# Patient Record
Sex: MALE | Race: BLACK OR AFRICAN AMERICAN | NOT HISPANIC OR LATINO | Employment: OTHER | URBAN - METROPOLITAN AREA
[De-identification: names, ages, dates, MRNs, and addresses within clinical notes are randomized per-mention and may not be internally consistent; named-entity substitution may affect disease eponyms.]

---

## 2017-01-01 RX ORDER — ISOSORBIDE MONONITRATE 30 MG/1
TABLET, EXTENDED RELEASE ORAL
Qty: 90 TABLET | Refills: 0 | Status: SHIPPED | OUTPATIENT
Start: 2017-01-01 | End: 2017-01-01 | Stop reason: SDUPTHER

## 2017-01-01 RX ORDER — LOSARTAN POTASSIUM 50 MG/1
TABLET ORAL
Qty: 60 TABLET | Refills: 0 | Status: SHIPPED | OUTPATIENT
Start: 2017-01-01 | End: 2018-01-01 | Stop reason: SDUPTHER

## 2017-01-01 RX ORDER — ISOSORBIDE MONONITRATE 30 MG/1
TABLET, EXTENDED RELEASE ORAL
Qty: 60 TABLET | Refills: 0 | Status: SHIPPED | OUTPATIENT
Start: 2017-01-01 | End: 2018-01-01

## 2017-01-01 RX ORDER — ISOSORBIDE MONONITRATE 30 MG/1
TABLET, EXTENDED RELEASE ORAL
Qty: 90 TABLET | Refills: 0 | Status: SHIPPED | OUTPATIENT
Start: 2017-01-01 | End: 2018-01-01 | Stop reason: SDUPTHER

## 2017-01-01 RX ORDER — LOSARTAN POTASSIUM 50 MG/1
TABLET ORAL
Qty: 60 TABLET | Refills: 0 | Status: SHIPPED | OUTPATIENT
Start: 2017-01-01 | End: 2017-01-01 | Stop reason: SDUPTHER

## 2017-01-09 RX ORDER — SPIRONOLACTONE 25 MG/1
TABLET ORAL
Qty: 30 TABLET | Refills: 0 | Status: SHIPPED | OUTPATIENT
Start: 2017-01-09 | End: 2017-02-20 | Stop reason: SDUPTHER

## 2017-02-20 RX ORDER — SPIRONOLACTONE 25 MG/1
TABLET ORAL
Qty: 30 TABLET | Refills: 5 | Status: SHIPPED | OUTPATIENT
Start: 2017-02-20 | End: 2017-06-19 | Stop reason: SDUPTHER

## 2017-03-13 RX ORDER — POTASSIUM CHLORIDE 20 MEQ/1
TABLET, EXTENDED RELEASE ORAL
Qty: 90 TABLET | Refills: 0 | Status: SHIPPED | OUTPATIENT
Start: 2017-03-13

## 2017-03-13 RX ORDER — FUROSEMIDE 40 MG/1
TABLET ORAL
Qty: 120 TABLET | Refills: 0 | Status: SHIPPED | OUTPATIENT
Start: 2017-03-13 | End: 2017-06-11 | Stop reason: SDUPTHER

## 2017-03-20 ENCOUNTER — CLINICAL SUPPORT (OUTPATIENT)
Dept: TRANSPLANT | Facility: CLINIC | Age: 57
End: 2017-03-20
Payer: MEDICAID

## 2017-03-20 ENCOUNTER — HOSPITAL ENCOUNTER (OUTPATIENT)
Dept: CARDIOLOGY | Facility: CLINIC | Age: 57
Discharge: HOME | End: 2017-03-20
Payer: MEDICAID

## 2017-03-20 ENCOUNTER — OFFICE VISIT (OUTPATIENT)
Dept: TRANSPLANT | Facility: CLINIC | Age: 57
End: 2017-03-20
Payer: MEDICAID

## 2017-03-20 VITALS
SYSTOLIC BLOOD PRESSURE: 122 MMHG | DIASTOLIC BLOOD PRESSURE: 79 MMHG | WEIGHT: 228.81 LBS | HEART RATE: 73 BPM | HEIGHT: 71 IN | BODY MASS INDEX: 32.03 KG/M2

## 2017-03-20 DIAGNOSIS — Z95.0 BIVENTRICULAR CARDIAC PACEMAKER IN SITU: ICD-10-CM

## 2017-03-20 DIAGNOSIS — I25.111 CORONARY ARTERY DISEASE INVOLVING NATIVE CORONARY ARTERY OF NATIVE HEART WITH ANGINA PECTORIS WITH DOCUMENTED SPASM: ICD-10-CM

## 2017-03-20 DIAGNOSIS — I63.9 CEREBROVASCULAR ACCIDENT (CVA), UNSPECIFIED MECHANISM: ICD-10-CM

## 2017-03-20 DIAGNOSIS — D86.0 SARCOIDOSIS OF LUNG: ICD-10-CM

## 2017-03-20 DIAGNOSIS — G40.909 SEIZURE DISORDER: ICD-10-CM

## 2017-03-20 DIAGNOSIS — I50.22 CHRONIC SYSTOLIC HEART FAILURE: ICD-10-CM

## 2017-03-20 DIAGNOSIS — I50.22 CHRONIC SYSTOLIC HEART FAILURE: Primary | ICD-10-CM

## 2017-03-20 DIAGNOSIS — I50.22 CHRONIC SYSTOLIC CHF (CONGESTIVE HEART FAILURE): Primary | ICD-10-CM

## 2017-03-20 LAB
DIASTOLIC DYSFUNCTION: NO
DIASTOLIC DYSFUNCTION: NO
ESTIMATED PA SYSTOLIC PRESSURE: 24
MITRAL VALVE REGURGITATION: ABNORMAL
RETIRED EF AND QEF - SEE NOTES: 13 (ref 55–65)
TRICUSPID VALVE REGURGITATION: ABNORMAL

## 2017-03-20 PROCEDURE — 99999 PR PBB SHADOW E&M-EST. PATIENT-LVL III: CPT | Mod: PBBFAC,,, | Performed by: INTERNAL MEDICINE

## 2017-03-20 PROCEDURE — 93306 TTE W/DOPPLER COMPLETE: CPT | Mod: 26,S$PBB,, | Performed by: INTERNAL MEDICINE

## 2017-03-20 PROCEDURE — 94621 CARDIOPULM EXERCISE TESTING: CPT | Mod: 26,S$PBB,, | Performed by: INTERNAL MEDICINE

## 2017-03-20 PROCEDURE — 99214 OFFICE O/P EST MOD 30 MIN: CPT | Mod: S$PBB,,, | Performed by: INTERNAL MEDICINE

## 2017-03-20 RX ORDER — PROMETHAZINE HYDROCHLORIDE AND CODEINE PHOSPHATE 6.25; 1 MG/5ML; MG/5ML
SOLUTION ORAL
Refills: 0 | COMMUNITY
Start: 2017-02-07 | End: 2017-03-20

## 2017-03-20 RX ORDER — COLCHICINE 0.6 MG/1
0.6 TABLET ORAL DAILY
Refills: 5 | COMMUNITY
Start: 2017-03-11

## 2017-03-20 RX ORDER — PREDNISONE 10 MG/1
TABLET ORAL
Refills: 5 | Status: ON HOLD | COMMUNITY
Start: 2017-02-26 | End: 2018-01-01 | Stop reason: HOSPADM

## 2017-03-20 RX ORDER — LOSARTAN POTASSIUM 50 MG/1
50 TABLET ORAL DAILY
Qty: 30 TABLET | Refills: 6
Start: 2017-03-20 | End: 2017-06-19 | Stop reason: SDUPTHER

## 2017-03-20 NOTE — MR AVS SNAPSHOT
[facility]  [address]  [facility] [address]  Phone: [phone]                  Yonathan Good Jr.   3/20/2017 10:30 AM   Office Visit    Description:  Male : 1960   Provider:  Chana Berman MD   Department:  [facility]           Reason for Visit     Heart Transplant Pre-evaluation           Diagnoses this Visit        Comments    Cerebrovascular accident (CVA), unspecified mechanism    -  Primary     Seizure disorder         Sarcoidosis of lung         Chronic systolic heart failure         Coronary artery disease involving native coronary artery of native heart with angina pectoris with documented spasm         Biventricular cardiac pacemaker in situ                To Do List           Future Appointments        Provider Department Dept Phone    2017 9:00 AM LAB, APPOINTMENT [place] [facility] [phone]    2017 10:30 AM Kimberly Lazo MD [facility] [phone]      Goals (5 Years of Data)     None       These Medications        Disp Refills Start End    losartan (COZAAR) 50 MG tablet 30 tablet 6 3/20/2017     Take 1 tablet (50 mg total) by mouth once daily. - Oral    Pharmacy: [facility] [address] [place] [address] HEATHER AT [facility] Ph #: [phone]         [facility]     [facility] Care [facility] - 24/ Assistance  Registered nurses in the [facility] provide clinical advisement, health education, appointment booking, and other advisory services.  Call for this free service at [phone].             Medications           Message regarding Medications     Verify the changes and/or additions to your medication regime listed below are the same as discussed with your clinician today.  If any of these changes or additions are incorrect, please notify your healthcare provider.        CHANGE how you are taking these medications     Start Taking  Instead of    losartan (COZAAR) 50 MG tablet losartan (COZAAR) 50 MG tablet    Dosage:  Take 1 tablet (50 mg total) by mouth once daily. Dosage:  Take 1 tablet (50 mg total) by mouth 2 (two) times daily.    Reason for Change:  Reorder       STOP taking these medications     promethazine-codeine 6.25-10 mg/5 ml (PHENERGAN WITH CODEINE) 6.25-10 mg/5 mL syrup TK 5 ML PO Q 6 H PRF COUGH           Verify that the below list of medications is an accurate representation of the medications you are currently taking.  If none reported, the list may be blank. If incorrect, please contact your healthcare provider. Carry this list with you in case of emergency.           Current Medications     albuterol (VENTOLIN HFA) 90 mcg/actuation inhaler Inhale 2 puffs into the lungs every 4 (four) hours as needed for Wheezing.    allopurinol (ZYLOPRIM) 100 MG tablet TK 1 T PO D FOR GOUT    alprazolam (XANAX) 2 MG tablet Take 2 mg by mouth 2 (two) times daily as needed.     ARNUITY ELLIPTA 200 mcg/actuation DsDv INHALE 1 PUFF PO QD    aspirin (ECOTRIN) 81 MG EC tablet Take 81 mg by mouth. 1 Tablet, Delayed Release (E.C.) Oral Every day    carisoprodol (SOMA) 350 MG tablet Take 350 mg by mouth 4 (four) times daily as needed for Muscle spasms.    colchicine 0.6 mg tablet 0.6 mg once daily.     fluticasone furoate 100 mcg/actuation DsDv Inhale into the lungs.    folic acid (FOLVITE) 1 MG tablet TK 1 T PO QD    furosemide (LASIX) 40 MG tablet TAKE 2 TABLETS BY MOUTH TWICE DAILY    gabapentin (NEURONTIN) 600 MG tablet Take 600 mg by mouth 2 (two) times daily. 1 Tablet Oral At bedtime    hydrocodone-acetaminophen 10-325mg (NORCO)  mg Tab Take 1 tablet by mouth 2 (two) times daily.     isosorbide mononitrate (IMDUR) 30 MG 24 hr tablet Take 2 tablets (60 mg total) by mouth once daily.    losartan (COZAAR) 50 MG tablet Take 1 tablet (50 mg total) by mouth once daily.    NITROSTAT 0.4 mg SL tablet Place 0.4 mg under the tongue every 5 (five)  "minutes as needed.     pantoprazole (PROTONIX) 40 MG tablet Take 40 mg by mouth. 1 Tablet, Delayed Release (E.C.) Oral Every day    phenytoin (DILANTIN) 100 MG ER capsule 100 mg once.     potassium chloride SA (K-DUR,KLOR-CON) 20 MEQ tablet TAKE 2 TABLETS BY MOUTH EVERY MORNING AND 1 TABLET BY MOUTH EVERY EVENING    pravastatin (PRAVACHOL) 40 MG tablet Take 40 mg by mouth once daily.     predniSONE (DELTASONE) 10 MG tablet TK 1 T PO QAM    predniSONE (DELTASONE) 5 MG tablet 10 mg once daily.     sotalol (BETAPACE) 120 MG Tab 120 mg every 12 (twelve) hours.     spironolactone (ALDACTONE) 25 MG tablet TAKE 1 TABLET(25 MG) BY MOUTH EVERY DAY    warfarin (COUMADIN) 7.5 MG tablet            Clinical Reference Information           Your Vitals Were     BP Pulse Height Weight BMI    122/79 73 5' 11" (1.803 m) 103.8 kg (228 lb 13.4 oz) 31.92 kg/m2      Blood Pressure          Most Recent Value    BP  122/79      Allergies as of 3/20/2017     No Known Allergies      Immunizations Administered on Date of Encounter - 3/20/2017     None      Maintenance Dialysis History     Patient has no recorded history of maintenance dialysis.      Transplant Information        Txp Date Organ Coordinator Care Team     Heart JOANNA Hammondstarun Sign-Up     Activating your [facility] account is as easy as 1-2-3!     1) Visit [email or web address], select Sign Up Now, enter this activation code and your date of birth, then select Next.  [email or web address]  Expires: 3/30/2017  8:44 AM      2) Create a username and password to use when you visit [facility] in the future and select a security question in case you lose your password and select Next.    3) Enter your e-mail address and click Sign Up!    Additional Information  If you have questions, please e-mail [email or web address] or call [phone] to talk to our [facility] staff. Remember, [facility] is NOT to be used for urgent needs. For medical emergencies, dial 911.         Language " Assistance Services     ATTENTION: Language assistance services are available, free of charge. Please call [phone].      ATENCIÓN: Si habla vasquez, tiene a torres disposición servicios gratuitos de asistencia lingüística. Llame al [phone].     CHÚ Ý: N?u b?n nói Ti?ng Vi?t, có các d?ch v? h? tr? ngôn ng? mi?n phí dành cho b?n. G?i s? [phone].         [facility] complies with applicable Federal civil rights laws and does not discriminate on the basis of race, color, national origin, age, disability, or sex.

## 2017-03-20 NOTE — PROGRESS NOTES
"Subjective:   Initial evaluation of heart transplant candidacy.    HPI:  Mr. Good is a 56 y.o. year old Black or  male who has presented to be evaluated as a potential heart transplant recipient.  Mr. Good has ICM, LVEF 10% ([facility]viously [facility] in [facility]), CAD /sp SONG, left parietal stroke associated with presumed LV thrombus, pulmonary sarcoidosis, HTN, seizure disorder, left ulnar neuropathy, whom we first met 07/2012 on transfer for BIV/ICD placement. At the time, cardiac MRI done before placement of ICD demonstrated nontransmural myocardial scar, which was most likely representing MI in LAD distribution, not sarcoidosis. Last hospitalization for heart failure March 2014.     Since his last visit, he feels he has been doing very well. We increased his diuretic after August RHC, and patient states he has been doing very well since then. However then states that he notices shortness of breath that comes on with "fast heart rate", still cutting grass but not doing it as much as before at a time, takes more breaks, only able to do front yard rather than both front and back. Admits to stable tightness in his chest, states he takes nitroglycerin and it resolves. He describes NYHA FC III, states he does not get short of breath if he takes his time, and is adamant that he feels better. Is very hesitant to proceed with advanced options, due to feeling as though he feels better. Of note, states he quit drinking alcohol completely since his last visit with me in August (although in August he stated he quit drinking earlier than that?).    CPX with pkVO2 10.4 ml/kg/min and VE/VCO2 27.1.     Underwent RHC 08/08/16, with the following findings:  AOPRES: 138/101 (113)  AOSAT: 96  FICKCI: 1.92  FICKCO: 4.29  PAPRES: 66/34 (48)  PASAT: 59  PVR: 4.2  PWPRES: 34/34 (34)  RAPRES: 19/19 (17)  RVPRES: 64/13, 18    Echo 05/29/15:  1 - Left ventricular enlargement. LVEDD 7.0cm.   2 - Eccentric hypertrophy. " "  3 - Severely depressed left ventricular systolic function (EF 15-20%).   4 - Left ventricular diastolic dysfunction.   5 - Mild left atrial enlargement.   6 - Pulmonary hypertension.   7 - Normal right ventricular systolic function .   8 - Trivial tricuspid regurgitation.     Review of Systems   Constitution: Negative for chills, decreased appetite, diaphoresis, fever, weakness, malaise/fatigue, weight gain and weight loss.   HENT: Negative for headaches.    Eyes: Negative for visual disturbance.   Cardiovascular: Positive for dyspnea on exertion and palpitations. Negative for chest pain, irregular heartbeat, leg swelling, near-syncope, orthopnea, paroxysmal nocturnal dyspnea and syncope.   Respiratory: Positive for cough, shortness of breath and sputum production. Negative for sleep disturbances due to breathing, snoring and wheezing.    Hematologic/Lymphatic: Negative for bleeding problem. Does not bruise/bleed easily.   Skin: Negative for color change, poor wound healing and rash.   Musculoskeletal: Negative for back pain, falls, joint pain and muscle weakness.   Gastrointestinal: Negative for bloating, abdominal pain, constipation, diarrhea, hematemesis, hematochezia, melena, nausea and vomiting.   Genitourinary: Negative for nocturia.   Neurological: Negative for excessive daytime sleepiness, dizziness, focal weakness and light-headedness.   Psychiatric/Behavioral: Negative for depression and memory loss. The patient does not have insomnia and is not nervous/anxious.      Objective:     Physical Exam   Constitutional: He is oriented to person, place, and time. He appears well-developed and well-nourished. No distress.   /79  Pulse 73  Ht 5' 11" (1.803 m)  Wt 103.8 kg (228 lb 13.4 oz)  BMI 31.92 kg/m2   HENT:   Head: Normocephalic and atraumatic.   Mouth/Throat: Oropharynx is clear and moist.   Eyes: Conjunctivae and EOM are normal. Right eye exhibits no discharge. Left eye exhibits no discharge. "   Neck: Normal range of motion. Neck supple. No JVD (7cm H2O) present.   Cardiovascular: Normal rate, regular rhythm and intact distal pulses.  Exam reveals no gallop and no friction rub.    No murmur heard.  Pulmonary/Chest: Effort normal and breath sounds normal. He has no wheezes. He has no rales. He exhibits no tenderness.   Abdominal: Soft. Bowel sounds are normal. He exhibits no distension and no mass. There is no tenderness. There is no rebound and no guarding.   Musculoskeletal: Normal range of motion. He exhibits no edema or tenderness.   Neurological: He is alert and oriented to person, place, and time.   Skin: Skin is warm and dry. No rash noted. He is not diaphoretic. No erythema.   Psychiatric: He has a normal mood and affect. His behavior is normal. Judgment and thought content normal.   Nursing note and vitals reviewed.    Labs:      Chemistry        Component Value Date/Time     03/20/2017 0855    K 3.5 03/20/2017 0855     03/20/2017 0855    CO2 24 03/20/2017 0855    BUN 23 (H) 03/20/2017 0855    CREATININE 1.1 03/20/2017 0855    GLU 98 03/20/2017 0855        Component Value Date/Time    CALCIUM 9.1 03/20/2017 0855    ALKPHOS 54 (L) 03/20/2017 0855    AST 17 03/20/2017 0855    ALT 21 03/20/2017 0855    BILITOT 0.6 03/20/2017 0855          Magnesium   Date Value Ref Range Status   10/07/2016 2.5 1.6 - 2.6 mg/dL Final     Lab Results   Component Value Date    WBC 5.14 03/20/2017    HGB 14.3 03/20/2017    HCT 42.0 03/20/2017    MCV 93 03/20/2017     (L) 03/20/2017     Lab Results   Component Value Date    INR 2.1 (H) 03/20/2017    INR 2.4 (H) 10/07/2016    INR 1.5 (H) 08/08/2016     BNP   Date Value Ref Range Status   03/20/2017 380 (H) 0 - 99 pg/mL Final     Comment:     Values of less than 100 pg/ml are consistent with non-CHF populations.   10/07/2016 442 (H) 0 - 99 pg/mL Final     Comment:     Values of less than 100 pg/ml are consistent with non-CHF populations.   08/08/2016 5398  (H) 0 - 99 pg/mL Final     Comment:     Values of less than 100 pg/ml are consistent with non-CHF populations.     Labs were reviewed with the patient.    Assessment:      1. Chronic systolic heart failure    2. Cerebrovascular accident (CVA), unspecified mechanism    3. Seizure disorder    4. Sarcoidosis of lung    5. Coronary artery disease involving native coronary artery of native heart with angina pectoris with documented spasm    6. Biventricular cardiac pacemaker in situ        Plan:   Patient seems to have declined some since his last visit in October, when he was NYHA FC II-III and had improved so much with increasing his meds.   He is very reluctant to proceed with advanced options, states he is skeptical of this and he would prefer to look for etiology of chest pain before proceeding with further advanced options.   He is also very hesitant to increase imdur because he is worried about his blood pressure dropping.   Patient states he has abstained from drinking alcohol since August of 2016.   Will send a copy of our note to Dr. Rahman who is his primary cardiologist (and who we apologize to for not sending notes in the past, patient states he has not been receiving our notes, we will correct this with the system coordinator and make sure the numbers in the computer are accurate). Since it does seem patient is having a little more chest pain now than previously, wonder about performing a repeat ischemic evaluation- last one here was in 2015 at which time patient had infarct only, no ischemia.   Additionally patient remarks about heart racing and feels this is the main time that his chest pain acts up- wonder about VT, if possible that Dr. Rahman would perform ICD interrogation and see if there are any more arrhythmias noted than previous.   Patient is now NYHA III  Recommend 2 gram sodium restriction and 1500cc fluid restriction.  Encourage physical activity with graded exercise program.  Requested patient  to weigh themselves daily, and to notify us if their weight increases by more than 3 lbs in 1 day or 5 lbs in 1 week.   Would like to repeat RHC, with possible admission at that time, however patient wants to hold off still on evaluation.       UNOS Patient Status  Functional Status: 80% - Normal activity with effort: some symptoms of disease  Physical Capacity: No Limitations  Working for Income: Unknown    Chana Berman MD

## 2017-05-11 RX ORDER — ISOSORBIDE MONONITRATE 30 MG/1
TABLET, EXTENDED RELEASE ORAL
Qty: 60 TABLET | Refills: 0 | Status: SHIPPED | OUTPATIENT
Start: 2017-05-11 | End: 2017-06-11 | Stop reason: SDUPTHER

## 2017-06-12 RX ORDER — FUROSEMIDE 40 MG/1
TABLET ORAL
Qty: 120 TABLET | Refills: 0 | Status: ON HOLD | OUTPATIENT
Start: 2017-06-12 | End: 2018-01-01

## 2017-06-12 RX ORDER — ISOSORBIDE MONONITRATE 30 MG/1
TABLET, EXTENDED RELEASE ORAL
Qty: 60 TABLET | Refills: 0 | Status: SHIPPED | OUTPATIENT
Start: 2017-06-12 | End: 2017-06-19 | Stop reason: SDUPTHER

## 2017-06-16 ENCOUNTER — TELEPHONE (OUTPATIENT)
Dept: TRANSPLANT | Facility: CLINIC | Age: 57
End: 2017-06-16

## 2017-06-16 NOTE — TELEPHONE ENCOUNTER
Contacted patient to confirm appt for Monday w/Dr. Berman at 9 and labs at 0720.  Pt verbalized understanding and advised of plans to keep appts.

## 2017-06-19 ENCOUNTER — LAB VISIT (OUTPATIENT)
Dept: LAB | Facility: HOSPITAL | Age: 57
End: 2017-06-19
Attending: INTERNAL MEDICINE
Payer: MEDICAID

## 2017-06-19 ENCOUNTER — OFFICE VISIT (OUTPATIENT)
Dept: TRANSPLANT | Facility: CLINIC | Age: 57
End: 2017-06-19
Payer: MEDICAID

## 2017-06-19 VITALS
HEART RATE: 63 BPM | HEIGHT: 71 IN | WEIGHT: 228.81 LBS | BODY MASS INDEX: 32.03 KG/M2 | DIASTOLIC BLOOD PRESSURE: 76 MMHG | SYSTOLIC BLOOD PRESSURE: 114 MMHG

## 2017-06-19 DIAGNOSIS — D86.0 SARCOIDOSIS OF LUNG: ICD-10-CM

## 2017-06-19 DIAGNOSIS — I25.111 CORONARY ARTERY DISEASE INVOLVING NATIVE CORONARY ARTERY OF NATIVE HEART WITH ANGINA PECTORIS WITH DOCUMENTED SPASM: ICD-10-CM

## 2017-06-19 DIAGNOSIS — G40.909 SEIZURE DISORDER: ICD-10-CM

## 2017-06-19 DIAGNOSIS — I50.22 CHRONIC SYSTOLIC HEART FAILURE: Primary | ICD-10-CM

## 2017-06-19 DIAGNOSIS — R91.8 OPACITY OF LUNG ON IMAGING STUDY: ICD-10-CM

## 2017-06-19 DIAGNOSIS — I50.22 CHRONIC SYSTOLIC CHF (CONGESTIVE HEART FAILURE): ICD-10-CM

## 2017-06-19 DIAGNOSIS — Z95.0 BIVENTRICULAR CARDIAC PACEMAKER IN SITU: ICD-10-CM

## 2017-06-19 DIAGNOSIS — I63.9 CEREBROVASCULAR ACCIDENT (CVA), UNSPECIFIED MECHANISM: ICD-10-CM

## 2017-06-19 LAB
ALBUMIN SERPL BCP-MCNC: 3.9 G/DL
ALP SERPL-CCNC: 56 U/L
ALT SERPL W/O P-5'-P-CCNC: 37 U/L
ANION GAP SERPL CALC-SCNC: 12 MMOL/L
AST SERPL-CCNC: 33 U/L
BASOPHILS # BLD AUTO: 0.04 K/UL
BASOPHILS NFR BLD: 0.6 %
BILIRUB SERPL-MCNC: 0.6 MG/DL
BNP SERPL-MCNC: 627 PG/ML
BUN SERPL-MCNC: 30 MG/DL
CALCIUM SERPL-MCNC: 9.5 MG/DL
CHLORIDE SERPL-SCNC: 102 MMOL/L
CO2 SERPL-SCNC: 25 MMOL/L
CREAT SERPL-MCNC: 1.4 MG/DL
DIFFERENTIAL METHOD: ABNORMAL
EOSINOPHIL # BLD AUTO: 0.2 K/UL
EOSINOPHIL NFR BLD: 2.7 %
ERYTHROCYTE [DISTWIDTH] IN BLOOD BY AUTOMATED COUNT: 14.5 %
EST. GFR  (AFRICAN AMERICAN): >60 ML/MIN/1.73 M^2
EST. GFR  (NON AFRICAN AMERICAN): 56.6 ML/MIN/1.73 M^2
GLUCOSE SERPL-MCNC: 96 MG/DL
HCT VFR BLD AUTO: 45.1 %
HGB BLD-MCNC: 15 G/DL
LYMPHOCYTES # BLD AUTO: 1.8 K/UL
LYMPHOCYTES NFR BLD: 27.8 %
MCH RBC QN AUTO: 31.7 PG
MCHC RBC AUTO-ENTMCNC: 33.3 %
MCV RBC AUTO: 95 FL
MONOCYTES # BLD AUTO: 1.1 K/UL
MONOCYTES NFR BLD: 16.2 %
NEUTROPHILS # BLD AUTO: 3.4 K/UL
NEUTROPHILS NFR BLD: 52.2 %
PLATELET # BLD AUTO: 176 K/UL
PMV BLD AUTO: 11.2 FL
POTASSIUM SERPL-SCNC: 3.7 MMOL/L
PROT SERPL-MCNC: 8.1 G/DL
RBC # BLD AUTO: 4.73 M/UL
SODIUM SERPL-SCNC: 139 MMOL/L
WBC # BLD AUTO: 6.55 K/UL

## 2017-06-19 PROCEDURE — 99999 PR PBB SHADOW E&M-EST. PATIENT-LVL III: CPT | Mod: PBBFAC,TXP,, | Performed by: INTERNAL MEDICINE

## 2017-06-19 PROCEDURE — 99214 OFFICE O/P EST MOD 30 MIN: CPT | Mod: S$PBB,TXP,, | Performed by: INTERNAL MEDICINE

## 2017-06-19 PROCEDURE — 99213 OFFICE O/P EST LOW 20 MIN: CPT | Mod: PBBFAC,TXP | Performed by: INTERNAL MEDICINE

## 2017-06-19 RX ORDER — LOSARTAN POTASSIUM 50 MG/1
50 TABLET ORAL DAILY
Qty: 30 TABLET | Refills: 6
Start: 2017-06-19

## 2017-06-19 RX ORDER — ISOSORBIDE MONONITRATE 30 MG/1
90 TABLET, EXTENDED RELEASE ORAL DAILY
Qty: 90 TABLET | Refills: 0 | Status: SHIPPED | OUTPATIENT
Start: 2017-06-19 | End: 2017-01-01 | Stop reason: SDUPTHER

## 2017-06-19 RX ORDER — SPIRONOLACTONE 25 MG/1
TABLET ORAL
Qty: 30 TABLET | Refills: 5 | Status: SHIPPED | OUTPATIENT
Start: 2017-06-19 | End: 2018-01-01 | Stop reason: SDUPTHER

## 2017-06-19 NOTE — PATIENT INSTRUCTIONS
Increase imdur to 3 tablets once a day (90mg total).     If blood pressure drops too much, and you feel it, decrease to 60mg again.

## 2017-06-19 NOTE — PROGRESS NOTES
"Subjective:   Initial evaluation of heart transplant candidacy.    HPI:  Mr. Good is a 56 y.o. year old Black or  male who has presented to be evaluated as a potential heart transplant recipient.  Mr. Good has ICM, LVEF 10% (previously 25% in 2012 [facility] at [facility]), CAD /sp SONG, left parietal stroke associated with presumed LV thrombus, pulmonary sarcoidosis, HTN, seizure disorder, left ulnar neuropathy, whom we first met 07/2012 on transfer for BIV/ICD placement. At the time, cardiac MRI done before placement of ICD demonstrated nontransmural myocardial scar, which was most likely representing MI in LAD distribution, not sarcoidosis. Last hospitalization for heart failure March 2014.     Since his last visit, he feels he has been doing very well. We increased his diuretic after August RHC, and patient states he has been doing very well since then. However then states that he notices shortness of breath that comes on with "fast heart rate", still cutting grass but not doing it as much as before at a time, takes more breaks, only able to do front yard rather than both front and back. Admits to stable tightness in his chest, states he takes nitroglycerin and it resolves. He describes NYHA FC III, states he does not get short of breath if he takes his time, and is adamant that he feels better. Is very hesitant to proceed with advanced options, due to feeling as though he feels better. Of note, states he quit drinking alcohol completely since his last visit with me in August (although in August he stated he quit drinking earlier than that?).    CPX with pkVO2 10.4 ml/kg/min and VE/VCO2 27.1.     Underwent RHC 08/08/16, with the following findings:  AOPRES: 138/101 (113)  AOSAT: 96  FICKCI: 1.92  FICKCO: 4.29  PAPRES: 66/34 (48)  PASAT: 59  PVR: 4.2  PWPRES: 34/34 (34)  RAPRES: 19/19 (17)  RVPRES: 64/13, 18    Echo 05/29/15:  1 - Left ventricular enlargement. LVEDD 7.0cm.   2 - Eccentric hypertrophy. " "  3 - Severely depressed left ventricular systolic function (EF 15-20%).   4 - Left ventricular diastolic dysfunction.   5 - Mild left atrial enlargement.   6 - Pulmonary hypertension.   7 - Normal right ventricular systolic function .   8 - Trivial tricuspid regurgitation.     Review of Systems   Constitution: Negative for chills, decreased appetite, diaphoresis, fever, weakness, malaise/fatigue, weight gain and weight loss.   HENT: Negative for headaches.    Eyes: Negative for visual disturbance.   Cardiovascular: Positive for dyspnea on exertion and palpitations. Negative for chest pain, irregular heartbeat, leg swelling, near-syncope, orthopnea, paroxysmal nocturnal dyspnea and syncope.   Respiratory: Positive for cough, shortness of breath and sputum production. Negative for sleep disturbances due to breathing, snoring and wheezing.    Hematologic/Lymphatic: Negative for bleeding problem. Does not bruise/bleed easily.   Skin: Negative for color change, poor wound healing and rash.   Musculoskeletal: Negative for back pain, falls, joint pain and muscle weakness.   Gastrointestinal: Negative for bloating, abdominal pain, constipation, diarrhea, hematemesis, hematochezia, melena, nausea and vomiting.   Genitourinary: Negative for nocturia.   Neurological: Negative for excessive daytime sleepiness, dizziness, focal weakness and light-headedness.   Psychiatric/Behavioral: Negative for depression and memory loss. The patient does not have insomnia and is not nervous/anxious.      Objective:     Physical Exam   Constitutional: He is oriented to person, place, and time. He appears well-developed and well-nourished. No distress.   /79  Pulse 73  Ht 5' 11" (1.803 m)  Wt 103.8 kg (228 lb 13.4 oz)  BMI 31.92 kg/m2   HENT:   Head: Normocephalic and atraumatic.   Mouth/Throat: Oropharynx is clear and moist.   Eyes: Conjunctivae and EOM are normal. Right eye exhibits no discharge. Left eye exhibits no discharge. "   Neck: Normal range of motion. Neck supple. No JVD (7cm H2O) present.   Cardiovascular: Normal rate, regular rhythm and intact distal pulses.  Exam reveals no gallop and no friction rub.    No murmur heard.  Pulmonary/Chest: Effort normal and breath sounds normal. He has no wheezes. He has no rales. He exhibits no tenderness.   Abdominal: Soft. Bowel sounds are normal. He exhibits no distension and no mass. There is no tenderness. There is no rebound and no guarding.   Musculoskeletal: Normal range of motion. He exhibits no edema or tenderness.   Neurological: He is alert and oriented to person, place, and time.   Skin: Skin is warm and dry. No rash noted. He is not diaphoretic. No erythema.   Psychiatric: He has a normal mood and affect. His behavior is normal. Judgment and thought content normal.   Nursing note and vitals reviewed.    Labs:      Chemistry        Component Value Date/Time     06/19/2017 0811    K 3.7 06/19/2017 0811     06/19/2017 0811    CO2 25 06/19/2017 0811    BUN 30 (H) 06/19/2017 0811    CREATININE 1.4 06/19/2017 0811    GLU 96 06/19/2017 0811        Component Value Date/Time    CALCIUM 9.5 06/19/2017 0811    ALKPHOS 56 06/19/2017 0811    AST 33 06/19/2017 0811    ALT 37 06/19/2017 0811    BILITOT 0.6 06/19/2017 0811          Magnesium   Date Value Ref Range Status   10/07/2016 2.5 1.6 - 2.6 mg/dL Final     Lab Results   Component Value Date    WBC 6.55 06/19/2017    HGB 15.0 06/19/2017    HCT 45.1 06/19/2017    MCV 95 06/19/2017     06/19/2017     Lab Results   Component Value Date    INR 2.1 (H) 03/20/2017    INR 2.4 (H) 10/07/2016    INR 1.5 (H) 08/08/2016     BNP   Date Value Ref Range Status   06/19/2017 627 (H) 0 - 99 pg/mL Final     Comment:     Values of less than 100 pg/ml are consistent with non-CHF populations.   03/20/2017 380 (H) 0 - 99 pg/mL Final     Comment:     Values of less than 100 pg/ml are consistent with non-CHF populations.   10/07/2016 442 (H) 0 -  99 pg/mL Final     Comment:     Values of less than 100 pg/ml are consistent with non-CHF populations.     Labs were reviewed with the patient.    Assessment:      1. Chronic systolic heart failure    2. Sarcoidosis of lung    3. Cerebrovascular accident (CVA), unspecified mechanism    4. Seizure disorder    5. Coronary artery disease involving native coronary artery of native heart with angina pectoris with documented spasm    6. Opacity of lung on imaging study    7. Biventricular cardiac pacemaker in situ        Plan:   Patient states today he has thought about it more and feels better about the possibility of proceeding with evaluation for OHT/LVAD. His daughter is very trepidatious and less comfortable with the idea, but as opposed to last visit, he states he is ready if we think it will help him feel better.   Will increase imdur to 90mg po daily to see if helps with his chest pain, chest pressure.  Additionally, he states he has not gotten tested for sleep apnea, and will approach Dr. Gates to try to set this up.   Come back in 2 months with repeat CPX, and possible intitiation of evaluation at that time.   Will send a copy of this note to Dr. Rahman as well, and thank him for sending EKG and FUENTES report.   Patient is now NYHA III  Recommend 2 gram sodium restriction and 1500cc fluid restriction.  Encourage physical activity with graded exercise program.  Requested patient to weigh themselves daily, and to notify us if their weight increases by more than 3 lbs in 1 day or 5 lbs in 1 week.   Would like to repeat RHC, with possible admission at that time, however will plan on doing this at next visit if we proceed with evaluation.       UNOS Patient Status  Functional Status: 70% - Cares for self: unable to carry on normal activity or active work  Physical Capacity: No Limitations  Working for Income: Unknown    Chana Berman MD

## 2017-06-27 DIAGNOSIS — I50.22 CHRONIC SYSTOLIC HEART FAILURE: Primary | ICD-10-CM

## 2017-07-23 RX ORDER — LOSARTAN POTASSIUM 50 MG/1
TABLET ORAL
Qty: 60 TABLET | Refills: 0 | Status: SHIPPED | OUTPATIENT
Start: 2017-07-23 | End: 2018-01-01 | Stop reason: SDUPTHER

## 2018-01-01 ENCOUNTER — ANESTHESIA (OUTPATIENT)
Dept: INTENSIVE CARE | Facility: HOSPITAL | Age: 58
End: 2018-01-01

## 2018-01-01 ENCOUNTER — HOSPITAL ENCOUNTER (INPATIENT)
Facility: HOSPITAL | Age: 58
LOS: 22 days | DRG: 981 | End: 2018-07-29
Attending: INTERNAL MEDICINE | Admitting: INTERNAL MEDICINE
Payer: MEDICAID

## 2018-01-01 ENCOUNTER — TELEPHONE (OUTPATIENT)
Dept: TRANSPLANT | Facility: CLINIC | Age: 58
End: 2018-01-01

## 2018-01-01 ENCOUNTER — OFFICE VISIT (OUTPATIENT)
Dept: TRANSPLANT | Facility: CLINIC | Age: 58
End: 2018-01-01
Payer: MEDICAID

## 2018-01-01 ENCOUNTER — ANESTHESIA (OUTPATIENT)
Dept: INTENSIVE CARE | Facility: HOSPITAL | Age: 58
DRG: 981 | End: 2018-01-01
Payer: MEDICAID

## 2018-01-01 ENCOUNTER — ANESTHESIA EVENT (OUTPATIENT)
Dept: INTENSIVE CARE | Facility: HOSPITAL | Age: 58
End: 2018-01-01

## 2018-01-01 ENCOUNTER — INITIAL CONSULT (OUTPATIENT)
Dept: TRANSPLANT | Facility: CLINIC | Age: 58
End: 2018-01-01
Payer: MEDICAID

## 2018-01-01 ENCOUNTER — COMMITTEE REVIEW (OUTPATIENT)
Dept: TRANSPLANT | Facility: CLINIC | Age: 58
End: 2018-01-01

## 2018-01-01 ENCOUNTER — PATIENT OUTREACH (OUTPATIENT)
Dept: ADMINISTRATIVE | Facility: CLINIC | Age: 58
End: 2018-01-01

## 2018-01-01 ENCOUNTER — DOCUMENTATION ONLY (OUTPATIENT)
Dept: ELECTROPHYSIOLOGY | Facility: CLINIC | Age: 58
End: 2018-01-01

## 2018-01-01 ENCOUNTER — ANESTHESIA EVENT (OUTPATIENT)
Dept: ENDOSCOPY | Facility: HOSPITAL | Age: 58
DRG: 091 | End: 2018-01-01
Payer: MEDICAID

## 2018-01-01 ENCOUNTER — HOSPITAL ENCOUNTER (OUTPATIENT)
Dept: CARDIOLOGY | Facility: CLINIC | Age: 58
Discharge: HOME | End: 2018-01-16
Attending: INTERNAL MEDICINE
Payer: MEDICAID

## 2018-01-01 ENCOUNTER — SOCIAL WORK (OUTPATIENT)
Dept: TRANSPLANT | Facility: HOSPITAL | Age: 58
End: 2018-01-01

## 2018-01-01 ENCOUNTER — HOSPITAL ENCOUNTER (OUTPATIENT)
Dept: CARDIOLOGY | Facility: CLINIC | Age: 58
Discharge: HOME | End: 2018-05-03
Payer: MEDICAID

## 2018-01-01 ENCOUNTER — HOSPITAL ENCOUNTER (OUTPATIENT)
Dept: RADIOLOGY | Facility: HOSPITAL | Age: 58
Discharge: HOME | End: 2018-05-03
Attending: INTERNAL MEDICINE
Payer: MEDICAID

## 2018-01-01 ENCOUNTER — HOSPITAL ENCOUNTER (OUTPATIENT)
Dept: PULMONOLOGY | Facility: CLINIC | Age: 58
Discharge: HOME | End: 2018-05-03
Payer: MEDICAID

## 2018-01-01 ENCOUNTER — ANESTHESIA (OUTPATIENT)
Dept: ENDOSCOPY | Facility: HOSPITAL | Age: 58
DRG: 091 | End: 2018-01-01
Payer: MEDICAID

## 2018-01-01 ENCOUNTER — ANESTHESIA EVENT (OUTPATIENT)
Dept: INTENSIVE CARE | Facility: HOSPITAL | Age: 58
DRG: 981 | End: 2018-01-01
Payer: MEDICAID

## 2018-01-01 ENCOUNTER — TELEPHONE (OUTPATIENT)
Dept: ADMINISTRATIVE | Facility: HOSPITAL | Age: 58
End: 2018-01-01

## 2018-01-01 ENCOUNTER — DOCUMENTATION ONLY (OUTPATIENT)
Dept: TRANSPLANT | Facility: CLINIC | Age: 58
End: 2018-01-01

## 2018-01-01 ENCOUNTER — EDUCATION (OUTPATIENT)
Dept: TRANSPLANT | Facility: CLINIC | Age: 58
End: 2018-01-01

## 2018-01-01 ENCOUNTER — HOSPITAL ENCOUNTER (OUTPATIENT)
Facility: HOSPITAL | Age: 58
Discharge: HOME | End: 2018-03-05
Attending: INTERNAL MEDICINE | Admitting: INTERNAL MEDICINE
Payer: MEDICAID

## 2018-01-01 ENCOUNTER — HOSPITAL ENCOUNTER (INPATIENT)
Facility: HOSPITAL | Age: 58
LOS: 12 days | Discharge: HOME | DRG: 091 | End: 2018-07-04
Attending: PSYCHIATRY & NEUROLOGY | Admitting: PSYCHIATRY & NEUROLOGY
Payer: MEDICAID

## 2018-01-01 ENCOUNTER — SURGERY (OUTPATIENT)
Age: 58
End: 2018-01-01

## 2018-01-01 ENCOUNTER — NURSE TRIAGE (OUTPATIENT)
Dept: ADMINISTRATIVE | Facility: CLINIC | Age: 58
End: 2018-01-01

## 2018-01-01 VITALS
DIASTOLIC BLOOD PRESSURE: 52 MMHG | WEIGHT: 237.44 LBS | BODY MASS INDEX: 33.24 KG/M2 | HEART RATE: 83 BPM | SYSTOLIC BLOOD PRESSURE: 97 MMHG | HEIGHT: 71 IN

## 2018-01-01 VITALS
OXYGEN SATURATION: 97 % | TEMPERATURE: 97 F | DIASTOLIC BLOOD PRESSURE: 75 MMHG | HEART RATE: 88 BPM | SYSTOLIC BLOOD PRESSURE: 131 MMHG | RESPIRATION RATE: 20 BRPM | HEIGHT: 71 IN | BODY MASS INDEX: 32.9 KG/M2 | WEIGHT: 235 LBS

## 2018-01-01 VITALS
BODY MASS INDEX: 31.36 KG/M2 | SYSTOLIC BLOOD PRESSURE: 115 MMHG | WEIGHT: 224 LBS | HEIGHT: 71 IN | HEART RATE: 77 BPM | TEMPERATURE: 98 F | OXYGEN SATURATION: 91 % | RESPIRATION RATE: 18 BRPM | DIASTOLIC BLOOD PRESSURE: 77 MMHG

## 2018-01-01 VITALS
HEART RATE: 63 BPM | BODY MASS INDEX: 31.91 KG/M2 | SYSTOLIC BLOOD PRESSURE: 103 MMHG | OXYGEN SATURATION: 97 % | HEIGHT: 71 IN | DIASTOLIC BLOOD PRESSURE: 71 MMHG | WEIGHT: 227.94 LBS

## 2018-01-01 VITALS
BODY MASS INDEX: 32.31 KG/M2 | DIASTOLIC BLOOD PRESSURE: 46 MMHG | SYSTOLIC BLOOD PRESSURE: 64 MMHG | OXYGEN SATURATION: 43 % | HEIGHT: 71 IN | HEART RATE: 80 BPM | TEMPERATURE: 99 F | WEIGHT: 230.81 LBS | RESPIRATION RATE: 21 BRPM

## 2018-01-01 VITALS — WEIGHT: 235.88 LBS | HEIGHT: 71 IN | BODY MASS INDEX: 33.02 KG/M2

## 2018-01-01 DIAGNOSIS — D86.0 SARCOIDOSIS OF LUNG: ICD-10-CM

## 2018-01-01 DIAGNOSIS — I50.9 CHRONIC CONGESTIVE HEART FAILURE, UNSPECIFIED HEART FAILURE TYPE: Primary | ICD-10-CM

## 2018-01-01 DIAGNOSIS — I50.1 LEFT VENTRICULAR FAILURE: ICD-10-CM

## 2018-01-01 DIAGNOSIS — I50.22 CHRONIC SYSTOLIC HEART FAILURE: ICD-10-CM

## 2018-01-01 DIAGNOSIS — Z76.82 LUNG TRANSPLANT CANDIDATE: ICD-10-CM

## 2018-01-01 DIAGNOSIS — K92.2 GI BLEED: ICD-10-CM

## 2018-01-01 DIAGNOSIS — R06.02 SHORTNESS OF BREATH: ICD-10-CM

## 2018-01-01 DIAGNOSIS — Z76.82 ORGAN TRANSPLANT CANDIDATE: Primary | ICD-10-CM

## 2018-01-01 DIAGNOSIS — I50.23 ACUTE ON CHRONIC SYSTOLIC HEART FAILURE: ICD-10-CM

## 2018-01-01 DIAGNOSIS — Z76.82 LUNG TRANSPLANT CANDIDATE: Primary | ICD-10-CM

## 2018-01-01 DIAGNOSIS — I47.29 POLYMORPHIC VENTRICULAR TACHYCARDIA: ICD-10-CM

## 2018-01-01 DIAGNOSIS — Z95.0 BIVENTRICULAR CARDIAC PACEMAKER IN SITU: ICD-10-CM

## 2018-01-01 DIAGNOSIS — R07.9 CHEST PAIN IN ADULT: ICD-10-CM

## 2018-01-01 DIAGNOSIS — R47.82 FLUENCY DISORDER ASSOCIATED WITH UNDERLYING DISEASE: ICD-10-CM

## 2018-01-01 DIAGNOSIS — Z92.29 PERSONAL HISTORY OF LONG-TERM (CURRENT) USE OF ANTICOAGULANTS: ICD-10-CM

## 2018-01-01 DIAGNOSIS — Z86.73 HISTORY OF STROKE: ICD-10-CM

## 2018-01-01 DIAGNOSIS — R07.9 CHEST PAIN, UNSPECIFIED TYPE: ICD-10-CM

## 2018-01-01 DIAGNOSIS — J96.01 ACUTE RESPIRATORY FAILURE WITH HYPOXIA: ICD-10-CM

## 2018-01-01 DIAGNOSIS — R88.8 ABNORMAL FINDINGS IN OTHER BODY FLUIDS AND SUBSTANCES: ICD-10-CM

## 2018-01-01 DIAGNOSIS — Z95.810 PRESENCE OF AUTOMATIC IMPLANTABLE CARDIOVERTER-DEFIBRILLATOR: ICD-10-CM

## 2018-01-01 DIAGNOSIS — Z95.0 BIVENTRICULAR CARDIAC PACEMAKER IN SITU: Primary | ICD-10-CM

## 2018-01-01 DIAGNOSIS — Z72.0 NICOTINE ABUSE: ICD-10-CM

## 2018-01-01 DIAGNOSIS — D86.9 SARCOIDOSIS: Primary | ICD-10-CM

## 2018-01-01 DIAGNOSIS — K92.2 GASTROINTESTINAL HEMORRHAGE, UNSPECIFIED GASTROINTESTINAL HEMORRHAGE TYPE: ICD-10-CM

## 2018-01-01 DIAGNOSIS — Z45.02 ICD (IMPLANTABLE CARDIOVERTER-DEFIBRILLATOR) DISCHARGE: ICD-10-CM

## 2018-01-01 DIAGNOSIS — Z78.9 PACED RHYTHM ON CARDIAC MONITOR: ICD-10-CM

## 2018-01-01 DIAGNOSIS — G40.909 SEIZURE DISORDER: ICD-10-CM

## 2018-01-01 DIAGNOSIS — I47.29 VENTRICULAR TACHYCARDIA, INCESSANT: ICD-10-CM

## 2018-01-01 DIAGNOSIS — I25.111 CORONARY ARTERY DISEASE INVOLVING NATIVE CORONARY ARTERY OF NATIVE HEART WITH ANGINA PECTORIS WITH DOCUMENTED SPASM: ICD-10-CM

## 2018-01-01 DIAGNOSIS — I50.22 CHRONIC SYSTOLIC CHF (CONGESTIVE HEART FAILURE): ICD-10-CM

## 2018-01-01 DIAGNOSIS — E03.9 HYPOTHYROIDISM, UNSPECIFIED TYPE: ICD-10-CM

## 2018-01-01 DIAGNOSIS — J95.851 VAP (VENTILATOR-ASSOCIATED PNEUMONIA): ICD-10-CM

## 2018-01-01 DIAGNOSIS — N17.9 ACUTE RENAL FAILURE, UNSPECIFIED ACUTE RENAL FAILURE TYPE: ICD-10-CM

## 2018-01-01 DIAGNOSIS — I50.9 CONGESTIVE HEART FAILURE, UNSPECIFIED HF CHRONICITY, UNSPECIFIED HEART FAILURE TYPE: ICD-10-CM

## 2018-01-01 DIAGNOSIS — I50.23 ACUTE ON CHRONIC SYSTOLIC HEART FAILURE: Primary | ICD-10-CM

## 2018-01-01 DIAGNOSIS — R07.9 CHEST PAIN: ICD-10-CM

## 2018-01-01 DIAGNOSIS — R47.01 EXPRESSIVE APHASIA: ICD-10-CM

## 2018-01-01 DIAGNOSIS — Z86.79 HISTORY OF ATRIAL FIBRILLATION: ICD-10-CM

## 2018-01-01 DIAGNOSIS — I95.9 HYPOTENSION: ICD-10-CM

## 2018-01-01 DIAGNOSIS — R57.0 CARDIOGENIC SHOCK: ICD-10-CM

## 2018-01-01 DIAGNOSIS — D86.0 SARCOIDOSIS OF LUNG: Primary | ICD-10-CM

## 2018-01-01 DIAGNOSIS — R25.9 ABNORMAL INVOLUNTARY MOVEMENTS: ICD-10-CM

## 2018-01-01 DIAGNOSIS — Z76.82 ORGAN TRANSPLANT CANDIDATE: ICD-10-CM

## 2018-01-01 DIAGNOSIS — I63.9 STROKE: ICD-10-CM

## 2018-01-01 DIAGNOSIS — I73.9 PERIPHERAL VASCULAR DISEASE: ICD-10-CM

## 2018-01-01 DIAGNOSIS — I50.43 ACUTE ON CHRONIC COMBINED SYSTOLIC AND DIASTOLIC CONGESTIVE HEART FAILURE: ICD-10-CM

## 2018-01-01 DIAGNOSIS — I50.9 CHF (CONGESTIVE HEART FAILURE): ICD-10-CM

## 2018-01-01 DIAGNOSIS — D72.829 LEUKOCYTOSIS, UNSPECIFIED TYPE: ICD-10-CM

## 2018-01-01 DIAGNOSIS — I50.9 HEART FAILURE: ICD-10-CM

## 2018-01-01 DIAGNOSIS — Z76.82 HEART TRANSPLANT CANDIDATE: ICD-10-CM

## 2018-01-01 DIAGNOSIS — I47.20 VENTRICULAR TACHYCARDIA: ICD-10-CM

## 2018-01-01 DIAGNOSIS — I49.01 VENTRICULAR FIBRILLATION: ICD-10-CM

## 2018-01-01 DIAGNOSIS — K92.2 LOWER GI BLEEDING: ICD-10-CM

## 2018-01-01 DIAGNOSIS — I47.29 NSVT (NONSUSTAINED VENTRICULAR TACHYCARDIA): ICD-10-CM

## 2018-01-01 DIAGNOSIS — Z12.5 SCREENING FOR PROSTATE CANCER: ICD-10-CM

## 2018-01-01 DIAGNOSIS — Z45.09 ENCOUNTER FOR MANAGEMENT OF INTRA-AORTIC BALLOON PUMP: ICD-10-CM

## 2018-01-01 DIAGNOSIS — I50.9 HEART FAILURE: Primary | ICD-10-CM

## 2018-01-01 DIAGNOSIS — I95.9 HYPOTENSION, UNSPECIFIED HYPOTENSION TYPE: ICD-10-CM

## 2018-01-01 DIAGNOSIS — J96.90 RESPIRATORY FAILURE REQUIRING INTUBATION: ICD-10-CM

## 2018-01-01 DIAGNOSIS — I50.23 ACUTE ON CHRONIC SYSTOLIC CONGESTIVE HEART FAILURE: ICD-10-CM

## 2018-01-01 DIAGNOSIS — I50.23 ACUTE ON CHRONIC SYSTOLIC (CONGESTIVE) HEART FAILURE: Primary | ICD-10-CM

## 2018-01-01 DIAGNOSIS — I47.20 VT (VENTRICULAR TACHYCARDIA): ICD-10-CM

## 2018-01-01 LAB
1,3 BETA GLUCAN SPEC-MCNC: 46 PG/ML
ABO + RH BLD: NORMAL
ABORH REPEAT: NORMAL
ALBUMIN SERPL BCP-MCNC: 1.9 G/DL
ALBUMIN SERPL BCP-MCNC: 2 G/DL
ALBUMIN SERPL BCP-MCNC: 2.1 G/DL
ALBUMIN SERPL BCP-MCNC: 2.2 G/DL
ALBUMIN SERPL BCP-MCNC: 2.4 G/DL
ALBUMIN SERPL BCP-MCNC: 2.5 G/DL
ALBUMIN SERPL BCP-MCNC: 2.7 G/DL
ALBUMIN SERPL BCP-MCNC: 2.8 G/DL
ALBUMIN SERPL BCP-MCNC: 2.9 G/DL
ALBUMIN SERPL BCP-MCNC: 3 G/DL
ALBUMIN SERPL BCP-MCNC: 3 G/DL
ALBUMIN SERPL BCP-MCNC: 3.1 G/DL
ALBUMIN SERPL BCP-MCNC: 3.1 G/DL
ALBUMIN SERPL BCP-MCNC: 3.2 G/DL
ALBUMIN SERPL BCP-MCNC: 3.3 G/DL
ALBUMIN SERPL BCP-MCNC: 3.4 G/DL
ALBUMIN SERPL BCP-MCNC: 3.5 G/DL
ALBUMIN SERPL BCP-MCNC: 3.6 G/DL
ALBUMIN SERPL BCP-MCNC: 3.8 G/DL
ALBUMIN SERPL BCP-MCNC: 3.8 G/DL
ALLENS TEST: ABNORMAL
ALP SERPL-CCNC: 53 U/L
ALP SERPL-CCNC: 53 U/L
ALP SERPL-CCNC: 54 U/L
ALP SERPL-CCNC: 55 U/L
ALP SERPL-CCNC: 56 U/L
ALP SERPL-CCNC: 57 U/L
ALP SERPL-CCNC: 57 U/L
ALP SERPL-CCNC: 58 U/L
ALP SERPL-CCNC: 59 U/L
ALP SERPL-CCNC: 59 U/L
ALP SERPL-CCNC: 60 U/L
ALP SERPL-CCNC: 60 U/L
ALP SERPL-CCNC: 61 U/L
ALP SERPL-CCNC: 62 U/L
ALP SERPL-CCNC: 63 U/L
ALP SERPL-CCNC: 63 U/L
ALP SERPL-CCNC: 64 U/L
ALP SERPL-CCNC: 64 U/L
ALP SERPL-CCNC: 65 U/L
ALP SERPL-CCNC: 66 U/L
ALP SERPL-CCNC: 67 U/L
ALP SERPL-CCNC: 68 U/L
ALP SERPL-CCNC: 68 U/L
ALP SERPL-CCNC: 69 U/L
ALP SERPL-CCNC: 70 U/L
ALP SERPL-CCNC: 71 U/L
ALP SERPL-CCNC: 71 U/L
ALP SERPL-CCNC: 72 U/L
ALP SERPL-CCNC: 72 U/L
ALP SERPL-CCNC: 75 U/L
ALP SERPL-CCNC: 78 U/L
ALP SERPL-CCNC: 79 U/L
ALP SERPL-CCNC: 79 U/L
ALP SERPL-CCNC: 87 U/L
ALP SERPL-CCNC: 93 U/L
ALT SERPL W/O P-5'-P-CCNC: 10 U/L
ALT SERPL W/O P-5'-P-CCNC: 10 U/L
ALT SERPL W/O P-5'-P-CCNC: 11 U/L
ALT SERPL W/O P-5'-P-CCNC: 12 U/L
ALT SERPL W/O P-5'-P-CCNC: 13 U/L
ALT SERPL W/O P-5'-P-CCNC: 14 U/L
ALT SERPL W/O P-5'-P-CCNC: 15 U/L
ALT SERPL W/O P-5'-P-CCNC: 17 U/L
ALT SERPL W/O P-5'-P-CCNC: 17 U/L
ALT SERPL W/O P-5'-P-CCNC: 22 U/L
ALT SERPL W/O P-5'-P-CCNC: 26 U/L
ALT SERPL W/O P-5'-P-CCNC: 28 U/L
ALT SERPL W/O P-5'-P-CCNC: 29 U/L
ALT SERPL W/O P-5'-P-CCNC: 29 U/L
ALT SERPL W/O P-5'-P-CCNC: 30 U/L
ALT SERPL W/O P-5'-P-CCNC: 30 U/L
ALT SERPL W/O P-5'-P-CCNC: 32 U/L
ALT SERPL W/O P-5'-P-CCNC: 32 U/L
ALT SERPL W/O P-5'-P-CCNC: 33 U/L
ALT SERPL W/O P-5'-P-CCNC: 34 U/L
ALT SERPL W/O P-5'-P-CCNC: 35 U/L
ALT SERPL W/O P-5'-P-CCNC: 35 U/L
ALT SERPL W/O P-5'-P-CCNC: 37 U/L
ALT SERPL W/O P-5'-P-CCNC: 40 U/L
ALT SERPL W/O P-5'-P-CCNC: 41 U/L
ALT SERPL W/O P-5'-P-CCNC: 42 U/L
ALT SERPL W/O P-5'-P-CCNC: 45 U/L
ALT SERPL W/O P-5'-P-CCNC: 47 U/L
ALT SERPL W/O P-5'-P-CCNC: 47 U/L
ALT SERPL W/O P-5'-P-CCNC: 48 U/L
ALT SERPL W/O P-5'-P-CCNC: 5 U/L
ALT SERPL W/O P-5'-P-CCNC: 5 U/L
ALT SERPL W/O P-5'-P-CCNC: 51 U/L
ALT SERPL W/O P-5'-P-CCNC: 52 U/L
ALT SERPL W/O P-5'-P-CCNC: 53 U/L
ALT SERPL W/O P-5'-P-CCNC: 55 U/L
ALT SERPL W/O P-5'-P-CCNC: 57 U/L
ALT SERPL W/O P-5'-P-CCNC: 58 U/L
ALT SERPL W/O P-5'-P-CCNC: 6 U/L
ALT SERPL W/O P-5'-P-CCNC: 6 U/L
ALT SERPL W/O P-5'-P-CCNC: 7 U/L
ALT SERPL W/O P-5'-P-CCNC: 7 U/L
ALT SERPL W/O P-5'-P-CCNC: 8 U/L
ALT SERPL W/O P-5'-P-CCNC: 9 U/L
AMPHET+METHAMPHET UR QL: NEGATIVE
AMPHET+METHAMPHET UR QL: NEGATIVE
AMPHETAMINES SERPL QL: NEGATIVE
ANION GAP SERPL CALC-SCNC: 10 MMOL/L
ANION GAP SERPL CALC-SCNC: 11 MMOL/L
ANION GAP SERPL CALC-SCNC: 12 MMOL/L
ANION GAP SERPL CALC-SCNC: 13 MMOL/L
ANION GAP SERPL CALC-SCNC: 14 MMOL/L
ANION GAP SERPL CALC-SCNC: 14 MMOL/L
ANION GAP SERPL CALC-SCNC: 16 MMOL/L
ANION GAP SERPL CALC-SCNC: 17 MMOL/L
ANION GAP SERPL CALC-SCNC: 18 MMOL/L
ANION GAP SERPL CALC-SCNC: 18 MMOL/L
ANION GAP SERPL CALC-SCNC: 6 MMOL/L
ANION GAP SERPL CALC-SCNC: 7 MMOL/L
ANION GAP SERPL CALC-SCNC: 7 MMOL/L
ANION GAP SERPL CALC-SCNC: 8 MMOL/L
ANION GAP SERPL CALC-SCNC: 9 MMOL/L
ANISOCYTOSIS BLD QL SMEAR: SLIGHT
APTT BLDCRRT: 22.1 SEC
APTT BLDCRRT: 22.6 SEC
APTT BLDCRRT: <21 SEC
AST SERPL-CCNC: 10 U/L
AST SERPL-CCNC: 11 U/L
AST SERPL-CCNC: 12 U/L
AST SERPL-CCNC: 13 U/L
AST SERPL-CCNC: 14 U/L
AST SERPL-CCNC: 14 U/L
AST SERPL-CCNC: 15 U/L
AST SERPL-CCNC: 16 U/L
AST SERPL-CCNC: 17 U/L
AST SERPL-CCNC: 18 U/L
AST SERPL-CCNC: 18 U/L
AST SERPL-CCNC: 19 U/L
AST SERPL-CCNC: 20 U/L
AST SERPL-CCNC: 21 U/L
AST SERPL-CCNC: 23 U/L
AST SERPL-CCNC: 23 U/L
AST SERPL-CCNC: 24 U/L
AST SERPL-CCNC: 24 U/L
AST SERPL-CCNC: 25 U/L
AST SERPL-CCNC: 25 U/L
AST SERPL-CCNC: 27 U/L
AST SERPL-CCNC: 28 U/L
AST SERPL-CCNC: 29 U/L
AST SERPL-CCNC: 30 U/L
AST SERPL-CCNC: 31 U/L
AST SERPL-CCNC: 32 U/L
AST SERPL-CCNC: 32 U/L
AST SERPL-CCNC: 34 U/L
AST SERPL-CCNC: 35 U/L
AST SERPL-CCNC: 35 U/L
AST SERPL-CCNC: 36 U/L
AST SERPL-CCNC: 37 U/L
AST SERPL-CCNC: 38 U/L
AST SERPL-CCNC: 38 U/L
AST SERPL-CCNC: 40 U/L
AST SERPL-CCNC: 40 U/L
AST SERPL-CCNC: 43 U/L
AST SERPL-CCNC: 46 U/L
AT III AG ACT/NOR PPP IA: 81 %
BACTERIA #/AREA URNS AUTO: ABNORMAL /HPF
BACTERIA #/AREA URNS AUTO: ABNORMAL /HPF
BACTERIA BLD CULT: NORMAL
BACTERIA CATH TIP CULT: NORMAL
BACTERIA SPEC AEROBE CULT: NORMAL
BACTERIA UR CULT: NORMAL
BARBITURATES SERPL QL SCN: NEGATIVE
BARBITURATES UR QL SCN>200 NG/ML: NEGATIVE
BARBITURATES UR QL SCN>200 NG/ML: NEGATIVE
BASO STIPL BLD QL SMEAR: ABNORMAL
BASOPHILS # BLD AUTO: 0.01 K/UL
BASOPHILS # BLD AUTO: 0.02 K/UL
BASOPHILS # BLD AUTO: 0.03 K/UL
BASOPHILS # BLD AUTO: 0.04 K/UL
BASOPHILS # BLD AUTO: 0.06 K/UL
BASOPHILS # BLD AUTO: 0.06 K/UL
BASOPHILS # BLD AUTO: ABNORMAL K/UL
BASOPHILS NFR BLD: 0 %
BASOPHILS NFR BLD: 0.1 %
BASOPHILS NFR BLD: 0.2 %
BASOPHILS NFR BLD: 0.3 %
BASOPHILS NFR BLD: 0.4 %
BASOPHILS NFR BLD: 0.5 %
BASOPHILS NFR BLD: 0.6 %
BASOPHILS NFR BLD: 0.8 %
BASOPHILS NFR BLD: 0.9 %
BASOPHILS NFR BLD: 1 %
BENZODIAZ SERPL QL: NEGATIVE
BENZODIAZ UR QL SCN>200 NG/ML: NEGATIVE
BENZODIAZ UR QL SCN>200 NG/ML: NORMAL
BILIRUB DIRECT SERPL-MCNC: 0.4 MG/DL
BILIRUB SERPL-MCNC: 0.3 MG/DL
BILIRUB SERPL-MCNC: 0.4 MG/DL
BILIRUB SERPL-MCNC: 0.5 MG/DL
BILIRUB SERPL-MCNC: 0.6 MG/DL
BILIRUB SERPL-MCNC: 0.7 MG/DL
BILIRUB SERPL-MCNC: 0.9 MG/DL
BILIRUB SERPL-MCNC: 1.2 MG/DL
BILIRUB UR QL STRIP: NEGATIVE
BLD GP AB SCN CELLS X3 SERPL QL: NORMAL
BLD PROD TYP BPU: NORMAL
BLD PROD TYP BPU: NORMAL
BLOOD UNIT EXPIRATION DATE: NORMAL
BLOOD UNIT EXPIRATION DATE: NORMAL
BLOOD UNIT TYPE CODE: 1700
BLOOD UNIT TYPE CODE: 9500
BLOOD UNIT TYPE: NORMAL
BLOOD UNIT TYPE: NORMAL
BNP SERPL-MCNC: 1135 PG/ML
BNP SERPL-MCNC: 406 PG/ML
BNP SERPL-MCNC: 425 PG/ML
BNP SERPL-MCNC: 894 PG/ML
BUN SERPL-MCNC: 11 MG/DL
BUN SERPL-MCNC: 111 MG/DL
BUN SERPL-MCNC: 115 MG/DL
BUN SERPL-MCNC: 118 MG/DL
BUN SERPL-MCNC: 118 MG/DL
BUN SERPL-MCNC: 13 MG/DL
BUN SERPL-MCNC: 135 MG/DL
BUN SERPL-MCNC: 140 MG/DL
BUN SERPL-MCNC: 140 MG/DL
BUN SERPL-MCNC: 15 MG/DL
BUN SERPL-MCNC: 15 MG/DL
BUN SERPL-MCNC: 16 MG/DL
BUN SERPL-MCNC: 18 MG/DL
BUN SERPL-MCNC: 19 MG/DL
BUN SERPL-MCNC: 20 MG/DL
BUN SERPL-MCNC: 21 MG/DL
BUN SERPL-MCNC: 21 MG/DL (ref 6–30)
BUN SERPL-MCNC: 23 MG/DL
BUN SERPL-MCNC: 23 MG/DL
BUN SERPL-MCNC: 24 MG/DL
BUN SERPL-MCNC: 25 MG/DL
BUN SERPL-MCNC: 26 MG/DL
BUN SERPL-MCNC: 27 MG/DL
BUN SERPL-MCNC: 28 MG/DL
BUN SERPL-MCNC: 31 MG/DL
BUN SERPL-MCNC: 36 MG/DL
BUN SERPL-MCNC: 39 MG/DL
BUN SERPL-MCNC: 42 MG/DL
BUN SERPL-MCNC: 42 MG/DL
BUN SERPL-MCNC: 48 MG/DL
BUN SERPL-MCNC: 49 MG/DL
BUN SERPL-MCNC: 51 MG/DL
BUN SERPL-MCNC: 52 MG/DL
BUN SERPL-MCNC: 53 MG/DL
BUN SERPL-MCNC: 54 MG/DL
BUN SERPL-MCNC: 55 MG/DL
BUN SERPL-MCNC: 56 MG/DL
BUN SERPL-MCNC: 56 MG/DL
BUN SERPL-MCNC: 57 MG/DL
BUN SERPL-MCNC: 58 MG/DL
BUN SERPL-MCNC: 58 MG/DL
BUN SERPL-MCNC: 59 MG/DL
BUN SERPL-MCNC: 60 MG/DL
BUN SERPL-MCNC: 61 MG/DL
BUN SERPL-MCNC: 61 MG/DL
BUN SERPL-MCNC: 62 MG/DL
BUN SERPL-MCNC: 63 MG/DL
BUN SERPL-MCNC: 63 MG/DL
BUN SERPL-MCNC: 64 MG/DL
BUN SERPL-MCNC: 65 MG/DL
BUN SERPL-MCNC: 65 MG/DL
BUN SERPL-MCNC: 67 MG/DL
BUN SERPL-MCNC: 68 MG/DL
BUN SERPL-MCNC: 78 MG/DL
BUN SERPL-MCNC: 83 MG/DL
BUN SERPL-MCNC: 83 MG/DL
BUN SERPL-MCNC: 84 MG/DL
BUN SERPL-MCNC: 88 MG/DL
BUN SERPL-MCNC: 89 MG/DL
BUN SERPL-MCNC: 90 MG/DL
BUN SERPL-MCNC: 94 MG/DL
BUN SERPL-MCNC: 99 MG/DL
BZE UR QL SCN: NEGATIVE
BZE UR QL SCN: NEGATIVE
CALCIUM SERPL-MCNC: 10 MG/DL
CALCIUM SERPL-MCNC: 10.4 MG/DL
CALCIUM SERPL-MCNC: 8 MG/DL
CALCIUM SERPL-MCNC: 8.2 MG/DL
CALCIUM SERPL-MCNC: 8.2 MG/DL
CALCIUM SERPL-MCNC: 8.3 MG/DL
CALCIUM SERPL-MCNC: 8.3 MG/DL
CALCIUM SERPL-MCNC: 8.4 MG/DL
CALCIUM SERPL-MCNC: 8.4 MG/DL
CALCIUM SERPL-MCNC: 8.5 MG/DL
CALCIUM SERPL-MCNC: 8.6 MG/DL
CALCIUM SERPL-MCNC: 8.7 MG/DL
CALCIUM SERPL-MCNC: 8.8 MG/DL
CALCIUM SERPL-MCNC: 8.9 MG/DL
CALCIUM SERPL-MCNC: 9 MG/DL
CALCIUM SERPL-MCNC: 9.1 MG/DL
CALCIUM SERPL-MCNC: 9.2 MG/DL
CALCIUM SERPL-MCNC: 9.3 MG/DL
CALCIUM SERPL-MCNC: 9.4 MG/DL
CALCIUM SERPL-MCNC: 9.5 MG/DL
CALCIUM SERPL-MCNC: 9.6 MG/DL
CALCIUM SERPL-MCNC: 9.7 MG/DL
CALCIUM SERPL-MCNC: 9.8 MG/DL
CALCIUM SERPL-MCNC: 9.9 MG/DL
CANNABINOIDS SERPL QL: NEGATIVE
CANNABINOIDS UR QL SCN: NEGATIVE
CANNABINOIDS UR QL SCN: NEGATIVE
CARDIOLIPIN IGG SER IA-ACNC: <9.4 GPL
CARDIOLIPIN IGM SER IA-ACNC: <9.4 MPL
CHLORIDE SERPL-SCNC: 100 MMOL/L
CHLORIDE SERPL-SCNC: 101 MMOL/L
CHLORIDE SERPL-SCNC: 102 MMOL/L
CHLORIDE SERPL-SCNC: 103 MMOL/L
CHLORIDE SERPL-SCNC: 103 MMOL/L (ref 95–110)
CHLORIDE SERPL-SCNC: 104 MMOL/L
CHLORIDE SERPL-SCNC: 105 MMOL/L
CHLORIDE SERPL-SCNC: 90 MMOL/L
CHLORIDE SERPL-SCNC: 91 MMOL/L
CHLORIDE SERPL-SCNC: 93 MMOL/L
CHLORIDE SERPL-SCNC: 93 MMOL/L
CHLORIDE SERPL-SCNC: 95 MMOL/L
CHLORIDE SERPL-SCNC: 96 MMOL/L
CHLORIDE SERPL-SCNC: 97 MMOL/L
CHLORIDE SERPL-SCNC: 98 MMOL/L
CHLORIDE SERPL-SCNC: 99 MMOL/L
CHOLEST SERPL-MCNC: 205 MG/DL
CHOLEST SERPL-MCNC: 250 MG/DL
CHOLEST/HDLC SERPL: 2.8 {RATIO}
CHOLEST/HDLC SERPL: 3 {RATIO}
CK MB SERPL-MCNC: 1.6 NG/ML
CK MB SERPL-RTO: 2.7 %
CK SERPL-CCNC: 59 U/L
CLARITY UR REFRACT.AUTO: ABNORMAL
CLARITY UR REFRACT.AUTO: CLEAR
CLASS I ANTIBODIES - LUMINEX: NEGATIVE
CLASS II ANTIBODIES - LUMINEX: NEGATIVE
CMV IGG SERPL QL IA: REACTIVE
CO2 SERPL-SCNC: 19 MMOL/L
CO2 SERPL-SCNC: 21 MMOL/L
CO2 SERPL-SCNC: 22 MMOL/L
CO2 SERPL-SCNC: 22 MMOL/L
CO2 SERPL-SCNC: 23 MMOL/L
CO2 SERPL-SCNC: 24 MMOL/L
CO2 SERPL-SCNC: 25 MMOL/L
CO2 SERPL-SCNC: 26 MMOL/L
CO2 SERPL-SCNC: 27 MMOL/L
CO2 SERPL-SCNC: 28 MMOL/L
CO2 SERPL-SCNC: 29 MMOL/L
CO2 SERPL-SCNC: 30 MMOL/L
CO2 SERPL-SCNC: 31 MMOL/L
CO2 SERPL-SCNC: 32 MMOL/L
CO2 SERPL-SCNC: 33 MMOL/L
CO2 SERPL-SCNC: 34 MMOL/L
CO2 SERPL-SCNC: 35 MMOL/L
COCAINE, BLOOD: NEGATIVE
CODING SYSTEM: NORMAL
CODING SYSTEM: NORMAL
COLOR UR AUTO: NORMAL
COLOR UR AUTO: YELLOW
COMPLEXED PSA SERPL-MCNC: 0.47 NG/ML
CORONARY STENOSIS: ABNORMAL
CORTIS SERPL-MCNC: 1.1 UG/DL
COTININE SERPL-MCNC: <3 NG/ML
CPRA %: 0
CREAT SERPL-MCNC: 0.7 MG/DL (ref 0.5–1.4)
CREAT SERPL-MCNC: 0.8 MG/DL (ref 0.5–1.4)
CREAT SERPL-MCNC: 0.9 MG/DL
CREAT SERPL-MCNC: 1 MG/DL
CREAT SERPL-MCNC: 1.1 MG/DL
CREAT SERPL-MCNC: 1.2 MG/DL
CREAT SERPL-MCNC: 1.3 MG/DL
CREAT SERPL-MCNC: 1.4 MG/DL
CREAT SERPL-MCNC: 1.5 MG/DL
CREAT SERPL-MCNC: 1.6 MG/DL
CREAT SERPL-MCNC: 1.7 MG/DL
CREAT SERPL-MCNC: 1.8 MG/DL
CREAT SERPL-MCNC: 1.9 MG/DL
CREAT SERPL-MCNC: 2 MG/DL
CREAT SERPL-MCNC: 2.1 MG/DL
CREAT SERPL-MCNC: 2.2 MG/DL
CREAT SERPL-MCNC: 2.3 MG/DL
CREAT SERPL-MCNC: 2.4 MG/DL
CREAT SERPL-MCNC: 2.5 MG/DL
CREAT SERPL-MCNC: 2.6 MG/DL
CREAT SERPL-MCNC: 2.9 MG/DL
CREAT SERPL-MCNC: 2.9 MG/DL
CREAT UR-MCNC: 25 MG/DL
CREAT UR-MCNC: 70 MG/DL
CRP SERPL-MCNC: 49.3 MG/L
DELSYS: ABNORMAL
DEPRECATED SRA 0.1U/ML PORCINE HEPARIN S: 0 %
DEPRECATED SRA 100U/ML PORCINE HEPARIN S: 1 %
DIASTOLIC DYSFUNCTION: NO
DIASTOLIC DYSFUNCTION: YES
DIFFERENTIAL METHOD: ABNORMAL
DISPENSE STATUS: NORMAL
DISPENSE STATUS: NORMAL
EBV VCA IGG SER QL IA: POSITIVE
EOSINOPHIL # BLD AUTO: 0 K/UL
EOSINOPHIL # BLD AUTO: 0.1 K/UL
EOSINOPHIL # BLD AUTO: 0.2 K/UL
EOSINOPHIL # BLD AUTO: 0.3 K/UL
EOSINOPHIL # BLD AUTO: ABNORMAL K/UL
EOSINOPHIL NFR BLD: 0 %
EOSINOPHIL NFR BLD: 0.3 %
EOSINOPHIL NFR BLD: 0.5 %
EOSINOPHIL NFR BLD: 0.7 %
EOSINOPHIL NFR BLD: 0.7 %
EOSINOPHIL NFR BLD: 0.8 %
EOSINOPHIL NFR BLD: 0.9 %
EOSINOPHIL NFR BLD: 1 %
EOSINOPHIL NFR BLD: 1.1 %
EOSINOPHIL NFR BLD: 1.2 %
EOSINOPHIL NFR BLD: 1.2 %
EOSINOPHIL NFR BLD: 1.3 %
EOSINOPHIL NFR BLD: 1.3 %
EOSINOPHIL NFR BLD: 1.6 %
EOSINOPHIL NFR BLD: 1.6 %
EOSINOPHIL NFR BLD: 1.7 %
EOSINOPHIL NFR BLD: 2 %
EOSINOPHIL NFR BLD: 2.1 %
EOSINOPHIL NFR BLD: 2.1 %
EOSINOPHIL NFR BLD: 2.2 %
EOSINOPHIL NFR BLD: 2.2 %
EOSINOPHIL NFR BLD: 2.4 %
EOSINOPHIL NFR BLD: 2.5 %
EOSINOPHIL NFR BLD: 2.6 %
EOSINOPHIL NFR BLD: 2.8 %
ERYTHROCYTE [DISTWIDTH] IN BLOOD BY AUTOMATED COUNT: 14.2 %
ERYTHROCYTE [DISTWIDTH] IN BLOOD BY AUTOMATED COUNT: 14.3 %
ERYTHROCYTE [DISTWIDTH] IN BLOOD BY AUTOMATED COUNT: 14.4 %
ERYTHROCYTE [DISTWIDTH] IN BLOOD BY AUTOMATED COUNT: 14.4 %
ERYTHROCYTE [DISTWIDTH] IN BLOOD BY AUTOMATED COUNT: 14.5 %
ERYTHROCYTE [DISTWIDTH] IN BLOOD BY AUTOMATED COUNT: 14.6 %
ERYTHROCYTE [DISTWIDTH] IN BLOOD BY AUTOMATED COUNT: 14.7 %
ERYTHROCYTE [DISTWIDTH] IN BLOOD BY AUTOMATED COUNT: 14.8 %
ERYTHROCYTE [DISTWIDTH] IN BLOOD BY AUTOMATED COUNT: 14.9 %
ERYTHROCYTE [DISTWIDTH] IN BLOOD BY AUTOMATED COUNT: 15.1 %
ERYTHROCYTE [DISTWIDTH] IN BLOOD BY AUTOMATED COUNT: 15.3 %
ERYTHROCYTE [DISTWIDTH] IN BLOOD BY AUTOMATED COUNT: 15.3 %
ERYTHROCYTE [DISTWIDTH] IN BLOOD BY AUTOMATED COUNT: 15.4 %
ERYTHROCYTE [DISTWIDTH] IN BLOOD BY AUTOMATED COUNT: 15.4 %
ERYTHROCYTE [DISTWIDTH] IN BLOOD BY AUTOMATED COUNT: 15.5 %
ERYTHROCYTE [DISTWIDTH] IN BLOOD BY AUTOMATED COUNT: 15.8 %
ERYTHROCYTE [DISTWIDTH] IN BLOOD BY AUTOMATED COUNT: 16.1 %
ERYTHROCYTE [DISTWIDTH] IN BLOOD BY AUTOMATED COUNT: 16.2 %
ERYTHROCYTE [DISTWIDTH] IN BLOOD BY AUTOMATED COUNT: 16.2 %
ERYTHROCYTE [DISTWIDTH] IN BLOOD BY AUTOMATED COUNT: 16.4 %
ERYTHROCYTE [DISTWIDTH] IN BLOOD BY AUTOMATED COUNT: 16.5 %
ERYTHROCYTE [DISTWIDTH] IN BLOOD BY AUTOMATED COUNT: 17 %
ERYTHROCYTE [DISTWIDTH] IN BLOOD BY AUTOMATED COUNT: 17 %
ERYTHROCYTE [DISTWIDTH] IN BLOOD BY AUTOMATED COUNT: 17.1 %
ERYTHROCYTE [DISTWIDTH] IN BLOOD BY AUTOMATED COUNT: 17.4 %
ERYTHROCYTE [SEDIMENTATION RATE] IN BLOOD BY WESTERGREN METHOD: 14 MM/H
ERYTHROCYTE [SEDIMENTATION RATE] IN BLOOD BY WESTERGREN METHOD: 16 MM/H
ERYTHROCYTE [SEDIMENTATION RATE] IN BLOOD BY WESTERGREN METHOD: 18 MM/H
EST. GFR  (AFRICAN AMERICAN): 26.9 ML/MIN/1.73 M^2
EST. GFR  (AFRICAN AMERICAN): 26.9 ML/MIN/1.73 M^2
EST. GFR  (AFRICAN AMERICAN): 30.7 ML/MIN/1.73 M^2
EST. GFR  (AFRICAN AMERICAN): 32.2 ML/MIN/1.73 M^2
EST. GFR  (AFRICAN AMERICAN): 33.8 ML/MIN/1.73 M^2
EST. GFR  (AFRICAN AMERICAN): 35.6 ML/MIN/1.73 M^2
EST. GFR  (AFRICAN AMERICAN): 37.6 ML/MIN/1.73 M^2
EST. GFR  (AFRICAN AMERICAN): 39.8 ML/MIN/1.73 M^2
EST. GFR  (AFRICAN AMERICAN): 42.2 ML/MIN/1.73 M^2
EST. GFR  (AFRICAN AMERICAN): 44.9 ML/MIN/1.73 M^2
EST. GFR  (AFRICAN AMERICAN): 47.9 ML/MIN/1.73 M^2
EST. GFR  (AFRICAN AMERICAN): 51.3 ML/MIN/1.73 M^2
EST. GFR  (AFRICAN AMERICAN): 55.2 ML/MIN/1.73 M^2
EST. GFR  (AFRICAN AMERICAN): 59.7 ML/MIN/1.73 M^2
EST. GFR  (AFRICAN AMERICAN): >60 ML/MIN/1.73 M^2
EST. GFR  (NON AFRICAN AMERICAN): 23.3 ML/MIN/1.73 M^2
EST. GFR  (NON AFRICAN AMERICAN): 23.3 ML/MIN/1.73 M^2
EST. GFR  (NON AFRICAN AMERICAN): 26.6 ML/MIN/1.73 M^2
EST. GFR  (NON AFRICAN AMERICAN): 27.9 ML/MIN/1.73 M^2
EST. GFR  (NON AFRICAN AMERICAN): 29.3 ML/MIN/1.73 M^2
EST. GFR  (NON AFRICAN AMERICAN): 30.8 ML/MIN/1.73 M^2
EST. GFR  (NON AFRICAN AMERICAN): 32.5 ML/MIN/1.73 M^2
EST. GFR  (NON AFRICAN AMERICAN): 34.4 ML/MIN/1.73 M^2
EST. GFR  (NON AFRICAN AMERICAN): 36.5 ML/MIN/1.73 M^2
EST. GFR  (NON AFRICAN AMERICAN): 38.8 ML/MIN/1.73 M^2
EST. GFR  (NON AFRICAN AMERICAN): 41.4 ML/MIN/1.73 M^2
EST. GFR  (NON AFRICAN AMERICAN): 44.4 ML/MIN/1.73 M^2
EST. GFR  (NON AFRICAN AMERICAN): 47.8 ML/MIN/1.73 M^2
EST. GFR  (NON AFRICAN AMERICAN): 51.7 ML/MIN/1.73 M^2
EST. GFR  (NON AFRICAN AMERICAN): 56.2 ML/MIN/1.73 M^2
EST. GFR  (NON AFRICAN AMERICAN): >60 ML/MIN/1.73 M^2
ESTIMATED AVG GLUCOSE: 140 MG/DL
ESTIMATED PA SYSTOLIC PRESSURE: 21.15
ESTIMATED PA SYSTOLIC PRESSURE: 21.34
ETHANOL SERPL-MCNC: NEGATIVE MG/DL
ETHANOL UR-MCNC: <10 MG/DL
F2 GENE MUT ANL BLD/T: NORMAL
F5 P.R506Q BLD/T QL: NORMAL
FACT VIII ACT/NOR PPP: 226 %
FACT X PPP CHRO-ACNC: 0.27 IU/ML
FACT X PPP CHRO-ACNC: 0.34 IU/ML
FACT X PPP CHRO-ACNC: 0.37 IU/ML
FACT X PPP CHRO-ACNC: 0.39 IU/ML
FACT X PPP CHRO-ACNC: 0.47 IU/ML
FACT X PPP CHRO-ACNC: 0.47 IU/ML
FACT X PPP CHRO-ACNC: 0.6 IU/ML
FACT X PPP CHRO-ACNC: 0.61 IU/ML
FACT X PPP CHRO-ACNC: 0.65 IU/ML
FACT X PPP CHRO-ACNC: 0.69 IU/ML
FACT X PPP CHRO-ACNC: 0.72 IU/ML
FACT X PPP CHRO-ACNC: 0.72 IU/ML
FACT X PPP CHRO-ACNC: 0.75 IU/ML
FACT X PPP CHRO-ACNC: 0.75 IU/ML
FACT X PPP CHRO-ACNC: 0.77 IU/ML
FACT X PPP CHRO-ACNC: 0.79 IU/ML
FERRITIN SERPL-MCNC: 87 NG/ML
FIBRINOGEN PPP-MCNC: 301 MG/DL
FIBRINOGEN PPP-MCNC: 363 MG/DL
FIO2: 100
FIO2: 28
FIO2: 30
FIO2: 32
FIO2: 35
FIO2: 40
FIO2: 50
FIO2: 60
FIO2: 95
FLOW: 15
FLOW: 3
G6PD RBC-CCNT: 15.7 U/G HGB (ref 7–20.5)
GALACTOMANNAN AG SERPL IA-ACNC: <0.5 INDEX
GIANT PLATELETS BLD QL SMEAR: PRESENT
GLUCOSE SERPL-MCNC: 104 MG/DL
GLUCOSE SERPL-MCNC: 104 MG/DL
GLUCOSE SERPL-MCNC: 107 MG/DL
GLUCOSE SERPL-MCNC: 107 MG/DL (ref 70–110)
GLUCOSE SERPL-MCNC: 109 MG/DL
GLUCOSE SERPL-MCNC: 110 MG/DL
GLUCOSE SERPL-MCNC: 112 MG/DL
GLUCOSE SERPL-MCNC: 114 MG/DL
GLUCOSE SERPL-MCNC: 116 MG/DL
GLUCOSE SERPL-MCNC: 116 MG/DL
GLUCOSE SERPL-MCNC: 118 MG/DL
GLUCOSE SERPL-MCNC: 124 MG/DL
GLUCOSE SERPL-MCNC: 125 MG/DL
GLUCOSE SERPL-MCNC: 127 MG/DL
GLUCOSE SERPL-MCNC: 128 MG/DL
GLUCOSE SERPL-MCNC: 128 MG/DL
GLUCOSE SERPL-MCNC: 131 MG/DL
GLUCOSE SERPL-MCNC: 132 MG/DL
GLUCOSE SERPL-MCNC: 134 MG/DL
GLUCOSE SERPL-MCNC: 134 MG/DL
GLUCOSE SERPL-MCNC: 135 MG/DL
GLUCOSE SERPL-MCNC: 136 MG/DL
GLUCOSE SERPL-MCNC: 137 MG/DL
GLUCOSE SERPL-MCNC: 138 MG/DL
GLUCOSE SERPL-MCNC: 139 MG/DL
GLUCOSE SERPL-MCNC: 140 MG/DL
GLUCOSE SERPL-MCNC: 141 MG/DL
GLUCOSE SERPL-MCNC: 141 MG/DL
GLUCOSE SERPL-MCNC: 142 MG/DL
GLUCOSE SERPL-MCNC: 143 MG/DL
GLUCOSE SERPL-MCNC: 147 MG/DL
GLUCOSE SERPL-MCNC: 149 MG/DL
GLUCOSE SERPL-MCNC: 152 MG/DL
GLUCOSE SERPL-MCNC: 153 MG/DL
GLUCOSE SERPL-MCNC: 154 MG/DL
GLUCOSE SERPL-MCNC: 154 MG/DL
GLUCOSE SERPL-MCNC: 155 MG/DL
GLUCOSE SERPL-MCNC: 155 MG/DL
GLUCOSE SERPL-MCNC: 156 MG/DL
GLUCOSE SERPL-MCNC: 156 MG/DL
GLUCOSE SERPL-MCNC: 158 MG/DL
GLUCOSE SERPL-MCNC: 158 MG/DL
GLUCOSE SERPL-MCNC: 159 MG/DL
GLUCOSE SERPL-MCNC: 160 MG/DL
GLUCOSE SERPL-MCNC: 162 MG/DL
GLUCOSE SERPL-MCNC: 163 MG/DL
GLUCOSE SERPL-MCNC: 165 MG/DL
GLUCOSE SERPL-MCNC: 171 MG/DL
GLUCOSE SERPL-MCNC: 172 MG/DL
GLUCOSE SERPL-MCNC: 172 MG/DL
GLUCOSE SERPL-MCNC: 173 MG/DL
GLUCOSE SERPL-MCNC: 174 MG/DL
GLUCOSE SERPL-MCNC: 175 MG/DL
GLUCOSE SERPL-MCNC: 176 MG/DL
GLUCOSE SERPL-MCNC: 177 MG/DL
GLUCOSE SERPL-MCNC: 178 MG/DL
GLUCOSE SERPL-MCNC: 184 MG/DL
GLUCOSE SERPL-MCNC: 184 MG/DL
GLUCOSE SERPL-MCNC: 189 MG/DL
GLUCOSE SERPL-MCNC: 190 MG/DL
GLUCOSE SERPL-MCNC: 194 MG/DL
GLUCOSE SERPL-MCNC: 196 MG/DL
GLUCOSE SERPL-MCNC: 201 MG/DL
GLUCOSE SERPL-MCNC: 203 MG/DL
GLUCOSE SERPL-MCNC: 209 MG/DL
GLUCOSE SERPL-MCNC: 225 MG/DL
GLUCOSE SERPL-MCNC: 231 MG/DL
GLUCOSE SERPL-MCNC: 231 MG/DL
GLUCOSE SERPL-MCNC: 268 MG/DL
GLUCOSE SERPL-MCNC: 272 MG/DL
GLUCOSE SERPL-MCNC: 280 MG/DL
GLUCOSE SERPL-MCNC: 298 MG/DL
GLUCOSE SERPL-MCNC: 344 MG/DL
GLUCOSE SERPL-MCNC: 84 MG/DL
GLUCOSE SERPL-MCNC: 90 MG/DL
GLUCOSE SERPL-MCNC: 93 MG/DL
GLUCOSE SERPL-MCNC: 93 MG/DL
GLUCOSE SERPL-MCNC: 94 MG/DL
GLUCOSE SERPL-MCNC: 95 MG/DL
GLUCOSE SERPL-MCNC: 95 MG/DL
GLUCOSE SERPL-MCNC: 96 MG/DL
GLUCOSE SERPL-MCNC: 98 MG/DL
GLUCOSE SERPL-MCNC: 98 MG/DL
GLUCOSE SERPL-MCNC: 99 MG/DL
GLUCOSE UR QL STRIP: NEGATIVE
GRAM STN SPEC: NORMAL
GRAM STN SPEC: NORMAL
HBA1C MFR BLD HPLC: 6.5 %
HBV CORE AB SERPL QL IA: NEGATIVE
HBV SURFACE AB SER-ACNC: NEGATIVE M[IU]/ML
HBV SURFACE AG SERPL QL IA: NEGATIVE
HCO3 UR-SCNC: 23.8 MMOL/L (ref 24–28)
HCO3 UR-SCNC: 23.8 MMOL/L (ref 24–28)
HCO3 UR-SCNC: 26 MMOL/L (ref 24–28)
HCO3 UR-SCNC: 26.2 MMOL/L (ref 24–28)
HCO3 UR-SCNC: 26.7 MMOL/L (ref 24–28)
HCO3 UR-SCNC: 26.8 MMOL/L (ref 24–28)
HCO3 UR-SCNC: 26.9 MMOL/L (ref 24–28)
HCO3 UR-SCNC: 26.9 MMOL/L (ref 24–28)
HCO3 UR-SCNC: 27.1 MMOL/L (ref 24–28)
HCO3 UR-SCNC: 27.4 MMOL/L (ref 24–28)
HCO3 UR-SCNC: 27.6 MMOL/L (ref 24–28)
HCO3 UR-SCNC: 27.6 MMOL/L (ref 24–28)
HCO3 UR-SCNC: 27.7 MMOL/L (ref 24–28)
HCO3 UR-SCNC: 27.9 MMOL/L (ref 24–28)
HCO3 UR-SCNC: 27.9 MMOL/L (ref 24–28)
HCO3 UR-SCNC: 28.6 MMOL/L (ref 24–28)
HCO3 UR-SCNC: 28.9 MMOL/L (ref 24–28)
HCO3 UR-SCNC: 28.9 MMOL/L (ref 24–28)
HCO3 UR-SCNC: 29 MMOL/L (ref 24–28)
HCO3 UR-SCNC: 29.1 MMOL/L (ref 24–28)
HCO3 UR-SCNC: 29.2 MMOL/L (ref 24–28)
HCO3 UR-SCNC: 29.2 MMOL/L (ref 24–28)
HCO3 UR-SCNC: 29.5 MMOL/L (ref 24–28)
HCO3 UR-SCNC: 29.6 MMOL/L (ref 24–28)
HCO3 UR-SCNC: 29.6 MMOL/L (ref 24–28)
HCO3 UR-SCNC: 29.8 MMOL/L (ref 24–28)
HCO3 UR-SCNC: 29.8 MMOL/L (ref 24–28)
HCO3 UR-SCNC: 29.9 MMOL/L (ref 24–28)
HCO3 UR-SCNC: 30.7 MMOL/L (ref 24–28)
HCO3 UR-SCNC: 30.9 MMOL/L (ref 24–28)
HCO3 UR-SCNC: 30.9 MMOL/L (ref 24–28)
HCO3 UR-SCNC: 31 MMOL/L (ref 24–28)
HCO3 UR-SCNC: 31.1 MMOL/L (ref 24–28)
HCO3 UR-SCNC: 31.6 MMOL/L (ref 24–28)
HCO3 UR-SCNC: 32.1 MMOL/L (ref 24–28)
HCO3 UR-SCNC: 32.9 MMOL/L (ref 24–28)
HCO3 UR-SCNC: 33.3 MMOL/L (ref 24–28)
HCO3 UR-SCNC: 33.6 MMOL/L (ref 24–28)
HCO3 UR-SCNC: 33.7 MMOL/L (ref 24–28)
HCO3 UR-SCNC: 34.3 MMOL/L (ref 24–28)
HCO3 UR-SCNC: 34.8 MMOL/L (ref 24–28)
HCO3 UR-SCNC: 35.7 MMOL/L (ref 24–28)
HCO3 UR-SCNC: 35.8 MMOL/L (ref 24–28)
HCO3 UR-SCNC: 36 MMOL/L (ref 24–28)
HCO3 UR-SCNC: 36 MMOL/L (ref 24–28)
HCO3 UR-SCNC: 36.5 MMOL/L (ref 24–28)
HCO3 UR-SCNC: 36.5 MMOL/L (ref 24–28)
HCO3 UR-SCNC: 36.7 MMOL/L (ref 24–28)
HCO3 UR-SCNC: 37 MMOL/L (ref 24–28)
HCO3 UR-SCNC: 37.1 MMOL/L (ref 24–28)
HCO3 UR-SCNC: 37.6 MMOL/L (ref 24–28)
HCO3 UR-SCNC: 38 MMOL/L (ref 24–28)
HCO3 UR-SCNC: 38 MMOL/L (ref 24–28)
HCO3 UR-SCNC: 38.1 MMOL/L (ref 24–28)
HCO3 UR-SCNC: 38.2 MMOL/L (ref 24–28)
HCO3 UR-SCNC: 38.2 MMOL/L (ref 24–28)
HCO3 UR-SCNC: 38.3 MMOL/L (ref 24–28)
HCO3 UR-SCNC: 38.4 MMOL/L (ref 24–28)
HCO3 UR-SCNC: 38.4 MMOL/L (ref 24–28)
HCO3 UR-SCNC: 39.4 MMOL/L (ref 24–28)
HCO3 UR-SCNC: 39.8 MMOL/L (ref 24–28)
HCO3 UR-SCNC: 39.9 MMOL/L (ref 24–28)
HCO3 UR-SCNC: 40.7 MMOL/L (ref 24–28)
HCO3 UR-SCNC: 41 MMOL/L (ref 24–28)
HCO3 UR-SCNC: 41.1 MMOL/L (ref 24–28)
HCO3 UR-SCNC: 41.2 MMOL/L (ref 24–28)
HCO3 UR-SCNC: 41.8 MMOL/L (ref 24–28)
HCO3 UR-SCNC: 42.4 MMOL/L (ref 24–28)
HCT VFR BLD AUTO: 22.2 %
HCT VFR BLD AUTO: 22.3 %
HCT VFR BLD AUTO: 22.9 %
HCT VFR BLD AUTO: 22.9 %
HCT VFR BLD AUTO: 23.2 %
HCT VFR BLD AUTO: 23.3 %
HCT VFR BLD AUTO: 23.6 %
HCT VFR BLD AUTO: 24 %
HCT VFR BLD AUTO: 24 %
HCT VFR BLD AUTO: 24.7 %
HCT VFR BLD AUTO: 24.8 %
HCT VFR BLD AUTO: 24.9 %
HCT VFR BLD AUTO: 25.6 %
HCT VFR BLD AUTO: 25.8 %
HCT VFR BLD AUTO: 25.9 %
HCT VFR BLD AUTO: 25.9 %
HCT VFR BLD AUTO: 26 %
HCT VFR BLD AUTO: 26.4 %
HCT VFR BLD AUTO: 26.8 %
HCT VFR BLD AUTO: 27.2 %
HCT VFR BLD AUTO: 27.2 %
HCT VFR BLD AUTO: 27.3 %
HCT VFR BLD AUTO: 27.4 %
HCT VFR BLD AUTO: 27.7 %
HCT VFR BLD AUTO: 27.7 %
HCT VFR BLD AUTO: 27.8 %
HCT VFR BLD AUTO: 27.9 %
HCT VFR BLD AUTO: 28.4 %
HCT VFR BLD AUTO: 28.5 %
HCT VFR BLD AUTO: 29.1 %
HCT VFR BLD AUTO: 29.2 %
HCT VFR BLD AUTO: 29.7 %
HCT VFR BLD AUTO: 30.5 %
HCT VFR BLD AUTO: 31.6 %
HCT VFR BLD AUTO: 31.9 %
HCT VFR BLD AUTO: 32.7 %
HCT VFR BLD AUTO: 36.1 %
HCT VFR BLD AUTO: 36.5 %
HCT VFR BLD AUTO: 37.2 %
HCT VFR BLD AUTO: 37.4 %
HCT VFR BLD AUTO: 37.5 %
HCT VFR BLD AUTO: 38 %
HCT VFR BLD AUTO: 39.7 %
HCT VFR BLD AUTO: 39.9 %
HCT VFR BLD AUTO: 39.9 %
HCT VFR BLD AUTO: 40.1 %
HCT VFR BLD AUTO: 41.8 %
HCT VFR BLD AUTO: 41.9 %
HCT VFR BLD AUTO: 42.7 %
HCT VFR BLD AUTO: 42.9 %
HCT VFR BLD CALC: 41 %PCV (ref 36–54)
HCV AB SERPL QL IA: NEGATIVE
HCYS SERPL-SCNC: 15.6 UMOL/L
HDLC SERPL-MCNC: 73 MG/DL
HDLC SERPL-MCNC: 83 MG/DL
HDLC SERPL: 33.2 %
HDLC SERPL: 35.6 %
HEPATITIS A ANTIBODY, IGG: NEGATIVE
HGB BLD-MCNC: 10.1 G/DL
HGB BLD-MCNC: 10.1 G/DL
HGB BLD-MCNC: 10.4 G/DL
HGB BLD-MCNC: 11.4 G/DL
HGB BLD-MCNC: 11.6 G/DL
HGB BLD-MCNC: 11.7 G/DL
HGB BLD-MCNC: 11.7 G/DL
HGB BLD-MCNC: 11.8 G/DL
HGB BLD-MCNC: 12.3 G/DL
HGB BLD-MCNC: 12.3 G/DL
HGB BLD-MCNC: 12.7 G/DL
HGB BLD-MCNC: 12.7 G/DL
HGB BLD-MCNC: 13 G/DL
HGB BLD-MCNC: 13.5 G/DL
HGB BLD-MCNC: 13.5 G/DL
HGB BLD-MCNC: 13.6 G/DL
HGB BLD-MCNC: 14 G/DL
HGB BLD-MCNC: 6.4 G/DL
HGB BLD-MCNC: 6.7 G/DL
HGB BLD-MCNC: 6.8 G/DL
HGB BLD-MCNC: 6.9 G/DL
HGB BLD-MCNC: 6.9 G/DL
HGB BLD-MCNC: 7.1 G/DL
HGB BLD-MCNC: 7.1 G/DL
HGB BLD-MCNC: 7.2 G/DL
HGB BLD-MCNC: 7.2 G/DL
HGB BLD-MCNC: 7.3 G/DL
HGB BLD-MCNC: 7.3 G/DL
HGB BLD-MCNC: 7.4 G/DL
HGB BLD-MCNC: 7.4 G/DL
HGB BLD-MCNC: 7.6 G/DL
HGB BLD-MCNC: 7.8 G/DL
HGB BLD-MCNC: 7.9 G/DL
HGB BLD-MCNC: 8 G/DL
HGB BLD-MCNC: 8.2 G/DL
HGB BLD-MCNC: 8.3 G/DL
HGB BLD-MCNC: 8.4 G/DL
HGB BLD-MCNC: 8.4 G/DL
HGB BLD-MCNC: 8.5 G/DL
HGB BLD-MCNC: 8.6 G/DL
HGB BLD-MCNC: 8.6 G/DL
HGB BLD-MCNC: 8.7 G/DL
HGB BLD-MCNC: 8.7 G/DL
HGB BLD-MCNC: 8.8 G/DL
HGB BLD-MCNC: 8.8 G/DL
HGB BLD-MCNC: 8.9 G/DL
HGB BLD-MCNC: 9 G/DL
HGB BLD-MCNC: 9.1 G/DL
HGB BLD-MCNC: 9.1 G/DL
HGB BLD-MCNC: 9.2 G/DL
HGB BLD-MCNC: 9.4 G/DL
HGB BLD-MCNC: 9.6 G/DL
HGB UR QL STRIP: ABNORMAL
HGB UR QL STRIP: ABNORMAL
HGB UR QL STRIP: NEGATIVE
HGB UR QL STRIP: NEGATIVE
HISTOPLASMA AG VALUE: 0 NG/ML
HISTOPLASMA ANTIGEN URINE: NEGATIVE
HIV 1+2 AB+HIV1 P24 AG SERPL QL IA: NEGATIVE
HSV1 IGG SERPL QL IA: POSITIVE
HSV2 IGG SERPL QL IA: POSITIVE
HYALINE CASTS UR QL AUTO: 13 /LPF
HYPOCHROMIA BLD QL SMEAR: ABNORMAL
IMM GRANULOCYTES # BLD AUTO: 0.06 K/UL
IMM GRANULOCYTES # BLD AUTO: 0.09 K/UL
IMM GRANULOCYTES # BLD AUTO: 0.1 K/UL
IMM GRANULOCYTES # BLD AUTO: 0.11 K/UL
IMM GRANULOCYTES # BLD AUTO: 0.11 K/UL
IMM GRANULOCYTES # BLD AUTO: 0.12 K/UL
IMM GRANULOCYTES # BLD AUTO: 0.12 K/UL
IMM GRANULOCYTES # BLD AUTO: 0.13 K/UL
IMM GRANULOCYTES # BLD AUTO: 0.13 K/UL
IMM GRANULOCYTES # BLD AUTO: 0.14 K/UL
IMM GRANULOCYTES # BLD AUTO: 0.15 K/UL
IMM GRANULOCYTES # BLD AUTO: 0.17 K/UL
IMM GRANULOCYTES # BLD AUTO: 0.19 K/UL
IMM GRANULOCYTES # BLD AUTO: 0.2 K/UL
IMM GRANULOCYTES # BLD AUTO: 0.21 K/UL
IMM GRANULOCYTES # BLD AUTO: 0.24 K/UL
IMM GRANULOCYTES # BLD AUTO: 0.25 K/UL
IMM GRANULOCYTES # BLD AUTO: 0.26 K/UL
IMM GRANULOCYTES # BLD AUTO: 0.26 K/UL
IMM GRANULOCYTES # BLD AUTO: 0.28 K/UL
IMM GRANULOCYTES # BLD AUTO: 0.29 K/UL
IMM GRANULOCYTES # BLD AUTO: 0.29 K/UL
IMM GRANULOCYTES # BLD AUTO: 0.3 K/UL
IMM GRANULOCYTES # BLD AUTO: 0.31 K/UL
IMM GRANULOCYTES # BLD AUTO: 0.33 K/UL
IMM GRANULOCYTES # BLD AUTO: 0.4 K/UL
IMM GRANULOCYTES # BLD AUTO: 0.56 K/UL
IMM GRANULOCYTES # BLD AUTO: 0.6 K/UL
IMM GRANULOCYTES # BLD AUTO: 0.62 K/UL
IMM GRANULOCYTES # BLD AUTO: 0.64 K/UL
IMM GRANULOCYTES # BLD AUTO: ABNORMAL K/UL
IMM GRANULOCYTES NFR BLD AUTO: 0.7 %
IMM GRANULOCYTES NFR BLD AUTO: 1 %
IMM GRANULOCYTES NFR BLD AUTO: 1.1 %
IMM GRANULOCYTES NFR BLD AUTO: 1.1 %
IMM GRANULOCYTES NFR BLD AUTO: 1.2 %
IMM GRANULOCYTES NFR BLD AUTO: 1.3 %
IMM GRANULOCYTES NFR BLD AUTO: 1.4 %
IMM GRANULOCYTES NFR BLD AUTO: 1.4 %
IMM GRANULOCYTES NFR BLD AUTO: 1.5 %
IMM GRANULOCYTES NFR BLD AUTO: 1.8 %
IMM GRANULOCYTES NFR BLD AUTO: 1.9 %
IMM GRANULOCYTES NFR BLD AUTO: 2 %
IMM GRANULOCYTES NFR BLD AUTO: 2.1 %
IMM GRANULOCYTES NFR BLD AUTO: 2.2 %
IMM GRANULOCYTES NFR BLD AUTO: 2.3 %
IMM GRANULOCYTES NFR BLD AUTO: 2.4 %
IMM GRANULOCYTES NFR BLD AUTO: 2.5 %
IMM GRANULOCYTES NFR BLD AUTO: 2.5 %
IMM GRANULOCYTES NFR BLD AUTO: 2.8 %
IMM GRANULOCYTES NFR BLD AUTO: 3.1 %
IMM GRANULOCYTES NFR BLD AUTO: 3.1 %
IMM GRANULOCYTES NFR BLD AUTO: 3.3 %
IMM GRANULOCYTES NFR BLD AUTO: 3.5 %
IMM GRANULOCYTES NFR BLD AUTO: 3.8 %
IMM GRANULOCYTES NFR BLD AUTO: 3.8 %
IMM GRANULOCYTES NFR BLD AUTO: 4.3 %
IMM GRANULOCYTES NFR BLD AUTO: 4.6 %
IMM GRANULOCYTES NFR BLD AUTO: 4.9 %
IMM GRANULOCYTES NFR BLD AUTO: ABNORMAL %
INR PPP: 1
INR PPP: 1.1
INR PPP: 1.2
INR PPP: 1.2
INR PPP: 1.3
INR PPP: 1.4
INR PPP: 1.4
INR PPP: 1.5
INR PPP: 2
INR PPP: 2.1
INR PPP: 2.1
INR PPP: 2.4
INR PPP: 2.6
INR PPP: 2.7
INR PPP: 2.8
INR PPP: 3.1
INR PPP: 3.6
INTERNAL CAROTID STENOSIS: NORMAL
IRON SERPL-MCNC: 67 UG/DL
KETONES UR QL STRIP: NEGATIVE
LACTATE SERPL-SCNC: 1.1 MMOL/L
LACTATE SERPL-SCNC: 1.3 MMOL/L
LACTATE SERPL-SCNC: 1.3 MMOL/L
LACTATE SERPL-SCNC: 1.4 MMOL/L
LACTATE SERPL-SCNC: 1.5 MMOL/L
LACTATE SERPL-SCNC: 1.7 MMOL/L
LACTATE SERPL-SCNC: 1.8 MMOL/L
LACTATE SERPL-SCNC: 1.8 MMOL/L
LACTATE SERPL-SCNC: 2 MMOL/L
LACTATE SERPL-SCNC: 4.9 MMOL/L
LDH SERPL L TO P-CCNC: 394 U/L
LDLC SERPL CALC-MCNC: 111.6 MG/DL
LDLC SERPL CALC-MCNC: 150 MG/DL
LEUKOCYTE ESTERASE UR QL STRIP: ABNORMAL
LEUKOCYTE ESTERASE UR QL STRIP: ABNORMAL
LEUKOCYTE ESTERASE UR QL STRIP: NEGATIVE
LEUKOCYTE ESTERASE UR QL STRIP: NEGATIVE
LIDOCAIN SERPL-MCNC: 4.1 MCG/ML
LYMPHOCYTES # BLD AUTO: 0.5 K/UL
LYMPHOCYTES # BLD AUTO: 0.5 K/UL
LYMPHOCYTES # BLD AUTO: 0.6 K/UL
LYMPHOCYTES # BLD AUTO: 0.7 K/UL
LYMPHOCYTES # BLD AUTO: 0.8 K/UL
LYMPHOCYTES # BLD AUTO: 0.9 K/UL
LYMPHOCYTES # BLD AUTO: 1 K/UL
LYMPHOCYTES # BLD AUTO: 1.1 K/UL
LYMPHOCYTES # BLD AUTO: 1.2 K/UL
LYMPHOCYTES # BLD AUTO: 1.2 K/UL
LYMPHOCYTES # BLD AUTO: 1.3 K/UL
LYMPHOCYTES # BLD AUTO: 1.5 K/UL
LYMPHOCYTES # BLD AUTO: 1.5 K/UL
LYMPHOCYTES # BLD AUTO: 1.7 K/UL
LYMPHOCYTES # BLD AUTO: 2.1 K/UL
LYMPHOCYTES # BLD AUTO: ABNORMAL K/UL
LYMPHOCYTES NFR BLD: 0 %
LYMPHOCYTES NFR BLD: 10 %
LYMPHOCYTES NFR BLD: 10.9 %
LYMPHOCYTES NFR BLD: 11 %
LYMPHOCYTES NFR BLD: 11.3 %
LYMPHOCYTES NFR BLD: 12.7 %
LYMPHOCYTES NFR BLD: 13.2 %
LYMPHOCYTES NFR BLD: 13.3 %
LYMPHOCYTES NFR BLD: 13.3 %
LYMPHOCYTES NFR BLD: 13.4 %
LYMPHOCYTES NFR BLD: 13.5 %
LYMPHOCYTES NFR BLD: 13.9 %
LYMPHOCYTES NFR BLD: 14 %
LYMPHOCYTES NFR BLD: 14 %
LYMPHOCYTES NFR BLD: 14.3 %
LYMPHOCYTES NFR BLD: 14.4 %
LYMPHOCYTES NFR BLD: 14.7 %
LYMPHOCYTES NFR BLD: 15.6 %
LYMPHOCYTES NFR BLD: 15.7 %
LYMPHOCYTES NFR BLD: 15.8 %
LYMPHOCYTES NFR BLD: 18 %
LYMPHOCYTES NFR BLD: 19.5 %
LYMPHOCYTES NFR BLD: 19.8 %
LYMPHOCYTES NFR BLD: 2.5 %
LYMPHOCYTES NFR BLD: 22.5 %
LYMPHOCYTES NFR BLD: 3 %
LYMPHOCYTES NFR BLD: 3 %
LYMPHOCYTES NFR BLD: 3.3 %
LYMPHOCYTES NFR BLD: 3.6 %
LYMPHOCYTES NFR BLD: 3.9 %
LYMPHOCYTES NFR BLD: 4 %
LYMPHOCYTES NFR BLD: 4.6 %
LYMPHOCYTES NFR BLD: 4.9 %
LYMPHOCYTES NFR BLD: 5 %
LYMPHOCYTES NFR BLD: 5 %
LYMPHOCYTES NFR BLD: 5.3 %
LYMPHOCYTES NFR BLD: 6 %
LYMPHOCYTES NFR BLD: 6.1 %
LYMPHOCYTES NFR BLD: 6.3 %
LYMPHOCYTES NFR BLD: 6.5 %
LYMPHOCYTES NFR BLD: 7 %
LYMPHOCYTES NFR BLD: 8 %
LYMPHOCYTES NFR BLD: 8.1 %
LYMPHOCYTES NFR BLD: 8.4 %
LYMPHOCYTES NFR BLD: 8.7 %
LYMPHOCYTES NFR BLD: 9 %
LYMPHOCYTES NFR BLD: 9.2 %
LYMPHOCYTES NFR BLD: 9.5 %
LYMPHOCYTES NFR BLD: 9.6 %
MAGNESIUM SERPL-MCNC: 1.8 MG/DL
MAGNESIUM SERPL-MCNC: 1.9 MG/DL
MAGNESIUM SERPL-MCNC: 2 MG/DL
MAGNESIUM SERPL-MCNC: 2.1 MG/DL
MAGNESIUM SERPL-MCNC: 2.2 MG/DL
MAGNESIUM SERPL-MCNC: 2.3 MG/DL
MAGNESIUM SERPL-MCNC: 2.4 MG/DL
MAGNESIUM SERPL-MCNC: 2.5 MG/DL
MAGNESIUM SERPL-MCNC: 2.6 MG/DL
MAGNESIUM SERPL-MCNC: 2.7 MG/DL
MAGNESIUM SERPL-MCNC: 2.9 MG/DL
MCH RBC QN AUTO: 27.4 PG
MCH RBC QN AUTO: 27.6 PG
MCH RBC QN AUTO: 27.8 PG
MCH RBC QN AUTO: 28 PG
MCH RBC QN AUTO: 28.2 PG
MCH RBC QN AUTO: 28.3 PG
MCH RBC QN AUTO: 28.3 PG
MCH RBC QN AUTO: 28.4 PG
MCH RBC QN AUTO: 28.4 PG
MCH RBC QN AUTO: 28.5 PG
MCH RBC QN AUTO: 29 PG
MCH RBC QN AUTO: 29.1 PG
MCH RBC QN AUTO: 29.3 PG
MCH RBC QN AUTO: 29.5 PG
MCH RBC QN AUTO: 29.6 PG
MCH RBC QN AUTO: 29.7 PG
MCH RBC QN AUTO: 29.8 PG
MCH RBC QN AUTO: 29.8 PG
MCH RBC QN AUTO: 29.9 PG
MCH RBC QN AUTO: 30 PG
MCH RBC QN AUTO: 30.1 PG
MCH RBC QN AUTO: 30.2 PG
MCH RBC QN AUTO: 30.3 PG
MCH RBC QN AUTO: 30.4 PG
MCH RBC QN AUTO: 30.4 PG
MCH RBC QN AUTO: 30.5 PG
MCH RBC QN AUTO: 30.8 PG
MCH RBC QN AUTO: 30.8 PG
MCHC RBC AUTO-ENTMCNC: 28.8 G/DL
MCHC RBC AUTO-ENTMCNC: 29.2 G/DL
MCHC RBC AUTO-ENTMCNC: 29.6 G/DL
MCHC RBC AUTO-ENTMCNC: 29.7 G/DL
MCHC RBC AUTO-ENTMCNC: 30 G/DL
MCHC RBC AUTO-ENTMCNC: 30 G/DL
MCHC RBC AUTO-ENTMCNC: 30.1 G/DL
MCHC RBC AUTO-ENTMCNC: 30.2 G/DL
MCHC RBC AUTO-ENTMCNC: 30.3 G/DL
MCHC RBC AUTO-ENTMCNC: 30.4 G/DL
MCHC RBC AUTO-ENTMCNC: 30.4 G/DL
MCHC RBC AUTO-ENTMCNC: 30.5 G/DL
MCHC RBC AUTO-ENTMCNC: 30.6 G/DL
MCHC RBC AUTO-ENTMCNC: 30.9 G/DL
MCHC RBC AUTO-ENTMCNC: 31 G/DL
MCHC RBC AUTO-ENTMCNC: 31.2 G/DL
MCHC RBC AUTO-ENTMCNC: 31.3 G/DL
MCHC RBC AUTO-ENTMCNC: 31.4 G/DL
MCHC RBC AUTO-ENTMCNC: 31.5 G/DL
MCHC RBC AUTO-ENTMCNC: 31.6 G/DL
MCHC RBC AUTO-ENTMCNC: 31.7 G/DL
MCHC RBC AUTO-ENTMCNC: 31.7 G/DL
MCHC RBC AUTO-ENTMCNC: 31.8 G/DL
MCHC RBC AUTO-ENTMCNC: 32 G/DL
MCHC RBC AUTO-ENTMCNC: 32 G/DL
MCHC RBC AUTO-ENTMCNC: 32.1 G/DL
MCHC RBC AUTO-ENTMCNC: 32.3 G/DL
MCHC RBC AUTO-ENTMCNC: 32.3 G/DL
MCHC RBC AUTO-ENTMCNC: 32.4 G/DL
MCHC RBC AUTO-ENTMCNC: 32.5 G/DL
MCHC RBC AUTO-ENTMCNC: 32.6 G/DL
MCHC RBC AUTO-ENTMCNC: 32.6 G/DL
MCHC RBC AUTO-ENTMCNC: 32.8 G/DL
MCHC RBC AUTO-ENTMCNC: 32.8 G/DL
MCV RBC AUTO: 92 FL
MCV RBC AUTO: 93 FL
MCV RBC AUTO: 94 FL
MCV RBC AUTO: 95 FL
MCV RBC AUTO: 96 FL
MCV RBC AUTO: 97 FL
METAMYELOCYTES NFR BLD MANUAL: 0.5 %
METAMYELOCYTES NFR BLD MANUAL: 0.5 %
METAMYELOCYTES NFR BLD MANUAL: 2 %
METAMYELOCYTES NFR BLD MANUAL: 2 %
METHADONE SERPL QL SCN: NEGATIVE
METHADONE UR QL SCN>300 NG/ML: NEGATIVE
METHADONE UR QL SCN>300 NG/ML: NEGATIVE
MICROSCOPIC COMMENT: ABNORMAL
MICROSCOPIC COMMENT: ABNORMAL
MIN VOL: 12.4
MIN VOL: 7.11
MIN VOL: 7.34
MIN VOL: 7.4
MIN VOL: 7.42
MIN VOL: 7.43
MIN VOL: 7.6
MIN VOL: 7.62
MIN VOL: 7.62
MIN VOL: 7.66
MIN VOL: 7.67
MIN VOL: 7.7
MIN VOL: 7.78
MIN VOL: 7.8
MIN VOL: 7.8
MIN VOL: 8.46
MIN VOL: 8.49
MIN VOL: 8.88
MIN VOL: 8.92
MITOGEN IGNF BCKGRD COR BLD-ACNC: 0.03 IU/ML
MITOGEN NIL: >10 IU/ML
MITRAL VALVE REGURGITATION: ABNORMAL
MODE: ABNORMAL
MONOCYTES # BLD AUTO: 0.2 K/UL
MONOCYTES # BLD AUTO: 0.5 K/UL
MONOCYTES # BLD AUTO: 0.6 K/UL
MONOCYTES # BLD AUTO: 0.7 K/UL
MONOCYTES # BLD AUTO: 0.8 K/UL
MONOCYTES # BLD AUTO: 0.9 K/UL
MONOCYTES # BLD AUTO: 1 K/UL
MONOCYTES # BLD AUTO: 1 K/UL
MONOCYTES # BLD AUTO: 1.1 K/UL
MONOCYTES # BLD AUTO: 1.1 K/UL
MONOCYTES # BLD AUTO: 1.2 K/UL
MONOCYTES # BLD AUTO: 1.4 K/UL
MONOCYTES # BLD AUTO: ABNORMAL K/UL
MONOCYTES NFR BLD: 0 %
MONOCYTES NFR BLD: 1 %
MONOCYTES NFR BLD: 1 %
MONOCYTES NFR BLD: 1.8 %
MONOCYTES NFR BLD: 10 %
MONOCYTES NFR BLD: 10.2 %
MONOCYTES NFR BLD: 10.7 %
MONOCYTES NFR BLD: 10.9 %
MONOCYTES NFR BLD: 11 %
MONOCYTES NFR BLD: 11.4 %
MONOCYTES NFR BLD: 11.7 %
MONOCYTES NFR BLD: 11.9 %
MONOCYTES NFR BLD: 12.2 %
MONOCYTES NFR BLD: 12.4 %
MONOCYTES NFR BLD: 12.5 %
MONOCYTES NFR BLD: 12.7 %
MONOCYTES NFR BLD: 12.9 %
MONOCYTES NFR BLD: 13.3 %
MONOCYTES NFR BLD: 13.6 %
MONOCYTES NFR BLD: 3 %
MONOCYTES NFR BLD: 3 %
MONOCYTES NFR BLD: 4 %
MONOCYTES NFR BLD: 4.5 %
MONOCYTES NFR BLD: 4.5 %
MONOCYTES NFR BLD: 5 %
MONOCYTES NFR BLD: 5.1 %
MONOCYTES NFR BLD: 5.2 %
MONOCYTES NFR BLD: 5.3 %
MONOCYTES NFR BLD: 5.5 %
MONOCYTES NFR BLD: 5.5 %
MONOCYTES NFR BLD: 6 %
MONOCYTES NFR BLD: 6.1 %
MONOCYTES NFR BLD: 6.1 %
MONOCYTES NFR BLD: 6.2 %
MONOCYTES NFR BLD: 6.3 %
MONOCYTES NFR BLD: 6.5 %
MONOCYTES NFR BLD: 6.6 %
MONOCYTES NFR BLD: 6.7 %
MONOCYTES NFR BLD: 6.9 %
MONOCYTES NFR BLD: 7.1 %
MONOCYTES NFR BLD: 7.8 %
MONOCYTES NFR BLD: 7.9 %
MONOCYTES NFR BLD: 8.7 %
MONOCYTES NFR BLD: 8.8 %
MONOCYTES NFR BLD: 9.2 %
MONOCYTES NFR BLD: 9.2 %
MONOCYTES NFR BLD: 9.6 %
MONOCYTES NFR BLD: 9.8 %
MONOCYTES NFR BLD: 9.9 %
MYELOCYTES NFR BLD MANUAL: 1 %
MYELOCYTES NFR BLD MANUAL: 1.5 %
MYELOCYTES NFR BLD MANUAL: 2 %
MYELOCYTES NFR BLD MANUAL: 3 %
MYELOCYTES NFR BLD MANUAL: 4 %
NEUTROPHILS # BLD AUTO: 10.2 K/UL
NEUTROPHILS # BLD AUTO: 10.3 K/UL
NEUTROPHILS # BLD AUTO: 10.4 K/UL
NEUTROPHILS # BLD AUTO: 10.4 K/UL
NEUTROPHILS # BLD AUTO: 10.6 K/UL
NEUTROPHILS # BLD AUTO: 10.6 K/UL
NEUTROPHILS # BLD AUTO: 10.8 K/UL
NEUTROPHILS # BLD AUTO: 12.2 K/UL
NEUTROPHILS # BLD AUTO: 12.4 K/UL
NEUTROPHILS # BLD AUTO: 12.4 K/UL
NEUTROPHILS # BLD AUTO: 14.5 K/UL
NEUTROPHILS # BLD AUTO: 4 K/UL
NEUTROPHILS # BLD AUTO: 4.2 K/UL
NEUTROPHILS # BLD AUTO: 4.5 K/UL
NEUTROPHILS # BLD AUTO: 4.6 K/UL
NEUTROPHILS # BLD AUTO: 4.7 K/UL
NEUTROPHILS # BLD AUTO: 4.9 K/UL
NEUTROPHILS # BLD AUTO: 5 K/UL
NEUTROPHILS # BLD AUTO: 5 K/UL
NEUTROPHILS # BLD AUTO: 5.5 K/UL
NEUTROPHILS # BLD AUTO: 5.5 K/UL
NEUTROPHILS # BLD AUTO: 5.6 K/UL
NEUTROPHILS # BLD AUTO: 5.7 K/UL
NEUTROPHILS # BLD AUTO: 5.7 K/UL
NEUTROPHILS # BLD AUTO: 5.9 K/UL
NEUTROPHILS # BLD AUTO: 5.9 K/UL
NEUTROPHILS # BLD AUTO: 6 K/UL
NEUTROPHILS # BLD AUTO: 6.1 K/UL
NEUTROPHILS # BLD AUTO: 6.1 K/UL
NEUTROPHILS # BLD AUTO: 6.3 K/UL
NEUTROPHILS # BLD AUTO: 6.7 K/UL
NEUTROPHILS # BLD AUTO: 6.7 K/UL
NEUTROPHILS # BLD AUTO: 7.7 K/UL
NEUTROPHILS # BLD AUTO: 8.3 K/UL
NEUTROPHILS # BLD AUTO: 8.7 K/UL
NEUTROPHILS # BLD AUTO: 9 K/UL
NEUTROPHILS NFR BLD: 63.8 %
NEUTROPHILS NFR BLD: 64 %
NEUTROPHILS NFR BLD: 66.3 %
NEUTROPHILS NFR BLD: 66.3 %
NEUTROPHILS NFR BLD: 66.9 %
NEUTROPHILS NFR BLD: 67.1 %
NEUTROPHILS NFR BLD: 69.1 %
NEUTROPHILS NFR BLD: 69.4 %
NEUTROPHILS NFR BLD: 69.5 %
NEUTROPHILS NFR BLD: 70 %
NEUTROPHILS NFR BLD: 70.2 %
NEUTROPHILS NFR BLD: 70.5 %
NEUTROPHILS NFR BLD: 70.6 %
NEUTROPHILS NFR BLD: 70.6 %
NEUTROPHILS NFR BLD: 70.8 %
NEUTROPHILS NFR BLD: 71.7 %
NEUTROPHILS NFR BLD: 71.8 %
NEUTROPHILS NFR BLD: 72.4 %
NEUTROPHILS NFR BLD: 72.4 %
NEUTROPHILS NFR BLD: 72.9 %
NEUTROPHILS NFR BLD: 73.1 %
NEUTROPHILS NFR BLD: 74 %
NEUTROPHILS NFR BLD: 75.3 %
NEUTROPHILS NFR BLD: 76.1 %
NEUTROPHILS NFR BLD: 76.3 %
NEUTROPHILS NFR BLD: 77.4 %
NEUTROPHILS NFR BLD: 79 %
NEUTROPHILS NFR BLD: 79.6 %
NEUTROPHILS NFR BLD: 80.9 %
NEUTROPHILS NFR BLD: 81 %
NEUTROPHILS NFR BLD: 81.5 %
NEUTROPHILS NFR BLD: 81.8 %
NEUTROPHILS NFR BLD: 81.9 %
NEUTROPHILS NFR BLD: 82 %
NEUTROPHILS NFR BLD: 82.1 %
NEUTROPHILS NFR BLD: 83 %
NEUTROPHILS NFR BLD: 83.1 %
NEUTROPHILS NFR BLD: 83.1 %
NEUTROPHILS NFR BLD: 83.8 %
NEUTROPHILS NFR BLD: 84.8 %
NEUTROPHILS NFR BLD: 84.8 %
NEUTROPHILS NFR BLD: 84.9 %
NEUTROPHILS NFR BLD: 87.1 %
NEUTROPHILS NFR BLD: 87.5 %
NEUTROPHILS NFR BLD: 88.2 %
NEUTROPHILS NFR BLD: 91.6 %
NEUTROPHILS NFR BLD: 92 %
NEUTROPHILS NFR BLD: 93 %
NEUTROPHILS NFR BLD: 94 %
NEUTS BAND NFR BLD MANUAL: 19 %
NEUTS BAND NFR BLD MANUAL: 2 %
NEUTS BAND NFR BLD MANUAL: 3 %
NEUTS BAND NFR BLD MANUAL: 3 %
NEUTS BAND NFR BLD MANUAL: 8 %
NICOTINE SERPL-MCNC: <3 NG/ML
NITRITE UR QL STRIP: NEGATIVE
NONHDLC SERPL-MCNC: 132 MG/DL
NONHDLC SERPL-MCNC: 167 MG/DL
NRBC BLD-RTO: 0 /100 WBC
NRBC BLD-RTO: 1 /100 WBC
NRBC BLD-RTO: 3 /100 WBC
NUM UNITS TRANS FFP: NORMAL
OPIATES SERPL QL SCN: NEGATIVE
OPIATES UR QL SCN: NEGATIVE
OPIATES UR QL SCN: NORMAL
OVALOCYTES BLD QL SMEAR: ABNORMAL
PCO2 BLDA: 35.9 MMHG (ref 35–45)
PCO2 BLDA: 37.2 MMHG (ref 35–45)
PCO2 BLDA: 41.5 MMHG (ref 35–45)
PCO2 BLDA: 44.9 MMHG (ref 35–45)
PCO2 BLDA: 45.7 MMHG (ref 35–45)
PCO2 BLDA: 45.8 MMHG (ref 35–45)
PCO2 BLDA: 47.1 MMHG (ref 35–45)
PCO2 BLDA: 47.5 MMHG (ref 35–45)
PCO2 BLDA: 48 MMHG (ref 35–45)
PCO2 BLDA: 48.1 MMHG (ref 35–45)
PCO2 BLDA: 48.5 MMHG (ref 35–45)
PCO2 BLDA: 48.6 MMHG (ref 35–45)
PCO2 BLDA: 49.3 MMHG (ref 35–45)
PCO2 BLDA: 49.4 MMHG (ref 35–45)
PCO2 BLDA: 49.6 MMHG (ref 35–45)
PCO2 BLDA: 49.6 MMHG (ref 35–45)
PCO2 BLDA: 49.7 MMHG (ref 35–45)
PCO2 BLDA: 49.8 MMHG (ref 35–45)
PCO2 BLDA: 50.8 MMHG (ref 35–45)
PCO2 BLDA: 51 MMHG (ref 35–45)
PCO2 BLDA: 51.3 MMHG (ref 35–45)
PCO2 BLDA: 51.4 MMHG (ref 35–45)
PCO2 BLDA: 51.7 MMHG (ref 35–45)
PCO2 BLDA: 51.9 MMHG (ref 35–45)
PCO2 BLDA: 52.5 MMHG (ref 35–45)
PCO2 BLDA: 52.5 MMHG (ref 35–45)
PCO2 BLDA: 52.9 MMHG (ref 35–45)
PCO2 BLDA: 54.2 MMHG (ref 35–45)
PCO2 BLDA: 54.5 MMHG (ref 35–45)
PCO2 BLDA: 54.7 MMHG (ref 35–45)
PCO2 BLDA: 54.8 MMHG (ref 35–45)
PCO2 BLDA: 54.9 MMHG (ref 35–45)
PCO2 BLDA: 54.9 MMHG (ref 35–45)
PCO2 BLDA: 55.3 MMHG (ref 35–45)
PCO2 BLDA: 55.3 MMHG (ref 35–45)
PCO2 BLDA: 55.7 MMHG (ref 35–45)
PCO2 BLDA: 55.9 MMHG (ref 35–45)
PCO2 BLDA: 56.6 MMHG (ref 35–45)
PCO2 BLDA: 57 MMHG (ref 35–45)
PCO2 BLDA: 57.6 MMHG (ref 35–45)
PCO2 BLDA: 57.6 MMHG (ref 35–45)
PCO2 BLDA: 57.9 MMHG (ref 35–45)
PCO2 BLDA: 58.1 MMHG (ref 35–45)
PCO2 BLDA: 58.3 MMHG (ref 35–45)
PCO2 BLDA: 58.5 MMHG (ref 35–45)
PCO2 BLDA: 58.9 MMHG (ref 35–45)
PCO2 BLDA: 59.3 MMHG (ref 35–45)
PCO2 BLDA: 59.3 MMHG (ref 35–45)
PCO2 BLDA: 59.5 MMHG (ref 35–45)
PCO2 BLDA: 59.5 MMHG (ref 35–45)
PCO2 BLDA: 60.2 MMHG (ref 35–45)
PCO2 BLDA: 60.2 MMHG (ref 35–45)
PCO2 BLDA: 61.1 MMHG (ref 35–45)
PCO2 BLDA: 61.9 MMHG (ref 35–45)
PCO2 BLDA: 62.5 MMHG (ref 35–45)
PCO2 BLDA: 63.3 MMHG (ref 35–45)
PCO2 BLDA: 63.3 MMHG (ref 35–45)
PCO2 BLDA: 63.8 MMHG (ref 35–45)
PCO2 BLDA: 63.8 MMHG (ref 35–45)
PCO2 BLDA: 63.9 MMHG (ref 35–45)
PCO2 BLDA: 64.2 MMHG (ref 35–45)
PCO2 BLDA: 64.3 MMHG (ref 35–45)
PCO2 BLDA: 65.1 MMHG (ref 35–45)
PCO2 BLDA: 66 MMHG (ref 35–45)
PCO2 BLDA: 66.2 MMHG (ref 35–45)
PCO2 BLDA: 66.7 MMHG (ref 35–45)
PCO2 BLDA: 72.4 MMHG (ref 35–45)
PCO2 BLDA: 77 MMHG (ref 35–45)
PCP SERPL QL SCN: NEGATIVE
PCP UR QL SCN>25 NG/ML: NEGATIVE
PCP UR QL SCN>25 NG/ML: NEGATIVE
PEEP: 10
PEEP: 5
PEEP: 6
PEEP: 8
PEEP: 8
PH SMN: 7.28 [PH] (ref 7.35–7.45)
PH SMN: 7.29 [PH] (ref 7.35–7.45)
PH SMN: 7.3 [PH] (ref 7.35–7.45)
PH SMN: 7.31 [PH] (ref 7.35–7.45)
PH SMN: 7.31 [PH] (ref 7.35–7.45)
PH SMN: 7.32 [PH] (ref 7.35–7.45)
PH SMN: 7.33 [PH] (ref 7.35–7.45)
PH SMN: 7.34 [PH] (ref 7.35–7.45)
PH SMN: 7.34 [PH] (ref 7.35–7.45)
PH SMN: 7.35 [PH] (ref 7.35–7.45)
PH SMN: 7.36 [PH] (ref 7.35–7.45)
PH SMN: 7.37 [PH] (ref 7.35–7.45)
PH SMN: 7.38 [PH] (ref 7.35–7.45)
PH SMN: 7.39 [PH] (ref 7.35–7.45)
PH SMN: 7.4 [PH] (ref 7.35–7.45)
PH SMN: 7.41 [PH] (ref 7.35–7.45)
PH SMN: 7.41 [PH] (ref 7.35–7.45)
PH SMN: 7.42 [PH] (ref 7.35–7.45)
PH SMN: 7.43 [PH] (ref 7.35–7.45)
PH SMN: 7.44 [PH] (ref 7.35–7.45)
PH SMN: 7.44 [PH] (ref 7.35–7.45)
PH SMN: 7.45 [PH] (ref 7.35–7.45)
PH SMN: 7.45 [PH] (ref 7.35–7.45)
PH SMN: 7.46 [PH] (ref 7.35–7.45)
PH SMN: 7.47 [PH] (ref 7.35–7.45)
PH SMN: 7.49 [PH] (ref 7.35–7.45)
PH SMN: 7.55 [PH] (ref 7.35–7.45)
PH UR STRIP: 5 [PH] (ref 5–8)
PH UR STRIP: 6 [PH] (ref 5–8)
PHENYTOIN SERPL-MCNC: 1.2 UG/ML
PHENYTOIN SERPL-MCNC: 2 UG/ML
PHOSPHATE SERPL-MCNC: 2.4 MG/DL
PHOSPHATE SERPL-MCNC: 2.8 MG/DL
PHOSPHATE SERPL-MCNC: 2.9 MG/DL
PHOSPHATE SERPL-MCNC: 3.4 MG/DL
PHOSPHATE SERPL-MCNC: 3.6 MG/DL
PHOSPHATE SERPL-MCNC: 3.8 MG/DL
PHOSPHATE SERPL-MCNC: 3.8 MG/DL
PHOSPHATE SERPL-MCNC: 4.8 MG/DL
PHOSPHATE SERPL-MCNC: 4.9 MG/DL
PHOSPHATE SERPL-MCNC: 5.6 MG/DL
PHOSPHATE SERPL-MCNC: 6.3 MG/DL
PHOSPHATE SERPL-MCNC: 6.3 MG/DL
PIP: 25
PIP: 26
PIP: 28
PIP: 29
PIP: 30
PIP: 31
PIP: 32
PIP: 33
PIP: 33
PIP: 35
PIP: 41
PIP: 41
PIP: 47
PLATELET # BLD AUTO: 120 K/UL
PLATELET # BLD AUTO: 125 K/UL
PLATELET # BLD AUTO: 126 K/UL
PLATELET # BLD AUTO: 131 K/UL
PLATELET # BLD AUTO: 131 K/UL
PLATELET # BLD AUTO: 142 K/UL
PLATELET # BLD AUTO: 142 K/UL
PLATELET # BLD AUTO: 143 K/UL
PLATELET # BLD AUTO: 145 K/UL
PLATELET # BLD AUTO: 146 K/UL
PLATELET # BLD AUTO: 148 K/UL
PLATELET # BLD AUTO: 148 K/UL
PLATELET # BLD AUTO: 151 K/UL
PLATELET # BLD AUTO: 152 K/UL
PLATELET # BLD AUTO: 157 K/UL
PLATELET # BLD AUTO: 158 K/UL
PLATELET # BLD AUTO: 159 K/UL
PLATELET # BLD AUTO: 159 K/UL
PLATELET # BLD AUTO: 160 K/UL
PLATELET # BLD AUTO: 160 K/UL
PLATELET # BLD AUTO: 161 K/UL
PLATELET # BLD AUTO: 164 K/UL
PLATELET # BLD AUTO: 164 K/UL
PLATELET # BLD AUTO: 168 K/UL
PLATELET # BLD AUTO: 169 K/UL
PLATELET # BLD AUTO: 170 K/UL
PLATELET # BLD AUTO: 171 K/UL
PLATELET # BLD AUTO: 172 K/UL
PLATELET # BLD AUTO: 176 K/UL
PLATELET # BLD AUTO: 177 K/UL
PLATELET # BLD AUTO: 179 K/UL
PLATELET # BLD AUTO: 181 K/UL
PLATELET # BLD AUTO: 181 K/UL
PLATELET # BLD AUTO: 186 K/UL
PLATELET # BLD AUTO: 186 K/UL
PLATELET # BLD AUTO: 188 K/UL
PLATELET # BLD AUTO: 188 K/UL
PLATELET # BLD AUTO: 189 K/UL
PLATELET # BLD AUTO: 194 K/UL
PLATELET # BLD AUTO: 197 K/UL
PLATELET # BLD AUTO: 201 K/UL
PLATELET # BLD AUTO: 229 K/UL
PLATELET # BLD AUTO: 236 K/UL
PLATELET # BLD AUTO: 237 K/UL
PLATELET # BLD AUTO: 255 K/UL
PLATELET # BLD AUTO: 268 K/UL
PLATELET # BLD AUTO: 330 K/UL
PLATELET BLD QL SMEAR: ABNORMAL
PLATELET FUNCTION ASSAY - EPINEPHRINE: 97 SECS
PMV BLD AUTO: 10.7 FL
PMV BLD AUTO: 10.7 FL
PMV BLD AUTO: 10.9 FL
PMV BLD AUTO: 11 FL
PMV BLD AUTO: 11 FL
PMV BLD AUTO: 11.1 FL
PMV BLD AUTO: 11.2 FL
PMV BLD AUTO: 11.3 FL
PMV BLD AUTO: 11.4 FL
PMV BLD AUTO: 11.5 FL
PMV BLD AUTO: 11.6 FL
PMV BLD AUTO: 11.7 FL
PMV BLD AUTO: 11.8 FL
PMV BLD AUTO: 11.9 FL
PMV BLD AUTO: 12 FL
PMV BLD AUTO: 12.1 FL
PMV BLD AUTO: 12.1 FL
PMV BLD AUTO: 12.2 FL
PMV BLD AUTO: 12.2 FL
PMV BLD AUTO: 12.3 FL
PMV BLD AUTO: 12.4 FL
PO2 BLDA: 124 MMHG (ref 80–100)
PO2 BLDA: 26 MMHG (ref 40–60)
PO2 BLDA: 268 MMHG (ref 80–100)
PO2 BLDA: 28 MMHG (ref 40–60)
PO2 BLDA: 29 MMHG (ref 40–60)
PO2 BLDA: 30 MMHG (ref 40–60)
PO2 BLDA: 31 MMHG (ref 40–60)
PO2 BLDA: 32 MMHG (ref 40–60)
PO2 BLDA: 33 MMHG (ref 40–60)
PO2 BLDA: 34 MMHG (ref 40–60)
PO2 BLDA: 35 MMHG (ref 40–60)
PO2 BLDA: 36 MMHG (ref 40–60)
PO2 BLDA: 37 MMHG (ref 40–60)
PO2 BLDA: 38 MMHG (ref 40–60)
PO2 BLDA: 39 MMHG (ref 40–60)
PO2 BLDA: 40 MMHG (ref 40–60)
PO2 BLDA: 41 MMHG (ref 40–60)
PO2 BLDA: 44 MMHG (ref 40–60)
PO2 BLDA: 52 MMHG (ref 80–100)
PO2 BLDA: 54 MMHG (ref 40–60)
PO2 BLDA: 57 MMHG (ref 80–100)
PO2 BLDA: 63 MMHG (ref 80–100)
PO2 BLDA: 65 MMHG (ref 80–100)
PO2 BLDA: 68 MMHG (ref 80–100)
PO2 BLDA: 70 MMHG (ref 80–100)
PO2 BLDA: 71 MMHG (ref 80–100)
PO2 BLDA: 72 MMHG (ref 80–100)
PO2 BLDA: 73 MMHG (ref 80–100)
PO2 BLDA: 73 MMHG (ref 80–100)
PO2 BLDA: 79 MMHG (ref 80–100)
PO2 BLDA: 80 MMHG (ref 80–100)
PO2 BLDA: 82 MMHG (ref 80–100)
PO2 BLDA: 86 MMHG (ref 80–100)
PO2 BLDA: 92 MMHG (ref 80–100)
PO2 BLDA: 96 MMHG (ref 80–100)
PO2 BLDA: 98 MMHG (ref 80–100)
POC BE: -3 MMOL/L
POC BE: -3 MMOL/L
POC BE: 0 MMOL/L
POC BE: 1 MMOL/L
POC BE: 10 MMOL/L
POC BE: 10 MMOL/L
POC BE: 11 MMOL/L
POC BE: 12 MMOL/L
POC BE: 13 MMOL/L
POC BE: 14 MMOL/L
POC BE: 15 MMOL/L
POC BE: 15 MMOL/L
POC BE: 16 MMOL/L
POC BE: 16 MMOL/L
POC BE: 17 MMOL/L
POC BE: 19 MMOL/L
POC BE: 2 MMOL/L
POC BE: 3 MMOL/L
POC BE: 4 MMOL/L
POC BE: 5 MMOL/L
POC BE: 6 MMOL/L
POC BE: 6 MMOL/L
POC BE: 7 MMOL/L
POC BE: 7 MMOL/L
POC BE: 8 MMOL/L
POC BE: 9 MMOL/L
POC BE: 9 MMOL/L
POC IONIZED CALCIUM: 1.1 MMOL/L (ref 1.06–1.42)
POC PTINR: 0.9 (ref 0.9–1.2)
POC PTWBT: 11.4 SEC (ref 9.7–14.3)
POC SATURATED O2: 100 % (ref 95–100)
POC SATURATED O2: 40 % (ref 95–100)
POC SATURATED O2: 45 % (ref 95–100)
POC SATURATED O2: 47 % (ref 95–100)
POC SATURATED O2: 53 % (ref 95–100)
POC SATURATED O2: 54 % (ref 95–100)
POC SATURATED O2: 56 % (ref 95–100)
POC SATURATED O2: 58 % (ref 95–100)
POC SATURATED O2: 59 % (ref 95–100)
POC SATURATED O2: 60 % (ref 95–100)
POC SATURATED O2: 61 % (ref 95–100)
POC SATURATED O2: 61 % (ref 95–100)
POC SATURATED O2: 62 % (ref 95–100)
POC SATURATED O2: 63 % (ref 95–100)
POC SATURATED O2: 64 % (ref 95–100)
POC SATURATED O2: 65 % (ref 95–100)
POC SATURATED O2: 66 % (ref 95–100)
POC SATURATED O2: 67 % (ref 95–100)
POC SATURATED O2: 67 % (ref 95–100)
POC SATURATED O2: 68 % (ref 95–100)
POC SATURATED O2: 69 % (ref 95–100)
POC SATURATED O2: 70 % (ref 95–100)
POC SATURATED O2: 70 % (ref 95–100)
POC SATURATED O2: 71 % (ref 95–100)
POC SATURATED O2: 72 % (ref 95–100)
POC SATURATED O2: 73 % (ref 95–100)
POC SATURATED O2: 73 % (ref 95–100)
POC SATURATED O2: 74 % (ref 95–100)
POC SATURATED O2: 75 % (ref 95–100)
POC SATURATED O2: 86 % (ref 95–100)
POC SATURATED O2: 87 % (ref 95–100)
POC SATURATED O2: 87 % (ref 95–100)
POC SATURATED O2: 90 % (ref 95–100)
POC SATURATED O2: 93 % (ref 95–100)
POC SATURATED O2: 95 % (ref 95–100)
POC SATURATED O2: 96 % (ref 95–100)
POC SATURATED O2: 97 % (ref 95–100)
POC SATURATED O2: 97 % (ref 95–100)
POC SATURATED O2: 98 % (ref 95–100)
POC SATURATED O2: 99 % (ref 95–100)
POC TCO2 (MEASURED): 27 MMOL/L (ref 23–29)
POC TCO2: 25 MMOL/L (ref 24–29)
POC TCO2: 25 MMOL/L (ref 24–29)
POC TCO2: 27 MMOL/L (ref 24–29)
POC TCO2: 28 MMOL/L (ref 23–27)
POC TCO2: 28 MMOL/L (ref 24–29)
POC TCO2: 28 MMOL/L (ref 24–29)
POC TCO2: 29 MMOL/L (ref 23–27)
POC TCO2: 29 MMOL/L (ref 23–27)
POC TCO2: 29 MMOL/L (ref 24–29)
POC TCO2: 30 MMOL/L (ref 23–27)
POC TCO2: 30 MMOL/L (ref 24–29)
POC TCO2: 30 MMOL/L (ref 24–29)
POC TCO2: 31 MMOL/L (ref 24–29)
POC TCO2: 32 MMOL/L (ref 23–27)
POC TCO2: 32 MMOL/L (ref 24–29)
POC TCO2: 33 MMOL/L (ref 24–29)
POC TCO2: 33 MMOL/L (ref 24–29)
POC TCO2: 34 MMOL/L (ref 24–29)
POC TCO2: 35 MMOL/L (ref 23–27)
POC TCO2: 35 MMOL/L (ref 23–27)
POC TCO2: 35 MMOL/L (ref 24–29)
POC TCO2: 35 MMOL/L (ref 24–29)
POC TCO2: 36 MMOL/L (ref 24–29)
POC TCO2: 37 MMOL/L (ref 23–27)
POC TCO2: 37 MMOL/L (ref 24–29)
POC TCO2: 38 MMOL/L (ref 23–27)
POC TCO2: 38 MMOL/L (ref 23–27)
POC TCO2: 38 MMOL/L (ref 24–29)
POC TCO2: 39 MMOL/L (ref 23–27)
POC TCO2: 39 MMOL/L (ref 24–29)
POC TCO2: 40 MMOL/L (ref 23–27)
POC TCO2: 40 MMOL/L (ref 24–29)
POC TCO2: 41 MMOL/L (ref 24–29)
POC TCO2: 42 MMOL/L (ref 23–27)
POC TCO2: 42 MMOL/L (ref 23–27)
POC TCO2: 42 MMOL/L (ref 24–29)
POC TCO2: 43 MMOL/L (ref 23–27)
POC TCO2: 43 MMOL/L (ref 23–27)
POC TCO2: 43 MMOL/L (ref 24–29)
POC TCO2: 44 MMOL/L (ref 24–29)
POC TCO2: 44 MMOL/L (ref 24–29)
POCT GLUCOSE: 105 MG/DL (ref 70–110)
POCT GLUCOSE: 109 MG/DL (ref 70–110)
POCT GLUCOSE: 112 MG/DL (ref 70–110)
POCT GLUCOSE: 116 MG/DL (ref 70–110)
POCT GLUCOSE: 121 MG/DL (ref 70–110)
POCT GLUCOSE: 123 MG/DL (ref 70–110)
POCT GLUCOSE: 126 MG/DL (ref 70–110)
POCT GLUCOSE: 130 MG/DL (ref 70–110)
POCT GLUCOSE: 133 MG/DL (ref 70–110)
POCT GLUCOSE: 134 MG/DL (ref 70–110)
POCT GLUCOSE: 137 MG/DL (ref 70–110)
POCT GLUCOSE: 138 MG/DL (ref 70–110)
POCT GLUCOSE: 139 MG/DL (ref 70–110)
POCT GLUCOSE: 140 MG/DL (ref 70–110)
POCT GLUCOSE: 142 MG/DL (ref 70–110)
POCT GLUCOSE: 143 MG/DL (ref 70–110)
POCT GLUCOSE: 144 MG/DL (ref 70–110)
POCT GLUCOSE: 144 MG/DL (ref 70–110)
POCT GLUCOSE: 145 MG/DL (ref 70–110)
POCT GLUCOSE: 146 MG/DL (ref 70–110)
POCT GLUCOSE: 147 MG/DL (ref 70–110)
POCT GLUCOSE: 148 MG/DL (ref 70–110)
POCT GLUCOSE: 149 MG/DL (ref 70–110)
POCT GLUCOSE: 150 MG/DL (ref 70–110)
POCT GLUCOSE: 150 MG/DL (ref 70–110)
POCT GLUCOSE: 151 MG/DL (ref 70–110)
POCT GLUCOSE: 152 MG/DL (ref 70–110)
POCT GLUCOSE: 152 MG/DL (ref 70–110)
POCT GLUCOSE: 153 MG/DL (ref 70–110)
POCT GLUCOSE: 154 MG/DL (ref 70–110)
POCT GLUCOSE: 154 MG/DL (ref 70–110)
POCT GLUCOSE: 155 MG/DL (ref 70–110)
POCT GLUCOSE: 156 MG/DL (ref 70–110)
POCT GLUCOSE: 157 MG/DL (ref 70–110)
POCT GLUCOSE: 157 MG/DL (ref 70–110)
POCT GLUCOSE: 158 MG/DL (ref 70–110)
POCT GLUCOSE: 159 MG/DL (ref 70–110)
POCT GLUCOSE: 159 MG/DL (ref 70–110)
POCT GLUCOSE: 160 MG/DL (ref 70–110)
POCT GLUCOSE: 161 MG/DL (ref 70–110)
POCT GLUCOSE: 162 MG/DL (ref 70–110)
POCT GLUCOSE: 164 MG/DL (ref 70–110)
POCT GLUCOSE: 165 MG/DL (ref 70–110)
POCT GLUCOSE: 166 MG/DL (ref 70–110)
POCT GLUCOSE: 167 MG/DL (ref 70–110)
POCT GLUCOSE: 168 MG/DL (ref 70–110)
POCT GLUCOSE: 169 MG/DL (ref 70–110)
POCT GLUCOSE: 170 MG/DL (ref 70–110)
POCT GLUCOSE: 170 MG/DL (ref 70–110)
POCT GLUCOSE: 171 MG/DL (ref 70–110)
POCT GLUCOSE: 172 MG/DL (ref 70–110)
POCT GLUCOSE: 173 MG/DL (ref 70–110)
POCT GLUCOSE: 173 MG/DL (ref 70–110)
POCT GLUCOSE: 174 MG/DL (ref 70–110)
POCT GLUCOSE: 175 MG/DL (ref 70–110)
POCT GLUCOSE: 176 MG/DL (ref 70–110)
POCT GLUCOSE: 178 MG/DL (ref 70–110)
POCT GLUCOSE: 179 MG/DL (ref 70–110)
POCT GLUCOSE: 179 MG/DL (ref 70–110)
POCT GLUCOSE: 180 MG/DL (ref 70–110)
POCT GLUCOSE: 182 MG/DL (ref 70–110)
POCT GLUCOSE: 182 MG/DL (ref 70–110)
POCT GLUCOSE: 183 MG/DL (ref 70–110)
POCT GLUCOSE: 183 MG/DL (ref 70–110)
POCT GLUCOSE: 184 MG/DL (ref 70–110)
POCT GLUCOSE: 184 MG/DL (ref 70–110)
POCT GLUCOSE: 186 MG/DL (ref 70–110)
POCT GLUCOSE: 188 MG/DL (ref 70–110)
POCT GLUCOSE: 189 MG/DL (ref 70–110)
POCT GLUCOSE: 190 MG/DL (ref 70–110)
POCT GLUCOSE: 193 MG/DL (ref 70–110)
POCT GLUCOSE: 193 MG/DL (ref 70–110)
POCT GLUCOSE: 194 MG/DL (ref 70–110)
POCT GLUCOSE: 195 MG/DL (ref 70–110)
POCT GLUCOSE: 196 MG/DL (ref 70–110)
POCT GLUCOSE: 197 MG/DL (ref 70–110)
POCT GLUCOSE: 202 MG/DL (ref 70–110)
POCT GLUCOSE: 202 MG/DL (ref 70–110)
POCT GLUCOSE: 203 MG/DL (ref 70–110)
POCT GLUCOSE: 204 MG/DL (ref 70–110)
POCT GLUCOSE: 204 MG/DL (ref 70–110)
POCT GLUCOSE: 206 MG/DL (ref 70–110)
POCT GLUCOSE: 206 MG/DL (ref 70–110)
POCT GLUCOSE: 207 MG/DL (ref 70–110)
POCT GLUCOSE: 209 MG/DL (ref 70–110)
POCT GLUCOSE: 211 MG/DL (ref 70–110)
POCT GLUCOSE: 213 MG/DL (ref 70–110)
POCT GLUCOSE: 216 MG/DL (ref 70–110)
POCT GLUCOSE: 217 MG/DL (ref 70–110)
POCT GLUCOSE: 223 MG/DL (ref 70–110)
POCT GLUCOSE: 226 MG/DL (ref 70–110)
POCT GLUCOSE: 229 MG/DL (ref 70–110)
POCT GLUCOSE: 231 MG/DL (ref 70–110)
POCT GLUCOSE: 234 MG/DL (ref 70–110)
POCT GLUCOSE: 236 MG/DL (ref 70–110)
POCT GLUCOSE: 237 MG/DL (ref 70–110)
POCT GLUCOSE: 239 MG/DL (ref 70–110)
POCT GLUCOSE: 244 MG/DL (ref 70–110)
POCT GLUCOSE: 245 MG/DL (ref 70–110)
POCT GLUCOSE: 246 MG/DL (ref 70–110)
POCT GLUCOSE: 246 MG/DL (ref 70–110)
POCT GLUCOSE: 247 MG/DL (ref 70–110)
POCT GLUCOSE: 248 MG/DL (ref 70–110)
POCT GLUCOSE: 250 MG/DL (ref 70–110)
POCT GLUCOSE: 250 MG/DL (ref 70–110)
POCT GLUCOSE: 251 MG/DL (ref 70–110)
POCT GLUCOSE: 263 MG/DL (ref 70–110)
POCT GLUCOSE: 277 MG/DL (ref 70–110)
POCT GLUCOSE: 278 MG/DL (ref 70–110)
POCT GLUCOSE: 285 MG/DL (ref 70–110)
POCT GLUCOSE: 316 MG/DL (ref 70–110)
POCT GLUCOSE: 316 MG/DL (ref 70–110)
POCT GLUCOSE: 328 MG/DL (ref 70–110)
POCT GLUCOSE: 369 MG/DL (ref 70–110)
POCT GLUCOSE: 375 MG/DL (ref 70–110)
POCT GLUCOSE: <20 MG/DL (ref 70–110)
POIKILOCYTOSIS BLD QL SMEAR: SLIGHT
POLYCHROMASIA BLD QL SMEAR: ABNORMAL
POTASSIUM BLD-SCNC: 3.6 MMOL/L (ref 3.5–5.1)
POTASSIUM SERPL-SCNC: 3.3 MMOL/L
POTASSIUM SERPL-SCNC: 3.3 MMOL/L
POTASSIUM SERPL-SCNC: 3.5 MMOL/L
POTASSIUM SERPL-SCNC: 3.6 MMOL/L
POTASSIUM SERPL-SCNC: 3.7 MMOL/L
POTASSIUM SERPL-SCNC: 3.8 MMOL/L
POTASSIUM SERPL-SCNC: 3.8 MMOL/L
POTASSIUM SERPL-SCNC: 3.9 MMOL/L
POTASSIUM SERPL-SCNC: 4 MMOL/L
POTASSIUM SERPL-SCNC: 4.1 MMOL/L
POTASSIUM SERPL-SCNC: 4.2 MMOL/L
POTASSIUM SERPL-SCNC: 4.3 MMOL/L
POTASSIUM SERPL-SCNC: 4.4 MMOL/L
POTASSIUM SERPL-SCNC: 4.5 MMOL/L
POTASSIUM SERPL-SCNC: 4.6 MMOL/L
POTASSIUM SERPL-SCNC: 4.7 MMOL/L
POTASSIUM SERPL-SCNC: 4.7 MMOL/L
POTASSIUM SERPL-SCNC: 4.8 MMOL/L
POTASSIUM SERPL-SCNC: 4.9 MMOL/L
POTASSIUM SERPL-SCNC: 5 MMOL/L
POTASSIUM SERPL-SCNC: 5.1 MMOL/L
POTASSIUM SERPL-SCNC: 5.2 MMOL/L
POTASSIUM SERPL-SCNC: 5.4 MMOL/L
PRE FEV1 FVC: 76
PRE FEV1: 1.9
PRE FVC: 2.51
PREALB SERPL-MCNC: 24 MG/DL
PREDICTED FEV1 FVC: 80
PREDICTED FEV1: 3.88
PREDICTED FVC: 4.79
PROCALCITONIN SERPL IA-MCNC: 0.3 NG/ML
PROPOXYPH SERPL QL: NEGATIVE
PROT S AG ACT/NOR PPP IA: 108 %
PROT SERPL-MCNC: 5.2 G/DL
PROT SERPL-MCNC: 5.4 G/DL
PROT SERPL-MCNC: 5.4 G/DL
PROT SERPL-MCNC: 5.5 G/DL
PROT SERPL-MCNC: 5.6 G/DL
PROT SERPL-MCNC: 5.7 G/DL
PROT SERPL-MCNC: 5.7 G/DL
PROT SERPL-MCNC: 5.8 G/DL
PROT SERPL-MCNC: 5.9 G/DL
PROT SERPL-MCNC: 6 G/DL
PROT SERPL-MCNC: 6 G/DL
PROT SERPL-MCNC: 6.1 G/DL
PROT SERPL-MCNC: 6.1 G/DL
PROT SERPL-MCNC: 6.2 G/DL
PROT SERPL-MCNC: 6.2 G/DL
PROT SERPL-MCNC: 6.3 G/DL
PROT SERPL-MCNC: 6.4 G/DL
PROT SERPL-MCNC: 6.5 G/DL
PROT SERPL-MCNC: 6.6 G/DL
PROT SERPL-MCNC: 6.7 G/DL
PROT SERPL-MCNC: 6.8 G/DL
PROT SERPL-MCNC: 6.9 G/DL
PROT SERPL-MCNC: 6.9 G/DL
PROT SERPL-MCNC: 7 G/DL
PROT SERPL-MCNC: 7.1 G/DL
PROT SERPL-MCNC: 7.3 G/DL
PROT SERPL-MCNC: 7.4 G/DL
PROT UR QL STRIP: ABNORMAL
PROT UR QL STRIP: NEGATIVE
PROTHROMBIN TIME: 10.3 SEC
PROTHROMBIN TIME: 10.5 SEC
PROTHROMBIN TIME: 10.6 SEC
PROTHROMBIN TIME: 10.6 SEC
PROTHROMBIN TIME: 10.8 SEC
PROTHROMBIN TIME: 10.9 SEC
PROTHROMBIN TIME: 10.9 SEC
PROTHROMBIN TIME: 11 SEC
PROTHROMBIN TIME: 11.1 SEC
PROTHROMBIN TIME: 11.2 SEC
PROTHROMBIN TIME: 11.3 SEC
PROTHROMBIN TIME: 11.4 SEC
PROTHROMBIN TIME: 11.5 SEC
PROTHROMBIN TIME: 11.6 SEC
PROTHROMBIN TIME: 12.4 SEC
PROTHROMBIN TIME: 12.6 SEC
PROTHROMBIN TIME: 13 SEC
PROTHROMBIN TIME: 13.9 SEC
PROTHROMBIN TIME: 14.4 SEC
PROTHROMBIN TIME: 15.3 SEC
PROTHROMBIN TIME: 19.6 SEC
PROTHROMBIN TIME: 20.4 SEC
PROTHROMBIN TIME: 20.4 SEC
PROTHROMBIN TIME: 23.3 SEC
PROTHROMBIN TIME: 24.9 SEC
PROTHROMBIN TIME: 25.8 SEC
PROTHROMBIN TIME: 26.8 SEC
PROTHROMBIN TIME: 29.8 SEC
PROTHROMBIN TIME: 35 SEC
PROVIDER CREDENTIALS: ABNORMAL
PROVIDER CREDENTIALS: ABNORMAL
PROVIDER NOTIFIED: ABNORMAL
PROVIDER NOTIFIED: ABNORMAL
RBC # BLD AUTO: 2.34 M/UL
RBC # BLD AUTO: 2.42 M/UL
RBC # BLD AUTO: 2.43 M/UL
RBC # BLD AUTO: 2.45 M/UL
RBC # BLD AUTO: 2.45 M/UL
RBC # BLD AUTO: 2.47 M/UL
RBC # BLD AUTO: 2.5 M/UL
RBC # BLD AUTO: 2.5 M/UL
RBC # BLD AUTO: 2.51 M/UL
RBC # BLD AUTO: 2.54 M/UL
RBC # BLD AUTO: 2.58 M/UL
RBC # BLD AUTO: 2.61 M/UL
RBC # BLD AUTO: 2.62 M/UL
RBC # BLD AUTO: 2.62 M/UL
RBC # BLD AUTO: 2.7 M/UL
RBC # BLD AUTO: 2.71 M/UL
RBC # BLD AUTO: 2.75 M/UL
RBC # BLD AUTO: 2.77 M/UL
RBC # BLD AUTO: 2.81 M/UL
RBC # BLD AUTO: 2.82 M/UL
RBC # BLD AUTO: 2.83 M/UL
RBC # BLD AUTO: 2.88 M/UL
RBC # BLD AUTO: 2.9 M/UL
RBC # BLD AUTO: 2.91 M/UL
RBC # BLD AUTO: 2.92 M/UL
RBC # BLD AUTO: 2.95 M/UL
RBC # BLD AUTO: 2.96 M/UL
RBC # BLD AUTO: 2.96 M/UL
RBC # BLD AUTO: 3.01 M/UL
RBC # BLD AUTO: 3.03 M/UL
RBC # BLD AUTO: 3.04 M/UL
RBC # BLD AUTO: 3.08 M/UL
RBC # BLD AUTO: 3.13 M/UL
RBC # BLD AUTO: 3.17 M/UL
RBC # BLD AUTO: 3.36 M/UL
RBC # BLD AUTO: 3.37 M/UL
RBC # BLD AUTO: 3.45 M/UL
RBC # BLD AUTO: 3.82 M/UL
RBC # BLD AUTO: 3.87 M/UL
RBC # BLD AUTO: 3.9 M/UL
RBC # BLD AUTO: 3.92 M/UL
RBC # BLD AUTO: 3.93 M/UL
RBC # BLD AUTO: 3.99 M/UL
RBC # BLD AUTO: 4.11 M/UL
RBC # BLD AUTO: 4.26 M/UL
RBC # BLD AUTO: 4.28 M/UL
RBC # BLD AUTO: 4.28 M/UL
RBC # BLD AUTO: 4.47 M/UL
RBC # BLD AUTO: 4.49 M/UL
RBC # BLD AUTO: 4.52 M/UL
RBC # BLD AUTO: 4.55 M/UL
RBC #/AREA URNS AUTO: 3 /HPF (ref 0–4)
RBC #/AREA URNS AUTO: 3 /HPF (ref 0–4)
RETIRED EF AND QEF - SEE NOTES: 10 (ref 55–65)
RETIRED EF AND QEF - SEE NOTES: 15 (ref 55–65)
RETIRED EF AND QEF - SEE NOTES: 8 (ref 55–65)
RPR SER QL: NORMAL
SAMPLE: ABNORMAL
SAMPLE: NORMAL
SERUM COLLECTION DT - LUMINEX CLASS I: NORMAL
SERUM COLLECTION DT - LUMINEX CLASS II: NORMAL
SITE: ABNORMAL
SODIUM BLD-SCNC: 138 MMOL/L (ref 136–145)
SODIUM SERPL-SCNC: 129 MMOL/L
SODIUM SERPL-SCNC: 129 MMOL/L
SODIUM SERPL-SCNC: 130 MMOL/L
SODIUM SERPL-SCNC: 131 MMOL/L
SODIUM SERPL-SCNC: 132 MMOL/L
SODIUM SERPL-SCNC: 133 MMOL/L
SODIUM SERPL-SCNC: 134 MMOL/L
SODIUM SERPL-SCNC: 135 MMOL/L
SODIUM SERPL-SCNC: 136 MMOL/L
SODIUM SERPL-SCNC: 137 MMOL/L
SODIUM SERPL-SCNC: 138 MMOL/L
SODIUM SERPL-SCNC: 139 MMOL/L
SODIUM SERPL-SCNC: 140 MMOL/L
SODIUM SERPL-SCNC: 141 MMOL/L
SODIUM SERPL-SCNC: 142 MMOL/L
SODIUM SERPL-SCNC: 143 MMOL/L
SODIUM SERPL-SCNC: 143 MMOL/L
SODIUM SERPL-SCNC: 144 MMOL/L
SODIUM SERPL-SCNC: 145 MMOL/L
SODIUM SERPL-SCNC: 146 MMOL/L
SODIUM SERPL-SCNC: 147 MMOL/L
SODIUM SERPL-SCNC: 148 MMOL/L
SODIUM SERPL-SCNC: 149 MMOL/L
SODIUM SERPL-SCNC: 150 MMOL/L
SODIUM SERPL-SCNC: 151 MMOL/L
SP GR UR STRIP: 1.01 (ref 1–1.03)
SP02: 100
SP02: 50
SP02: 9195
SP02: 93
SP02: 94
SP02: 95
SP02: 96
SP02: 96
SP02: 97
SP02: 98
SP02: 99
SPCL1 TESTING DATE: NORMAL
SPCL2 TESTING DATE: NORMAL
SPLUA TESTING DATE: NORMAL
SQUAMOUS #/AREA URNS AUTO: 0 /HPF
SQUAMOUS #/AREA URNS AUTO: 1 /HPF
SRA PORCINE INTERP SER-IMP: NEGATIVE
STRONGYLOIDES ANTIBODY IGG: NEGATIVE
T GONDII IGG SER QL IA: NORMAL
T GONDII IGG SERPL IA-ACNC: <5 IU/ML
TB GOLD PLUS: NEGATIVE
TB1 - NIL: 0.05 IU/ML
TB2 - NIL: 0.08 IU/ML
TOXICOLOGY INFORMATION: NORMAL
TOXICOLOGY INFORMATION: NORMAL
TRANS ERYTHROCYTES VOL PATIENT: NORMAL ML
TRANSFERRIN SERPL-MCNC: 308 MG/DL
TRICUSPID VALVE REGURGITATION: ABNORMAL
TRICUSPID VALVE REGURGITATION: ABNORMAL
TRIGL SERPL-MCNC: 102 MG/DL
TRIGL SERPL-MCNC: 85 MG/DL
TROPONIN I SERPL DL<=0.01 NG/ML-MCNC: 0.02 NG/ML
TROPONIN I SERPL DL<=0.01 NG/ML-MCNC: 0.04 NG/ML
TROPONIN I SERPL DL<=0.01 NG/ML-MCNC: 0.18 NG/ML
TROPONIN I SERPL DL<=0.01 NG/ML-MCNC: 0.32 NG/ML
TROPONIN I SERPL DL<=0.01 NG/ML-MCNC: 0.33 NG/ML
TROPONIN I SERPL DL<=0.01 NG/ML-MCNC: 0.46 NG/ML
TROPONIN I SERPL DL<=0.01 NG/ML-MCNC: 0.73 NG/ML
TROPONIN I SERPL DL<=0.01 NG/ML-MCNC: 1.07 NG/ML
TROPONIN I SERPL DL<=0.01 NG/ML-MCNC: 2.35 NG/ML
TSH SERPL DL<=0.005 MIU/L-ACNC: 0.71 UIU/ML
TSH SERPL DL<=0.005 MIU/L-ACNC: 0.88 UIU/ML
URATE SERPL-MCNC: 11.4 MG/DL
URN SPEC COLLECT METH UR: ABNORMAL
URN SPEC COLLECT METH UR: ABNORMAL
URN SPEC COLLECT METH UR: NORMAL
URN SPEC COLLECT METH UR: NORMAL
UROBILINOGEN UR STRIP-ACNC: NEGATIVE EU/DL
VANCOMYCIN SERPL-MCNC: 18.7 UG/ML
VANCOMYCIN SERPL-MCNC: 22.3 UG/ML
VANCOMYCIN SERPL-MCNC: 27.5 UG/ML
VANCOMYCIN SERPL-MCNC: 34.4 UG/ML
VANCOMYCIN SERPL-MCNC: 35.5 UG/ML
VANCOMYCIN TROUGH SERPL-MCNC: 16.1 UG/ML
VANCOMYCIN TROUGH SERPL-MCNC: 20.5 UG/ML
VANCOMYCIN TROUGH SERPL-MCNC: 21.2 UG/ML
VANCOMYCIN TROUGH SERPL-MCNC: 27.7 UG/ML
VANCOMYCIN TROUGH SERPL-MCNC: 29.8 UG/ML
VANCOMYCIN TROUGH SERPL-MCNC: 30 UG/ML
VANCOMYCIN TROUGH SERPL-MCNC: 43.2 UG/ML
VARICELLA INTERPRETATION: POSITIVE
VARICELLA ZOSTER IGG: 2.78 ISR
VASCULAR ANKLE BRACHIAL INDEX (ABI) LEFT: 2.43 (ref 0.9–1.2)
VT: 450
VT: 500
VWF AG ACT/NOR PPP IA: 383 %
VWF MULTIMERS PPP QL: NORMAL
VWF:AC ACT/NOR PPP IA: 296 %
WBC # BLD AUTO: 10.65 K/UL
WBC # BLD AUTO: 10.91 K/UL
WBC # BLD AUTO: 11.32 K/UL
WBC # BLD AUTO: 11.38 K/UL
WBC # BLD AUTO: 12.03 K/UL
WBC # BLD AUTO: 12.18 K/UL
WBC # BLD AUTO: 12.54 K/UL
WBC # BLD AUTO: 12.64 K/UL
WBC # BLD AUTO: 13.06 K/UL
WBC # BLD AUTO: 13.11 K/UL
WBC # BLD AUTO: 13.19 K/UL
WBC # BLD AUTO: 13.33 K/UL
WBC # BLD AUTO: 13.4 K/UL
WBC # BLD AUTO: 14.12 K/UL
WBC # BLD AUTO: 14.2 K/UL
WBC # BLD AUTO: 14.23 K/UL
WBC # BLD AUTO: 14.67 K/UL
WBC # BLD AUTO: 14.76 K/UL
WBC # BLD AUTO: 14.81 K/UL
WBC # BLD AUTO: 15.23 K/UL
WBC # BLD AUTO: 17.12 K/UL
WBC # BLD AUTO: 20.95 K/UL
WBC # BLD AUTO: 28.24 K/UL
WBC # BLD AUTO: 6.04 K/UL
WBC # BLD AUTO: 6.28 K/UL
WBC # BLD AUTO: 6.37 K/UL
WBC # BLD AUTO: 6.38 K/UL
WBC # BLD AUTO: 6.4 K/UL
WBC # BLD AUTO: 6.54 K/UL
WBC # BLD AUTO: 6.58 K/UL
WBC # BLD AUTO: 6.68 K/UL
WBC # BLD AUTO: 6.87 K/UL
WBC # BLD AUTO: 6.9 K/UL
WBC # BLD AUTO: 7.1 K/UL
WBC # BLD AUTO: 7.25 K/UL
WBC # BLD AUTO: 7.39 K/UL
WBC # BLD AUTO: 7.42 K/UL
WBC # BLD AUTO: 7.82 K/UL
WBC # BLD AUTO: 7.84 K/UL
WBC # BLD AUTO: 7.84 K/UL
WBC # BLD AUTO: 7.92 K/UL
WBC # BLD AUTO: 8.16 K/UL
WBC # BLD AUTO: 8.2 K/UL
WBC # BLD AUTO: 8.34 K/UL
WBC # BLD AUTO: 8.38 K/UL
WBC # BLD AUTO: 8.69 K/UL
WBC # BLD AUTO: 8.73 K/UL
WBC # BLD AUTO: 8.81 K/UL
WBC # BLD AUTO: 9.25 K/UL
WBC # BLD AUTO: 9.38 K/UL
WBC # BLD AUTO: 9.95 K/UL
WBC #/AREA URNS AUTO: 38 /HPF (ref 0–5)
WBC #/AREA URNS AUTO: 5 /HPF (ref 0–5)
YEAST UR QL AUTO: ABNORMAL

## 2018-01-01 PROCEDURE — 20000000 HC ICU ROOM: Mod: NTX

## 2018-01-01 PROCEDURE — 99900026 HC AIRWAY MAINTENANCE (STAT): Mod: NTX

## 2018-01-01 PROCEDURE — 87040 BLOOD CULTURE FOR BACTERIA: CPT | Mod: 59,NTX

## 2018-01-01 PROCEDURE — 99233 SBSQ HOSP IP/OBS HIGH 50: CPT | Mod: NTX,,, | Performed by: PSYCHIATRY & NEUROLOGY

## 2018-01-01 PROCEDURE — 83735 ASSAY OF MAGNESIUM: CPT | Mod: NTX

## 2018-01-01 PROCEDURE — 87077 CULTURE AEROBIC IDENTIFY: CPT | Mod: NTX

## 2018-01-01 PROCEDURE — 85027 COMPLETE CBC AUTOMATED: CPT | Mod: 91,NTX

## 2018-01-01 PROCEDURE — 63600175 PHARM REV CODE 636 W HCPCS: Mod: NTX | Performed by: STUDENT IN AN ORGANIZED HEALTH CARE EDUCATION/TRAINING PROGRAM

## 2018-01-01 PROCEDURE — 36592 COLLECT BLOOD FROM PICC: CPT | Mod: NTX

## 2018-01-01 PROCEDURE — 85305 CLOT INHIBIT PROT S TOTAL: CPT | Mod: NTX

## 2018-01-01 PROCEDURE — 80053 COMPREHEN METABOLIC PANEL: CPT | Mod: NTX

## 2018-01-01 PROCEDURE — 94761 N-INVAS EAR/PLS OXIMETRY MLT: CPT | Mod: NTX

## 2018-01-01 PROCEDURE — B2151ZZ FLUOROSCOPY OF LEFT HEART USING LOW OSMOLAR CONTRAST: ICD-10-PCS | Performed by: INTERNAL MEDICINE

## 2018-01-01 PROCEDURE — 63600175 PHARM REV CODE 636 W HCPCS: Mod: NTX | Performed by: INTERNAL MEDICINE

## 2018-01-01 PROCEDURE — 80048 BASIC METABOLIC PNL TOTAL CA: CPT | Mod: 91,NTX

## 2018-01-01 PROCEDURE — 99291 CRITICAL CARE FIRST HOUR: CPT | Mod: NTX,,, | Performed by: NURSE PRACTITIONER

## 2018-01-01 PROCEDURE — 99900035 HC TECH TIME PER 15 MIN (STAT): Mod: NTX

## 2018-01-01 PROCEDURE — 27000221 HC OXYGEN, UP TO 24 HOURS: Mod: NTX

## 2018-01-01 PROCEDURE — 63600175 PHARM REV CODE 636 W HCPCS: Mod: NTX | Performed by: PHYSICIAN ASSISTANT

## 2018-01-01 PROCEDURE — 94727 GAS DIL/WSHOT DETER LNG VOL: CPT | Mod: PBBFAC | Performed by: INTERNAL MEDICINE

## 2018-01-01 PROCEDURE — 94003 VENT MGMT INPAT SUBQ DAY: CPT | Mod: NTX

## 2018-01-01 PROCEDURE — 25000003 PHARM REV CODE 250: Mod: NTX | Performed by: NURSE PRACTITIONER

## 2018-01-01 PROCEDURE — 94640 AIRWAY INHALATION TREATMENT: CPT | Mod: NTX

## 2018-01-01 PROCEDURE — D9220A PRA ANESTHESIA: Mod: CRNA,NTX,, | Performed by: NURSE ANESTHETIST, CERTIFIED REGISTERED

## 2018-01-01 PROCEDURE — 85610 PROTHROMBIN TIME: CPT | Mod: NTX

## 2018-01-01 PROCEDURE — 25000003 PHARM REV CODE 250: Mod: NTX | Performed by: PHYSICIAN ASSISTANT

## 2018-01-01 PROCEDURE — 25000003 PHARM REV CODE 250: Mod: NTX | Performed by: INTERNAL MEDICINE

## 2018-01-01 PROCEDURE — P9021 RED BLOOD CELLS UNIT: HCPCS | Mod: NTX

## 2018-01-01 PROCEDURE — 85347 COAGULATION TIME ACTIVATED: CPT | Mod: NTX

## 2018-01-01 PROCEDURE — 93005 ELECTROCARDIOGRAM TRACING: CPT | Mod: NTX

## 2018-01-01 PROCEDURE — 99233 SBSQ HOSP IP/OBS HIGH 50: CPT | Mod: NTX,,, | Performed by: INTERNAL MEDICINE

## 2018-01-01 PROCEDURE — 99213 OFFICE O/P EST LOW 20 MIN: CPT | Mod: PBBFAC,25,TXP | Performed by: INTERNAL MEDICINE

## 2018-01-01 PROCEDURE — 85730 THROMBOPLASTIN TIME PARTIAL: CPT | Mod: NTX

## 2018-01-01 PROCEDURE — 84145 PROCALCITONIN (PCT): CPT | Mod: NTX

## 2018-01-01 PROCEDURE — 27201040 HC RC 50 FILTER: Mod: NTX

## 2018-01-01 PROCEDURE — 85007 BL SMEAR W/DIFF WBC COUNT: CPT | Mod: NTX

## 2018-01-01 PROCEDURE — 25000003 PHARM REV CODE 250: Mod: NTX | Performed by: STUDENT IN AN ORGANIZED HEALTH CARE EDUCATION/TRAINING PROGRAM

## 2018-01-01 PROCEDURE — 99232 SBSQ HOSP IP/OBS MODERATE 35: CPT | Mod: NTX,,, | Performed by: INTERNAL MEDICINE

## 2018-01-01 PROCEDURE — 85397 CLOTTING FUNCT ACTIVITY: CPT | Mod: NTX

## 2018-01-01 PROCEDURE — 02HV33Z INSERTION OF INFUSION DEVICE INTO SUPERIOR VENA CAVA, PERCUTANEOUS APPROACH: ICD-10-PCS | Performed by: INTERNAL MEDICINE

## 2018-01-01 PROCEDURE — 99292 CRITICAL CARE ADDL 30 MIN: CPT | Mod: NTX,,, | Performed by: PHYSICIAN ASSISTANT

## 2018-01-01 PROCEDURE — 5A1955Z RESPIRATORY VENTILATION, GREATER THAN 96 CONSECUTIVE HOURS: ICD-10-PCS | Performed by: ANESTHESIOLOGY

## 2018-01-01 PROCEDURE — 85025 COMPLETE CBC W/AUTO DIFF WBC: CPT | Mod: NTX

## 2018-01-01 PROCEDURE — A4216 STERILE WATER/SALINE, 10 ML: HCPCS | Mod: NTX | Performed by: INTERNAL MEDICINE

## 2018-01-01 PROCEDURE — 86828 HLA CLASS I&II ANTIBODY QUAL: CPT | Mod: TXP

## 2018-01-01 PROCEDURE — 86787 VARICELLA-ZOSTER ANTIBODY: CPT | Mod: NTX

## 2018-01-01 PROCEDURE — 95951 PR EEG MONITORING/VIDEORECORD: CPT | Mod: 26,52,NTX, | Performed by: PSYCHIATRY & NEUROLOGY

## 2018-01-01 PROCEDURE — 95951 HC EEG MONITORING/VIDEO RECORD: CPT | Mod: NTX

## 2018-01-01 PROCEDURE — 86850 RBC ANTIBODY SCREEN: CPT | Mod: NTX

## 2018-01-01 PROCEDURE — 86644 CMV ANTIBODY: CPT | Mod: NTX

## 2018-01-01 PROCEDURE — 93306 TTE W/DOPPLER COMPLETE: CPT | Mod: 26,S$PBB,NTX, | Performed by: INTERNAL MEDICINE

## 2018-01-01 PROCEDURE — 80053 COMPREHEN METABOLIC PANEL: CPT | Mod: 91,NTX

## 2018-01-01 PROCEDURE — 0BH17EZ INSERTION OF ENDOTRACHEAL AIRWAY INTO TRACHEA, VIA NATURAL OR ARTIFICIAL OPENING: ICD-10-PCS | Performed by: ANESTHESIOLOGY

## 2018-01-01 PROCEDURE — 84443 ASSAY THYROID STIM HORMONE: CPT | Mod: NTX

## 2018-01-01 PROCEDURE — 80202 ASSAY OF VANCOMYCIN: CPT | Mod: NTX

## 2018-01-01 PROCEDURE — 36430 TRANSFUSION BLD/BLD COMPNT: CPT | Mod: NTX

## 2018-01-01 PROCEDURE — 93284 PRGRMG EVAL IMPLANTABLE DFB: CPT

## 2018-01-01 PROCEDURE — 25000242 PHARM REV CODE 250 ALT 637 W/ HCPCS: Mod: NTX | Performed by: INTERNAL MEDICINE

## 2018-01-01 PROCEDURE — 93922 UPR/L XTREMITY ART 2 LEVELS: CPT | Mod: NTX

## 2018-01-01 PROCEDURE — 81376 HLA II TYPING 1 LOCUS LR: CPT | Mod: 91,TXP

## 2018-01-01 PROCEDURE — 80048 BASIC METABOLIC PNL TOTAL CA: CPT | Mod: NTX

## 2018-01-01 PROCEDURE — 83880 ASSAY OF NATRIURETIC PEPTIDE: CPT | Mod: NTX

## 2018-01-01 PROCEDURE — 82803 BLOOD GASES ANY COMBINATION: CPT | Mod: NTX

## 2018-01-01 PROCEDURE — 84484 ASSAY OF TROPONIN QUANT: CPT | Mod: NTX

## 2018-01-01 PROCEDURE — 83735 ASSAY OF MAGNESIUM: CPT | Mod: 91,NTX

## 2018-01-01 PROCEDURE — 85520 HEPARIN ASSAY: CPT | Mod: 91,NTX

## 2018-01-01 PROCEDURE — 83540 ASSAY OF IRON: CPT | Mod: NTX

## 2018-01-01 PROCEDURE — 84100 ASSAY OF PHOSPHORUS: CPT | Mod: NTX

## 2018-01-01 PROCEDURE — 36600 WITHDRAWAL OF ARTERIAL BLOOD: CPT | Mod: NTX

## 2018-01-01 PROCEDURE — 0DBM8ZX EXCISION OF DESCENDING COLON, VIA NATURAL OR ARTIFICIAL OPENING ENDOSCOPIC, DIAGNOSTIC: ICD-10-PCS | Performed by: COLON & RECTAL SURGERY

## 2018-01-01 PROCEDURE — 4A023N7 MEASUREMENT OF CARDIAC SAMPLING AND PRESSURE, LEFT HEART, PERCUTANEOUS APPROACH: ICD-10-PCS | Performed by: INTERNAL MEDICINE

## 2018-01-01 PROCEDURE — 25000003 PHARM REV CODE 250: Mod: NTX | Performed by: COLON & RECTAL SURGERY

## 2018-01-01 PROCEDURE — 99233 SBSQ HOSP IP/OBS HIGH 50: CPT | Mod: NTX,,, | Performed by: PHYSICIAN ASSISTANT

## 2018-01-01 PROCEDURE — 81241 F5 GENE: CPT | Mod: NTX

## 2018-01-01 PROCEDURE — 86592 SYPHILIS TEST NON-TREP QUAL: CPT | Mod: NTX

## 2018-01-01 PROCEDURE — A4216 STERILE WATER/SALINE, 10 ML: HCPCS | Mod: NTX | Performed by: NURSE PRACTITIONER

## 2018-01-01 PROCEDURE — 87070 CULTURE OTHR SPECIMN AEROBIC: CPT | Mod: NTX

## 2018-01-01 PROCEDURE — 36415 COLL VENOUS BLD VENIPUNCTURE: CPT | Mod: NTX

## 2018-01-01 PROCEDURE — B2111ZZ FLUOROSCOPY OF MULTIPLE CORONARY ARTERIES USING LOW OSMOLAR CONTRAST: ICD-10-PCS | Performed by: INTERNAL MEDICINE

## 2018-01-01 PROCEDURE — 25000003 PHARM REV CODE 250: Mod: NTX

## 2018-01-01 PROCEDURE — 27000202 HC IABP, ADD'L DAY: Mod: NTX

## 2018-01-01 PROCEDURE — 63600175 PHARM REV CODE 636 W HCPCS: Mod: NTX | Performed by: NURSE PRACTITIONER

## 2018-01-01 PROCEDURE — 20600001 HC STEP DOWN PRIVATE ROOM: Mod: NTX

## 2018-01-01 PROCEDURE — 99232 SBSQ HOSP IP/OBS MODERATE 35: CPT | Mod: NTX,,, | Performed by: NURSE PRACTITIONER

## 2018-01-01 PROCEDURE — 93010 ELECTROCARDIOGRAM REPORT: CPT | Mod: NTX,,, | Performed by: INTERNAL MEDICINE

## 2018-01-01 PROCEDURE — 86077 PHYS BLOOD BANK SERV XMATCH: CPT | Mod: NTX,,, | Performed by: PATHOLOGY

## 2018-01-01 PROCEDURE — 94010 BREATHING CAPACITY TEST: CPT | Mod: PBBFAC | Performed by: INTERNAL MEDICINE

## 2018-01-01 PROCEDURE — 86682 HELMINTH ANTIBODY: CPT | Mod: NTX

## 2018-01-01 PROCEDURE — 95813 EEG EXTND MNTR 61-119 MIN: CPT | Mod: NTX

## 2018-01-01 PROCEDURE — 87449 NOS EACH ORGANISM AG IA: CPT | Mod: NTX

## 2018-01-01 PROCEDURE — 84134 ASSAY OF PREALBUMIN: CPT | Mod: NTX

## 2018-01-01 PROCEDURE — 93284 PRGRMG EVAL IMPLANTABLE DFB: CPT | Mod: 26,NTX,, | Performed by: INTERNAL MEDICINE

## 2018-01-01 PROCEDURE — 27200966 HC CLOSED SUCTION SYSTEM: Mod: NTX

## 2018-01-01 PROCEDURE — 85384 FIBRINOGEN ACTIVITY: CPT | Mod: NTX

## 2018-01-01 PROCEDURE — 85610 PROTHROMBIN TIME: CPT | Mod: 91,NTX

## 2018-01-01 PROCEDURE — 63600175 PHARM REV CODE 636 W HCPCS: Mod: NTX

## 2018-01-01 PROCEDURE — 37000009 HC ANESTHESIA EA ADD 15 MINS: Mod: NTX | Performed by: COLON & RECTAL SURGERY

## 2018-01-01 PROCEDURE — 86901 BLOOD TYPING SEROLOGIC RH(D): CPT | Mod: NTX

## 2018-01-01 PROCEDURE — 99291 CRITICAL CARE FIRST HOUR: CPT | Mod: NTX,,, | Performed by: INTERNAL MEDICINE

## 2018-01-01 PROCEDURE — 92610 EVALUATE SWALLOWING FUNCTION: CPT | Mod: NTX

## 2018-01-01 PROCEDURE — 82955 ASSAY OF G6PD ENZYME: CPT | Mod: NTX

## 2018-01-01 PROCEDURE — C1751 CATH, INF, PER/CENT/MIDLINE: HCPCS | Mod: NTX

## 2018-01-01 PROCEDURE — 71250 CT THORAX DX C-: CPT | Mod: TC,TXP

## 2018-01-01 PROCEDURE — 99233 SBSQ HOSP IP/OBS HIGH 50: CPT | Mod: NTX,,, | Performed by: EMERGENCY MEDICINE

## 2018-01-01 PROCEDURE — 99291 CRITICAL CARE FIRST HOUR: CPT | Mod: NTX,,, | Performed by: PHYSICIAN ASSISTANT

## 2018-01-01 PROCEDURE — C9113 INJ PANTOPRAZOLE SODIUM, VIA: HCPCS | Mod: NTX | Performed by: INTERNAL MEDICINE

## 2018-01-01 PROCEDURE — 85520 HEPARIN ASSAY: CPT | Mod: NTX

## 2018-01-01 PROCEDURE — 86825 HLA X-MATH NON-CYTOTOXIC: CPT | Mod: TXP

## 2018-01-01 PROCEDURE — 97530 THERAPEUTIC ACTIVITIES: CPT | Mod: NTX

## 2018-01-01 PROCEDURE — 27000190 HC CPAP FULL FACE MASK W/VALVE: Mod: NTX

## 2018-01-01 PROCEDURE — 27100171 HC OXYGEN HIGH FLOW UP TO 24 HOURS: Mod: NTX

## 2018-01-01 PROCEDURE — 94002 VENT MGMT INPAT INIT DAY: CPT | Mod: NTX

## 2018-01-01 PROCEDURE — 84132 ASSAY OF SERUM POTASSIUM: CPT | Mod: NTX

## 2018-01-01 PROCEDURE — 87205 SMEAR GRAM STAIN: CPT | Mod: NTX

## 2018-01-01 PROCEDURE — 99900022: Mod: NTX

## 2018-01-01 PROCEDURE — 86977 RBC SERUM PRETX INCUBJ/INHIB: CPT | Mod: 91,TXP

## 2018-01-01 PROCEDURE — 85240 CLOT FACTOR VIII AHG 1 STAGE: CPT | Mod: NTX

## 2018-01-01 PROCEDURE — 80176 ASSAY OF LIDOCAINE: CPT | Mod: NTX

## 2018-01-01 PROCEDURE — 93284 PRGRMG EVAL IMPLANTABLE DFB: CPT | Mod: NTX

## 2018-01-01 PROCEDURE — 99223 1ST HOSP IP/OBS HIGH 75: CPT | Mod: NTX,,, | Performed by: INTERNAL MEDICINE

## 2018-01-01 PROCEDURE — 93010 ELECTROCARDIOGRAM REPORT: CPT | Mod: 59,NTX,, | Performed by: INTERNAL MEDICINE

## 2018-01-01 PROCEDURE — 0DBN8ZX EXCISION OF SIGMOID COLON, VIA NATURAL OR ARTIFICIAL OPENING ENDOSCOPIC, DIAGNOSTIC: ICD-10-PCS | Performed by: COLON & RECTAL SURGERY

## 2018-01-01 PROCEDURE — 86147 CARDIOLIPIN ANTIBODY EA IG: CPT | Mod: 59,NTX

## 2018-01-01 PROCEDURE — 82553 CREATINE MB FRACTION: CPT | Mod: NTX

## 2018-01-01 PROCEDURE — 85018 HEMOGLOBIN: CPT | Mod: NTX

## 2018-01-01 PROCEDURE — 83605 ASSAY OF LACTIC ACID: CPT | Mod: NTX

## 2018-01-01 PROCEDURE — 71250 CT THORAX DX C-: CPT | Mod: 26,NTX,, | Performed by: RADIOLOGY

## 2018-01-01 PROCEDURE — 80307 DRUG TEST PRSMV CHEM ANLYZR: CPT | Mod: NTX

## 2018-01-01 PROCEDURE — 85007 BL SMEAR W/DIFF WBC COUNT: CPT | Mod: 91,NTX

## 2018-01-01 PROCEDURE — 37799 UNLISTED PX VASCULAR SURGERY: CPT | Mod: NTX

## 2018-01-01 PROCEDURE — 86022 PLATELET ANTIBODIES: CPT | Mod: NTX

## 2018-01-01 PROCEDURE — 84550 ASSAY OF BLOOD/URIC ACID: CPT | Mod: NTX

## 2018-01-01 PROCEDURE — 84484 ASSAY OF TROPONIN QUANT: CPT | Mod: 91,NTX

## 2018-01-01 PROCEDURE — 85025 COMPLETE CBC W/AUTO DIFF WBC: CPT | Mod: 91,NTX

## 2018-01-01 PROCEDURE — 27100094 HC IABP, SET-UP: Mod: NTX

## 2018-01-01 PROCEDURE — 5A12012 PERFORMANCE OF CARDIAC OUTPUT, SINGLE, MANUAL: ICD-10-PCS | Performed by: INTERNAL MEDICINE

## 2018-01-01 PROCEDURE — 80185 ASSAY OF PHENYTOIN TOTAL: CPT | Mod: NTX

## 2018-01-01 PROCEDURE — 82550 ASSAY OF CK (CPK): CPT | Mod: NTX

## 2018-01-01 PROCEDURE — 93017 CV STRESS TEST TRACING ONLY: CPT | Mod: NTX

## 2018-01-01 PROCEDURE — 87147 CULTURE TYPE IMMUNOLOGIC: CPT | Mod: NTX

## 2018-01-01 PROCEDURE — 85027 COMPLETE CBC AUTOMATED: CPT | Mod: NTX

## 2018-01-01 PROCEDURE — 25000003 PHARM REV CODE 250: Mod: TXP

## 2018-01-01 PROCEDURE — 84100 ASSAY OF PHOSPHORUS: CPT | Mod: 91,NTX

## 2018-01-01 PROCEDURE — 4A10X4Z MONITORING OF CENTRAL NERVOUS ELECTRICAL ACTIVITY, EXTERNAL APPROACH: ICD-10-PCS | Performed by: PSYCHIATRY & NEUROLOGY

## 2018-01-01 PROCEDURE — 86829 HLA CLASS I/II ANTIBODY QUAL: CPT | Mod: 91,TXP

## 2018-01-01 PROCEDURE — 36620 INSERTION CATHETER ARTERY: CPT | Mod: NTX

## 2018-01-01 PROCEDURE — 93922 UPR/L XTREMITY ART 2 LEVELS: CPT | Mod: 26,NTX,, | Performed by: INTERNAL MEDICINE

## 2018-01-01 PROCEDURE — 95813 EEG EXTND MNTR 61-119 MIN: CPT | Mod: 26,NTX,, | Performed by: PSYCHIATRY & NEUROLOGY

## 2018-01-01 PROCEDURE — 93306 TTE W/DOPPLER COMPLETE: CPT | Mod: NTX

## 2018-01-01 PROCEDURE — 93284 PRGRMG EVAL IMPLANTABLE DFB: CPT | Mod: 26,,, | Performed by: INTERNAL MEDICINE

## 2018-01-01 PROCEDURE — 83036 HEMOGLOBIN GLYCOSYLATED A1C: CPT | Mod: NTX

## 2018-01-01 PROCEDURE — 45385 COLONOSCOPY W/LESION REMOVAL: CPT | Mod: NTX,,, | Performed by: COLON & RECTAL SURGERY

## 2018-01-01 PROCEDURE — 99285 EMERGENCY DEPT VISIT HI MDM: CPT | Mod: ,,, | Performed by: EMERGENCY MEDICINE

## 2018-01-01 PROCEDURE — G8998 SWALLOW D/C STATUS: HCPCS | Mod: CH,NTX

## 2018-01-01 PROCEDURE — 25000003 PHARM REV CODE 250: Mod: NTX | Performed by: NURSE ANESTHETIST, CERTIFIED REGISTERED

## 2018-01-01 PROCEDURE — 87186 SC STD MICRODIL/AGAR DIL: CPT | Mod: NTX

## 2018-01-01 PROCEDURE — 25000242 PHARM REV CODE 250 ALT 637 W/ HCPCS: Mod: NTX | Performed by: NURSE PRACTITIONER

## 2018-01-01 PROCEDURE — 97164 PT RE-EVAL EST PLAN CARE: CPT | Mod: NTX

## 2018-01-01 PROCEDURE — 97161 PT EVAL LOW COMPLEX 20 MIN: CPT | Mod: NTX

## 2018-01-01 PROCEDURE — 80202 ASSAY OF VANCOMYCIN: CPT | Mod: 91,NTX

## 2018-01-01 PROCEDURE — 94727 GAS DIL/WSHOT DETER LNG VOL: CPT | Mod: 26,S$PBB,, | Performed by: INTERNAL MEDICINE

## 2018-01-01 PROCEDURE — 83615 LACTATE (LD) (LDH) ENZYME: CPT | Mod: NTX

## 2018-01-01 PROCEDURE — 99238 HOSP IP/OBS DSCHRG MGMT 30/<: CPT | Mod: NTX,,, | Performed by: INTERNAL MEDICINE

## 2018-01-01 PROCEDURE — 81373 HLA I TYPING 1 LOCUS LR: CPT | Mod: TXP

## 2018-01-01 PROCEDURE — 81240 F2 GENE: CPT | Mod: NTX

## 2018-01-01 PROCEDURE — 86790 VIRUS ANTIBODY NOS: CPT | Mod: NTX

## 2018-01-01 PROCEDURE — 85246 CLOT FACTOR VIII VW ANTIGEN: CPT | Mod: NTX

## 2018-01-01 PROCEDURE — 5A02210 ASSISTANCE WITH CARDIAC OUTPUT USING BALLOON PUMP, CONTINUOUS: ICD-10-PCS | Performed by: INTERNAL MEDICINE

## 2018-01-01 PROCEDURE — 86140 C-REACTIVE PROTEIN: CPT | Mod: NTX

## 2018-01-01 PROCEDURE — 12000002 HC ACUTE/MED SURGE SEMI-PRIVATE ROOM: Mod: NTX

## 2018-01-01 PROCEDURE — 86665 EPSTEIN-BARR CAPSID VCA: CPT | Mod: NTX

## 2018-01-01 PROCEDURE — 99238 HOSP IP/OBS DSCHRG MGMT 30/<: CPT | Mod: ,,, | Performed by: INTERNAL MEDICINE

## 2018-01-01 PROCEDURE — 27200188 HC TRANSDUCER, ART ADULT/PEDS: Mod: NTX

## 2018-01-01 PROCEDURE — 87102 FUNGUS ISOLATION CULTURE: CPT | Mod: NTX

## 2018-01-01 PROCEDURE — 81373 HLA I TYPING 1 LOCUS LR: CPT | Mod: 91,TXP

## 2018-01-01 PROCEDURE — 86706 HEP B SURFACE ANTIBODY: CPT | Mod: NTX

## 2018-01-01 PROCEDURE — 99999 PR PBB SHADOW E&M-EST. PATIENT-LVL III: CPT | Mod: PBBFAC,TXP,, | Performed by: INTERNAL MEDICINE

## 2018-01-01 PROCEDURE — 31500 INSERT EMERGENCY AIRWAY: CPT | Mod: NTX

## 2018-01-01 PROCEDURE — 86480 TB TEST CELL IMMUN MEASURE: CPT | Mod: NTX

## 2018-01-01 PROCEDURE — D9220A PRA ANESTHESIA: Mod: ANES,NTX,, | Performed by: ANESTHESIOLOGY

## 2018-01-01 PROCEDURE — 37000008 HC ANESTHESIA 1ST 15 MINUTES: Mod: NTX | Performed by: COLON & RECTAL SURGERY

## 2018-01-01 PROCEDURE — 94010 BREATHING CAPACITY TEST: CPT | Mod: 26,S$PBB,, | Performed by: INTERNAL MEDICINE

## 2018-01-01 PROCEDURE — 87040 BLOOD CULTURE FOR BACTERIA: CPT | Mod: NTX

## 2018-01-01 PROCEDURE — 63600175 PHARM REV CODE 636 W HCPCS: Mod: NTX | Performed by: NURSE ANESTHETIST, CERTIFIED REGISTERED

## 2018-01-01 PROCEDURE — 83605 ASSAY OF LACTIC ACID: CPT | Mod: 91,NTX

## 2018-01-01 PROCEDURE — 86833 HLA CLASS II HIGH DEFIN QUAL: CPT | Mod: TXP

## 2018-01-01 PROCEDURE — 99499 UNLISTED E&M SERVICE: CPT | Mod: NTX,,, | Performed by: PHYSICIAN ASSISTANT

## 2018-01-01 PROCEDURE — 88305 TISSUE EXAM BY PATHOLOGIST: CPT | Mod: 26,NTX,, | Performed by: PATHOLOGY

## 2018-01-01 PROCEDURE — 94621 CARDIOPULM EXERCISE TESTING: CPT | Mod: PBBFAC,NTX | Performed by: INTERNAL MEDICINE

## 2018-01-01 PROCEDURE — 25500020 PHARM REV CODE 255: Mod: NTX | Performed by: PSYCHIATRY & NEUROLOGY

## 2018-01-01 PROCEDURE — 86703 HIV-1/HIV-2 1 RESULT ANTBDY: CPT | Mod: NTX

## 2018-01-01 PROCEDURE — 87086 URINE CULTURE/COLONY COUNT: CPT | Mod: NTX

## 2018-01-01 PROCEDURE — 80323 ALKALOIDS NOS: CPT | Mod: NTX

## 2018-01-01 PROCEDURE — 81003 URINALYSIS AUTO W/O SCOPE: CPT | Mod: NTX

## 2018-01-01 PROCEDURE — 92523 SPEECH SOUND LANG COMPREHEN: CPT | Mod: NTX

## 2018-01-01 PROCEDURE — 87088 URINE BACTERIA CULTURE: CPT | Mod: NTX

## 2018-01-01 PROCEDURE — 36569 INSJ PICC 5 YR+ W/O IMAGING: CPT | Mod: NTX

## 2018-01-01 PROCEDURE — 94729 DIFFUSING CAPACITY: CPT | Mod: 26,S$PBB,, | Performed by: INTERNAL MEDICINE

## 2018-01-01 PROCEDURE — 99214 OFFICE O/P EST MOD 30 MIN: CPT | Mod: S$PBB,,, | Performed by: INTERNAL MEDICINE

## 2018-01-01 PROCEDURE — 5A2204Z RESTORATION OF CARDIAC RHYTHM, SINGLE: ICD-10-PCS | Performed by: INTERNAL MEDICINE

## 2018-01-01 PROCEDURE — 86825 HLA X-MATH NON-CYTOTOXIC: CPT | Mod: 91,TXP

## 2018-01-01 PROCEDURE — 99223 1ST HOSP IP/OBS HIGH 75: CPT | Mod: NTX,,, | Performed by: PHYSICIAN ASSISTANT

## 2018-01-01 PROCEDURE — 94729 DIFFUSING CAPACITY: CPT | Mod: PBBFAC | Performed by: INTERNAL MEDICINE

## 2018-01-01 PROCEDURE — 80061 LIPID PANEL: CPT | Mod: NTX

## 2018-01-01 PROCEDURE — 87385 HISTOPLASMA CAPSUL AG IA: CPT | Mod: NTX

## 2018-01-01 PROCEDURE — 93306 TTE W/DOPPLER COMPLETE: CPT | Mod: 26,NTX,, | Performed by: INTERNAL MEDICINE

## 2018-01-01 PROCEDURE — 99285 EMERGENCY DEPT VISIT HI MDM: CPT | Mod: 25,NTX

## 2018-01-01 PROCEDURE — 85576 BLOOD PLATELET AGGREGATION: CPT | Mod: NTX

## 2018-01-01 PROCEDURE — C1894 INTRO/SHEATH, NON-LASER: HCPCS | Mod: TXP

## 2018-01-01 PROCEDURE — 94618 PULMONARY STRESS TESTING: CPT | Mod: PBBFAC | Performed by: INTERNAL MEDICINE

## 2018-01-01 PROCEDURE — 85301 ANTITHROMBIN III ANTIGEN: CPT | Mod: NTX

## 2018-01-01 PROCEDURE — 31500 INSERT EMERGENCY AIRWAY: CPT | Mod: NTX,,, | Performed by: ANESTHESIOLOGY

## 2018-01-01 PROCEDURE — 86696 HERPES SIMPLEX TYPE 2 TEST: CPT | Mod: NTX

## 2018-01-01 PROCEDURE — 86832 HLA CLASS I HIGH DEFIN QUAL: CPT | Mod: TXP

## 2018-01-01 PROCEDURE — 82565 ASSAY OF CREATININE: CPT | Mod: NTX

## 2018-01-01 PROCEDURE — G8997 SWALLOW GOAL STATUS: HCPCS | Mod: CH,NTX

## 2018-01-01 PROCEDURE — 85247 CLOT FACTOR VIII MULTIMETRIC: CPT | Mod: NTX

## 2018-01-01 PROCEDURE — 81376 HLA II TYPING 1 LOCUS LR: CPT | Mod: TXP

## 2018-01-01 PROCEDURE — 81003 URINALYSIS AUTO W/O SCOPE: CPT | Mod: 59,NTX

## 2018-01-01 PROCEDURE — 99214 OFFICE O/P EST MOD 30 MIN: CPT | Mod: S$PBB,NTX,, | Performed by: INTERNAL MEDICINE

## 2018-01-01 PROCEDURE — 63600175 PHARM REV CODE 636 W HCPCS: Mod: NTX | Performed by: PSYCHIATRY & NEUROLOGY

## 2018-01-01 PROCEDURE — 81001 URINALYSIS AUTO W/SCOPE: CPT | Mod: NTX

## 2018-01-01 PROCEDURE — 88305 TISSUE EXAM BY PATHOLOGIST: CPT | Mod: NTX | Performed by: PATHOLOGY

## 2018-01-01 PROCEDURE — 87070 CULTURE OTHR SPECIMN AEROBIC: CPT

## 2018-01-01 PROCEDURE — C8929 TTE W OR WO FOL WCON,DOPPLER: HCPCS | Mod: NTX

## 2018-01-01 PROCEDURE — 87305 ASPERGILLUS AG IA: CPT | Mod: NTX

## 2018-01-01 PROCEDURE — 93458 L HRT ARTERY/VENTRICLE ANGIO: CPT | Mod: NTX

## 2018-01-01 PROCEDURE — 45385 COLONOSCOPY W/LESION REMOVAL: CPT | Mod: NTX | Performed by: COLON & RECTAL SURGERY

## 2018-01-01 PROCEDURE — 84466 ASSAY OF TRANSFERRIN: CPT | Mod: NTX

## 2018-01-01 PROCEDURE — 93880 EXTRACRANIAL BILAT STUDY: CPT | Mod: 26,NTX,, | Performed by: INTERNAL MEDICINE

## 2018-01-01 PROCEDURE — 86777 TOXOPLASMA ANTIBODY: CPT | Mod: NTX

## 2018-01-01 PROCEDURE — 86803 HEPATITIS C AB TEST: CPT | Mod: NTX

## 2018-01-01 PROCEDURE — 99204 OFFICE O/P NEW MOD 45 MIN: CPT | Mod: 25,S$PBB,TXP, | Performed by: INTERNAL MEDICINE

## 2018-01-01 PROCEDURE — 27201089 HC SNARE, DISP (ANY): Mod: NTX | Performed by: COLON & RECTAL SURGERY

## 2018-01-01 PROCEDURE — 93458 L HRT ARTERY/VENTRICLE ANGIO: CPT | Mod: 26,NTX,, | Performed by: INTERNAL MEDICINE

## 2018-01-01 PROCEDURE — 87340 HEPATITIS B SURFACE AG IA: CPT | Mod: NTX

## 2018-01-01 PROCEDURE — 99213 OFFICE O/P EST LOW 20 MIN: CPT | Mod: PBBFAC,25,NTX | Performed by: INTERNAL MEDICINE

## 2018-01-01 PROCEDURE — 86920 COMPATIBILITY TEST SPIN: CPT | Mod: NTX

## 2018-01-01 PROCEDURE — 86704 HEP B CORE ANTIBODY TOTAL: CPT | Mod: NTX

## 2018-01-01 PROCEDURE — 94667 MNPJ CHEST WALL 1ST: CPT | Mod: NTX

## 2018-01-01 PROCEDURE — 93451 RIGHT HEART CATH: CPT | Mod: 26,TXP,, | Performed by: INTERNAL MEDICINE

## 2018-01-01 PROCEDURE — 97168 OT RE-EVAL EST PLAN CARE: CPT | Mod: NTX

## 2018-01-01 PROCEDURE — 87106 FUNGI IDENTIFICATION YEAST: CPT | Mod: NTX

## 2018-01-01 PROCEDURE — 82533 TOTAL CORTISOL: CPT | Mod: NTX

## 2018-01-01 PROCEDURE — 94618 PULMONARY STRESS TESTING: CPT | Mod: 26,S$PBB,, | Performed by: INTERNAL MEDICINE

## 2018-01-01 PROCEDURE — 82728 ASSAY OF FERRITIN: CPT | Mod: NTX

## 2018-01-01 PROCEDURE — 97165 OT EVAL LOW COMPLEX 30 MIN: CPT | Mod: NTX

## 2018-01-01 PROCEDURE — G8996 SWALLOW CURRENT STATUS: HCPCS | Mod: CH,NTX

## 2018-01-01 PROCEDURE — 99223 1ST HOSP IP/OBS HIGH 75: CPT | Mod: NTX,,, | Performed by: NURSE PRACTITIONER

## 2018-01-01 PROCEDURE — 99233 SBSQ HOSP IP/OBS HIGH 50: CPT | Mod: ,,, | Performed by: PSYCHIATRY & NEUROLOGY

## 2018-01-01 PROCEDURE — 92950 HEART/LUNG RESUSCITATION CPR: CPT | Mod: NTX

## 2018-01-01 PROCEDURE — 80185 ASSAY OF PHENYTOIN TOTAL: CPT | Mod: 91,NTX

## 2018-01-01 PROCEDURE — 76937 US GUIDE VASCULAR ACCESS: CPT | Mod: NTX

## 2018-01-01 PROCEDURE — 36556 INSERT NON-TUNNEL CV CATH: CPT | Mod: NTX

## 2018-01-01 PROCEDURE — 02PYXDZ REMOVAL OF INTRALUMINAL DEVICE FROM GREAT VESSEL, EXTERNAL APPROACH: ICD-10-PCS | Performed by: INTERNAL MEDICINE

## 2018-01-01 PROCEDURE — 93880 EXTRACRANIAL BILAT STUDY: CPT | Mod: NTX

## 2018-01-01 PROCEDURE — 84153 ASSAY OF PSA TOTAL: CPT | Mod: NTX

## 2018-01-01 PROCEDURE — 82248 BILIRUBIN DIRECT: CPT | Mod: NTX

## 2018-01-01 PROCEDURE — 85014 HEMATOCRIT: CPT | Mod: NTX

## 2018-01-01 PROCEDURE — 27200997: Mod: NTX | Performed by: COLON & RECTAL SURGERY

## 2018-01-01 PROCEDURE — 83090 ASSAY OF HOMOCYSTEINE: CPT | Mod: NTX

## 2018-01-01 RX ORDER — ALBUTEROL SULFATE 0.83 MG/ML
2.5 SOLUTION RESPIRATORY (INHALATION) EVERY 4 HOURS
Status: DISCONTINUED | OUTPATIENT
Start: 2018-01-01 | End: 2018-01-01 | Stop reason: HOSPADM

## 2018-01-01 RX ORDER — PANTOPRAZOLE SODIUM 40 MG/10ML
40 INJECTION, POWDER, LYOPHILIZED, FOR SOLUTION INTRAVENOUS 2 TIMES DAILY
Status: DISCONTINUED | OUTPATIENT
Start: 2018-01-01 | End: 2018-01-01 | Stop reason: ALTCHOICE

## 2018-01-01 RX ORDER — ENOXAPARIN SODIUM 100 MG/ML
100 INJECTION SUBCUTANEOUS
Status: DISCONTINUED | OUTPATIENT
Start: 2018-01-01 | End: 2018-01-01

## 2018-01-01 RX ORDER — PREDNISONE 10 MG/1
10 TABLET ORAL
Status: DISCONTINUED | OUTPATIENT
Start: 2018-01-01 | End: 2018-01-01

## 2018-01-01 RX ORDER — SODIUM,POTASSIUM PHOSPHATES 280-250MG
2 POWDER IN PACKET (EA) ORAL
Status: DISCONTINUED | OUTPATIENT
Start: 2018-01-01 | End: 2018-01-01

## 2018-01-01 RX ORDER — ISOSORBIDE MONONITRATE 30 MG/1
TABLET, EXTENDED RELEASE ORAL
Qty: 90 TABLET | Refills: 0 | Status: SHIPPED | OUTPATIENT
Start: 2018-01-01 | End: 2018-01-01 | Stop reason: SDUPTHER

## 2018-01-01 RX ORDER — LOSARTAN POTASSIUM 50 MG/1
50 TABLET ORAL DAILY
Status: DISCONTINUED | OUTPATIENT
Start: 2018-01-01 | End: 2018-01-01

## 2018-01-01 RX ORDER — EPINEPHRINE 0.1 MG/ML
INJECTION INTRAVENOUS CODE/TRAUMA/SEDATION MEDICATION
Status: COMPLETED | OUTPATIENT
Start: 2018-01-01 | End: 2018-01-01

## 2018-01-01 RX ORDER — PANTOPRAZOLE SODIUM 40 MG/10ML
40 INJECTION, POWDER, LYOPHILIZED, FOR SOLUTION INTRAVENOUS DAILY
Status: DISCONTINUED | OUTPATIENT
Start: 2018-01-01 | End: 2018-01-01

## 2018-01-01 RX ORDER — FUROSEMIDE 10 MG/ML
40 INJECTION INTRAMUSCULAR; INTRAVENOUS ONCE
Status: COMPLETED | OUTPATIENT
Start: 2018-01-01 | End: 2018-01-01

## 2018-01-01 RX ORDER — POTASSIUM CHLORIDE 20 MEQ/15ML
40 SOLUTION ORAL
Status: DISCONTINUED | OUTPATIENT
Start: 2018-01-01 | End: 2018-01-01

## 2018-01-01 RX ORDER — FUROSEMIDE 80 MG/1
80 TABLET ORAL 2 TIMES DAILY
Status: DISCONTINUED | OUTPATIENT
Start: 2018-01-01 | End: 2018-01-01

## 2018-01-01 RX ORDER — PANTOPRAZOLE SODIUM 40 MG/1
40 TABLET, DELAYED RELEASE ORAL 2 TIMES DAILY
Status: DISCONTINUED | OUTPATIENT
Start: 2018-01-01 | End: 2018-01-01

## 2018-01-01 RX ORDER — LEVETIRACETAM 500 MG/1
500 TABLET ORAL 2 TIMES DAILY
Status: DISCONTINUED | OUTPATIENT
Start: 2018-01-01 | End: 2018-01-01

## 2018-01-01 RX ORDER — AMIODARONE HYDROCHLORIDE 400 MG/1
TABLET ORAL
Qty: 60 TABLET | Refills: 11 | Status: SHIPPED | OUTPATIENT
Start: 2018-01-01

## 2018-01-01 RX ORDER — FUROSEMIDE 10 MG/ML
40 INJECTION INTRAMUSCULAR; INTRAVENOUS 3 TIMES DAILY
Status: DISCONTINUED | OUTPATIENT
Start: 2018-01-01 | End: 2018-01-01

## 2018-01-01 RX ORDER — SPIRONOLACTONE 25 MG/1
25 TABLET ORAL DAILY
Status: DISCONTINUED | OUTPATIENT
Start: 2018-01-01 | End: 2018-01-01

## 2018-01-01 RX ORDER — POTASSIUM CHLORIDE 29.8 MG/ML
40 INJECTION INTRAVENOUS ONCE
Status: COMPLETED | OUTPATIENT
Start: 2018-01-01 | End: 2018-01-01

## 2018-01-01 RX ORDER — HEPARIN SODIUM 5000 [USP'U]/ML
5000 INJECTION, SOLUTION INTRAVENOUS; SUBCUTANEOUS EVERY 8 HOURS
Status: DISCONTINUED | OUTPATIENT
Start: 2018-01-01 | End: 2018-01-01 | Stop reason: HOSPADM

## 2018-01-01 RX ORDER — LABETALOL HYDROCHLORIDE 5 MG/ML
10 INJECTION, SOLUTION INTRAVENOUS EVERY 6 HOURS PRN
Status: DISCONTINUED | OUTPATIENT
Start: 2018-01-01 | End: 2018-01-01 | Stop reason: HOSPADM

## 2018-01-01 RX ORDER — PROMETHAZINE HYDROCHLORIDE AND CODEINE PHOSPHATE 6.25; 1 MG/5ML; MG/5ML
SOLUTION ORAL
Refills: 0 | COMMUNITY
Start: 2018-01-01

## 2018-01-01 RX ORDER — BUDESONIDE 0.5 MG/2ML
0.5 INHALANT ORAL EVERY 12 HOURS
Status: DISCONTINUED | OUTPATIENT
Start: 2018-01-01 | End: 2018-01-01 | Stop reason: HOSPADM

## 2018-01-01 RX ORDER — FLUTICASONE FUROATE AND VILANTEROL 200; 25 UG/1; UG/1
1 POWDER RESPIRATORY (INHALATION) DAILY
Status: DISCONTINUED | OUTPATIENT
Start: 2018-01-01 | End: 2018-01-01 | Stop reason: HOSPADM

## 2018-01-01 RX ORDER — FUROSEMIDE 40 MG/1
40 TABLET ORAL 2 TIMES DAILY
Status: DISCONTINUED | OUTPATIENT
Start: 2018-01-01 | End: 2018-01-01

## 2018-01-01 RX ORDER — FUROSEMIDE 40 MG/1
80 TABLET ORAL 2 TIMES DAILY
Status: DISCONTINUED | OUTPATIENT
Start: 2018-01-01 | End: 2018-01-01

## 2018-01-01 RX ORDER — INSULIN ASPART 100 [IU]/ML
3 INJECTION, SOLUTION INTRAVENOUS; SUBCUTANEOUS
Status: DISCONTINUED | OUTPATIENT
Start: 2018-01-01 | End: 2018-01-01

## 2018-01-01 RX ORDER — FUROSEMIDE 10 MG/ML
80 INJECTION INTRAMUSCULAR; INTRAVENOUS 2 TIMES DAILY
Status: DISCONTINUED | OUTPATIENT
Start: 2018-01-01 | End: 2018-01-01

## 2018-01-01 RX ORDER — MAGNESIUM SULFATE HEPTAHYDRATE 40 MG/ML
2 INJECTION, SOLUTION INTRAVENOUS
Status: DISCONTINUED | OUTPATIENT
Start: 2018-01-01 | End: 2018-01-01 | Stop reason: HOSPADM

## 2018-01-01 RX ORDER — HYDROCODONE BITARTRATE AND ACETAMINOPHEN 500; 5 MG/1; MG/1
TABLET ORAL
Status: DISCONTINUED | OUTPATIENT
Start: 2018-01-01 | End: 2018-01-01 | Stop reason: HOSPADM

## 2018-01-01 RX ORDER — COLCHICINE 0.6 MG/1
0.6 TABLET, FILM COATED ORAL DAILY
Status: DISCONTINUED | OUTPATIENT
Start: 2018-01-01 | End: 2018-01-01 | Stop reason: HOSPADM

## 2018-01-01 RX ORDER — PANTOPRAZOLE SODIUM 40 MG/1
40 TABLET, DELAYED RELEASE ORAL DAILY
Status: DISCONTINUED | OUTPATIENT
Start: 2018-01-01 | End: 2018-01-01 | Stop reason: HOSPADM

## 2018-01-01 RX ORDER — ASPIRIN 325 MG
325 TABLET ORAL ONCE
Status: COMPLETED | OUTPATIENT
Start: 2018-01-01 | End: 2018-01-01

## 2018-01-01 RX ORDER — WARFARIN SODIUM 5 MG/1
5 TABLET ORAL DAILY
Status: DISCONTINUED | OUTPATIENT
Start: 2018-01-01 | End: 2018-01-01

## 2018-01-01 RX ORDER — SENNOSIDES 8.8 MG/5ML
5 LIQUID ORAL NIGHTLY
Status: DISCONTINUED | OUTPATIENT
Start: 2018-01-01 | End: 2018-01-01 | Stop reason: HOSPADM

## 2018-01-01 RX ORDER — POLYETHYLENE GLYCOL 3350, SODIUM SULFATE ANHYDROUS, SODIUM BICARBONATE, SODIUM CHLORIDE, POTASSIUM CHLORIDE 236; 22.74; 6.74; 5.86; 2.97 G/4L; G/4L; G/4L; G/4L; G/4L
2000 POWDER, FOR SOLUTION ORAL ONCE
Status: COMPLETED | OUTPATIENT
Start: 2018-01-01 | End: 2018-01-01

## 2018-01-01 RX ORDER — FUROSEMIDE 10 MG/ML
80 INJECTION INTRAMUSCULAR; INTRAVENOUS ONCE
Status: DISCONTINUED | OUTPATIENT
Start: 2018-01-01 | End: 2018-01-01

## 2018-01-01 RX ORDER — POTASSIUM CHLORIDE 20 MEQ/1
40 TABLET, EXTENDED RELEASE ORAL DAILY
Status: DISCONTINUED | OUTPATIENT
Start: 2018-01-01 | End: 2018-01-01

## 2018-01-01 RX ORDER — FUROSEMIDE 10 MG/ML
80 INJECTION INTRAMUSCULAR; INTRAVENOUS ONCE
Status: COMPLETED | OUTPATIENT
Start: 2018-01-01 | End: 2018-01-01

## 2018-01-01 RX ORDER — NOREPINEPHRINE BITARTRATE/D5W 4MG/250ML
0.02 PLASTIC BAG, INJECTION (ML) INTRAVENOUS CONTINUOUS
Status: DISCONTINUED | OUTPATIENT
Start: 2018-01-01 | End: 2018-01-01 | Stop reason: HOSPADM

## 2018-01-01 RX ORDER — LANOLIN ALCOHOL/MO/W.PET/CERES
800 CREAM (GRAM) TOPICAL
Status: DISCONTINUED | OUTPATIENT
Start: 2018-01-01 | End: 2018-01-01

## 2018-01-01 RX ORDER — ATORVASTATIN CALCIUM 40 MG/1
40 TABLET, FILM COATED ORAL DAILY
Qty: 90 TABLET | Refills: 3 | Status: SHIPPED | OUTPATIENT
Start: 2018-01-01 | End: 2019-07-04

## 2018-01-01 RX ORDER — LIDOCAINE HYDROCHLORIDE 10 MG/ML
INJECTION INFILTRATION; PERINEURAL
Status: DISPENSED
Start: 2018-01-01 | End: 2018-01-01

## 2018-01-01 RX ORDER — PROPOFOL 10 MG/ML
INJECTION, EMULSION INTRAVENOUS
Status: COMPLETED
Start: 2018-01-01 | End: 2018-01-01

## 2018-01-01 RX ORDER — LEVETIRACETAM 500 MG/1
500 TABLET ORAL 2 TIMES DAILY
Qty: 60 TABLET | Refills: 11 | Status: SHIPPED | OUTPATIENT
Start: 2018-01-01 | End: 2018-01-01

## 2018-01-01 RX ORDER — ETOMIDATE 2 MG/ML
INJECTION INTRAVENOUS
Status: COMPLETED
Start: 2018-01-01 | End: 2018-01-01

## 2018-01-01 RX ORDER — SPIRONOLACTONE 25 MG/1
TABLET ORAL
Qty: 30 TABLET | Refills: 0 | Status: SHIPPED | OUTPATIENT
Start: 2018-01-01 | End: 2018-01-01 | Stop reason: SDUPTHER

## 2018-01-01 RX ORDER — ENOXAPARIN SODIUM 100 MG/ML
1 INJECTION SUBCUTANEOUS
Status: DISCONTINUED | OUTPATIENT
Start: 2018-01-01 | End: 2018-01-01 | Stop reason: HOSPADM

## 2018-01-01 RX ORDER — POTASSIUM CHLORIDE 20 MEQ/1
20 TABLET, EXTENDED RELEASE ORAL NIGHTLY
Status: DISCONTINUED | OUTPATIENT
Start: 2018-01-01 | End: 2018-01-01

## 2018-01-01 RX ORDER — POTASSIUM CHLORIDE 20 MEQ/15ML
20 SOLUTION ORAL ONCE
Status: COMPLETED | OUTPATIENT
Start: 2018-01-01 | End: 2018-01-01

## 2018-01-01 RX ORDER — LIDOCAINE HYDROCHLORIDE ANHYDROUS AND DEXTROSE MONOHYDRATE .8; 5 G/100ML; G/100ML
1 INJECTION, SOLUTION INTRAVENOUS CONTINUOUS
Status: DISCONTINUED | OUTPATIENT
Start: 2018-01-01 | End: 2018-01-01

## 2018-01-01 RX ORDER — ISOSORBIDE MONONITRATE 30 MG/1
60 TABLET, EXTENDED RELEASE ORAL DAILY
Status: DISCONTINUED | OUTPATIENT
Start: 2018-01-01 | End: 2018-01-01

## 2018-01-01 RX ORDER — LEVETIRACETAM 5 MG/ML
500 INJECTION INTRAVASCULAR EVERY 12 HOURS
Status: DISCONTINUED | OUTPATIENT
Start: 2018-01-01 | End: 2018-01-01

## 2018-01-01 RX ORDER — PHENYLEPHRINE HCL IN 0.9% NACL 1 MG/10 ML
300 SYRINGE (ML) INTRAVENOUS ONCE
Status: COMPLETED | OUTPATIENT
Start: 2018-01-01 | End: 2018-01-01

## 2018-01-01 RX ORDER — AMIODARONE HYDROCHLORIDE 200 MG/1
400 TABLET ORAL 2 TIMES DAILY
Status: DISCONTINUED | OUTPATIENT
Start: 2018-01-01 | End: 2018-01-01 | Stop reason: HOSPADM

## 2018-01-01 RX ORDER — ACETAMINOPHEN 325 MG/1
650 TABLET ORAL EVERY 6 HOURS PRN
Status: DISCONTINUED | OUTPATIENT
Start: 2018-01-01 | End: 2018-01-01

## 2018-01-01 RX ORDER — ENOXAPARIN SODIUM 100 MG/ML
100 INJECTION SUBCUTANEOUS EVERY 12 HOURS
Qty: 10 ML | Refills: 3 | Status: SHIPPED | OUTPATIENT
Start: 2018-01-01

## 2018-01-01 RX ORDER — HYDROCODONE BITARTRATE AND ACETAMINOPHEN 7.5; 325 MG/1; MG/1
1 TABLET ORAL EVERY 6 HOURS PRN
Status: DISCONTINUED | OUTPATIENT
Start: 2018-01-01 | End: 2018-01-01 | Stop reason: HOSPADM

## 2018-01-01 RX ORDER — PREDNISONE 20 MG/1
20 TABLET ORAL 2 TIMES DAILY
Status: DISCONTINUED | OUTPATIENT
Start: 2018-01-01 | End: 2018-01-01

## 2018-01-01 RX ORDER — SODIUM CHLORIDE 0.9 % (FLUSH) 0.9 %
3 SYRINGE (ML) INJECTION EVERY 8 HOURS
Status: DISCONTINUED | OUTPATIENT
Start: 2018-01-01 | End: 2018-01-01

## 2018-01-01 RX ORDER — METOPROLOL TARTRATE 1 MG/ML
2.5 INJECTION, SOLUTION INTRAVENOUS EVERY 4 HOURS
Status: DISCONTINUED | OUTPATIENT
Start: 2018-01-01 | End: 2018-01-01 | Stop reason: HOSPADM

## 2018-01-01 RX ORDER — PANTOPRAZOLE SODIUM 40 MG/1
40 TABLET, DELAYED RELEASE ORAL DAILY
Status: DISCONTINUED | OUTPATIENT
Start: 2018-01-01 | End: 2018-01-01

## 2018-01-01 RX ORDER — POLYETHYLENE GLYCOL 3350, SODIUM SULFATE ANHYDROUS, SODIUM BICARBONATE, SODIUM CHLORIDE, POTASSIUM CHLORIDE 236; 22.74; 6.74; 5.86; 2.97 G/4L; G/4L; G/4L; G/4L; G/4L
4000 POWDER, FOR SOLUTION ORAL ONCE
Status: DISCONTINUED | OUTPATIENT
Start: 2018-01-01 | End: 2018-01-01

## 2018-01-01 RX ORDER — LACTULOSE 10 G/15ML
30 SOLUTION ORAL ONCE
Status: DISCONTINUED | OUTPATIENT
Start: 2018-01-01 | End: 2018-01-01 | Stop reason: HOSPADM

## 2018-01-01 RX ORDER — BUTALBITAL, ACETAMINOPHEN AND CAFFEINE 50; 325; 40 MG/1; MG/1; MG/1
1 TABLET ORAL EVERY 4 HOURS PRN
Status: DISCONTINUED | OUTPATIENT
Start: 2018-01-01 | End: 2018-01-01 | Stop reason: HOSPADM

## 2018-01-01 RX ORDER — GLUCAGON 1 MG
1 KIT INJECTION
Status: DISCONTINUED | OUTPATIENT
Start: 2018-01-01 | End: 2018-01-01

## 2018-01-01 RX ORDER — ATORVASTATIN CALCIUM 20 MG/1
40 TABLET, FILM COATED ORAL DAILY
Status: DISCONTINUED | OUTPATIENT
Start: 2018-01-01 | End: 2018-01-01 | Stop reason: HOSPADM

## 2018-01-01 RX ORDER — CLOPIDOGREL BISULFATE 75 MG/1
75 TABLET ORAL DAILY
Status: DISCONTINUED | OUTPATIENT
Start: 2018-01-01 | End: 2018-01-01

## 2018-01-01 RX ORDER — LOSARTAN POTASSIUM 50 MG/1
50 TABLET ORAL NIGHTLY
Status: DISCONTINUED | OUTPATIENT
Start: 2018-01-01 | End: 2018-01-01

## 2018-01-01 RX ORDER — POTASSIUM CHLORIDE 20 MEQ/1
40 TABLET, EXTENDED RELEASE ORAL ONCE
Status: COMPLETED | OUTPATIENT
Start: 2018-01-01 | End: 2018-01-01

## 2018-01-01 RX ORDER — PREDNISONE 10 MG/1
10 TABLET ORAL DAILY
Qty: 30 TABLET | Refills: 11 | Status: SHIPPED | OUTPATIENT
Start: 2018-01-01

## 2018-01-01 RX ORDER — FLUTICASONE FUROATE AND VILANTEROL 200; 25 UG/1; UG/1
1 POWDER RESPIRATORY (INHALATION) DAILY
COMMUNITY

## 2018-01-01 RX ORDER — ALPRAZOLAM 1 MG/1
1 TABLET ORAL 2 TIMES DAILY PRN
Status: DISCONTINUED | OUTPATIENT
Start: 2018-01-01 | End: 2018-01-01 | Stop reason: HOSPADM

## 2018-01-01 RX ORDER — INSULIN ASPART 100 [IU]/ML
5 INJECTION, SOLUTION INTRAVENOUS; SUBCUTANEOUS
Status: DISCONTINUED | OUTPATIENT
Start: 2018-07-30 | End: 2018-01-01 | Stop reason: HOSPADM

## 2018-01-01 RX ORDER — ONDANSETRON 8 MG/1
8 TABLET, ORALLY DISINTEGRATING ORAL EVERY 8 HOURS PRN
Status: DISCONTINUED | OUTPATIENT
Start: 2018-01-01 | End: 2018-01-01

## 2018-01-01 RX ORDER — HEPARIN SODIUM 10000 [USP'U]/100ML
10 INJECTION, SOLUTION INTRAVENOUS CONTINUOUS
Status: DISCONTINUED | OUTPATIENT
Start: 2018-01-01 | End: 2018-01-01

## 2018-01-01 RX ORDER — POTASSIUM CHLORIDE 29.8 MG/ML
80 INJECTION INTRAVENOUS
Status: DISCONTINUED | OUTPATIENT
Start: 2018-01-01 | End: 2018-01-01 | Stop reason: HOSPADM

## 2018-01-01 RX ORDER — FUROSEMIDE 10 MG/ML
40 INJECTION INTRAMUSCULAR; INTRAVENOUS 2 TIMES DAILY
Status: DISCONTINUED | OUTPATIENT
Start: 2018-01-01 | End: 2018-01-01

## 2018-01-01 RX ORDER — AMIODARONE HYDROCHLORIDE 150 MG/3ML
150 INJECTION, SOLUTION INTRAVENOUS ONCE
Status: COMPLETED | OUTPATIENT
Start: 2018-01-01 | End: 2018-01-01

## 2018-01-01 RX ORDER — METOPROLOL TARTRATE 25 MG/1
25 TABLET, FILM COATED ORAL 2 TIMES DAILY
Qty: 60 TABLET | Refills: 11 | Status: SHIPPED | OUTPATIENT
Start: 2018-01-01 | End: 2019-07-04

## 2018-01-01 RX ORDER — NITROGLYCERIN 0.4 MG/1
0.4 TABLET SUBLINGUAL EVERY 5 MIN PRN
Qty: 100 TABLET | Refills: 3 | Status: SHIPPED | OUTPATIENT
Start: 2018-01-01 | End: 2018-01-01 | Stop reason: SDUPTHER

## 2018-01-01 RX ORDER — ASPIRIN 81 MG/1
81 TABLET ORAL DAILY
Status: DISCONTINUED | OUTPATIENT
Start: 2018-01-01 | End: 2018-01-01 | Stop reason: HOSPADM

## 2018-01-01 RX ORDER — CHLORHEXIDINE GLUCONATE ORAL RINSE 1.2 MG/ML
15 SOLUTION DENTAL 2 TIMES DAILY
Status: DISCONTINUED | OUTPATIENT
Start: 2018-01-01 | End: 2018-01-01 | Stop reason: HOSPADM

## 2018-01-01 RX ORDER — INDOMETHACIN 25 MG/1
50 CAPSULE ORAL ONCE
Status: COMPLETED | OUTPATIENT
Start: 2018-01-01 | End: 2018-01-01

## 2018-01-01 RX ORDER — GABAPENTIN 300 MG/1
600 CAPSULE ORAL 2 TIMES DAILY
Status: DISCONTINUED | OUTPATIENT
Start: 2018-01-01 | End: 2018-01-01 | Stop reason: HOSPADM

## 2018-01-01 RX ORDER — CLOPIDOGREL 300 MG/1
300 TABLET, FILM COATED ORAL ONCE
Status: COMPLETED | OUTPATIENT
Start: 2018-01-01 | End: 2018-01-01

## 2018-01-01 RX ORDER — VANCOMYCIN 2 GRAM/500 ML IN 0.9 % SODIUM CHLORIDE INTRAVENOUS
20 ONCE
Status: COMPLETED | OUTPATIENT
Start: 2018-01-01 | End: 2018-01-01

## 2018-01-01 RX ORDER — LEVETIRACETAM 10 MG/ML
1000 INJECTION INTRAVASCULAR EVERY 12 HOURS
Status: DISCONTINUED | OUTPATIENT
Start: 2018-01-01 | End: 2018-01-01

## 2018-01-01 RX ORDER — HEPARIN SODIUM,PORCINE/D5W 25000/250
17 INTRAVENOUS SOLUTION INTRAVENOUS CONTINUOUS
Status: DISPENSED | OUTPATIENT
Start: 2018-01-01 | End: 2018-01-01

## 2018-01-01 RX ORDER — LIDOCAINE HCL/PF 100 MG/5ML
SYRINGE (ML) INTRAVENOUS
Status: DISCONTINUED | OUTPATIENT
Start: 2018-01-01 | End: 2018-01-01

## 2018-01-01 RX ORDER — CEFEPIME HYDROCHLORIDE 2 G/1
2 INJECTION, POWDER, FOR SOLUTION INTRAVENOUS
Status: DISCONTINUED | OUTPATIENT
Start: 2018-01-01 | End: 2018-01-01 | Stop reason: HOSPADM

## 2018-01-01 RX ORDER — ISOSORBIDE MONONITRATE 30 MG/1
90 TABLET, EXTENDED RELEASE ORAL DAILY
Qty: 90 TABLET | Refills: 6 | Status: SHIPPED | OUTPATIENT
Start: 2018-01-01

## 2018-01-01 RX ORDER — MAGNESIUM SULFATE HEPTAHYDRATE 40 MG/ML
4 INJECTION, SOLUTION INTRAVENOUS
Status: DISCONTINUED | OUTPATIENT
Start: 2018-01-01 | End: 2018-01-01 | Stop reason: HOSPADM

## 2018-01-01 RX ORDER — PREDNISONE 20 MG/1
1 TABLET ORAL 2 TIMES DAILY
Refills: 11 | Status: ON HOLD | COMMUNITY
Start: 2018-01-01 | End: 2018-01-01 | Stop reason: HOSPADM

## 2018-01-01 RX ORDER — BISACODYL 10 MG
10 SUPPOSITORY, RECTAL RECTAL ONCE
Status: DISCONTINUED | OUTPATIENT
Start: 2018-01-01 | End: 2018-01-01 | Stop reason: HOSPADM

## 2018-01-01 RX ORDER — FENTANYL CITRATE-0.9 % NACL/PF 10 MCG/ML
PLASTIC BAG, INJECTION (ML) INTRAVENOUS CONTINUOUS
Status: DISCONTINUED | OUTPATIENT
Start: 2018-01-01 | End: 2018-01-01 | Stop reason: HOSPADM

## 2018-01-01 RX ORDER — DOCUSATE SODIUM 50 MG/5ML
100 LIQUID ORAL DAILY
Status: DISCONTINUED | OUTPATIENT
Start: 2018-01-01 | End: 2018-01-01 | Stop reason: HOSPADM

## 2018-01-01 RX ORDER — POTASSIUM CHLORIDE 20 MEQ/15ML
60 SOLUTION ORAL
Status: DISCONTINUED | OUTPATIENT
Start: 2018-01-01 | End: 2018-01-01

## 2018-01-01 RX ORDER — NOREPINEPHRINE BITARTRATE/D5W 4MG/250ML
0.02 PLASTIC BAG, INJECTION (ML) INTRAVENOUS CONTINUOUS
Status: DISCONTINUED | OUTPATIENT
Start: 2018-01-01 | End: 2018-01-01

## 2018-01-01 RX ORDER — LIDOCAINE HCL/PF 100 MG/5ML
SYRINGE (ML) INTRAVENOUS CODE/TRAUMA/SEDATION MEDICATION
Status: COMPLETED | OUTPATIENT
Start: 2018-01-01 | End: 2018-01-01

## 2018-01-01 RX ORDER — CALCIUM CHLORIDE INJECTION 100 MG/ML
INJECTION, SOLUTION INTRAVENOUS CODE/TRAUMA/SEDATION MEDICATION
Status: COMPLETED | OUTPATIENT
Start: 2018-01-01 | End: 2018-01-01

## 2018-01-01 RX ORDER — POTASSIUM CHLORIDE 14.9 MG/ML
60 INJECTION INTRAVENOUS
Status: DISCONTINUED | OUTPATIENT
Start: 2018-01-01 | End: 2018-01-01 | Stop reason: HOSPADM

## 2018-01-01 RX ORDER — PREDNISONE 20 MG/1
20 TABLET ORAL
Status: DISCONTINUED | OUTPATIENT
Start: 2018-01-01 | End: 2018-01-01

## 2018-01-01 RX ORDER — CEFAZOLIN SODIUM 1 G/3ML
1 INJECTION, POWDER, FOR SOLUTION INTRAMUSCULAR; INTRAVENOUS
Status: DISCONTINUED | OUTPATIENT
Start: 2018-01-01 | End: 2018-01-01

## 2018-01-01 RX ORDER — ALPRAZOLAM 0.5 MG/1
2 TABLET ORAL 2 TIMES DAILY PRN
Status: DISCONTINUED | OUTPATIENT
Start: 2018-01-01 | End: 2018-01-01 | Stop reason: HOSPADM

## 2018-01-01 RX ORDER — DEXMEDETOMIDINE HYDROCHLORIDE 4 UG/ML
0.2 INJECTION, SOLUTION INTRAVENOUS CONTINUOUS
Status: DISCONTINUED | OUTPATIENT
Start: 2018-01-01 | End: 2018-01-01 | Stop reason: HOSPADM

## 2018-01-01 RX ORDER — LEVETIRACETAM 500 MG/1
500 TABLET ORAL 2 TIMES DAILY
Qty: 60 TABLET | Refills: 11 | Status: SHIPPED | OUTPATIENT
Start: 2018-01-01 | End: 2019-07-04

## 2018-01-01 RX ORDER — PHENYTOIN SODIUM 100 MG/1
200 CAPSULE, EXTENDED RELEASE ORAL NIGHTLY
Status: DISCONTINUED | OUTPATIENT
Start: 2018-01-01 | End: 2018-01-01

## 2018-01-01 RX ORDER — POTASSIUM CHLORIDE 29.8 MG/ML
40 INJECTION INTRAVENOUS
Status: DISCONTINUED | OUTPATIENT
Start: 2018-01-01 | End: 2018-01-01 | Stop reason: HOSPADM

## 2018-01-01 RX ORDER — ALBUTEROL SULFATE 0.83 MG/ML
2.5 SOLUTION RESPIRATORY (INHALATION) EVERY 6 HOURS PRN
Refills: 3 | COMMUNITY
Start: 2018-01-01

## 2018-01-01 RX ORDER — DEXMEDETOMIDINE HYDROCHLORIDE 4 UG/ML
INJECTION, SOLUTION INTRAVENOUS
Status: COMPLETED
Start: 2018-01-01 | End: 2018-01-01

## 2018-01-01 RX ORDER — AMIODARONE HYDROCHLORIDE 200 MG/1
400 TABLET ORAL DAILY
Status: DISCONTINUED | OUTPATIENT
Start: 2018-01-01 | End: 2018-01-01

## 2018-01-01 RX ORDER — INSULIN ASPART 100 [IU]/ML
0-5 INJECTION, SOLUTION INTRAVENOUS; SUBCUTANEOUS EVERY 4 HOURS PRN
Status: DISCONTINUED | OUTPATIENT
Start: 2018-01-01 | End: 2018-01-01 | Stop reason: HOSPADM

## 2018-01-01 RX ORDER — PREDNISONE 20 MG/1
40 TABLET ORAL DAILY
Status: DISCONTINUED | OUTPATIENT
Start: 2018-01-01 | End: 2018-01-01

## 2018-01-01 RX ORDER — MAGNESIUM SULFATE HEPTAHYDRATE 500 MG/ML
INJECTION, SOLUTION INTRAMUSCULAR; INTRAVENOUS CODE/TRAUMA/SEDATION MEDICATION
Status: COMPLETED | OUTPATIENT
Start: 2018-01-01 | End: 2018-01-01

## 2018-01-01 RX ORDER — PHENYLEPHRINE HCL IN 0.9% NACL 1 MG/10 ML
SYRINGE (ML) INTRAVENOUS
Status: COMPLETED
Start: 2018-01-01 | End: 2018-01-01

## 2018-01-01 RX ORDER — INSULIN ASPART 100 [IU]/ML
5 INJECTION, SOLUTION INTRAVENOUS; SUBCUTANEOUS
Status: DISCONTINUED | OUTPATIENT
Start: 2018-01-01 | End: 2018-01-01 | Stop reason: HOSPADM

## 2018-01-01 RX ORDER — METOPROLOL TARTRATE 1 MG/ML
2.5 INJECTION, SOLUTION INTRAVENOUS ONCE
Status: COMPLETED | OUTPATIENT
Start: 2018-01-01 | End: 2018-01-01

## 2018-01-01 RX ORDER — MIDAZOLAM HYDROCHLORIDE 1 MG/ML
INJECTION, SOLUTION INTRAMUSCULAR; INTRAVENOUS
Status: DISCONTINUED | OUTPATIENT
Start: 2018-01-01 | End: 2018-01-01

## 2018-01-01 RX ORDER — ETOMIDATE 2 MG/ML
INJECTION INTRAVENOUS
Status: DISCONTINUED | OUTPATIENT
Start: 2018-01-01 | End: 2018-01-01

## 2018-01-01 RX ORDER — INSULIN ASPART 100 [IU]/ML
0-5 INJECTION, SOLUTION INTRAVENOUS; SUBCUTANEOUS EVERY 4 HOURS PRN
Status: DISCONTINUED | OUTPATIENT
Start: 2018-01-01 | End: 2018-01-01

## 2018-01-01 RX ORDER — POTASSIUM CHLORIDE 20 MEQ/1
20 TABLET, EXTENDED RELEASE ORAL ONCE
Status: COMPLETED | OUTPATIENT
Start: 2018-01-01 | End: 2018-01-01

## 2018-01-01 RX ORDER — METOPROLOL TARTRATE 25 MG/1
25 TABLET, FILM COATED ORAL 2 TIMES DAILY
Status: DISCONTINUED | OUTPATIENT
Start: 2018-01-01 | End: 2018-01-01

## 2018-01-01 RX ORDER — SODIUM CHLORIDE 0.9 % (FLUSH) 0.9 %
3 SYRINGE (ML) INJECTION EVERY 8 HOURS
Status: DISCONTINUED | OUTPATIENT
Start: 2018-01-01 | End: 2018-01-01 | Stop reason: HOSPADM

## 2018-01-01 RX ORDER — BENZONATATE 100 MG/1
100 CAPSULE ORAL 3 TIMES DAILY PRN
Status: DISCONTINUED | OUTPATIENT
Start: 2018-01-01 | End: 2018-01-01 | Stop reason: HOSPADM

## 2018-01-01 RX ORDER — METOPROLOL TARTRATE 1 MG/ML
INJECTION, SOLUTION INTRAVENOUS
Status: COMPLETED
Start: 2018-01-01 | End: 2018-01-01

## 2018-01-01 RX ORDER — HYDROCODONE BITARTRATE AND ACETAMINOPHEN 10; 325 MG/1; MG/1
1 TABLET ORAL 2 TIMES DAILY PRN
Status: DISCONTINUED | OUTPATIENT
Start: 2018-01-01 | End: 2018-01-01 | Stop reason: HOSPADM

## 2018-01-01 RX ORDER — AMIODARONE HYDROCHLORIDE 150 MG/3ML
150 INJECTION, SOLUTION INTRAVENOUS ONCE
Status: DISCONTINUED | OUTPATIENT
Start: 2018-01-01 | End: 2018-01-01

## 2018-01-01 RX ORDER — SPIRONOLACTONE 25 MG/1
25 TABLET ORAL DAILY
Status: DISCONTINUED | OUTPATIENT
Start: 2018-01-01 | End: 2018-01-01 | Stop reason: HOSPADM

## 2018-01-01 RX ORDER — CHLORHEXIDINE GLUCONATE ORAL RINSE 1.2 MG/ML
15 SOLUTION DENTAL 2 TIMES DAILY
Status: DISCONTINUED | OUTPATIENT
Start: 2018-01-01 | End: 2018-01-01

## 2018-01-01 RX ORDER — SOTALOL HYDROCHLORIDE 160 MG/1
TABLET ORAL
Refills: 4 | Status: ON HOLD | COMMUNITY
Start: 2018-01-01 | End: 2018-01-01 | Stop reason: HOSPADM

## 2018-01-01 RX ORDER — PREDNISONE 10 MG/1
10 TABLET ORAL DAILY
Qty: 30 TABLET | Refills: 11 | Status: SHIPPED | OUTPATIENT
Start: 2018-01-01 | End: 2018-01-01

## 2018-01-01 RX ORDER — POTASSIUM CHLORIDE 14.9 MG/ML
20 INJECTION INTRAVENOUS
Status: DISCONTINUED | OUTPATIENT
Start: 2018-01-01 | End: 2018-01-01

## 2018-01-01 RX ORDER — NITROGLYCERIN 0.4 MG/1
TABLET SUBLINGUAL
Status: DISPENSED
Start: 2018-01-01 | End: 2018-01-01

## 2018-01-01 RX ORDER — LEVETIRACETAM 500 MG/1
500 TABLET ORAL 2 TIMES DAILY
Status: DISCONTINUED | OUTPATIENT
Start: 2018-01-01 | End: 2018-01-01 | Stop reason: HOSPADM

## 2018-01-01 RX ORDER — AMIODARONE HYDROCHLORIDE 400 MG/1
400 TABLET ORAL 2 TIMES DAILY
Qty: 60 TABLET | Refills: 11 | Status: SHIPPED | OUTPATIENT
Start: 2018-01-01 | End: 2018-01-01

## 2018-01-01 RX ORDER — ATORVASTATIN CALCIUM 40 MG/1
40 TABLET, FILM COATED ORAL DAILY
Qty: 90 TABLET | Refills: 3 | Status: SHIPPED | OUTPATIENT
Start: 2018-01-01 | End: 2018-01-01

## 2018-01-01 RX ORDER — CARISOPRODOL 350 MG/1
350 TABLET ORAL 4 TIMES DAILY PRN
Status: DISCONTINUED | OUTPATIENT
Start: 2018-01-01 | End: 2018-01-01 | Stop reason: HOSPADM

## 2018-01-01 RX ORDER — AMIODARONE HYDROCHLORIDE 150 MG/3ML
INJECTION, SOLUTION INTRAVENOUS CODE/TRAUMA/SEDATION MEDICATION
Status: COMPLETED | OUTPATIENT
Start: 2018-01-01 | End: 2018-01-01

## 2018-01-01 RX ORDER — VANCOMYCIN HCL IN 5 % DEXTROSE 1G/250ML
1000 PLASTIC BAG, INJECTION (ML) INTRAVENOUS
Status: DISCONTINUED | OUTPATIENT
Start: 2018-01-01 | End: 2018-01-01

## 2018-01-01 RX ORDER — METOPROLOL TARTRATE 25 MG/1
25 TABLET, FILM COATED ORAL 2 TIMES DAILY
Status: DISCONTINUED | OUTPATIENT
Start: 2018-01-01 | End: 2018-01-01 | Stop reason: HOSPADM

## 2018-01-01 RX ORDER — WARFARIN 7.5 MG/1
7.5 TABLET ORAL DAILY
Status: DISCONTINUED | OUTPATIENT
Start: 2018-01-01 | End: 2018-01-01

## 2018-01-01 RX ORDER — SPIRONOLACTONE 25 MG/1
TABLET ORAL
Qty: 30 TABLET | Refills: 0 | Status: SHIPPED | OUTPATIENT
Start: 2018-01-01

## 2018-01-01 RX ORDER — LIDOCAINE HYDROCHLORIDE 10 MG/ML
1 INJECTION INFILTRATION; PERINEURAL ONCE
Status: DISCONTINUED | OUTPATIENT
Start: 2018-01-01 | End: 2018-01-01

## 2018-01-01 RX ORDER — PREDNISONE 10 MG/1
10 TABLET ORAL DAILY
Status: DISCONTINUED | OUTPATIENT
Start: 2018-01-01 | End: 2018-01-01

## 2018-01-01 RX ORDER — PROPOFOL 10 MG/ML
5 INJECTION, EMULSION INTRAVENOUS CONTINUOUS
Status: DISCONTINUED | OUTPATIENT
Start: 2018-01-01 | End: 2018-01-01 | Stop reason: HOSPADM

## 2018-01-01 RX ORDER — PREDNISONE 10 MG/1
10 TABLET ORAL DAILY
Status: DISCONTINUED | OUTPATIENT
Start: 2018-01-01 | End: 2018-01-01 | Stop reason: HOSPADM

## 2018-01-01 RX ORDER — POTASSIUM CHLORIDE 20 MEQ/1
TABLET, EXTENDED RELEASE ORAL
Status: COMPLETED
Start: 2018-01-01 | End: 2018-01-01

## 2018-01-01 RX ORDER — CEFEPIME HYDROCHLORIDE 2 G/1
2 INJECTION, POWDER, FOR SOLUTION INTRAVENOUS
Status: DISCONTINUED | OUTPATIENT
Start: 2018-01-01 | End: 2018-01-01

## 2018-01-01 RX ORDER — NOREPINEPHRINE BITARTRATE 1 MG/ML
INJECTION, SOLUTION INTRAVENOUS
Status: COMPLETED
Start: 2018-01-01 | End: 2018-01-01

## 2018-01-01 RX ORDER — AMIODARONE HYDROCHLORIDE 200 MG/1
400 TABLET ORAL 2 TIMES DAILY
Status: DISCONTINUED | OUTPATIENT
Start: 2018-01-01 | End: 2018-01-01

## 2018-01-01 RX ORDER — DIPHENHYDRAMINE HCL 50 MG
50 CAPSULE ORAL ONCE
Status: COMPLETED | OUTPATIENT
Start: 2018-01-01 | End: 2018-01-01

## 2018-01-01 RX ORDER — PHENYLEPHRINE HCL IN 0.9% NACL 1 MG/10 ML
200 SYRINGE (ML) INTRAVENOUS ONCE
Status: COMPLETED | OUTPATIENT
Start: 2018-01-01 | End: 2018-01-01

## 2018-01-01 RX ORDER — PROMETHAZINE HYDROCHLORIDE AND CODEINE PHOSPHATE 6.25; 1 MG/5ML; MG/5ML
5 SOLUTION ORAL EVERY 6 HOURS PRN
Status: DISCONTINUED | OUTPATIENT
Start: 2018-01-01 | End: 2018-01-01

## 2018-01-01 RX ORDER — GLUCAGON 1 MG
1 KIT INJECTION
Status: DISCONTINUED | OUTPATIENT
Start: 2018-01-01 | End: 2018-01-01 | Stop reason: HOSPADM

## 2018-01-01 RX ORDER — NAPROXEN SODIUM 220 MG/1
81 TABLET, FILM COATED ORAL DAILY
Status: DISCONTINUED | OUTPATIENT
Start: 2018-01-01 | End: 2018-01-01 | Stop reason: HOSPADM

## 2018-01-01 RX ORDER — SODIUM CHLORIDE 0.9 % (FLUSH) 0.9 %
3 SYRINGE (ML) INJECTION
Status: DISCONTINUED | OUTPATIENT
Start: 2018-01-01 | End: 2018-01-01 | Stop reason: HOSPADM

## 2018-01-01 RX ORDER — NITROGLYCERIN 0.4 MG/1
TABLET SUBLINGUAL
Qty: 100 TABLET | Refills: 0 | Status: SHIPPED | OUTPATIENT
Start: 2018-01-01

## 2018-01-01 RX ORDER — WARFARIN 2.5 MG/1
2.5 TABLET ORAL DAILY
Status: DISCONTINUED | OUTPATIENT
Start: 2018-01-01 | End: 2018-01-01

## 2018-01-01 RX ORDER — ACETAMINOPHEN 650 MG/20.3ML
650 LIQUID ORAL EVERY 6 HOURS PRN
Status: DISCONTINUED | OUTPATIENT
Start: 2018-01-01 | End: 2018-01-01 | Stop reason: HOSPADM

## 2018-01-01 RX ORDER — LACTULOSE 10 G/15ML
30 SOLUTION ORAL ONCE
Status: COMPLETED | OUTPATIENT
Start: 2018-01-01 | End: 2018-01-01

## 2018-01-01 RX ORDER — CALCIUM CHLORIDE INJECTION 100 MG/ML
1 INJECTION, SOLUTION INTRAVENOUS ONCE
Status: COMPLETED | OUTPATIENT
Start: 2018-01-01 | End: 2018-01-01

## 2018-01-01 RX ORDER — BUMETANIDE 1 MG/1
1 TABLET ORAL DAILY PRN
Refills: 2 | COMMUNITY
Start: 2018-01-01

## 2018-01-01 RX ORDER — GABAPENTIN 300 MG/1
600 CAPSULE ORAL 3 TIMES DAILY
Status: DISCONTINUED | OUTPATIENT
Start: 2018-01-01 | End: 2018-01-01 | Stop reason: HOSPADM

## 2018-01-01 RX ORDER — WARFARIN 7.5 MG/1
7.5 TABLET ORAL DAILY
Status: DISCONTINUED | OUTPATIENT
Start: 2018-01-01 | End: 2018-01-01 | Stop reason: HOSPADM

## 2018-01-01 RX ORDER — ONDANSETRON 2 MG/ML
4 INJECTION INTRAMUSCULAR; INTRAVENOUS EVERY 12 HOURS PRN
Status: DISCONTINUED | OUTPATIENT
Start: 2018-01-01 | End: 2018-01-01

## 2018-01-01 RX ORDER — LOSARTAN POTASSIUM 25 MG/1
25 TABLET ORAL DAILY
Status: DISCONTINUED | OUTPATIENT
Start: 2018-01-01 | End: 2018-01-01

## 2018-01-01 RX ORDER — MAGNESIUM SULFATE HEPTAHYDRATE 40 MG/ML
2 INJECTION, SOLUTION INTRAVENOUS ONCE
Status: DISCONTINUED | OUTPATIENT
Start: 2018-01-01 | End: 2018-01-01

## 2018-01-01 RX ORDER — METOPROLOL TARTRATE 25 MG/1
25 TABLET, FILM COATED ORAL 2 TIMES DAILY
Qty: 60 TABLET | Refills: 11 | Status: SHIPPED | OUTPATIENT
Start: 2018-01-01 | End: 2018-01-01

## 2018-01-01 RX ORDER — FUROSEMIDE 80 MG/1
80 TABLET ORAL 2 TIMES DAILY
Status: DISCONTINUED | OUTPATIENT
Start: 2018-01-01 | End: 2018-01-01 | Stop reason: HOSPADM

## 2018-01-01 RX ORDER — NITROGLYCERIN 0.4 MG/1
0.4 TABLET SUBLINGUAL
Status: DISCONTINUED | OUTPATIENT
Start: 2018-01-01 | End: 2018-01-01 | Stop reason: HOSPADM

## 2018-01-01 RX ORDER — PHENYTOIN SODIUM 100 MG/1
100 CAPSULE, EXTENDED RELEASE ORAL 2 TIMES DAILY
Status: DISCONTINUED | OUTPATIENT
Start: 2018-01-01 | End: 2018-01-01

## 2018-01-01 RX ORDER — ADENOSINE 3 MG/ML
6 INJECTION, SOLUTION INTRAVENOUS ONCE
Status: DISCONTINUED | OUTPATIENT
Start: 2018-01-01 | End: 2018-01-01

## 2018-01-01 RX ORDER — SUCCINYLCHOLINE CHLORIDE 20 MG/ML
INJECTION INTRAMUSCULAR; INTRAVENOUS
Status: COMPLETED
Start: 2018-01-01 | End: 2018-01-01

## 2018-01-01 RX ORDER — SODIUM CHLORIDE 9 MG/ML
3 INJECTION, SOLUTION INTRAVENOUS CONTINUOUS
Status: DISCONTINUED | OUTPATIENT
Start: 2018-01-01 | End: 2018-01-01

## 2018-01-01 RX ORDER — MEXILETINE HYDROCHLORIDE 150 MG/1
150 CAPSULE ORAL EVERY 8 HOURS
Status: DISCONTINUED | OUTPATIENT
Start: 2018-01-01 | End: 2018-01-01 | Stop reason: HOSPADM

## 2018-01-01 RX ADMIN — METOPROLOL TARTRATE 25 MG: 25 TABLET ORAL at 08:07

## 2018-01-01 RX ADMIN — Medication 200 MG: at 09:07

## 2018-01-01 RX ADMIN — GABAPENTIN 600 MG: 300 CAPSULE ORAL at 09:07

## 2018-01-01 RX ADMIN — INSULIN ASPART 2 UNITS: 100 INJECTION, SOLUTION INTRAVENOUS; SUBCUTANEOUS at 12:07

## 2018-01-01 RX ADMIN — ALBUTEROL SULFATE 2.5 MG: 2.5 SOLUTION RESPIRATORY (INHALATION) at 11:07

## 2018-01-01 RX ADMIN — CHLORHEXIDINE GLUCONATE 0.12% ORAL RINSE 15 ML: 1.2 LIQUID ORAL at 08:07

## 2018-01-01 RX ADMIN — LEVETIRACETAM 500 MG: 500 TABLET ORAL at 09:07

## 2018-01-01 RX ADMIN — ISOSORBIDE MONONITRATE 90 MG: 60 TABLET, EXTENDED RELEASE ORAL at 09:06

## 2018-01-01 RX ADMIN — Medication 0.06 MCG/KG/MIN: at 04:07

## 2018-01-01 RX ADMIN — GABAPENTIN 600 MG: 300 CAPSULE ORAL at 04:06

## 2018-01-01 RX ADMIN — Medication 3 ML: at 03:07

## 2018-01-01 RX ADMIN — ALBUTEROL SULFATE 2.5 MG: 2.5 SOLUTION RESPIRATORY (INHALATION) at 08:07

## 2018-01-01 RX ADMIN — METOPROLOL TARTRATE 2.5 MG: 5 INJECTION, SOLUTION INTRAVENOUS at 10:07

## 2018-01-01 RX ADMIN — PHENYTOIN SODIUM 200 MG: 100 CAPSULE ORAL at 12:06

## 2018-01-01 RX ADMIN — POTASSIUM CHLORIDE 40 MEQ: 29.8 INJECTION, SOLUTION INTRAVENOUS at 04:07

## 2018-01-01 RX ADMIN — LEVETIRACETAM 500 MG: 500 TABLET ORAL at 08:07

## 2018-01-01 RX ADMIN — CEFAZOLIN 1 G: 1 INJECTION, POWDER, FOR SOLUTION INTRAMUSCULAR; INTRAVENOUS at 11:07

## 2018-01-01 RX ADMIN — METOPROLOL TARTRATE 2.5 MG: 5 INJECTION, SOLUTION INTRAVENOUS at 02:07

## 2018-01-01 RX ADMIN — INSULIN ASPART 1 UNITS: 100 INJECTION, SOLUTION INTRAVENOUS; SUBCUTANEOUS at 12:07

## 2018-01-01 RX ADMIN — AMIODARONE HYDROCHLORIDE 0.5 MG/MIN: 1.8 INJECTION, SOLUTION INTRAVENOUS at 03:07

## 2018-01-01 RX ADMIN — MEXILETINE HYDROCHLORIDE 150 MG: 150 CAPSULE ORAL at 09:07

## 2018-01-01 RX ADMIN — GABAPENTIN 600 MG: 300 CAPSULE ORAL at 08:07

## 2018-01-01 RX ADMIN — ASPIRIN 81 MG CHEWABLE TABLET 81 MG: 81 TABLET CHEWABLE at 09:07

## 2018-01-01 RX ADMIN — CEFAZOLIN 1 G: 1 INJECTION, POWDER, FOR SOLUTION INTRAMUSCULAR; INTRAVENOUS at 04:07

## 2018-01-01 RX ADMIN — ALBUTEROL SULFATE 2.5 MG: 2.5 SOLUTION RESPIRATORY (INHALATION) at 03:07

## 2018-01-01 RX ADMIN — SPIRONOLACTONE 25 MG: 25 TABLET, FILM COATED ORAL at 08:07

## 2018-01-01 RX ADMIN — FUROSEMIDE 10 MG/HR: 10 INJECTION, SOLUTION INTRAMUSCULAR; INTRAVENOUS at 10:07

## 2018-01-01 RX ADMIN — Medication 200 MG: at 08:07

## 2018-01-01 RX ADMIN — Medication 0.4 MCG/KG/MIN: at 09:07

## 2018-01-01 RX ADMIN — PROPOFOL 40 MCG/KG/MIN: 10 INJECTION, EMULSION INTRAVENOUS at 07:07

## 2018-01-01 RX ADMIN — VANCOMYCIN 1000 MG: 1 INJECTION, SOLUTION INTRAVENOUS at 11:07

## 2018-01-01 RX ADMIN — DOCUSATE SODIUM 100 MG: 50 LIQUID ORAL at 08:07

## 2018-01-01 RX ADMIN — HEPARIN SODIUM 5000 UNITS: 5000 INJECTION, SOLUTION INTRAVENOUS; SUBCUTANEOUS at 09:07

## 2018-01-01 RX ADMIN — CHLORHEXIDINE GLUCONATE 0.12% ORAL RINSE 15 ML: 1.2 LIQUID ORAL at 09:07

## 2018-01-01 RX ADMIN — METOPROLOL TARTRATE 2.5 MG: 5 INJECTION, SOLUTION INTRAVENOUS at 09:07

## 2018-01-01 RX ADMIN — PROPOFOL 50 MCG/KG/MIN: 10 INJECTION, EMULSION INTRAVENOUS at 09:07

## 2018-01-01 RX ADMIN — AMIODARONE HYDROCHLORIDE 400 MG: 200 TABLET ORAL at 08:07

## 2018-01-01 RX ADMIN — ETOMIDATE 2 MG: 2 INJECTION, SOLUTION INTRAVENOUS at 03:07

## 2018-01-01 RX ADMIN — FLUTICASONE FUROATE AND VILANTEROL TRIFENATATE 1 PUFF: 200; 25 POWDER RESPIRATORY (INHALATION) at 08:06

## 2018-01-01 RX ADMIN — Medication 3 ML: at 09:07

## 2018-01-01 RX ADMIN — FUROSEMIDE 20 MG/HR: 10 INJECTION, SOLUTION INTRAMUSCULAR; INTRAVENOUS at 04:07

## 2018-01-01 RX ADMIN — PROPOFOL 50 MCG/KG/MIN: 10 INJECTION, EMULSION INTRAVENOUS at 04:07

## 2018-01-01 RX ADMIN — Medication 100 MCG/HR: at 09:07

## 2018-01-01 RX ADMIN — ATORVASTATIN CALCIUM 40 MG: 20 TABLET, FILM COATED ORAL at 08:07

## 2018-01-01 RX ADMIN — DEXTROSE 40 MG: 50 INJECTION, SOLUTION INTRAVENOUS at 09:07

## 2018-01-01 RX ADMIN — PROPOFOL 50 MCG/KG/MIN: 10 INJECTION, EMULSION INTRAVENOUS at 03:07

## 2018-01-01 RX ADMIN — PROPOFOL 40 MCG/KG/MIN: 10 INJECTION, EMULSION INTRAVENOUS at 02:07

## 2018-01-01 RX ADMIN — PROPOFOL 50.05 MCG/KG/MIN: 10 INJECTION, EMULSION INTRAVENOUS at 04:07

## 2018-01-01 RX ADMIN — Medication 3 ML: at 05:07

## 2018-01-01 RX ADMIN — PROPOFOL 50 MCG/KG/MIN: 10 INJECTION, EMULSION INTRAVENOUS at 07:07

## 2018-01-01 RX ADMIN — PANTOPRAZOLE SODIUM 40 MG: 40 TABLET, DELAYED RELEASE ORAL at 08:07

## 2018-01-01 RX ADMIN — POTASSIUM CHLORIDE 20 MEQ: 20 TABLET, EXTENDED RELEASE ORAL at 08:06

## 2018-01-01 RX ADMIN — POTASSIUM CHLORIDE 40 MEQ: 400 INJECTION, SOLUTION INTRAVENOUS at 10:07

## 2018-01-01 RX ADMIN — MEXILETINE HYDROCHLORIDE 150 MG: 150 CAPSULE ORAL at 03:07

## 2018-01-01 RX ADMIN — BUDESONIDE 0.5 MG: 0.5 INHALANT RESPIRATORY (INHALATION) at 07:07

## 2018-01-01 RX ADMIN — CHLOROTHIAZIDE SODIUM 250 MG: 500 INJECTION, POWDER, LYOPHILIZED, FOR SOLUTION INTRAVENOUS at 12:07

## 2018-01-01 RX ADMIN — METOPROLOL TARTRATE 2.5 MG: 5 INJECTION, SOLUTION INTRAVENOUS at 05:07

## 2018-01-01 RX ADMIN — MEXILETINE HYDROCHLORIDE 150 MG: 150 CAPSULE ORAL at 05:07

## 2018-01-01 RX ADMIN — ATORVASTATIN CALCIUM 40 MG: 20 TABLET, FILM COATED ORAL at 09:06

## 2018-01-01 RX ADMIN — ETOMIDATE 10 MG: 2 INJECTION, SOLUTION INTRAVENOUS at 02:07

## 2018-01-01 RX ADMIN — POTASSIUM CHLORIDE 40 MEQ: 400 INJECTION, SOLUTION INTRAVENOUS at 01:07

## 2018-01-01 RX ADMIN — HEPARIN SODIUM 5000 UNITS: 5000 INJECTION, SOLUTION INTRAVENOUS; SUBCUTANEOUS at 01:07

## 2018-01-01 RX ADMIN — PROMETHAZINE HYDROCHLORIDE AND CODEINE PHOSPHATE 5 ML: 10; 6.25 SOLUTION ORAL at 11:07

## 2018-01-01 RX ADMIN — ALPRAZOLAM 1 MG: 1 TABLET ORAL at 09:06

## 2018-01-01 RX ADMIN — HEPARIN SODIUM 5000 UNITS: 5000 INJECTION, SOLUTION INTRAVENOUS; SUBCUTANEOUS at 05:07

## 2018-01-01 RX ADMIN — Medication 300 MCG: at 03:07

## 2018-01-01 RX ADMIN — CEFEPIME 2 G: 2 INJECTION, POWDER, FOR SOLUTION INTRAVENOUS at 03:07

## 2018-01-01 RX ADMIN — PROPOFOL 30 MCG/KG/MIN: 10 INJECTION, EMULSION INTRAVENOUS at 06:07

## 2018-01-01 RX ADMIN — ALBUTEROL SULFATE 2.5 MG: 2.5 SOLUTION RESPIRATORY (INHALATION) at 07:07

## 2018-01-01 RX ADMIN — FUROSEMIDE 10 MG/HR: 10 INJECTION, SOLUTION INTRAMUSCULAR; INTRAVENOUS at 12:06

## 2018-01-01 RX ADMIN — PROPOFOL 50.05 MCG/KG/MIN: 10 INJECTION, EMULSION INTRAVENOUS at 06:07

## 2018-01-01 RX ADMIN — INSULIN ASPART 3 UNITS: 100 INJECTION, SOLUTION INTRAVENOUS; SUBCUTANEOUS at 03:07

## 2018-01-01 RX ADMIN — FUROSEMIDE 10 MG/HR: 10 INJECTION, SOLUTION INTRAMUSCULAR; INTRAVENOUS at 05:07

## 2018-01-01 RX ADMIN — SUCCINYLCHOLINE CHLORIDE 20 MG: 20 INJECTION, SOLUTION INTRAMUSCULAR; INTRAVENOUS at 03:07

## 2018-01-01 RX ADMIN — HEPARIN SODIUM AND DEXTROSE 17 UNITS/KG/HR: 10000; 5 INJECTION INTRAVENOUS at 12:06

## 2018-01-01 RX ADMIN — FUROSEMIDE 40 MG: 10 INJECTION, SOLUTION INTRAMUSCULAR; INTRAVENOUS at 10:07

## 2018-01-01 RX ADMIN — EPINEPHRINE 1 MG: 0.1 INJECTION, SOLUTION ENDOTRACHEAL; INTRACARDIAC; INTRAVENOUS at 03:07

## 2018-01-01 RX ADMIN — PREDNISONE 10 MG: 10 TABLET ORAL at 09:06

## 2018-01-01 RX ADMIN — PANTOPRAZOLE SODIUM 40 MG: 40 TABLET, DELAYED RELEASE ORAL at 09:07

## 2018-01-01 RX ADMIN — INSULIN ASPART 2 UNITS: 100 INJECTION, SOLUTION INTRAVENOUS; SUBCUTANEOUS at 04:07

## 2018-01-01 RX ADMIN — FUROSEMIDE 80 MG: 80 TABLET ORAL at 08:07

## 2018-01-01 RX ADMIN — Medication 200 MCG/HR: at 05:07

## 2018-01-01 RX ADMIN — CEFEPIME 2 G: 2 INJECTION, POWDER, FOR SOLUTION INTRAVENOUS at 07:07

## 2018-01-01 RX ADMIN — METOPROLOL TARTRATE 2.5 MG: 5 INJECTION, SOLUTION INTRAVENOUS at 03:07

## 2018-01-01 RX ADMIN — CEFEPIME 2 G: 2 INJECTION, POWDER, FOR SOLUTION INTRAVENOUS at 01:07

## 2018-01-01 RX ADMIN — BUDESONIDE 0.5 MG: 0.5 INHALANT RESPIRATORY (INHALATION) at 05:07

## 2018-01-01 RX ADMIN — HYDROCORTISONE SODIUM SUCCINATE 100 MG: 100 INJECTION, POWDER, FOR SOLUTION INTRAMUSCULAR; INTRAVENOUS at 09:07

## 2018-01-01 RX ADMIN — FUROSEMIDE 20 MG/HR: 10 INJECTION, SOLUTION INTRAMUSCULAR; INTRAVENOUS at 07:07

## 2018-01-01 RX ADMIN — MEXILETINE HYDROCHLORIDE 150 MG: 150 CAPSULE ORAL at 02:07

## 2018-01-01 RX ADMIN — Medication 125 MCG/HR: at 03:07

## 2018-01-01 RX ADMIN — FUROSEMIDE 80 MG: 10 INJECTION, SOLUTION INTRAMUSCULAR; INTRAVENOUS at 05:07

## 2018-01-01 RX ADMIN — POTASSIUM CHLORIDE 20 MEQ: 1500 TABLET, EXTENDED RELEASE ORAL at 02:06

## 2018-01-01 RX ADMIN — HYDROCODONE BITARTRATE AND ACETAMINOPHEN 1 TABLET: 7.5; 325 TABLET ORAL at 09:06

## 2018-01-01 RX ADMIN — PREDNISONE 30 MG: 20 TABLET ORAL at 08:07

## 2018-01-01 RX ADMIN — PHENYTOIN SODIUM 100 MG: 100 CAPSULE ORAL at 09:06

## 2018-01-01 RX ADMIN — SODIUM CHLORIDE: 0.9 INJECTION, SOLUTION INTRAVENOUS at 03:07

## 2018-01-01 RX ADMIN — HEPARIN SODIUM 5000 UNITS: 5000 INJECTION, SOLUTION INTRAVENOUS; SUBCUTANEOUS at 06:07

## 2018-01-01 RX ADMIN — SENNOSIDES 5 ML: 8.8 SYRUP ORAL at 08:07

## 2018-01-01 RX ADMIN — AMIODARONE HYDROCHLORIDE 400 MG: 200 TABLET ORAL at 09:06

## 2018-01-01 RX ADMIN — LEVETIRACETAM 500 MG: 5 INJECTION INTRAVENOUS at 10:07

## 2018-01-01 RX ADMIN — COLCHICINE 0.6 MG: 0.6 TABLET, FILM COATED ORAL at 09:07

## 2018-01-01 RX ADMIN — Medication 0.2 MCG/KG/MIN: at 02:07

## 2018-01-01 RX ADMIN — CALCIUM CHLORIDE 1 G: 100 INJECTION, SOLUTION INTRAVENOUS at 04:07

## 2018-01-01 RX ADMIN — CEFAZOLIN 1 G: 1 INJECTION, POWDER, FOR SOLUTION INTRAMUSCULAR; INTRAVENOUS at 08:07

## 2018-01-01 RX ADMIN — ACETAMINOPHEN 650 MG: 325 TABLET ORAL at 06:06

## 2018-01-01 RX ADMIN — COLCHICINE 0.6 MG: 0.6 TABLET, FILM COATED ORAL at 08:06

## 2018-01-01 RX ADMIN — NOREPINEPHRINE BITARTRATE 4000 MCG: 1 INJECTION, SOLUTION, CONCENTRATE INTRAVENOUS at 03:07

## 2018-01-01 RX ADMIN — SENNOSIDES 5 ML: 8.8 SYRUP ORAL at 09:07

## 2018-01-01 RX ADMIN — WARFARIN SODIUM 5 MG: 5 TABLET ORAL at 05:07

## 2018-01-01 RX ADMIN — FUROSEMIDE 80 MG: 80 TABLET ORAL at 05:06

## 2018-01-01 RX ADMIN — FUROSEMIDE 10 MG/HR: 10 INJECTION, SOLUTION INTRAMUSCULAR; INTRAVENOUS at 05:06

## 2018-01-01 RX ADMIN — WARFARIN SODIUM 5 MG: 5 TABLET ORAL at 06:07

## 2018-01-01 RX ADMIN — Medication 0.04 MCG/KG/MIN: at 07:07

## 2018-01-01 RX ADMIN — INSULIN ASPART 2 UNITS: 100 INJECTION, SOLUTION INTRAVENOUS; SUBCUTANEOUS at 11:07

## 2018-01-01 RX ADMIN — AMIODARONE HYDROCHLORIDE 1 MG/MIN: 1.8 INJECTION, SOLUTION INTRAVENOUS at 03:07

## 2018-01-01 RX ADMIN — SPIRONOLACTONE 25 MG: 25 TABLET, FILM COATED ORAL at 09:07

## 2018-01-01 RX ADMIN — DIPHENHYDRAMINE HYDROCHLORIDE 50 MG: 50 CAPSULE ORAL at 10:06

## 2018-01-01 RX ADMIN — PROPOFOL 50 MCG/KG/MIN: 10 INJECTION, EMULSION INTRAVENOUS at 08:07

## 2018-01-01 RX ADMIN — COLCHICINE 0.6 MG: 0.6 TABLET, FILM COATED ORAL at 08:07

## 2018-01-01 RX ADMIN — CHLOROTHIAZIDE SODIUM 250 MG: 500 INJECTION, POWDER, LYOPHILIZED, FOR SOLUTION INTRAVENOUS at 10:07

## 2018-01-01 RX ADMIN — ALBUTEROL SULFATE 2.5 MG: 2.5 SOLUTION RESPIRATORY (INHALATION) at 12:07

## 2018-01-01 RX ADMIN — GABAPENTIN 600 MG: 300 CAPSULE ORAL at 08:06

## 2018-01-01 RX ADMIN — METOPROLOL TARTRATE 25 MG: 25 TABLET ORAL at 09:06

## 2018-01-01 RX ADMIN — PROPOFOL 20 MCG/KG/MIN: 10 INJECTION, EMULSION INTRAVENOUS at 01:07

## 2018-01-01 RX ADMIN — HYDROCODONE BITARTRATE AND ACETAMINOPHEN 1 TABLET: 7.5; 325 TABLET ORAL at 12:07

## 2018-01-01 RX ADMIN — DEXMEDETOMIDINE HYDROCHLORIDE 0.2 MCG/KG/HR: 4 INJECTION, SOLUTION INTRAVENOUS at 09:07

## 2018-01-01 RX ADMIN — Medication 3 ML: at 06:07

## 2018-01-01 RX ADMIN — HYDROCORTISONE SODIUM SUCCINATE 100 MG: 100 INJECTION, POWDER, FOR SOLUTION INTRAMUSCULAR; INTRAVENOUS at 06:07

## 2018-01-01 RX ADMIN — HEPARIN SODIUM AND DEXTROSE 13 UNITS/KG/HR: 10000; 5 INJECTION INTRAVENOUS at 02:07

## 2018-01-01 RX ADMIN — METOPROLOL TARTRATE 2.5 MG: 5 INJECTION, SOLUTION INTRAVENOUS at 06:07

## 2018-01-01 RX ADMIN — SPIRONOLACTONE 25 MG: 25 TABLET, FILM COATED ORAL at 09:06

## 2018-01-01 RX ADMIN — HYDROCORTISONE SODIUM SUCCINATE 100 MG: 100 INJECTION, POWDER, FOR SOLUTION INTRAMUSCULAR; INTRAVENOUS at 01:07

## 2018-01-01 RX ADMIN — HEPARIN SODIUM 5000 UNITS: 5000 INJECTION, SOLUTION INTRAVENOUS; SUBCUTANEOUS at 02:07

## 2018-01-01 RX ADMIN — Medication 100 MCG/HR: at 01:07

## 2018-01-01 RX ADMIN — LEVETIRACETAM 1000 MG: 10 INJECTION INTRAVENOUS at 08:07

## 2018-01-01 RX ADMIN — POTASSIUM CHLORIDE 40 MEQ: 29.8 INJECTION, SOLUTION INTRAVENOUS at 06:07

## 2018-01-01 RX ADMIN — Medication 2500 MCG: at 06:07

## 2018-01-01 RX ADMIN — POTASSIUM CHLORIDE 40 MEQ: 400 INJECTION, SOLUTION INTRAVENOUS at 03:07

## 2018-01-01 RX ADMIN — INSULIN ASPART 5 UNITS: 100 INJECTION, SOLUTION INTRAVENOUS; SUBCUTANEOUS at 09:07

## 2018-01-01 RX ADMIN — FLUTICASONE FUROATE AND VILANTEROL TRIFENATATE 1 PUFF: 200; 25 POWDER RESPIRATORY (INHALATION) at 10:07

## 2018-01-01 RX ADMIN — PREDNISONE 10 MG: 10 TABLET ORAL at 08:07

## 2018-01-01 RX ADMIN — ALPRAZOLAM 1 MG: 1 TABLET ORAL at 09:07

## 2018-01-01 RX ADMIN — HYDROCODONE BITARTRATE AND ACETAMINOPHEN 1 TABLET: 7.5; 325 TABLET ORAL at 09:07

## 2018-01-01 RX ADMIN — FUROSEMIDE 80 MG: 10 INJECTION, SOLUTION INTRAMUSCULAR; INTRAVENOUS at 11:07

## 2018-01-01 RX ADMIN — GABAPENTIN 600 MG: 300 CAPSULE ORAL at 05:06

## 2018-01-01 RX ADMIN — ETOMIDATE 2 MG: 2 INJECTION, SOLUTION INTRAVENOUS at 04:07

## 2018-01-01 RX ADMIN — PROPOFOL 45 MCG/KG/MIN: 10 INJECTION, EMULSION INTRAVENOUS at 10:07

## 2018-01-01 RX ADMIN — AMIODARONE HYDROCHLORIDE 0.5 MG/MIN: 1.8 INJECTION, SOLUTION INTRAVENOUS at 08:07

## 2018-01-01 RX ADMIN — LEVETIRACETAM 1000 MG: 10 INJECTION INTRAVENOUS at 09:07

## 2018-01-01 RX ADMIN — DEXTROSE 40 MG: 50 INJECTION, SOLUTION INTRAVENOUS at 10:07

## 2018-01-01 RX ADMIN — Medication 2500 MCG: at 04:07

## 2018-01-01 RX ADMIN — INSULIN ASPART 2 UNITS: 100 INJECTION, SOLUTION INTRAVENOUS; SUBCUTANEOUS at 08:07

## 2018-01-01 RX ADMIN — MEXILETINE HYDROCHLORIDE 150 MG: 150 CAPSULE ORAL at 01:07

## 2018-01-01 RX ADMIN — ALBUTEROL SULFATE 2.5 MG: 2.5 SOLUTION RESPIRATORY (INHALATION) at 09:07

## 2018-01-01 RX ADMIN — PROPOFOL 35 MCG/KG/MIN: 10 INJECTION, EMULSION INTRAVENOUS at 04:07

## 2018-01-01 RX ADMIN — HEPARIN SODIUM AND DEXTROSE 17 UNITS/KG/HR: 10000; 5 INJECTION INTRAVENOUS at 04:06

## 2018-01-01 RX ADMIN — GABAPENTIN 600 MG: 300 CAPSULE ORAL at 07:06

## 2018-01-01 RX ADMIN — MEXILETINE HYDROCHLORIDE 150 MG: 150 CAPSULE ORAL at 06:07

## 2018-01-01 RX ADMIN — FUROSEMIDE 40 MG: 10 INJECTION, SOLUTION INTRAMUSCULAR; INTRAVENOUS at 03:07

## 2018-01-01 RX ADMIN — VANCOMYCIN 1000 MG: 1 INJECTION, SOLUTION INTRAVENOUS at 12:07

## 2018-01-01 RX ADMIN — PROPOFOL 50 MCG/KG/MIN: 10 INJECTION, EMULSION INTRAVENOUS at 12:07

## 2018-01-01 RX ADMIN — GABAPENTIN 600 MG: 300 CAPSULE ORAL at 02:06

## 2018-01-01 RX ADMIN — Medication 2500 MCG: at 07:07

## 2018-01-01 RX ADMIN — PROPOFOL 50 MCG/KG/MIN: 10 INJECTION, EMULSION INTRAVENOUS at 06:07

## 2018-01-01 RX ADMIN — ATORVASTATIN CALCIUM 40 MG: 20 TABLET, FILM COATED ORAL at 08:06

## 2018-01-01 RX ADMIN — POTASSIUM CHLORIDE 40 MEQ: 20 TABLET, EXTENDED RELEASE ORAL at 09:06

## 2018-01-01 RX ADMIN — FUROSEMIDE 10 MG/HR: 10 INJECTION, SOLUTION INTRAMUSCULAR; INTRAVENOUS at 09:07

## 2018-01-01 RX ADMIN — POTASSIUM CHLORIDE 20 MEQ: 20 TABLET, EXTENDED RELEASE ORAL at 09:06

## 2018-01-01 RX ADMIN — PROPOFOL 50 MCG/KG/MIN: 10 INJECTION, EMULSION INTRAVENOUS at 02:07

## 2018-01-01 RX ADMIN — FUROSEMIDE 80 MG: 10 INJECTION, SOLUTION INTRAMUSCULAR; INTRAVENOUS at 10:07

## 2018-01-01 RX ADMIN — LIDOCAINE HYDROCHLORIDE 1 MG/MIN: 8 INJECTION, SOLUTION INTRAVENOUS at 04:07

## 2018-01-01 RX ADMIN — PROPOFOL 50 MCG/KG/MIN: 10 INJECTION, EMULSION INTRAVENOUS at 11:07

## 2018-01-01 RX ADMIN — BUDESONIDE 0.5 MG: 0.5 INHALANT RESPIRATORY (INHALATION) at 08:07

## 2018-01-01 RX ADMIN — ALBUTEROL SULFATE 2.5 MG: 2.5 SOLUTION RESPIRATORY (INHALATION) at 04:07

## 2018-01-01 RX ADMIN — HYDROCODONE BITARTRATE AND ACETAMINOPHEN 1 TABLET: 7.5; 325 TABLET ORAL at 08:06

## 2018-01-01 RX ADMIN — HYDROCODONE BITARTRATE AND ACETAMINOPHEN 1 TABLET: 7.5; 325 TABLET ORAL at 12:06

## 2018-01-01 RX ADMIN — Medication 3 ML: at 02:07

## 2018-01-01 RX ADMIN — PREDNISONE 10 MG: 10 TABLET ORAL at 10:06

## 2018-01-01 RX ADMIN — ACETAMINOPHEN 650 MG: 160 SOLUTION ORAL at 04:07

## 2018-01-01 RX ADMIN — AMIODARONE HYDROCHLORIDE 1 MG/MIN: 1.8 INJECTION, SOLUTION INTRAVENOUS at 01:07

## 2018-01-01 RX ADMIN — CEFEPIME 2 G: 2 INJECTION, POWDER, FOR SOLUTION INTRAVENOUS at 08:07

## 2018-01-01 RX ADMIN — NITROGLYCERIN 0.4 MG: 0.4 TABLET SUBLINGUAL at 09:07

## 2018-01-01 RX ADMIN — METOPROLOL TARTRATE 2.5 MG: 5 INJECTION, SOLUTION INTRAVENOUS at 01:07

## 2018-01-01 RX ADMIN — AMIODARONE HYDROCHLORIDE 0.5 MG/MIN: 1.8 INJECTION, SOLUTION INTRAVENOUS at 07:07

## 2018-01-01 RX ADMIN — SODIUM CHLORIDE 1 UNITS/HR: 9 INJECTION, SOLUTION INTRAVENOUS at 10:07

## 2018-01-01 RX ADMIN — CLOPIDOGREL 75 MG: 75 TABLET, FILM COATED ORAL at 09:06

## 2018-01-01 RX ADMIN — NITROGLYCERIN 0.4 MG: 0.4 TABLET SUBLINGUAL at 02:07

## 2018-01-01 RX ADMIN — AMIODARONE HYDROCHLORIDE 0.5 MG/MIN: 1.8 INJECTION, SOLUTION INTRAVENOUS at 06:07

## 2018-01-01 RX ADMIN — INSULIN ASPART 3 UNITS: 100 INJECTION, SOLUTION INTRAVENOUS; SUBCUTANEOUS at 08:07

## 2018-01-01 RX ADMIN — GABAPENTIN 600 MG: 300 CAPSULE ORAL at 09:06

## 2018-01-01 RX ADMIN — ASPIRIN 81 MG CHEWABLE TABLET 81 MG: 81 TABLET CHEWABLE at 08:07

## 2018-01-01 RX ADMIN — METOPROLOL TARTRATE 25 MG: 25 TABLET ORAL at 09:07

## 2018-01-01 RX ADMIN — SODIUM CHLORIDE 250 ML: 0.9 INJECTION, SOLUTION INTRAVENOUS at 08:07

## 2018-01-01 RX ADMIN — AMIODARONE HYDROCHLORIDE 400 MG: 200 TABLET ORAL at 08:06

## 2018-01-01 RX ADMIN — METOPROLOL TARTRATE 2.5 MG: 5 INJECTION, SOLUTION INTRAVENOUS at 11:07

## 2018-01-01 RX ADMIN — Medication 3 ML: at 01:07

## 2018-01-01 RX ADMIN — POTASSIUM CHLORIDE 20 MEQ: 1500 TABLET, EXTENDED RELEASE ORAL at 09:07

## 2018-01-01 RX ADMIN — HYDROCORTISONE SODIUM SUCCINATE 100 MG: 100 INJECTION, POWDER, FOR SOLUTION INTRAMUSCULAR; INTRAVENOUS at 02:07

## 2018-01-01 RX ADMIN — DOCUSATE SODIUM 100 MG: 50 LIQUID ORAL at 09:07

## 2018-01-01 RX ADMIN — HYDROCODONE BITARTRATE AND ACETAMINOPHEN 1 TABLET: 7.5; 325 TABLET ORAL at 03:07

## 2018-01-01 RX ADMIN — DEXTROSE 40 MG: 50 INJECTION, SOLUTION INTRAVENOUS at 08:07

## 2018-01-01 RX ADMIN — Medication 0.02 MCG/KG/MIN: at 02:07

## 2018-01-01 RX ADMIN — CEFAZOLIN 1 G: 1 INJECTION, POWDER, FOR SOLUTION INTRAMUSCULAR; INTRAVENOUS at 10:07

## 2018-01-01 RX ADMIN — WARFARIN SODIUM 5 MG: 5 TABLET ORAL at 04:06

## 2018-01-01 RX ADMIN — Medication 0.07 MCG/KG/MIN: at 12:07

## 2018-01-01 RX ADMIN — POTASSIUM CHLORIDE 40 MEQ: 20 TABLET, EXTENDED RELEASE ORAL at 08:06

## 2018-01-01 RX ADMIN — ATORVASTATIN CALCIUM 40 MG: 20 TABLET, FILM COATED ORAL at 09:07

## 2018-01-01 RX ADMIN — PREDNISONE 20 MG: 20 TABLET ORAL at 08:06

## 2018-01-01 RX ADMIN — PHENYTOIN SODIUM 200 MG: 100 CAPSULE ORAL at 08:06

## 2018-01-01 RX ADMIN — FUROSEMIDE 10 MG/HR: 10 INJECTION, SOLUTION INTRAMUSCULAR; INTRAVENOUS at 07:07

## 2018-01-01 RX ADMIN — AMIODARONE HYDROCHLORIDE 1 MG/MIN: 1.8 INJECTION, SOLUTION INTRAVENOUS at 07:06

## 2018-01-01 RX ADMIN — FLUTICASONE FUROATE AND VILANTEROL TRIFENATATE 1 PUFF: 200; 25 POWDER RESPIRATORY (INHALATION) at 01:06

## 2018-01-01 RX ADMIN — CEFAZOLIN 1 G: 1 INJECTION, POWDER, FOR SOLUTION INTRAMUSCULAR; INTRAVENOUS at 03:07

## 2018-01-01 RX ADMIN — PROPOFOL 50 MCG/KG/MIN: 10 INJECTION, EMULSION INTRAVENOUS at 10:07

## 2018-01-01 RX ADMIN — FUROSEMIDE 20 MG/HR: 10 INJECTION, SOLUTION INTRAMUSCULAR; INTRAVENOUS at 09:07

## 2018-01-01 RX ADMIN — FLUTICASONE FUROATE AND VILANTEROL TRIFENATATE 1 PUFF: 200; 25 POWDER RESPIRATORY (INHALATION) at 09:06

## 2018-01-01 RX ADMIN — METOPROLOL TARTRATE 25 MG: 25 TABLET ORAL at 08:06

## 2018-01-01 RX ADMIN — CEFEPIME 2 G: 2 INJECTION, POWDER, FOR SOLUTION INTRAVENOUS at 02:07

## 2018-01-01 RX ADMIN — PROPOFOL 5 MCG/KG/MIN: 10 INJECTION, EMULSION INTRAVENOUS at 03:07

## 2018-01-01 RX ADMIN — PROPOFOL 50 MCG/KG/MIN: 10 INJECTION, EMULSION INTRAVENOUS at 01:07

## 2018-01-01 RX ADMIN — GABAPENTIN 600 MG: 300 CAPSULE ORAL at 03:06

## 2018-01-01 RX ADMIN — ISOSORBIDE MONONITRATE 60 MG: 30 TABLET, EXTENDED RELEASE ORAL at 08:07

## 2018-01-01 RX ADMIN — HEPARIN SODIUM AND DEXTROSE 14 UNITS/KG/HR: 10000; 5 INJECTION INTRAVENOUS at 02:07

## 2018-01-01 RX ADMIN — INSULIN ASPART 3 UNITS: 100 INJECTION, SOLUTION INTRAVENOUS; SUBCUTANEOUS at 12:07

## 2018-01-01 RX ADMIN — PROPOFOL 50 MCG/KG/MIN: 10 INJECTION, EMULSION INTRAVENOUS at 05:07

## 2018-01-01 RX ADMIN — PROPOFOL 40 MCG/KG/MIN: 10 INJECTION, EMULSION INTRAVENOUS at 11:07

## 2018-01-01 RX ADMIN — HYDROCODONE BITARTRATE AND ACETAMINOPHEN 1 TABLET: 7.5; 325 TABLET ORAL at 01:06

## 2018-01-01 RX ADMIN — HEPARIN SODIUM AND DEXTROSE 12 UNITS/KG/HR: 10000; 5 INJECTION INTRAVENOUS at 01:06

## 2018-01-01 RX ADMIN — FUROSEMIDE 80 MG: 10 INJECTION, SOLUTION INTRAMUSCULAR; INTRAVENOUS at 08:07

## 2018-01-01 RX ADMIN — Medication 0.04 MCG/KG/MIN: at 11:07

## 2018-01-01 RX ADMIN — FUROSEMIDE 10 MG/HR: 10 INJECTION, SOLUTION INTRAMUSCULAR; INTRAVENOUS at 02:07

## 2018-01-01 RX ADMIN — ASPIRIN 325 MG ORAL TABLET 325 MG: 325 PILL ORAL at 08:06

## 2018-01-01 RX ADMIN — ASPIRIN 81 MG: 81 TABLET, COATED ORAL at 08:06

## 2018-01-01 RX ADMIN — HYDROCODONE BITARTRATE AND ACETAMINOPHEN 1 TABLET: 10; 325 TABLET ORAL at 11:07

## 2018-01-01 RX ADMIN — AMIODARONE HYDROCHLORIDE 0.5 MG/MIN: 1.8 INJECTION, SOLUTION INTRAVENOUS at 10:07

## 2018-01-01 RX ADMIN — WARFARIN SODIUM 5 MG: 5 TABLET ORAL at 05:06

## 2018-01-01 RX ADMIN — POLYETHYLENE GLYCOL 3350, SODIUM SULFATE ANHYDROUS, SODIUM BICARBONATE, SODIUM CHLORIDE, POTASSIUM CHLORIDE 2000 ML: 236; 22.74; 6.74; 5.86; 2.97 POWDER, FOR SOLUTION ORAL at 02:07

## 2018-01-01 RX ADMIN — LEVETIRACETAM 500 MG: 500 TABLET ORAL at 09:06

## 2018-01-01 RX ADMIN — AMIODARONE HYDROCHLORIDE 1 MG/MIN: 1.8 INJECTION, SOLUTION INTRAVENOUS at 02:06

## 2018-01-01 RX ADMIN — Medication 200 MG: at 03:07

## 2018-01-01 RX ADMIN — Medication 200 MG: at 10:07

## 2018-01-01 RX ADMIN — CHLOROTHIAZIDE SODIUM 250 MG: 500 INJECTION, POWDER, LYOPHILIZED, FOR SOLUTION INTRAVENOUS at 11:07

## 2018-01-01 RX ADMIN — ISOSORBIDE MONONITRATE 90 MG: 60 TABLET, EXTENDED RELEASE ORAL at 08:07

## 2018-01-01 RX ADMIN — ASPIRIN 81 MG: 81 TABLET, COATED ORAL at 08:07

## 2018-01-01 RX ADMIN — HEPARIN SODIUM AND DEXTROSE 16 UNITS/KG/HR: 10000; 5 INJECTION INTRAVENOUS at 07:06

## 2018-01-01 RX ADMIN — PROPOFOL 30 MCG/KG/MIN: 10 INJECTION, EMULSION INTRAVENOUS at 10:07

## 2018-01-01 RX ADMIN — POTASSIUM CHLORIDE 40 MEQ: 20 TABLET, EXTENDED RELEASE ORAL at 09:07

## 2018-01-01 RX ADMIN — PANTOPRAZOLE SODIUM 40 MG: 40 TABLET, DELAYED RELEASE ORAL at 08:06

## 2018-01-01 RX ADMIN — Medication 0.1 MCG/KG/MIN: at 09:07

## 2018-01-01 RX ADMIN — MIDAZOLAM HYDROCHLORIDE 2 MG: 1 INJECTION, SOLUTION INTRAMUSCULAR; INTRAVENOUS at 03:07

## 2018-01-01 RX ADMIN — INSULIN ASPART 1 UNITS: 100 INJECTION, SOLUTION INTRAVENOUS; SUBCUTANEOUS at 10:07

## 2018-01-01 RX ADMIN — FUROSEMIDE 10 MG/HR: 10 INJECTION, SOLUTION INTRAMUSCULAR; INTRAVENOUS at 08:07

## 2018-01-01 RX ADMIN — FUROSEMIDE 10 MG/HR: 10 INJECTION, SOLUTION INTRAMUSCULAR; INTRAVENOUS at 12:07

## 2018-01-01 RX ADMIN — PREDNISONE 10 MG: 10 TABLET ORAL at 09:07

## 2018-01-01 RX ADMIN — CLOPIDOGREL 75 MG: 75 TABLET, FILM COATED ORAL at 08:06

## 2018-01-01 RX ADMIN — GABAPENTIN 600 MG: 300 CAPSULE ORAL at 10:07

## 2018-01-01 RX ADMIN — MEXILETINE HYDROCHLORIDE 150 MG: 150 CAPSULE ORAL at 10:07

## 2018-01-01 RX ADMIN — PROPOFOL 40 MCG/KG/MIN: 10 INJECTION, EMULSION INTRAVENOUS at 03:07

## 2018-01-01 RX ADMIN — CEFEPIME 2 G: 2 INJECTION, POWDER, FOR SOLUTION INTRAVENOUS at 10:07

## 2018-01-01 RX ADMIN — HEPARIN SODIUM AND DEXTROSE 12 UNITS/KG/HR: 10000; 5 INJECTION INTRAVENOUS at 05:06

## 2018-01-01 RX ADMIN — ALPRAZOLAM 1 MG: 1 TABLET ORAL at 08:07

## 2018-01-01 RX ADMIN — HEPARIN SODIUM AND DEXTROSE 15 UNITS/KG/HR: 10000; 5 INJECTION INTRAVENOUS at 10:06

## 2018-01-01 RX ADMIN — INSULIN ASPART 1 UNITS: 100 INJECTION, SOLUTION INTRAVENOUS; SUBCUTANEOUS at 08:07

## 2018-01-01 RX ADMIN — CEFEPIME 2 G: 2 INJECTION, POWDER, FOR SOLUTION INTRAVENOUS at 11:07

## 2018-01-01 RX ADMIN — CEFEPIME 2 G: 2 INJECTION, POWDER, FOR SOLUTION INTRAVENOUS at 05:07

## 2018-01-01 RX ADMIN — HYDROCODONE BITARTRATE AND ACETAMINOPHEN 1 TABLET: 10; 325 TABLET ORAL at 09:07

## 2018-01-01 RX ADMIN — PROPOFOL 30 MCG/KG/MIN: 10 INJECTION, EMULSION INTRAVENOUS at 09:07

## 2018-01-01 RX ADMIN — HYDROCODONE BITARTRATE AND ACETAMINOPHEN 1 TABLET: 7.5; 325 TABLET ORAL at 07:06

## 2018-01-01 RX ADMIN — Medication 0.06 MCG/KG/MIN: at 02:07

## 2018-01-01 RX ADMIN — PROMETHAZINE HYDROCHLORIDE AND CODEINE PHOSPHATE 5 ML: 10; 6.25 SOLUTION ORAL at 10:07

## 2018-01-01 RX ADMIN — FUROSEMIDE 20 MG/HR: 10 INJECTION, SOLUTION INTRAMUSCULAR; INTRAVENOUS at 06:07

## 2018-01-01 RX ADMIN — VANCOMYCIN HYDROCHLORIDE 2000 MG: 10 INJECTION, POWDER, LYOPHILIZED, FOR SOLUTION INTRAVENOUS at 09:07

## 2018-01-01 RX ADMIN — Medication 0.9 MCG/KG/MIN: at 05:07

## 2018-01-01 RX ADMIN — AMIODARONE HYDROCHLORIDE 0.5 MG/MIN: 1.8 INJECTION, SOLUTION INTRAVENOUS at 09:07

## 2018-01-01 RX ADMIN — GABAPENTIN 600 MG: 300 CAPSULE ORAL at 04:07

## 2018-01-01 RX ADMIN — Medication 3 ML: at 10:07

## 2018-01-01 RX ADMIN — PANTOPRAZOLE SODIUM 40 MG: 40 TABLET, DELAYED RELEASE ORAL at 09:06

## 2018-01-01 RX ADMIN — FUROSEMIDE 80 MG: 10 INJECTION, SOLUTION INTRAMUSCULAR; INTRAVENOUS at 05:06

## 2018-01-01 RX ADMIN — GABAPENTIN 600 MG: 300 CAPSULE ORAL at 05:07

## 2018-01-01 RX ADMIN — PROMETHAZINE HYDROCHLORIDE AND CODEINE PHOSPHATE 5 ML: 10; 6.25 SOLUTION ORAL at 12:07

## 2018-01-01 RX ADMIN — VANCOMYCIN HYDROCHLORIDE 1500 MG: 10 INJECTION, POWDER, LYOPHILIZED, FOR SOLUTION INTRAVENOUS at 09:07

## 2018-01-01 RX ADMIN — POTASSIUM CHLORIDE 40 MEQ: 400 INJECTION, SOLUTION INTRAVENOUS at 07:07

## 2018-01-01 RX ADMIN — GABAPENTIN 600 MG: 300 CAPSULE ORAL at 02:07

## 2018-01-01 RX ADMIN — Medication 0.04 MCG/KG/MIN: at 06:07

## 2018-01-01 RX ADMIN — EPINEPHRINE 1 MG: 0.1 INJECTION, SOLUTION ENDOTRACHEAL; INTRACARDIAC; INTRAVENOUS at 02:07

## 2018-01-01 RX ADMIN — Medication 200 MG: at 05:07

## 2018-01-01 RX ADMIN — Medication 0.5 MCG/KG/MIN: at 07:07

## 2018-01-01 RX ADMIN — PANTOPRAZOLE SODIUM 40 MG: 40 TABLET, DELAYED RELEASE ORAL at 02:07

## 2018-01-01 RX ADMIN — LEVETIRACETAM 500 MG: 500 TABLET ORAL at 08:06

## 2018-01-01 RX ADMIN — PREDNISONE 40 MG: 20 TABLET ORAL at 09:07

## 2018-01-01 RX ADMIN — CEFEPIME 2 G: 2 INJECTION, POWDER, FOR SOLUTION INTRAVENOUS at 06:07

## 2018-01-01 RX ADMIN — Medication: at 03:07

## 2018-01-01 RX ADMIN — LACTULOSE 30 G: 20 SOLUTION ORAL at 09:07

## 2018-01-01 RX ADMIN — AMIODARONE HYDROCHLORIDE 0.5 MG/MIN: 1.8 INJECTION, SOLUTION INTRAVENOUS at 11:07

## 2018-01-01 RX ADMIN — LIDOCAINE HYDROCHLORIDE 100 MG: 20 INJECTION, SOLUTION INTRAVENOUS at 03:07

## 2018-01-01 RX ADMIN — COLCHICINE 0.6 MG: 0.6 TABLET, FILM COATED ORAL at 09:06

## 2018-01-01 RX ADMIN — HEPARIN SODIUM AND DEXTROSE 13 UNITS/KG/HR: 10000; 5 INJECTION INTRAVENOUS at 05:07

## 2018-01-01 RX ADMIN — CEFAZOLIN 1 G: 1 INJECTION, POWDER, FOR SOLUTION INTRAMUSCULAR; INTRAVENOUS at 12:07

## 2018-01-01 RX ADMIN — FUROSEMIDE 80 MG: 40 TABLET ORAL at 08:07

## 2018-01-01 RX ADMIN — Medication 0.06 MCG/KG/MIN: at 06:07

## 2018-01-01 RX ADMIN — ALPRAZOLAM 2 MG: 1 TABLET ORAL at 10:07

## 2018-01-01 RX ADMIN — LOSARTAN POTASSIUM 50 MG: 50 TABLET, FILM COATED ORAL at 11:07

## 2018-01-01 RX ADMIN — INSULIN ASPART 5 UNITS: 100 INJECTION, SOLUTION INTRAVENOUS; SUBCUTANEOUS at 11:07

## 2018-01-01 RX ADMIN — Medication 0.03 MCG/KG/MIN: at 04:07

## 2018-01-01 RX ADMIN — Medication 0.03 MCG/KG/MIN: at 01:07

## 2018-01-01 RX ADMIN — FUROSEMIDE 20 MG/HR: 10 INJECTION, SOLUTION INTRAMUSCULAR; INTRAVENOUS at 05:07

## 2018-01-01 RX ADMIN — Medication 0.02 MCG/KG/MIN: at 09:07

## 2018-01-01 RX ADMIN — FLUTICASONE FUROATE AND VILANTEROL TRIFENATATE 1 PUFF: 200; 25 POWDER RESPIRATORY (INHALATION) at 08:07

## 2018-01-01 RX ADMIN — CEFEPIME 2 G: 2 INJECTION, POWDER, FOR SOLUTION INTRAVENOUS at 04:07

## 2018-01-01 RX ADMIN — AMIODARONE HYDROCHLORIDE 1 MG/MIN: 1.8 INJECTION, SOLUTION INTRAVENOUS at 01:06

## 2018-01-01 RX ADMIN — AMIODARONE HYDROCHLORIDE 400 MG: 200 TABLET ORAL at 09:07

## 2018-01-01 RX ADMIN — FUROSEMIDE 20 MG/HR: 10 INJECTION, SOLUTION INTRAMUSCULAR; INTRAVENOUS at 10:07

## 2018-01-01 RX ADMIN — AMIODARONE HYDROCHLORIDE 0.5 MG/MIN: 1.8 INJECTION, SOLUTION INTRAVENOUS at 01:07

## 2018-01-01 RX ADMIN — PREDNISONE 20 MG: 20 TABLET ORAL at 09:07

## 2018-01-01 RX ADMIN — PROPOFOL 35 MCG/KG/MIN: 10 INJECTION, EMULSION INTRAVENOUS at 06:07

## 2018-01-01 RX ADMIN — POTASSIUM CHLORIDE 40 MEQ: 400 INJECTION, SOLUTION INTRAVENOUS at 06:07

## 2018-01-01 RX ADMIN — CEFEPIME 2 G: 2 INJECTION, POWDER, FOR SOLUTION INTRAVENOUS at 09:07

## 2018-01-01 RX ADMIN — ASPIRIN 81 MG: 81 TABLET, COATED ORAL at 09:06

## 2018-01-01 RX ADMIN — CEFEPIME 2 G: 2 INJECTION, POWDER, FOR SOLUTION INTRAVENOUS at 12:07

## 2018-01-01 RX ADMIN — HEPARIN SODIUM 5000 UNITS: 5000 INJECTION, SOLUTION INTRAVENOUS; SUBCUTANEOUS at 03:07

## 2018-01-01 RX ADMIN — AMIODARONE HYDROCHLORIDE 0.5 MG/MIN: 1.8 INJECTION, SOLUTION INTRAVENOUS at 05:07

## 2018-01-01 RX ADMIN — FUROSEMIDE 40 MG: 10 INJECTION, SOLUTION INTRAMUSCULAR; INTRAVENOUS at 11:07

## 2018-01-01 RX ADMIN — HYDROCORTISONE SODIUM SUCCINATE 100 MG: 100 INJECTION, POWDER, FOR SOLUTION INTRAMUSCULAR; INTRAVENOUS at 05:07

## 2018-01-01 RX ADMIN — PROPOFOL 40 MCG/KG/MIN: 10 INJECTION, EMULSION INTRAVENOUS at 06:07

## 2018-01-01 RX ADMIN — Medication 125 MCG: at 06:07

## 2018-01-01 RX ADMIN — POTASSIUM CHLORIDE 20 MEQ: 20 SOLUTION ORAL at 03:07

## 2018-01-01 RX ADMIN — LIDOCAINE HYDROCHLORIDE 1 MG/MIN: 8 INJECTION, SOLUTION INTRAVENOUS at 01:07

## 2018-01-01 RX ADMIN — ALPRAZOLAM 1 MG: 1 TABLET ORAL at 05:06

## 2018-01-01 RX ADMIN — Medication 100 MCG/HR: at 05:07

## 2018-01-01 RX ADMIN — POTASSIUM CHLORIDE 40 MEQ: 400 INJECTION, SOLUTION INTRAVENOUS at 12:07

## 2018-01-01 RX ADMIN — ALPRAZOLAM 1 MG: 1 TABLET ORAL at 03:07

## 2018-01-01 RX ADMIN — ACETAMINOPHEN 650 MG: 160 SOLUTION ORAL at 07:07

## 2018-01-01 RX ADMIN — Medication 2500 MCG: at 03:07

## 2018-01-01 RX ADMIN — INSULIN ASPART 3 UNITS: 100 INJECTION, SOLUTION INTRAVENOUS; SUBCUTANEOUS at 11:07

## 2018-01-01 RX ADMIN — SODIUM CHLORIDE 250 ML: 0.9 INJECTION, SOLUTION INTRAVENOUS at 01:07

## 2018-01-01 RX ADMIN — IOHEXOL 100 ML: 350 INJECTION, SOLUTION INTRAVENOUS at 10:06

## 2018-01-01 RX ADMIN — FLUTICASONE FUROATE AND VILANTEROL TRIFENATATE 1 PUFF: 200; 25 POWDER RESPIRATORY (INHALATION) at 09:07

## 2018-01-01 RX ADMIN — POTASSIUM CHLORIDE 40 MEQ: 400 INJECTION, SOLUTION INTRAVENOUS at 09:07

## 2018-01-01 RX ADMIN — BUDESONIDE 0.5 MG: 0.5 INHALANT RESPIRATORY (INHALATION) at 11:07

## 2018-01-01 RX ADMIN — SPIRONOLACTONE 25 MG: 25 TABLET, FILM COATED ORAL at 08:06

## 2018-01-01 RX ADMIN — PROPOFOL 35 MCG/KG/MIN: 10 INJECTION, EMULSION INTRAVENOUS at 11:07

## 2018-01-01 RX ADMIN — Medication 200 MCG: at 03:07

## 2018-01-01 RX ADMIN — FUROSEMIDE 20 MG/HR: 10 INJECTION, SOLUTION INTRAMUSCULAR; INTRAVENOUS at 12:07

## 2018-01-01 RX ADMIN — SODIUM CHLORIDE 2.5 UNITS/HR: 9 INJECTION, SOLUTION INTRAVENOUS at 09:07

## 2018-01-01 RX ADMIN — Medication 3 ML: at 02:06

## 2018-01-01 RX ADMIN — AMIODARONE HYDROCHLORIDE 300 MG: 1.5 INJECTION, SOLUTION INTRAVENOUS at 07:07

## 2018-01-01 RX ADMIN — Medication 0.06 MCG/KG/MIN: at 08:07

## 2018-01-01 RX ADMIN — INSULIN ASPART 3 UNITS: 100 INJECTION, SOLUTION INTRAVENOUS; SUBCUTANEOUS at 04:07

## 2018-01-01 RX ADMIN — VANCOMYCIN HYDROCHLORIDE 1000 MG: 1 INJECTION, POWDER, LYOPHILIZED, FOR SOLUTION INTRAVENOUS at 09:07

## 2018-01-01 RX ADMIN — Medication 125 MCG: at 12:07

## 2018-01-01 RX ADMIN — PROPOFOL 50.05 MCG/KG/MIN: 10 INJECTION, EMULSION INTRAVENOUS at 01:07

## 2018-01-01 RX ADMIN — INSULIN ASPART 4 UNITS: 100 INJECTION, SOLUTION INTRAVENOUS; SUBCUTANEOUS at 04:07

## 2018-01-01 RX ADMIN — Medication 0.05 MCG/KG/MIN: at 10:07

## 2018-01-01 RX ADMIN — SODIUM BICARBONATE 50 MEQ: 84 INJECTION, SOLUTION INTRAVENOUS at 05:07

## 2018-01-01 RX ADMIN — LIDOCAINE HYDROCHLORIDE 2 MG/MIN: 8 INJECTION, SOLUTION INTRAVENOUS at 09:07

## 2018-01-01 RX ADMIN — HYDROCODONE BITARTRATE AND ACETAMINOPHEN 1 TABLET: 7.5; 325 TABLET ORAL at 04:06

## 2018-01-01 RX ADMIN — INSULIN ASPART 2 UNITS: 100 INJECTION, SOLUTION INTRAVENOUS; SUBCUTANEOUS at 03:07

## 2018-01-01 RX ADMIN — HYDROCODONE BITARTRATE AND ACETAMINOPHEN 1 TABLET: 7.5; 325 TABLET ORAL at 10:06

## 2018-01-01 RX ADMIN — AMIODARONE HYDROCHLORIDE 1 MG/MIN: 1.8 INJECTION, SOLUTION INTRAVENOUS at 07:07

## 2018-01-01 RX ADMIN — POTASSIUM CHLORIDE 40 MEQ: 400 INJECTION, SOLUTION INTRAVENOUS at 05:07

## 2018-01-01 RX ADMIN — Medication 0.02 MCG/KG/MIN: at 06:07

## 2018-01-01 RX ADMIN — HYDROCODONE BITARTRATE AND ACETAMINOPHEN 1 TABLET: 7.5; 325 TABLET ORAL at 05:07

## 2018-01-01 RX ADMIN — POTASSIUM CHLORIDE 20 MEQ: 200 INJECTION, SOLUTION INTRAVENOUS at 05:07

## 2018-01-01 RX ADMIN — PROPOFOL 45 MCG/KG/MIN: 10 INJECTION, EMULSION INTRAVENOUS at 03:07

## 2018-01-01 RX ADMIN — Medication 0.02 MCG/KG/MIN: at 07:07

## 2018-01-01 RX ADMIN — ALBUTEROL SULFATE 2.5 MG: 2.5 SOLUTION RESPIRATORY (INHALATION) at 05:07

## 2018-01-01 RX ADMIN — PROPOFOL 50.05 MCG/KG/MIN: 10 INJECTION, EMULSION INTRAVENOUS at 07:07

## 2018-01-01 RX ADMIN — Medication 2500 MCG: at 11:07

## 2018-01-01 RX ADMIN — FUROSEMIDE 80 MG: 80 TABLET ORAL at 11:07

## 2018-01-01 RX ADMIN — AMIODARONE HYDROCHLORIDE 0.5 MG/MIN: 1.8 INJECTION, SOLUTION INTRAVENOUS at 04:07

## 2018-01-01 RX ADMIN — HYDROCODONE BITARTRATE AND ACETAMINOPHEN 1 TABLET: 7.5; 325 TABLET ORAL at 11:07

## 2018-01-01 RX ADMIN — Medication 100 MCG/HR: at 04:07

## 2018-01-01 RX ADMIN — HEPARIN SODIUM AND DEXTROSE 15 UNITS/KG/HR: 10000; 5 INJECTION INTRAVENOUS at 06:06

## 2018-01-01 RX ADMIN — Medication 200 MG: at 02:07

## 2018-01-01 RX ADMIN — ISOSORBIDE MONONITRATE 90 MG: 60 TABLET, EXTENDED RELEASE ORAL at 09:07

## 2018-01-01 RX ADMIN — BUDESONIDE 0.5 MG: 0.5 INHALANT RESPIRATORY (INHALATION) at 06:07

## 2018-01-01 RX ADMIN — HYDROCORTISONE SODIUM SUCCINATE 100 MG: 100 INJECTION, POWDER, FOR SOLUTION INTRAMUSCULAR; INTRAVENOUS at 03:07

## 2018-01-01 RX ADMIN — Medication 3 ML: at 06:06

## 2018-01-01 RX ADMIN — FUROSEMIDE 20 MG/HR: 10 INJECTION, SOLUTION INTRAMUSCULAR; INTRAVENOUS at 01:07

## 2018-01-01 RX ADMIN — FUROSEMIDE 80 MG: 40 TABLET ORAL at 09:06

## 2018-01-01 RX ADMIN — AMIODARONE HYDROCHLORIDE 0.5 MG/MIN: 1.8 INJECTION, SOLUTION INTRAVENOUS at 12:07

## 2018-01-01 RX ADMIN — ISOSORBIDE MONONITRATE 90 MG: 60 TABLET, EXTENDED RELEASE ORAL at 08:06

## 2018-01-01 RX ADMIN — MAGNESIUM SULFATE HEPTAHYDRATE 2 G: 500 INJECTION, SOLUTION INTRAMUSCULAR; INTRAVENOUS at 03:07

## 2018-01-01 RX ADMIN — PREDNISONE 40 MG: 20 TABLET ORAL at 08:07

## 2018-01-01 RX ADMIN — INSULIN DETEMIR 10 UNITS: 100 INJECTION, SOLUTION SUBCUTANEOUS at 09:07

## 2018-01-01 RX ADMIN — ACETAMINOPHEN 650 MG: 160 SOLUTION ORAL at 08:07

## 2018-01-01 RX ADMIN — ASPIRIN 81 MG: 81 TABLET, COATED ORAL at 09:07

## 2018-01-01 RX ADMIN — PROPOFOL 40 MCG/KG/MIN: 10 INJECTION, EMULSION INTRAVENOUS at 01:07

## 2018-01-01 RX ADMIN — CLOPIDOGREL BISULFATE 300 MG: 300 TABLET, FILM COATED ORAL at 08:06

## 2018-01-01 RX ADMIN — HEPARIN SODIUM AND DEXTROSE 10 UNITS/KG/HR: 10000; 5 INJECTION INTRAVENOUS at 08:06

## 2018-01-01 RX ADMIN — POTASSIUM CHLORIDE 60 MEQ: 200 INJECTION, SOLUTION INTRAVENOUS at 05:07

## 2018-01-01 RX ADMIN — PROPOFOL 30 MCG/KG/MIN: 10 INJECTION, EMULSION INTRAVENOUS at 03:07

## 2018-01-01 RX ADMIN — Medication 3 ML: at 01:06

## 2018-01-01 RX ADMIN — AMIODARONE HYDROCHLORIDE 150 MG: 50 INJECTION, SOLUTION INTRAVENOUS at 03:07

## 2018-01-01 RX ADMIN — HEPARIN SODIUM AND DEXTROSE 16 UNITS/KG/HR: 10000; 5 INJECTION INTRAVENOUS at 11:06

## 2018-01-01 RX ADMIN — ETOMIDATE 6 MG: 2 INJECTION, SOLUTION INTRAVENOUS at 03:07

## 2018-01-01 RX ADMIN — Medication 0.02 MCG/KG/MIN: at 03:07

## 2018-01-01 RX ADMIN — POTASSIUM CHLORIDE 40 MEQ: 1500 TABLET, EXTENDED RELEASE ORAL at 02:06

## 2018-01-01 RX ADMIN — PROPOFOL 30 MCG/KG/MIN: 10 INJECTION, EMULSION INTRAVENOUS at 04:07

## 2018-01-01 RX ADMIN — FUROSEMIDE 40 MG: 10 INJECTION, SOLUTION INTRAMUSCULAR; INTRAVENOUS at 05:06

## 2018-01-01 RX ADMIN — GELATIN ABSORBABLE SPONGE 12-7 MM 1 APPLICATOR: 12-7 MISC at 03:07

## 2018-01-01 RX ADMIN — Medication 2500 MCG: at 12:07

## 2018-01-01 RX ADMIN — PROPOFOL 5 MCG/KG/MIN: 10 INJECTION, EMULSION INTRAVENOUS at 08:07

## 2018-01-01 RX ADMIN — PROPOFOL 30 MG: 10 INJECTION, EMULSION INTRAVENOUS at 04:07

## 2018-01-01 RX ADMIN — FUROSEMIDE 80 MG: 10 INJECTION, SOLUTION INTRAMUSCULAR; INTRAVENOUS at 09:07

## 2018-01-01 RX ADMIN — PREDNISONE 20 MG: 20 TABLET ORAL at 09:06

## 2018-01-01 RX ADMIN — CHLORHEXIDINE GLUCONATE 0.12% ORAL RINSE 15 ML: 1.2 LIQUID ORAL at 10:07

## 2018-01-01 RX ADMIN — AMIODARONE HYDROCHLORIDE 0.5 MG/MIN: 1.8 INJECTION, SOLUTION INTRAVENOUS at 02:07

## 2018-01-01 RX ADMIN — POLYETHYLENE GLYCOL 3350, SODIUM SULFATE ANHYDROUS, SODIUM BICARBONATE, SODIUM CHLORIDE, POTASSIUM CHLORIDE 2000 ML: 236; 22.74; 6.74; 5.86; 2.97 POWDER, FOR SOLUTION ORAL at 05:07

## 2018-01-01 RX ADMIN — PROMETHAZINE HYDROCHLORIDE AND CODEINE PHOSPHATE 5 ML: 10; 6.25 SOLUTION ORAL at 03:07

## 2018-01-01 RX ADMIN — METOPROLOL TARTRATE 2.5 MG: 5 INJECTION, SOLUTION INTRAVENOUS at 12:07

## 2018-01-01 RX ADMIN — PROPOFOL 45 MCG/KG/MIN: 10 INJECTION, EMULSION INTRAVENOUS at 12:07

## 2018-01-01 RX ADMIN — HEPARIN SODIUM 5000 UNITS: 5000 INJECTION, SOLUTION INTRAVENOUS; SUBCUTANEOUS at 10:07

## 2018-01-01 RX ADMIN — HYDROCODONE BITARTRATE AND ACETAMINOPHEN 1 TABLET: 7.5; 325 TABLET ORAL at 06:07

## 2018-01-01 RX ADMIN — HYDROCODONE BITARTRATE AND ACETAMINOPHEN 1 TABLET: 10; 325 TABLET ORAL at 10:07

## 2018-01-01 RX ADMIN — FUROSEMIDE 80 MG: 80 TABLET ORAL at 09:06

## 2018-01-01 RX ADMIN — FUROSEMIDE 80 MG: 80 TABLET ORAL at 06:07

## 2018-01-01 RX ADMIN — DOCUSATE SODIUM 100 MG: 50 LIQUID ORAL at 10:07

## 2018-01-01 RX ADMIN — Medication 150 MCG/HR: at 12:07

## 2018-01-01 RX ADMIN — PANTOPRAZOLE SODIUM 40 MG: 40 INJECTION, POWDER, FOR SOLUTION INTRAVENOUS at 11:07

## 2018-01-01 RX ADMIN — BUTALBITAL, ACETAMINOPHEN AND CAFFEINE 1 TABLET: 50; 325; 40 TABLET ORAL at 09:06

## 2018-01-01 RX ADMIN — Medication 0.05 MCG/KG/MIN: at 11:07

## 2018-01-01 RX ADMIN — FUROSEMIDE 80 MG: 80 TABLET ORAL at 06:06

## 2018-01-01 RX ADMIN — HYDROCODONE BITARTRATE AND ACETAMINOPHEN 1 TABLET: 7.5; 325 TABLET ORAL at 02:06

## 2018-01-01 RX ADMIN — AMIODARONE HYDROCHLORIDE 150 MG: 1.5 INJECTION, SOLUTION INTRAVENOUS at 03:07

## 2018-01-01 RX ADMIN — LEVETIRACETAM 1000 MG: 10 INJECTION INTRAVENOUS at 10:07

## 2018-01-01 RX ADMIN — PREDNISONE 30 MG: 20 TABLET ORAL at 09:07

## 2018-01-01 RX ADMIN — METOPROLOL TARTRATE 2.5 MG: 5 INJECTION, SOLUTION INTRAVENOUS at 07:07

## 2018-01-01 RX ADMIN — AMIODARONE HYDROCHLORIDE 360 MG: 1.8 INJECTION, SOLUTION INTRAVENOUS at 03:07

## 2018-01-01 RX ADMIN — PROPOFOL 50.05 MCG/KG/MIN: 10 INJECTION, EMULSION INTRAVENOUS at 03:07

## 2018-01-01 RX ADMIN — DEXTROSE: 50 INJECTION, SOLUTION INTRAVENOUS at 09:07

## 2018-01-01 RX ADMIN — HYDROCODONE BITARTRATE AND ACETAMINOPHEN 1 TABLET: 7.5; 325 TABLET ORAL at 11:06

## 2018-01-01 RX ADMIN — FUROSEMIDE 20 MG/HR: 10 INJECTION, SOLUTION INTRAMUSCULAR; INTRAVENOUS at 08:07

## 2018-01-01 RX ADMIN — FUROSEMIDE 40 MG: 10 INJECTION, SOLUTION INTRAMUSCULAR; INTRAVENOUS at 09:07

## 2018-01-01 RX ADMIN — PROPOFOL 30 MCG/KG/MIN: 10 INJECTION, EMULSION INTRAVENOUS at 02:07

## 2018-01-01 RX ADMIN — Medication 3 ML: at 08:06

## 2018-01-01 RX ADMIN — Medication 0.02 MCG/KG/MIN: at 01:07

## 2018-01-01 RX ADMIN — HYDROCODONE BITARTRATE AND ACETAMINOPHEN 1 TABLET: 7.5; 325 TABLET ORAL at 06:06

## 2018-01-01 RX ADMIN — PROPOFOL 5 MCG/KG/MIN: 10 INJECTION, EMULSION INTRAVENOUS at 09:07

## 2018-01-01 RX ADMIN — MAGNESIUM SULFATE IN WATER 2 G: 40 INJECTION, SOLUTION INTRAVENOUS at 05:07

## 2018-01-01 RX ADMIN — ENOXAPARIN SODIUM 100 MG: 100 INJECTION SUBCUTANEOUS at 08:07

## 2018-01-01 RX ADMIN — Medication 0.01 MCG/KG/MIN: at 02:07

## 2018-01-01 RX ADMIN — AMIODARONE HYDROCHLORIDE 150 MG: 1.5 INJECTION, SOLUTION INTRAVENOUS at 01:07

## 2018-01-01 RX ADMIN — WARFARIN SODIUM 7.5 MG: 7.5 TABLET ORAL at 04:07

## 2018-01-01 RX ADMIN — ALBUTEROL SULFATE 2.5 MG: 2.5 SOLUTION RESPIRATORY (INHALATION) at 10:07

## 2018-01-01 RX ADMIN — CALCIUM CHLORIDE 1 G: 100 INJECTION, SOLUTION INTRAVENOUS at 02:07

## 2018-01-01 RX ADMIN — FUROSEMIDE 80 MG: 40 TABLET ORAL at 06:07

## 2018-01-01 RX ADMIN — LEVETIRACETAM 500 MG: 500 TABLET ORAL at 10:07

## 2018-01-01 RX ADMIN — LOSARTAN POTASSIUM 50 MG: 50 TABLET, FILM COATED ORAL at 08:07

## 2018-01-01 RX ADMIN — LEVETIRACETAM 500 MG: 5 INJECTION INTRAVENOUS at 11:07

## 2018-01-01 RX ADMIN — INSULIN ASPART 3 UNITS: 100 INJECTION, SOLUTION INTRAVENOUS; SUBCUTANEOUS at 09:07

## 2018-01-01 RX ADMIN — POTASSIUM CHLORIDE 20 MEQ: 20 TABLET, EXTENDED RELEASE ORAL at 08:07

## 2018-01-01 RX ADMIN — AMIODARONE HYDROCHLORIDE 150 MG: 50 INJECTION, SOLUTION INTRAVENOUS at 05:07

## 2018-01-01 RX ADMIN — PROPOFOL 30 MCG/KG/MIN: 10 INJECTION, EMULSION INTRAVENOUS at 11:07

## 2018-01-01 RX ADMIN — HYDROCODONE BITARTRATE AND ACETAMINOPHEN 1 TABLET: 7.5; 325 TABLET ORAL at 08:07

## 2018-01-01 RX ADMIN — FLUTICASONE FUROATE AND VILANTEROL TRIFENATATE 1 PUFF: 200; 25 POWDER RESPIRATORY (INHALATION) at 10:06

## 2018-01-01 RX ADMIN — PROMETHAZINE HYDROCHLORIDE AND CODEINE PHOSPHATE 5 ML: 10; 6.25 SOLUTION ORAL at 02:07

## 2018-01-01 RX ADMIN — FUROSEMIDE 40 MG: 40 TABLET ORAL at 05:06

## 2018-01-01 RX ADMIN — PROPOFOL 35 MCG/KG/MIN: 10 INJECTION, EMULSION INTRAVENOUS at 10:07

## 2018-01-01 RX ADMIN — POTASSIUM CHLORIDE 40 MEQ: 1500 TABLET, EXTENDED RELEASE ORAL at 05:06

## 2018-01-01 RX ADMIN — LIDOCAINE HYDROCHLORIDE 50 MG: 20 INJECTION, SOLUTION INTRAVENOUS at 03:07

## 2018-01-01 RX ADMIN — Medication 0.02 MCG/KG/MIN: at 05:07

## 2018-01-01 RX ADMIN — SODIUM PHOSPHATE, DIBASIC AND SODIUM PHOSPHATE, MONOBASIC 1 ENEMA: 7; 19 ENEMA RECTAL at 10:07

## 2018-01-01 RX ADMIN — PROPOFOL 30 MCG/KG/MIN: 10 INJECTION, EMULSION INTRAVENOUS at 01:07

## 2018-01-01 RX ADMIN — INSULIN ASPART 2 UNITS: 100 INJECTION, SOLUTION INTRAVENOUS; SUBCUTANEOUS at 05:07

## 2018-01-01 RX ADMIN — ISOSORBIDE MONONITRATE 60 MG: 30 TABLET, EXTENDED RELEASE ORAL at 09:07

## 2018-01-01 RX ADMIN — Medication 0.08 MCG/KG/MIN: at 02:07

## 2018-01-01 RX ADMIN — Medication 75 MCG/HR: at 01:07

## 2018-01-16 NOTE — PATIENT INSTRUCTIONS
Increase lasix to 80mg in AM and 40mg in the afternoon.     Repeat labs in 1 week locally to make sure renal function is stable.

## 2018-01-16 NOTE — PROGRESS NOTES
Subjective:   Initial evaluation of heart transplant candidacy.    HPI:  Mr. Good is a 57 y.o. year old Black or  male who has presented to be evaluated as a potential heart transplant recipient.  Mr. Good has ICM, LVEF 10% (previously 25% in 2012 here at [facility]), CAD /sp SONG, left parietal stroke associated with presumed LV thrombus, pulmonary sarcoidosis, HTN, seizure disorder, left ulnar neuropathy, whom we first met 07/2012 on transfer for BIV/ICD placement. At the time, cardiac MRI done before placement of ICD demonstrated nontransmural myocardial scar, which was most likely representing MI in LAD distribution, not sarcoidosis. Last hospitalization for heart failure March 2014.     Since his last visit, patient states he had an admission for ADHF and flare of his sarcoidosis, looks like his prednisone was increased. Patient states he is ready to go ahead with evaluation however at the same time states he is not ready to stay and be admitted but would like to return to go through this in 1 month or so. He has increased leg swelling, increased MONTERO, and weight is up with steroids and with fluid, states he feels he has to stop more often than he used to before continues walking. Denies N/V/F/C, lightheadedness, dizziness, PND, orthopnea, LE edema, abdominal pain, abdominal pressure. NYHA FC III, but seems more symptomatic than before.     Of note, he thought he did well with cpx however admits he stopped early because of his legs hurting not because he had chest pain or shortness of breath. Does use nitro prn for chest pressure, but he states he feels he knows this is because of fluid.      CPX with pkVO2 12.0 ml/kg/min and VE/VCO2 29.1, adequate effort    Underwent RHC 08/08/16, with the following findings:  AOPRES: 138/101 (113)  AOSAT: 96  FICKCI: 1.92  FICKCO: 4.29  PAPRES: 66/34 (48)  PASAT: 59  PVR: 4.2  PWPRES: 34/34 (34)  RAPRES: 19/19 (17)  RVPRES: 64/13, 18    Echo 05/29/15:  1 -  "Left ventricular enlargement. LVEDD 7.4cm   2 - Eccentric hypertrophy.   3 - Severely depressed left ventricular systolic function (EF 10-15%).   4 - normal Left ventricular diastolic dysfunction.   5 - moderate left atrial enlargement  6 - mild mitral regurgation.  7 - normal right ventricular systolic function.   8 - trivial to mild tricuspid regurgitation  9 - estimated PA systolic pressure is 24 mm Hg.      Review of Systems   Constitution: Negative for chills, decreased appetite, diaphoresis, fever, weakness, malaise/fatigue, weight gain and weight loss.   Eyes: Negative for visual disturbance.   Cardiovascular: Positive for dyspnea on exertion and palpitations. Negative for chest pain, irregular heartbeat, leg swelling, near-syncope, orthopnea, paroxysmal nocturnal dyspnea and syncope.   Respiratory: Positive for cough, shortness of breath and sputum production. Negative for sleep disturbances due to breathing, snoring and wheezing.    Hematologic/Lymphatic: Negative for bleeding problem. Does not bruise/bleed easily.   Skin: Negative for color change, poor wound healing and rash.   Musculoskeletal: Negative for back pain, falls, joint pain and muscle weakness.   Gastrointestinal: Negative for bloating, abdominal pain, constipation, diarrhea, hematemesis, hematochezia, melena, nausea and vomiting.   Genitourinary: Negative for nocturia.   Neurological: Negative for excessive daytime sleepiness, dizziness, focal weakness, headaches and light-headedness.   Psychiatric/Behavioral: Negative for depression and memory loss. The patient does not have insomnia and is not nervous/anxious.      Objective:     Physical Exam   Constitutional: He is oriented to person, place, and time. He appears well-developed and well-nourished. No distress.   BP (!) 97/52   Pulse 83   Ht 5' 11" (1.803 m)   Wt 107.7 kg (237 lb 7 oz)   BMI 33.12 kg/m²    HENT:   Head: Normocephalic and atraumatic.   Mouth/Throat: Oropharynx is clear " and moist.   Eyes: Conjunctivae and EOM are normal. Right eye exhibits no discharge. Left eye exhibits no discharge.   Neck: Normal range of motion. Neck supple. No JVD (7cm H2O) present.   Cardiovascular: Normal rate, regular rhythm and intact distal pulses.  Exam reveals no gallop and no friction rub.    No murmur heard.  Pulmonary/Chest: Effort normal and breath sounds normal. He has no wheezes. He has no rales. He exhibits no tenderness.   Abdominal: Soft. Bowel sounds are normal. He exhibits no distension and no mass. There is no tenderness. There is no rebound and no guarding.   Musculoskeletal: Normal range of motion. He exhibits no edema or tenderness.   Neurological: He is alert and oriented to person, place, and time.   Skin: Skin is warm and dry. No rash noted. He is not diaphoretic. No erythema.   Psychiatric: He has a normal mood and affect. His behavior is normal. Judgment and thought content normal.   Nursing note and vitals reviewed.    Labs:      Chemistry        Component Value Date/Time     01/16/2018 1156    K 4.3 01/16/2018 1156     01/16/2018 1156    CO2 26 01/16/2018 1156    BUN 30 (H) 01/16/2018 1156    CREATININE 1.2 01/16/2018 1156    GLU 98 01/16/2018 1156        Component Value Date/Time    CALCIUM 8.6 (L) 01/16/2018 1156    ALKPHOS 53 (L) 01/16/2018 1156    AST 35 01/16/2018 1156    ALT 43 01/16/2018 1156    BILITOT 0.6 01/16/2018 1156          Magnesium   Date Value Ref Range Status   10/07/2016 2.5 1.6 - 2.6 mg/dL Final     Lab Results   Component Value Date    WBC 9.26 01/16/2018    HGB 12.6 (L) 01/16/2018    HCT 38.9 (L) 01/16/2018    MCV 92 01/16/2018     01/16/2018     Lab Results   Component Value Date    INR 2.1 (H) 03/20/2017    INR 2.4 (H) 10/07/2016    INR 1.5 (H) 08/08/2016     BNP   Date Value Ref Range Status   01/16/2018 958 (H) 0 - 99 pg/mL Final     Comment:     Values of less than 100 pg/ml are consistent with non-CHF populations.   06/19/2017 947 (H)  0 - 99 pg/mL Final     Comment:     Values of less than 100 pg/ml are consistent with non-CHF populations.   03/20/2017 380 (H) 0 - 99 pg/mL Final     Comment:     Values of less than 100 pg/ml are consistent with non-CHF populations.     Labs were reviewed with the patient.    Assessment:      No diagnosis found.    Plan:   Will have patient increase lasix to 80mg in AM and 40mg in PM.   Repeat labs in 1 week to make sure electrolytes are stable.   Return in 1 month- patient is not ready to do sooner than this, additionally his daughter is not with him today.   Will have him return for CT chest/abd/pelvis, RHC, and likely anticipate admission from there.  Patient states again he is no longer drinking alcohol, it has been a little over a year according to him.  Additionally, I will give his local pulmonologist a call, will likely need him to be seen by Dr. Holloway here at [facility], concerned about his sarcoidosis.   Patient with angina in addition to heart failure exacerbation (last exacerbation was in Feb of 2017 according to him), not sure about LVAD alone, however will proceed with evaluation following RHC and go from there.    Patient is now NYHA III  Recommend 2 gram sodium restriction and 1500cc fluid restriction.  Encourage physical activity with graded exercise program.  Requested patient to weigh themselves daily, and to notify us if their weight increases by more than 3 lbs in 1 day or 5 lbs in 1 week.   Would like to repeat RHC, with possible admission at that time, however will plan on doing this at next visit if we proceed with evaluation.       UNOS Patient Status  Functional Status: 70% - Cares for self: unable to carry on normal activity or active work  Physical Capacity: No Limitations  Working for Income: Unknown    Chana Berman MD

## 2018-01-17 NOTE — PROGRESS NOTES
Met with patient immediately following clinic visit with Dr. Berman.  Provided with MD order for lab to be drawn locally ([facility]- Dr. Edgar Trimble's office) in one week due to increased lasix dosing.  Pt voiced understanding.  Will f/u result next week.

## 2018-01-24 NOTE — TELEPHONE ENCOUNTER
Patient called to inquire about Coumadin dosing prior to RHC that is scheduled for 2/15/18.  Of note, he is NOT followed by [facility]'s coumadin clinic and dosing is managed by his PCP.  Advised patient that he will have to contact his PCP for specific instructions; however, we prefer INR to be 2 or lower and that we typically advise holding coumadin for 3 days prior to procedure.  He verbalized understanding and will contact his PCP.

## 2018-02-14 NOTE — TELEPHONE ENCOUNTER
informed Dr Berman that Medicaid denied CT chest/abd/pelvis unless peer to peer call is made.  Pt is scheduled for CTs and RHC tomorrow.    Per MD, OK to cancel CTs at this time.  Pt should still come fo RHC as he may need to get admitted based on results of that test.       Left voicemail for pt instructed on cancellation of CT but to KEEP his appt for lab and RHC.  Requested a call back to confirm he received the message.   No answer at home number.   Left voicemail at sister's listed number.

## 2018-02-15 NOTE — TELEPHONE ENCOUNTER
Received return call from pt stating he was discharged last night from local hospital in [place]. He states he was hospitalized for over a week with the flu.   He will not be able to keep his appt for RHC today.      He requests to reschedule in a few weeks in order to regain his strength.   Primary coordinator notified.   RHC cancelled for today.

## 2018-02-21 NOTE — TELEPHONE ENCOUNTER
Pt called office to confirm date that RHC has been rescheduled for.  Advised that he has lab appt at 0830 on 3/5/18 and RHC at 1000, same date.  He confirmed that he is working with Dr. Kennedy to adjust his coumadin to get his INR to 2 or below.  Understanding verbalized.

## 2018-03-05 NOTE — DISCHARGE INSTRUCTIONS

## 2018-03-05 NOTE — BRIEF OP NOTE
Admit date: 3/5/2018    Discharge date and time: 03/05/2018 03/05/2018    Admitting Physician: ADITHYA Myrick MD    Discharge Physician:  ADITHYA Myrick MD    Admission Diagnoses: Chronic systolic heart failure [428.22]  Organ transplant candidate [V49.83]    Discharge Diagnoses: Chronic systolic heart failure [428.22]  Organ transplant candidate [V49.83]    Discharged Condition: stable    Significant Diagnostic Studies: Right Heart Catheterization     Disposition: Home or Self Care    Patient Instructions:   -cont home meds    AFTER THE PROCEDURE:  -You may remove the bandage in 24 hours and wash with soap and water.  -You may shower, but do not soak in a tub for three days.   PRECAUTIONS FOR THE NEXT 24 HOURS:  -If you need to cough, sneeze, have a bowel movement, or bear down, hold pressure over your bandage.  -Do not  anything heavier than a gallon of milk(about 5 pounds)  -Avoid excessive bending over.  SYMPTOMS TO WATCH FOR AND REPORT TO YOUR DOCTOR:  -BLEEDING: hold pressure over the site until bleeding stops. Proceed to Emergency Room by ambulance (do not drive yourself) if unable to stop bleeding. Notify your doctor.  -HEMATOMA(hard bruise under the skin): Dale around the bruise if one develops. Call your doctor if it increases in size or if you have difficulty talking, swallowing, breathing or anything unusual.  SIGNS OF INFECTION:Fever (temperature over 100.5 F), pus or redness  -RASH  -CHEST PAIN OR SHORTNESS OF BREATH    You may call you coordinator in the Heart Failure/Heart Transplant/Pulmonary Hypertension Clinic at [phone] during normal business hours(Monday through Friday from 8 A.M. to 5 P.M.) After hours, call the Heart Transplant Service doctor on call at [phone].    Activity: activity as tolerated  Diet: cardiac low sodium    Follow-up with Dr. Berman today     Signed:  ADITHYA Myrick MD  Transplant Cardiology

## 2018-03-05 NOTE — OP NOTE
Right Heart Catheterization Hemodynamics:    BP: 97/72, 80    RA: 9  RV: 48/9  PA: 48/24, 32  PCWP: 24 (fluoro proven)  TP  PVR: 1.86  SVR: 1320    PA sat: 58  AO sat: 100    Gretel CO/CI: 4.3/1.9    TROY: 2.7  RVSWI: 569 (HR 78)      Conclusion:  1. Mildly elevated Right and Mild-moderately elevated Left Heart filling pressures  2. Mildly reduced cardiac output and index  3. Mildly elevated SVR  4. Mild-moderate pulmonary hypertension  5. Compared to RHC from 16, CO/CI are unchanged but there has been improvement of his PH and right and left sided filling pressures    Patient tolerated the procedure well. Please see complete RHC procedure note for full details and results. Stable to be discharged from cath lab to see Dr. Berman in clinic.    ADITHYA Myrick MD  Transplant Cardiology

## 2018-03-05 NOTE — H&P
Cardiac Cath Lab History and Physical      History of Present Illness:  57 y.o. Black or  male who has presented to be evaluated as a potential heart transplant recipient.  He has  presents to cath lab for RHC for assessment of hemodynamics as part of workup for ICM, LVEF 10% (previously 25% in 2012 here at [facility]), CAD /sp SONG, left parietal stroke associated with presumed LV thrombus, pulmonary sarcoidosis, HTN, seizure disorder, left ulnar neuropathy, whom we first met 07/2012 on transfer for BIV/ICD placement. At the time, cardiac MRI done before placement of ICD demonstrated nontransmural myocardial scar, which was most likely representing MI in LAD distribution, not sarcoidosis. He was last seen by Dr. Berman 1/21/18 when he was volume-up on exam. She wanted to admit to complete workup up but he had some personal things to attend to and agreed to return in ~1 month for RHC and further testing.  Today, he stable MONTERO, continued but occasional palpitations, and occasional cough. He is currently able to lay flat.       Past Medical History:   Diagnosis Date    Arthritis     CHF (congestive heart failure)     Coronary artery disease     Sarcoid        Past Surgical History:   Procedure Laterality Date    CARDIAC DEFIBRILLATOR PLACEMENT  7-12    CORONARY STENT PLACEMENT         Family History   Problem Relation Age of Onset    Hypertension Mother     Heart disease Mother     Hypertension Father     Heart disease Father     Coronary artery disease Father     Hypertension Sister     Heart disease Sister     Stroke Sister     Vision loss Sister     Kidney disease Sister     Coronary artery disease Sister     Coronary artery disease Sister     Cancer Maternal Grandmother        Social History     Social History    Marital status: Other     Spouse name: N/A    Number of children: N/A    Years of education: N/A     Social History Main Topics    Smoking status: Never Smoker     Smokeless tobacco: Never Used    Alcohol use No    Drug use: No    Sexual activity: Not on file     Other Topics Concern    Not on file     Social History Narrative    No narrative on file       Review of Systems: Remainder of 12pt ROS negative    Review of patient's allergies indicates:  No Known Allergies    Home medications on file prior to visit:     No current facility-administered medications on file prior to encounter.      Current Outpatient Prescriptions on File Prior to Encounter   Medication Sig Dispense Refill    albuterol (VENTOLIN HFA) 90 mcg/actuation inhaler Inhale 2 puffs into the lungs every 4 (four) hours as needed for Wheezing. 18 g 5    alprazolam (XANAX) 2 MG tablet Take 2 mg by mouth 2 (two) times daily as needed.       aspirin (ECOTRIN) 81 MG EC tablet Take 81 mg by mouth. 1 Tablet, Delayed Release (E.C.) Oral Every day      carisoprodol (SOMA) 350 MG tablet Take 350 mg by mouth 4 (four) times daily as needed for Muscle spasms.      colchicine 0.6 mg tablet 0.6 mg once daily.   5    fluticasone-vilanterol (BREO ELLIPTA) 200-25 mcg/dose DsDv diskus inhaler Inhale 1 puff into the lungs once daily. Controller      furosemide (LASIX) 40 MG tablet TAKE 2 TABLETS BY MOUTH TWICE DAILY 120 tablet 0    gabapentin (NEURONTIN) 600 MG tablet Take 600 mg by mouth 2 (two) times daily. 1 Tablet Oral At bedtime      hydrocodone-acetaminophen 10-325mg (NORCO)  mg Tab Take 1 tablet by mouth 2 (two) times daily.   0    isosorbide mononitrate (IMDUR) 30 MG 24 hr tablet Take 3 tablets (90 mg total) by mouth once daily. 90 tablet 6    losartan (COZAAR) 50 MG tablet Take 1 tablet (50 mg total) by mouth once daily. 30 tablet 6    nitroGLYCERIN (NITROSTAT) 0.4 MG SL tablet Place 1 tablet (0.4 mg total) under the tongue every 5 (five) minutes as needed. 100 tablet 3    pantoprazole (PROTONIX) 40 MG tablet Take 40 mg by mouth. 1 Tablet, Delayed Release (E.C.) Oral Every day      phenytoin  (DILANTIN) 100 MG ER capsule 100 mg once.   5    potassium chloride SA (K-DUR,KLOR-CON) 20 MEQ tablet TAKE 2 TABLETS BY MOUTH EVERY MORNING AND 1 TABLET BY MOUTH EVERY EVENING 90 tablet 0    pravastatin (PRAVACHOL) 40 MG tablet Take 40 mg by mouth once daily.   5    predniSONE (DELTASONE) 10 MG tablet TK 1 T PO QAM  5    sotalol (BETAPACE) 120 MG Tab 120 mg every 12 (twelve) hours.   3    warfarin (COUMADIN) 7.5 MG tablet   5       Physical exam:  VS reviewed: AF VSS  GENERAL: lying in bed.  Appears comfortable.    HEENT: No scleral icterus. PERRL, No arcus senilis or xanthelasma.    NECK:  JVP elevated  PULM: Equal lung excursion bilaterally.  No adventitious sounds.  No signs of consolidation.    CV: s1 s2, regular; no murmurs or extra heart sounds. PMI non-displaced, no RV lift; Normal carotid upstrokes. Equal, strong pulses in all extremities.    GI: soft and non-tender.  No organ enlargement.    EXT: warm and well-perfused. No clubbing, cyanosis, or edema  Neuro: awake and alert.  Normal insight and judgement. CNII-XII intact  Skin:  No lesions, nodules or rashes.        Labs  Recent Labs      03/05/18 0822   WBC  8.69   RBC  4.31*   HGB  13.3*   HCT  40.0   MCV  93   MCH  30.9   MCHC  33.3   PLT  171       Lab Results   Component Value Date    WBC 8.69 03/05/2018    HGB 13.3 (L) 03/05/2018    HCT 40.0 03/05/2018    MCV 93 03/05/2018     03/05/2018         Recent Labs  Lab 03/05/18  0822   BNP 1,851*     Lab Results   Component Value Date    CREATININE 1.0 03/05/2018    BUN 20 03/05/2018     03/05/2018    K 4.3 03/05/2018     03/05/2018    CO2 30 (H) 03/05/2018       No results for input(s): FIO2, PHART, XAA7MSR, PO2ART, MAF9WNY, BEART in the last 72 hours.  Recent Labs      03/05/18   0822   INR  1.3*   LABPROT  12.7*     ---------------------------------  Problem List  Ischemic Cardiomyopathy  Chronic combined systolic and diastolic heart failure  Sarcoidosis       Assessment/Plan:  ICMP/Chronic combined HF  - I have explained the risks, benefits, and alternatives of the procedure in detail. The patient expresses understanding and all questions have been answered. The patient agrees to the proceed as planned.  - proceed with RHC via RIJ  using 7Fr Sheath  - Micropuncture access needle will be used to minimize bleeding risk.    ADITHYA Myrick MD  Transplant Cardiology

## 2018-03-12 NOTE — PROGRESS NOTES
Subjective:   Initial evaluation of heart transplant candidacy.    HPI:  Mr. Good is a 57 y.o. year old Black or  male who has presented to be evaluated as a potential heart transplant recipient.  Mr. Good has ICM, LVEF 10% (previously 25% in 2012 here at [facility]), CAD /sp SONG, left parietal stroke associated with presumed LV thrombus, pulmonary sarcoidosis, HTN, seizure disorder, left ulnar neuropathy, whom we first met 07/2012 on transfer for BIV/ICD placement. At the time, cardiac MRI done before placement of ICD demonstrated nontransmural myocardial scar, which was most likely representing MI in LAD distribution, not sarcoidosis. Last hospitalization for heart failure earlier this year, which led to us performing RHC today, with the thought that it may lead to him being admitted for evaluation.  However, RHC with mildly elevated filling pressures and mildly to moderately reduced CO/CI, not significant to require admission today. Patient states he is doing well overall, feels volume has been stable. Once again had sarcoidosis flair, where he had bolus steroids and is back to tapering it down as we speak. NYHA FC III.      RA:  (9)  RV: 48  RVEDP: 9  PW:  (24)  PA: 48/24 (32)  AO: 97/72 (80)  PA_SAT: 58    CONDITION 1 (3/5/2018 09:42:24):  SPO2: 98  FICKCI: 1.9  FICKCO: 4.3  PVR: 1.89W  SVR: 1320    CPX with pkVO2 12.0 ml/kg/min and VE/VCO2 29.1, adequate effort    Underwent RHC 08/08/16, with the following findings:  AOPRES: 138/101 (113)  AOSAT: 96  FICKCI: 1.92  FICKCO: 4.29  PAPRES: 66/34 (48)  PASAT: 59  PVR: 4.2  PWPRES: 34/34 (34)  RAPRES: 19/19 (17)  RVPRES: 64/13, 18    Echo 05/29/15:  1 - Left ventricular enlargement. LVEDD 7.4cm   2 - Eccentric hypertrophy.   3 - Severely depressed left ventricular systolic function (EF 10-15%).   4 - normal Left ventricular diastolic dysfunction.   5 - moderate left atrial enlargement  6 - mild mitral regurgation.  7 - normal right ventricular  "systolic function.   8 - trivial to mild tricuspid regurgitation  9 - estimated PA systolic pressure is 24 mm Hg.      Review of Systems   Constitution: Negative for chills, decreased appetite, diaphoresis, fever, weakness, malaise/fatigue, weight gain and weight loss.   Eyes: Negative for visual disturbance.   Cardiovascular: Positive for dyspnea on exertion and palpitations. Negative for chest pain, irregular heartbeat, leg swelling, near-syncope, orthopnea, paroxysmal nocturnal dyspnea and syncope.   Respiratory: Positive for cough, shortness of breath and sputum production. Negative for sleep disturbances due to breathing, snoring and wheezing.    Hematologic/Lymphatic: Negative for bleeding problem. Does not bruise/bleed easily.   Skin: Negative for color change, poor wound healing and rash.   Musculoskeletal: Negative for back pain, falls, joint pain and muscle weakness.   Gastrointestinal: Negative for bloating, abdominal pain, constipation, diarrhea, hematemesis, hematochezia, melena, nausea and vomiting.   Genitourinary: Negative for nocturia.   Neurological: Negative for excessive daytime sleepiness, dizziness, focal weakness, headaches and light-headedness.   Psychiatric/Behavioral: Negative for depression and memory loss. The patient does not have insomnia and is not nervous/anxious.      Objective:     Physical Exam   Constitutional: He is oriented to person, place, and time. He appears well-developed and well-nourished. No distress.   /71 (BP Location: Right arm, Patient Position: Sitting, BP Method: Large (Automatic))   Pulse 63   Ht 5' 11" (1.803 m)   Wt 103.4 kg (227 lb 15.3 oz)   SpO2 97%   BMI 31.79 kg/m²    HENT:   Head: Normocephalic and atraumatic.   Mouth/Throat: Oropharynx is clear and moist.   Eyes: Conjunctivae and EOM are normal. Right eye exhibits no discharge. Left eye exhibits no discharge.   Neck: Normal range of motion. Neck supple. No JVD (10cm H2O) present. "   Cardiovascular: Normal rate, regular rhythm and intact distal pulses.  Exam reveals no gallop and no friction rub.    No murmur heard.  Pulmonary/Chest: Effort normal and breath sounds normal. He has no wheezes. He has no rales. He exhibits no tenderness.   Abdominal: Soft. Bowel sounds are normal. He exhibits no distension and no mass. There is no tenderness. There is no rebound and no guarding.   Musculoskeletal: Normal range of motion. He exhibits no edema or tenderness.   Neurological: He is alert and oriented to person, place, and time.   Skin: Skin is warm and dry. No rash noted. He is not diaphoretic. No erythema.   Psychiatric: He has a normal mood and affect. His behavior is normal. Judgment and thought content normal.   Nursing note and vitals reviewed.    Labs:      Chemistry        Component Value Date/Time     03/05/2018 0822    K 4.3 03/05/2018 0822     03/05/2018 0822    CO2 30 (H) 03/05/2018 0822    BUN 20 03/05/2018 0822    CREATININE 1.0 03/05/2018 0822     03/05/2018 0822        Component Value Date/Time    CALCIUM 9.3 03/05/2018 0822    ALKPHOS 65 03/05/2018 0822    AST 35 03/05/2018 0822    ALT 61 (H) 03/05/2018 0822    BILITOT 0.8 03/05/2018 0822          Magnesium   Date Value Ref Range Status   03/05/2018 2.0 1.6 - 2.6 mg/dL Final     Lab Results   Component Value Date    WBC 8.69 03/05/2018    HGB 13.3 (L) 03/05/2018    HCT 40.0 03/05/2018    MCV 93 03/05/2018     03/05/2018     Lab Results   Component Value Date    INR 1.3 (H) 03/05/2018    INR 2.1 (H) 03/20/2017    INR 2.4 (H) 10/07/2016     BNP   Date Value Ref Range Status   03/05/2018 1,851 (H) 0 - 99 pg/mL Final     Comment:     Values of less than 100 pg/ml are consistent with non-CHF populations.   01/16/2018 958 (H) 0 - 99 pg/mL Final     Comment:     Values of less than 100 pg/ml are consistent with non-CHF populations.   06/19/2017 627 (H) 0 - 99 pg/mL Final     Comment:     Values of less than 100  pg/ml are consistent with non-CHF populations.     Labs were reviewed with the patient.    Assessment:      1. Sarcoidosis    2. Organ transplant candidate        Plan:   Will go ahead and have patient seen by Dr. Holloway here at [facility] for his sarcoidosis. Concerned may be discussing heart/lung transplant however do not have objective evidence of this.   Will get PFTs and refer to pulmonary. Get other records if possible in the meantime.   Patient will take extra 40mg po lasix for 3 days before returning back to abseline lasix dose.    Patient is now NYHA III  Recommend 2 gram sodium restriction and 1500cc fluid restriction.  Encourage physical activity with graded exercise program.  Requested patient to weigh themselves daily, and to notify us if their weight increases by more than 3 lbs in 1 day or 5 lbs in 1 week.   If pulmonary states he is a candidate for OHT alone, will plan on referral to [place] for combined heart/lung transplant.     UNOS Patient Status  Functional Status: 70% - Cares for self: unable to carry on normal activity or active work  Physical Capacity: No Limitations  Working for Income: Unknown    Chana Berman MD

## 2018-03-29 NOTE — LETTER
04/05/2018    Chana Berman  [address] [facility] [address]  Phone: [phone]  Fax: [phone]         Dear Dr. Chana Berman    Patient: Yonathan Good Jr.     MR Number: [id number]     YOB: 1960       Thank you for the referral of Yonathan Good Jr. to our lung transplant program. An initial appointment with the transplant team has been scheduled for May 3, 2018. You will receive an after-visit summary following the completion of your patients appointment in our clinic.    Thank you again for your trust in our program. If there is anything we can do for you or your staff, please feel free to contact us at [phone].    Sincerely,         Orestes Holloway MD   Director, Lung Transplantation   Pulmonary & Critical Care Medicine    [facility]  [address] [place] [id number]  [phone]

## 2018-05-03 NOTE — PROCEDURES
Yonathan Good Jr. is a 57 y.o.  male patient, who presents for a 6 minute walk test ordered by Ernie Holloway MD.  The diagnosis is Pre Lung Transplant Evaluation; Sarcoidosis; Congestive Heart Failure.  The patient's BMI is 33 kg/m2.  Predicted distance (lower limit of normal) is 420.17 meters.      Test Results:    The test was completed without stopping.  The total time walked was 360 seconds.  During walking, the patient reported:  Dyspnea.  The patient used no assistive devices during testing.     05/03/2018---------Distance: 335.28 meters (1100 feet)     O2 Sat % Supplemental Oxygen Heart Rate Blood Pressure Karlee Scale   Pre-exercise  (Resting) 97 % Room Air 103 bpm 119/81 mmHg 4   During Exercise 94 % Room Air 116 bpm 123/75 mmHg 5-6   Post-exercise  (Recovery) 95 % Room Air  104 bpm       Recovery Time:  85 seconds    Performing nurse/tech:  BERTO Carlisle.      PREVIOUS STUDY:   07/14/2015---------Distance: 507.49 meters (1665 feet)       O2 Sat % Supplemental Oxygen Heart Rate Blood Pressure Karlee Scale   Pre-exercise  (Resting) 93 % Room Air 70 bpm 135/101 mmHg 3   During Exercise 95 % Room Air 95 bpm 157/99 mmHg 7-8   Post-exercise  (Recovery) 95 % Room Air  77 bpm         CLINICAL INTERPRETATION:  Six minute walk distance is 335.28 meters (1100 feet) with heavy dyspnea.  During exercise, there was desaturation while breathing room air.  Both blood pressure and heart rate remained stable with walking.  Tachycardia was present prior to exercise.  The patient did not report non-pulmonary symptoms during exercise.  Since the previous study in July 2015, exercise capacity is significantly worse.  Based upon age and body mass index, exercise capacity is less than predicted.

## 2018-05-03 NOTE — PROGRESS NOTES
Administered optison contrast to assist with Echo. Allergies verified and consent signed. NAD noted. IV flushed and d/c'd.

## 2018-05-03 NOTE — PROGRESS NOTES
LUNG TRANSPLANT INITIAL EVALUATION                                                                                                                                             Reason for Visit:  Evaluation for lung transplant    Referring Physician: Chana Berman MD    History of Present Illness: Yonathan Good Jr. is a 57 y.o. male who is on 0L of oxygen.  He is on no assisted ventilation.  His New York Heart Association Class is III and a Karnofsky score of 70% - Cares for self: Unable to carry on normal activity or active work. He is not diabetic.  He presents today for initial lung transplant evaluation for a diagnosis of pulmonary sarcoid.    He is currently a patient of Dr. Berman and is being considered for heart transplant vs LVAD placement.  He has a history of ischemic cardiomyopathy with and LVEF of <10%.  He states that he has had 3 heart attacks in the past requiring PCI.  He had a BIV/AICD placed in 2012.  [facility] requested lung transplant evaluation given his history of pulmonary sarcoidosis.    He states that his pulmonary sarcoid was diagnosed in 2015 when he was evaluated for a dry non-productive cough.  States that he underwent bronchoscopy with biopsies which confirmed sarcoid.  Since then, he has been on varying doses of Prednisone and currently his regimen includes Prednisone 20mg BID on MWF and 10mg BID on other days.  He has no other organ involvement from sarcoid and states that his only symptom has been cough.  Denies any palpitations and states that work-up has been negative for cardiac sarcoid.  This included a negative cardiac MRI.  He states that his physician had recommended treatment with MTX recently but that this was not advised by Dr. Berman.  He complains of weight gain with the steroids and is concerned that he may develop diabetes.      Other medical history is significant for a left parietal CVA which was presumed from a LV thrombus.  He is currently on Coumadin.  He states that he  had a recent hospitalization in Feb 2018 for influenza, pneumonia, and CHF exacerbation.  He did not require intubation.      He is  and has 2 children.  Presents to clinic alone today.  Is a never smoker.  Denies any alcohol or illicit drug use.  Previously worked as a .       Past Medical History:   Diagnosis Date    AICD (automatic cardioverter/defibrillator) present     Arthritis     CHF (congestive heart failure)     Coronary artery disease     MI (myocardial infarction)     Sarcoid     Stroke      Colonoscopy Results: No procedure found.    Past Surgical History:   Procedure Laterality Date    CARDIAC CATHETERIZATION      CARDIAC DEFIBRILLATOR PLACEMENT  7-12    CORONARY STENT PLACEMENT       [place]: None    Blood Product Transfusions: yes. PRBC    Allergies: Patient has no known allergies.    Current Outpatient Prescriptions   Medication Sig    albuterol (VENTOLIN HFA) 90 mcg/actuation inhaler Inhale 2 puffs into the lungs every 4 (four) hours as needed for Wheezing.    alprazolam (XANAX) 2 MG tablet Take 2 mg by mouth 2 (two) times daily as needed.     aspirin (ECOTRIN) 81 MG EC tablet Take 81 mg by mouth. 1 Tablet, Delayed Release (E.C.) Oral Every day    carisoprodol (SOMA) 350 MG tablet Take 350 mg by mouth 4 (four) times daily as needed for Muscle spasms.    colchicine 0.6 mg tablet 0.6 mg once daily.     fluticasone-vilanterol (BREO ELLIPTA) 200-25 mcg/dose DsDv diskus inhaler Inhale 1 puff into the lungs once daily. Controller    furosemide (LASIX) 40 MG tablet TAKE 2 TABLETS BY MOUTH TWICE DAILY    gabapentin (NEURONTIN) 600 MG tablet Take 600 mg by mouth 2 (two) times daily. 1 Tablet Oral At bedtime    hydrocodone-acetaminophen 10-325mg (NORCO)  mg Tab Take 1 tablet by mouth 2 (two) times daily.     isosorbide mononitrate (IMDUR) 30 MG 24 hr tablet Take 3 tablets (90 mg total) by mouth once daily. (Patient taking differently: Take 60 mg by mouth once daily. )     losartan (COZAAR) 50 MG tablet Take 1 tablet (50 mg total) by mouth once daily.    nitroGLYCERIN (NITROSTAT) 0.4 MG SL tablet Place 1 tablet (0.4 mg total) under the tongue every 5 (five) minutes as needed.    pantoprazole (PROTONIX) 40 MG tablet Take 40 mg by mouth. 1 Tablet, Delayed Release (E.C.) Oral Every day    phenytoin (DILANTIN) 100 MG ER capsule 100 mg 2 (two) times daily.     potassium chloride SA (K-DUR,KLOR-CON) 20 MEQ tablet TAKE 2 TABLETS BY MOUTH EVERY MORNING AND 1 TABLET BY MOUTH EVERY EVENING    pravastatin (PRAVACHOL) 40 MG tablet Take 40 mg by mouth once daily.     predniSONE (DELTASONE) 10 MG tablet TK 1 T PO BID    promethazine-codeine 6.25-10 mg/5 ml (PHENERGAN WITH CODEINE) 6.25-10 mg/5 mL syrup TK 5 ML PO  Q 6 H PRN    sotalol (BETAPACE) 120 MG Tab 160 mg every 12 (twelve) hours.     sotalol (BETAPACE) 160 MG Tab TK 1 T PO  BID    spironolactone (ALDACTONE) 25 MG tablet TAKE 1 TABLET BY MOUTH EVERY DAY    warfarin (COUMADIN) 7.5 MG tablet      No current facility-administered medications for this visit.          There is no immunization history on file for this patient.  Family History:    Family History   Problem Relation Age of Onset    Hypertension Mother     Heart disease Mother     Hypertension Father     Heart disease Father     Coronary artery disease Father     Hypertension Sister     Heart disease Sister     Stroke Sister     Vision loss Sister     Kidney disease Sister     Coronary artery disease Sister     Coronary artery disease Sister     Cancer Maternal Grandmother      History   Alcohol Use No      History   Drug Use No      Social History     Social History    Marital status: Other     Spouse name: N/A    Number of children: N/A    Years of education: N/A     Occupational History    Not on file.     Social History Main Topics    Smoking status: Never Smoker    Smokeless tobacco: Never Used    Alcohol use No    Drug use: No    Sexual  "activity: Not on file     Other Topics Concern    Not on file     Social History Narrative    No narrative on file     Review of Systems   Constitutional: Negative for chills, diaphoresis, fever, malaise/fatigue and weight loss.   HENT: Negative for congestion, ear discharge, ear pain, hearing loss, nosebleeds, sinus pain, sore throat and tinnitus.    Eyes: Negative for blurred vision, double vision, photophobia, pain, discharge and redness.   Respiratory: Negative for cough, hemoptysis, sputum production, shortness of breath, wheezing and stridor.    Cardiovascular: Negative for chest pain, palpitations, orthopnea, claudication, leg swelling and PND.   Gastrointestinal: Negative for abdominal pain, blood in stool, constipation, diarrhea, heartburn, melena, nausea and vomiting.   Genitourinary: Negative for dysuria, flank pain, frequency, hematuria and urgency.   Musculoskeletal: Negative for back pain, falls, joint pain, myalgias and neck pain.   Skin: Negative for itching and rash.   Neurological: Negative for dizziness, tingling, tremors, sensory change, speech change, focal weakness, seizures, loss of consciousness, weakness and headaches.   Endo/Heme/Allergies: Negative for environmental allergies and polydipsia. Does not bruise/bleed easily.   Psychiatric/Behavioral: Negative for depression, hallucinations, memory loss, substance abuse and suicidal ideas. The patient is not nervous/anxious and does not have insomnia.      Vitals  /75 (BP Location: Right arm, Patient Position: Sitting, BP Method: Medium (Automatic))   Pulse 88   Temp 97.1 °F (36.2 °C) (Oral)   Resp 20   Ht 5' 11" (1.803 m)   Wt 106.6 kg (235 lb)   SpO2 97% Comment: room air  BMI 32.78 kg/m²   Physical Exam    Labs:  No visits with results within 7 Day(s) from this visit.   Latest known visit with results is:   Lab Visit on 03/05/2018   Component Date Value    BNP 03/05/2018 1851*    WBC 03/05/2018 8.69     RBC 03/05/2018 4.31* "    Hemoglobin 03/05/2018 13.3*    Hematocrit 03/05/2018 40.0     MCV 03/05/2018 93     MCH 03/05/2018 30.9     MCHC 03/05/2018 33.3     RDW 03/05/2018 16.1*    Platelets 03/05/2018 171     MPV 03/05/2018 10.8     Immature Granulocytes 03/05/2018 0.7*    Gran # (ANC) 03/05/2018 6.6     Immature Grans (Abs) 03/05/2018 0.06*    Lymph # 03/05/2018 1.1     Mono # 03/05/2018 0.8     Eos # 03/05/2018 0.1     Baso # 03/05/2018 0.03     nRBC 03/05/2018 0     Gran% 03/05/2018 76.0*    Lymph% 03/05/2018 12.9*    Mono% 03/05/2018 9.3     Eosinophil% 03/05/2018 0.8     Basophil% 03/05/2018 0.3     Differential Method 03/05/2018 Automated     Sodium 03/05/2018 141     Potassium 03/05/2018 4.3     Chloride 03/05/2018 103     CO2 03/05/2018 30*    Glucose 03/05/2018 100     BUN, Bld 03/05/2018 20     Creatinine 03/05/2018 1.0     Calcium 03/05/2018 9.3     Total Protein 03/05/2018 7.0     Albumin 03/05/2018 3.4*    Total Bilirubin 03/05/2018 0.8     Alkaline Phosphatase 03/05/2018 65     AST 03/05/2018 35     ALT 03/05/2018 61*    Anion Gap 03/05/2018 8     eGFR if African American 03/05/2018 >60.0     eGFR if non African Amer* 03/05/2018 >60.0     Magnesium 03/05/2018 2.0     Prothrombin Time 03/05/2018 12.7*    INR 03/05/2018 1.3*       Pulmonary Function Tests 5/3/2018   FVC 2.51   FEV1 1.9   TLC (liters) 3.75   DLCO (ml/mmHg sec) 17.9   FVC% 53   FEV1% 49   FEF 25-75 1.54   FEF 25-75% 39   TLC% 54   RV 1.23   RV% 52   DLCO% 64      6MW 5/3/2018 7/14/2015   6MWT Status completed without stopping completed without stopping   Patient Reported Dyspnea Chest pain;Dyspnea   Was O2 used? No No   6MW Distance walked (feet) 1100 1665   Distance walked (meters) 335.28 507.49   Did patient stop? No No   Oxygen Saturation 97 93   Supplemental Oxygen Room Air Room Air   Heart Rate 103 70   Blood Pressure 119/81 135/101   Karlee Dyspnea Rating  somewhat heavy moderate   Oxygen Saturation 94 95    Supplemental Oxygen Room Air Room Air   Heart Rate 116 95   Blood Pressure 123/75 157/99   Karlee Dyspnea Rating  heavy very heavy   Recovery Time (seconds) 85 117   Oxygen Saturation 95 95   Supplemental Oxygen Room Air Room Air   Heart Rate 104 77       Imaging:  Results for orders placed during the hospital encounter of 03/25/13   X-Ray Chest PA And Lateral    Narrative Patient has a history of sarcoidosis which probably accounts for the increased interstitial markings in prominent nir.  Bothersome on the study is a platelike density extending out from the right hilum that could be atelectatic change or infiltrate or a   neoplastic process.  Clinical correlation should be made.  The heart remains large an AICD seen.    CT may be helpful to fully evaluate the right hilar area with the changes is present now.    Impression  As above.      Electronically signed by: Alberto Rashid MD  Date:     03/25/13  Time:    12:31        Cardiodiagnostics:  CONCLUSIONS     1 - Severe left ventricular enlargement.     2 - Severely depressed left ventricular systolic function (EF 15-20%).     3 - Normal right ventricular systolic function .     4 - The estimated PA systolic pressure is greater than 21 mmHg.     5 - Mild mitral regurgitation.     6 - Trivial to mild tricuspid regurgitation.     7 - Pacemaker/catheter seen in the RA, RV.     8 - Severe left atrial enlargement.     9 - Eccentric hypertrophy.     10 - No wall motion abnormalities.     Hemodynamic Results   RA:  (9)  RV: 48  RVEDP: 9  PW:  (24)  PA: 48/24 (32)  AO: 97/72 (80)  PA_SAT: 58  CONDITION 1 (3/5/2018 09:42:24):  SPO2: 98  FICKCI: 1.9  FICKCO: 4.3  PVR: 1.89W  SVR: 1320    CONCLUSIONS   CPX Conclusions:  Severe functional impairment associated with a normal breathing reserve, normal oxygen saturation, an adequate effort, and a reduced AT. These findings are indicative of functional impairment secondary to circulatory insufficiency.   Please note that the test  was stopped due to claudications    Assessment:  1. Lung transplant candidate    2. Sarcoidosis of lung    3. Chronic systolic heart failure      Plan:   1. Currently followed by an outside pulmonologist for a diagnosis of sarcoid that was diagnosed by bronchoscopy with biopsies.  Therapy now includes high dose Prednisone and his main complaint is cough.  PFTs today show moderate restriction with moderate decrease in DLCO.  Has a sleep study ordered.  Does not require supplemental oxygen.  Would recommend continued attempts to wean down/off Prednisone.  Would consider MTX, Azathioprine, or Cellcept as potential steroid sparing agents.      2. Has an extensive cardiac history and is followed by Dr. Berman for ICM with LVEF <10%.  Per patient considering LVAD or heart transplantation.  Continue heart failure management per Dr. Berman.      3. With regards to lung transplant candidacy (or combined heart-lung candidacy), he currently does not meet disease specific indications for lung transplantation for a diagnosis of pulmonary sarcoid.  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  Would therefore recommend continued follow-up with HTS for either LVAD or heart transplant.      4. Can follow-up as needed.    Naz Plaza DO  PCCM Fellow        Attending Note:    I have seen and evaluated the patient with the fellow. Their note reflects the content of our discussion and my plan of care.      Orestes Holloway MD  Pulmonary/Critical Care Medicine

## 2018-06-04 NOTE — TELEPHONE ENCOUNTER
Spoke with patient and confirmed that he would like to undergo outpatient OHT eval at end of June.  Will request financial clearance today and cancel f/u visit with Dr. Berman for tomorrow.  Will have him see MD with eval.

## 2018-06-05 NOTE — TELEPHONE ENCOUNTER
Pt is financially cleared for outpatient eval.  Will arrange for end of the month unless advised otherwise by Dr. Berman.

## 2018-06-07 NOTE — TELEPHONE ENCOUNTER
"Contacted patient to inquire about prior colonoscopy.  Pt advised that he had a c-scope at age 21 for "infected colon."  Advised that new study is required for heart transplant w/u.  He advised that he will call his PCP to arrange locally; of note, coumadin is also managed by his PCP.  Additionally, pt reported that he fell at home and "twisted his back" and told that he may have "torn a muscle" by local MD.  He states that he is able to complete eval for AHF options.  He knows to bring caregiver.    Scheduling request submitted for remainder of outpatient eval; targeted to take place at end of month.  "

## 2018-06-22 PROBLEM — I50.9 CHF (CONGESTIVE HEART FAILURE): Status: ACTIVE | Noted: 2018-01-01

## 2018-06-22 PROBLEM — R47.82 FLUENCY DISORDER ASSOCIATED WITH UNDERLYING DISEASE: Status: ACTIVE | Noted: 2018-01-01

## 2018-06-23 PROBLEM — R47.01 EXPRESSIVE APHASIA: Status: ACTIVE | Noted: 2018-01-01

## 2018-06-23 NOTE — PLAN OF CARE
Problem: SLP Goal  Goal: SLP Goal  Speech Language Pathology Goals  Goals expected to be met by 6/30  1. Pt will tolerate regular & thin liquids with no s/s aspiration  2. Pt will participate in SLP Speech/Language/Cognitive Evaluation  SLP Clinical Swallow Evaluation completed. See note for details.     Daija Olmedo MS, CCC-SLP  Speech Language Pathologist  Pager: [phone]  6/23/2018

## 2018-06-23 NOTE — ASSESSMENT & PLAN NOTE
57 y.o. male with significant past medical history of prior Left sided stroke w/o residual deficits, CHF, CAD, seizure DO, sarcoidosis of the lung, s/p pacemaker placement, ?A fib-on Coumadin presented to hospital as a transfer from [facility] for evaluation of aphasia. No tPA.  No LVO on CTA MP.    Etiology unclear at this time, infarct vs TIA vs seizure    Antithrombotics for secondary stroke prevention: Anticoagulants: Warfarin INR adjusted target    Statins for secondary stroke prevention and hyperlipidemia, if present:   Statins: Atorvastatin- 40 mg daily    Aggressive risk factor modification: Obesity, A-Fib, CAD, prior stroke     Rehab efforts: PT/OT/SLP to evaluate and treat    Diagnostics ordered/pending: CT scan of head without contrast to asses brain parenchyma, TTE to assess cardiac function/status , Other: EEG    VTE prophylaxis: None: Reason for No Pharmacological VTE Prophylaxis: Currently on anticoagulation, Mechanical prophylaxis: Place SCDs    BP parameters: SBP<220

## 2018-06-23 NOTE — CONSULTS
Food & Nutrition  Education    Diet Education: Cardiac and Coumadin/Vitamin K  Time Spent: 10 mins  Learners: Pt      Nutrition Education provided with handouts: Heart Failure Nutrition Therapy and Coumadin/Vitamin K       Comments: Pt accepted education and verbalized understanding. Has been following these diets at home.       All questions and concerns answered. Dietitian's contact information provided.       Follow-Up:    Please Re-consult as needed        Thanks!

## 2018-06-23 NOTE — ED NOTES
Patient on stretcher, Bed placed in low/locked position, side rails up x 2, call light is within reach of patient. Patient placed into position of comfort. Patient in NAD and offers no complaints at this time. Will continue to monitor.

## 2018-06-23 NOTE — NURSING
at bedside and ware of patient run of v tach, no new orders, per MD will place central line, will continue to monitor.

## 2018-06-23 NOTE — PT/OT/SLP EVAL
"Speech Language Pathology Evaluation  Bedside Swallow    Patient Name:  Yonathan Good Jr.   MRN:  [id number]  Admitting Diagnosis: Fluency disorder associated with underlying disease    Recommendations:                 General Recommendations: ST follow up to check diet tolerance & complete SLP Speech/Language/Cognitive Evaluation  Diet recommendations:  Regular, Thin   Aspiration Precautions: Standard aspiration precautions   General Precautions: Standard, fall    History:     Past Medical History:   Diagnosis Date    AICD (automatic cardioverter/defibrillator) present     Arthritis     CHF (congestive heart failure)     Coronary artery disease     MI (myocardial infarction)     Sarcoid     Stroke        Past Surgical History:   Procedure Laterality Date    CARDIAC CATHETERIZATION      CARDIAC DEFIBRILLATOR PLACEMENT  7-12    CARDIAC DEFIBRILLATOR PLACEMENT      CORONARY STENT PLACEMENT  07/2011    FRACTURE SURGERY       Chest X-Rays: 6/23 Bilateral middle and lower lung zone predominant interstitial opacities, as well as ill-defined right lower lung zone airspace opacities, suspicious for edema or infectious/inflammatory disease.    Trace right pleural effusion.    Prior diet: regular/thin    Pt reports disfluency in conversation "goes in and out." Pt reports that this occurred after his last strokes in 2005 & 2006. He did state he that there are stressors in his life right now.     Subjective   Awake & alert. Seated up in chair.     Pain/Comfort:  · Pain Rating 1: 0/10 ("not right now")  · Pain Rating Post-Intervention 2: 0/10    Objective:     Oral Musculature Evaluation  · Oral Musculature: WFL  · Dentition: present and adequate  · Mucosal Quality: good  · Oral Labial Strength and Mobility: WFL  · Lingual Strength and Mobility: WFL  · Volitional Cough: adequate  · Volitional Swallow: decreased hyolaryngeal excursion via digital palpation  · Voice Prior to PO Intake: clear    Bedside Swallow Eval: "   Consistencies Assessed:  · Thin liquids via cup sips x3 & via straw sips x3  · Solids 1/2 kyara watts     Oral Phase:   · WFL    Pharyngeal Phase:   · decreased hyolaryngeal excursion to palpation  · no overt clinical signs/symptoms of aspiration      Assessment:     Yonathan Good Jr. is a 57 y.o. male with possible s/s pharyngeal dysphagia yet appears to be safely tolerating current diet. ST service will follow up to ensure tolerance & to complete SLP Speech/Language/Cognitive Evaluation.    Goals:    SLP Goals        Problem: SLP Goal    Goal Priority Disciplines Outcome   SLP Goal     SLP    Description:  Speech Language Pathology Goals  Goals expected to be met by 6/30  1. Pt will tolerate regular & thin liquids with no s/s aspiration  2. Pt will participate in SLP Speech/Language/Cognitive Evaluation                    Plan:     · Patient to be seen:  4 x/week   · Plan of Care expires:  07/22/18  · Plan of Care reviewed with:  patient   · SLP Follow-Up:  Yes       Discharge recommendations:   (tbd)     Time Tracking:     SLP Treatment Date:   06/23/18  Speech Start Time:  1157  Speech Stop Time:  1207     Speech Total Time (min):  10 min    Billable Minutes: Eval Swallow and Oral Function 10    Daija Olmedo CCC-SLP  06/23/2018   [phone]

## 2018-06-23 NOTE — HPI
Patient is a 57 y.o. male with significant past medical history of prior Left sided stroke w/o residual deficits, CHF, CAD, seizure DO, sarcoidosis of the lung, s/p pacemaker placement, ?A fib-on Coumadin presented to hospital as a transfer from [facility] for evaluation of aphasia.  Patient was LKW at 1000.  He began to have speech difficulty at that time that initially improved without intervention, then recurred ~1500.  The patient denies weakness, numbness, CP, walking difficulty, or change in vision.  He presented to the OSH for evaluation.  A telestroke consult with Dr. Meehan was attempted but unsuccessful due to technical difficulties.  CTH at that time was negative for acute findings. The patient was not given tPA.  Of note, the patient had been off of his Coumadin for a few days in order to have a colonoscopy.  The patient was transferred to [facility] for higher level of care, further evaluation.  On arrival to the ED the patient is AA&Ox3.  He has no c/o dizziness, HA, weakness, n/v, or CP.  CTA MP obtained, negative for acute findings.

## 2018-06-23 NOTE — NURSING
Patient arrived to the unit alert and oriented no signs of distress denies chest pain, vitals stable, patient daughter aware that patient is in ICU, MD at bedside, will continue to monitor.

## 2018-06-23 NOTE — ASSESSMENT & PLAN NOTE
-Stroke risk factor.  Last 2D Echo 5/3/18.  EF 15-20%, Severe LAE.  -Continue home meds  -BNP 1138

## 2018-06-23 NOTE — SIGNIFICANT EVENT
Nursing staff contacted stroke team around 1300 stating that patient was in SVT with HR in 160s. Rapid response was called. Stroke team member arrived at bedside. Patient complaining of chest pain and dizziness. Hospital medicine team was contacted for additional support. Patient was connected to heart monitor, which revealed vtach. Code blue was called and ICU team was notified, patient will be transferred to ICU for further care.

## 2018-06-23 NOTE — NURSING
During V- tach episode patient was able to bare down to break v tach rhythm, still not signs of distress, Dr. Vivar aware, will continue to monitor.

## 2018-06-23 NOTE — ASSESSMENT & PLAN NOTE
57 y.o. male with significant past medical history of prior Left sided stroke w/o residual deficits, CHF, CAD, seizure DO, sarcoidosis of the lung, s/p pacemaker placement, ?A fib-on Coumadin presented to hospital as a transfer from [facility] for evaluation of aphasia. No tPA.  No LVO on CTA MP.    Antithrombotics for secondary stroke prevention: Anticoagulants: Warfarin INR adjusted target    Statins for secondary stroke prevention and hyperlipidemia, if present:   Statins: Atorvastatin- 40 mg daily    Aggressive risk factor modification: Obesity, A-Fib, CAD, prior stroke     Rehab efforts: PT/OT/SLP to evaluate and treat    Diagnostics ordered/pending: CT scan of head without contrast to asses brain parenchyma, HgbA1C to assess blood glucose levels, Lipid Profile to assess cholesterol levels, TSH to assess thyroid function, Other: EEG    VTE prophylaxis: None: Reason for No Pharmacological VTE Prophylaxis: Currently on anticoagulation, Mechanical prophylaxis: Place SCDs    BP parameters: SBP<220

## 2018-06-23 NOTE — H&P
[facility]  Vascular Neurology  Comprehensive Stroke Center  History & Physical    Consults  Assessment/Plan:       * Fluency disorder associated with underlying disease    57 y.o. male with significant past medical history of prior Left sided stroke w/o residual deficits, CHF, CAD, seizure DO, sarcoidosis of the lung, s/p pacemaker placement, ?A fib-on Coumadin presented to hospital as a transfer from [facility] for evaluation of aphasia. No tPA.  No LVO on CTA MP.    Antithrombotics for secondary stroke prevention: Anticoagulants: Warfarin INR adjusted target    Statins for secondary stroke prevention and hyperlipidemia, if present:   Statins: Atorvastatin- 40 mg daily    Aggressive risk factor modification: Obesity, A-Fib, CAD, prior stroke     Rehab efforts: PT/OT/SLP to evaluate and treat    Diagnostics ordered/pending: CT scan of head without contrast to asses brain parenchyma, HgbA1C to assess blood glucose levels, Lipid Profile to assess cholesterol levels, TSH to assess thyroid function, Other: EEG    VTE prophylaxis: None: Reason for No Pharmacological VTE Prophylaxis: Currently on anticoagulation, Mechanical prophylaxis: Place SCDs    BP parameters: SBP<220            Seizure disorder    -Recorded hx of seizure disorder.  Dilantin listed in home meds.  -Dilantin level   -EEG        CHF (congestive heart failure)    -Stroke risk factor.  Last 2D Echo 5/3/18.  EF 15-20%, Severe LAE.  -Continue home meds  -BNP pending        CAD (coronary artery disease)    -Stroke risk factor.  S/p LAD stent 6/2011.  Patient reports having CP requiring NTG a few days ago.    -Telemetry  -Troponin pending        Sarcoidosis of lung    -Continue home meds.  -Oxygen prn  -Duonebs prn        Biventricular cardiac pacemaker in situ    -Hx of pacemaker placement.  Unable to obtain MRI.            STROKE DOCUMENTATION     Acute Stroke Times   Last Known Normal Date: 06/22/18  Last  Known Normal Time: 1000  Symptom Onset Date: 06/22/18  Symptom Onset Time: 1000  Stroke Team Called Date: 06/22/18  Stroke Team Called Time:  (Problems with the tele-stroke connection)  Stroke Team Arrival Date: 06/22/18  CT Interpretation Time: 1649    NIH Scale:  1a. Level Of Consciousness: 0-->Alert: keenly responsive  1b. LOC Questions: 0-->Answers both questions correctly  1c. LOC Commands: 0-->Performs both tasks correctly  2. Best Gaze: 0-->Normal  3. Visual: 0-->No visual loss  4. Facial Palsy: 0-->Normal symmetrical movements  5a. Motor Arm, Left: 0-->No drift: limb holds 90 (or 45) degrees for full 10 secs  5b. Motor Arm, Right: 0-->No drift: limb holds 90 (or 45) degrees for full 10 secs  6a. Motor Leg, Left: 0-->No drift: leg holds 30 degree position for full 5 secs  6b. Motor Leg, Right: 0-->No drift: leg holds 30 degree position for full 5 secs  7. Limb Ataxia: 0-->Absent  8. Sensory: 0-->Normal: no sensory loss  9. Best Language: 1-->Mild-to-moderate aphasia: some obvious loss of fluency or facility of comprehension, without significant limitation on ideas expressed or form of expression. Reduction of speech and/or comprehension, however, makes conversation. . . (see row details)  10. Dysarthria: 1-->Mild-to-moderate dysarthria: patient slurs at least some words and, at worst, can be understood with some difficulty  11. Extinction and Inattention (formerly Neglect): 0-->No abnormality  Total (NIH Stroke Scale): 2     Modified [place] Score: 0  [place] Coma Scale:15   ABCD2 Score:    [id number] Score:3  HAS -BLED Score:2  ICH Score:   Hunt & Lemus Classification:      Thrombolysis Candidate? No, Strong suspicion for stroke mimic or alternative diagnosis       Interventional Revascularization Candidate?   Is the patient eligible for mechanical endovascular reperfusion (HENRY)?  No; No large vessel occlusion    Hemorrhagic change of an Ischemic Stroke: Does this patient have an ischemic stroke with  hemorrhagic changes? No         Subjective:     History of Present Illness:  Patient is a 57 y.o. male with significant past medical history of prior Left sided stroke w/o residual deficits, CHF, CAD, seizure DO, sarcoidosis of the lung, s/p pacemaker placement, ?A fib-on Coumadin presented to hospital as a transfer from [facility] for evaluation of aphasia.  Patient was LKW at 1000.  He began to have speech difficulty at that time that initially improved without intervention, then recurred ~1500.  The patient denies weakness, numbness, CP, walking difficulty, or change in vision.  He presented to the OSH for evaluation.  A telestroke consult with Dr. Meehan was attempted but unsuccessful due to technical difficulties.  CTH at that time was negative for acute findings. The patient was not given tPA.  Of note, the patient had been off of his Coumadin for a few days in order to have a colonoscopy.  The patient was transferred to [facility] for higher level of care, further evaluation.  On arrival to the ED the patient is AA&Ox3.  He has no c/o dizziness, HA, weakness, n/v, or CP.  CTA MP obtained, negative for acute findings.  Will admit to Vascular Neurology for further evaluation.        Past Medical History:   Diagnosis Date    AICD (automatic cardioverter/defibrillator) present     Arthritis     CHF (congestive heart failure)     Coronary artery disease     MI (myocardial infarction)     Sarcoid     Stroke      Past Surgical History:   Procedure Laterality Date    CARDIAC CATHETERIZATION      CARDIAC DEFIBRILLATOR PLACEMENT  7-12    CARDIAC DEFIBRILLATOR PLACEMENT      CORONARY STENT PLACEMENT  07/2011    FRACTURE SURGERY       Family History   Problem Relation Age of Onset    Hypertension Mother     Heart disease Mother     Hypertension Father     Heart disease Father     Coronary artery disease Father     Hypertension Sister     Heart disease Sister     Stroke  Sister     Vision loss Sister     Kidney disease Sister     Coronary artery disease Sister     Coronary artery disease Sister     Cancer Maternal Grandmother      Social History   Substance Use Topics    Smoking status: Never Smoker    Smokeless tobacco: Never Used    Alcohol use No     Review of patient's allergies indicates:  No Known Allergies    Medications: I have reviewed the current medication administration record.    Current Outpatient Prescriptions:     albuterol (PROVENTIL) 2.5 mg /3 mL (0.083 %) nebulizer solution, Take 2.5 mg by nebulization every 6 (six) hours as needed., Disp: , Rfl: 3    alprazolam (XANAX) 2 MG tablet, Take 2 mg by mouth 2 (two) times daily as needed. , Disp: , Rfl:     aspirin (ECOTRIN) 81 MG EC tablet, Take 81 mg by mouth. 1 Tablet, Delayed Release (E.C.) Oral Every day, Disp: , Rfl:     bumetanide (BUMEX) 1 MG tablet, Take 1 mg by mouth daily as needed., Disp: , Rfl: 2    carisoprodol (SOMA) 350 MG tablet, Take 350 mg by mouth 4 (four) times daily as needed for Muscle spasms., Disp: , Rfl:     colchicine 0.6 mg tablet, 0.6 mg once daily. , Disp: , Rfl: 5    furosemide (LASIX) 40 MG tablet, TAKE 2 TABLETS BY MOUTH TWICE DAILY, Disp: 120 tablet, Rfl: 0    gabapentin (NEURONTIN) 600 MG tablet, Take 600 mg by mouth 2 (two) times daily. 1 Tablet Oral At bedtime, Disp: , Rfl:     hydrocodone-acetaminophen 10-325mg (NORCO)  mg Tab, Take 1 tablet by mouth 2 (two) times daily as needed. , Disp: , Rfl: 0    isosorbide mononitrate (IMDUR) 30 MG 24 hr tablet, Take 3 tablets (90 mg total) by mouth once daily. (Patient taking differently: Take 60 mg by mouth once daily. ), Disp: 90 tablet, Rfl: 6    losartan (COZAAR) 50 MG tablet, Take 1 tablet (50 mg total) by mouth once daily., Disp: 30 tablet, Rfl: 6    pantoprazole (PROTONIX) 40 MG tablet, Take 40 mg by mouth. 1 Tablet, Delayed Release (E.C.) Oral Every day, Disp: , Rfl:     phenytoin (DILANTIN) 100 MG ER capsule,  100 mg 2 (two) times daily. , Disp: , Rfl: 5    potassium chloride SA (K-DUR,KLOR-CON) 20 MEQ tablet, TAKE 2 TABLETS BY MOUTH EVERY MORNING AND 1 TABLET BY MOUTH EVERY EVENING, Disp: 90 tablet, Rfl: 0    pravastatin (PRAVACHOL) 40 MG tablet, Take 40 mg by mouth once daily. , Disp: , Rfl: 5    predniSONE (DELTASONE) 10 MG tablet, TK 1 T PO BID Tuesday, Thursday, Saturday, Disp: , Rfl: 5    predniSONE (DELTASONE) 20 MG tablet, Take 1 tablet by mouth 2 (two) times daily. Monday, Wednesday, and Friday, Sunday, Disp: , Rfl: 11    sotalol (BETAPACE) 160 MG Tab, TK 1 T PO  BID, Disp: , Rfl: 4    spironolactone (ALDACTONE) 25 MG tablet, TAKE 1 TABLET BY MOUTH EVERY DAY, Disp: 30 tablet, Rfl: 0    warfarin (COUMADIN) 7.5 MG tablet, 1 tablet Monday 0.5 tablet Tuesday-Sunday, Disp: , Rfl: 5    albuterol (VENTOLIN HFA) 90 mcg/actuation inhaler, Inhale 2 puffs into the lungs every 4 (four) hours as needed for Wheezing., Disp: 18 g, Rfl: 5    fluticasone-vilanterol (BREO ELLIPTA) 200-25 mcg/dose DsDv diskus inhaler, Inhale 1 puff into the lungs once daily. Controller, Disp: , Rfl:     nitroGLYCERIN (NITROSTAT) 0.4 MG SL tablet, ONE TABLET UNDER TONGUE AS NEEDED FOR CHEST PAIN, Disp: 100 tablet, Rfl: 0    promethazine-codeine 6.25-10 mg/5 ml (PHENERGAN WITH CODEINE) 6.25-10 mg/5 mL syrup, TK 5 ML PO  Q 6 H PRN, Disp: , Rfl: 0      Review of Systems   Constitutional: Negative for chills and fever.   HENT: Negative for drooling and trouble swallowing.    Eyes: Negative for redness and visual disturbance.   Respiratory: Negative for cough and shortness of breath.    Cardiovascular: Negative for chest pain, palpitations and leg swelling.   Gastrointestinal: Negative for abdominal pain, nausea and vomiting.   Endocrine: Negative for cold intolerance and heat intolerance.   Genitourinary: Negative for dysuria and hematuria.   Musculoskeletal: Negative for gait problem, neck pain and neck stiffness.   Skin: Negative for rash  and wound.   Allergic/Immunologic: Negative for environmental allergies and food allergies.   Neurological: Positive for speech difficulty. Negative for dizziness, facial asymmetry, weakness, light-headedness, numbness and headaches.   Hematological: Negative for adenopathy.   Psychiatric/Behavioral: Negative for agitation and confusion.     Objective:     Vital Signs (Most Recent):  Temp: 96.9 °F (36.1 °C) (06/22/18 2204)  Pulse: 82 (06/22/18 2230)  Resp: (!) 34 (06/22/18 2230)  BP: 127/89 (06/22/18 2204)  SpO2: 97 % (06/22/18 2230)    Vital Signs Range (Last 24H):  Temp:  [96.9 °F (36.1 °C)-98.5 °F (36.9 °C)]   Pulse:  [72-84]   Resp:  [16-34]   BP: (114-127)/(78-89)   SpO2:  [97 %]     Physical Exam   Constitutional: He is oriented to person, place, and time. He appears well-developed and well-nourished. No distress.   HENT:   Head: Normocephalic and atraumatic.   Right Ear: External ear normal.   Left Ear: External ear normal.   Nose: Nose normal.   Mouth/Throat: Oropharynx is clear and moist. No oropharyngeal exudate.   Eyes: Conjunctivae and EOM are normal. Pupils are equal, round, and reactive to light. Right eye exhibits no discharge. Left eye exhibits no discharge. No scleral icterus.   Neck: Normal range of motion. Neck supple. No thyromegaly present.   Cardiovascular: Normal rate and intact distal pulses.  A regularly irregular rhythm present.   Murmur heard.  Pulmonary/Chest: Effort normal and breath sounds normal. No respiratory distress. He has no wheezes.   Abdominal: Soft. Bowel sounds are normal. He exhibits no distension. There is no tenderness.   Musculoskeletal: He exhibits no edema.   Lymphadenopathy:     He has no cervical adenopathy.   Neurological: He is alert and oriented to person, place, and time.   Skin: Skin is warm and dry. He is not diaphoretic.   Psychiatric: He has a normal mood and affect.   Nursing note and vitals reviewed.      Neurological Exam:   LOC: alert  Attention Span: Good    Language: Expressive aphasia  Articulation: Dysarthria  Orientation: Person, Place, Time   Visual Fields: Full  EOM (CN III, IV, VI): Full/intact  Pupils (CN II, III): PERRL  Facial Movement (CN VII): Symmetric facial expression    Motor: Arm left  Normal 5/5  Leg left  Normal 5/5  Arm right  Normal 5/5  Leg right Normal 5/5  Cebellar: No evidence of appendicular or axial ataxia  Sensation: Intact to light touch, temperature and vibration      Laboratory:  CMP: No results for input(s): GLUCOSE, CALCIUM, ALBUMIN, PROT, NA, K, CO2, CL, BUN, CREATININE, ALKPHOS, ALT, AST, BILITOT in the last 24 hours.  CBC:   Recent Labs  Lab 06/22/18  2230   HCT 41     Lipid Panel: No results for input(s): CHOL, LDLCALC, HDL, TRIG in the last 168 hours.  Coagulation: No results for input(s): PT, INR, APTT in the last 168 hours.  Hgb A1C: No results for input(s): HGBA1C in the last 168 hours.  TSH: No results for input(s): TSH in the last 168 hours.    Diagnostic Results:      Brain imaging:  CTH 06/22/2018 negative for acute findings.    No MRI 2/2 pacemaker    Vessel Imaging:  CTA MP negative for occlusion or high grade stenosis.    Cardiac Evaluation:   2D Echo 5/3/18:  CONCLUSIONS     1 - Severe left ventricular enlargement.     2 - Severely depressed left ventricular systolic function (EF 15-20%).     3 - Normal right ventricular systolic function .     4 - The estimated PA systolic pressure is greater than 21 mmHg.     5 - Mild mitral regurgitation.     6 - Trivial to mild tricuspid regurgitation.     7 - Pacemaker/catheter seen in the RA, RV.     8 - Severe left atrial enlargement.     9 - Eccentric hypertrophy.     10 - No wall motion abnormalities.         Bev Aviles, BINH, NP  [facility]  Department of Vascular Neurology   [facility]

## 2018-06-23 NOTE — CONSULTS
Inpatient consult to Physical Medicine Rehab  Consult performed by: SARA FLYNN  Consult ordered by: EVETTE HINTON  Reason for consult: assess rehab needs        Reviewed patient history and current admission.  Rehab team following.  Full consult to follow.    SYLVIE Sosa, FNP-C  Physical Medicine & Rehabilitation   06/23/2018  Spectralink: [id number]

## 2018-06-23 NOTE — SUBJECTIVE & OBJECTIVE
Past Medical History:   Diagnosis Date    AICD (automatic cardioverter/defibrillator) present     Arthritis     CHF (congestive heart failure)     Coronary artery disease     MI (myocardial infarction)     Sarcoid     Stroke      Past Surgical History:   Procedure Laterality Date    CARDIAC CATHETERIZATION      CARDIAC DEFIBRILLATOR PLACEMENT  7-12    CARDIAC DEFIBRILLATOR PLACEMENT      CORONARY STENT PLACEMENT  07/2011    FRACTURE SURGERY       Family History   Problem Relation Age of Onset    Hypertension Mother     Heart disease Mother     Hypertension Father     Heart disease Father     Coronary artery disease Father     Hypertension Sister     Heart disease Sister     Stroke Sister     Vision loss Sister     Kidney disease Sister     Coronary artery disease Sister     Coronary artery disease Sister     Cancer Maternal Grandmother      Social History   Substance Use Topics    Smoking status: Never Smoker    Smokeless tobacco: Never Used    Alcohol use No     Review of patient's allergies indicates:  No Known Allergies    Medications: I have reviewed the current medication administration record.    Current Outpatient Prescriptions:     albuterol (PROVENTIL) 2.5 mg /3 mL (0.083 %) nebulizer solution, Take 2.5 mg by nebulization every 6 (six) hours as needed., Disp: , Rfl: 3    alprazolam (XANAX) 2 MG tablet, Take 2 mg by mouth 2 (two) times daily as needed. , Disp: , Rfl:     aspirin (ECOTRIN) 81 MG EC tablet, Take 81 mg by mouth. 1 Tablet, Delayed Release (E.C.) Oral Every day, Disp: , Rfl:     bumetanide (BUMEX) 1 MG tablet, Take 1 mg by mouth daily as needed., Disp: , Rfl: 2    carisoprodol (SOMA) 350 MG tablet, Take 350 mg by mouth 4 (four) times daily as needed for Muscle spasms., Disp: , Rfl:     colchicine 0.6 mg tablet, 0.6 mg once daily. , Disp: , Rfl: 5    furosemide (LASIX) 40 MG tablet, TAKE 2 TABLETS BY MOUTH TWICE DAILY, Disp: 120 tablet, Rfl: 0    gabapentin  (NEURONTIN) 600 MG tablet, Take 600 mg by mouth 2 (two) times daily. 1 Tablet Oral At bedtime, Disp: , Rfl:     hydrocodone-acetaminophen 10-325mg (NORCO)  mg Tab, Take 1 tablet by mouth 2 (two) times daily as needed. , Disp: , Rfl: 0    isosorbide mononitrate (IMDUR) 30 MG 24 hr tablet, Take 3 tablets (90 mg total) by mouth once daily. (Patient taking differently: Take 60 mg by mouth once daily. ), Disp: 90 tablet, Rfl: 6    losartan (COZAAR) 50 MG tablet, Take 1 tablet (50 mg total) by mouth once daily., Disp: 30 tablet, Rfl: 6    pantoprazole (PROTONIX) 40 MG tablet, Take 40 mg by mouth. 1 Tablet, Delayed Release (E.C.) Oral Every day, Disp: , Rfl:     phenytoin (DILANTIN) 100 MG ER capsule, 100 mg 2 (two) times daily. , Disp: , Rfl: 5    potassium chloride SA (K-DUR,KLOR-CON) 20 MEQ tablet, TAKE 2 TABLETS BY MOUTH EVERY MORNING AND 1 TABLET BY MOUTH EVERY EVENING, Disp: 90 tablet, Rfl: 0    pravastatin (PRAVACHOL) 40 MG tablet, Take 40 mg by mouth once daily. , Disp: , Rfl: 5    predniSONE (DELTASONE) 10 MG tablet, TK 1 T PO BID Tuesday, Thursday, Saturday, Disp: , Rfl: 5    predniSONE (DELTASONE) 20 MG tablet, Take 1 tablet by mouth 2 (two) times daily. Monday, Wednesday, and Friday, Sunday, Disp: , Rfl: 11    sotalol (BETAPACE) 160 MG Tab, TK 1 T PO  BID, Disp: , Rfl: 4    spironolactone (ALDACTONE) 25 MG tablet, TAKE 1 TABLET BY MOUTH EVERY DAY, Disp: 30 tablet, Rfl: 0    warfarin (COUMADIN) 7.5 MG tablet, 1 tablet Monday 0.5 tablet Tuesday-Sunday, Disp: , Rfl: 5    albuterol (VENTOLIN HFA) 90 mcg/actuation inhaler, Inhale 2 puffs into the lungs every 4 (four) hours as needed for Wheezing., Disp: 18 g, Rfl: 5    fluticasone-vilanterol (BREO ELLIPTA) 200-25 mcg/dose DsDv diskus inhaler, Inhale 1 puff into the lungs once daily. Controller, Disp: , Rfl:     nitroGLYCERIN (NITROSTAT) 0.4 MG SL tablet, ONE TABLET UNDER TONGUE AS NEEDED FOR CHEST PAIN, Disp: 100 tablet, Rfl: 0     promethazine-codeine 6.25-10 mg/5 ml (PHENERGAN WITH CODEINE) 6.25-10 mg/5 mL syrup, TK 5 ML PO  Q 6 H PRN, Disp: , Rfl: 0      Review of Systems   Constitutional: Negative for chills and fever.   HENT: Negative for drooling and trouble swallowing.    Eyes: Negative for redness and visual disturbance.   Respiratory: Negative for cough and shortness of breath.    Cardiovascular: Negative for chest pain, palpitations and leg swelling.   Gastrointestinal: Negative for abdominal pain, nausea and vomiting.   Endocrine: Negative for cold intolerance and heat intolerance.   Genitourinary: Negative for dysuria and hematuria.   Musculoskeletal: Negative for gait problem, neck pain and neck stiffness.   Skin: Negative for rash and wound.   Allergic/Immunologic: Negative for environmental allergies and food allergies.   Neurological: Positive for speech difficulty. Negative for dizziness, facial asymmetry, weakness, light-headedness, numbness and headaches.   Hematological: Negative for adenopathy.   Psychiatric/Behavioral: Negative for agitation and confusion.     Objective:     Vital Signs (Most Recent):  Temp: 96.9 °F (36.1 °C) (06/22/18 2204)  Pulse: 82 (06/22/18 2230)  Resp: (!) 34 (06/22/18 2230)  BP: 127/89 (06/22/18 2204)  SpO2: 97 % (06/22/18 2230)    Vital Signs Range (Last 24H):  Temp:  [96.9 °F (36.1 °C)-98.5 °F (36.9 °C)]   Pulse:  [72-84]   Resp:  [16-34]   BP: (114-127)/(78-89)   SpO2:  [97 %]     Physical Exam   Constitutional: He is oriented to person, place, and time. He appears well-developed and well-nourished. No distress.   HENT:   Head: Normocephalic and atraumatic.   Right Ear: External ear normal.   Left Ear: External ear normal.   Nose: Nose normal.   Mouth/Throat: Oropharynx is clear and moist. No oropharyngeal exudate.   Eyes: Conjunctivae and EOM are normal. Pupils are equal, round, and reactive to light. Right eye exhibits no discharge. Left eye exhibits no discharge. No scleral icterus.   Neck:  Normal range of motion. Neck supple. No thyromegaly present.   Cardiovascular: Normal rate and intact distal pulses.  A regularly irregular rhythm present.   Murmur heard.  Pulmonary/Chest: Effort normal and breath sounds normal. No respiratory distress. He has no wheezes.   Abdominal: Soft. Bowel sounds are normal. He exhibits no distension. There is no tenderness.   Musculoskeletal: He exhibits no edema.   Lymphadenopathy:     He has no cervical adenopathy.   Neurological: He is alert and oriented to person, place, and time.   Skin: Skin is warm and dry. He is not diaphoretic.   Psychiatric: He has a normal mood and affect.   Nursing note and vitals reviewed.      Neurological Exam:   LOC: alert  Attention Span: Good   Language: Expressive aphasia  Articulation: Dysarthria  Orientation: Person, Place, Time   Visual Fields: Full  EOM (CN III, IV, VI): Full/intact  Pupils (CN II, III): PERRL  Facial Movement (CN VII): Symmetric facial expression    Motor: Arm left  Normal 5/5  Leg left  Normal 5/5  Arm right  Normal 5/5  Leg right Normal 5/5  Cebellar: No evidence of appendicular or axial ataxia  Sensation: Intact to light touch, temperature and vibration      Laboratory:  CMP: No results for input(s): GLUCOSE, CALCIUM, ALBUMIN, PROT, NA, K, CO2, CL, BUN, CREATININE, ALKPHOS, ALT, AST, BILITOT in the last 24 hours.  CBC:   Recent Labs  Lab 06/22/18  2230   HCT 41     Lipid Panel: No results for input(s): CHOL, LDLCALC, HDL, TRIG in the last 168 hours.  Coagulation: No results for input(s): PT, INR, APTT in the last 168 hours.  Hgb A1C: No results for input(s): HGBA1C in the last 168 hours.  TSH: No results for input(s): TSH in the last 168 hours.    Diagnostic Results:      Brain imaging:  CTH 06/22/2018 negative for acute findings.    No MRI 2/2 pacemaker    Vessel Imaging:  CTA MP negative for occlusion or high grade stenosis.    Cardiac Evaluation:   2D Echo 5/3/18:  CONCLUSIONS     1 - Severe left ventricular  enlargement.     2 - Severely depressed left ventricular systolic function (EF 15-20%).     3 - Normal right ventricular systolic function .     4 - The estimated PA systolic pressure is greater than 21 mmHg.     5 - Mild mitral regurgitation.     6 - Trivial to mild tricuspid regurgitation.     7 - Pacemaker/catheter seen in the RA, RV.     8 - Severe left atrial enlargement.     9 - Eccentric hypertrophy.     10 - No wall motion abnormalities.

## 2018-06-23 NOTE — NURSING
Patient in sustained V- tach, Dr Vivar with cardiology notified, patient states that he does not feel faint or dizzy like previous. He does state that he feels tingling to the left cheek. Cb at bedside and aware, will continue to monitor.

## 2018-06-23 NOTE — ASSESSMENT & PLAN NOTE
-Recorded hx of seizure disorder.  Dilantin listed in home meds.  -Dilantin level low, loading with cerebyx  -EEG pending

## 2018-06-23 NOTE — HOSPITAL COURSE
6/23: 24 hour CT pending, echo and eeg pending, dilantin level low-loading with cerebyx  6/26:  Cardiac cath yesterday without intervention.  No new events, but continues to report speech not back to baseline.  EEG negative for seizure. Slowing left parietal region.  Cardiology following for continued wide complex tachycardia/Vtach.  6/28 patient neurologically stable  6.30: complaints of left leg weakness/numbness w pain overnight, now resolved  7/1/18- leg issues resolved. Daughter states patient is much better, but still has some mild issues with finding words.

## 2018-06-23 NOTE — PLAN OF CARE
Problem: Occupational Therapy Goal  Goal: Occupational Therapy Goal  Outcome: Outcome(s) achieved Date Met: 06/23/18    Pt's PLOF was I with ADLs and func mobility.  Pt completed OT evaluation and noted to perform func mobility and ADLs with mod I to I.  Pt will have assistance/supervision as needed once discharged home.  Due to these factors, pt is discharged from OT services.  No DME or post acute OT required at this time.     Karen Waters, OT  6/23/2018

## 2018-06-23 NOTE — ED NOTES
LOC: The patient is awake, alert, and oriented to place, time, situation. Affect is appropriate. Expressive aphasia     APPEARANCE: Patient resting comfortably in no acute distress. Patient is clean and well groomed.     SKIN: The skin is warm and dry; color consistent with ethnicity. Patient has normal skin turgor and moist mucus membranes. Skin intact; no breakdown or bruising noted.      MUSCULOSKELETAL: Patient moving upper and lower extremities without difficulty. Left sided weakness.     RESPIRATORY: Airway is open and patent. Respirations spontaneous, even, easy, and non-labored. Patient has a normal effort and rate. No accessory muscle use noted. Denies cough.       CARDIAC: Normal rhythm and rate noted. No peripheral edema noted. No complaints of chest pain.       ABDOMEN: Soft and non tender to palpation.      NEUROLOGIC: Eyes open spontaneously. Behavior appropriate to situation. Follows commands; facial expression symmetrical. Purposeful motor response noted.

## 2018-06-23 NOTE — PT/OT/SLP EVAL
Occupational Therapy   Evaluation and Discharge Note    Name: Yonathan Good Jr.  MRN: [id number]  Admitting Diagnosis:  Fluency disorder associated with underlying disease      Recommendations:     Discharge Recommendations: home (with family support)  Discharge Equipment Recommendations:  none  Barriers to discharge:  None    History:     Occupational Profile:  Living Environment: Pt lives with wife and 17 year old daughter in mobile home with 2 steps (B rails) to enter.  Has tub/shower combo.  Previous level of function: Independent with ADLs, Retired after pace maker placed in 2012.  Drives and cooks.   Roles and Routines: father and spouse  Equipment Owned:  none  Assistance upon Discharge: 24/7    Past Medical History:   Diagnosis Date    AICD (automatic cardioverter/defibrillator) present     Arthritis     CHF (congestive heart failure)     Coronary artery disease     MI (myocardial infarction)     Sarcoid     Stroke        Past Surgical History:   Procedure Laterality Date    CARDIAC CATHETERIZATION      CARDIAC DEFIBRILLATOR PLACEMENT  7-12    CARDIAC DEFIBRILLATOR PLACEMENT      CORONARY STENT PLACEMENT  07/2011    FRACTURE SURGERY         Subjective     Chief Complaint: pain in back and headache - pain medication given - also concerned of expressive aphasia  Patient/Family stated goals: improve verbal communication  Communicated with: nurse prior to session.  Pain/Comfort:  · Pain Rating 1: 5/10  · Location - Orientation 1: lower  · Location 1: back  · Pain Addressed 1: Pre-medicate for activity, Distraction  · Pain Rating Post-Intervention 1: 5/10    Patients cultural, spiritual, Yazdanism conflicts given the current situation:      Objective:     Patient found with: telemetry    General Precautions: Standard, fall   Orthopedic Precautions:N/A   Braces: N/A     Occupational Performance:    Bed Mobility:    · Patient completed Rolling/Turning to Left with  modified independence  · Patient  completed Rolling/Turning to Right with modified independence  · Patient completed Supine to Sit with modified independence    Functional Mobility/Transfers:  · Patient completed Sit <> Stand Transfer with modified independence  with  no assistive device   · Patient completed Bed <> Chair Transfer using Stand Pivot technique with stand by assistance with no assistive device  · Patient completed Toilet Transfer Stand Pivot technique with stand by assistance with  no AD  · Functional Mobility: pt able to walk in room from bed <-> bathroom with SBA only without AD.    Activities of Daily Living:  · Feeding:  modified independence with breakfast  · Grooming: supervision stand at sink to wash hands  · UB Dressing: modified independence [facility] gown sitting EOB  · LB Dressing: supervision loren/doff socks  · Toileting: supervision with all steps    Cognitive/Visual Perceptual:  Cognitive/Psychosocial Skills:  -       Oriented to: Person, Place, Time and Situation   -       Follows Commands/attention:Follows one-step commands  -       Communication: expressive aphasia - per daughter's report, improved since yesterday (spoke to daughter on telephone)  -       Memory: No Deficits noted  -       Safety awareness/insight to disability: intact   -       Mood/Affect/Coping skills/emotional control: Appropriate to situation    Physical Exam:  Balance: -       sitting and standing - WFL  Postural examination/scapula alignment: -       Rounded shoulders  Skin integrity: Visible skin intact  Edema:  None noted  Sensation:    -       Impaired  light/touch L hand fingers and medial elbow on L - states it's neuropathy (chronic)  Dominant hand: -       right  Upper Extremity Range of Motion:   WFL  Upper Extremity Strength:  WFL   Strength:  WFL  Fine Motor Coordination: -       Intact  Gross motor coordination: WFL    Patient left up in chair with call button in reach and PT present    AMPA 6 Click:  AMPAC Total Score:  "22    Treatment & Education:  · Pt completed ADLs and func mobility activities for tx session as noted above  · Whiteboard updated  · Pt educated on role of OT and POC    Education:    Assessment:     Yonathan Good Jr. is a 57 y.o. male with a medical diagnosis of Fluency disorder associated with underlying disease. At this time, patient is functioning at their prior level of function and does not require further acute OT services.     Clinical Decision Makin.  OT Low:  "Pt evaluation falls under low complexity for evaluation coding due to performance deficits noted in 1-3 areas as stated above and 0 co-morbities affecting current functional status. Data obtained from problem focused assessments. No modifications or assistance was required for completion of evaluation. Only brief occupational profile and history review completed."     Plan:     During this hospitalization, patient does not require further acute OT services.  Please re-consult if situation changes.    · Plan of Care Reviewed with: patient    This Plan of care has been discussed with the patient who was involved in its development and understands and is in agreement with the identified goals and treatment plan    GOALS:    Occupational Therapy Goals     Not on file          Multidisciplinary Problems (Resolved)        Problem: Occupational Therapy Goal    Goal Priority Disciplines Outcome Interventions   Occupational Therapy Goal   (Resolved)     OT, PT/OT Outcome(s) achieved                    Time Tracking:     OT Date of Treatment: 18  OT Start Time: 0832  OT Stop Time: 0850  OT Total Time (min): 18 min    Billable Minutes:Evaluation 18    Kraen Waters OT  2018    "

## 2018-06-23 NOTE — ED PROVIDER NOTES
Encounter Date: 6/22/2018       History     Chief Complaint   Patient presents with    [place] T[place]: CVA     transfer from [place] for neuro consult, pt has expressive aphasia onset of symptoms at 10am, no other neuro deficits noted, no TPA was given due to pt being outside of window     HPI     Yonathan Good is a 56 yo M with PMH of CHF, afib noncompliant with coumadin, CAD, MI, sarcoid, CVA presenting with expressive aphasia since 10 AM today which initially improved then worsened again. The patient also reports some subjective left sided weakness. The patient denies N/V/D, CP, SOB. The patient was told to stop coumadin 5 days PTA due to colonoscopy and did not restart after the colonoscopy was cancelled. The patient denies numbness.     Review of patient's allergies indicates:  No Known Allergies  Past Medical History:   Diagnosis Date    AICD (automatic cardioverter/defibrillator) present     Arthritis     CHF (congestive heart failure)     Coronary artery disease     MI (myocardial infarction)     Sarcoid     Stroke      Past Surgical History:   Procedure Laterality Date    CARDIAC CATHETERIZATION      CARDIAC DEFIBRILLATOR PLACEMENT  7-12    CARDIAC DEFIBRILLATOR PLACEMENT      CORONARY STENT PLACEMENT  07/2011    FRACTURE SURGERY       Family History   Problem Relation Age of Onset    Hypertension Mother     Heart disease Mother     Hypertension Father     Heart disease Father     Coronary artery disease Father     Hypertension Sister     Heart disease Sister     Stroke Sister     Vision loss Sister     Kidney disease Sister     Coronary artery disease Sister     Coronary artery disease Sister     Cancer Maternal Grandmother      Social History   Substance Use Topics    Smoking status: Never Smoker    Smokeless tobacco: Never Used    Alcohol use No     Review of Systems   Constitutional: Negative for fever.   HENT: Negative for sore throat.    Respiratory: Negative for  shortness of breath.    Cardiovascular: Negative for chest pain.   Gastrointestinal: Negative for nausea.   Genitourinary: Negative for dysuria.   Musculoskeletal: Negative for back pain.   Skin: Negative for rash.   Neurological: Positive for speech difficulty and weakness.   Hematological: Does not bruise/bleed easily.   All other systems reviewed and are negative.      Physical Exam     Initial Vitals [06/22/18 2204]   BP Pulse Resp Temp SpO2   127/89 79 17 96.9 °F (36.1 °C) 97 %      MAP       --         Physical Exam    Nursing note and vitals reviewed.  Constitutional: He appears well-developed and well-nourished.   Patient is lying in bed supine in NAD, some mild expressive aphasia, can not say glasses but can identify watch   HENT:   Head: Normocephalic and atraumatic.   Mouth/Throat: No oropharyngeal exudate.   Eyes: EOM are normal. Pupils are equal, round, and reactive to light. No scleral icterus.   Neck: Normal range of motion. Neck supple. No JVD present.   Cardiovascular: Normal rate, regular rhythm and normal heart sounds. Exam reveals no gallop and no friction rub.    No murmur heard.  Pulmonary/Chest: Breath sounds normal. No respiratory distress. He has no wheezes. He has no rhonchi. He has no rales. He exhibits no tenderness.   Abdominal: Soft. Bowel sounds are normal. He exhibits no distension and no mass. There is no tenderness. There is no rebound and no guarding.   Musculoskeletal: Normal range of motion. He exhibits no edema.   Neurological: He is oriented to person, place, and time. He has normal strength and normal reflexes. No cranial nerve deficit.   Mild expressive aphasia, RUE, LUE extinction   Skin: Capillary refill takes less than 2 seconds.         ED Course   Procedures  Labs Reviewed   PHENYTOIN LEVEL, TOTAL - Abnormal; Notable for the following:        Result Value    Phenytoin Lvl 2.0 (*)     All other components within normal limits    Narrative:     Fasting   COMPREHENSIVE  METABOLIC PANEL - Abnormal; Notable for the following:     CO2 22 (*)     All other components within normal limits    Narrative:     Fasting   LIPID PANEL - Abnormal; Notable for the following:     Cholesterol 250 (*)     HDL 83 (*)     All other components within normal limits    Narrative:     Fasting   HEMOGLOBIN A1C - Abnormal; Notable for the following:     Hemoglobin A1C 6.5 (*)     Estimated Avg Glucose 140 (*)     All other components within normal limits    Narrative:     Fasting   CBC W/ AUTO DIFFERENTIAL - Abnormal; Notable for the following:     RBC 4.52 (*)     Hemoglobin 13.5 (*)     MCHC 31.5 (*)     RDW 14.8 (*)     Immature Granulocytes 0.7 (*)     Immature Grans (Abs) 0.06 (*)     All other components within normal limits    Narrative:     Fasting   PROTIME-INR - Abnormal; Notable for the following:     Prothrombin Time 15.3 (*)     INR 1.5 (*)     All other components within normal limits    Narrative:     Fasting   B-TYPE NATRIURETIC PEPTIDE - Abnormal; Notable for the following:     BNP 1,135 (*)     All other components within normal limits    Narrative:     Fasting   MAGNESIUM    Narrative:     Fasting   PHOSPHORUS    Narrative:     Fasting   TSH    Narrative:     Fasting   TROPONIN I    Narrative:     Fasting   CK-MB    Narrative:     Fasting   APTT    Narrative:     Fasting   DRUG SCREEN PANEL, URINE EMERGENCY    Narrative:     If patient is unable to provide a clean catch specimen due to  impairments such as mobility or cognition, obtain straight  cath specimen.   URINALYSIS, REFLEX TO URINE CULTURE    Narrative:     Preferred Collection Type->Urine, Clean Catch   COMPREHENSIVE METABOLIC PANEL   CBC W/ AUTO DIFFERENTIAL   ISTAT PROCEDURE   ISTAT CREATININE   ISTAT PROCEDURE     EKG Readings: (Independently Interpreted)   Initial Reading: No STEMI. Rhythm: Paced Rhythm. Heart Rate: 77. ST Segments: Normal ST Segments.       Imaging Results          CTA STROKE MULTI-PHASE (Final result)   Result time 06/22/18 23:27:31    Final result by Niko Dejesus MD (06/22/18 23:27:31)                 Impression:      CT head:    No evidence of acute intracranial pathology.    Left parietal lobe encephalomalacia.    CTA head and neck:    No evidence of large vessel occlusion, significant stenoses, or aneurysm.  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  Additional incidental findings as above.    Electronically signed by resident: Anabel Jain  Date:    06/22/2018  Time:    23:04    Electronically signed by: Niko Dejesus MD  Date:    06/22/2018  Time:    23:27             Narrative:    EXAMINATION:  CTA STROKE MULTI-PHASE    CLINICAL HISTORY:  Stroke;    TECHNIQUE:  Non contrast low dose axial images were obtained thought the head. CT angiogram was performed from the level of the fredrick to the top of the head following the IV administration of 100mL of Omnipaque 350.   Sagittal and coronal reconstructions and maximum intensity projection reconstructions were performed. Arterial stenosis percentages are based on NASCET measurement criteria.2 additional phases of immediate post-contrast CT images were performed through the head alone.    COMPARISON:  None    FINDINGS:  CT head:    The ventricles are normal in size without evidence for hydrocephalus.  There is left parietal lobe encephalomalacia.  No evidence of new major vascular distribution infarct, hemorrhage, edema, or parenchymal mass.  Mild chronic microvascular ischemic changes noted.  No extra-axial blood or fluid collections.  The cranium is intact.  Probable small polyp or mucous retention cyst noted in the left maxillary sinus.  The paranasal sinuses and mastoid air cells are otherwise clear.    CTA head and neck:    The aortic arch maintains a normal branching pattern. The common and internal carotid arteries are normal in course and caliber.  Mild atherosclerotic calcifications noted at the  carotid bifurcations and proximal ICAs.  Mild atherosclerotic calcifications of the cavernous carotids are also noted.    The vertebral origins are patent. The cervical vertebral arteries are normal in course and hypoplastic.  Vertebrobasilar system is within hypoplastic but patent without focal abnormality.  Bilateral hypoplastic P1 segments with fetal origin of bilateral posterior cerebral cyst.    The anterior, middle, and posterior cerebral arteries are within normal limits, without evidence of significant stenosis, focal occlusion, or intracranial aneurysm formation.  Hypoplastic right A1 segment.    Soft tissues:    The soft tissue structures of the base of the neck demonstrate no significant abnormalities.  The thyroid, submandibular, and parotid glands appear normal.  The airway is patent.  The lung apices demonstrate mild atelectatic changes.  There are mild degenerative changes of the cervical spine.                                 Medical Decision Making:   Initial Assessment:   Yonathan Good is a 56 yo M with PMH of CHF, afib noncompliant with coumadin, CAD, MI, sarcoid, CVA presenting with expressive aphasia since 10 AM today which initially improved then worsened again.   Differential Diagnosis:   CVA, TIA, sepsis, pankaj's paralysis, intracranial hemorrhage, tox  ED Management:  Patient history inconsistent with tox at this time. CVA likely, CT angio ordered by vascular neurology. Stroke code called. No ICH out OSH CT. No hx of seizure-like activity making Pankaj's paralysis less likely at this time. Dispo per vascular neurology.    CTA H/N unremarkable for large vessel occlusion. Patient admitted to vascular neurology.              Attending Attestation:   Physician Attestation Statement for Resident:  As the supervising MD   Physician Attestation Statement: I have personally seen and examined this patient.   I agree with the above history. -:   As the supervising MD I agree with the above PE.    As the  supervising MD I agree with the above treatment, course, plan, and disposition.   -: This is a 57-year-old male who is transfer with expressive aphasia.  Has been seen by vascular neurology.  They're requesting admission.  As a result patient will be admitted.  Please see all admission notes for further.                       Clinical Impression:   The primary encounter diagnosis was Expressive aphasia. A diagnosis of Fluency disorder associated with underlying disease was also pertinent to this visit.                             Ruben Klein MD  Resident  06/23/18 0007       Rafa Uribe MD  06/23/18 0156

## 2018-06-23 NOTE — PT/OT/SLP EVAL
Physical Therapy Evaluation and Discharge Note    Patient Name:  Yonathan Good Jr.   MRN:  [id number]    Recommendations:     Discharge Recommendations:  outpatient PT   Discharge Equipment Recommendations: none   Barriers to discharge: None    Assessment:     Yonathan Good Jr. is a 57 y.o. male admitted with a medical diagnosis of Fluency disorder associated with underlying disease. .  At this time, patient is functioning at their prior level of function and does not require further acute PT services. Patient is at his baseline for mobility and has no acute PT concerns. Patient may benefit from outpatient PT followup to address his back pain    Recent Surgery: * No surgery found *      Plan:     During this hospitalization, patient does not require further acute PT services.  Please re-consult if situation changes.     Plan of Care Reviewed with: patient    Subjective     Communicated with nurse prior to session.  Patient found sitting in bedside chair upon PT entry to room, agreeable to evaluation.      Chief Complaint: low back pain, dysarthria  Patient comments/goals: return to plof  Pain/Comfort:  Pain Rating 1: 5/10  Location 1: back  Pain Addressed 1: Distraction, Cessation of Activity    Patients cultural, spiritual, Congregation conflicts given the current situation: none stated    Living Environment:  Patient lives w/ wife and 16y/o daughter in a trailer, 2STE w/ BHR; tub shower combo. Patient drives  Prior to admission, patients level of function was Independent.  Patient has the following equipment: none.   Upon discharge, patient will have assistance from family.    Objective:     Patient found with: telemetry     General Precautions: Standard, fall (dysarthria)   Orthopedic Precautions:N/A   Braces: N/A     Exams:  · Cognitive Exam:  Patient is oriented to Person, Place, Time and Situation and follows 100% of all commands   · Fine Motor Coordination:    · -       Intact  · Gross Motor Coordination:   WFL  · Postural Exam:  Patient presented with the following abnormalities: -       Rounded shoulders  · Sensation:    · -       Intact  · RLE ROM: WFL  · RLE Strength: WFL  · LLE ROM: WFL  · LLE Strength: WFL  · BLE Grossly 4/5    Functional Mobility:  · Transfers:  Sit to Stand:  supervision with no AD  · Gait: 220' SBA, no AD; No balance deficits noted  · Stairs:  Pt ascended/descended 2 stair(s) with No Assistive Device with one handrail with Supervision or Set-up Assistance and Stand-by Assistance.     AM-PAC 6 CLICK MOBILITY  Total Score:24       Therapeutic Activities and Exercises:   PT Eval completed  Patient educated on self mobility and activity in the hospital    Patient left up in chair with all lines intact, call button in reach and nurse notified.    GOALS:    Physical Therapy Goals     Not on file          Multidisciplinary Problems (Resolved)        Problem: Physical Therapy Goal    Goal Priority Disciplines Outcome Goal Variances Interventions   Physical Therapy Goal   (Resolved)     PT/OT, PT Outcome(s) achieved                     History:     Past Medical History:   Diagnosis Date    AICD (automatic cardioverter/defibrillator) present     Arthritis     CHF (congestive heart failure)     Coronary artery disease     MI (myocardial infarction)     Sarcoid     Stroke        Past Surgical History:   Procedure Laterality Date    CARDIAC CATHETERIZATION      CARDIAC DEFIBRILLATOR PLACEMENT  7-12    CARDIAC DEFIBRILLATOR PLACEMENT      CORONARY STENT PLACEMENT  07/2011    FRACTURE SURGERY         Clinical Decision Making:     History  Co-morbidities and personal factors that may impact the plan of care Examination  Body Structures and Functions, activity limitations and participation restrictions that may impact the plan of care Clinical Presentation   Decision Making/ Complexity Score   Co-morbidities:   [] Time since onset of injury / illness / exacerbation  [] Status of current  condition  []Patient's cognitive status and safety concerns    [] Multiple Medical Problems (see med hx)  Personal Factors:   [] Patient's age  [] Prior Level of function   [] Patient's home situation (environment and family support)  [] Patient's level of motivation  [] Expected progression of patient      HISTORY:(criteria)    [] 53362 - no personal factors/history    [] 28802 - has 1-2 personal factor/comorbidity     [] 25113 - has >3 personal factor/comorbidity     Body Regions:  [] Objective examination findings  [] Head     []  Neck  [] Trunk   [] Upper Extremity  [] Lower Extremity    Body Systems:  [x] For communication ability, affect, cognition, language, and learning style: the assessment of the ability to make needs known, consciousness, orientation (person, place, and time), expected emotional /behavioral responses, and learning preferences (eg, learning barriers, education  needs)  [] For the neuromuscular system: a general assessment of gross coordinated movement (eg, balance, gait, locomotion, transfers, and transitions) and motor function  (motor control and motor learning)  [] For the musculoskeletal system: the assessment of gross symmetry, gross range of motion, gross strength, height, and weight  [] For the integumentary system: the assessment of pliability(texture), presence of scar formation, skin color, and skin integrity  [] For cardiovascular/pulmonary system: the assessment of heart rate, respiratory rate, blood pressure, and edema     Activity limitations:    [] Patient's cognitive status and saf ety concerns          [] Status of current condition      [] Weight bearing restriction  [] Cardiopulmunary Restriction    Participation Restrictions:   [] Goals and goal agreement with the patient     [] Rehab potential (prognosis) and probable outcome      Examination of Body System: (criteria)    [] 63033 - addressing 1-2 elements    [] 97569 - addressing a total of 3 or more elements     []  88423 -  Addressing a total of 4 or more elements         Clinical Presentation: (criteria)  Stable - 11306     On examination of body system using standardized tests and measures patient presents with 1-2 elements from any of the following: body structures and functions, activity limitations, and/or participation restrictions.  Leading to a clinical presentation that is considered stable and/or uncomplicated                              Clinical Decision Making  (Eval Complexity):  Low- 56057     Time Tracking:     PT Received On: 06/23/18  PT Start Time: 0850     PT Stop Time: 0901  PT Total Time (min): 11 min     Billable Minutes: Evaluation 11 Min      Dustin Goyal, PT  06/23/2018

## 2018-06-23 NOTE — ASSESSMENT & PLAN NOTE
-Stroke risk factor.  S/p LAD stent 6/2011.  Patient reports having CP requiring NTG a few days ago.    -Telemetry  -Troponin pending

## 2018-06-23 NOTE — ED TRIAGE NOTES
Pt arrived to ED transfer for stroke evaluation. Pt reports being at home when he suddenly was unable to speak was he wanted. Pt reports right sided facial droop, left sided weakness. Pt unable to identify touch to right side with bilateral touching.

## 2018-06-23 NOTE — CARE UPDATE
Rapid Response Nurse Note     Rapid Response Metrics:     Admit Date: 2018  LOS: 0  Code Status: Full Code   Date of Consult: 2018  : 1960  Age: 57 y.o.  Weight:   Wt Readings from Last 1 Encounters:   18 104 kg (229 lb 4.5 oz)     Sex: male  Bed: [facility] A:   MRN: [id number]  Time Rapid Response Team page Received: 1301  Time Rapid Response Team at Bedside: 1305  Time Rapid Response Team left Bedside:   Was the patient discharged from an ICU this admission? no  Was the patient discharged from a PACU within last 24 hours?no  Did the patient receive conscious sedation/general anesthesia within last 24 hours? no  Was the patient in the ED within the past 24 hours? no  Was the patient started on NIPPV within the past 24 hours? no  Did this progress into an ARC or CPA:    Attending Physician: Roxy Bernardo MD  Primary Service: Networked reference to record PCT   Consult Requested By: Roxy Bernardo MD   Rapid Response Indication(s): SVT      SITUATION:     Reason for Call:   Called to evaluate the patient for Circulatory    BACKGROUND:     Why is the patient in the hospital?: stroke  What changed?: SVT    ASSESSMENT:     What did you find: Rapid Response called, patient in SVT.  Upon arrival to room, requested a bedside cardiac monitor and 12 lead.  BP stable 115/78, o2 saturation 98% and HR per the pulse ox 105.  Patient with c/o chest pain and diaphoretic.  Patient's shirt removed and bedside monitor applied.  Patient in V-tach.  Code called per nursing supervisor.  Code cart in room and pads applied to patient.  Patient did not lose a pulse and continued to talk throughout the V-tach episode.  He stated he had an AICD.  BP would not register, CCS at bedside and patient shocked with 150j.  Amiodarone administered and drip started.  Patient's rhythm SB, then  back into V-tach. Patient was then was shocked by his AICD.  SBP post shock in the 130's, O2 saturation 98%, HR in the 90's.   Patient transferred to [facility].    RECOMMENDATIONS:     We recommend: transfer    FOLLOW-UP/CONTINGENCY PLAN:       Call the rapid response Nurse at x [phone] for additional questions or concerns.      PHYSICIAN ESCALATION:     Orders received and case discussed with  CCS    Disposition: [facility]

## 2018-06-23 NOTE — ASSESSMENT & PLAN NOTE
-Stroke risk factor.  Last 2D Echo 5/3/18.  EF 15-20%, Severe LAE.  -Continue home meds  -BNP pending

## 2018-06-23 NOTE — PROGRESS NOTES
[facility]  Vascular Neurology  Comprehensive Stroke Center  Progress Note    Assessment/Plan:     * Fluency disorder associated with underlying disease    57 y.o. male with significant past medical history of prior Left sided stroke w/o residual deficits, CHF, CAD, seizure DO, sarcoidosis of the lung, s/p pacemaker placement, ?A fib-on Coumadin presented to hospital as a transfer from [facility] for evaluation of aphasia. No tPA.  No LVO on CTA MP.    Etiology unclear at this time, infarct vs TIA vs seizure    Antithrombotics for secondary stroke prevention: Anticoagulants: Warfarin INR adjusted target    Statins for secondary stroke prevention and hyperlipidemia, if present:   Statins: Atorvastatin- 40 mg daily    Aggressive risk factor modification: Obesity, A-Fib, CAD, prior stroke     Rehab efforts: PT/OT/SLP to evaluate and treat    Diagnostics ordered/pending: CT scan of head without contrast to asses brain parenchyma, TTE to assess cardiac function/status , Other: EEG    VTE prophylaxis: None: Reason for No Pharmacological VTE Prophylaxis: Currently on anticoagulation, Mechanical prophylaxis: Place SCDs    BP parameters: SBP<220            CHF (congestive heart failure)    -Stroke risk factor.  Last 2D Echo 5/3/18.  EF 15-20%, Severe LAE.  -Continue home meds  -BNP 1135        Seizure disorder    -Recorded hx of seizure disorder.  Dilantin listed in home meds.  -Dilantin level low, loading with cerebyx  -EEG pending        CAD (coronary artery disease)    -Stroke risk factor.  S/p LAD stent 6/2011.  Patient reports having CP requiring NTG a few days ago.    -Telemetry  -Troponin pending        Sarcoidosis of lung    -Continue home meds.  -Oxygen prn  -Duonebs prn        Biventricular cardiac pacemaker in situ    -Hx of pacemaker placement.  Unable to obtain MRI.             6/23: 24 hour CT pending, echo and eeg pending, dilantin level low-loading with  cerebyx    STROKE DOCUMENTATION   Acute Stroke Times   Last Known Normal Date: 06/22/18  Last Known Normal Time: 1000  Symptom Onset Date: 06/22/18  Symptom Onset Time: 1000  Stroke Team Called Date: 06/22/18  Stroke Team Called Time:  (Problems with the tele-stroke connection)  Stroke Team Arrival Date: 06/22/18  CT Interpretation Time: 1649    NIH Scale:  1a. Level Of Consciousness: 0-->Alert: keenly responsive  1b. LOC Questions: 0-->Answers both questions correctly  1c. LOC Commands: 0-->Performs both tasks correctly  2. Best Gaze: 0-->Normal  3. Visual: 0-->No visual loss  4. Facial Palsy: 0-->Normal symmetrical movements  5a. Motor Arm, Left: 0-->No drift: limb holds 90 (or 45) degrees for full 10 secs  5b. Motor Arm, Right: 0-->No drift: limb holds 90 (or 45) degrees for full 10 secs  6a. Motor Leg, Left: 0-->No drift: leg holds 30 degree position for full 5 secs  6b. Motor Leg, Right: 0-->No drift: leg holds 30 degree position for full 5 secs  7. Limb Ataxia: 0-->Absent  8. Sensory: 0-->Normal: no sensory loss  9. Best Language: 1-->Mild-to-moderate aphasia: some obvious loss of fluency or facility of comprehension, without significant limitation on ideas expressed or form of expression. Reduction of speech and/or comprehension, however, makes conversation. . . (see row details)  10. Dysarthria: 0-->Normal  11. Extinction and Inattention (formerly Neglect): 0-->No abnormality  Total (NIH Stroke Scale): 1       Modified [place] Score: 0  [place] Coma Scale:    ABCD2 Score:    [id number] Score:3  HAS -BLED Score:2  ICH Score:   Hunt & Lemus Classification:      Hemorrhagic change of an Ischemic Stroke: Does this patient have an ischemic stroke with hemorrhagic changes? No     Neurologic Chief Complaint: aphasia    Subjective:     Interval History: Patient is seen for follow-up neurological assessment and treatment recommendations: NAEON, no new complaints    HPI, Past Medical, Family, and Social History  remains the same as documented in the initial encounter.     Review of Systems   Constitutional: Negative for chills and fever.   Respiratory: Negative for cough.    Gastrointestinal: Negative for vomiting.   Neurological: Positive for speech difficulty. Negative for facial asymmetry, weakness and numbness.     Scheduled Meds:   atorvastatin  40 mg Oral Daily    colchicine  0.6 mg Oral Daily    fluticasone-vilanterol  1 puff Inhalation Daily    fosphenytoin (CEREBYX) infusion  800 mg PE Intravenous Once    furosemide  80 mg Oral BID    gabapentin  600 mg Oral TID    isosorbide mononitrate  90 mg Oral Daily    pantoprazole  40 mg Oral Daily    [START ON 6/24/2018] phenytoin  200 mg Oral QHS    potassium chloride SA  20 mEq Oral QHS    potassium chloride SA  40 mEq Oral Daily    predniSONE  10 mg Oral Every Tues, Thurs, Sat    [START ON 6/25/2018] predniSONE  20 mg Oral Every Mon, Wed, Fri    [START ON 6/24/2018] predniSONE  20 mg Oral Every Sun    sodium chloride 0.9%  3 mL Intravenous Q8H    spironolactone  25 mg Oral Daily    warfarin  7.5 mg Oral Daily     Continuous Infusions:   sodium chloride 0.9%       PRN Meds:butalbital-acetaminophen-caffeine -40 mg, labetalol, ondansetron, ondansetron, sodium chloride 0.9%    Objective:     Vital Signs (Most Recent):  Temp: 98.8 °F (37.1 °C) (06/23/18 0813)  Pulse: 75 (06/23/18 0924)  Resp: 18 (06/23/18 0813)  BP: 129/81 (06/23/18 0813)  SpO2: 98 % (06/23/18 0813)  BP Location: Left arm    Vital Signs Range (Last 24H):  Temp:  [96.9 °F (36.1 °C)-98.8 °F (37.1 °C)]   Pulse:  [63-84]   Resp:  [16-34]   BP: (114-129)/(70-89)   SpO2:  [94 %-99 %]   BP Location: Left arm    Physical Exam   Constitutional: He appears well-developed and well-nourished. No distress.   HENT:   Head: Normocephalic and atraumatic.   Eyes: EOM are normal. Pupils are equal, round, and reactive to light.   Cardiovascular: Normal rate.    Pulmonary/Chest: Effort normal.    Musculoskeletal: Normal range of motion.   Neurological: He is alert.   Skin: Skin is warm and dry.   Vitals reviewed.      Neurological Exam:   LOC: alert  Attention Span: Good   Language: Expressive aphasia  Articulation: No dysarthria  Orientation: Person, Place, Time   Visual Fields: Full  EOM (CN III, IV, VI): Full/intact  Pupils (CN II, III): PERRL  Facial Sensation (CN V): Normal  Facial Movement (CN VII): Symmetric facial expression    Motor: Arm left  Normal 5/5  Leg left  Normal 5/5  Arm right  Normal 5/5  Leg right Normal 5/5  Cebellar: No evidence of appendicular or axial ataxia  Sensation: Intact to light touch, temperature and vibration  Tone: Normal tone throughout    Laboratory:  CMP:   Recent Labs  Lab 06/23/18  0018   CALCIUM 9.3   ALBUMIN 3.8   PROT 7.3      K 3.7   CO2 22*      BUN 19   CREATININE 0.9   ALKPHOS 66   ALT 34   AST 19   BILITOT 0.6     CBC:   Recent Labs  Lab 06/23/18  0018   WBC 8.81   RBC 4.52*   HGB 13.5*   HCT 42.9      MCV 95   MCH 29.9   MCHC 31.5*     Lipid Panel:   Recent Labs  Lab 06/23/18  0018   CHOL 250*   LDLCALC 150.0   HDL 83*   TRIG 85     Coagulation:   Recent Labs  Lab 06/23/18  0018   INR 1.5*   APTT 22.6     Platelet Aggregation Study: No results for input(s): PLTAGG, PLTAGINTERP, PLTAGREGLACO, ADPPLTAGGREG in the last 168 hours.  Hgb A1C:   Recent Labs  Lab 06/23/18  0018   HGBA1C 6.5*     TSH:   Recent Labs  Lab 06/23/18  0018   TSH 0.706       Diagnostic Results       Vessel Imaging   CTA Multiphase. Date: 06/22/18  CT head:    No evidence of acute intracranial pathology.    Left parietal lobe encephalomalacia.    CTA head and neck:    No evidence of large vessel occlusion, significant stenoses, or aneurysm.  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  Additional incidental findings as above.    Cardiac Imaging   Echo pending            Teagan Hennessy PA-C  Comprehensive Stroke  [place]  Department of Vascular Neurology   [facility]

## 2018-06-23 NOTE — SUBJECTIVE & OBJECTIVE
Neurologic Chief Complaint: aphasia    Subjective:     Interval History: Patient is seen for follow-up neurological assessment and treatment recommendations: NAEON, no new complaints    HPI, Past Medical, Family, and Social History remains the same as documented in the initial encounter.     Review of Systems   Constitutional: Negative for chills and fever.   Respiratory: Negative for cough.    Gastrointestinal: Negative for vomiting.   Neurological: Positive for speech difficulty. Negative for facial asymmetry, weakness and numbness.     Scheduled Meds:   atorvastatin  40 mg Oral Daily    colchicine  0.6 mg Oral Daily    fluticasone-vilanterol  1 puff Inhalation Daily    fosphenytoin (CEREBYX) infusion  800 mg PE Intravenous Once    furosemide  80 mg Oral BID    gabapentin  600 mg Oral TID    isosorbide mononitrate  90 mg Oral Daily    pantoprazole  40 mg Oral Daily    [START ON 6/24/2018] phenytoin  200 mg Oral QHS    potassium chloride SA  20 mEq Oral QHS    potassium chloride SA  40 mEq Oral Daily    predniSONE  10 mg Oral Every Tues, Thurs, Sat    [START ON 6/25/2018] predniSONE  20 mg Oral Every Mon, Wed, Fri    [START ON 6/24/2018] predniSONE  20 mg Oral Every Sun    sodium chloride 0.9%  3 mL Intravenous Q8H    spironolactone  25 mg Oral Daily    warfarin  7.5 mg Oral Daily     Continuous Infusions:   sodium chloride 0.9%       PRN Meds:butalbital-acetaminophen-caffeine -40 mg, labetalol, ondansetron, ondansetron, sodium chloride 0.9%    Objective:     Vital Signs (Most Recent):  Temp: 98.8 °F (37.1 °C) (06/23/18 0813)  Pulse: 75 (06/23/18 0924)  Resp: 18 (06/23/18 0813)  BP: 129/81 (06/23/18 0813)  SpO2: 98 % (06/23/18 0813)  BP Location: Left arm    Vital Signs Range (Last 24H):  Temp:  [96.9 °F (36.1 °C)-98.8 °F (37.1 °C)]   Pulse:  [63-84]   Resp:  [16-34]   BP: (114-129)/(70-89)   SpO2:  [94 %-99 %]   BP Location: Left arm    Physical Exam   Constitutional: He appears  well-developed and well-nourished. No distress.   HENT:   Head: Normocephalic and atraumatic.   Eyes: EOM are normal. Pupils are equal, round, and reactive to light.   Cardiovascular: Normal rate.    Pulmonary/Chest: Effort normal.   Musculoskeletal: Normal range of motion.   Neurological: He is alert.   Skin: Skin is warm and dry.   Vitals reviewed.      Neurological Exam:   LOC: alert  Attention Span: Good   Language: Expressive aphasia  Articulation: No dysarthria  Orientation: Person, Place, Time   Visual Fields: Full  EOM (CN III, IV, VI): Full/intact  Pupils (CN II, III): PERRL  Facial Sensation (CN V): Normal  Facial Movement (CN VII): Symmetric facial expression    Motor: Arm left  Normal 5/5  Leg left  Normal 5/5  Arm right  Normal 5/5  Leg right Normal 5/5  Cebellar: No evidence of appendicular or axial ataxia  Sensation: Intact to light touch, temperature and vibration  Tone: Normal tone throughout    Laboratory:  CMP:   Recent Labs  Lab 06/23/18  0018   CALCIUM 9.3   ALBUMIN 3.8   PROT 7.3      K 3.7   CO2 22*      BUN 19   CREATININE 0.9   ALKPHOS 66   ALT 34   AST 19   BILITOT 0.6     CBC:   Recent Labs  Lab 06/23/18  0018   WBC 8.81   RBC 4.52*   HGB 13.5*   HCT 42.9      MCV 95   MCH 29.9   MCHC 31.5*     Lipid Panel:   Recent Labs  Lab 06/23/18  0018   CHOL 250*   LDLCALC 150.0   HDL 83*   TRIG 85     Coagulation:   Recent Labs  Lab 06/23/18  0018   INR 1.5*   APTT 22.6     Platelet Aggregation Study: No results for input(s): PLTAGG, PLTAGINTERP, PLTAGREGLACO, ADPPLTAGGREG in the last 168 hours.  Hgb A1C:   Recent Labs  Lab 06/23/18  0018   HGBA1C 6.5*     TSH:   Recent Labs  Lab 06/23/18  0018   TSH 0.706       Diagnostic Results       Vessel Imaging   CTA Multiphase. Date: 06/22/18  CT head:    No evidence of acute intracranial pathology.    Left parietal lobe encephalomalacia.    CTA head and neck:    No evidence of large vessel occlusion, significant stenoses, or aneurysm.   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  Additional incidental findings as above.    Cardiac Imaging   Echo pending

## 2018-06-24 PROBLEM — I47.20 VT (VENTRICULAR TACHYCARDIA): Status: ACTIVE | Noted: 2018-01-01

## 2018-06-24 NOTE — ASSESSMENT & PLAN NOTE
-s/p LAD stent 2011  -complained of chest pain prior to VT episode  -Repeat troponin 2 which could be due to VT/Shock  -will reat as ACS for now  -needs ischemic eval per EP

## 2018-06-24 NOTE — SUBJECTIVE & OBJECTIVE
Past Medical History:   Diagnosis Date    AICD (automatic cardioverter/defibrillator) present     Arthritis     CHF (congestive heart failure)     Coronary artery disease     MI (myocardial infarction)     Sarcoid     Stroke        Past Surgical History:   Procedure Laterality Date    CARDIAC CATHETERIZATION      CARDIAC DEFIBRILLATOR PLACEMENT  7-12    CARDIAC DEFIBRILLATOR PLACEMENT      CORONARY STENT PLACEMENT  07/2011    FRACTURE SURGERY         Review of patient's allergies indicates:  No Known Allergies    No current facility-administered medications on file prior to encounter.      Current Outpatient Prescriptions on File Prior to Encounter   Medication Sig    albuterol (PROVENTIL) 2.5 mg /3 mL (0.083 %) nebulizer solution Take 2.5 mg by nebulization every 6 (six) hours as needed.    alprazolam (XANAX) 2 MG tablet Take 2 mg by mouth 2 (two) times daily as needed.     aspirin (ECOTRIN) 81 MG EC tablet Take 81 mg by mouth. 1 Tablet, Delayed Release (E.C.) Oral Every day    bumetanide (BUMEX) 1 MG tablet Take 1 mg by mouth daily as needed.    carisoprodol (SOMA) 350 MG tablet Take 350 mg by mouth 4 (four) times daily as needed for Muscle spasms.    colchicine 0.6 mg tablet 0.6 mg once daily.     furosemide (LASIX) 40 MG tablet TAKE 2 TABLETS BY MOUTH TWICE DAILY    gabapentin (NEURONTIN) 600 MG tablet Take 600 mg by mouth 2 (two) times daily. 1 Tablet Oral At bedtime    hydrocodone-acetaminophen 10-325mg (NORCO)  mg Tab Take 1 tablet by mouth 2 (two) times daily as needed.     isosorbide mononitrate (IMDUR) 30 MG 24 hr tablet Take 3 tablets (90 mg total) by mouth once daily. (Patient taking differently: Take 60 mg by mouth once daily. )    losartan (COZAAR) 50 MG tablet Take 1 tablet (50 mg total) by mouth once daily.    pantoprazole (PROTONIX) 40 MG tablet Take 40 mg by mouth. 1 Tablet, Delayed Release (E.C.) Oral Every day    phenytoin (DILANTIN) 100 MG ER capsule 100 mg 2 (two)  times daily.     potassium chloride SA (K-DUR,KLOR-CON) 20 MEQ tablet TAKE 2 TABLETS BY MOUTH EVERY MORNING AND 1 TABLET BY MOUTH EVERY EVENING    pravastatin (PRAVACHOL) 40 MG tablet Take 40 mg by mouth once daily.     predniSONE (DELTASONE) 10 MG tablet TK 1 T PO BID Tuesday, Thursday, Saturday    predniSONE (DELTASONE) 20 MG tablet Take 1 tablet by mouth 2 (two) times daily. Monday, Wednesday, and Friday, Sunday    sotalol (BETAPACE) 160 MG Tab TK 1 T PO  BID    spironolactone (ALDACTONE) 25 MG tablet TAKE 1 TABLET BY MOUTH EVERY DAY    warfarin (COUMADIN) 7.5 MG tablet 1 tablet Monday  0.5 tablet Tuesday-Sunday    albuterol (VENTOLIN HFA) 90 mcg/actuation inhaler Inhale 2 puffs into the lungs every 4 (four) hours as needed for Wheezing.    fluticasone-vilanterol (BREO ELLIPTA) 200-25 mcg/dose DsDv diskus inhaler Inhale 1 puff into the lungs once daily. Controller    nitroGLYCERIN (NITROSTAT) 0.4 MG SL tablet ONE TABLET UNDER TONGUE AS NEEDED FOR CHEST PAIN    promethazine-codeine 6.25-10 mg/5 ml (PHENERGAN WITH CODEINE) 6.25-10 mg/5 mL syrup TK 5 ML PO  Q 6 H PRN     Family History     Problem Relation (Age of Onset)    Cancer Maternal Grandmother    Coronary artery disease Father, Sister, Sister    Heart disease Mother, Father, Sister    Hypertension Mother, Father, Sister    Kidney disease Sister    Stroke Sister    Vision loss Sister        Social History Main Topics    Smoking status: Never Smoker    Smokeless tobacco: Never Used    Alcohol use No    Drug use: No    Sexual activity: Not on file     Review of Systems   All other systems reviewed and are negative.    Objective:     Vital Signs (Most Recent):  Temp: 98.7 °F (37.1 °C) (06/23/18 2300)  Pulse: 70 (06/23/18 2300)  Resp: 16 (06/23/18 2300)  BP: 105/71 (06/23/18 2300)  SpO2: 100 % (06/23/18 2300) Vital Signs (24h Range):  Temp:  [97.2 °F (36.2 °C)-98.9 °F (37.2 °C)] 98.7 °F (37.1 °C)  Pulse:  [] 70  Resp:  [16-98] 16  SpO2:  [94  %-100 %] 100 %  BP: (105-137)/(66-94) 105/71     Weight: 104 kg (229 lb 4.5 oz)  Body mass index is 31.98 kg/m².    SpO2: 100 %  O2 Device (Oxygen Therapy): nasal cannula    Physical Exam  General: alert, awake and oriented x 3  Eyes:PERRL.   Neck:no JVD   Lungs:  clear to auscultation bilaterally   Cardiovascular: Heart: regular rate and rhythm, S1, S2 normal, no murmur, click, rub or gallop.   Chest Wall: no tenderness.   Pulses-2+ radial, femoral, DP, PT b/l  Extremities: no cyanosis or edema.   Abdomen/Rectal: Abdomen: soft, non-tender non-distented; bowel sounds normal  Neurologic: Normal strength and tone. No focal numbness or weakness  Significant Labs:   CMP:   Recent Labs  Lab 06/23/18  0018 06/23/18  1744    139  139   K 3.7 3.6  3.6  3.6    101  101   CO2 22* 27  27   GLU 95 116*  116*   BUN 19 16  16   CREATININE 0.9 0.9  0.9   CALCIUM 9.3 9.0  9.0   PROT 7.3 6.9   ALBUMIN 3.8 3.5   BILITOT 0.6 0.7   ALKPHOS 66 61   AST 19 23   ALT 34 33   ANIONGAP 16 11  11   ESTGFRAFRICA >60.0 >60.0  >60.0   EGFRNONAA >60.0 >60.0  >60.0   , CBC:   Recent Labs  Lab 06/23/18  0018 06/23/18  1744   WBC 8.81 8.38   HGB 13.5* 14.0   HCT 42.9 42.7    161    and Troponin   Recent Labs  Lab 06/23/18  0018 06/23/18  1334 06/23/18  1744   TROPONINI 0.022 0.036* 2.352*       Significant Imaging:   Echocardiogram:   2D echo with color flow doppler:   Results for orders placed or performed during the hospital encounter of 06/22/18   2D echo with color flow doppler   Result Value Ref Range    EF 8 (A) 55 - 65    Mitral Valve Regurgitation MILD TO MODERATE     Est. PA Systolic Pressure 21.15     Tricuspid Valve Regurgitation TRIVIAL TO MILD     and Ejection fraction:   Recent Labs  Lab 06/23/18  1536   EF 8*

## 2018-06-24 NOTE — PROGRESS NOTES
Pt seen and assessed this am. Doing well without further events on telemetry.   Exam unchanged from H&P. CVP3, SVO2 61; calculated Gretel CO 4.9, CI 2.0, SVR 1322.  Plan as per H&P.  1. VT storm  -Continue amiodarone gtt for loading  -Continue ACS protocol, trend troponin  -VT zone lowered to 150 as per EP  -Interventional consult for [facility] tomorrow  -If recurrence, start IV lidocaine    Srinivas Pelaez MD  Cardiology Fellow PGY-4  Pager: [phone]

## 2018-06-24 NOTE — HPI
57 y.o. male  with h/o ischemic cardiomyopathy(negative for sarcoid on cardiac MRI 2012), H/o stents 7 years ago,Pulmonary sarcoid, left parietal stroke in the past, atrial fibrillation, CRT-D(st Giorgio) who follows Dr Berman and is being worked up as outpatient for advanced options. He was transferred to Stroke service last evening with difficulty speaking with suspected Stroke.Initial workup included CVA which was negative for acute CVA.No tPA was given. This am patient complained of chest pain and dizziness and was noted to be in Monomorphic VT -Code blue was called.He was ATP'ed out of it but he also had an external shock delivered by rapid repsonse team as he was persistently in VT but no shock was delivered.He went into Fast VT with rate>200 and his ICD shocked him out of it. He was transferred to ICU.He reports having chest pain for last 4-5 years on exertion relieved with nitro-reports stress test done in [place].[facility] He was loaded with amiodarone.He had 2 more episodes of VT which were slower at 150-His device did not ATP as below VT-1 zone. He also complains of cough with pinkish sputum,SOB.He reports no more chest pain since the shock.  He was recently scheduled for colonoscopy as part of transplant workup for which his coumadin was held and he was placed on eliquis temporarily.He had chest pain on day of c-scope and he had to take a nitro and his procedure was cancelled(5 days ago). He reports he started taking his coumadin after.His INR was subtherapeutic on arrival.     He states that his pulmonary sarcoid was diagnosed in 2015 when he was evaluated for a dry non-productive cough.  States that he underwent bronchoscopy with biopsies which confirmed sarcoid.  Since then, he has been on varying doses of Prednisone He has no other organ involvement from sarcoid and states that his only symptom has been cough. Denies any alcohol or illicit drug use.  Previously worked as  a .

## 2018-06-24 NOTE — ASSESSMENT & PLAN NOTE
-Monomorphic VT  -Needs ischemic evaluation  -Keep K>4, mG >2  -Continue amiodarone  -BB  -If recurrence on above regimen will start lidocaine  -Device setting adjusted after slower VT -VT zone 1 decreased from 180 to 150  -Please see ICD programming for details

## 2018-06-24 NOTE — HPI
57 y.o. male with h/o ischemic cardiomyopathy(negative for sarcoid on cardiac MRI 2012), H/o stents 7 years ago,Pulmonary sarcoid, left parietal stroke in the past, atrial fibrillation, CRT-D(st [place]iorgio) who follows Dr Berman and is being worked up as outpatient for advanced options. He was transferred to Stroke service last evening with difficulty speaking with suspected Stroke.Initial workup included CVA which was negative for acute CVA.No tPA was given. This am patient complained of chest pain and dizziness and was noted to be in Monomorphic VT -Code blue was called.He was ATP'ed out of it but he also had an external shock delivered by rapid repsonse team as he was persistently in VT but no shock was delivered.He went into Fast VT with rate>200 and his ICD shocked him out of it. He was transferred to ICU.He reports having chest pain for last 4-5 years on exertion relieved with nitro-reports stress test done in [place].[facility] He was loaded with amiodarone.He had 2 more episodes of VT which were slower at 150-His device did not ATP as below VT-1 zone. He also complains of cough with pinkish sputum,SOB.He reports no more chest pain since the shock.  He was recently scheduled for colonoscopy as part of transplant workup for which his coumadin was held and he was placed on eliquis temporarily.He had chest pain on day of c-scope and he had to take a nitro and his procedure was cancelled(5 days ago). He reports he started taking his coumadin after.His INR was subtherapeutic on arrival.     He states that his pulmonary sarcoid was diagnosed in 2015 when he was evaluated for a dry non-productive cough.  States that he underwent bronchoscopy with biopsies which confirmed sarcoid.  Since then, he has been on varying doses of Prednisone He has no other organ involvement from sarcoid and states that his only symptom has been cough. Denies any alcohol or illicit drug use.  Previously worked as  a .

## 2018-06-24 NOTE — PLAN OF CARE
Problem: Patient Care Overview  Goal: Plan of Care Review  Outcome: Ongoing (interventions implemented as appropriate)  No acute events throughout the shift. See vital signs and assessments in flowsheets. See below for updates on today's progress.     Pulmonary: Patient is on nasal cannula 2Lpm, with oxygen saturation ranges from %    Cardiovascular: Paced rhythm rate ranges from 60-80's. SBP ranges from 97-115mmHg and MAP greater than 65mmHg    Neurological: Patient is oriented to person, place, time and event    Gastrointestinal: NPO and no BM during the shift    Genitourinary: Voiding per urinal     Integumentary/Other:  No skin issues    Infusions: Heparin and Amiodarone    Patient progressing towards goals as tolerated, plan of care communicated and reviewed with Yonathan Good Jr. and family. All concerns addressed. Will continue to monitor.

## 2018-06-24 NOTE — PROCEDURES
Type of Device [facility]    Leads RA, RV, LV    Mode DDD    Base rate 55  Max track rate 130 bpm  Paced AV delay 150 ms  Sensed AV delay 100 ms  Ventricular pacing RV-> LV, 15 ms  Therapy zones     bpm -ATP x 2, 845 V , 890 V x 2     bpm ATP x 1, 845 V, 890 V, 890 V x 4    Events  June 23,2018  VT X 6-ATP x 9  Vt rate of 260 845 V shock x 1  0 accelerations    Multiple episodes of VT feb 9-Patient was admitted to [facility] at that time    At/Af burden 0 %  Mode switch 0 %  Pacing   AS- 96 %  AS-VS <1 %  AP- <1 %    Dependence  Not checked

## 2018-06-24 NOTE — CONSULTS
[facility]  Cardiac Electrophysiology  Consult Note    Admission Date: 6/22/2018  Code Status: Full Code   Attending Provider: Roxy Bernardo MD  Consulting Provider: Jaycob Vivar MD  Principal Problem:VT (ventricular tachycardia)    Inpatient consult to Electrophysiology  Consult performed by: JAYCOB VIVAR  Consult ordered by: JAYCOB VIVAR  Reason for consult: VT        Subjective:     Chief Complaint:  VT    HPI:   57 y.o. male  with h/o ischemic cardiomyopathy(negative for sarcoid on cardiac MRI 2012), H/o stents 7 years ago,Pulmonary sarcoid, left parietal stroke in the past, atrial fibrillation, CRT-D([facility]) who follows Dr Berman and is being worked up as outpatient for advanced options. He was transferred to Stroke service last evening with difficulty speaking with suspected Stroke.Initial workup included CVA which was negative for acute CVA.No tPA was given. This am patient complained of chest pain and dizziness and was noted to be in Monomorphic VT -Code blue was called.He was ATP'ed out of it but he also had an external shock delivered by rapid repsonse team as he was persistently in VT but no shock was delivered.He went into Fast VT with rate>200 and his ICD shocked him out of it. He was transferred to ICU.He reports having chest pain for last 4-5 years on exertion relieved with nitro-reports stress test done in [place].Last stress test 2015 in [facility]. He was loaded with amiodarone.He had 2 more episodes of VT which were slower at 150-His device did not ATP as below VT-1 zone. He also complains of cough with pinkish sputum,SOB.He reports no more chest pain since the shock.  He was recently scheduled for colonoscopy as part of transplant workup for which his coumadin was held and he was placed on eliquis temporarily.He had chest pain on day of c-scope and he had to take a nitro and his procedure was cancelled(5 days ago). He reports he started taking his coumadin  after.His INR was subtherapeutic on arrival.     He states that his pulmonary sarcoid was diagnosed in 2015 when he was evaluated for a dry non-productive cough.  States that he underwent bronchoscopy with biopsies which confirmed sarcoid.  Since then, he has been on varying doses of Prednisone He has no other organ involvement from sarcoid and states that his only symptom has been cough. Denies any alcohol or illicit drug use.  Previously worked as a .     Past Medical History:   Diagnosis Date    AICD (automatic cardioverter/defibrillator) present     Arthritis     CHF (congestive heart failure)     Coronary artery disease     MI (myocardial infarction)     Sarcoid     Stroke        Past Surgical History:   Procedure Laterality Date    CARDIAC CATHETERIZATION      CARDIAC DEFIBRILLATOR PLACEMENT  7-12    CARDIAC DEFIBRILLATOR PLACEMENT      CORONARY STENT PLACEMENT  07/2011    FRACTURE SURGERY         Review of patient's allergies indicates:  No Known Allergies    No current facility-administered medications on file prior to encounter.      Current Outpatient Prescriptions on File Prior to Encounter   Medication Sig    albuterol (PROVENTIL) 2.5 mg /3 mL (0.083 %) nebulizer solution Take 2.5 mg by nebulization every 6 (six) hours as needed.    alprazolam (XANAX) 2 MG tablet Take 2 mg by mouth 2 (two) times daily as needed.     aspirin (ECOTRIN) 81 MG EC tablet Take 81 mg by mouth. 1 Tablet, Delayed Release (E.C.) Oral Every day    bumetanide (BUMEX) 1 MG tablet Take 1 mg by mouth daily as needed.    carisoprodol (SOMA) 350 MG tablet Take 350 mg by mouth 4 (four) times daily as needed for Muscle spasms.    colchicine 0.6 mg tablet 0.6 mg once daily.     furosemide (LASIX) 40 MG tablet TAKE 2 TABLETS BY MOUTH TWICE DAILY    gabapentin (NEURONTIN) 600 MG tablet Take 600 mg by mouth 2 (two) times daily. 1 Tablet Oral At bedtime    hydrocodone-acetaminophen 10-325mg (NORCO)  mg Tab Take  1 tablet by mouth 2 (two) times daily as needed.     isosorbide mononitrate (IMDUR) 30 MG 24 hr tablet Take 3 tablets (90 mg total) by mouth once daily. (Patient taking differently: Take 60 mg by mouth once daily. )    losartan (COZAAR) 50 MG tablet Take 1 tablet (50 mg total) by mouth once daily.    pantoprazole (PROTONIX) 40 MG tablet Take 40 mg by mouth. 1 Tablet, Delayed Release (E.C.) Oral Every day    phenytoin (DILANTIN) 100 MG ER capsule 100 mg 2 (two) times daily.     potassium chloride SA (K-DUR,KLOR-CON) 20 MEQ tablet TAKE 2 TABLETS BY MOUTH EVERY MORNING AND 1 TABLET BY MOUTH EVERY EVENING    pravastatin (PRAVACHOL) 40 MG tablet Take 40 mg by mouth once daily.     predniSONE (DELTASONE) 10 MG tablet TK 1 T PO BID Tuesday, Thursday, Saturday    predniSONE (DELTASONE) 20 MG tablet Take 1 tablet by mouth 2 (two) times daily. Monday, Wednesday, and Friday, Sunday    sotalol (BETAPACE) 160 MG Tab TK 1 T PO  BID    spironolactone (ALDACTONE) 25 MG tablet TAKE 1 TABLET BY MOUTH EVERY DAY    warfarin (COUMADIN) 7.5 MG tablet 1 tablet Monday  0.5 tablet Tuesday-Sunday    albuterol (VENTOLIN HFA) 90 mcg/actuation inhaler Inhale 2 puffs into the lungs every 4 (four) hours as needed for Wheezing.    fluticasone-vilanterol (BREO ELLIPTA) 200-25 mcg/dose DsDv diskus inhaler Inhale 1 puff into the lungs once daily. Controller    nitroGLYCERIN (NITROSTAT) 0.4 MG SL tablet ONE TABLET UNDER TONGUE AS NEEDED FOR CHEST PAIN    promethazine-codeine 6.25-10 mg/5 ml (PHENERGAN WITH CODEINE) 6.25-10 mg/5 mL syrup TK 5 ML PO  Q 6 H PRN     Family History     Problem Relation (Age of Onset)    Cancer Maternal Grandmother    Coronary artery disease Father, Sister, Sister    Heart disease Mother, Father, Sister    Hypertension Mother, Father, Sister    Kidney disease Sister    Stroke Sister    Vision loss Sister        Social History Main Topics    Smoking status: Never Smoker    Smokeless tobacco: Never Used     Alcohol use No    Drug use: No    Sexual activity: Not on file     Review of Systems   All other systems reviewed and are negative.    Objective:     Vital Signs (Most Recent):  Temp: 98.7 °F (37.1 °C) (06/23/18 2300)  Pulse: 70 (06/23/18 2300)  Resp: 16 (06/23/18 2300)  BP: 105/71 (06/23/18 2300)  SpO2: 100 % (06/23/18 2300) Vital Signs (24h Range):  Temp:  [97.2 °F (36.2 °C)-98.9 °F (37.2 °C)] 98.7 °F (37.1 °C)  Pulse:  [] 70  Resp:  [16-98] 16  SpO2:  [94 %-100 %] 100 %  BP: (105-137)/(66-94) 105/71     Weight: 104 kg (229 lb 4.5 oz)  Body mass index is 31.98 kg/m².    SpO2: 100 %  O2 Device (Oxygen Therapy): nasal cannula    Physical Exam  General: alert, awake and oriented x 3  Eyes:PERRL.   Neck:no JVD   Lungs:  clear to auscultation bilaterally   Cardiovascular: Heart: regular rate and rhythm, S1, S2 normal, no murmur, click, rub or gallop.   Chest Wall: no tenderness.   Pulses-2+ radial DP, PT b/l  Extremities: no cyanosis or edema.   Abdomen/Rectal: Abdomen: soft, non-tender non-distented; bowel sounds normal  Neurologic: Normal strength and tone. No focal numbness or weakness  Significant Labs:   CMP:   Recent Labs  Lab 06/23/18  0018 06/23/18  1744    139  139   K 3.7 3.6  3.6  3.6    101  101   CO2 22* 27  27   GLU 95 116*  116*   BUN 19 16  16   CREATININE 0.9 0.9  0.9   CALCIUM 9.3 9.0  9.0   PROT 7.3 6.9   ALBUMIN 3.8 3.5   BILITOT 0.6 0.7   ALKPHOS 66 61   AST 19 23   ALT 34 33   ANIONGAP 16 11  11   ESTGFRAFRICA >60.0 >60.0  >60.0   EGFRNONAA >60.0 >60.0  >60.0   , CBC:   Recent Labs  Lab 06/23/18  0018 06/23/18  1744   WBC 8.81 8.38   HGB 13.5* 14.0   HCT 42.9 42.7    161    and Troponin   Recent Labs  Lab 06/23/18  0018 06/23/18  1334 06/23/18  1744   TROPONINI 0.022 0.036* 2.352*       Significant Imaging:   Echocardiogram:   2D echo with color flow doppler:   Results for orders placed or performed during the hospital encounter of 06/22/18   2D echo with  color flow doppler   Result Value Ref Range    EF 8 (A) 55 - 65    Mitral Valve Regurgitation MILD TO MODERATE     Est. PA Systolic Pressure 21.15     Tricuspid Valve Regurgitation TRIVIAL TO MILD     and Ejection fraction:   Recent Labs  Lab 06/23/18  1536   EF 8*     Assessment and Plan:     * VT (ventricular tachycardia)     -Monomorphic VT  -Needs ischemic evaluation  -Keep K>4, mG >2  -Continue amiodarone  -BB  -If recurrence on above regimen will start lidocaine  -Device setting adjusted after slower VT -VT zone 1 decreased from 180 to 150  -Please see ICD programming for details                 Thank you for your consult.     Addi Vivar MD  Cardiac Electrophysiology  [facility]

## 2018-06-24 NOTE — SUBJECTIVE & OBJECTIVE
Past Medical History:   Diagnosis Date    AICD (automatic cardioverter/defibrillator) present     Arthritis     CHF (congestive heart failure)     Coronary artery disease     MI (myocardial infarction)     Sarcoid     Stroke        Past Surgical History:   Procedure Laterality Date    CARDIAC CATHETERIZATION      CARDIAC DEFIBRILLATOR PLACEMENT  7-12    CARDIAC DEFIBRILLATOR PLACEMENT      CORONARY STENT PLACEMENT  07/2011    FRACTURE SURGERY         Review of patient's allergies indicates:  No Known Allergies    Current Facility-Administered Medications   Medication    amiodarone (CORDARONE) 360 mg/200 mL (1.8 mg/mL) infusion    amiodarone 360 mg/200 mL (1.8 mg/mL) infusion    amiodarone in dextrose 150 mg/100 mL (1.5 mg/mL) loading dose    aspirin EC tablet 81 mg    atorvastatin tablet 40 mg    butalbital-acetaminophen-caffeine -40 mg per tablet 1 tablet    clopidogrel tablet 75 mg    colchicine capsule/tablet 0.6 mg    fluticasone-vilanterol 200-25 mcg/dose diskus inhaler 1 puff    fosphenytoin (CEREBYX) 800 mg PE in dextrose 5 % 100 mL IVPB    gabapentin capsule 600 mg    heparin 25,000 units in dextrose 5% 250 mL (100 units/mL) infusion (heparin infusion)    HYDROcodone-acetaminophen 7.5-325 mg per tablet 1 tablet    isosorbide mononitrate 24 hr tablet 90 mg    labetalol injection 10 mg    metoprolol tartrate (LOPRESSOR) tablet 25 mg    nitroGLYCERIN (NITROSTAT) 0.4 MG SL tablet    pantoprazole EC tablet 40 mg    phenytoin (DILANTIN) ER capsule 200 mg    potassium chloride SA CR tablet 20 mEq    potassium chloride SA CR tablet 40 mEq    predniSONE tablet 10 mg    [START ON 6/25/2018] predniSONE tablet 20 mg    predniSONE tablet 20 mg    sodium chloride 0.9% bolus 500 mL    sodium chloride 0.9% flush 3 mL    spironolactone tablet 25 mg     Family History     Problem Relation (Age of Onset)    Cancer Maternal Grandmother    Coronary artery disease Father, Sister,  Sister    Heart disease Mother, Father, Sister    Hypertension Mother, Father, Sister    Kidney disease Sister    Stroke Sister    Vision loss Sister        Social History Main Topics    Smoking status: Never Smoker    Smokeless tobacco: Never Used    Alcohol use No    Drug use: No    Sexual activity: Not on file     Review of Systems   All other systems reviewed and are negative.    Objective:     Vital Signs (Most Recent):  Temp: 98.7 °F (37.1 °C) (06/23/18 2300)  Pulse: 70 (06/23/18 2300)  Resp: 16 (06/23/18 2300)  BP: 105/71 (06/23/18 2300)  SpO2: 100 % (06/23/18 2300) Vital Signs (24h Range):  Temp:  [97.2 °F (36.2 °C)-98.9 °F (37.2 °C)] 98.7 °F (37.1 °C)  Pulse:  [] 70  Resp:  [16-98] 16  SpO2:  [94 %-100 %] 100 %  BP: (105-137)/(66-94) 105/71     Patient Vitals for the past 72 hrs (Last 3 readings):   Weight   06/23/18 0500 104 kg (229 lb 4.5 oz)     Body mass index is 31.98 kg/m².      Intake/Output Summary (Last 24 hours) at 06/24/18 0020  Last data filed at 06/23/18 2300   Gross per 24 hour   Intake           307.66 ml   Output              350 ml   Net           -42.34 ml       Physical Exam  General: alert, awake and oriented x 3  Eyes:PERRL.   Neck:no JVD   Lungs:  clear to auscultation bilaterally   Cardiovascular: Heart: regular rate and rhythm, S1, S2 normal, no murmur, click, rub or gallop.   Chest Wall: no tenderness.   Pulses-2+ radial, femoral, DP, PT b/l  Extremities: no cyanosis or edema.   Abdomen/Rectal: Abdomen: soft, non-tender non-distented; bowel sounds normal  Neurologic: Normal strength and tone. No focal numbness or weakness  Significant Labs:  CBC:    Recent Labs  Lab 06/22/18  2230 06/23/18  0018 06/23/18  1744   WBC  --  8.81 8.38   RBC  --  4.52* 4.55*   HGB  --  13.5* 14.0   HCT 41 42.9 42.7   PLT  --  169 161   MCV  --  95 94   MCH  --  29.9 30.8   MCHC  --  31.5* 32.8     BNP:    Recent Labs  Lab 06/23/18 0018   BNP 1,135*     CMP:    Recent Labs  Lab 06/23/18 0018  06/23/18  1744   GLU 95 116*  116*   CALCIUM 9.3 9.0  9.0   ALBUMIN 3.8 3.5   PROT 7.3 6.9    139  139   K 3.7 3.6  3.6  3.6   CO2 22* 27  27    101  101   BUN 19 16  16   CREATININE 0.9 0.9  0.9   ALKPHOS 66 61   ALT 34 33   AST 19 23   BILITOT 0.6 0.7      Coagulation:     Recent Labs  Lab 06/23/18  0018   INR 1.5*   APTT 22.6     LDH:  No results for input(s): LDH in the last 72 hours.  Microbiology:  Microbiology Results (last 7 days)     ** No results found for the last 168 hours. **          BMP:   Recent Labs  Lab 06/23/18  1744   *  116*     139   K 3.6  3.6  3.6     101   CO2 27  27   BUN 16  16   CREATININE 0.9  0.9   CALCIUM 9.0  9.0   MG 2.1     Cardiac Markers: No results for input(s): CKMB, TROPONINT, MYOGLOBIN in the last 72 hours.  I have reviewed all pertinent labs within the past 24 hours.    Diagnostic Results:  I have reviewed all pertinent imaging results/findings within the past 24 hours.

## 2018-06-24 NOTE — NURSING
Rapid response called at 1315 pm. Patient complained of chest pain, pulse rate of 174. RRT team arrived at patients room. Dr. Melvin intervened during the code. Respiratory  therapist at the bedside. Amiodarone administered and defribillated once. Patient awake , alert and responsive at 13:30 pm. Vital signs stable. Brought to Cardiac ICU at 13:50 pm.

## 2018-06-24 NOTE — PROCEDURES
"Yonathan Good Jr. is a 57 y.o. male patient.    Temp: 98.4 °F (36.9 °C) (06/24/18 0400)  Pulse: 62 (06/24/18 0500)  Resp: 13 (06/24/18 0500)  BP: 107/72 (06/24/18 0500)  SpO2: 100 % (06/24/18 0500)  Weight: 104 kg (229 lb 4.5 oz) (06/23/18 0500)  Height: 5' 11" (180.3 cm) (06/23/18 0500)    Central Line  Date/Time: 6/24/2018 6:12 AM  Location procedure was performed: [facility]ARD[facility] ([facility])  Performed by: JAYCOB VIVAR  Pre-operative Diagnosis: CHF  Consent Done: Yes  Time out: Immediately prior to procedure a "time out" was called to verify the correct patient, procedure, equipment, support staff and site/side marked as required.  Indications: vascular access and hemodynamic monitoring  Anesthesia: local infiltration    Anesthesia:  Local Anesthetic: lidocaine 1% without epinephrine  Preparation: skin prepped with ChloraPrep  Skin prep agent dried: skin prep agent completely dried prior to procedure  Sterile barriers: all five maximum sterile barriers used - cap, mask, sterile gown, sterile gloves, and large sterile sheet  Hand hygiene: hand hygiene performed prior to central venous catheter insertion  Location details: right internal jugular  Catheter type: triple lumen  Catheter size: 7 Fr  Catheter Length: 16cm    Ultrasound guidance: yes  Vessel Caliber: small, patent, compressibility normal  Needle advanced into vessel with real time Ultrasound guidance.  Guidewire confirmed in vessel.  Sterile sheath used.  Manometry: Yes  Number of attempts: 1  Assessment: placement verified by x-ray  Complications: none  Specimens: No  Implants: No  Post-procedure: line sutured,  chlorhexidine patch,  sterile dressing applied and blood return through all ports  Complications: No          No flowsheet data found.    Jaycob Vivar  6/24/2018    "

## 2018-06-24 NOTE — ASSESSMENT & PLAN NOTE
-Central line placed via Right IJ  -CVP-2, SVO2 61  -Hold lasix  -BB  -workup as outpatient if stable for advanced options.

## 2018-06-24 NOTE — ASSESSMENT & PLAN NOTE
-Neurology following  -repeat CT per neuro  -also h/o seizures-EEG per neuro  -Ok to use heparin per neuro

## 2018-06-24 NOTE — PROGRESS NOTES
CRT-D settings changed after episode of VT with rate of 150  VT -1 zone changed from 180-> 150  Max track rate decreased from 130 to 120

## 2018-06-24 NOTE — H&P
[facility]  Heart Transplant  H&P    Patient Name: Yonathan Good Jr.  MRN: [id number]  Admission Date: 6/22/2018  Attending Physician: Roxy Bernardo MD  Primary Care Provider: Edgar Gates MD  Principal Problem:VT (ventricular tachycardia)    Subjective:     History of Present Illness:  57 y.o. male  with h/o ischemic cardiomyopathy(negative for sarcoid on cardiac MRI 2012), H/o stents 7 years ago,Pulmonary sarcoid, left parietal stroke in the past, atrial fibrillation, CRT-D(st Giorgio) who follows Dr Berman and is being worked up as outpatient for advanced options. He was transferred to Stroke service last evening with difficulty speaking with suspected Stroke.Initial workup included CVA which was negative for acute CVA.No tPA was given. This am patient complained of chest pain and dizziness and was noted to be in Monomorphic VT -Code blue was called.He was ATP'ed out of it but he also had an external shock delivered by rapid repsonse team as he was persistently in VT but no shock was delivered.He went into Fast VT with rate>200 and his ICD shocked him out of it. He was transferred to ICU.He reports having chest pain for last 4-5 years on exertion relieved with nitro-reports stress test done in [place].Last stress test 2015 in [facility]. He was loaded with amiodarone.He had 2 more episodes of VT which were slower at 150-His device did not ATP as below VT-1 zone. He also complains of cough with pinkish sputum,SOB.He reports no more chest pain since the shock.  He was recently scheduled for colonoscopy as part of transplant workup for which his coumadin was held and he was placed on eliquis temporarily.He had chest pain on day of c-scope and he had to take a nitro and his procedure was cancelled(5 days ago). He reports he started taking his coumadin after.His INR was subtherapeutic on arrival.     He states that his pulmonary sarcoid was diagnosed in 2015 when he was evaluated for a dry  non-productive cough.  States that he underwent bronchoscopy with biopsies which confirmed sarcoid.  Since then, he has been on varying doses of Prednisone He has no other organ involvement from sarcoid and states that his only symptom has been cough. Denies any alcohol or illicit drug use.  Previously worked as a .     Past Medical History:   Diagnosis Date    AICD (automatic cardioverter/defibrillator) present     Arthritis     CHF (congestive heart failure)     Coronary artery disease     MI (myocardial infarction)     Sarcoid     Stroke        Past Surgical History:   Procedure Laterality Date    CARDIAC CATHETERIZATION      CARDIAC DEFIBRILLATOR PLACEMENT  7-12    CARDIAC DEFIBRILLATOR PLACEMENT      CORONARY STENT PLACEMENT  07/2011    FRACTURE SURGERY         Review of patient's allergies indicates:  No Known Allergies    Current Facility-Administered Medications   Medication    amiodarone (CORDARONE) 360 mg/200 mL (1.8 mg/mL) infusion    amiodarone 360 mg/200 mL (1.8 mg/mL) infusion    amiodarone in dextrose 150 mg/100 mL (1.5 mg/mL) loading dose    aspirin EC tablet 81 mg    atorvastatin tablet 40 mg    butalbital-acetaminophen-caffeine -40 mg per tablet 1 tablet    clopidogrel tablet 75 mg    colchicine capsule/tablet 0.6 mg    fluticasone-vilanterol 200-25 mcg/dose diskus inhaler 1 puff    fosphenytoin (CEREBYX) 800 mg PE in dextrose 5 % 100 mL IVPB    gabapentin capsule 600 mg    heparin 25,000 units in dextrose 5% 250 mL (100 units/mL) infusion (heparin infusion)    HYDROcodone-acetaminophen 7.5-325 mg per tablet 1 tablet    isosorbide mononitrate 24 hr tablet 90 mg    labetalol injection 10 mg    metoprolol tartrate (LOPRESSOR) tablet 25 mg    nitroGLYCERIN (NITROSTAT) 0.4 MG SL tablet    pantoprazole EC tablet 40 mg    phenytoin (DILANTIN) ER capsule 200 mg    potassium chloride SA CR tablet 20 mEq    potassium chloride SA CR tablet 40 mEq    predniSONE  tablet 10 mg    [START ON 6/25/2018] predniSONE tablet 20 mg    predniSONE tablet 20 mg    sodium chloride 0.9% bolus 500 mL    sodium chloride 0.9% flush 3 mL    spironolactone tablet 25 mg     Family History     Problem Relation (Age of Onset)    Cancer Maternal Grandmother    Coronary artery disease Father, Sister, Sister    Heart disease Mother, Father, Sister    Hypertension Mother, Father, Sister    Kidney disease Sister    Stroke Sister    Vision loss Sister        Social History Main Topics    Smoking status: Never Smoker    Smokeless tobacco: Never Used    Alcohol use No    Drug use: No    Sexual activity: Not on file     Review of Systems   All other systems reviewed and are negative.    Objective:     Vital Signs (Most Recent):  Temp: 98.7 °F (37.1 °C) (06/23/18 2300)  Pulse: 70 (06/23/18 2300)  Resp: 16 (06/23/18 2300)  BP: 105/71 (06/23/18 2300)  SpO2: 100 % (06/23/18 2300) Vital Signs (24h Range):  Temp:  [97.2 °F (36.2 °C)-98.9 °F (37.2 °C)] 98.7 °F (37.1 °C)  Pulse:  [] 70  Resp:  [16-98] 16  SpO2:  [94 %-100 %] 100 %  BP: (105-137)/(66-94) 105/71     Patient Vitals for the past 72 hrs (Last 3 readings):   Weight   06/23/18 0500 104 kg (229 lb 4.5 oz)     Body mass index is 31.98 kg/m².      Intake/Output Summary (Last 24 hours) at 06/24/18 0020  Last data filed at 06/23/18 2300   Gross per 24 hour   Intake           307.66 ml   Output              350 ml   Net           -42.34 ml       Physical Exam  General: alert, awake and oriented x 3  Eyes:PERRL.   Neck:no JVD   Lungs:  clear to auscultation bilaterally   Cardiovascular: Heart: regular rate and rhythm, S1, S2 normal, no murmur, click, rub or gallop.   Chest Wall: no tenderness.   Pulses-2+ radial, femoral, DP, PT b/l  Extremities: no cyanosis or edema.   Abdomen/Rectal: Abdomen: soft, non-tender non-distented; bowel sounds normal  Neurologic: Normal strength and tone. No focal numbness or weakness  Significant  Labs:  CBC:    Recent Labs  Lab 06/22/18  2230 06/23/18  0018 06/23/18  1744   WBC  --  8.81 8.38   RBC  --  4.52* 4.55*   HGB  --  13.5* 14.0   HCT 41 42.9 42.7   PLT  --  169 161   MCV  --  95 94   MCH  --  29.9 30.8   MCHC  --  31.5* 32.8     BNP:    Recent Labs  Lab 06/23/18  0018   BNP 1,135*     CMP:    Recent Labs  Lab 06/23/18  0018 06/23/18  1744   GLU 95 116*  116*   CALCIUM 9.3 9.0  9.0   ALBUMIN 3.8 3.5   PROT 7.3 6.9    139  139   K 3.7 3.6  3.6  3.6   CO2 22* 27  27    101  101   BUN 19 16  16   CREATININE 0.9 0.9  0.9   ALKPHOS 66 61   ALT 34 33   AST 19 23   BILITOT 0.6 0.7      Coagulation:     Recent Labs  Lab 06/23/18  0018   INR 1.5*   APTT 22.6     LDH:  No results for input(s): LDH in the last 72 hours.  Microbiology:  Microbiology Results (last 7 days)     ** No results found for the last 168 hours. **          BMP:   Recent Labs  Lab 06/23/18  1744   *  116*     139   K 3.6  3.6  3.6     101   CO2 27  27   BUN 16  16   CREATININE 0.9  0.9   CALCIUM 9.0  9.0   MG 2.1     Cardiac Markers: No results for input(s): CKMB, TROPONINT, MYOGLOBIN in the last 72 hours.  I have reviewed all pertinent labs within the past 24 hours.    Diagnostic Results:  I have reviewed all pertinent imaging results/findings within the past 24 hours.    Assessment/Plan:     * VT (ventricular tachycardia)    -Monomorphic VT  -EP consult  -Keep K>4, mG >2  -Continue amiodarone  -BB  -If recurrence on above regimen will start lidocaine  -Device adjusted per EP        Expressive aphasia    -Neurology following  -repeat CT per neuro  -also h/o seizures-EEG per neuro  -Ok to use heparin per neuro        CHF (congestive heart failure)    -Central line placed via Right IJ  -CVP-2, SVO2 61  -Hold lasix  -BB  -workup as outpatient if stable for advanced options.        CAD (coronary artery disease)    -s/p LAD stent 2011  -complained of chest pain prior to VT episode  -Repeat  troponin 2 which could be due to VT/Shock  -will reat as ACS for now  -needs ischemic eval per EP        Sarcoidosis of lung    -prednisone per home regimen        Biventricular cardiac pacemaker in situ    -St Giorgio device interrogated-See EP note            Addi Vivar MD  Heart Transplant  [facility]

## 2018-06-24 NOTE — PLAN OF CARE
Problem: Patient Care Overview  Goal: Plan of Care Review  Outcome: Ongoing (interventions implemented as appropriate)   Patient understands, and agrees with plan of care. ABCDEF Bundle. Not intubated nor sedated. Breathing on 3 liters nasal canula, respirations stable currently 20, lungs clear pulse ox 99. Cam score negative, patient alert and oriented times 4 and can follow commands. Patient can move independently in bed, and move all extremities. No signs of skin breakdown. Patient remain free from fall in injury throughout the shift. Family at bedside and involved with patient care. Patient denies chest pain throughout the shift. Patient ate 50% of meals and voided approprietly each hour. No BM. Side rails up times 2, call light in reach, will continue to monitor.

## 2018-06-24 NOTE — NURSING
Ok by Dr. Vivar to allow patient amiodarone drip to run at 1mg/min, until other wise specified, will continue to monitor.

## 2018-06-24 NOTE — ASSESSMENT & PLAN NOTE
-Monomorphic VT  -EP consult  -Keep K>4, mG >2  -Continue amiodarone  -BB  -If recurrence on above regimen will start lidocaine  -Device adjusted per EP

## 2018-06-25 PROBLEM — I21.4 NSTEMI (NON-ST ELEVATED MYOCARDIAL INFARCTION): Status: ACTIVE | Noted: 2018-01-01

## 2018-06-25 PROBLEM — R47.82 FLUENCY DISORDER ASSOCIATED WITH UNDERLYING DISEASE: Status: RESOLVED | Noted: 2018-01-01 | Resolved: 2018-01-01

## 2018-06-25 NOTE — ASSESSMENT & PLAN NOTE
-Multiple episodes of MMVT on 6/23  -EP consulted. VT1 zone lowered as per EP  -K>4, Mg>2  -Continue amiodarone, transition to oral today, 400 bid  -If recurrence, restart drip +/- lidocaine  -Needs ischemic evaluation  -IC consult for LHC today

## 2018-06-25 NOTE — ASSESSMENT & PLAN NOTE
REUBEN RISK SCORE-1  RISK OF BRII-7.5 %  RISK OF DIALYSIS-0.04 %  PURVIS PREDICTED MORTALITY    - Keep NPO  -Right radial access for LHC   -The risks, benefits and alternatives of the procedure were explained to the patient.   -The risks of coronary angiography include but are not limited to: bleeding, infection, heart rhythm abnormalities, allergic reactions, kidney injury and potential need for dialysis, stroke and death.   -The risks of moderate sedation include hypotension, respiratory depression, arrhythmias, bronchospasm, and death.   - Informed consent was obtained and the  patient is agreeable to proceed with the procedure.

## 2018-06-25 NOTE — ASSESSMENT & PLAN NOTE
-Central line placed via Right IJ, SVO2 61  -CVP 7 today, SVO2 pending  -Holding home diuretic  -Appears euvolemic  -Continue metoprolol, losartan, spironolactone  -workup as outpatient if stable for advanced options.

## 2018-06-25 NOTE — ASSESSMENT & PLAN NOTE
-Hx of CAD s/p remote PCI  -Intermittent atypical chest pain  -Troponin up to 2.3 but in setting of VT and defibrillation  -Treating as NSTEMI for now  -Continue ASA, plavix, statin, metoprolol tartrate 25 bid, home imdur, heparin gtt  -LHC today

## 2018-06-25 NOTE — BRIEF OP NOTE
"    Post Cath Note  Referring Physician: Britt Melgar MD  Procedure: Left heart cath (N/A)       Access: Right radial  Nonobstructive CAD    LVEDP 45 mmHg    See full report for further details    Intervention:   None  Lasix 80mg IV once    Closure device: Radial band    Post Cath Exam:   /77 (BP Location: Left arm, Patient Position: Lying)   Pulse 80   Temp 98.1 °F (36.7 °C) (Oral)   Resp (!) 21   Ht 5' 11" (1.803 m)   Wt 104 kg (229 lb 4.5 oz)   SpO2 100%   BMI 31.98 kg/m²   No unusual pain, hematoma, thrill or bruit at vascular access site.  Distal pulse present without signs of ischemia.    Recommendations:   - Routine post-cath care  - IVF off given severely elevated LV filling pressures  - Continue medical management      Lowell Guevara MD  [phone])  Cardiology Fellow      "

## 2018-06-25 NOTE — CONSULTS
[facility]  Interventional Cardiology  Consult Note    Patient Name: Yonathan Good Jr.  MRN: [id number]  Admission Date: 6/22/2018  Hospital Length of Stay: 2 days  Code Status: Full Code   Attending Provider: Britt Melgar MD  Consulting Provider: Jaycob Vivar MD  Primary Care Physician: Edgar Gates MD  Principal Problem:VT (ventricular tachycardia)    Patient information was obtained from patient, past medical records and ER records.     Inpatient consult to Interventional Cardiology  Consult performed by: JAYCOB VIVAR  Consult ordered by: AROLDO CALIX  Reason for consult: NSTEMI, VT        Subjective:     Chief Complaint:  Chest pain, VT     HPI:  57 y.o. male  with h/o ischemic cardiomyopathy(negative for sarcoid on cardiac MRI 2012), H/o stents 7 years ago,Pulmonary sarcoid, left parietal stroke in the past, atrial fibrillation, CRT-D(st Giorgio) who follows Dr Berman and is being worked up as outpatient for advanced options. He was transferred to Stroke service last evening with difficulty speaking with suspected Stroke.Initial workup included CVA which was negative for acute CVA.No tPA was given. This am patient complained of chest pain and dizziness and was noted to be in Monomorphic VT -Code blue was called.He was ATP'ed out of it but he also had an external shock delivered by rapid repsonse team as he was persistently in VT but no shock was delivered.He went into Fast VT with rate>200 and his ICD shocked him out of it. He was transferred to ICU.He reports having chest pain for last 4-5 years on exertion relieved with nitro-reports stress test done in [place].Last stress test 2015 in [facility]. He was loaded with amiodarone.He had 2 more episodes of VT which were slower at 150-His device did not ATP as below VT-1 zone. He also complains of cough with pinkish sputum,SOB.He reports no more chest pain since the shock.  He was recently scheduled for colonoscopy as part of  transplant workup for which his coumadin was held and he was placed on eliquis temporarily.He had chest pain on day of c-scope and he had to take a nitro and his procedure was cancelled(5 days ago). He reports he started taking his coumadin after.His INR was subtherapeutic on arrival.     He states that his pulmonary sarcoid was diagnosed in 2015 when he was evaluated for a dry non-productive cough.  States that he underwent bronchoscopy with biopsies which confirmed sarcoid.  Since then, he has been on varying doses of Prednisone He has no other organ involvement from sarcoid and states that his only symptom has been cough. Denies any alcohol or illicit drug use.  Previously worked as a .        Past Medical History:   Diagnosis Date    AICD (automatic cardioverter/defibrillator) present     Arthritis     CHF (congestive heart failure)     Coronary artery disease     MI (myocardial infarction)     Sarcoid     Stroke        Past Surgical History:   Procedure Laterality Date    CARDIAC CATHETERIZATION      CARDIAC DEFIBRILLATOR PLACEMENT  7-12    CARDIAC DEFIBRILLATOR PLACEMENT      CORONARY STENT PLACEMENT  07/2011    FRACTURE SURGERY         Review of patient's allergies indicates:  No Known Allergies    PTA Medications   Medication Sig    albuterol (PROVENTIL) 2.5 mg /3 mL (0.083 %) nebulizer solution Take 2.5 mg by nebulization every 6 (six) hours as needed.    alprazolam (XANAX) 2 MG tablet Take 2 mg by mouth 2 (two) times daily as needed.     aspirin (ECOTRIN) 81 MG EC tablet Take 81 mg by mouth. 1 Tablet, Delayed Release (E.C.) Oral Every day    bumetanide (BUMEX) 1 MG tablet Take 1 mg by mouth daily as needed.    carisoprodol (SOMA) 350 MG tablet Take 350 mg by mouth 4 (four) times daily as needed for Muscle spasms.    colchicine 0.6 mg tablet 0.6 mg once daily.     furosemide (LASIX) 40 MG tablet TAKE 2 TABLETS BY MOUTH TWICE DAILY    gabapentin (NEURONTIN) 600 MG tablet Take 600  mg by mouth 2 (two) times daily. 1 Tablet Oral At bedtime    hydrocodone-acetaminophen 10-325mg (NORCO)  mg Tab Take 1 tablet by mouth 2 (two) times daily as needed.     isosorbide mononitrate (IMDUR) 30 MG 24 hr tablet Take 3 tablets (90 mg total) by mouth once daily. (Patient taking differently: Take 60 mg by mouth once daily. )    losartan (COZAAR) 50 MG tablet Take 1 tablet (50 mg total) by mouth once daily.    pantoprazole (PROTONIX) 40 MG tablet Take 40 mg by mouth. 1 Tablet, Delayed Release (E.C.) Oral Every day    phenytoin (DILANTIN) 100 MG ER capsule 100 mg 2 (two) times daily.     potassium chloride SA (K-DUR,KLOR-CON) 20 MEQ tablet TAKE 2 TABLETS BY MOUTH EVERY MORNING AND 1 TABLET BY MOUTH EVERY EVENING    pravastatin (PRAVACHOL) 40 MG tablet Take 40 mg by mouth once daily.     predniSONE (DELTASONE) 10 MG tablet TK 1 T PO BID Tuesday, Thursday, Saturday    predniSONE (DELTASONE) 20 MG tablet Take 1 tablet by mouth 2 (two) times daily. Monday, Wednesday, and Friday, Sunday    sotalol (BETAPACE) 160 MG Tab TK 1 T PO  BID    spironolactone (ALDACTONE) 25 MG tablet TAKE 1 TABLET BY MOUTH EVERY DAY    warfarin (COUMADIN) 7.5 MG tablet 1 tablet Monday  0.5 tablet Tuesday-Sunday    albuterol (VENTOLIN HFA) 90 mcg/actuation inhaler Inhale 2 puffs into the lungs every 4 (four) hours as needed for Wheezing.    fluticasone-vilanterol (BREO ELLIPTA) 200-25 mcg/dose DsDv diskus inhaler Inhale 1 puff into the lungs once daily. Controller    nitroGLYCERIN (NITROSTAT) 0.4 MG SL tablet ONE TABLET UNDER TONGUE AS NEEDED FOR CHEST PAIN    promethazine-codeine 6.25-10 mg/5 ml (PHENERGAN WITH CODEINE) 6.25-10 mg/5 mL syrup TK 5 ML PO  Q 6 H PRN     Family History     Problem Relation (Age of Onset)    Cancer Maternal Grandmother    Coronary artery disease Father, Sister, Sister    Heart disease Mother, Father, Sister    Hypertension Mother, Father, Sister    Kidney disease Sister    Stroke Sister     Vision loss Sister        Social History Main Topics    Smoking status: Never Smoker    Smokeless tobacco: Never Used    Alcohol use No    Drug use: No    Sexual activity: Not on file     Review of Systems   All other systems reviewed and are negative.    Objective:     Vital Signs (Most Recent):  Temp: 98.1 °F (36.7 °C) (06/25/18 0800)  Pulse: 80 (06/25/18 0936)  Resp: (!) 21 (06/25/18 0936)  BP: 117/77 (06/25/18 0800)  SpO2: 100 % (06/25/18 0936) Vital Signs (24h Range):  Temp:  [97.8 °F (36.6 °C)-98.8 °F (37.1 °C)] 98.1 °F (36.7 °C)  Pulse:  [63-91] 80  Resp:  [12-47] 21  SpO2:  [95 %-100 %] 100 %  BP: (102-133)/(68-95) 117/77     Weight: 104 kg (229 lb 4.5 oz)  Body mass index is 31.98 kg/m².    SpO2: 100 %  O2 Device (Oxygen Therapy): nasal cannula      Intake/Output Summary (Last 24 hours) at 06/25/18 1001  Last data filed at 06/25/18 0600   Gross per 24 hour   Intake          1656.01 ml   Output             1350 ml   Net           306.01 ml       Lines/Drains/Airways     Central Venous Catheter Line                 Percutaneous Central Line Insertion/Assessment - triple lumen  06/23/18 1800 1 day          Peripheral Intravenous Line                 Peripheral IV - Single Lumen 06/22/18 2206 Right Antecubital 2 days                Physical Exam  General: alert, awake and oriented x 3  Eyes:PERRL.   Neck:no JVD   Lungs:  clear to auscultation bilaterally   Cardiovascular: Heart: regular rate and rhythm, S1, S2 normal, no murmur, click, rub or gallop.   Chest Wall: no tenderness.   Extremities: no cyanosis or edema.   Abdomen/Rectal: Abdomen: soft, non-tender non-distented; bowel sounds normal  Neurologic: Normal strength and tone. No focal numbness or weakness     LEFT RIGHT   RADIAL 2+ 2+   ULNAR     BRACHIAL     FEMORAL 2+ 2+   DP 1+ 1+   TP 1+ 1+   TRUNG'S TEST Normal Normal   BARBEAU TEST         Significant Labs:   CMP   Recent Labs  Lab 06/23/18  1744 06/24/18  0104 06/24/18  0335 06/24/18  1136  06/25/18  0500     139 139 139  --  137   K 3.6  3.6  3.6 3.9 3.7 4.3 4.1     101 102 102  --  103   CO2 27  27 26 26  --  25   *  116* 118* 104  --  107   BUN 16  16 16 15  --  13   CREATININE 0.9  0.9 1.0 0.9  --  0.9   CALCIUM 9.0  9.0 9.1 9.0  --  9.2   PROT 6.9  --  6.5  --  6.4   ALBUMIN 3.5  --  3.3*  --  3.2*   BILITOT 0.7  --  0.5  --  0.4   ALKPHOS 61  --  57  --  53*   AST 23  --  23  --  18   ALT 33  --  28  --  26   ANIONGAP 11  11 11 11  --  9   ESTGFRAFRICA >60.0  >60.0 >60.0 >60.0  --  >60.0   EGFRNONAA >60.0  >60.0 >60.0 >60.0  --  >60.0   , CBC   Recent Labs  Lab 06/23/18  1744 06/24/18  1615 06/25/18  0500   WBC 8.38 9.38 7.84  7.84   HGB 14.0 13.0* 12.7*  12.7*   HCT 42.7 40.1 39.9*  39.9*    143* 142*  142*    and INR No results for input(s): INR, PROTIME in the last 48 hours.    Significant Imaging: Echocardiogram:   2D echo with color flow doppler:   Results for orders placed or performed during the hospital encounter of 06/22/18   2D echo with color flow doppler   Result Value Ref Range    EF 8 (A) 55 - 65    Mitral Valve Regurgitation MILD TO MODERATE     Est. PA Systolic Pressure 21.15     Tricuspid Valve Regurgitation TRIVIAL TO MILD      Assessment and Plan:     * VT (ventricular tachycardia)    REUBEN RISK SCORE-1  RISK OF BRII-7.5 %  RISK OF DIALYSIS-0.04 %  PURVIS PREDICTED MORTALITY    - Keep NPO  -Right radial access for LHC   -The risks, benefits and alternatives of the procedure were explained to the patient.   -The risks of coronary angiography include but are not limited to: bleeding, infection, heart rhythm abnormalities, allergic reactions, kidney injury and potential need for dialysis, stroke and death.   -The risks of moderate sedation include hypotension, respiratory depression, arrhythmias, bronchospasm, and death.   - Informed consent was obtained and the  patient is agreeable to proceed with the procedure.        NSTEMI (non-ST  elevated myocardial infarction)                  VTE Risk Mitigation         Ordered     heparin 25,000 units in dextrose 5% 250 mL (100 units/mL) infusion (heparin infusion)  Continuous      06/23/18 1937          Thank you for your consult. I will follow-up with patient. Please contact us if you have any additional questions.    Addi Vivar MD  Interventional Cardiology   [facility]

## 2018-06-25 NOTE — ASSESSMENT & PLAN NOTE
-Initially admitted to Neuro, no TPA. CT head and CTA negative. Hx of seizures, EEG pending.  -Resolved, no further workup at this time

## 2018-06-25 NOTE — HPI
57 y.o. male  with h/o ischemic cardiomyopathy(negative for sarcoid on cardiac MRI 2012), H/o stents 7 years ago,Pulmonary sarcoid, left parietal stroke in the past, atrial fibrillation, CRT-D(st Giorgio) who follows Dr Berman and is being worked up as outpatient for advanced options. He was transferred to Stroke service last evening with difficulty speaking with suspected Stroke.Initial workup included CVA which was negative for acute CVA.No tPA was given. This am patient complained of chest pain and dizziness and was noted to be in Monomorphic VT -Code blue was called.He was ATP'ed out of it but he also had an external shock delivered by rapid repsonse team as he was persistently in VT but no shock was delivered.He went into Fast VT with rate>200 and his ICD shocked him out of it. He was transferred to ICU.He reports having chest pain for last 4-5 years on exertion relieved with nitro-reports stress test done in [place].[facility] He was loaded with amiodarone.He had 2 more episodes of VT which were slower at 150-His device did not ATP as below VT-1 zone. He also complains of cough with pinkish sputum,SOB.He reports no more chest pain since the shock.  He was recently scheduled for colonoscopy as part of transplant workup for which his coumadin was held and he was placed on eliquis temporarily.He had chest pain on day of c-scope and he had to take a nitro and his procedure was cancelled(5 days ago). He reports he started taking his coumadin after.His INR was subtherapeutic on arrival.     He states that his pulmonary sarcoid was diagnosed in 2015 when he was evaluated for a dry non-productive cough.  States that he underwent bronchoscopy with biopsies which confirmed sarcoid.  Since then, he has been on varying doses of Prednisone He has no other organ involvement from sarcoid and states that his only symptom has been cough. Denies any alcohol or illicit drug use.  Previously worked as  a .

## 2018-06-25 NOTE — PROGRESS NOTES
[facility]  Heart Transplant  Progress Note    Patient Name: Yonathan Good Jr.  MRN: [id number]  Admission Date: 6/22/2018  Hospital Length of Stay: 2 days  Attending Physician: Britt Melgar MD  Primary Care Provider: Edgar Gates MD  Principal Problem:VT (ventricular tachycardia)    Subjective:     Interval HPI: NAEON. Doing well this morning. 1 run of NSVT x15s last night, asymptomatic. NPO this am, feeling well with exception of dry cough. CVP 7.     Review of Systems   All other systems reviewed and are negative.    Objective:     Vital Signs (Most Recent):  Temp: 98 °F (36.7 °C) (06/25/18 0400)  Pulse: 78 (06/25/18 0906)  Resp: 20 (06/25/18 0906)  BP: 122/79 (06/25/18 0600)  SpO2: 100 % (06/25/18 0748) Vital Signs (24h Range):  Temp:  [97.8 °F (36.6 °C)-98.8 °F (37.1 °C)] 98 °F (36.7 °C)  Pulse:  [63-91] 78  Resp:  [12-47] 20  SpO2:  [95 %-100 %] 100 %  BP: (102-133)/(68-95) 122/79     Patient Vitals for the past 72 hrs (Last 3 readings):   Weight   06/23/18 0500 104 kg (229 lb 4.5 oz)     Body mass index is 31.98 kg/m².      Intake/Output Summary (Last 24 hours) at 06/25/18 0926  Last data filed at 06/25/18 0600   Gross per 24 hour   Intake          1701.81 ml   Output             1650 ml   Net            51.81 ml       Physical Exam  General: alert, awake and oriented x 3  Eyes:PERRL.   Neck:no JVD   Lungs:  clear to auscultation bilaterally with exception of scattered rales R lung base   Cardiovascular: Heart: regular rate and rhythm, S1, S2 normal, no murmur, click, rub or gallop.   Chest Wall: no tenderness.   Pulses-2+ radial, femoral, DP, PT b/l  Extremities: no cyanosis or edema.   Abdomen/Rectal: Abdomen: soft, non-tender non-distented; bowel sounds normal  Neurologic: Normal strength and tone. No focal numbness or weakness  Significant Labs:  CBC:    Recent Labs  Lab 06/23/18  1744 06/24/18  1615 06/25/18  0500   WBC 8.38 9.38 7.84  7.84   RBC 4.55* 4.26* 4.28*  4.28*   HGB 14.0  13.0* 12.7*  12.7*   HCT 42.7 40.1 39.9*  39.9*    143* 142*  142*   MCV 94 94 93  93   MCH 30.8 30.5 29.7  29.7   MCHC 32.8 32.4 31.8*  31.8*     BNP:    Recent Labs  Lab 06/23/18  0018   BNP 1,135*     CMP:    Recent Labs  Lab 06/23/18  1744 06/24/18  0104 06/24/18  0335 06/24/18  1136 06/25/18  0500   *  116* 118* 104  --  107   CALCIUM 9.0  9.0 9.1 9.0  --  9.2   ALBUMIN 3.5  --  3.3*  --  3.2*   PROT 6.9  --  6.5  --  6.4     139 139 139  --  137   K 3.6  3.6  3.6 3.9 3.7 4.3 4.1   CO2 27  27 26 26  --  25     101 102 102  --  103   BUN 16  16 16 15  --  13   CREATININE 0.9  0.9 1.0 0.9  --  0.9   ALKPHOS 61  --  57  --  53*   ALT 33  --  28  --  26   AST 23  --  23  --  18   BILITOT 0.7  --  0.5  --  0.4      Coagulation:     Recent Labs  Lab 06/23/18  0018   INR 1.5*   APTT 22.6     LDH:  No results for input(s): LDH in the last 72 hours.  Microbiology:  Microbiology Results (last 7 days)     ** No results found for the last 168 hours. **          BMP:   Recent Labs  Lab 06/24/18  0335  06/25/18  0500     --  107     --  137   K 3.7  < > 4.1     --  103   CO2 26  --  25   BUN 15  --  13   CREATININE 0.9  --  0.9   CALCIUM 9.0  --  9.2   MG 2.1  --   --    < > = values in this interval not displayed.  Cardiac Markers: No results for input(s): CKMB, TROPONINT, MYOGLOBIN in the last 72 hours.  I have reviewed all pertinent labs within the past 24 hours.    Diagnostic Results:  I have reviewed all pertinent imaging results/findings within the past 24 hours.    Assessment and Plan:     57 y.o. male  with h/o ischemic cardiomyopathy(negative for sarcoid on cardiac MRI 2012), H/o stents 7 years ago,Pulmonary sarcoid, left parietal stroke in the past, atrial fibrillation, CRT-D(st Giorgio), working up for advanced options who initially presented on 6/22 with expressive aphasia. Initial imaging negative for CVA, no tPA given. Transferred to [facility] service after  having VT storm on 6/23. Treating as ACS for now, ischemic evaluation today. Well controlled thus far on amiodarone loading.    * VT (ventricular tachycardia)    -Multiple episodes of MMVT on 6/23  -EP consulted. VT1 zone lowered as per EP  -K>4, Mg>2  -Continue amiodarone, transition to oral today, 400 bid  -If recurrence, restart drip +/- lidocaine  -Needs ischemic evaluation  -IC consult for [facility] today        CHF (congestive heart failure)    -Central line placed via Right IJ, SVO2 61  -CVP 7 today, SVO2 pending  -Holding home diuretic  -Appears euvolemic  -Continue metoprolol, losartan, spironolactone  -workup as outpatient if stable for advanced options.        NSTEMI (non-ST elevated myocardial infarction)    -Hx of CAD s/p remote PCI  -Intermittent atypical chest pain  -Troponin up to 2.3 but in setting of VT and defibrillation  -Treating as NSTEMI for now  -Continue ASA, plavix, statin, metoprolol tartrate 25 bid, home imdur, heparin gtt  -[facility] today        CAD (coronary artery disease)    -s/p LAD stent 2011  -Intermittent nonexertional chest pain, increased in frequency in recent weeks  -reported CP prior to VT episode  -Troponin peaked at 2.3, did also get shocked x1  -Treating as ACS as per NSTEMI        Sarcoidosis of lung    -prednisone per home regimen        Biventricular cardiac pacemaker in situ    -St Giorgio device interrogated-See EP notes        Expressive aphasia    -Initially admitted to Neuro, no TPA. CT head and CTA negative. Hx of seizures, EEG pending.  -Resolved, no further workup at this time        Seizure disorder    Home phenytoin            Srinivas Pelaez MD  Heart Transplant  [facility]

## 2018-06-25 NOTE — PLAN OF CARE
Problem: Patient Care Overview  Goal: Plan of Care Review  Outcome: Ongoing (interventions implemented as appropriate)   Patient understands, and agrees with plan of care. ABC DEF Bundle. Not intubated nor sedated. Breathing on 3 liters nasal canula, respirations stable currently 20, lungs course pulse ox 99. Cam score negative, patient alert and oriented times 4 and can follow commands. Patient can move independently in bed, and move all extremities. No signs of skin breakdown. Patient remain free from fall in injury throughout the shift. Family at bedside and involved with patient care. Patient denies chest pain throughout the shift. Patient remained NPO, for the majority of the shift. No signs of bleeding from cath lab procedure. Voided approprietly each hour. No BM. Side rails up times 2, call light in reach, will continue to monitor.

## 2018-06-25 NOTE — PLAN OF CARE
Problem: Patient Care Overview  Goal: Plan of Care Review  Outcome: Ongoing (interventions implemented as appropriate)    Uneventful night; VSS, no ectopy noted overnight. Amio gtt @ 1 mg/min, heparin @ 12 units/hr. Xa sent this am. NPO p MN, heart cath today. POC reviewed with pt and family, verbalized understanding.

## 2018-06-25 NOTE — NURSING
Air aspirated out of band from Cath, lab no signs of bleeding or distress, will continue to monitor.

## 2018-06-25 NOTE — ASSESSMENT & PLAN NOTE
-s/p LAD stent 2011  -Intermittent nonexertional chest pain, increased in frequency in recent weeks  -reported CP prior to VT episode  -Troponin peaked at 2.3, did also get shocked x1  -Treating as ACS as per NSTEMI

## 2018-06-25 NOTE — PROGRESS NOTES
Admit Note     Met with patient and daughter to assess needs. Patient is a 57 y.o.  male, admitted for VT .    Pt reports a history of strokes and heart attack.     Patient admitted from emergency.  on 6/22/2018 .  At this time, patient presents as alert and oriented x 4, good eye contact, calm and communicative.  At this time, patients caregiver presents as alert and oriented x 4 and calm.    Household/Family Systems     Patient resides with patient's spouse or when needed with his daughter/family.        Mailing address:     [address]  [place] [facility] [place] [facility] [address].      Physical address:  [address] [place] [address]  [place] [facility]    NOTE: Pt is not sure which home he will go to when discharged. He may go home or to one of his daughters homes.    Pt did not list daughters as emergency contacts. The pt reports his 17 yr old daughter usually comes with him to his doctor appointments. The pt's other two daughters are from his first marriage and they are 36 and 34 years old.  One daughter lives in [place] and the other daughter lives in [place].       Support system includes spouse, three daughters and other extended family.     Patient does not have dependents that are need of being cared for.      Patients primary caregiver is self.   Pt reports his spouse is in remission from cancer, however she has a trach and is not able to communicated over the phone.     Pt's home:  [phone]  Pt's cell:  [phone]    Additional emergency contact:  Jenny (sister, lives in [place])     During admission, patient's caregiver plans to visit.  Confirmed patient and patients caregivers do have access to reliable transportation.    Cognitive Status/Learning     Patient reports reading ability as 12th grade and trade school and states patient does not have difficulty with reading, writing, seeing, hearing, comprehension, learning and memory. PT reports he needs to see an eye  doctor.  Patient reports patient learns best by all methods.     Needed: No.   Highest education level: High School (9-12) or GED    Vocation/Disability   .  Working for Income: No  If no, reason not working: Disability  Patient reports he started receiving  SSI in .    Pt reports he plans on contacting Medicare office to inquire about SSD.     Adherence     Patient reports a medium level of adherence to patients health care regimen. Pt reports he does not always follow his diet.  Adherence counseling and education provided. Patient verbalizes understanding.    Substance Use    Patient reports the following substance usage.    Tobacco: none, patient denies any use.  Alcohol: none.  Pt reports he quit drinking a few years ago..  Illicit Drugs/Non-prescribed Medications: none, patient denies any use.  Patient states clear understanding of the potential impact of substance use.  Substance abstinence/cessation counseling, education and resources provided and reviewed.     Services Utilizing/ADLS    Infusion Service: Prior to admission, patient utilizing? no  Home Health: Prior to admission, patient utilizing? no pt reports he may need HH with physical therapy when discharged.  DME: Prior to admission, yes nebulizar  Pulmonary/Cardiac Rehab: Prior to admission, no  Dialysis:  Prior to admission, no  Transplant Specialty Pharmacy:  Prior to admission, no.    Prior to admission, patient reports patient was mostly  independent with ADLS and was driving. Pt reports his daughter can assist with transportation.   Patient reports patient is not able to care for self at this time due to compromised medical condition (as documented in medical record) and physical weakness..  Patient indicates a willingness to care for self once medically cleared to do so.    Insurance/Medications    Insured by   Payor/Plan Subscr  Sex Relation Sub. Ins. ID Effective Group Num   1. MEDICAID - [place]* SUNG MCBRIDE JR. 1960 Male   [id number]* 7/1/11                                    [address]      Primary Insurance (for UNOS reporting): Public Insurance - Medicaid  Secondary Insurance (for UNOS reporting): None    Patient reports he may not be able to obtain and afford medications at this time and at time of discharge.    Living Will/Healthcare Power of     Patient states patient does not have a LW and/or HCPA.   provided education regarding LW and HCPA and the completion of forms.    Coping/Mental Health    Patient is coping adequately with the aid of  family members.   Patient indicates mental health difficulties.   Pt reports a history of anxiety.  Pt reports he takes Xanax 2x a day.  Pt does not attend out pt counseling, however reports good family support.  Worker provided general support.     Discharge Planning    At time of discharge, patient is not sure whose home he will go to.  Pt's daughter will transport patient.  Per rounds today, expected discharge date has not been medically determined at this time. Patient and patients caregiver  verbalize understanding and are involved in treatment planning and discharge process.    Additional Concerns    Patient is being followed for needs, education, resources, information, emotional support, supportive counseling, and for supportive and skilled discharge plan of care.  providing ongoing psychosocial support, education, resources and d/c planning as needed.  SW remains available. Patient's caregiver verbalizes understanding and agreement with information reviewed,  availability and how to access available resources as needed. Patient verbalizes understanding and agreement with information reviewed, social work availability, and how to access available resources as needed.

## 2018-06-25 NOTE — PROGRESS NOTES
Patient's chart was reviewed by a stroke team provider.    Patient with no new neurological symptoms.  Aphasia resolved.  Patient transferred to ICU 6/23 for episodes of chest pain and dizziness with vtach confirmed.  Cardiac cath performed this morning without intervention.    EEG - abnormal EEG during wakefulness, drowsiness and sleep.  The presence of left temporal focal slowing is suggestive of focal neuronal dysfunction in this region and correlates with history of left parietal encephalomalacia.  There is no evidence of an epileptic process on  this recording.  No seizures were recorded during this study     Etiology of symptoms:  Recrudescence of prior stroke in setting of hypoperfusion vs TIA    Pending diagnostics to follow up on include:  None   For other recommendations please see our previous note completed on: 6/23/2018.    There are no new recommendations at this time. Will continue to follow. Discussed patient with staff. Please contact stroke team for any questions or concerns.     Danita Theodore NP-C  Vascular Neurology  [phone]

## 2018-06-25 NOTE — ASSESSMENT & PLAN NOTE
-Monomorphic VT  -Needs ischemic evaluation -- for [facility] today  -Keep K>4, mG >2  -Continue amiodarone  -BB  -If recurrence on above regimen would start lidocaine  -Device setting adjusted after slower VT -VT zone 1 decreased from 180 to 150  -Please see ICD programming for details  -overnight event noted, will have to check intrinsic rhythm to assess whether this is VT of an entirely separate morphology vs AT with intrinsic QRS.  I will be able to do this later in the day as patient is in cath right now

## 2018-06-25 NOTE — PROCEDURES
DATE OF PROCEDURE: 06/23/2018    EEG NUMBER:  [id number]    REFERRING PHYSICIAN:  Dr. Melgar.    This EEG was performed to assess for evidence of any underlying epilepsy.    ELECTROENCEPHALOGRAM REPORT    METHODOLOGY:  Electroencephalographic (EEG) recording is recorded with   electrodes placed according to the International 10-20 placement system.  Thirty   two (32) channels of digital signal (sampling rate of 512/sec), including T1   and T2, were simultaneously recorded from the scalp and may include EKG, EMG,   and/or eye monitors.  Recording band pass was 0.1 to 512 Hz.  Digital video   recording of the patient is simultaneously recorded with the EEG.  The patient   is instructed to report clinical symptoms which may occur during the recording   session.  EEG and video recording are stored and archived in digital format.    Activation procedures, which include photic stimulation, hyperventilation and   instructing patients to perform simple tasks, are done in selected patients.    The EEG is displayed on a monitor screen and can be reviewed using different   montages.  Computer assisted-analysis is employed to detect spike and   electrographic seizure activity.  The entire record is submitted for computer   analysis.  The entire recording is visually reviewed, and the times identified   by computer analysis as being spikes or seizures are reviewed again.    Compressed spectral analysis (CSA) is also performed on the activity recorded   from each individual channel.  This is displayed as a power display of   frequencies from 0 to 30 Hz over time.  The CSA is reviewed looking for   asymmetries in power between homologous areas of the scalp, then compared with   the original EEG recording.    Ten[employer] software was also utilized in the review of this study.  This software   suite analyzes the EEG recording in multiple domains.  Coherence and rhythmicity   are computed to identify EEG sections which may contain  organized seizures.    Each channel undergoes analysis to detect the presence of spike and sharp waves   which have special and morphological characteristics of epileptic activity.  The   routine EEG recording is converted from special into frequency domain.  This is   then displayed comparing homologous areas to identify areas of significant   asymmetry.  Algorithm to identify non-cortically generated artifact is used to   separate artifact from the EEG.    RECORDING TIMES:  Start on 06/23/2018 at hour 15 minute 44 second 45.  End on 06/23/2018 at hour 17 minute 32 second 24.  Total time of video EEG recording for this study was 1 hour and 45 minutes.    EEG FINDINGS:  The recording was obtained with a number of standard bipolar and   referential montages during wakefulness, drowsiness and sleep.  In the alert   state, the posterior background rhythm was a symmetric, well-modulated 9 Hz   alpha rhythm, which reacted symmetrically to eye opening.  Activation procedures   were not performed.  During drowsiness, the background rhythm waxed and waned   and there were periods of slowing.  Intermittent left temporal polymorphic delta   and theta range slowing was noted.  During stage II sleep, symmetric V waves, K   complexes and sleep spindles were noted.  There were no interictal epileptiform   abnormalities and no clinical or electrographic seizures were recorded.  The   EKG channel revealed sinus rhythm with frequent PVCs.  In addition, periods of   sinus tachycardia were also noted.    IMPRESSION:  This is an abnormal EEG during wakefulness, drowsiness and sleep.    Left temporal focal slowing was noted.    CLINICAL CORRELATION:  The patient is a 37-year-old male with history of left   hemisphere parietal lobe encephalomalacia, who is currently maintained on   gabapentin and phenytoin.  This is an abnormal EEG during wakefulness,   drowsiness and sleep.  The presence of left temporal focal slowing is suggestive   of  focal neuronal dysfunction in this region and correlates with history of   left parietal encephalomalacia.  There is no evidence of an epileptic process on   this recording.  No seizures were recorded during this study.  Of note, the EKG   channel revealed frequent PVCs and periods of sinus tachycardia and   wide-complex rhythms.  Further cardiac evaluation is recommended.      FAK/HN  dd: 06/25/2018 10:30:18 (CDT)  td: 06/25/2018 11:00:21 (CDT)  Doc ID   #[id number]  Job ID #[id number]    CC:

## 2018-06-25 NOTE — SUBJECTIVE & OBJECTIVE
Interval HPI: NAEON. Doing well this morning. 1 run of NSVT x15s last night, asymptomatic. NPO this am, feeling well with exception of dry cough. CVP 7.     Review of Systems   All other systems reviewed and are negative.    Objective:     Vital Signs (Most Recent):  Temp: 98 °F (36.7 °C) (06/25/18 0400)  Pulse: 78 (06/25/18 0906)  Resp: 20 (06/25/18 0906)  BP: 122/79 (06/25/18 0600)  SpO2: 100 % (06/25/18 0748) Vital Signs (24h Range):  Temp:  [97.8 °F (36.6 °C)-98.8 °F (37.1 °C)] 98 °F (36.7 °C)  Pulse:  [63-91] 78  Resp:  [12-47] 20  SpO2:  [95 %-100 %] 100 %  BP: (102-133)/(68-95) 122/79     Patient Vitals for the past 72 hrs (Last 3 readings):   Weight   06/23/18 0500 104 kg (229 lb 4.5 oz)     Body mass index is 31.98 kg/m².      Intake/Output Summary (Last 24 hours) at 06/25/18 0926  Last data filed at 06/25/18 0600   Gross per 24 hour   Intake          1701.81 ml   Output             1650 ml   Net            51.81 ml       Physical Exam  General: alert, awake and oriented x 3  Eyes:PERRL.   Neck:no JVD   Lungs:  clear to auscultation bilaterally with exception of scattered rales R lung base   Cardiovascular: Heart: regular rate and rhythm, S1, S2 normal, no murmur, click, rub or gallop.   Chest Wall: no tenderness.   Pulses-2+ radial, femoral, DP, PT b/l  Extremities: no cyanosis or edema.   Abdomen/Rectal: Abdomen: soft, non-tender non-distented; bowel sounds normal  Neurologic: Normal strength and tone. No focal numbness or weakness  Significant Labs:  CBC:    Recent Labs  Lab 06/23/18  1744 06/24/18  1615 06/25/18  0500   WBC 8.38 9.38 7.84  7.84   RBC 4.55* 4.26* 4.28*  4.28*   HGB 14.0 13.0* 12.7*  12.7*   HCT 42.7 40.1 39.9*  39.9*    143* 142*  142*   MCV 94 94 93  93   MCH 30.8 30.5 29.7  29.7   MCHC 32.8 32.4 31.8*  31.8*     BNP:    Recent Labs  Lab 06/23/18  0018   BNP 1,135*     CMP:    Recent Labs  Lab 06/23/18  1744 06/24/18  0104 06/24/18  0335 06/24/18  1136 06/25/18  0500    *  116* 118* 104  --  107   CALCIUM 9.0  9.0 9.1 9.0  --  9.2   ALBUMIN 3.5  --  3.3*  --  3.2*   PROT 6.9  --  6.5  --  6.4     139 139 139  --  137   K 3.6  3.6  3.6 3.9 3.7 4.3 4.1   CO2 27  27 26 26  --  25     101 102 102  --  103   BUN 16  16 16 15  --  13   CREATININE 0.9  0.9 1.0 0.9  --  0.9   ALKPHOS 61  --  57  --  53*   ALT 33  --  28  --  26   AST 23  --  23  --  18   BILITOT 0.7  --  0.5  --  0.4      Coagulation:     Recent Labs  Lab 06/23/18  0018   INR 1.5*   APTT 22.6     LDH:  No results for input(s): LDH in the last 72 hours.  Microbiology:  Microbiology Results (last 7 days)     ** No results found for the last 168 hours. **          BMP:   Recent Labs  Lab 06/24/18  0335  06/25/18  0500     --  107     --  137   K 3.7  < > 4.1     --  103   CO2 26  --  25   BUN 15  --  13   CREATININE 0.9  --  0.9   CALCIUM 9.0  --  9.2   MG 2.1  --   --    < > = values in this interval not displayed.  Cardiac Markers: No results for input(s): CKMB, TROPONINT, MYOGLOBIN in the last 72 hours.  I have reviewed all pertinent labs within the past 24 hours.    Diagnostic Results:  I have reviewed all pertinent imaging results/findings within the past 24 hours.

## 2018-06-25 NOTE — PROGRESS NOTES
Plan of Care:  LVEDP elevated at 45, increased dyspnea/orthopnea and cough while in the cath lab c/w volume overload.  Given 80 IV lasix, will continue on gtt at 10/hr.    Srinivas Pelaez MD  Cardiology Fellow PGY-4  Pager: [phone]

## 2018-06-25 NOTE — SUBJECTIVE & OBJECTIVE
Interval History: Patient had a wide complex tachycardia overnight for 30s which self-terminated.    Review of Systems   Constitution: Negative.   HENT: Negative.    Eyes: Negative.    Cardiovascular: Negative.    Respiratory: Negative.    Endocrine: Negative.    Skin: Negative.    Musculoskeletal: Negative.    Gastrointestinal: Negative.    Genitourinary: Negative.    Neurological: Negative.      Objective:     Vital Signs (Most Recent):  Temp: 98.1 °F (36.7 °C) (06/25/18 0800)  Pulse: 80 (06/25/18 0936)  Resp: (!) 21 (06/25/18 0936)  BP: 117/77 (06/25/18 0800)  SpO2: 100 % (06/25/18 0936) Vital Signs (24h Range):  Temp:  [97.8 °F (36.6 °C)-98.6 °F (37 °C)] 98.1 °F (36.7 °C)  Pulse:  [63-91] 80  Resp:  [12-47] 21  SpO2:  [95 %-100 %] 100 %  BP: (102-133)/(68-95) 117/77     Weight: 104 kg (229 lb 4.5 oz)  Body mass index is 31.98 kg/m².     SpO2: 100 %  O2 Device (Oxygen Therapy): nasal cannula    Physical Exam   Constitutional: He is oriented to person, place, and time. He appears well-developed and well-nourished.   HENT:   Head: Normocephalic and atraumatic.   Eyes: Conjunctivae and EOM are normal. Pupils are equal, round, and reactive to light.   Neck: Normal range of motion. Neck supple. No JVD present.   Cardiovascular: Normal rate, regular rhythm and normal heart sounds.  Exam reveals no gallop and no friction rub.    No murmur heard.  Pulmonary/Chest: Effort normal and breath sounds normal. No respiratory distress. He has no wheezes. He has no rales. He exhibits no tenderness.   Abdominal: Soft. Bowel sounds are normal. He exhibits no distension. There is no tenderness.   Musculoskeletal: Normal range of motion. He exhibits no edema or tenderness.   Neurological: He is alert and oriented to person, place, and time.   Skin: Skin is warm and dry. No erythema. No pallor.       Significant Labs:   EP:   Recent Labs  Lab 06/23/18  1744 06/24/18  0104 06/24/18  0335 06/24/18  1136 06/24/18  1615 06/25/18  0500      139 139 139  --   --  137   K 3.6  3.6  3.6 3.9 3.7 4.3  --  4.1     101 102 102  --   --  103   CO2 27  27 26 26  --   --  25   *  116* 118* 104  --   --  107   BUN 16  16 16 15  --   --  13   CREATININE 0.9  0.9 1.0 0.9  --   --  0.9   CALCIUM 9.0  9.0 9.1 9.0  --   --  9.2   PROT 6.9  --  6.5  --   --  6.4   ALBUMIN 3.5  --  3.3*  --   --  3.2*   BILITOT 0.7  --  0.5  --   --  0.4   ALKPHOS 61  --  57  --   --  53*   AST 23  --  23  --   --  18   ALT 33  --  28  --   --  26   ANIONGAP 11  11 11 11  --   --  9   ESTGFRAFRICA >60.0  >60.0 >60.0 >60.0  --   --  >60.0   EGFRNONAA >60.0  >60.0 >60.0 >60.0  --   --  >60.0   WBC 8.38  --   --   --  9.38 7.84  7.84   HGB 14.0  --   --   --  13.0* 12.7*  12.7*   HCT 42.7  --   --   --  40.1 39.9*  39.9*     --   --   --  143* 142*  142*       Significant Imaging: Echocardiogram:   2D echo with color flow doppler:   Results for orders placed or performed during the hospital encounter of 06/22/18   2D echo with color flow doppler   Result Value Ref Range    EF 8 (A) 55 - 65    Mitral Valve Regurgitation MILD TO MODERATE     Est. PA Systolic Pressure 21.15     Tricuspid Valve Regurgitation TRIVIAL TO MILD

## 2018-06-25 NOTE — SUBJECTIVE & OBJECTIVE
Past Medical History:   Diagnosis Date    AICD (automatic cardioverter/defibrillator) present     Arthritis     CHF (congestive heart failure)     Coronary artery disease     MI (myocardial infarction)     Sarcoid     Stroke        Past Surgical History:   Procedure Laterality Date    CARDIAC CATHETERIZATION      CARDIAC DEFIBRILLATOR PLACEMENT  7-12    CARDIAC DEFIBRILLATOR PLACEMENT      CORONARY STENT PLACEMENT  07/2011    FRACTURE SURGERY         Review of patient's allergies indicates:  No Known Allergies    PTA Medications   Medication Sig    albuterol (PROVENTIL) 2.5 mg /3 mL (0.083 %) nebulizer solution Take 2.5 mg by nebulization every 6 (six) hours as needed.    alprazolam (XANAX) 2 MG tablet Take 2 mg by mouth 2 (two) times daily as needed.     aspirin (ECOTRIN) 81 MG EC tablet Take 81 mg by mouth. 1 Tablet, Delayed Release (E.C.) Oral Every day    bumetanide (BUMEX) 1 MG tablet Take 1 mg by mouth daily as needed.    carisoprodol (SOMA) 350 MG tablet Take 350 mg by mouth 4 (four) times daily as needed for Muscle spasms.    colchicine 0.6 mg tablet 0.6 mg once daily.     furosemide (LASIX) 40 MG tablet TAKE 2 TABLETS BY MOUTH TWICE DAILY    gabapentin (NEURONTIN) 600 MG tablet Take 600 mg by mouth 2 (two) times daily. 1 Tablet Oral At bedtime    hydrocodone-acetaminophen 10-325mg (NORCO)  mg Tab Take 1 tablet by mouth 2 (two) times daily as needed.     isosorbide mononitrate (IMDUR) 30 MG 24 hr tablet Take 3 tablets (90 mg total) by mouth once daily. (Patient taking differently: Take 60 mg by mouth once daily. )    losartan (COZAAR) 50 MG tablet Take 1 tablet (50 mg total) by mouth once daily.    pantoprazole (PROTONIX) 40 MG tablet Take 40 mg by mouth. 1 Tablet, Delayed Release (E.C.) Oral Every day    phenytoin (DILANTIN) 100 MG ER capsule 100 mg 2 (two) times daily.     potassium chloride SA (K-DUR,KLOR-CON) 20 MEQ tablet TAKE 2 TABLETS BY MOUTH EVERY MORNING AND 1 TABLET  BY MOUTH EVERY EVENING    pravastatin (PRAVACHOL) 40 MG tablet Take 40 mg by mouth once daily.     predniSONE (DELTASONE) 10 MG tablet TK 1 T PO BID Tuesday, Thursday, Saturday    predniSONE (DELTASONE) 20 MG tablet Take 1 tablet by mouth 2 (two) times daily. Monday, Wednesday, and Friday, Sunday    sotalol (BETAPACE) 160 MG Tab TK 1 T PO  BID    spironolactone (ALDACTONE) 25 MG tablet TAKE 1 TABLET BY MOUTH EVERY DAY    warfarin (COUMADIN) 7.5 MG tablet 1 tablet Monday  0.5 tablet Tuesday-Sunday    albuterol (VENTOLIN HFA) 90 mcg/actuation inhaler Inhale 2 puffs into the lungs every 4 (four) hours as needed for Wheezing.    fluticasone-vilanterol (BREO ELLIPTA) 200-25 mcg/dose DsDv diskus inhaler Inhale 1 puff into the lungs once daily. Controller    nitroGLYCERIN (NITROSTAT) 0.4 MG SL tablet ONE TABLET UNDER TONGUE AS NEEDED FOR CHEST PAIN    promethazine-codeine 6.25-10 mg/5 ml (PHENERGAN WITH CODEINE) 6.25-10 mg/5 mL syrup TK 5 ML PO  Q 6 H PRN     Family History     Problem Relation (Age of Onset)    Cancer Maternal Grandmother    Coronary artery disease Father, Sister, Sister    Heart disease Mother, Father, Sister    Hypertension Mother, Father, Sister    Kidney disease Sister    Stroke Sister    Vision loss Sister        Social History Main Topics    Smoking status: Never Smoker    Smokeless tobacco: Never Used    Alcohol use No    Drug use: No    Sexual activity: Not on file     Review of Systems   All other systems reviewed and are negative.    Objective:     Vital Signs (Most Recent):  Temp: 98.1 °F (36.7 °C) (06/25/18 0800)  Pulse: 80 (06/25/18 0936)  Resp: (!) 21 (06/25/18 0936)  BP: 117/77 (06/25/18 0800)  SpO2: 100 % (06/25/18 0936) Vital Signs (24h Range):  Temp:  [97.8 °F (36.6 °C)-98.8 °F (37.1 °C)] 98.1 °F (36.7 °C)  Pulse:  [63-91] 80  Resp:  [12-47] 21  SpO2:  [95 %-100 %] 100 %  BP: (102-133)/(68-95) 117/77     Weight: 104 kg (229 lb 4.5 oz)  Body mass index is 31.98  kg/m².    SpO2: 100 %  O2 Device (Oxygen Therapy): nasal cannula      Intake/Output Summary (Last 24 hours) at 06/25/18 1001  Last data filed at 06/25/18 0600   Gross per 24 hour   Intake          1656.01 ml   Output             1350 ml   Net           306.01 ml       Lines/Drains/Airways     Central Venous Catheter Line                 Percutaneous Central Line Insertion/Assessment - triple lumen  06/23/18 1800 1 day          Peripheral Intravenous Line                 Peripheral IV - Single Lumen 06/22/18 2206 Right Antecubital 2 days                Physical Exam  General: alert, awake and oriented x 3  Eyes:PERRL.   Neck:no JVD   Lungs:  clear to auscultation bilaterally   Cardiovascular: Heart: regular rate and rhythm, S1, S2 normal, no murmur, click, rub or gallop.   Chest Wall: no tenderness.   Extremities: no cyanosis or edema.   Abdomen/Rectal: Abdomen: soft, non-tender non-distented; bowel sounds normal  Neurologic: Normal strength and tone. No focal numbness or weakness     LEFT RIGHT   RADIAL 2+ 2+   ULNAR     BRACHIAL     FEMORAL 2+ 2+   DP 1+ 1+   TP 1+ 1+   TRUNG'S TEST Normal Normal   BARBEAU TEST         Significant Labs:   CMP   Recent Labs  Lab 06/23/18  1744 06/24/18  0104 06/24/18  0335 06/24/18  1136 06/25/18  0500     139 139 139  --  137   K 3.6  3.6  3.6 3.9 3.7 4.3 4.1     101 102 102  --  103   CO2 27  27 26 26  --  25   *  116* 118* 104  --  107   BUN 16  16 16 15  --  13   CREATININE 0.9  0.9 1.0 0.9  --  0.9   CALCIUM 9.0  9.0 9.1 9.0  --  9.2   PROT 6.9  --  6.5  --  6.4   ALBUMIN 3.5  --  3.3*  --  3.2*   BILITOT 0.7  --  0.5  --  0.4   ALKPHOS 61  --  57  --  53*   AST 23  --  23  --  18   ALT 33  --  28  --  26   ANIONGAP 11  11 11 11  --  9   ESTGFRAFRICA >60.0  >60.0 >60.0 >60.0  --  >60.0   EGFRNONAA >60.0  >60.0 >60.0 >60.0  --  >60.0   , CBC   Recent Labs  Lab 06/23/18  1744 06/24/18  1615 06/25/18  0500   WBC 8.38 9.38 7.84  7.84   HGB 14.0 13.0*  12.7*  12.7*   HCT 42.7 40.1 39.9*  39.9*    143* 142*  142*    and INR No results for input(s): INR, PROTIME in the last 48 hours.    Significant Imaging: Echocardiogram:   2D echo with color flow doppler:   Results for orders placed or performed during the hospital encounter of 06/22/18   2D echo with color flow doppler   Result Value Ref Range    EF 8 (A) 55 - 65    Mitral Valve Regurgitation MILD TO MODERATE     Est. PA Systolic Pressure 21.15     Tricuspid Valve Regurgitation TRIVIAL TO MILD

## 2018-06-25 NOTE — PROGRESS NOTES
[facility]  Cardiac Electrophysiology  Progress Note    Admission Date: 6/22/2018  Code Status: Full Code   Attending Physician: Britt Melgar MD   Expected Discharge Date:   Principal Problem:VT (ventricular tachycardia)    Subjective:     Interval History: Patient had a wide complex tachycardia overnight for 30s which self-terminated.    Review of Systems   Constitution: Negative.   HENT: Negative.    Eyes: Negative.    Cardiovascular: Negative.    Respiratory: Negative.    Endocrine: Negative.    Skin: Negative.    Musculoskeletal: Negative.    Gastrointestinal: Negative.    Genitourinary: Negative.    Neurological: Negative.      Objective:     Vital Signs (Most Recent):  Temp: 98.1 °F (36.7 °C) (06/25/18 0800)  Pulse: 80 (06/25/18 0936)  Resp: (!) 21 (06/25/18 0936)  BP: 117/77 (06/25/18 0800)  SpO2: 100 % (06/25/18 0936) Vital Signs (24h Range):  Temp:  [97.8 °F (36.6 °C)-98.6 °F (37 °C)] 98.1 °F (36.7 °C)  Pulse:  [63-91] 80  Resp:  [12-47] 21  SpO2:  [95 %-100 %] 100 %  BP: (102-133)/(68-95) 117/77     Weight: 104 kg (229 lb 4.5 oz)  Body mass index is 31.98 kg/m².     SpO2: 100 %  O2 Device (Oxygen Therapy): nasal cannula    Physical Exam   Constitutional: He is oriented to person, place, and time. He appears well-developed and well-nourished.   HENT:   Head: Normocephalic and atraumatic.   Eyes: Conjunctivae and EOM are normal. Pupils are equal, round, and reactive to light.   Neck: Normal range of motion. Neck supple. No JVD present.   Cardiovascular: Normal rate, regular rhythm and normal heart sounds.  Exam reveals no gallop and no friction rub.    No murmur heard.  Pulmonary/Chest: Effort normal and breath sounds normal. No respiratory distress. He has no wheezes. He has no rales. He exhibits no tenderness.   Abdominal: Soft. Bowel sounds are normal. He exhibits no distension. There is no tenderness.   Musculoskeletal: Normal range of motion. He exhibits no edema or tenderness.    Neurological: He is alert and oriented to person, place, and time.   Skin: Skin is warm and dry. No erythema. No pallor.       Significant Labs:   EP:   Recent Labs  Lab 06/23/18  1744 06/24/18  0104 06/24/18  0335 06/24/18  1136 06/24/18  1615 06/25/18  0500     139 139 139  --   --  137   K 3.6  3.6  3.6 3.9 3.7 4.3  --  4.1     101 102 102  --   --  103   CO2 27  27 26 26  --   --  25   *  116* 118* 104  --   --  107   BUN 16  16 16 15  --   --  13   CREATININE 0.9  0.9 1.0 0.9  --   --  0.9   CALCIUM 9.0  9.0 9.1 9.0  --   --  9.2   PROT 6.9  --  6.5  --   --  6.4   ALBUMIN 3.5  --  3.3*  --   --  3.2*   BILITOT 0.7  --  0.5  --   --  0.4   ALKPHOS 61  --  57  --   --  53*   AST 23  --  23  --   --  18   ALT 33  --  28  --   --  26   ANIONGAP 11  11 11 11  --   --  9   ESTGFRAFRICA >60.0  >60.0 >60.0 >60.0  --   --  >60.0   EGFRNONAA >60.0  >60.0 >60.0 >60.0  --   --  >60.0   WBC 8.38  --   --   --  9.38 7.84  7.84   HGB 14.0  --   --   --  13.0* 12.7*  12.7*   HCT 42.7  --   --   --  40.1 39.9*  39.9*     --   --   --  143* 142*  142*       Significant Imaging: Echocardiogram:   2D echo with color flow doppler:   Results for orders placed or performed during the hospital encounter of 06/22/18   2D echo with color flow doppler   Result Value Ref Range    EF 8 (A) 55 - 65    Mitral Valve Regurgitation MILD TO MODERATE     Est. PA Systolic Pressure 21.15     Tricuspid Valve Regurgitation TRIVIAL TO MILD      Assessment and Plan:     * VT (ventricular tachycardia)     -Monomorphic VT  -Needs ischemic evaluation -- for [facility] today  -Keep K>4, mG >2  -Continue amiodarone  -BB  -If recurrence on above regimen would start lidocaine  -Device setting adjusted after slower VT -VT zone 1 decreased from 180 to 150  -Please see ICD programming for details  -overnight event noted, will have to check intrinsic rhythm to assess whether this is VT of an entirely separate morphology vs AT  with intrinsic QRS.  I will be able to do this later in the day as patient is in cath right now                 Paulino Rubi MD  Cardiac Electrophysiology  [facility]

## 2018-06-25 NOTE — CONSULTS
PM&R consult follow up.      Patient is high level with PT/OT. Patient ambulated 220 ft SBA without LOB. Also, ascended/descended 2 stair(s) SV-SetupA.  Agree with therapy recommendations for outpatient PT. SLP cognition evaluation pending. If SLP deficits are noted on evaluation, recommend SLP outpatient as well. Will sign off.  Please call with questions/concerns or re-consult if situation changes.      SYLVIE Sosa, FNP-C  Physical Medicine & Rehabilitation   06/25/2018  Spectralink: [id number]

## 2018-06-26 PROBLEM — I47.20 VENTRICULAR TACHYCARDIA: Status: ACTIVE | Noted: 2018-01-01

## 2018-06-26 PROBLEM — I50.23 ACUTE ON CHRONIC SYSTOLIC CONGESTIVE HEART FAILURE: Status: ACTIVE | Noted: 2018-01-01

## 2018-06-26 NOTE — PROGRESS NOTES
EDUCATION NOTE:    Yonathan Good was seen today for pre-heart transplant education.  Patient signed wellness contract and informed consent to undergo heart transplant evaluation work-up.      Information presented included:  · Evaluation process  · Members of the transplant team  · Selection committee members and role of the committee  · Listing process for transplant  · Different listing designations, including status 7  · 1-year graft survival statistics  · LVAD as bridge to transplant or DT  · Need to reach patient within 15 minutes of donor offer  · CDC high risk donors  · Blood transfusions  · Process for matching donor with recipient  · Need for weight loss and how it relates to the wait time  · Post-transplant immunosuppression for life with need to be able to afford post-transplant medications  · Need for a caregiver to be with them at all times beginning with discharge from ICU, through at least the first 6 weeks post-transplant  · Need to find local housing for the first 6 weeks post-transplant  · How to reach team members at any time  · [facility] website with written instructions regarding how to look up information specific to [facility] transplant program    Patient was accompanied by both daughters; advised that we will attempt to complete w/u as inpatient.  Contact information was provided.  Encouraged patient and/or daughters to contact me with any questions/concerns.

## 2018-06-26 NOTE — PROGRESS NOTES
PRE-EDUCATION BOOKLET NOTE:    Met with Yonathan Good and had a brief discussion regarding the advanced heart failure evaluation process including options for heart transplantation and/or left ventricular assist device (LVAD) implantation.     Heart Transplant Educational Booklet given to patient, which included the following handouts:  · Treatment options for Advanced Heart Failure: A Referral Guide for patients  · Pre Heart Transplant Coordinator Team Contact Information   · Recipient Informed Consent   · Wellness Contract  · Multiple Listing Protocol and [employer] toll free numbers   · VAD Education Packet   · Advanced Directives  · 8 Step Plan for Heart Failure Patients     Significant History:  · Prior sternotomies: No  · ICD: yes- [facility] CRT-D/2012  · Blood transfusions:  no  · Angiography:  6/25/18 at [facility]  · Colonoscopy:  No--cancelled at outside facility due to chest pain.  · Tobacco history:  Denies    Question and answer session was conducted.  Patient was encouraged to review thoroughly and be prepared to review with coordinator tomorrow with questions. Patient was encouraged to contact the coordinator team with any questions or concerns. Contact information provided. Understanding verbalized.

## 2018-06-26 NOTE — SUBJECTIVE & OBJECTIVE
No current facility-administered medications on file prior to encounter.      Current Outpatient Prescriptions on File Prior to Encounter   Medication Sig    albuterol (PROVENTIL) 2.5 mg /3 mL (0.083 %) nebulizer solution Take 2.5 mg by nebulization every 6 (six) hours as needed.    alprazolam (XANAX) 2 MG tablet Take 2 mg by mouth 2 (two) times daily as needed.     aspirin (ECOTRIN) 81 MG EC tablet Take 81 mg by mouth. 1 Tablet, Delayed Release (E.C.) Oral Every day    bumetanide (BUMEX) 1 MG tablet Take 1 mg by mouth daily as needed.    carisoprodol (SOMA) 350 MG tablet Take 350 mg by mouth 4 (four) times daily as needed for Muscle spasms.    colchicine 0.6 mg tablet 0.6 mg once daily.     furosemide (LASIX) 40 MG tablet TAKE 2 TABLETS BY MOUTH TWICE DAILY    gabapentin (NEURONTIN) 600 MG tablet Take 600 mg by mouth 2 (two) times daily. 1 Tablet Oral At bedtime    hydrocodone-acetaminophen 10-325mg (NORCO)  mg Tab Take 1 tablet by mouth 2 (two) times daily as needed.     isosorbide mononitrate (IMDUR) 30 MG 24 hr tablet Take 3 tablets (90 mg total) by mouth once daily. (Patient taking differently: Take 60 mg by mouth once daily. )    losartan (COZAAR) 50 MG tablet Take 1 tablet (50 mg total) by mouth once daily.    pantoprazole (PROTONIX) 40 MG tablet Take 40 mg by mouth. 1 Tablet, Delayed Release (E.C.) Oral Every day    phenytoin (DILANTIN) 100 MG ER capsule 100 mg 2 (two) times daily.     potassium chloride SA (K-DUR,KLOR-CON) 20 MEQ tablet TAKE 2 TABLETS BY MOUTH EVERY MORNING AND 1 TABLET BY MOUTH EVERY EVENING    pravastatin (PRAVACHOL) 40 MG tablet Take 40 mg by mouth once daily.     predniSONE (DELTASONE) 10 MG tablet TK 1 T PO BID Tuesday, Thursday, Saturday    predniSONE (DELTASONE) 20 MG tablet Take 1 tablet by mouth 2 (two) times daily. Monday, Wednesday, and Friday, Sunday    sotalol (BETAPACE) 160 MG Tab TK 1 T PO  BID    spironolactone (ALDACTONE) 25 MG tablet TAKE 1 TABLET  BY MOUTH EVERY DAY    warfarin (COUMADIN) 7.5 MG tablet 1 tablet Monday  0.5 tablet Tuesday-Sunday    albuterol (VENTOLIN HFA) 90 mcg/actuation inhaler Inhale 2 puffs into the lungs every 4 (four) hours as needed for Wheezing.    fluticasone-vilanterol (BREO ELLIPTA) 200-25 mcg/dose DsDv diskus inhaler Inhale 1 puff into the lungs once daily. Controller    nitroGLYCERIN (NITROSTAT) 0.4 MG SL tablet ONE TABLET UNDER TONGUE AS NEEDED FOR CHEST PAIN    promethazine-codeine 6.25-10 mg/5 ml (PHENERGAN WITH CODEINE) 6.25-10 mg/5 mL syrup TK 5 ML PO  Q 6 H PRN       Review of patient's allergies indicates:  No Known Allergies    Past Medical History:   Diagnosis Date    AICD (automatic cardioverter/defibrillator) present     Arthritis     CHF (congestive heart failure)     Coronary artery disease     MI (myocardial infarction)     Sarcoid     Stroke      Past Surgical History:   Procedure Laterality Date    CARDIAC CATHETERIZATION      CARDIAC DEFIBRILLATOR PLACEMENT  7-12    CARDIAC DEFIBRILLATOR PLACEMENT      CORONARY STENT PLACEMENT  07/2011    FRACTURE SURGERY      LEFT HEART CATHETERIZATION N/A 6/25/2018    Procedure: Left heart cath;  Surgeon: Jordi Lui MD;  Location: [facility] CATH LAB;  Service: Cardiology;  Laterality: N/A;     Family History     Problem Relation (Age of Onset)    Cancer Maternal Grandmother    Coronary artery disease Father, Sister, Sister    Heart disease Mother, Father, Sister    Hypertension Mother, Father, Sister    Kidney disease Sister    Stroke Sister    Vision loss Sister        Social History Main Topics    Smoking status: Never Smoker    Smokeless tobacco: Never Used    Alcohol use No    Drug use: No    Sexual activity: Not on file     Review of Systems   Constitutional: Negative for activity change and fatigue.   Respiratory: Negative for shortness of breath.    Cardiovascular: Negative for chest pain, palpitations and leg swelling.   Gastrointestinal:  Negative for abdominal pain, diarrhea and vomiting.   Endocrine: Negative for polydipsia, polyphagia and polyuria.   Genitourinary: Negative for dysuria.   Musculoskeletal: Negative for gait problem.   Skin: Negative for rash.   Allergic/Immunologic: Negative for immunocompromised state.   Neurological: Negative for dizziness, syncope and weakness.   Hematological: Does not bruise/bleed easily.   Psychiatric/Behavioral: Negative for behavioral problems.     Objective:     Vital Signs (Most Recent):  Temp: 98.8 °F (37.1 °C) (06/26/18 0701)  Pulse: 96 (06/26/18 1001)  Resp: 20 (06/26/18 1001)  BP: 116/80 (06/26/18 1001)  SpO2: 98 % (06/26/18 1001) Vital Signs (24h Range):  Temp:  [98.2 °F (36.8 °C)-99.1 °F (37.3 °C)] 98.8 °F (37.1 °C)  Pulse:  [] 96  Resp:  [14-65] 20  SpO2:  [93 %-100 %] 98 %  BP: (104-149)/(6-91) 116/80     Weight: 104 kg (229 lb 4.5 oz)  Body mass index is 31.98 kg/m².    SpO2: 98 %  O2 Device (Oxygen Therapy): nasal cannula     Intake/Output - Last 3 Shifts       06/24 0700 - 06/25 0659 06/25 0700 - 06/26 0659 06/26 0700 - 06/27 0659    P.O. 1190 350     I.V. (mL/kg) 1099.2 (10.6) 154.1 (1.5) 35.1 (0.3)    Total Intake(mL/kg) 2289.2 (22) 504.1 (4.8) 35.1 (0.3)    Urine (mL/kg/hr) 1650 (0.7) 3225 (1.3) 150 (0.4)    Total Output 1650 3225 150    Net +639.2 -2721 -114.9           Stool Occurrence  0 x            Lines/Drains/Airways     Central Venous Catheter Line                 Percutaneous Central Line Insertion/Assessment - triple lumen  06/23/18 1800 2 days          Peripheral Intravenous Line                 Peripheral IV - Single Lumen 06/22/18 2206 Right Antecubital 3 days                Physical Exam   Constitutional: He is oriented to person, place, and time. He appears well-developed and well-nourished.   Cardiovascular: Normal rate, regular rhythm and normal heart sounds.    Pulmonary/Chest: Effort normal and breath sounds normal.   Abdominal: Soft.   Neurological: He is alert and  oriented to person, place, and time.   Skin: Skin is warm, dry and intact.   Psychiatric: He has a normal mood and affect.       Significant Labs:  BMP:   Recent Labs  Lab 06/25/18  1634 06/26/18  0310   GLU  --  96   NA  --  136   K 4.7 4.0   CL  --  97   CO2  --  29   BUN  --  11   CREATININE  --  1.1   CALCIUM  --  9.6   MG 2.0  --      CBC:   Recent Labs  Lab 06/26/18  0310   WBC 8.69   RBC 4.49*   HGB 13.5*   HCT 41.8   *   MCV 93   MCH 30.1   MCHC 32.3     Significant Diagnostics:  CT: I have reviewed all pertinent results/findings within the past 24 hours

## 2018-06-26 NOTE — ASSESSMENT & PLAN NOTE
57 y.o. male with significant past medical history of prior Left sided stroke w/o residual deficits, CHF, CAD, seizure DO, sarcoidosis of the lung, s/p pacemaker placement, ?A fib-on Coumadin presented to hospital as a transfer from [facility] for evaluation of aphasia. No tPA.  No LVO on CTA MP.    Aphasia resolving but reports not back to baseline.  No history of speech deficits with prior history of stroke.  Patient unable to obtain MRI.  Repeat CT did not demonstrate new infarcts.  Given symptom continuation for > 24 hours, TIA is less likely.  It is possible he has had another event not evident on imaging.  Repeat 2D echo demonstrates an EF of 5-10%.  He is at high risk for thromboembolism.  Would benefit from resuming warfarin.  Currently on ASA and Plavix.     Antithrombotics for secondary stroke prevention: Anticoagulants: Warfarin INR adjusted target - discontinue ASA and Plavix once warfarin therapeutic.     Statins for secondary stroke prevention and hyperlipidemia, if present:   Statins: Atorvastatin- 40 mg daily     Aggressive risk factor modification: Obesity, A-Fib, CAD, prior stroke, CHF     Rehab efforts: PT/OT/SLP to evaluate and treat  - Please re-consult Speech therapy (order discontinued when transferred to ICU)     Diagnostics ordered/pending:  None     VTE prophylaxis: Recommend resuming anticoagulation; Mechanical prophylaxis: Place SCDs     BP parameters: SBP<180

## 2018-06-26 NOTE — NURSING TRANSFER
Nursing Transfer Note      6/26/2018     Transfer To: [address]    Transfer via wheelchair    Transfer with 2L NC to O2, cardiac monitoring    Transported by RN    Medicines sent: Inhaler    Chart send with patient: Yes    Notified: daughter    Patient reassessed at: 1300 on 6/26/18 by JOANNA Watkins    Upon arrival to floor: cardiac monitor applied, patient oriented to room, call bell in reach and bed in lowest position

## 2018-06-26 NOTE — ASSESSMENT & PLAN NOTE
-Multiple episodes of MMVT on 6/23  -EP consulted. VT1 zone lowered as per EP. No further VT. Likely AT with aberrancy on early AM 6/25, nonsustained.   -K>4, Mg>2  -Continue amiodarone, 400 bid x2 weeks, then 400 daily  -If recurrence, restart drip +/- lidocaine  -C without overt culprit lesion  -Follow up with Dr. Telles in EP in 4-6 weeks

## 2018-06-26 NOTE — CONSULTS
[facility]  Cardiothoracic Surgery  Consult Note    Patient Name: Yonathan Good Jr.  MRN: [id number]  Admission Date: 6/22/2018  Attending Physician: Britt Melgar MD  Referring Provider: Chuck Antunez MD    Patient information was obtained from records.     Inpatient consult to Cardiothoracic Surgery  Consult performed by: KHUSHI DYKES  Consult ordered by: ANDERSON MTZ  Reason for consult: LVAD/OHT work up         Subjective:     Principal Problem: VT (ventricular tachycardia)    History of Present Illness: 57 y.o. male  with h/o ischemic cardiomyopathy(negative for sarcoid on cardiac MRI 2012), H/o stents 7 years ago,Pulmonary sarcoid, left parietal stroke in the past, atrial fibrillation, CRT-D(st [place]) who follows Dr Berman and is being worked up as outpatient for advanced options. He was transferred to Stroke service last evening with difficulty speaking with suspected Stroke.Initial workup included CVA which was negative for acute CVA.No tPA was given. This am patient complained of chest pain and dizziness and was noted to be in Monomorphic VT -Code blue was called.He was ATP'ed out of it but he also had an external shock delivered by rapid repsonse team as he was persistently in VT but no shock was delivered.He went into Fast VT with rate>200 and his ICD shocked him out of it. He was transferred to ICU.He reports having chest pain for last 4-5 years on exertion relieved with nitro-reports stress test done in [place].Last stress test 2015 in [facility]. He was loaded with amiodarone.He had 2 more episodes of VT which were slower at 150-His device did not ATP as below VT-1 zone. He also complains of cough with pinkish sputum,SOB.He reports no more chest pain since the shock.    He was recently scheduled for colonoscopy as part of transplant workup for which his coumadin was held and he was placed on eliquis temporarily.He had chest pain on day of c-scope and he had  to take a nitro and his procedure was cancelled(5 days ago). He reports he started taking his coumadin after.His INR was subtherapeutic on arrival.     He states that his pulmonary sarcoid was diagnosed in 2015 when he was evaluated for a dry non-productive cough.  States that he underwent bronchoscopy with biopsies which confirmed sarcoid.  Since then, he has been on varying doses of Prednisone He has no other organ involvement from sarcoid and states that his only symptom has been cough. Denies any alcohol or illicit drug use.  Previously worked as a .        No current facility-administered medications on file prior to encounter.      Current Outpatient Prescriptions on File Prior to Encounter   Medication Sig    albuterol (PROVENTIL) 2.5 mg /3 mL (0.083 %) nebulizer solution Take 2.5 mg by nebulization every 6 (six) hours as needed.    alprazolam (XANAX) 2 MG tablet Take 2 mg by mouth 2 (two) times daily as needed.     aspirin (ECOTRIN) 81 MG EC tablet Take 81 mg by mouth. 1 Tablet, Delayed Release (E.C.) Oral Every day    bumetanide (BUMEX) 1 MG tablet Take 1 mg by mouth daily as needed.    carisoprodol (SOMA) 350 MG tablet Take 350 mg by mouth 4 (four) times daily as needed for Muscle spasms.    colchicine 0.6 mg tablet 0.6 mg once daily.     furosemide (LASIX) 40 MG tablet TAKE 2 TABLETS BY MOUTH TWICE DAILY    gabapentin (NEURONTIN) 600 MG tablet Take 600 mg by mouth 2 (two) times daily. 1 Tablet Oral At bedtime    hydrocodone-acetaminophen 10-325mg (NORCO)  mg Tab Take 1 tablet by mouth 2 (two) times daily as needed.     isosorbide mononitrate (IMDUR) 30 MG 24 hr tablet Take 3 tablets (90 mg total) by mouth once daily. (Patient taking differently: Take 60 mg by mouth once daily. )    losartan (COZAAR) 50 MG tablet Take 1 tablet (50 mg total) by mouth once daily.    pantoprazole (PROTONIX) 40 MG tablet Take 40 mg by mouth. 1 Tablet, Delayed Release (E.C.) Oral Every day    phenytoin  (DILANTIN) 100 MG ER capsule 100 mg 2 (two) times daily.     potassium chloride SA (K-DUR,KLOR-CON) 20 MEQ tablet TAKE 2 TABLETS BY MOUTH EVERY MORNING AND 1 TABLET BY MOUTH EVERY EVENING    pravastatin (PRAVACHOL) 40 MG tablet Take 40 mg by mouth once daily.     predniSONE (DELTASONE) 10 MG tablet TK 1 T PO BID Tuesday, Thursday, Saturday    predniSONE (DELTASONE) 20 MG tablet Take 1 tablet by mouth 2 (two) times daily. Monday, Wednesday, and Friday, Sunday    sotalol (BETAPACE) 160 MG Tab TK 1 T PO  BID    spironolactone (ALDACTONE) 25 MG tablet TAKE 1 TABLET BY MOUTH EVERY DAY    warfarin (COUMADIN) 7.5 MG tablet 1 tablet Monday  0.5 tablet Tuesday-Sunday    albuterol (VENTOLIN HFA) 90 mcg/actuation inhaler Inhale 2 puffs into the lungs every 4 (four) hours as needed for Wheezing.    fluticasone-vilanterol (BREO ELLIPTA) 200-25 mcg/dose DsDv diskus inhaler Inhale 1 puff into the lungs once daily. Controller    nitroGLYCERIN (NITROSTAT) 0.4 MG SL tablet ONE TABLET UNDER TONGUE AS NEEDED FOR CHEST PAIN    promethazine-codeine 6.25-10 mg/5 ml (PHENERGAN WITH CODEINE) 6.25-10 mg/5 mL syrup TK 5 ML PO  Q 6 H PRN       Review of patient's allergies indicates:  No Known Allergies    Past Medical History:   Diagnosis Date    AICD (automatic cardioverter/defibrillator) present     Arthritis     CHF (congestive heart failure)     Coronary artery disease     MI (myocardial infarction)     Sarcoid     Stroke      Past Surgical History:   Procedure Laterality Date    CARDIAC CATHETERIZATION      CARDIAC DEFIBRILLATOR PLACEMENT  7-12    CARDIAC DEFIBRILLATOR PLACEMENT      CORONARY STENT PLACEMENT  07/2011    FRACTURE SURGERY      LEFT HEART CATHETERIZATION N/A 6/25/2018    Procedure: Left heart cath;  Surgeon: Jordi Lui MD;  Location[facility] CATH LAB;  Service: Cardiology;  Laterality: N/A;     Family History     Problem Relation (Age of Onset)    Cancer Maternal Grandmother    Coronary artery  disease Father, Sister, Sister    Heart disease Mother, Father, Sister    Hypertension Mother, Father, Sister    Kidney disease Sister    Stroke Sister    Vision loss Sister        Social History Main Topics    Smoking status: Never Smoker    Smokeless tobacco: Never Used    Alcohol use No    Drug use: No    Sexual activity: Not on file     Review of Systems   Constitutional: Negative for activity change and fatigue.   Respiratory: Negative for shortness of breath.    Cardiovascular: Negative for chest pain, palpitations and leg swelling.   Gastrointestinal: Negative for abdominal pain, diarrhea and vomiting.   Endocrine: Negative for polydipsia, polyphagia and polyuria.   Genitourinary: Negative for dysuria.   Musculoskeletal: Negative for gait problem.   Skin: Negative for rash.   Allergic/Immunologic: Negative for immunocompromised state.   Neurological: Negative for dizziness, syncope and weakness.   Hematological: Does not bruise/bleed easily.   Psychiatric/Behavioral: Negative for behavioral problems.     Objective:     Vital Signs (Most Recent):  Temp: 98.8 °F (37.1 °C) (06/26/18 0701)  Pulse: 96 (06/26/18 1001)  Resp: 20 (06/26/18 1001)  BP: 116/80 (06/26/18 1001)  SpO2: 98 % (06/26/18 1001) Vital Signs (24h Range):  Temp:  [98.2 °F (36.8 °C)-99.1 °F (37.3 °C)] 98.8 °F (37.1 °C)  Pulse:  [] 96  Resp:  [14-65] 20  SpO2:  [93 %-100 %] 98 %  BP: (104-149)/(6-91) 116/80     Weight: 104 kg (229 lb 4.5 oz)  Body mass index is 31.98 kg/m².    SpO2: 98 %  O2 Device (Oxygen Therapy): nasal cannula     Intake/Output - Last 3 Shifts       06/24 0700 - 06/25 0659 06/25 0700 - 06/26 0659 06/26 0700 - 06/27 0659    P.O. 1190 350     I.V. (mL/kg) 1099.2 (10.6) 154.1 (1.5) 35.1 (0.3)    Total Intake(mL/kg) 2289.2 (22) 504.1 (4.8) 35.1 (0.3)    Urine (mL/kg/hr) 1650 (0.7) 3225 (1.3) 150 (0.4)    Total Output 1650 3225 150    Net +639.2 -2721 -114.9           Stool Occurrence  0 x            Lines/Drains/Airways      Central Venous Catheter Line                 Percutaneous Central Line Insertion/Assessment - triple lumen  06/23/18 1800 2 days          Peripheral Intravenous Line                 Peripheral IV - Single Lumen 06/22/18 2206 Right Antecubital 3 days                Physical Exam   Constitutional: He is oriented to person, place, and time. He appears well-developed and well-nourished.   Cardiovascular: Normal rate, regular rhythm and normal heart sounds.    Pulmonary/Chest: Effort normal and breath sounds normal.   Abdominal: Soft.   Neurological: He is alert and oriented to person, place, and time.   Skin: Skin is warm, dry and intact.   Psychiatric: He has a normal mood and affect.       Significant Labs:  BMP:   Recent Labs  Lab 06/25/18  1634 06/26/18  0310   GLU  --  96   NA  --  136   K 4.7 4.0   CL  --  97   CO2  --  29   BUN  --  11   CREATININE  --  1.1   CALCIUM  --  9.6   MG 2.0  --      CBC:   Recent Labs  Lab 06/26/18  0310   WBC 8.69   RBC 4.49*   HGB 13.5*   HCT 41.8   *   MCV 93   MCH 30.1   MCHC 32.3     Significant Diagnostics:  CT: I have reviewed all pertinent results/findings within the past 24 hours    ECHO CONCLUSIONS     1 - Severe left ventricular enlargement.     2 - Severely depressed left ventricular systolic function (EF 5-10%).     3 - Eccentric hypertrophy.     4 - Severe left atrial enlargement.     5 - Right ventricular enlargement with low normal systolic function.     6 - Mild to moderate mitral regurgitation.     7 - Trivial to mild tricuspid regurgitation.     8 - The estimated PA systolic pressure is 21 mmHg.    [facility] Angiographic Results Diagnostic:        Patient has a right dominant coronary artery.      - Left Main Coronary Artery:             The LM has luminal irregularities. There is TARI 3 flow.     - Left Anterior Descending Artery:             The LAD has a 30% stenosis. There is TARI 3 flow. Patent stent.      - Left Circumflex Artery:             The proximal  LCX has a 70% stenosis. There is TARI 3 flow.     - OM1:             The OM1 has a 80% stenosis. There is TARI 3 flow. Bifurcation lesion.      - Right Coronary Artery:             The RCA has luminal irregularities. There is TARI 3 flow.     - Radial:             The radial has a 30% stenosis.  Assessment/Plan:     NYHA Score: NYHA IV: inability to carry on any physical activity without discomfort    CHF (congestive heart failure)    CT does not preclude patient from advanced options. Pt can follow up outpatient with Dr. Chung. Pt will be presented in selection.             Thank you for your consult. I will sign off. Please contact us if you have any additional questions.    Teagan Aclocer NP  Cardiothoracic Surgery  [facility]

## 2018-06-26 NOTE — SUBJECTIVE & OBJECTIVE
Interval HPI: NAEON. Doing well this morning. No events on telemetry. CVP 8. Diuresed negative 2.7 L overnight on lasix gtt. Breathing and cough significantly improved.    Review of Systems   All other systems reviewed and are negative.    Objective:     Vital Signs (Most Recent):  Temp: 98.8 °F (37.1 °C) (06/26/18 0701)  Pulse: 91 (06/26/18 1101)  Resp: (!) 27 (06/26/18 1101)  BP: 116/84 (06/26/18 1101)  SpO2: 98 % (06/26/18 1101) Vital Signs (24h Range):  Temp:  [98.2 °F (36.8 °C)-99.1 °F (37.3 °C)] 98.8 °F (37.1 °C)  Pulse:  [] 91  Resp:  [14-65] 27  SpO2:  [93 %-100 %] 98 %  BP: (104-149)/(6-91) 116/84     No data found.    Body mass index is 31.98 kg/m².      Intake/Output Summary (Last 24 hours) at 06/26/18 1133  Last data filed at 06/26/18 1101   Gross per 24 hour   Intake           460.83 ml   Output             2625 ml   Net         -2164.17 ml       Physical Exam  General: alert, awake and oriented x 3  Eyes:PERRL.   Neck:no JVD   Lungs:  clear to auscultation bilaterally   Cardiovascular: Heart: regular rate and rhythm, S1, S2 normal, no murmur, click, rub or gallop.   Chest Wall: no tenderness.   Pulses-2+ radial, femoral, DP, PT b/l  Extremities: no cyanosis or edema.   Abdomen/Rectal: Abdomen: soft, non-tender non-distented; bowel sounds normal  Neurologic: Normal strength and tone. No focal numbness or weakness    Significant Labs:  CBC:    Recent Labs  Lab 06/24/18  1615 06/25/18  0500 06/26/18  0310   WBC 9.38 7.84  7.84 8.69   RBC 4.26* 4.28*  4.28* 4.49*   HGB 13.0* 12.7*  12.7* 13.5*   HCT 40.1 39.9*  39.9* 41.8   * 142*  142* 131*   MCV 94 93  93 93   MCH 30.5 29.7  29.7 30.1   MCHC 32.4 31.8*  31.8* 32.3     BNP:    Recent Labs  Lab 06/23/18  0018   BNP 1,135*     CMP:    Recent Labs  Lab 06/24/18  0335  06/25/18  0500 06/25/18  1634 06/26/18  0310     --  107  --  96   CALCIUM 9.0  --  9.2  --  9.6   ALBUMIN 3.3*  --  3.2*  --  3.6   PROT 6.5  --  6.4  --  7.1   NA  139  --  137  --  136   K 3.7  < > 4.1 4.7 4.0   CO2 26  --  25  --  29     --  103  --  97   BUN 15  --  13  --  11   CREATININE 0.9  --  0.9  --  1.1   ALKPHOS 57  --  53*  --  54*   ALT 28  --  26  --  29   AST 23  --  18  --  17   BILITOT 0.5  --  0.4  --  1.2*   < > = values in this interval not displayed.   Coagulation:     Recent Labs  Lab 06/23/18  0018   INR 1.5*   APTT 22.6     LDH:  No results for input(s): LDH in the last 72 hours.  Microbiology:  Microbiology Results (last 7 days)     ** No results found for the last 168 hours. **          BMP:     Recent Labs  Lab 06/25/18  1634 06/26/18  0310   GLU  --  96   NA  --  136   K 4.7 4.0   CL  --  97   CO2  --  29   BUN  --  11   CREATININE  --  1.1   CALCIUM  --  9.6   MG 2.0  --      Cardiac Markers: No results for input(s): CKMB, TROPONINT, MYOGLOBIN in the last 72 hours.  I have reviewed all pertinent labs within the past 24 hours.    Diagnostic Results:  I have reviewed all pertinent imaging results/findings within the past 24 hours.

## 2018-06-26 NOTE — ASSESSMENT & PLAN NOTE
-s/p LAD stent 2011  -Intermittent nonexertional chest pain, increased in frequency in recent weeks  -reported CP prior to VT episode  -Troponin peaked at 2.3, did also get shocked x1  -Treated as ACS as per NSTEMI, no culprit lesion identified, likely scar based VT. Will consider PET stress once euvolemic.  -Heparin stopped  -LHC: LM luminal irreg, LAD 30% w/ patent stent, prox Lcx 70%, OM1 80%, RCA luminal irregularities

## 2018-06-26 NOTE — PLAN OF CARE
Problem: Physical Therapy Goal  Goal: Physical Therapy Goal  Outcome: Outcome(s) achieved Date Met: 06/26/18  PT re-evaluation completed- see note for details. Pt does not require skilled PT services- no acute needs. PT orders discontinued.     Candace Alvarado PT, DPT   6/26/2018  Pager: [phone]

## 2018-06-26 NOTE — PROGRESS NOTES
Update:    SW to pt's room for update. Pt aaox4, calm, and quiet. Pt's dtr, Kasandra Good ([phone]), present aaox4, calm, and pleasant. Pt's dtr reports plan to stay at hospital with pt until D/C. Pt and dtr report coping adequately today. Pt reviewing pre-heart transplant education as SW entered room. SW provided caregiver education for TX and LVAD. Pt's dtr reports she works as a  for [employer], and has already discussed using FMLA with her employer. Pt's dtr currently lives in [place], however she states she has considered the option of moving closer to pt. Pt's dtr reports pt and family have also discussed the option of pt moving closer to dtr Kyree Chisholm ([phone]) in [place]. Pt reports his sister, Jenny ([phone]), lives near pt in [place]. Pt's dtr reports plan to discuss caregiving plan with family. SW answered all questions about caregivign for TX and LVAD. SW providing ongoing psychosocial and counseling support, education, assistance, resources, and discharge planning as indicated. SW continuing to follow and remains available.

## 2018-06-26 NOTE — PROGRESS NOTES
[facility]  Vascular Neurology  Comprehensive Stroke Center  Progress Note    Assessment/Plan:     Ventricular tachycardia    -Cardiology following        Expressive aphasia     57 y.o. male with significant past medical history of prior Left sided stroke w/o residual deficits, CHF, CAD, seizure DO, sarcoidosis of the lung, s/p pacemaker placement, ?A fib-on Coumadin presented to hospital as a transfer from [facility] for evaluation of aphasia. No tPA.  No LVO on CTA MP.    Aphasia resolving but reports not back to baseline.  No history of speech deficits with prior history of stroke.  Patient unable to obtain MRI.  Repeat CT did not demonstrate new infarcts.  Given symptom continuation for > 24 hours, TIA is less likely.  It is possible he has had another event not evident on imaging.  Repeat 2D echo demonstrates an EF of 5-10%.  He is at high risk for thromboembolism.  Would benefit from resuming warfarin.  Currently on ASA and Plavix.     Antithrombotics for secondary stroke prevention: Anticoagulants: Warfarin INR adjusted target - discontinue ASA and Plavix once warfarin therapeutic.     Statins for secondary stroke prevention and hyperlipidemia, if present:   Statins: Atorvastatin- 40 mg daily     Aggressive risk factor modification: Obesity, A-Fib, CAD, prior stroke, CHF     Rehab efforts: PT/OT/SLP to evaluate and treat  - Please re-consult Speech therapy (order discontinued when transferred to ICU)     Diagnostics ordered/pending:  None     VTE prophylaxis: Recommend resuming anticoagulation; Mechanical prophylaxis: Place SCDs     BP parameters: SBP<180             Acute on chronic systolic congestive heart failure    -Stroke risk factor. 2D Echo now showing EF 5-10%, Severe LAE.  -Cardiology following        Seizure disorder    -Recorded hx of seizure disorder.  Dilantin listed in home meds.  -Dilantin level low, loading with cerebyx  -EEG shows no evidence of  seizure activity; slowing in left parietal region corresponding to prior infarct  -no new episodes        CAD (coronary artery disease)    -Stroke risk factor.  S/p LAD stent 6/2011.   -Cardiology following        Sarcoidosis of lung    -Continue home meds.  -Oxygen prn  -Duonebs prn        Biventricular cardiac pacemaker in situ    -Hx of pacemaker placement.  Unable to obtain MRI.             6/23: 24 hour CT pending, echo and eeg pending, dilantin level low-loading with cerebyx  6/26:  Cardiac cath yesterday without intervention.  No new events, but continues to report speech not back to baseline.  EEG negative for seizure. Slowing left parietal region.  Cardiology following for continued wide complex tachycardia/Vtach.    STROKE DOCUMENTATION   Acute Stroke Times   Last Known Normal Date: 06/22/18  Last Known Normal Time: 1000  Symptom Onset Date: 06/22/18  Symptom Onset Time: 1000  Stroke Team Called Date: 06/22/18  Stroke Team Called Time:  (Problems with the tele-stroke connection)  Stroke Team Arrival Date: 06/22/18  CT Interpretation Time: 1649    NIH Scale:  1a. Level Of Consciousness: 0-->Alert: keenly responsive  1b. LOC Questions: 0-->Answers both questions correctly  1c. LOC Commands: 0-->Performs both tasks correctly  2. Best Gaze: 0-->Normal  3. Visual: 0-->No visual loss  4. Facial Palsy: 0-->Normal symmetrical movements  5a. Motor Arm, Left: 0-->No drift: limb holds 90 (or 45) degrees for full 10 secs  5b. Motor Arm, Right: 0-->No drift: limb holds 90 (or 45) degrees for full 10 secs  6a. Motor Leg, Left: 0-->No drift: leg holds 30 degree position for full 5 secs  6b. Motor Leg, Right: 0-->No drift: leg holds 30 degree position for full 5 secs  7. Limb Ataxia: 0-->Absent  8. Sensory: 0-->Normal: no sensory loss  9. Best Language: 1-->Mild-to-moderate aphasia: some obvious loss of fluency or facility of comprehension, without significant limitation on ideas expressed or form of expression. Reduction  of speech and/or comprehension, however, makes conversation. . . (see row details)  10. Dysarthria: 0-->Normal  11. Extinction and Inattention (formerly Neglect): 0-->No abnormality  Total (NIH Stroke Scale): 1       Modified Linden    Ardsley Coma Scale:    ABCD2 Score:    [id number] Score:   HAS -BLED Score:   ICH Score:   Hunt & Lemus Classification:      Hemorrhagic change of an Ischemic Stroke: Does this patient have an ischemic stroke with hemorrhagic changes? No     Neurologic Chief Complaint: aphasia    Subjective:     Interval History: Patient is seen for follow-up neurological assessment and treatment recommendations:  No acute issues or events overnight.  Continues to voice concerns about speech.  Daughter also reports patient not back to baseline.  Cardiac cath yesterday without intervention.  No new events, but continues to report speech not back to baseline.  EEG negative for seizure. Slowing left parietal region.  Cardiology following for continued wide complex tachycardia/Vtach.    HPI, Past Medical, Family, and Social History remains the same as documented in the initial encounter.     Review of Systems   Constitutional: Negative for fever.   Respiratory: Negative for cough.    Gastrointestinal: Negative for vomiting.   Neurological: Positive for speech difficulty. Negative for facial asymmetry, weakness and numbness.     Scheduled Meds:   amiodarone  400 mg Oral BID    aspirin  81 mg Oral Daily    atorvastatin  40 mg Oral Daily    clopidogrel  75 mg Oral Daily    colchicine  0.6 mg Oral Daily    fluticasone-vilanterol  1 puff Inhalation Daily    gabapentin  600 mg Oral TID    isosorbide mononitrate  90 mg Oral Daily    metoprolol tartrate  25 mg Oral BID    pantoprazole  40 mg Oral Daily    phenytoin  200 mg Oral QHS    potassium chloride SA  20 mEq Oral QHS    potassium chloride SA  40 mEq Oral Daily    predniSONE  10 mg Oral Every Tues, Thurs, Sat    predniSONE  20 mg Oral Every  Mon, Wed, Fri    predniSONE  20 mg Oral Every Sun    spironolactone  25 mg Oral Daily     Continuous Infusions:   furosemide (LASIX) 2 mg/mL infusion (non-titrating) 10 mg/hr (06/26/18 1201)    sodium chloride 0.9%       PRN Meds:butalbital-acetaminophen-caffeine -40 mg, HYDROcodone-acetaminophen, labetalol, sodium chloride 0.9%    Objective:     Vital Signs (Most Recent):  Temp: 97.1 °F (36.2 °C) (06/26/18 1627)  Pulse: 91 (06/26/18 1627)  Resp: 18 (06/26/18 1627)  BP: 119/82 (06/26/18 1627)  SpO2: 95 % (06/26/18 1627)  BP Location: Left arm    Vital Signs Range (Last 24H):  Temp:  [97.1 °F (36.2 °C)-99.1 °F (37.3 °C)]   Pulse:  []   Resp:  [14-65]   BP: (104-149)/(6-91)   SpO2:  [92 %-100 %]   BP Location: Left arm    Physical Exam   Constitutional: He appears well-developed and well-nourished. No distress.   Pulmonary/Chest: Effort normal.   Skin: Skin is warm and dry.   Psychiatric: He has a normal mood and affect. His behavior is normal. Judgment and thought content normal.   Vitals reviewed.      Neurological Exam:   LOC: alert  Attention Span: Good   Language: Expressive aphasia  Articulation: No dysarthria  Orientation: Person, Place, Time   Visual Fields: Full  EOM (CN III, IV, VI): Full/intact  Pupils (CN II, III): PERRL  Facial Sensation (CN V): Normal  Facial Movement (CN VII): Symmetric facial expression    Motor: Arm left  Normal 5/5  Leg left  Normal 5/5  Arm right  Normal 5/5  Leg right Normal 5/5  Cebellar: No evidence of appendicular or axial ataxia  Sensation: Intact to light touch, temperature and vibration  Tone: Normal tone throughout    Laboratory:  CMP:     Recent Labs  Lab 06/26/18  0310   CALCIUM 9.6   ALBUMIN 3.6   PROT 7.1      K 4.0   CO2 29   CL 97   BUN 11   CREATININE 1.1   ALKPHOS 54*   ALT 29   AST 17   BILITOT 1.2*     CBC:     Recent Labs  Lab 06/26/18  0310   WBC 8.69   RBC 4.49*   HGB 13.5*   HCT 41.8   *   MCV 93   MCH 30.1   MCHC 32.3     Lipid  Panel:     Recent Labs  Lab 06/23/18  0018   CHOL 250*   LDLCALC 150.0   HDL 83*   TRIG 85     Coagulation:     Recent Labs  Lab 06/23/18  0018   INR 1.5*   APTT 22.6     Platelet Aggregation Study: No results for input(s): PLTAGG, PLTAGINTERP, PLTAGREGLACO, ADPPLTAGGREG in the last 168 hours.  Hgb A1C:     Recent Labs  Lab 06/23/18  0018   HGBA1C 6.5*     TSH:     Recent Labs  Lab 06/23/18  0018   TSH 0.706       Diagnostic Results       Vessel Imaging   CTA Multiphase. Date: 06/22/18  CT head:    No evidence of acute intracranial pathology.    Left parietal lobe encephalomalacia.    CTA head and neck:    No evidence of large vessel occlusion, significant stenoses, or aneurysm.  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  Additional incidental findings as above.    6/24/2018 CT Head  No acute intracranial abnormalities  Left parietal lobe encephalomalacia, unchanged.  Mild periventricular decreased supratentorial white matter attenuation most likely related to chronic nonspecific small vessel disease.    Cardiac Imaging   6/23/2018 2D Echo  EF 5-10%  Severely enlarged left atrium  Multiple akinetic and hypokinetic regions      Danita Theodore NP  Comprehensive Stroke Center  Department of Vascular Neurology   [facility]

## 2018-06-26 NOTE — ASSESSMENT & PLAN NOTE
-Recorded hx of seizure disorder.  Dilantin listed in home meds.  -Dilantin level low, loading with cerebyx  -EEG shows no evidence of seizure activity; slowing in left parietal region corresponding to prior infarct  -no new episodes

## 2018-06-26 NOTE — ASSESSMENT & PLAN NOTE
-Central line placed via Right IJ, SVO2 61  -CVP 7 today, SVO2 59 on 6/25  -Overloaded, LVEDP elevated to 45  -Lasix 10/hr, diuresing well, will continue  -Continue metoprolol, losartan, spironolactone  -Pathway started 6/25/18

## 2018-06-26 NOTE — ASSESSMENT & PLAN NOTE
-Hx of CAD s/p remote PCI  -Intermittent atypical chest pain  -Troponin up to 2.3 but in setting of VT and defibrillation  -LHC as above  -Stopped heparin. Will continue ASA/plavix/metoprolol tartrate 25 bid, home imdur

## 2018-06-26 NOTE — PROGRESS NOTES
[facility]  Heart Transplant  Progress Note    Patient Name: Yonathan Good Jr.  MRN: [id number]  Admission Date: 6/22/2018  Hospital Length of Stay: 3 days  Attending Physician: Britt Melgar MD  Primary Care Provider: Edgar Gates MD  Principal Problem:VT (ventricular tachycardia)    Subjective:     Interval HPI: NAEON. Doing well this morning. No events on telemetry. CVP 8. Diuresed negative 2.7 L overnight on lasix gtt. Breathing and cough significantly improved.    Review of Systems   All other systems reviewed and are negative.    Objective:     Vital Signs (Most Recent):  Temp: 98.8 °F (37.1 °C) (06/26/18 0701)  Pulse: 91 (06/26/18 1101)  Resp: (!) 27 (06/26/18 1101)  BP: 116/84 (06/26/18 1101)  SpO2: 98 % (06/26/18 1101) Vital Signs (24h Range):  Temp:  [98.2 °F (36.8 °C)-99.1 °F (37.3 °C)] 98.8 °F (37.1 °C)  Pulse:  [] 91  Resp:  [14-65] 27  SpO2:  [93 %-100 %] 98 %  BP: (104-149)/(6-91) 116/84     No data found.    Body mass index is 31.98 kg/m².      Intake/Output Summary (Last 24 hours) at 06/26/18 1133  Last data filed at 06/26/18 1101   Gross per 24 hour   Intake           460.83 ml   Output             2625 ml   Net         -2164.17 ml       Physical Exam  General: alert, awake and oriented x 3  Eyes:PERRL.   Neck:no JVD   Lungs:  clear to auscultation bilaterally   Cardiovascular: Heart: regular rate and rhythm, S1, S2 normal, no murmur, click, rub or gallop.   Chest Wall: no tenderness.   Pulses-2+ radial, femoral, DP, PT b/l  Extremities: no cyanosis or edema.   Abdomen/Rectal: Abdomen: soft, non-tender non-distented; bowel sounds normal  Neurologic: Normal strength and tone. No focal numbness or weakness    Significant Labs:  CBC:    Recent Labs  Lab 06/24/18  1615 06/25/18  0500 06/26/18  0310   WBC 9.38 7.84  7.84 8.69   RBC 4.26* 4.28*  4.28* 4.49*   HGB 13.0* 12.7*  12.7* 13.5*   HCT 40.1 39.9*  39.9* 41.8   * 142*  142* 131*   MCV 94 93  93 93   MCH 30.5  29.7  29.7 30.1   MCHC 32.4 31.8*  31.8* 32.3     BNP:    Recent Labs  Lab 06/23/18  0018   BNP 1,135*     CMP:    Recent Labs  Lab 06/24/18  0335  06/25/18  0500 06/25/18  1634 06/26/18  0310     --  107  --  96   CALCIUM 9.0  --  9.2  --  9.6   ALBUMIN 3.3*  --  3.2*  --  3.6   PROT 6.5  --  6.4  --  7.1     --  137  --  136   K 3.7  < > 4.1 4.7 4.0   CO2 26  --  25  --  29     --  103  --  97   BUN 15  --  13  --  11   CREATININE 0.9  --  0.9  --  1.1   ALKPHOS 57  --  53*  --  54*   ALT 28  --  26  --  29   AST 23  --  18  --  17   BILITOT 0.5  --  0.4  --  1.2*   < > = values in this interval not displayed.   Coagulation:     Recent Labs  Lab 06/23/18  0018   INR 1.5*   APTT 22.6     LDH:  No results for input(s): LDH in the last 72 hours.  Microbiology:  Microbiology Results (last 7 days)     ** No results found for the last 168 hours. **          BMP:     Recent Labs  Lab 06/25/18  1634 06/26/18  0310   GLU  --  96   NA  --  136   K 4.7 4.0   CL  --  97   CO2  --  29   BUN  --  11   CREATININE  --  1.1   CALCIUM  --  9.6   MG 2.0  --      Cardiac Markers: No results for input(s): CKMB, TROPONINT, MYOGLOBIN in the last 72 hours.  I have reviewed all pertinent labs within the past 24 hours.    Diagnostic Results:  I have reviewed all pertinent imaging results/findings within the past 24 hours.    Assessment and Plan:     57 y.o. male  with h/o ischemic cardiomyopathy(negative for sarcoid on cardiac MRI 2012), H/o stents 7 years ago,Pulmonary sarcoid, left parietal stroke in the past, atrial fibrillation, CRT-D(st Giorgio), working up for advanced options who initially presented on 6/22 with expressive aphasia. Initial imaging negative for CVA, no tPA given. Transferred to [facility] service after having VT storm on 6/23. Well controlled on amiodarone loading. Now with ADHF, diuresing on lasix gtt.    * VT (ventricular tachycardia)    -Multiple episodes of MMVT on 6/23  -EP consulted. VT1 zone lowered  as per EP. No further VT. Likely AT with aberrancy on early AM 6/25, nonsustained.   -K>4, Mg>2  -Continue amiodarone, 400 bid x2 weeks, then 400 daily  -If recurrence, restart drip +/- lidocaine  -C without overt culprit lesion  -Follow up with Dr. Telles in EP in 4-6 weeks        Acute on chronic systolic congestive heart failure    -Central line placed via Right IJ, SVO2 61  -CVP 7 today, SVO2 59 on 6/25  -Overloaded, LVEDP elevated to 45  -Lasix 10/hr, diuresing well, will continue  -Continue metoprolol, losartan, spironolactone  -Pathway started 6/25/18        NSTEMI (non-ST elevated myocardial infarction)    -Hx of CAD s/p remote PCI  -Intermittent atypical chest pain  -Troponin up to 2.3 but in setting of VT and defibrillation  -C as above  -Stopped heparin. Will continue ASA/plavix/metoprolol tartrate 25 bid, home imdur        CAD (coronary artery disease)    -s/p LAD stent 2011  -Intermittent nonexertional chest pain, increased in frequency in recent weeks  -reported CP prior to VT episode  -Troponin peaked at 2.3, did also get shocked x1  -Treated as ACS as per NSTEMI, no culprit lesion identified, likely scar based VT. Will consider PET stress once euvolemic.  -Heparin stopped  -LHC: LM luminal irreg, LAD 30% w/ patent stent, prox Lcx 70%, OM1 80%, RCA luminal irregularities        Sarcoidosis of lung    -prednisone per home regimen        Biventricular cardiac pacemaker in situ    -[employer] Giorgio device interrogated-See EP notes        Expressive aphasia    -Initially admitted to Neuro, no TPA. CT head and CTA negative. Hx of seizures, EEG pending.  -Resolved, no further workup at this time        Seizure disorder    Home phenytoin            Srinivas Pelaez MD  Heart Transplant  [facility]

## 2018-06-26 NOTE — PT/OT/SLP RE-EVAL
Physical Therapy Re-evaluation & Discharge     Patient Name:  Yonathan Good Jr.   MRN:  [id number]    Recommendations:     Discharge Recommendations:  home, no needs  Discharge Equipment Recommendations: none   Barriers to discharge: None    Assessment:     Yonathan Good Jr. is a 57 y.o. male admitted with a medical diagnosis of VT (ventricular tachycardia). Pt evaluated and discharged from PT 6/23/18-demonstrating high independence with functional mobility, ability to ambulate >200 ft and navigate stairs. Later on 6/23, rapid response called and pt transferred to ICU; L heart cath performed 6/25. Pt currently undergoing work-up for potential OHT/LVAD. Pt re-evaluated for therapy today. During evaluation, pt denied any change in functional status and expressed no concerns for therapy; demonstrated good functional strength and mobility. No acute gait deficits identified. Pt safe to ambulate with nursing staff during acute hospital stay to prevent deconditioning. No skilled PT intervention warranted.     Recent Surgery: Procedure(s) (LRB):  Left heart cath (N/A) 1 Day Post-Op    Plan:     Pt discharged from PT services. Pt encouraged to ambulate in hallways daily with nursing supervision to prevent deconditioning- no skilled PT intervention warranted.     Subjective     Communicated with RN prior to session.  Patient found sitting EOB with family present upon PT entry to room. Pt agreeable to evaluation.  Pt expressed no concerns for therapy, denied any change in strength, balance or general mobility. Pt states that he is going to be transferred to the floor today, awaiting bed availability.     Chief Complaint: none  Patient comments/goals: return home  Pain/Comfort:  · Pain Rating 1: 0/10  · Pain Rating Post-Intervention 1: 0/10    Patients cultural, spiritual, [employer] conflicts given the current situation: no conflicts      Objective:     Patient found with: telemetry     General Precautions: Standard, fall    Orthopedic Precautions:N/A   Braces: N/A     Exams:  · Postural Exam:  Patient presented with the following abnormalities:    · -       Rounded shoulders  · Sensation: -       Intact  · Skin Integrity/Edema:      · -       Skin integrity: Visible skin intact  · RUE ROM: WFL  · RUE Strength: WFL  · LUE ROM: WFL  · LUE Strength: WFL  · RLE ROM: WFL  · RLE Strength: WFL  · LLE ROM: WFL  · LLE Strength: WFL    Functional Mobility:       Bed Mobility    · Supine to sit: independence     · Sit to supine: independence       Transfers · Sit <> Stand: independence from EOB x 2 trials; able to perform without UE use        Gait  · Distance: ~10 ft.; limited 2/2 medical lines, pt preparing for transfer to step-down floor   · Assistance level: no AD, supervision 2/2 multiple medical lines    · Gait Deviations: no acute gait deficits identified           AM-PAC 6 CLICK MOBILITY  Total Score:24     Therapeutic Activities and Exercises:  Pt and family educated on:  - role of PT and discharge from therapy   - safety with mobility and tips to reduce fall risk  - recommendations for daily ambulation with nursing staff to prevent deconditioning-no skilled gait training warranted.   - instructed to notify medical team if experiencing decline in function during acute hospital stay     Pt verbalized understanding and expressed no further concerns/questions.     Patient left sitting EOB  with all lines intact, call button in reach, RN notified and family present.    GOALS:    Physical Therapy Goals     Not on file          Multidisciplinary Problems (Resolved)        Problem: Physical Therapy Goal    Goal Priority Disciplines Outcome Goal Variances Interventions   Physical Therapy Goal   (Resolved)     PT/OT, PT Outcome(s) achieved                     History:     Past Medical History:   Diagnosis Date    AICD (automatic cardioverter/defibrillator) present     Arthritis     CHF (congestive heart failure)     Coronary artery disease      MI (myocardial infarction)     Sarcoid     Stroke        Past Surgical History:   Procedure Laterality Date    CARDIAC CATHETERIZATION      CARDIAC DEFIBRILLATOR PLACEMENT  7-12    CARDIAC DEFIBRILLATOR PLACEMENT      CORONARY STENT PLACEMENT  07/2011    FRACTURE SURGERY      LEFT HEART CATHETERIZATION N/A 6/25/2018    Procedure: Left heart cath;  Surgeon: Jordi Lui MD;  Location: [facility] CATH LAB;  Service: Cardiology;  Laterality: N/A;       Clinical Decision Making:     Low complexity evaluation:   · Stable clinical presentation   · No acute treatment warranted     Time Tracking:     PT Received On: 06/26/18  PT Start Time: 1050     PT Stop Time: 1058  PT Total Time (min): 8 min     Billable Minutes: Re-eval 8 min      Candace Alvarado PT, DPT   6/26/2018  Pager: [phone]

## 2018-06-26 NOTE — SUBJECTIVE & OBJECTIVE
Neurologic Chief Complaint: aphasia    Subjective:     Interval History: Patient is seen for follow-up neurological assessment and treatment recommendations:  No acute issues or events overnight.  Continues to voice concerns about speech.  Daughter also reports patient not back to baseline.  Cardiac cath yesterday without intervention.  No new events, but continues to report speech not back to baseline.  EEG negative for seizure. Slowing left parietal region.  Cardiology following for continued wide complex tachycardia/Vtach.    HPI, Past Medical, Family, and Social History remains the same as documented in the initial encounter.     Review of Systems   Constitutional: Negative for fever.   Respiratory: Negative for cough.    Gastrointestinal: Negative for vomiting.   Neurological: Positive for speech difficulty. Negative for facial asymmetry, weakness and numbness.     Scheduled Meds:   amiodarone  400 mg Oral BID    aspirin  81 mg Oral Daily    atorvastatin  40 mg Oral Daily    clopidogrel  75 mg Oral Daily    colchicine  0.6 mg Oral Daily    fluticasone-vilanterol  1 puff Inhalation Daily    gabapentin  600 mg Oral TID    isosorbide mononitrate  90 mg Oral Daily    metoprolol tartrate  25 mg Oral BID    pantoprazole  40 mg Oral Daily    phenytoin  200 mg Oral QHS    potassium chloride SA  20 mEq Oral QHS    potassium chloride SA  40 mEq Oral Daily    predniSONE  10 mg Oral Every Tues, Thurs, Sat    predniSONE  20 mg Oral Every Mon, Wed, Fri    predniSONE  20 mg Oral Every Sun    spironolactone  25 mg Oral Daily     Continuous Infusions:   furosemide (LASIX) 2 mg/mL infusion (non-titrating) 10 mg/hr (06/26/18 1201)    sodium chloride 0.9%       PRN Meds:butalbital-acetaminophen-caffeine -40 mg, HYDROcodone-acetaminophen, labetalol, sodium chloride 0.9%    Objective:     Vital Signs (Most Recent):  Temp: 97.1 °F (36.2 °C) (06/26/18 1627)  Pulse: 91 (06/26/18 1627)  Resp: 18 (06/26/18  1627)  BP: 119/82 (06/26/18 1627)  SpO2: 95 % (06/26/18 1627)  BP Location: Left arm    Vital Signs Range (Last 24H):  Temp:  [97.1 °F (36.2 °C)-99.1 °F (37.3 °C)]   Pulse:  []   Resp:  [14-65]   BP: (104-149)/(6-91)   SpO2:  [92 %-100 %]   BP Location: Left arm    Physical Exam   Constitutional: He appears well-developed and well-nourished. No distress.   Pulmonary/Chest: Effort normal.   Skin: Skin is warm and dry.   Psychiatric: He has a normal mood and affect. His behavior is normal. Judgment and thought content normal.   Vitals reviewed.      Neurological Exam:   LOC: alert  Attention Span: Good   Language: Expressive aphasia  Articulation: No dysarthria  Orientation: Person, Place, Time   Visual Fields: Full  EOM (CN III, IV, VI): Full/intact  Pupils (CN II, III): PERRL  Facial Sensation (CN V): Normal  Facial Movement (CN VII): Symmetric facial expression    Motor: Arm left  Normal 5/5  Leg left  Normal 5/5  Arm right  Normal 5/5  Leg right Normal 5/5  Cebellar: No evidence of appendicular or axial ataxia  Sensation: Intact to light touch, temperature and vibration  Tone: Normal tone throughout    Laboratory:  CMP:     Recent Labs  Lab 06/26/18  0310   CALCIUM 9.6   ALBUMIN 3.6   PROT 7.1      K 4.0   CO2 29   CL 97   BUN 11   CREATININE 1.1   ALKPHOS 54*   ALT 29   AST 17   BILITOT 1.2*     CBC:     Recent Labs  Lab 06/26/18  0310   WBC 8.69   RBC 4.49*   HGB 13.5*   HCT 41.8   *   MCV 93   MCH 30.1   MCHC 32.3     Lipid Panel:     Recent Labs  Lab 06/23/18  0018   CHOL 250*   LDLCALC 150.0   HDL 83*   TRIG 85     Coagulation:     Recent Labs  Lab 06/23/18  0018   INR 1.5*   APTT 22.6     Platelet Aggregation Study: No results for input(s): PLTAGG, PLTAGINTERP, PLTAGREGLACO, ADPPLTAGGREG in the last 168 hours.  Hgb A1C:     Recent Labs  Lab 06/23/18  0018   HGBA1C 6.5*     TSH:     Recent Labs  Lab 06/23/18  0018   TSH 0.706       Diagnostic Results       Vessel Imaging   CTA Multiphase.  Date: 06/22/18  CT head:    No evidence of acute intracranial pathology.    Left parietal lobe encephalomalacia.    CTA head and neck:    No evidence of large vessel occlusion, significant stenoses, or aneurysm.  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  Additional incidental findings as above.    6/24/2018 CT Head  No acute intracranial abnormalities  Left parietal lobe encephalomalacia, unchanged.  Mild periventricular decreased supratentorial white matter attenuation most likely related to chronic nonspecific small vessel disease.    Cardiac Imaging   6/23/2018 2D Echo  EF 5-10%  Severely enlarged left atrium  Multiple akinetic and hypokinetic regions

## 2018-06-26 NOTE — HPI
57 y.o. male  with h/o ischemic cardiomyopathy(negative for sarcoid on cardiac MRI 2012), H/o stents 7 years ago,Pulmonary sarcoid, left parietal stroke in the past, atrial fibrillation, CRT-D([facility]) who follows Dr Berman and is being worked up as outpatient for advanced options. He was transferred to Stroke service last evening with difficulty speaking with suspected Stroke.Initial workup included CVA which was negative for acute CVA.No tPA was given. This am patient complained of chest pain and dizziness and was noted to be in Monomorphic VT -Code blue was called.He was ATP'ed out of it but he also had an external shock delivered by rapid repsonse team as he was persistently in VT but no shock was delivered.He went into Fast VT with rate>200 and his ICD shocked him out of it. He was transferred to ICU.He reports having chest pain for last 4-5 years on exertion relieved with nitro-reports stress test done in [place].[facility] He was loaded with amiodarone.He had 2 more episodes of VT which were slower at 150-His device did not ATP as below VT-1 zone. He also complains of cough with pinkish sputum,SOB.He reports no more chest pain since the shock.    He was recently scheduled for colonoscopy as part of transplant workup for which his coumadin was held and he was placed on eliquis temporarily.He had chest pain on day of c-scope and he had to take a nitro and his procedure was cancelled(5 days ago). He reports he started taking his coumadin after.His INR was subtherapeutic on arrival.     He states that his pulmonary sarcoid was diagnosed in 2015 when he was evaluated for a dry non-productive cough.  States that he underwent bronchoscopy with biopsies which confirmed sarcoid.  Since then, he has been on varying doses of Prednisone He has no other organ involvement from sarcoid and states that his only symptom has been cough. Denies any alcohol or illicit drug use.  Previously worked  as a .

## 2018-06-27 PROBLEM — I21.4 NSTEMI (NON-ST ELEVATED MYOCARDIAL INFARCTION): Status: RESOLVED | Noted: 2018-01-01 | Resolved: 2018-01-01

## 2018-06-27 NOTE — PROGRESS NOTES
[facility]  Heart Transplant  Progress Note    Patient Name: Yonathan Good Jr.  MRN: [id number]  Admission Date: 6/22/2018  Hospital Length of Stay: 4 days  Attending Physician: Britt Melgar MD  Primary Care Provider: Edgar Gates MD  Principal Problem:VT (ventricular tachycardia)    Subjective:     Interval HPI: NAEON. Doing well today. No events on telemetry. Net even on diuresis, Cr increased to 1.4 from 1.1.     Review of Systems   All other systems reviewed and are negative.    Objective:     Vital Signs (Most Recent):  Temp: 98.3 °F (36.8 °C) (06/27/18 0745)  Pulse: 83 (06/27/18 1122)  Resp: 18 (06/27/18 0838)  BP: 104/66 (06/27/18 0745)  SpO2: 97 % (06/27/18 0745) Vital Signs (24h Range):  Temp:  [97.1 °F (36.2 °C)-99.6 °F (37.6 °C)] 98.3 °F (36.8 °C)  Pulse:  [] 83  Resp:  [11-24] 18  SpO2:  [92 %-99 %] 97 %  BP: ()/(54-82) 104/66     Patient Vitals for the past 72 hrs (Last 3 readings):   Weight   06/26/18 1101 101.2 kg (223 lb 1.7 oz)     Body mass index is 31.12 kg/m².      Intake/Output Summary (Last 24 hours) at 06/27/18 1134  Last data filed at 06/27/18 1113   Gross per 24 hour   Intake             1385 ml   Output              375 ml   Net             1010 ml       Physical Exam  General: alert, awake and oriented x 3  Eyes:PERRL.   Neck:no JVD   Lungs:  clear to auscultation bilaterally   Cardiovascular: Heart: regular rate and rhythm, S1, S2 normal, no murmur, click, rub or gallop.   Chest Wall: no tenderness.   Pulses-2+ radial, femoral, DP, PT b/l  Extremities: no cyanosis or edema.   Abdomen/Rectal: Abdomen: soft, non-tender non-distented; bowel sounds normal  Neurologic: Normal strength and tone. No focal numbness or weakness    Significant Labs:  CBC:    Recent Labs  Lab 06/25/18  0500 06/26/18  0310 06/27/18  0657   WBC 7.84  7.84 8.69 6.54   RBC 4.28*  4.28* 4.49* 4.47*   HGB 12.7*  12.7* 13.5* 13.6*   HCT 39.9*  39.9* 41.8 41.9   *  142* 131* 125*    MCV 93  93 93 94   MCH 29.7  29.7 30.1 30.4   MCHC 31.8*  31.8* 32.3 32.5     BNP:    Recent Labs  Lab 06/23/18  0018   BNP 1,135*     CMP:    Recent Labs  Lab 06/25/18  0500 06/25/18  1634 06/26/18  0310 06/27/18  0657     --  96 109   CALCIUM 9.2  --  9.6 9.6   ALBUMIN 3.2*  --  3.6 3.5   PROT 6.4  --  7.1 7.0     --  136 138   K 4.1 4.7 4.0 4.1   CO2 25  --  29 29     --  97 97   BUN 13  --  11 18   CREATININE 0.9  --  1.1 1.4   ALKPHOS 53*  --  54* 62   ALT 26  --  29 34   AST 18  --  17 27   BILITOT 0.4  --  1.2* 0.9      Coagulation:     Recent Labs  Lab 06/23/18  0018   INR 1.5*   APTT 22.6     LDH:  No results for input(s): LDH in the last 72 hours.  Microbiology:  Microbiology Results (last 7 days)     ** No results found for the last 168 hours. **          BMP:     Recent Labs  Lab 06/25/18  1634  06/27/18  0657   GLU  --   < > 109   NA  --   < > 138   K 4.7  < > 4.1   CL  --   < > 97   CO2  --   < > 29   BUN  --   < > 18   CREATININE  --   < > 1.4   CALCIUM  --   < > 9.6   MG 2.0  --   --    < > = values in this interval not displayed.  Cardiac Markers: No results for input(s): CKMB, TROPONINT, MYOGLOBIN in the last 72 hours.  I have reviewed all pertinent labs within the past 24 hours.    Diagnostic Results:  I have reviewed all pertinent imaging results/findings within the past 24 hours.    Assessment and Plan:     57 y.o. male  with h/o ischemic cardiomyopathy(negative for sarcoid on cardiac MRI 2012), H/o stents 7 years ago,Pulmonary sarcoid, left parietal stroke in the past, atrial fibrillation, CRT-D([facility]), working up for advanced options who initially presented on 6/22 with expressive aphasia. Initial imaging negative for CVA, no tPA given. Transferred to [facility] service after having VT storm on 6/23. Well controlled on amiodarone loading. Now with ADHF, diuresing on lasix gtt, transitioned to lasix push 80 IV BID. On pathway, advanced to step 2 yesterday.    * VT  (ventricular tachycardia)    -Multiple episodes of MMVT on 6/23  -EP consulted. VT1 zone lowered as per EP. No further VT. Likely AT with aberrancy on early AM 6/25, nonsustained.   -K>4, Mg>2  -Continue amiodarone, 400 bid x2 weeks, then 400 daily  -If recurrence, restart drip +/- lidocaine  -[facility] without overt culprit lesion  -Follow up with Dr. Telles in EP in 4-6 weeks        Acute on chronic systolic congestive heart failure    -Central line placed via Right IJ, SVO2 61  -CVP 7 today, SVO2 59 on 6/25  -Overloaded, LVEDP elevated to 45  -Diuresed on lasix 10/hr for 2 days, switch to 80 IV bid  -Continue metoprolol, losartan, spironolactone  -Pathway started 6/25/18        CAD (coronary artery disease)    -s/p LAD stent 2011  -Intermittent nonexertional chest pain, increased in frequency in recent weeks  -Troponin peaked at 2.3, did also get shocked x1  -Treated initially as ACS as per NSTEMI, no culprit lesion identified, likely scar based VT.  -Can consider PET stress once euvolemic.  -Heparin stopped. Continue ASA, statin, BB, ARB, MRA, imdur. No recent stents, will stop plavix as restarting AC  -[facility]: LM luminal irreg, LAD 30% w/ patent stent, prox Lcx 70%, OM1 80%, RCA luminal irregularities        Sarcoidosis of lung    -prednisone per home regimen  -Consulted lung transplant to re-evaluate patient as proceeding with advanced options workup for pulmonary optimization, further testing needed as workup prior to presenting him        Biventricular cardiac pacemaker in situ    -[facility] device interrogated-See EP notes        Expressive aphasia    -Initially admitted to Neuro, no TPA. CT head and CTA negative. Hx of seizures, EEG pending.  -Resolved, no further workup at this time        Seizure disorder    Home phenytoin        CVA (cerebrovascular accident)    -Hx of parietal CVA presumed due to LV thrombus per chart. Prior echos reviewed here, no thrombus visualzied in past  -Pt reports initiation of AC at time  of MI when he had LV aneurysm  -Also reports hx of AF ([id number])  -Will stop plavix (last stent 7 yrs ago, this presentation not suspected secondary to ACS)  -Continue ASA, restart coumadin            Srinivas Pelaez MD  Heart Transplant  [facility]

## 2018-06-27 NOTE — PLAN OF CARE
Problem: Patient Care Overview  Goal: Plan of Care Review  Outcome: Revised  Pt free of falls/trauma/injuries.  Denies c/o SOB, CP, or discomfort.  Generalized skin remains CDI; No edema noted.  Pt being diuresed with IV Lasix; diuresing well.   Pt tolerating plan of care.

## 2018-06-27 NOTE — PROGRESS NOTES
Left Ventricular Assist Device (LVAD) and Transplant Recipient Adult Psychosocial Assessment    Mailing address:  [address]  [facility] [address]       Physical address:  [address]   [facility] [address]  [place] from [facility]    Telephone Information:   Mobile [phone]   Home  [phone] (home)  Work  There is no work phone number on file.  E-mail  No e-mail address on record    Sex: male  YOB: 1960  Age: 57 y.o.    Encounter Date: 6/22/2018  U[place] Citizen: yes  Primary Language: English   Needed: no    Emergency Contact:  Name: Jenny Good  Relationship: sister  Address: [place], [place]  Phone Numbers: [phone] (mobile)    Family/Social Support:   Number of dependents/: Pt reports he is not responsible for any minor children. Pt reports having 3 adult dtrs, and 1 17 year old step dtr who lives with her mother.  Marital history: Pt reports  2 times. Pt reports having 3 children with his first wife who passed away in 2005. Pt reports  He and his current wife were  for 7 years, and are now . Pt's wife is going through treatment for cancer, has a trach, and is not able to speak on the phone.   Other family dynamics: Pt's oldest dtr, Kasandra (age 35), is currently at the hospital with pt. Kasandra lives in [place], [place], and reports she is willing to move to assist in pt's care if necessary. Pt's dtr Kyree lives in [place], and has 2 children. Pt's dtr reports her mother in law is able to assist with childcare so that she can assist with pt's care. Pt's dtr Doreen (age 34) lives in [place], however pt reports she is not involved in his care. Pt's step dtr Sudhakar, is 17 and lives with her mother. Pt's sister, Jenny, is 70 years old and works in a school kitchen. Pt lives with his cousin Arturo Chester and Arturo's wife Tasha. Pt's wife is going through treatment for cancer is not able to assist with pt's  care at this time.    Household Composition: Pt lives with his cousin Arturo Chester and Arturo's wife Tasha. Both drive and work during the day.     Do you and your caregivers have access to reliable transportation? yes  PRIMARY CAREGIVER: Kasandra Good, pt's dtr, will be primary caregiver, phone number [phone].      provided in-depth information to Patient and Caregiver regarding  regarding pre- and post-LVAD and pre- and post-transplant caregiver role.   strongly encourages Patient and Caregiver to have concrete plan regarding post-transplant care giving, including back-up caregiver(s) to ensure care giving needs are met as needed.    Patient and Caregiver states understanding all aspects of caregiver role/commitment and is able/willing/committed to being caregiver to the fullest extent necessary.     Patient verbalizes understanding of the education provided today and caregiver responsibilities.       remains available. Patient and Caregiver agree to contact  in a timely manner if concerns arise.      Able to take time off work without financial concerns: yes. Pt's dtr reports she is able to use FMLA while caring for pt.      Additional Significant Others who will Assist with LVAD/Transplant:     Name: Kyree Chisholm  Relationship: daughter  Address: [place], [place]  Phone Numbers:  [phone]  Does Person Drive: yes  Pt's dtr reports she does not work during the day. She is a stay at home mom (her youngest is 13 months old). Pt's dtr reports her mother in law is able to care for her children as long as necessary so that she can be pt's caregiver.    Living Will: no  Healthcare Power of : no  Advance Directives on file: <<no information> per medical record.  Verbally reviewed LW/HCPA information.   provided patient with copy of LW/HCPA documents and provided education on completion of forms    Living Donors: N/A    Highest  Education Level: Attended College/Technical School. 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  Reading Ability: 12th grade  Reports difficulty with: N/A. Pt wears glasses.  Learns Best By:  Hands on     Status: yes: [employer], date:   VA Benefits: no     Working for Income: No  If no, reason not working: Disability  Spouse/Significant Other Employment: n/a    Disabled: yes: date disability began: , due to: CHF.    Monthly Income:  SSI: $750  Able to afford all costs now and if transplanted or receives LVAD, including medications: yes. Pt reports his dtrs provide financial assistance when needed. Pt's dtr reports ability to continue providing assistance when necessary.   Pt reports secure power source? yes  Pt reports ability to afford monthly electric bill? yes  Pt reports ability to afford LVAD dressing supplies? yes  Patient and Caregiver verbalizes understanding of personal responsibilities related to LVAD and transplant costs and the importance of having a financial plan to ensure that patients LVAD and transplant costs are fully covered.       provided fundraising information/education.  Patient and Caregiver verbalizes understanding.   remains available.    Insurance:   Payor/Plan Subscr  Sex Relation Sub. Ins. ID Effective Group Num   1. MEDICAID - [place]* [place] . 1960 Male  [id number]* 11                                    [address]     Primary Insurance (for UNOS reporting): Public Insurance - Medicaid  Secondary Insurance (for UNOS reporting): None  Patient and Caregiver verbalizes clear understanding that patient may experience difficulty obtaining and/or be denied insurance coverage post-surgery. This includes and is not limited to disability insurance, life insurance, health insurance, burial insurance, long term care insurance, and other insurances.      Patient and Caregiver also reports  "understanding that future health concerns   related to or unrelated to LVAD or transplantation may not be covered by patient's insurance.  Resources and information provided and reviewed.      Patient and Caregiver provides verbal permission to release any necessary information to outside resources for patient care and discharge planning.  Resources and information provided are reviewed.      Infusion Service: patient utilizing? no  Home Health: patient utilizing? no  DME: no  Pulmonary/Cardiac Rehab: no   ADLS:  Pt reports independent in ADLs and drives.     Adherence:   Pt reports high level of adherence to attending medical appointments and taking medications as prescribed. Pt reports medium level of adherence to low sodium diet.  Adherence education and counseling provided.     Per History Section:  Past Medical History:   Diagnosis Date    AICD (automatic cardioverter/defibrillator) present     Arthritis     CHF (congestive heart failure)     Coronary artery disease     MI (myocardial infarction)     Sarcoid     Stroke      Social History   Substance Use Topics    Smoking status: Never Smoker    Smokeless tobacco: Never Used    Alcohol use No     History   Drug Use No     History   Sexual Activity    Sexual activity: Not on file       Per Today's Psychosocial:  Tobacco: none, patient denies any use.  Alcohol: Pt reports no alcohol use over the last year. Pt reports past alcohol use of about 24 cans of beer over a weekend on "some weekends".  Illicit Drugs/Non-prescribed Medications: none, patient denies any use.    Patient and Caregiver states clear understanding of the potential impact of substance use as it relates to LVAD and transplant candidacy and is aware of possible random substance screening.  Substance abstinence/cessation counseling, education and resources provided and reviewed.     Arrests/DWI/Treatment/Rehab: yes. Pt reports arrested 1 year ago on "false charges" of "domestic violence," " and all charges have been dropped. Pt reports no pending legal issues.     Psychiatric History:    Mental Health: anxiety  Psychiatrist/Counselor: pt denies  Medications:  Pt reports PCP prescribes Xanax.  Suicide/Homicide Issues: Pt denies.   Safety at home: Pt reports safe at home.     Knowledge: Patient and Caregiver states having clear understanding and realistic expectations regarding the potential risks and potential benefits LVAD implantation and organ transplantation and organ donation and agrees to discuss with health care team members and support system members, as well as to utilize available resources and express questions and/or concerns in order to further facilitate the pt informed decision-making.  Resources and information provided and reviewed.     Patient and Caregiver is aware of [facility]'s affiliation and/or partnership with agencies in home health care, LTAC, SNF, [facility], and other hospitals and clinics.    Understanding: Patient and Caregiver reports having a clear understanding of the many lifetime commitments involved with being an LVAD and transplant recipient, including costs, compliance, medications, lab work, procedures, appointments, concrete and financial planning, preparedness, timely and appropriate communication of concerns, abstinence (ETOH, tobacco, illicit non-prescribed drugs), adherence to all health care team recommendations, support system and caregiver involvement, appropriate and timely resource utilization and follow-through, mental health counseling as needed/recommended, and patient and caregiver responsibilities.  Social Service Handbook, resources and detailed educational information provided and reviewed.  Educational information provided.    Patient and Caregiver also reports current and expected compliance with health care regime and states having a clear understanding of the importance of compliance.       Patient and Caregiver reports a clear understanding that risks  and benefits may be involved with LVAD heart failure treatment and organ transplantation and with organ donation.     Patient and Caregiver also reports clear understanding that psychosocial risk factors may affect patient, and include but are not limited to feelings of depression, generalized anxiety, anxiety regarding dependence on others, post traumatic stress disorder, feelings of guilt and other emotional and/or mental concerns, and/or exacerbation of existing mental health concerns.  Detailed resources provided and discussed.      Patient and Caregiver agrees to access appropriate resources in a timely manner as needed and/or as recommended, and to communicate concerns appropriately.     Patient and Caregiver also reports a clear understanding of treatment options available.      Feelings or Concerns: Pt reports concerns about LVAD, and still unsure if he is willing to have LVAD surgery.     Coping: Pt reports coping adequately at this time. Pt reports some feelings of anxiety due to medical condition. Pt reports using deep breathing and Xanax to help with symptoms of anxiety. SW providing emotional support.     Goals: Pt reports goal of returning to work.      Interview Behavior: Patient presents as alert and oriented x 4, pleasant, calm, communicative, cooperative and asking and answering questions appropriately.  Both of pt's dtrs presented as alert and oriented x 4 and communicative. Providing caregiver education proved difficult at times due to both of pt's dtrs speaking over SW while SW was providing education.          Transplant Social Work - Candidacy  Assessment/Plan:     Psychosocial Suitability: Patient presents as a suitable candidate for LVAD or transplant at this time. Based on psychosocial risk factors, patient presents as medium risk, due to both caregivers living in different cities than pt. Both of pt's dtrs report willing to move closer to pt to assist with his care if necessary. Pt also  reports a history of anxiety which he is able to manage with taking Xanax daily. Pt does report a past history of ETOH use, however pt reports no use in the last year.       Leslie Ledesma, DEVONW

## 2018-06-27 NOTE — PROGRESS NOTES
At the request of Dr. Melgar, I have been asked to meet patient and provide VAD education. Introduced self and reason for visit. Pt and daughter AAAO.  Provided written VAD education (black folder). Included in folder is the following: VAD education, wellness contract, acknowledgement of being evaluated for VAD, support group flier,    picture of VAD  In body, Yunno pamphlet, There is hope pamphlet, Tomorrow depends on the decision we make today patient education pack (HM II), [employer] information, and business cards for all VAD coordinators. Explained that we use 3 different types of pumps here and information on both pumps is in the red folder. I explained the work up process as well.     Explained to look over the entire contents and read the wellness contract, caregiver agreement and acknowledgement form.  Also explained they should bring this folder with them to all clinic visits and if they are admitted to the hospital so we can continue education as needed. Should there be any questions, please write them down and bring with you or feel free to call and we can talk on the phone. All questions answered to pt and daughter satisfaction as evidence by verbal acknowledgement.     Of note, pt daughter reports they have old red LVAD pre education folder.  Explained to pt daughter to dispose of.

## 2018-06-27 NOTE — SUBJECTIVE & OBJECTIVE
Interval HPI: NAEON. Doing well today. No events on telemetry. Net even on diuresis, Cr increased to 1.4 from 1.1.     Review of Systems   All other systems reviewed and are negative.    Objective:     Vital Signs (Most Recent):  Temp: 98.3 °F (36.8 °C) (06/27/18 0745)  Pulse: 83 (06/27/18 1122)  Resp: 18 (06/27/18 0838)  BP: 104/66 (06/27/18 0745)  SpO2: 97 % (06/27/18 0745) Vital Signs (24h Range):  Temp:  [97.1 °F (36.2 °C)-99.6 °F (37.6 °C)] 98.3 °F (36.8 °C)  Pulse:  [] 83  Resp:  [11-24] 18  SpO2:  [92 %-99 %] 97 %  BP: ()/(54-82) 104/66     Patient Vitals for the past 72 hrs (Last 3 readings):   Weight   06/26/18 1101 101.2 kg (223 lb 1.7 oz)     Body mass index is 31.12 kg/m².      Intake/Output Summary (Last 24 hours) at 06/27/18 1134  Last data filed at 06/27/18 1113   Gross per 24 hour   Intake             1385 ml   Output              375 ml   Net             1010 ml       Physical Exam  General: alert, awake and oriented x 3  Eyes:PERRL.   Neck:no JVD   Lungs:  clear to auscultation bilaterally   Cardiovascular: Heart: regular rate and rhythm, S1, S2 normal, no murmur, click, rub or gallop.   Chest Wall: no tenderness.   Pulses-2+ radial, femoral, DP, PT b/l  Extremities: no cyanosis or edema.   Abdomen/Rectal: Abdomen: soft, non-tender non-distented; bowel sounds normal  Neurologic: Normal strength and tone. No focal numbness or weakness    Significant Labs:  CBC:    Recent Labs  Lab 06/25/18  0500 06/26/18  0310 06/27/18  0657   WBC 7.84  7.84 8.69 6.54   RBC 4.28*  4.28* 4.49* 4.47*   HGB 12.7*  12.7* 13.5* 13.6*   HCT 39.9*  39.9* 41.8 41.9   *  142* 131* 125*   MCV 93  93 93 94   MCH 29.7  29.7 30.1 30.4   MCHC 31.8*  31.8* 32.3 32.5     BNP:    Recent Labs  Lab 06/23/18  0018   BNP 1,135*     CMP:    Recent Labs  Lab 06/25/18  0500 06/25/18  1634 06/26/18  0310 06/27/18  0657     --  96 109   CALCIUM 9.2  --  9.6 9.6   ALBUMIN 3.2*  --  3.6 3.5   PROT 6.4  --  7.1  7.0     --  136 138   K 4.1 4.7 4.0 4.1   CO2 25  --  29 29     --  97 97   BUN 13  --  11 18   CREATININE 0.9  --  1.1 1.4   ALKPHOS 53*  --  54* 62   ALT 26  --  29 34   AST 18  --  17 27   BILITOT 0.4  --  1.2* 0.9      Coagulation:     Recent Labs  Lab 06/23/18  0018   INR 1.5*   APTT 22.6     LDH:  No results for input(s): LDH in the last 72 hours.  Microbiology:  Microbiology Results (last 7 days)     ** No results found for the last 168 hours. **          BMP:     Recent Labs  Lab 06/25/18  1634  06/27/18  0657   GLU  --   < > 109   NA  --   < > 138   K 4.7  < > 4.1   CL  --   < > 97   CO2  --   < > 29   BUN  --   < > 18   CREATININE  --   < > 1.4   CALCIUM  --   < > 9.6   MG 2.0  --   --    < > = values in this interval not displayed.  Cardiac Markers: No results for input(s): CKMB, TROPONINT, MYOGLOBIN in the last 72 hours.  I have reviewed all pertinent labs within the past 24 hours.    Diagnostic Results:  I have reviewed all pertinent imaging results/findings within the past 24 hours.

## 2018-06-27 NOTE — ASSESSMENT & PLAN NOTE
-Hx of parietal CVA presumed due to LV thrombus per chart. Prior echos reviewed here, no thrombus visualzied in past  -Pt reports initiation of AC at time of MI when he had LV aneurysm  -Also reports hx of AF (Gyupb2gsft 4)  -Will stop plavix (last stent 7 yrs ago, this presentation not suspected secondary to ACS)  -Continue ASA, restart coumadin

## 2018-06-27 NOTE — PT/OT/SLP RE-EVAL
Occupational Therapy   Re-evaluation/Discharge    Name: Yonathan Good Jr.  MRN: [id number]  Admitting Diagnosis:  VT (ventricular tachycardia)   Pt eval/discharged from OT 6/23; later, rapid response called and pt transferred to ICU; L heart cath performed 6/25    Recommendations:     Discharge Recommendations: home  Discharge Equipment Recommendations:  none  Barriers to discharge:  None    History:     Past Medical History:   Diagnosis Date    AICD (automatic cardioverter/defibrillator) present     Arthritis     CHF (congestive heart failure)     Coronary artery disease     MI (myocardial infarction)     Sarcoid     Stroke        Past Surgical History:   Procedure Laterality Date    CARDIAC CATHETERIZATION      CARDIAC DEFIBRILLATOR PLACEMENT  7-12    CARDIAC DEFIBRILLATOR PLACEMENT      CORONARY STENT PLACEMENT  07/2011    FRACTURE SURGERY      LEFT HEART CATHETERIZATION N/A 6/25/2018    Procedure: Left heart cath;  Surgeon: Jordi Lui MD;  Location: [facility] CATH LAB;  Service: Cardiology;  Laterality: N/A;       Subjective     Chief Complaint: None stated  Patient/Family stated goals: Return to PLOF  Communicated with: RN prior to session.  Pain/Comfort:  · Pain Rating 1: 0/10  · Pain Rating Post-Intervention 1: 0/10    Objective:     Patient found with: pulse ox (continuous), telemetry, oxygen, peripheral IV    General Precautions: Standard, fall   Orthopedic Precautions:N/A   Braces: N/A     Occupational Performance:    Bed Mobility:    · NT, pt already up in room    Functional Mobility/Transfers:  · Patient completed Sit <> Stand Transfer with independence with no assistive device  · Functional mobility around unit >500 ft with (I) no AD; no signs or c/o SOB    Activities of Daily Living:  · UB Dressing: independence    · LB Dressing: independence in standing to don pants    Cognitive/Visual Perceptual:  WFL    Physical Exam:  Pt demo WFL balance, B UE ROM, strength, and fine motor  coordination    Patient left seated EOB with all lines intact and call button in reach    [facility] 6 Click:  [facility] Total Score: 24    Treatment & Education:  OT re-eval; educated on OT role and POC including no further need for acute OT due to remains at high level of function; white board updated and pt encouraged to continue ambulation and ADL routine  Education:    Assessment:     Yonathan Good Jr. is a 57 y.o. male with a medical diagnosis of VT (ventricular tachycardia).  Pt is at baseline level of function and does not have acute OT needs at this time.    Plan:     ·   (D/C OT)   · Plan of Care Reviewed with: patient    This Plan of care has been discussed with the patient who was involved in its development and understands and is in agreement with the identified goals and treatment plan    GOALS:    Occupational Therapy Goals     Not on file          Multidisciplinary Problems (Resolved)        Problem: Occupational Therapy Goal    Goal Priority Disciplines Outcome Interventions   Occupational Therapy Goal   (Resolved)     OT, PT/OT Outcome(s) achieved                    Time Tracking:     OT Date of Treatment: 06/27/18  OT Start Time: 1021  OT Stop Time: 1040  OT Total Time (min): 19 min    Billable Minutes:Re-eval 10  Therapeutic Activity 9    MELISSA Tam  6/27/2018

## 2018-06-27 NOTE — NURSING
"Patient is not on home oxygen. SPO2 = 96-97% on room air.  Removed oxygen.   Daughter stated, "The doctor told me if oxygen burns your nose you don't need it anymore. Daddy does it burn?  If it doesn't burn then he may still need it.  Did you prescribe it or did the doctor prescribe it?  Please call the doctor to see if it is a good idea to remove the oxygen."  Paged MIREYA Pelaez M.D.  "

## 2018-06-27 NOTE — ASSESSMENT & PLAN NOTE
-Central line placed via Right IJ, SVO2 61  -CVP 7 today, SVO2 59 on 6/25  -Overloaded, LVEDP elevated to 45  -Diuresed on lasix 10/hr for 2 days, switch to 80 IV bid  -Continue metoprolol, losartan, spironolactone  -Pathway started 6/25/18

## 2018-06-27 NOTE — PLAN OF CARE
Problem: Occupational Therapy Goal  Goal: Occupational Therapy Goal  Outcome: Outcome(s) achieved Date Met: 06/27/18  Re-eval and D/C from OT 6/27

## 2018-06-27 NOTE — PROGRESS NOTES
Patient's chart was reviewed by a stroke team provider.  There is no new imaging to review.    Patient needs speech therapy evaluation. Continue asa, coumadin, and atorvastatin    For other recommendations please see our previous note completed on: 06/26/18.    Will continue to follow. Discussed patient with staff. Please contact stroke team for any questions or concerns.             Teagan Hennessy PA-C  Vascular Neurology  [phone]

## 2018-06-27 NOTE — ASSESSMENT & PLAN NOTE
-prednisone per home regimen  -Consulted lung transplant to re-evaluate patient as proceeding with advanced options workup for pulmonary optimization, further testing needed as workup prior to presenting him

## 2018-06-27 NOTE — NURSING
Pulmonary Lab @ [phone], called to report PFTs were completed in May, 2018 at [facility] and he does not need to complete them again.  MIREYA Pelaez M.D. notified.  PFTs canceled.

## 2018-06-27 NOTE — PLAN OF CARE
Problem: Patient Care Overview  Goal: Plan of Care Review  Outcome: Ongoing (interventions implemented as appropriate)  Pt AAOx4. Pt free from falls. Pt wears non slip socks when ambulating. Pt bed low and locked position. Pt afebrile. Pt IV site without redness or edema. Educated pt on importance of hand washing. Pt received hydrocodone for pain.

## 2018-06-27 NOTE — PLAN OF CARE
Problem: Patient Care Overview  Goal: Plan of Care Review  Outcome: Ongoing (interventions implemented as appropriate)  Reviewed plan of care with patient.  Furosemide 10 mg/hr discontinued.  Lasix 80 mg INJ BID ordered.  Inpatient consult to Lung Transplant.  Labetolol 10 mg INJ q6H for SBP > 220 and DBP >110.  NaCl+ Bolus for SPB <100.  Last BM 6/23/18, patient refused medication for bowel motility.  Pacemaker and AICD left upper chest wall.  Patient is paced on the monitor.  Patient has remained free of fall/trauma/injury by using appropriate lighting, nonskid socks, by keeping area free of debris, and family remains at bedside.  Patient verbalized understanding of all instructions.

## 2018-06-27 NOTE — ASSESSMENT & PLAN NOTE
-s/p LAD stent 2011  -Intermittent nonexertional chest pain, increased in frequency in recent weeks  -Troponin peaked at 2.3, did also get shocked x1  -Treated initially as ACS as per NSTEMI, no culprit lesion identified, likely scar based VT.  -Can consider PET stress once euvolemic.  -Heparin stopped. Continue ASA, statin, BB, ARB, MRA, imdur. No recent stents, will stop plavix as restarting AC  -LHC: LM luminal irreg, LAD 30% w/ patent stent, prox Lcx 70%, OM1 80%, RCA luminal irregularities

## 2018-06-28 PROBLEM — Z76.82 HEART TRANSPLANT CANDIDATE: Status: ACTIVE | Noted: 2018-01-01

## 2018-06-28 NOTE — NURSING
Removed Triple Lumen IJ.  Placed patient in supine position, removed dressing, removed 2 stitches, and removed catheter.  Applied petroleum gauze, 4x4, and tegaderm. Held pressure x 5 minutes and patient will lie flat for a total of 30 minutes.

## 2018-06-28 NOTE — ASSESSMENT & PLAN NOTE
-Patient states he had seizure one time in 2006 and was placed on dilantin  -EEG with no evidence of seizure activity  -patient has not had seizure since 2006  -recommend switching from dilantin to keppra or discontinue AED  -dilantin not recommended to be taken with coumadin due to interaction between medications causing decreased effectiveness

## 2018-06-28 NOTE — ASSESSMENT & PLAN NOTE
-Initially admitted to Neuro, no TPA. CT head and CTA negative. Hx of seizures, EEG pending.  -Resolved, no further workup at this time  -SLP evaluation

## 2018-06-28 NOTE — ASSESSMENT & PLAN NOTE
Diagnosed on bronchoscopy with biopsies. His PFTs reveal moderate restriction and decreased DLCO. He is on supplemental oxygen while inpatient, but does not use at home oxygen. He is stable from a respiratory standpoint.     Would start Cellcept for management of sarcoidosis.

## 2018-06-28 NOTE — ASSESSMENT & PLAN NOTE
-prednisone per home regimen  -Consulted lung transplant to re-evaluate patient  -Okay to proceed with workup as per Lung team, appreciate recommendations  -Recommended consideration of cellcept or MTX for steroid-sparing therapy (currently on prednisone monotherapy), will discuss with staff

## 2018-06-28 NOTE — NURSING TRANSFER
Nursing Transfer Note      6/28/2018     Transfer To: Ultrasound for Kidney    Transfer via stretcher    Transfer with cardiac monitoring    Transported by Marisol Saucedo    Medicines sent: No    Chart send with patient: No    Notified: daughter

## 2018-06-28 NOTE — ASSESSMENT & PLAN NOTE
-OHT/LVAD pathway, step 3  -CT C/A/P complete  -CTS consult; discussed further whether LVAD candidate given recent VT, hx of sarcoid (no prior e/o cardiac involvement), suppressed on Amio. Okay to proceed with both LVAD and OHT workup  -Colorectal surgery consulted for colonoscopy (unable to complete previously due to CP), will bridge with heparin given hx of thromboembolic stroke  -Renal U/s

## 2018-06-28 NOTE — SUBJECTIVE & OBJECTIVE
Past Medical History:   Diagnosis Date    AICD (automatic cardioverter/defibrillator) present     Arthritis     CHF (congestive heart failure)     Coronary artery disease     MI (myocardial infarction)     Sarcoid     Stroke        Past Surgical History:   Procedure Laterality Date    CARDIAC CATHETERIZATION      CARDIAC DEFIBRILLATOR PLACEMENT  7-12    CARDIAC DEFIBRILLATOR PLACEMENT      CORONARY STENT PLACEMENT  07/2011    FRACTURE SURGERY      LEFT HEART CATHETERIZATION N/A 6/25/2018    Procedure: Left heart cath;  Surgeon: Jordi Lui MD;  Location: [facility] CATH LAB;  Service: Cardiology;  Laterality: N/A;       Review of patient's allergies indicates:  No Known Allergies    PTA Medications   Medication Sig    albuterol (PROVENTIL) 2.5 mg /3 mL (0.083 %) nebulizer solution Take 2.5 mg by nebulization every 6 (six) hours as needed.    alprazolam (XANAX) 2 MG tablet Take 2 mg by mouth 2 (two) times daily as needed.     aspirin (ECOTRIN) 81 MG EC tablet Take 81 mg by mouth. 1 Tablet, Delayed Release (E.C.) Oral Every day    bumetanide (BUMEX) 1 MG tablet Take 1 mg by mouth daily as needed.    carisoprodol (SOMA) 350 MG tablet Take 350 mg by mouth 4 (four) times daily as needed for Muscle spasms.    colchicine 0.6 mg tablet 0.6 mg once daily.     furosemide (LASIX) 40 MG tablet TAKE 2 TABLETS BY MOUTH TWICE DAILY    gabapentin (NEURONTIN) 600 MG tablet Take 600 mg by mouth 2 (two) times daily. 1 Tablet Oral At bedtime    hydrocodone-acetaminophen 10-325mg (NORCO)  mg Tab Take 1 tablet by mouth 2 (two) times daily as needed.     isosorbide mononitrate (IMDUR) 30 MG 24 hr tablet Take 3 tablets (90 mg total) by mouth once daily. (Patient taking differently: Take 60 mg by mouth once daily. )    losartan (COZAAR) 50 MG tablet Take 1 tablet (50 mg total) by mouth once daily.    pantoprazole (PROTONIX) 40 MG tablet Take 40 mg by mouth. 1 Tablet, Delayed Release (E.C.) Oral Every day     phenytoin (DILANTIN) 100 MG ER capsule 100 mg 2 (two) times daily.     potassium chloride SA (K-DUR,KLOR-CON) 20 MEQ tablet TAKE 2 TABLETS BY MOUTH EVERY MORNING AND 1 TABLET BY MOUTH EVERY EVENING    pravastatin (PRAVACHOL) 40 MG tablet Take 40 mg by mouth once daily.     predniSONE (DELTASONE) 10 MG tablet TK 1 T PO BID Tuesday, Thursday, Saturday    predniSONE (DELTASONE) 20 MG tablet Take 1 tablet by mouth 2 (two) times daily. Monday, Wednesday, and Friday, Sunday    sotalol (BETAPACE) 160 MG Tab TK 1 T PO  BID    spironolactone (ALDACTONE) 25 MG tablet TAKE 1 TABLET BY MOUTH EVERY DAY    warfarin (COUMADIN) 7.5 MG tablet 1 tablet Monday  0.5 tablet Tuesday-Sunday    albuterol (VENTOLIN HFA) 90 mcg/actuation inhaler Inhale 2 puffs into the lungs every 4 (four) hours as needed for Wheezing.    fluticasone-vilanterol (BREO ELLIPTA) 200-25 mcg/dose DsDv diskus inhaler Inhale 1 puff into the lungs once daily. Controller    nitroGLYCERIN (NITROSTAT) 0.4 MG SL tablet ONE TABLET UNDER TONGUE AS NEEDED FOR CHEST PAIN    promethazine-codeine 6.25-10 mg/5 ml (PHENERGAN WITH CODEINE) 6.25-10 mg/5 mL syrup TK 5 ML PO  Q 6 H PRN     Family History     Problem Relation (Age of Onset)    Cancer Maternal Grandmother    Coronary artery disease Father, Sister, Sister    Heart disease Mother, Father, Sister    Hypertension Mother, Father, Sister    Kidney disease Sister    Stroke Sister    Vision loss Sister        Social History Main Topics    Smoking status: Never Smoker    Smokeless tobacco: Never Used    Alcohol use No    Drug use: No    Sexual activity: Not on file     Review of Systems   Constitutional: Negative for activity change, appetite change, diaphoresis, fatigue and fever.   HENT: Negative for congestion, rhinorrhea, sinus pressure, sore throat and trouble swallowing.    Respiratory: Negative for cough, chest tightness, shortness of breath and wheezing.         MONTERO   Cardiovascular: Negative for chest  pain, palpitations and leg swelling.   Gastrointestinal: Negative for abdominal distention and abdominal pain.   Genitourinary: Negative for difficulty urinating and dysuria.   Skin: Negative for pallor.     Objective:     Vital Signs (Most Recent):  Temp: 97.6 °F (36.4 °C) (06/28/18 0800)  Pulse: 87 (06/28/18 0800)  Resp: 18 (06/28/18 0800)  BP: 124/80 (06/28/18 0800)  SpO2: 98 % (06/28/18 0800) Vital Signs (24h Range):  Temp:  [97.6 °F (36.4 °C)-98.9 °F (37.2 °C)] 97.6 °F (36.4 °C)  Pulse:  [] 87  Resp:  [14-20] 18  SpO2:  [90 %-98 %] 98 %  BP: ()/(55-90) 124/80     Weight: 101.2 kg (223 lb 1.7 oz)  Body mass index is 31.12 kg/m².      Intake/Output Summary (Last 24 hours) at 06/28/18 0948  Last data filed at 06/28/18 0600   Gross per 24 hour   Intake             2160 ml   Output              825 ml   Net             1335 ml       Physical Exam   Constitutional: He is oriented to person, place, and time. He appears well-developed and well-nourished.   HENT:   Head: Normocephalic and atraumatic.   Nasal cannula in place   Eyes: EOM are normal. No scleral icterus.   Neck: Normal range of motion. Neck supple. No JVD present.   Cardiovascular: Normal rate, regular rhythm and normal heart sounds.  Exam reveals no gallop and no friction rub.    No murmur heard.  Pulmonary/Chest: Effort normal. No respiratory distress. He has no wheezes. He has no rales. He exhibits no tenderness.   Minimally decreased (diffusely)    Abdominal: Soft. He exhibits no distension.   Musculoskeletal: Normal range of motion. He exhibits no edema.   Neurological: He is alert and oriented to person, place, and time.   Skin: Skin is warm and dry. No erythema.   Psychiatric: He has a normal mood and affect. His behavior is normal.   Nursing note and vitals reviewed.      Significant Labs:  CBC:    Recent Labs  Lab 06/28/18  0504   WBC 8.73   RBC 3.92*   HGB 11.8*   HCT 36.5*   *   MCV 93   MCH 30.1   MCHC 32.3     BMP:    Recent  Labs  Lab 06/28/18  0504   *   K 4.2   CL 95   CO2 28   BUN 26*   CREATININE 1.5*   CALCIUM 9.5        I have reviewed all pertinent labs within the past 24 hours.    Diagnostic Results:  X-Ray: Reviewed  CT: Reviewed 6/25 - Chronic lung changes in keeping with the patient's known history of sarcoidosis. Superimposed ground-glass attenuation may relate to interstitial edema. Enlarging right-sided dependent pleural effusion.

## 2018-06-28 NOTE — PROGRESS NOTES
[facility]  Heart Transplant  Progress Note    Patient Name: Yonathan Good Jr.  MRN: [id number]  Admission Date: 6/22/2018  Hospital Length of Stay: 5 days  Attending Physician: Britt Melgar MD  Primary Care Provider: Edgar Gates MD  Principal Problem:VT (ventricular tachycardia)    Subjective:     Interval HPI: NAEON. Doing well today. No events on telemetry. Net positive, 4 unrecorded UOP. Cr 1.5 from 1.4 on IVP lasix.    Review of Systems   All other systems reviewed and are negative.    Objective:     Vital Signs (Most Recent):  Temp: 97.6 °F (36.4 °C) (06/28/18 0800)  Pulse: 87 (06/28/18 0800)  Resp: 18 (06/28/18 0800)  BP: 124/80 (06/28/18 0800)  SpO2: 98 % (06/28/18 0800) Vital Signs (24h Range):  Temp:  [97.6 °F (36.4 °C)-98.9 °F (37.2 °C)] 97.6 °F (36.4 °C)  Pulse:  [] 87  Resp:  [14-20] 18  SpO2:  [90 %-98 %] 98 %  BP: ()/(55-90) 124/80     Patient Vitals for the past 72 hrs (Last 3 readings):   Weight   06/26/18 1101 101.2 kg (223 lb 1.7 oz)     Body mass index is 31.12 kg/m².      Intake/Output Summary (Last 24 hours) at 06/28/18 0936  Last data filed at 06/28/18 0600   Gross per 24 hour   Intake             2160 ml   Output              825 ml   Net             1335 ml       Physical Exam  General: alert, awake and oriented x 3  Eyes:PERRL.   Neck:no JVD   Lungs:  clear to auscultation bilaterally   Cardiovascular: Heart: regular rate and rhythm, S1, S2 normal, no murmur, click, rub or gallop.   Chest Wall: no tenderness.   Pulses-2+ radial, femoral, DP, PT b/l  Extremities: no cyanosis or edema.   Abdomen/Rectal: Abdomen: soft, non-tender non-distented; bowel sounds normal  Neurologic: Normal strength and tone. No focal numbness or weakness    Significant Labs:  CBC:    Recent Labs  Lab 06/26/18  0310 06/27/18  0657 06/28/18  0504   WBC 8.69 6.54 8.73   RBC 4.49* 4.47* 3.92*   HGB 13.5* 13.6* 11.8*   HCT 41.8 41.9 36.5*   * 125* 120*   MCV 93 94 93   MCH 30.1 30.4  30.1   MCHC 32.3 32.5 32.3     BNP:    Recent Labs  Lab 06/23/18  0018 06/27/18  1356   BNP 1,135* 425*     CMP:    Recent Labs  Lab 06/26/18  0310 06/27/18  0657 06/28/18  0504   GLU 96 109 107   CALCIUM 9.6 9.6 9.5   ALBUMIN 3.6 3.5 3.4*   PROT 7.1 7.0 6.8    138 133*   K 4.0 4.1 4.2   CO2 29 29 28   CL 97 97 95   BUN 11 18 26*   CREATININE 1.1 1.4 1.5*   ALKPHOS 54* 62 60   ALT 29 34 33   AST 17 27 20   BILITOT 1.2* 0.9 0.6      Coagulation:     Recent Labs  Lab 06/23/18  0018 06/28/18  0504   INR 1.5* 1.1   APTT 22.6 22.1     LDH:    Recent Labs  Lab 06/27/18  1356   *     Microbiology:  Microbiology Results (last 7 days)     ** No results found for the last 168 hours. **          BMP:     Recent Labs  Lab 06/25/18  1634  06/28/18  0504   GLU  --   < > 107   NA  --   < > 133*   K 4.7  < > 4.2   CL  --   < > 95   CO2  --   < > 28   BUN  --   < > 26*   CREATININE  --   < > 1.5*   CALCIUM  --   < > 9.5   MG 2.0  --   --    < > = values in this interval not displayed.  Cardiac Markers: No results for input(s): CKMB, TROPONINT, MYOGLOBIN in the last 72 hours.  I have reviewed all pertinent labs within the past 24 hours.    Diagnostic Results:  I have reviewed all pertinent imaging results/findings within the past 24 hours.  Interval HPI: NAEON. Doing well today. No events on telemetry. Net even on diuresis, Cr increased to 1.4 from 1.1.     Review of Systems   All other systems reviewed and are negative.    Objective:     Vital Signs (Most Recent):  Temp: 97.6 °F (36.4 °C) (06/28/18 0800)  Pulse: 87 (06/28/18 0800)  Resp: 18 (06/28/18 0800)  BP: 124/80 (06/28/18 0800)  SpO2: 98 % (06/28/18 0800) Vital Signs (24h Range):  Temp:  [97.6 °F (36.4 °C)-98.9 °F (37.2 °C)] 97.6 °F (36.4 °C)  Pulse:  [] 87  Resp:  [14-20] 18  SpO2:  [90 %-98 %] 98 %  BP: ()/(55-90) 124/80     Patient Vitals for the past 72 hrs (Last 3 readings):   Weight   06/26/18 1101 101.2 kg (223 lb 1.7 oz)     Body mass index is  31.12 kg/m².      Intake/Output Summary (Last 24 hours) at 06/28/18 0936  Last data filed at 06/28/18 0600   Gross per 24 hour   Intake             2160 ml   Output              825 ml   Net             1335 ml       Physical Exam  General: alert, awake and oriented x 3  Eyes:PERRL.   Neck:no JVD   Lungs:  clear to auscultation bilaterally   Cardiovascular: Heart: regular rate and rhythm, S1, S2 normal, no murmur, click, rub or gallop.   Chest Wall: no tenderness.   Pulses-2+ radial, femoral, DP, PT b/l  Extremities: no cyanosis or edema.   Abdomen/Rectal: Abdomen: soft, non-tender non-distented; bowel sounds normal  Neurologic: Normal strength and tone. No focal numbness or weakness    Significant Labs:  CBC:    Recent Labs  Lab 06/26/18 0310 06/27/18  0657 06/28/18  0504   WBC 8.69 6.54 8.73   RBC 4.49* 4.47* 3.92*   HGB 13.5* 13.6* 11.8*   HCT 41.8 41.9 36.5*   * 125* 120*   MCV 93 94 93   MCH 30.1 30.4 30.1   MCHC 32.3 32.5 32.3     BNP:    Recent Labs  Lab 06/23/18 0018 06/27/18  1356   BNP 1,135* 425*     CMP:    Recent Labs  Lab 06/26/18 0310 06/27/18  0657 06/28/18  0504   GLU 96 109 107   CALCIUM 9.6 9.6 9.5   ALBUMIN 3.6 3.5 3.4*   PROT 7.1 7.0 6.8    138 133*   K 4.0 4.1 4.2   CO2 29 29 28   CL 97 97 95   BUN 11 18 26*   CREATININE 1.1 1.4 1.5*   ALKPHOS 54* 62 60   ALT 29 34 33   AST 17 27 20   BILITOT 1.2* 0.9 0.6      Coagulation:     Recent Labs  Lab 06/23/18 0018 06/28/18  0504   INR 1.5* 1.1   APTT 22.6 22.1     LDH:    Recent Labs  Lab 06/27/18  1356   *     Microbiology:  Microbiology Results (last 7 days)     ** No results found for the last 168 hours. **          BMP:     Recent Labs  Lab 06/25/18  1634  06/28/18  0504   GLU  --   < > 107   NA  --   < > 133*   K 4.7  < > 4.2   CL  --   < > 95   CO2  --   < > 28   BUN  --   < > 26*   CREATININE  --   < > 1.5*   CALCIUM  --   < > 9.5   MG 2.0  --   --    < > = values in this interval not displayed.  Cardiac Markers: No  results for input(s): CKMB, TROPONINT, MYOGLOBIN in the last 72 hours.  I have reviewed all pertinent labs within the past 24 hours.    Diagnostic Results:  I have reviewed all pertinent imaging results/findings within the past 24 hours.    Assessment and Plan:     57 y.o. male  with h/o ischemic cardiomyopathy(negative for sarcoid on cardiac MRI 2012), H/o stents 7 years ago,Pulmonary sarcoid, left parietal stroke in the past, atrial fibrillation, CRT-D(st Giorgio), working up for advanced options who initially presented on 6/22 with expressive aphasia. Initial imaging negative for CVA, no tPA given. Transferred to [facility] service after having VT storm on 6/23. Well controlled on amiodarone loading. Now with ADHF, diuresing on IV lasix, transitioning to oral lasix. On pathway, advancing.    * VT (ventricular tachycardia)    -Multiple episodes of MMVT on 6/23  -EP consulted. VT1 zone lowered as per EP. No further VT. Likely AT with aberrancy on early AM 6/25, nonsustained.   -K>4, Mg>2  -Continue amiodarone, 400 bid x2 weeks, then 400 daily  -[facility] without overt culprit lesion  -Follow up with Dr. Telles in EP in 4-6 weeks        Acute on chronic systolic congestive heart failure    -Central line placed via Right IJ, SVO2 61  -CVP 7 today, SVO2 59 on 6/25  -Overloaded, LVEDP elevated to 45  -Diuresed on lasix 10/hr for 2 days, switch to 80 IV bid x1 day, transition to PO lasix today  -Continue metoprolol, losartan, spironolactone  -Pathway started 6/25/18        Heart transplant candidate    -OHT/LVAD pathway, step 3  -CT C/A/P complete  -CTS consult complete; will discuss further whether LVAD candidate given recent VT, hx of sarcoid (thought to be pulmonary sarcoid, no prior e/o cardiac)  -GI consult for colonoscopy (unable to complete previously due to CP), will bridge with heparin given hx of thromboembolic stroke  -Renal U/s        CAD (coronary artery disease)    -s/p LAD stent 2011  -Intermittent nonexertional chest  pain, increased in frequency in recent weeks  -Troponin peaked at 2.3, did also get shocked x1  -Treated initially as ACS as per NSTEMI, no culprit lesion identified, likely scar based VT.  -Can consider PET stress  -Heparin stopped. Continue ASA, statin, BB, ARB, MRA, imdur. No recent stents, will stop plavix as restarting AC  -LHC: LM luminal irreg, LAD 30% w/ patent stent, prox Lcx 70%, OM1 80%, RCA luminal irregularities        Sarcoidosis of lung    -prednisone per home regimen  -Consulted lung transplant to re-evaluate patient  -Okay to proceed with workup as per Lung team, appreciate recommendations  -Recommended consideration of cellcept or MTX for steroid-sparing therapy (currently on prednisone monotherapy), will discuss with staff        Biventricular cardiac pacemaker in situ    -St Giorgio device interrogated-See EP notes        Expressive aphasia    -Initially admitted to Neuro, no TPA. CT head and CTA negative. Hx of seizures, EEG pending.  -Resolved, no further workup at this time  -SLP evaluation        Seizure disorder    Home phenytoin        CVA (cerebrovascular accident)    -Hx of parietal CVA presumed due to LV thrombus per chart. Prior echos reviewed here, no thrombus visualzied in past  -Pt reports initiation of AC at time of MI when he had LV aneurysm  -Also reports hx of AF ([id number])  -Will stop plavix (last stent 7 yrs ago, this presentation not suspected secondary to ACS)  -Continue ASA, restart coumadin  -Heparin bridge            Srinivas Pelaez MD  Heart Transplant  [facility]

## 2018-06-28 NOTE — CONSULTS
[facility]  Lung Transplant  Consult Note    Patient Name: Yonathan Good Jr.  MRN: [id number]  Admission Date: 6/22/2018  Hospital Length of Stay: 5 days  Attending Physician: Britt Melgar MD  Primary Care Provider: Edgar Gates MD     Consults  Subjective:     History of Present Illness:  Mr. Good is a 57 y.o. male  with PMHx of pulmonary sarcoidosis, ischemic cardiomyopathy s/p stent placements in 2011, left parietal stroke in the past, atrial fibrillation who initially presented for stroke workup for expressive aphasia. Initial imaging negative for CVA, and no tPA was given. He was transferred to [facility] service over the weekend after having multiple episodes of Vtach on 6/23, now well controlled on amiodarone. CT chest from 6/25 revealed GGOs and right dependent pleural effusion. BNP upon admission found to be >1000, and he underwent aggressive diuresis per [facility], now on IV lasix 80 mg BID currently for acute on chronic diastolic heart failure. He is currently being worked up while inpatient for heart transplant vs. bridge with LVAD. Lung transplant team consulted for assistance with management of underlying sarcoidosis.     Past Medical History:   Diagnosis Date    AICD (automatic cardioverter/defibrillator) present     Arthritis     CHF (congestive heart failure)     Coronary artery disease     MI (myocardial infarction)     Sarcoid     Stroke        Past Surgical History:   Procedure Laterality Date    CARDIAC CATHETERIZATION      CARDIAC DEFIBRILLATOR PLACEMENT  7-12    CARDIAC DEFIBRILLATOR PLACEMENT      CORONARY STENT PLACEMENT  07/2011    FRACTURE SURGERY      LEFT HEART CATHETERIZATION N/A 6/25/2018    Procedure: Left heart cath;  Surgeon: Jordi Lui MD;  Location: [facility] CATH LAB;  Service: Cardiology;  Laterality: N/A;       Review of patient's allergies indicates:  No Known Allergies    PTA Medications   Medication Sig    albuterol (PROVENTIL) 2.5 mg /3 mL (0.083 %)  nebulizer solution Take 2.5 mg by nebulization every 6 (six) hours as needed.    alprazolam (XANAX) 2 MG tablet Take 2 mg by mouth 2 (two) times daily as needed.     aspirin (ECOTRIN) 81 MG EC tablet Take 81 mg by mouth. 1 Tablet, Delayed Release (E.C.) Oral Every day    bumetanide (BUMEX) 1 MG tablet Take 1 mg by mouth daily as needed.    carisoprodol (SOMA) 350 MG tablet Take 350 mg by mouth 4 (four) times daily as needed for Muscle spasms.    colchicine 0.6 mg tablet 0.6 mg once daily.     furosemide (LASIX) 40 MG tablet TAKE 2 TABLETS BY MOUTH TWICE DAILY    gabapentin (NEURONTIN) 600 MG tablet Take 600 mg by mouth 2 (two) times daily. 1 Tablet Oral At bedtime    hydrocodone-acetaminophen 10-325mg (NORCO)  mg Tab Take 1 tablet by mouth 2 (two) times daily as needed.     isosorbide mononitrate (IMDUR) 30 MG 24 hr tablet Take 3 tablets (90 mg total) by mouth once daily. (Patient taking differently: Take 60 mg by mouth once daily. )    losartan (COZAAR) 50 MG tablet Take 1 tablet (50 mg total) by mouth once daily.    pantoprazole (PROTONIX) 40 MG tablet Take 40 mg by mouth. 1 Tablet, Delayed Release (E.C.) Oral Every day    phenytoin (DILANTIN) 100 MG ER capsule 100 mg 2 (two) times daily.     potassium chloride SA (K-DUR,KLOR-CON) 20 MEQ tablet TAKE 2 TABLETS BY MOUTH EVERY MORNING AND 1 TABLET BY MOUTH EVERY EVENING    pravastatin (PRAVACHOL) 40 MG tablet Take 40 mg by mouth once daily.     predniSONE (DELTASONE) 10 MG tablet TK 1 T PO BID Tuesday, Thursday, Saturday    predniSONE (DELTASONE) 20 MG tablet Take 1 tablet by mouth 2 (two) times daily. Monday, Wednesday, and Friday, Sunday    sotalol (BETAPACE) 160 MG Tab TK 1 T PO  BID    spironolactone (ALDACTONE) 25 MG tablet TAKE 1 TABLET BY MOUTH EVERY DAY    warfarin (COUMADIN) 7.5 MG tablet 1 tablet Monday  0.5 tablet Tuesday-Sunday    albuterol (VENTOLIN HFA) 90 mcg/actuation inhaler Inhale 2 puffs into the lungs every 4 (four) hours  as needed for Wheezing.    fluticasone-vilanterol (BREO ELLIPTA) 200-25 mcg/dose DsDv diskus inhaler Inhale 1 puff into the lungs once daily. Controller    nitroGLYCERIN (NITROSTAT) 0.4 MG SL tablet ONE TABLET UNDER TONGUE AS NEEDED FOR CHEST PAIN    promethazine-codeine 6.25-10 mg/5 ml (PHENERGAN WITH CODEINE) 6.25-10 mg/5 mL syrup TK 5 ML PO  Q 6 H PRN     Family History     Problem Relation (Age of Onset)    Cancer Maternal Grandmother    Coronary artery disease Father, Sister, Sister    Heart disease Mother, Father, Sister    Hypertension Mother, Father, Sister    Kidney disease Sister    Stroke Sister    Vision loss Sister        Social History Main Topics    Smoking status: Never Smoker    Smokeless tobacco: Never Used    Alcohol use No    Drug use: No    Sexual activity: Not on file     Review of Systems   Constitutional: Negative for activity change, appetite change, diaphoresis, fatigue and fever.   HENT: Negative for congestion, rhinorrhea, sinus pressure, sore throat and trouble swallowing.    Respiratory: Negative for cough, chest tightness, shortness of breath and wheezing.         MONTERO   Cardiovascular: Negative for chest pain, palpitations and leg swelling.   Gastrointestinal: Negative for abdominal distention and abdominal pain.   Genitourinary: Negative for difficulty urinating and dysuria.   Skin: Negative for pallor.     Objective:     Vital Signs (Most Recent):  Temp: 97.6 °F (36.4 °C) (06/28/18 0800)  Pulse: 87 (06/28/18 0800)  Resp: 18 (06/28/18 0800)  BP: 124/80 (06/28/18 0800)  SpO2: 98 % (06/28/18 0800) Vital Signs (24h Range):  Temp:  [97.6 °F (36.4 °C)-98.9 °F (37.2 °C)] 97.6 °F (36.4 °C)  Pulse:  [] 87  Resp:  [14-20] 18  SpO2:  [90 %-98 %] 98 %  BP: ()/(55-90) 124/80     Weight: 101.2 kg (223 lb 1.7 oz)  Body mass index is 31.12 kg/m².      Intake/Output Summary (Last 24 hours) at 06/28/18 0948  Last data filed at 06/28/18 0600   Gross per 24 hour   Intake              2160 ml   Output              825 ml   Net             1335 ml       Physical Exam   Constitutional: He is oriented to person, place, and time. He appears well-developed and well-nourished.   HENT:   Head: Normocephalic and atraumatic.   Nasal cannula in place   Eyes: EOM are normal. No scleral icterus.   Neck: Normal range of motion. Neck supple. No JVD present.   Cardiovascular: Normal rate, regular rhythm and normal heart sounds.  Exam reveals no gallop and no friction rub.    No murmur heard.  Pulmonary/Chest: Effort normal. No respiratory distress. He has no wheezes. He has no rales. He exhibits no tenderness.   Minimally decreased (diffusely)    Abdominal: Soft. He exhibits no distension.   Musculoskeletal: Normal range of motion. He exhibits no edema.   Neurological: He is alert and oriented to person, place, and time.   Skin: Skin is warm and dry. No erythema.   Psychiatric: He has a normal mood and affect. His behavior is normal.   Nursing note and vitals reviewed.      Significant Labs:  CBC:    Recent Labs  Lab 06/28/18  0504   WBC 8.73   RBC 3.92*   HGB 11.8*   HCT 36.5*   *   MCV 93   MCH 30.1   MCHC 32.3     BMP:    Recent Labs  Lab 06/28/18  0504   *   K 4.2   CL 95   CO2 28   BUN 26*   CREATININE 1.5*   CALCIUM 9.5        I have reviewed all pertinent labs within the past 24 hours.    Diagnostic Results:  X-Ray: Reviewed  CT: Reviewed 6/25 - Chronic lung changes in keeping with the patient's known history of sarcoidosis. Superimposed ground-glass attenuation may relate to interstitial edema. Enlarging right-sided dependent pleural effusion.    Assessment/Plan:     Sarcoidosis of lung    Diagnosed on bronchoscopy with biopsies. His PFTs reveal moderate restriction and decreased DLCO. He is on supplemental oxygen while inpatient, but does not use at home oxygen. He is stable from a respiratory standpoint.     Would start Cellcept for management of sarcoidosis.            Lung transplant  candidate    He does not meet disease specific indications for lung transplantation for a diagnosis of pulmonary sarcoid at this time. He has preserved pulmonary reserve with severe impairment from cardiac insufficiency. Would therefore recommend continued follow-up with HTS for either LVAD or heart transplant.            Thank you for your consult. I will follow-up with patient. Please contact us if you have any additional questions.    Caren White PA-C  Lung Transplant  [facility]

## 2018-06-28 NOTE — ASSESSMENT & PLAN NOTE
-Central line placed via Right IJ, SVO2 61  -CVP 7 today, SVO2 59 on 6/25  -Overloaded, LVEDP elevated to 45  -Diuresed on lasix 10/hr for 2 days, switch to 80 IV bid x1 day, transition to PO lasix today  -Continue metoprolol, losartan, spironolactone  -Pathway started 6/25/18

## 2018-06-28 NOTE — PLAN OF CARE
Problem: Patient Care Overview  Goal: Plan of Care Review  Outcome: Ongoing (interventions implemented as appropriate)  Reviewed plan of care with patient.  Heparin gtt @ 17u/kg/hr.  Lasix 80 mg INJ BID discontinued.  Lasix +80 mg tablet BID first dose at 1800 hours.  Inpatient to colorectal surgery and Gastroenterology.  U/S Kidney Retroperitoneal completed.  Labetolol 10 mg INJ q6H for SBP > 220 and DBP >110.  NaCl+ Bolus for SPB <100.  Last BM 6/23/18, patient refused medication for bowel motility.  Quantiferon Gold collected results pending.  Pacemaker and AICD left upper chest wall.  Patient is paced on the monitor.  Patient has remained free of fall/trauma/injury by using appropriate lighting, nonskid socks, by keeping area free of debris, and family remains at bedside.  Patient verbalized understanding of all instructions.

## 2018-06-28 NOTE — PROGRESS NOTES
Discussed with Stroke about their recommendations. Still considering CVA likely. Wanted Speech therapy consult and wanted dilatin stopped (interaction with warfarin) gave choice of anti-eplilectic (whether to continue it or not). Recommended Keppra 500 mg BID to start tomorrow.    Vasquez Adams MD, PGY-4

## 2018-06-28 NOTE — ASSESSMENT & PLAN NOTE
-s/p LAD stent 2011  -Intermittent nonexertional chest pain, increased in frequency in recent weeks  -Troponin peaked at 2.3, did also get shocked x1  -Treated initially as ACS as per NSTEMI, no culprit lesion identified, likely scar based VT.  -Can consider PET stress  -Heparin stopped. Continue ASA, statin, BB, ARB, MRA, imdur. No recent stents, will stop plavix as restarting AC  -LHC: LM luminal irreg, LAD 30% w/ patent stent, prox Lcx 70%, OM1 80%, RCA luminal irregularities

## 2018-06-28 NOTE — SUBJECTIVE & OBJECTIVE
Neurologic Chief Complaint: aphasia    Subjective:     Interval History: Patient is seen for follow-up neurological assessment and treatment recommendations: NAEON, no new complaints    HPI, Past Medical, Family, and Social History remains the same as documented in the initial encounter.     Review of Systems   Constitutional: Negative for fever.   Respiratory: Negative for cough.    Gastrointestinal: Negative for vomiting.   Neurological: Positive for speech difficulty. Negative for facial asymmetry, weakness and numbness.     Scheduled Meds:   amiodarone  400 mg Oral BID    aspirin  81 mg Oral Daily    atorvastatin  40 mg Oral Daily    colchicine  0.6 mg Oral Daily    fluticasone-vilanterol  1 puff Inhalation Daily    furosemide  80 mg Oral BID    gabapentin  600 mg Oral TID    isosorbide mononitrate  90 mg Oral Daily    metoprolol tartrate  25 mg Oral BID    pantoprazole  40 mg Oral Daily    phenytoin  200 mg Oral QHS    potassium chloride SA  20 mEq Oral QHS    potassium chloride SA  40 mEq Oral Daily    predniSONE  10 mg Oral Every Tues, Thurs, Sat    predniSONE  20 mg Oral Every Mon, Wed, Fri    predniSONE  20 mg Oral Every Sun    spironolactone  25 mg Oral Daily    warfarin  5 mg Oral Daily     Continuous Infusions:   heparin (porcine) in D5W 17 Units/kg/hr (06/28/18 1256)    sodium chloride 0.9%       PRN Meds:butalbital-acetaminophen-caffeine -40 mg, heparin (PORCINE), heparin (PORCINE), HYDROcodone-acetaminophen, labetalol, sodium chloride 0.9%    Objective:     Vital Signs (Most Recent):  Temp: 98 °F (36.7 °C) (06/28/18 1521)  Pulse: 79 (06/28/18 1521)  Resp: 18 (06/28/18 1457)  BP: 127/88 (06/28/18 1521)  SpO2: (!) 89 % (06/28/18 1521)  BP Location: Left arm    Vital Signs Range (Last 24H):  Temp:  [97.6 °F (36.4 °C)-98.4 °F (36.9 °C)]   Pulse:  []   Resp:  [18-20]   BP: ()/(58-88)   SpO2:  [89 %-98 %]   BP Location: Left arm    Physical Exam   Constitutional: He  appears well-developed and well-nourished. No distress.   Pulmonary/Chest: Effort normal.   Skin: Skin is warm and dry.   Psychiatric: He has a normal mood and affect. His behavior is normal. Judgment and thought content normal.   Vitals reviewed.      Neurological Exam:   LOC: alert  Attention Span: Good   Language: Expressive aphasia  Articulation: No dysarthria  Orientation: Person, Place, Time   Visual Fields: Full  EOM (CN III, IV, VI): Full/intact  Pupils (CN II, III): PERRL  Facial Sensation (CN V): Normal  Facial Movement (CN VII): Symmetric facial expression    Motor: Arm left  Normal 5/5  Leg left  Normal 5/5  Arm right  Normal 5/5  Leg right Normal 5/5  Cebellar: No evidence of appendicular or axial ataxia  Sensation: Intact to light touch, temperature and vibration  Tone: Normal tone throughout    Laboratory:  CMP:     Recent Labs  Lab 06/28/18  0504   CALCIUM 9.5   ALBUMIN 3.4*   PROT 6.8   *   K 4.2   CO2 28   CL 95   BUN 26*   CREATININE 1.5*   ALKPHOS 60   ALT 33   AST 20   BILITOT 0.6     CBC:     Recent Labs  Lab 06/28/18  0504   WBC 8.73   RBC 3.92*   HGB 11.8*   HCT 36.5*   *   MCV 93   MCH 30.1   MCHC 32.3     Lipid Panel:     Recent Labs  Lab 06/27/18  1356   CHOL 205*   LDLCALC 111.6   HDL 73   TRIG 102     Coagulation:     Recent Labs  Lab 06/28/18  0504   INR 1.1   APTT 22.1     Platelet Aggregation Study: No results for input(s): PLTAGG, PLTAGINTERP, PLTAGREGLACO, ADPPLTAGGREG in the last 168 hours.  Hgb A1C:     Recent Labs  Lab 06/23/18  0018   HGBA1C 6.5*     TSH:     Recent Labs  Lab 06/28/18  0504   TSH 0.884       Diagnostic Results       Vessel Imaging   CTA Multiphase. Date: 06/22/18  CT head:    No evidence of acute intracranial pathology.    Left parietal lobe encephalomalacia.    CTA head and neck:    No evidence of large vessel occlusion, significant stenoses, or aneurysm.  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   Additional incidental findings as above.    6/24/2018 CT Head  No acute intracranial abnormalities  Left parietal lobe encephalomalacia, unchanged.  Mild periventricular decreased supratentorial white matter attenuation most likely related to chronic nonspecific small vessel disease.    Cardiac Imaging   6/23/2018 2D Echo  EF 5-10%  Severely enlarged left atrium  Multiple akinetic and hypokinetic regions

## 2018-06-28 NOTE — NURSING
Lana Pelaez M.D.  Unable to place PIV.  Requested PICC consult.  Heparin gtt infusing to right IJ.

## 2018-06-28 NOTE — ASSESSMENT & PLAN NOTE
-OHT/LVAD pathway, step 3  -CT C/A/P complete  -CTS consult complete; will discuss further whether LVAD candidate given recent VT, hx of sarcoid (thought to be pulmonary sarcoid, no prior e/o cardiac)  -GI consult for colonoscopy (unable to complete previously due to CP), will bridge with heparin given hx of thromboembolic stroke  -Renal U/s

## 2018-06-28 NOTE — ASSESSMENT & PLAN NOTE
57 y.o. male with significant past medical history of prior Left sided stroke w/o residual deficits, CHF, CAD, seizure DO, sarcoidosis of the lung, s/p pacemaker placement, ?A fib-on Coumadin presented to hospital as a transfer from [facility] for evaluation of aphasia. No tPA.  No LVO on CTA MP.    Aphasia resolving but reports not back to baseline.  No history of speech deficits with prior history of stroke.  Patient unable to obtain MRI.  Repeat CT did not demonstrate new infarcts.  Given symptom continuation for > 24 hours, TIA is less likely.  It is possible he has had another event not evident on imaging.  Repeat 2D echo demonstrates an EF of 5-10%.  He is at high risk for thromboembolism.      Please consult speech therapy, patient will likely require outpatient speech therapy. Please consider switching dilantin to keppra 500 BID or discontinuing AED. Patient with one episode of seizure 12 years ago and has likely had subtherapeutic dilantin levels for quite some time. Dilantin and coumadin cannot be taken together due to interaction decreasing effectiveness of medications. There are no further recommendations or workup needed from a stroke perspective. Stroke team will sign off. Please contact [phone] for any questions or concerns.     Antithrombotics for secondary stroke prevention:  Continue coumadin and asa     Statins for secondary stroke prevention and hyperlipidemia, if present:   Statins: Atorvastatin- 40 mg daily     Aggressive risk factor modification: Obesity, A-Fib, CAD, prior stroke, CHF     Rehab efforts: PT/OT/SLP to evaluate and treat  - Please re-consult Speech therapy (order discontinued when transferred to ICU)     Diagnostics ordered/pending:  None     VTE prophylaxis: Recommend resuming anticoagulation; Mechanical prophylaxis: Place SCDs     BP parameters: SBP<180

## 2018-06-28 NOTE — PROGRESS NOTES
[facility]  Vascular Neurology  Comprehensive Stroke Center  Progress Note    Assessment/Plan:     Expressive aphasia     57 y.o. male with significant past medical history of prior Left sided stroke w/o residual deficits, CHF, CAD, seizure DO, sarcoidosis of the lung, s/p pacemaker placement, ?A fib-on Coumadin presented to hospital as a transfer from [facility] for evaluation of aphasia. No tPA.  No LVO on CTA MP.    Aphasia resolving but reports not back to baseline.  No history of speech deficits with prior history of stroke.  Patient unable to obtain MRI.  Repeat CT did not demonstrate new infarcts.  Given symptom continuation for > 24 hours, TIA is less likely.  It is possible he has had another event not evident on imaging.  Repeat 2D echo demonstrates an EF of 5-10%.  He is at high risk for thromboembolism.      Please consult speech therapy, patient will likely require outpatient speech therapy. Please consider switching dilantin to keppra 500 BID or discontinuing AED. Patient with one episode of seizure 12 years ago and has likely had subtherapeutic dilantin levels for quite some time. Dilantin and coumadin cannot be taken together due to interaction decreasing effectiveness of medications. There are no further recommendations or workup needed from a stroke perspective. Stroke team will sign off. Please contact [phone] for any questions or concerns.     Antithrombotics for secondary stroke prevention:  Continue coumadin and asa     Statins for secondary stroke prevention and hyperlipidemia, if present:   Statins: Atorvastatin- 40 mg daily     Aggressive risk factor modification: Obesity, A-Fib, CAD, prior stroke, CHF     Rehab efforts: PT/OT/SLP to evaluate and treat  - Please re-consult Speech therapy (order discontinued when transferred to ICU)     Diagnostics ordered/pending:  None     VTE prophylaxis: Recommend resuming anticoagulation; Mechanical  prophylaxis: Place SCDs     BP parameters: SBP<180             Ventricular tachycardia    -Cardiology following        Acute on chronic systolic congestive heart failure    -Stroke risk factor. 2D Echo now showing EF 5-10%, Severe LAE.          Seizure disorder    -Patient states he had seizure one time in 2006 and was placed on dilantin  -EEG with no evidence of seizure activity  -patient has not had seizure since 2006  -recommend switching from dilantin to keppra or discontinue AED  -dilantin not recommended to be taken with coumadin due to interaction between medications causing decreased effectiveness        CAD (coronary artery disease)    -Stroke risk factor.  S/p LAD stent 6/2011.   -Cardiology following        Sarcoidosis of lung    -management per primary team        Biventricular cardiac pacemaker in situ    -Hx of pacemaker placement.  Unable to obtain MRI.             6/23: 24 hour CT pending, echo and eeg pending, dilantin level low-loading with cerebyx  6/26:  Cardiac cath yesterday without intervention.  No new events, but continues to report speech not back to baseline.  EEG negative for seizure. Slowing left parietal region.  Cardiology following for continued wide complex tachycardia/Vtach.  6/28 patient neurologically stable    STROKE DOCUMENTATION   Acute Stroke Times   Last Known Normal Date: 06/22/18  Last Known Normal Time: 1000  Symptom Onset Date: 06/22/18  Symptom Onset Time: 1000  Stroke Team Called Date: 06/22/18  Stroke Team Called Time:  (Problems with the tele-stroke connection)  Stroke Team Arrival Date: 06/22/18  CT Interpretation Time: 1649    NIH Scale:  1a. Level Of Consciousness: 0-->Alert: keenly responsive  1b. LOC Questions: 0-->Answers both questions correctly  1c. LOC Commands: 0-->Performs both tasks correctly  2. Best Gaze: 0-->Normal  3. Visual: 0-->No visual loss  4. Facial Palsy: 0-->Normal symmetrical movements  5a. Motor Arm, Left: 0-->No drift: limb holds 90 (or 45)  degrees for full 10 secs  5b. Motor Arm, Right: 0-->No drift: limb holds 90 (or 45) degrees for full 10 secs  6a. Motor Leg, Left: 0-->No drift: leg holds 30 degree position for full 5 secs  6b. Motor Leg, Right: 0-->No drift: leg holds 30 degree position for full 5 secs  7. Limb Ataxia: 0-->Absent  8. Sensory: 0-->Normal: no sensory loss  9. Best Language: 0-->No aphasia: normal  10. Dysarthria: 0-->Normal  11. Extinction and Inattention (formerly Neglect): 0-->No abnormality  Total (NIH Stroke Scale): 0       Modified [place] Score: 0  Vichy Coma Scale:    ABCD2 Score:    [id number] Score:3  HAS -BLED Score:2  ICH Score:   Hunt & Lemus Classification:      Hemorrhagic change of an Ischemic Stroke: Does this patient have an ischemic stroke with hemorrhagic changes? No     Neurologic Chief Complaint: aphasia    Subjective:     Interval History: Patient is seen for follow-up neurological assessment and treatment recommendations: REYESON, no new complaints    HPI, Past Medical, Family, and Social History remains the same as documented in the initial encounter.     Review of Systems   Constitutional: Negative for fever.   Respiratory: Negative for cough.    Gastrointestinal: Negative for vomiting.   Neurological: Positive for speech difficulty. Negative for facial asymmetry, weakness and numbness.     Scheduled Meds:   amiodarone  400 mg Oral BID    aspirin  81 mg Oral Daily    atorvastatin  40 mg Oral Daily    colchicine  0.6 mg Oral Daily    fluticasone-vilanterol  1 puff Inhalation Daily    furosemide  80 mg Oral BID    gabapentin  600 mg Oral TID    isosorbide mononitrate  90 mg Oral Daily    metoprolol tartrate  25 mg Oral BID    pantoprazole  40 mg Oral Daily    phenytoin  200 mg Oral QHS    potassium chloride SA  20 mEq Oral QHS    potassium chloride SA  40 mEq Oral Daily    predniSONE  10 mg Oral Every Tues, Thurs, Sat    predniSONE  20 mg Oral Every Mon, Wed, Fri    predniSONE  20 mg Oral Every  Sun    spironolactone  25 mg Oral Daily    warfarin  5 mg Oral Daily     Continuous Infusions:   heparin (porcine) in D5W 17 Units/kg/hr (06/28/18 1256)    sodium chloride 0.9%       PRN Meds:butalbital-acetaminophen-caffeine -40 mg, heparin (PORCINE), heparin (PORCINE), HYDROcodone-acetaminophen, labetalol, sodium chloride 0.9%    Objective:     Vital Signs (Most Recent):  Temp: 98 °F (36.7 °C) (06/28/18 1521)  Pulse: 79 (06/28/18 1521)  Resp: 18 (06/28/18 1457)  BP: 127/88 (06/28/18 1521)  SpO2: (!) 89 % (06/28/18 1521)  BP Location: Left arm    Vital Signs Range (Last 24H):  Temp:  [97.6 °F (36.4 °C)-98.4 °F (36.9 °C)]   Pulse:  []   Resp:  [18-20]   BP: ()/(58-88)   SpO2:  [89 %-98 %]   BP Location: Left arm    Physical Exam   Constitutional: He appears well-developed and well-nourished. No distress.   Pulmonary/Chest: Effort normal.   Skin: Skin is warm and dry.   Psychiatric: He has a normal mood and affect. His behavior is normal. Judgment and thought content normal.   Vitals reviewed.      Neurological Exam:   LOC: alert  Attention Span: Good   Language: Expressive aphasia  Articulation: No dysarthria  Orientation: Person, Place, Time   Visual Fields: Full  EOM (CN III, IV, VI): Full/intact  Pupils (CN II, III): PERRL  Facial Sensation (CN V): Normal  Facial Movement (CN VII): Symmetric facial expression    Motor: Arm left  Normal 5/5  Leg left  Normal 5/5  Arm right  Normal 5/5  Leg right Normal 5/5  Cebellar: No evidence of appendicular or axial ataxia  Sensation: Intact to light touch, temperature and vibration  Tone: Normal tone throughout    Laboratory:  CMP:     Recent Labs  Lab 06/28/18  0504   CALCIUM 9.5   ALBUMIN 3.4*   PROT 6.8   *   K 4.2   CO2 28   CL 95   BUN 26*   CREATININE 1.5*   ALKPHOS 60   ALT 33   AST 20   BILITOT 0.6     CBC:     Recent Labs  Lab 06/28/18  0504   WBC 8.73   RBC 3.92*   HGB 11.8*   HCT 36.5*   *   MCV 93   MCH 30.1   MCHC 32.3     Lipid  Panel:     Recent Labs  Lab 06/27/18  1356   CHOL 205*   LDLCALC 111.6   HDL 73   TRIG 102     Coagulation:     Recent Labs  Lab 06/28/18  0504   INR 1.1   APTT 22.1     Platelet Aggregation Study: No results for input(s): PLTAGG, PLTAGINTERP, PLTAGREGLACO, ADPPLTAGGREG in the last 168 hours.  Hgb A1C:     Recent Labs  Lab 06/23/18  0018   HGBA1C 6.5*     TSH:     Recent Labs  Lab 06/28/18  0504   TSH 0.884       Diagnostic Results       Vessel Imaging   CTA Multiphase. Date: 06/22/18  CT head:    No evidence of acute intracranial pathology.    Left parietal lobe encephalomalacia.    CTA head and neck:    No evidence of large vessel occlusion, significant stenoses, or aneurysm.  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  Additional incidental findings as above.    6/24/2018 CT Head  No acute intracranial abnormalities  Left parietal lobe encephalomalacia, unchanged.  Mild periventricular decreased supratentorial white matter attenuation most likely related to chronic nonspecific small vessel disease.    Cardiac Imaging   6/23/2018 2D Echo  EF 5-10%  Severely enlarged left atrium  Multiple akinetic and hypokinetic regions            Teagan Hennessy PA-C  Comprehensive Stroke Center  Department of Vascular Neurology   [facility]

## 2018-06-28 NOTE — ASSESSMENT & PLAN NOTE
-Hx of parietal CVA presumed due to LV thrombus per chart. Prior echos reviewed here, no thrombus visualzied in past  -Pt reports initiation of AC at time of MI when he had LV aneurysm  -Also reports hx of AF ([id number])  -Will stop plavix (last stent 7 yrs ago, this presentation not suspected secondary to ACS)  -Continue ASA, restart coumadin  -Heparin bridge

## 2018-06-28 NOTE — ASSESSMENT & PLAN NOTE
He does not meet disease specific indications for lung transplantation for a diagnosis of pulmonary sarcoid at this time. He has preserved pulmonary reserve with severe impairment from cardiac insufficiency. Would therefore recommend continued follow-up with HTS for either LVAD or heart transplant.

## 2018-06-28 NOTE — HPI
Mr. Good is a 57 y.o. male  with PMHx of pulmonary sarcoidosis, ischemic cardiomyopathy s/p stent placements in 2011, left parietal stroke in the past, atrial fibrillation who initially presented for stroke workup for expressive aphasia. Initial imaging negative for CVA, and no tPA was given. He was transferred to [facility] service over the weekend after having multiple episodes of Vtach on 6/23, now well controlled on amiodarone. CT chest from 6/25 revealed GGOs and right dependent pleural effusion. BNP upon admission found to be >1000, and he underwent aggressive diuresis per [facility], now on IV lasix 80 mg BID currently for acute on chronic diastolic heart failure. He is currently being worked up while inpatient for heart transplant vs. bridge with LVAD. Lung transplant team consulted for assistance with management of underlying sarcoidosis.

## 2018-06-28 NOTE — PLAN OF CARE
PCP:Edgar Gates MD    Extended Emergency Contact Information  Primary Emergency Contact: Jenny Good   [place]  Mobile Phone: [phone]  Relation: [id number] - [place], [place] - [address]  [address]  [place] [address]  Phone: [phone] Fax: [phone]    Payor: MEDICAID / Plan: [employer] [facility]) / Product Type: Managed Medicaid /     Patient was independent living with his cousin prior to hospitalization. Awaiting PT/OT/SLP recommendations. Cm will continue to follow.      06/28/18 0820   Discharge Assessment   Assessment Type Discharge Planning Assessment   Confirmed/corrected address and phone number on facesheet? Yes   Assessment information obtained from? Patient;Caregiver   Communicated expected length of stay with patient/caregiver no   Prior to hospitilization cognitive status: Alert/Oriented   Prior to hospitalization functional status: Independent   Current cognitive status: Alert/Oriented   Current Functional Status: Independent   Facility Arrived From: [facility]    Lives With other (see comments)  (cousin)   Able to Return to Prior Arrangements unable to determine at this time (comments)   Is patient able to care for self after discharge? Unable to determine at this time (comments)   Who are your caregiver(s) and their phone number(s)? Jenny Good (Sister) [phone]   Readmission Within The Last 30 Days no previous admission in last 30 days   Patient currently being followed by outpatient case management? No   Patient currently receives any other outside agency services? No   Equipment Currently Used at Home none   Do you have any problems affording any of your prescribed medications? No   Is the patient taking medications as prescribed? yes   Does the patient have transportation home? Yes   Transportation Available car;family or friend will provide    Does the patient receive services at the Coumadin Clinic? No   Discharge Plan A Rehab   Discharge Plan B Rehab   Patient/Family In Agreement With Plan yes

## 2018-06-28 NOTE — ASSESSMENT & PLAN NOTE
-Multiple episodes of MMVT on 6/23  -EP consulted. VT1 zone lowered as per EP. No further VT. Likely AT with aberrancy on early AM 6/25, nonsustained.   -K>4, Mg>2  -Continue amiodarone, 400 bid x2 weeks, then 400 daily  -[facility] without overt culprit lesion  -Follow up with Dr. Telles in EP in 4-6 weeks

## 2018-06-28 NOTE — PHYSICIAN QUERY
PT Name: Yonathan Good Jr.  MR #: [id number]     Physician Query Form - Etiology of Condition Clarification      CDS/: Latoya Segovia RN, CCDS               Contact information: [email or web address]  This form is a permanent document in the medical record.     Query Date: June 28, 2018    By submitting this query, we are merely seeking further clarification of documentation.  Please utilize your independent clinical judgment when addressing the question(s) below.     The medical record contains the following:    Findings Supporting Clinical Information Location in Medical Record   transfer from [place] for neuro consult, pt has expressive aphasia onset of symptoms at 10am, no other neuro deficits noted, no TPA was given due to pt being outside of window    Fluency disorder associated with underlying disease                Expressive Aphasia              Expressive Aphasia  The patient was told to stop coumadin 5 days PTA due to colonoscopy and did not restart after the colonoscopy was cancelled. The patient denies numbness.               Etiology unclear at this time, infarct vs TIA vs seizure    Aphasia resolved:  Etiology of symptoms:  Recrudescence of prior stroke in setting of hypoperfusion vs TIA    aphasia resolved ,    Patient unable to obtain MRI.  Repeat CT did not demonstrate new infarcts.  Given symptom continuation for > 24 hours, TIA is less likely.  It is possible he has had another event not evident on imaging    Initially admitted to Neuro, no TPA. CT head and CTA negative. Hx of seizures, EEG pending.  -Resolved, no further workup at this time 6/22 ed note                6/22 neuro vasc h/p    6/23 neuro vasc note      6/25 neuro vasc note        6/26 neuro vasc note              6/25- 6/27 prog notes     Please document your best medical opinion regarding the etiology of __expressive aphasia__ for which the primary focus of treatment is/was directed.                         [ x   ]   Clinically Undetermined    Please document in your progress notes daily for the duration of treatment, until resolved, and include in your discharge summary.

## 2018-06-28 NOTE — PHYSICIAN QUERY
PT Name: Yonathan Good Jr.  MR #: [id number]    Physician Query Form - Atrial Fibrillation Specificity     CDS/: Latoya Segovia RN, CCDS              Contact information: [email or web address]     This form is a permanent document in the medical record.     Query Date: June 28, 2018    By submitting this query, we are merely seeking further clarification of documentation. Please utilize your independent clinical judgment when addressing the question(s) below.    The medical record contains the following:   Indicators     Supporting Clinical Findings Location in Medical Record   X Atrial Fibrillation ,PMH of CHF, afib noncompliant with coumadin     ?A fib-on Coumadin     H/o atrial fibrillation 6/22 ed note    6/22 neuro h/p    6/24 hts h/p    EKG results      Medication      Treatment      Other         Provider, please further specify the Atrial Fibrillation diagnosis.    [  ] Chronic  [ x ] Paroxysmal  [  ] Permanent  [  ] Persistent  [  ] Other (please specify): ____________________________  [  ] Clinically Undetermined    Please document in your progress notes daily for the duration of treatment until resolved, and include in your discharge summary.

## 2018-06-28 NOTE — SUBJECTIVE & OBJECTIVE
Interval HPI: NAEON. Doing well today. No events on telemetry. Net positive, 4 unrecorded UOP. Cr 1.5 from 1.4 on IVP lasix.    Review of Systems   All other systems reviewed and are negative.    Objective:     Vital Signs (Most Recent):  Temp: 97.6 °F (36.4 °C) (06/28/18 0800)  Pulse: 87 (06/28/18 0800)  Resp: 18 (06/28/18 0800)  BP: 124/80 (06/28/18 0800)  SpO2: 98 % (06/28/18 0800) Vital Signs (24h Range):  Temp:  [97.6 °F (36.4 °C)-98.9 °F (37.2 °C)] 97.6 °F (36.4 °C)  Pulse:  [] 87  Resp:  [14-20] 18  SpO2:  [90 %-98 %] 98 %  BP: ()/(55-90) 124/80     Patient Vitals for the past 72 hrs (Last 3 readings):   Weight   06/26/18 1101 101.2 kg (223 lb 1.7 oz)     Body mass index is 31.12 kg/m².      Intake/Output Summary (Last 24 hours) at 06/28/18 0936  Last data filed at 06/28/18 0600   Gross per 24 hour   Intake             2160 ml   Output              825 ml   Net             1335 ml       Physical Exam  General: alert, awake and oriented x 3  Eyes:PERRL.   Neck:no JVD   Lungs:  clear to auscultation bilaterally   Cardiovascular: Heart: regular rate and rhythm, S1, S2 normal, no murmur, click, rub or gallop.   Chest Wall: no tenderness.   Pulses-2+ radial, femoral, DP, PT b/l  Extremities: no cyanosis or edema.   Abdomen/Rectal: Abdomen: soft, non-tender non-distented; bowel sounds normal  Neurologic: Normal strength and tone. No focal numbness or weakness    Significant Labs:  CBC:    Recent Labs  Lab 06/26/18  0310 06/27/18  0657 06/28/18  0504   WBC 8.69 6.54 8.73   RBC 4.49* 4.47* 3.92*   HGB 13.5* 13.6* 11.8*   HCT 41.8 41.9 36.5*   * 125* 120*   MCV 93 94 93   MCH 30.1 30.4 30.1   MCHC 32.3 32.5 32.3     BNP:    Recent Labs  Lab 06/23/18  0018 06/27/18  1356   BNP 1,135* 425*     CMP:    Recent Labs  Lab 06/26/18  0310 06/27/18  0657 06/28/18  0504   GLU 96 109 107   CALCIUM 9.6 9.6 9.5   ALBUMIN 3.6 3.5 3.4*   PROT 7.1 7.0 6.8    138 133*   K 4.0 4.1 4.2   CO2 29 29 28   CL 97 97  95   BUN 11 18 26*   CREATININE 1.1 1.4 1.5*   ALKPHOS 54* 62 60   ALT 29 34 33   AST 17 27 20   BILITOT 1.2* 0.9 0.6      Coagulation:     Recent Labs  Lab 06/23/18  0018 06/28/18  0504   INR 1.5* 1.1   APTT 22.6 22.1     LDH:    Recent Labs  Lab 06/27/18  1356   *     Microbiology:  Microbiology Results (last 7 days)     ** No results found for the last 168 hours. **          BMP:     Recent Labs  Lab 06/25/18  1634  06/28/18  0504   GLU  --   < > 107   NA  --   < > 133*   K 4.7  < > 4.2   CL  --   < > 95   CO2  --   < > 28   BUN  --   < > 26*   CREATININE  --   < > 1.5*   CALCIUM  --   < > 9.5   MG 2.0  --   --    < > = values in this interval not displayed.  Cardiac Markers: No results for input(s): CKMB, TROPONINT, MYOGLOBIN in the last 72 hours.  I have reviewed all pertinent labs within the past 24 hours.    Diagnostic Results:  I have reviewed all pertinent imaging results/findings within the past 24 hours.  Interval HPI: NAEON. Doing well today. No events on telemetry. Net even on diuresis, Cr increased to 1.4 from 1.1.     Review of Systems   All other systems reviewed and are negative.    Objective:     Vital Signs (Most Recent):  Temp: 97.6 °F (36.4 °C) (06/28/18 0800)  Pulse: 87 (06/28/18 0800)  Resp: 18 (06/28/18 0800)  BP: 124/80 (06/28/18 0800)  SpO2: 98 % (06/28/18 0800) Vital Signs (24h Range):  Temp:  [97.6 °F (36.4 °C)-98.9 °F (37.2 °C)] 97.6 °F (36.4 °C)  Pulse:  [] 87  Resp:  [14-20] 18  SpO2:  [90 %-98 %] 98 %  BP: ()/(55-90) 124/80     Patient Vitals for the past 72 hrs (Last 3 readings):   Weight   06/26/18 1101 101.2 kg (223 lb 1.7 oz)     Body mass index is 31.12 kg/m².      Intake/Output Summary (Last 24 hours) at 06/28/18 0936  Last data filed at 06/28/18 0600   Gross per 24 hour   Intake             2160 ml   Output              825 ml   Net             1335 ml       Physical Exam  General: alert, awake and oriented x 3  Eyes:PERRL.   Neck:no JVD   Lungs:  clear to  auscultation bilaterally   Cardiovascular: Heart: regular rate and rhythm, S1, S2 normal, no murmur, click, rub or gallop.   Chest Wall: no tenderness.   Pulses-2+ radial, femoral, DP, PT b/l  Extremities: no cyanosis or edema.   Abdomen/Rectal: Abdomen: soft, non-tender non-distented; bowel sounds normal  Neurologic: Normal strength and tone. No focal numbness or weakness    Significant Labs:  CBC:    Recent Labs  Lab 06/26/18  0310 06/27/18  0657 06/28/18  0504   WBC 8.69 6.54 8.73   RBC 4.49* 4.47* 3.92*   HGB 13.5* 13.6* 11.8*   HCT 41.8 41.9 36.5*   * 125* 120*   MCV 93 94 93   MCH 30.1 30.4 30.1   MCHC 32.3 32.5 32.3     BNP:    Recent Labs  Lab 06/23/18  0018 06/27/18  1356   BNP 1,135* 425*     CMP:    Recent Labs  Lab 06/26/18  0310 06/27/18  0657 06/28/18  0504   GLU 96 109 107   CALCIUM 9.6 9.6 9.5   ALBUMIN 3.6 3.5 3.4*   PROT 7.1 7.0 6.8    138 133*   K 4.0 4.1 4.2   CO2 29 29 28   CL 97 97 95   BUN 11 18 26*   CREATININE 1.1 1.4 1.5*   ALKPHOS 54* 62 60   ALT 29 34 33   AST 17 27 20   BILITOT 1.2* 0.9 0.6      Coagulation:     Recent Labs  Lab 06/23/18  0018 06/28/18  0504   INR 1.5* 1.1   APTT 22.6 22.1     LDH:    Recent Labs  Lab 06/27/18  1356   *     Microbiology:  Microbiology Results (last 7 days)     ** No results found for the last 168 hours. **          BMP:     Recent Labs  Lab 06/25/18  1634  06/28/18  0504   GLU  --   < > 107   NA  --   < > 133*   K 4.7  < > 4.2   CL  --   < > 95   CO2  --   < > 28   BUN  --   < > 26*   CREATININE  --   < > 1.5*   CALCIUM  --   < > 9.5   MG 2.0  --   --    < > = values in this interval not displayed.  Cardiac Markers: No results for input(s): CKMB, TROPONINT, MYOGLOBIN in the last 72 hours.  I have reviewed all pertinent labs within the past 24 hours.    Diagnostic Results:  I have reviewed all pertinent imaging results/findings within the past 24 hours.

## 2018-06-29 NOTE — CONSULTS
[facility]  Colorectal Surgery  Consult Note    Patient Name: Yonathan Good Jr.  MRN: [id number]  Admission Date: 6/22/2018  Hospital Length of Stay: 6 days  Attending Physician: Britt Melgar MD  Primary Care Provider: Edgar Gates MD    Inpatient consult to Colorectal Surgery  Consult performed by: LONNIE KITCHEN  Consult ordered by: DWAYNE DALLAS        Subjective:     Chief Complaint/Reason for Admission: stroke     History of Present Illness:  Mr. Good is a 57 y.o. male  with PMHx of pulmonary sarcoidosis, ischemic cardiomyopathy s/p stent placements in 2011, left parietal stroke, atrial fibrillation on coumadin who initially presented for stroke workup for expressive aphasia. Initial imaging negative for CVA, and no tPA was given. He was transferred to [facility] service over the weekend after having multiple episodes of Vtach on 6/23, now well controlled on amiodarone.    CRS consulted for colonoscopy as part of advanced option work up. The patient reports having a colonoscopy > 30 years ago where polyps were removed, unsure of pathology. He denies any family history of colon or rectal CA, blood in his stool, abdominal pain, weight loss. No abdominal or rectal surgeries. Of note, he was scheduled to undergo a c-scope 3 weeks ago, but when he arrived he began having chest pain and c-scope was ultimately cancelled.     PTA Medications   Medication Sig    albuterol (PROVENTIL) 2.5 mg /3 mL (0.083 %) nebulizer solution Take 2.5 mg by nebulization every 6 (six) hours as needed.    alprazolam (XANAX) 2 MG tablet Take 2 mg by mouth 2 (two) times daily as needed.     aspirin (ECOTRIN) 81 MG EC tablet Take 81 mg by mouth. 1 Tablet, Delayed Release (E.C.) Oral Every day    bumetanide (BUMEX) 1 MG tablet Take 1 mg by mouth daily as needed.    carisoprodol (SOMA) 350 MG tablet Take 350 mg by mouth 4 (four) times daily as needed for Muscle spasms.    colchicine 0.6 mg tablet 0.6 mg once  daily.     furosemide (LASIX) 40 MG tablet TAKE 2 TABLETS BY MOUTH TWICE DAILY    gabapentin (NEURONTIN) 600 MG tablet Take 600 mg by mouth 2 (two) times daily. 1 Tablet Oral At bedtime    hydrocodone-acetaminophen 10-325mg (NORCO)  mg Tab Take 1 tablet by mouth 2 (two) times daily as needed.     isosorbide mononitrate (IMDUR) 30 MG 24 hr tablet Take 3 tablets (90 mg total) by mouth once daily. (Patient taking differently: Take 60 mg by mouth once daily. )    losartan (COZAAR) 50 MG tablet Take 1 tablet (50 mg total) by mouth once daily.    pantoprazole (PROTONIX) 40 MG tablet Take 40 mg by mouth. 1 Tablet, Delayed Release (E.C.) Oral Every day    phenytoin (DILANTIN) 100 MG ER capsule 100 mg 2 (two) times daily.     potassium chloride SA (K-DUR,KLOR-CON) 20 MEQ tablet TAKE 2 TABLETS BY MOUTH EVERY MORNING AND 1 TABLET BY MOUTH EVERY EVENING    pravastatin (PRAVACHOL) 40 MG tablet Take 40 mg by mouth once daily.     predniSONE (DELTASONE) 10 MG tablet TK 1 T PO BID Tuesday, Thursday, Saturday    predniSONE (DELTASONE) 20 MG tablet Take 1 tablet by mouth 2 (two) times daily. Monday, Wednesday, and Friday, Sunday    sotalol (BETAPACE) 160 MG Tab TK 1 T PO  BID    spironolactone (ALDACTONE) 25 MG tablet TAKE 1 TABLET BY MOUTH EVERY DAY    warfarin (COUMADIN) 7.5 MG tablet 1 tablet Monday  0.5 tablet Tuesday-Sunday    albuterol (VENTOLIN HFA) 90 mcg/actuation inhaler Inhale 2 puffs into the lungs every 4 (four) hours as needed for Wheezing.    fluticasone-vilanterol (BREO ELLIPTA) 200-25 mcg/dose DsDv diskus inhaler Inhale 1 puff into the lungs once daily. Controller    nitroGLYCERIN (NITROSTAT) 0.4 MG SL tablet ONE TABLET UNDER TONGUE AS NEEDED FOR CHEST PAIN    promethazine-codeine 6.25-10 mg/5 ml (PHENERGAN WITH CODEINE) 6.25-10 mg/5 mL syrup TK 5 ML PO  Q 6 H PRN       Review of patient's allergies indicates:  No Known Allergies    Past Medical History:   Diagnosis Date    AICD (automatic  cardioverter/defibrillator) present     Arthritis     CHF (congestive heart failure)     Coronary artery disease     MI (myocardial infarction)     Sarcoid     Stroke      Past Surgical History:   Procedure Laterality Date    CARDIAC CATHETERIZATION      CARDIAC DEFIBRILLATOR PLACEMENT  7-12    CARDIAC DEFIBRILLATOR PLACEMENT      CORONARY STENT PLACEMENT  07/2011    FRACTURE SURGERY      LEFT HEART CATHETERIZATION N/A 6/25/2018    Procedure: Left heart cath;  Surgeon: Jordi Lui MD;  Location: [facility] CATH LAB;  Service: Cardiology;  Laterality: N/A;     Family History     Problem Relation (Age of Onset)    Cancer Maternal Grandmother    Coronary artery disease Father, Sister, Sister    Heart disease Mother, Father, Sister    Hypertension Mother, Father, Sister    Kidney disease Sister    Stroke Sister    Vision loss Sister        Social History Main Topics    Smoking status: Never Smoker    Smokeless tobacco: Never Used    Alcohol use No    Drug use: No    Sexual activity: Not on file     Review of Systems   Constitutional: Positive for fatigue. Negative for appetite change, chills, fever and unexpected weight change.   Respiratory: Negative for cough and shortness of breath.    Cardiovascular: Negative for chest pain.   Gastrointestinal: Negative for abdominal distention, abdominal pain, anal bleeding, blood in stool, constipation, diarrhea, nausea, rectal pain and vomiting.     Objective:     Vital Signs (Most Recent):  Temp: 98 °F (36.7 °C) (06/29/18 0900)  Pulse: 81 (06/29/18 1230)  Resp: 14 (06/29/18 1230)  BP: 95/63 (06/29/18 1230)  SpO2: 100 % (06/29/18 1230) Vital Signs (24h Range):  Temp:  [98 °F (36.7 °C)-98.8 °F (37.1 °C)] 98 °F (36.7 °C)  Pulse:  [74-95] 81  Resp:  [14-20] 14  SpO2:  [89 %-100 %] 100 %  BP: ()/(63-91) 95/63     Weight: 97.3 kg (214 lb 8.1 oz)  Body mass index is 29.92 kg/m².    Physical Exam   Constitutional: He is oriented to person, place, and time. He  appears well-developed and well-nourished.   Eyes: Conjunctivae and EOM are normal.   Pulmonary/Chest: Effort normal. No respiratory distress.   Abdominal: Soft. He exhibits no distension. There is no tenderness.   Genitourinary: Rectal exam shows no mass and no tenderness.   Musculoskeletal: Normal range of motion.   Neurological: He is alert and oriented to person, place, and time.   Skin: Skin is warm and dry.   Psychiatric: He has a normal mood and affect. His behavior is normal.       Significant Labs:  BMP (Last 3 Results):   Recent Labs  Lab 06/24/18  0104 06/24/18  0335  06/25/18  1634  06/27/18  0657 06/28/18  0504 06/29/18  0606   * 104  < >  --   < > 109 107 98    139  < >  --   < > 138 133* 137   K 3.9 3.7  < > 4.7  < > 4.1 4.2 4.6    102  < >  --   < > 97 95 99   CO2 26 26  < >  --   < > 29 28 30*   BUN 16 15  < >  --   < > 18 26* 16   CREATININE 1.0 0.9  < >  --   < > 1.4 1.5* 1.1   CALCIUM 9.1 9.0  < >  --   < > 9.6 9.5 9.7   MG 2.1 2.1  --  2.0  --   --   --   --    < > = values in this interval not displayed.  CBC (Last 3 Results):   Recent Labs  Lab 06/27/18  0657 06/28/18  0504 06/29/18  0617   WBC 6.54 8.73 7.92   RBC 4.47* 3.92* 3.99*   HGB 13.6* 11.8* 12.3*   HCT 41.9 36.5* 38.0*   * 120* 131*   MCV 94 93 95   MCH 30.4 30.1 30.8   MCHC 32.5 32.3 32.4     CMP (Last 3 Results):   Recent Labs  Lab 06/27/18  0657 06/28/18  0504 06/29/18  0606    107 98   CALCIUM 9.6 9.5 9.7   ALBUMIN 3.5 3.4* 3.5   PROT 7.0 6.8 6.9    133* 137   K 4.1 4.2 4.6   CO2 29 28 30*   CL 97 95 99   BUN 18 26* 16   CREATININE 1.4 1.5* 1.1   ALKPHOS 62 60 67   ALT 34 33 32   AST 27 20 19   BILITOT 0.9 0.6 0.6     CRP (Last 3 Results):   Recent Labs  Lab 06/28/18  0504   CRP 49.3*       Significant Diagnostics:  None    Assessment/Plan:     Heart transplant candidate    Will plan for C-scope Monday, 7/2  Clear Liquids Sunday  Go lightly prep  NPO at MN   Begin holding coumadin  Hold  heparin 6 hours prior            Thank you for your consult. I will follow-up with patient. Please contact us if you have any additional questions.    Cornelia Corral NP  Colorectal Surgery  [facility]

## 2018-06-29 NOTE — ASSESSMENT & PLAN NOTE
Will plan for C-scope Monday, 7/2  Clear Liquids Sunday  Go lightly prep  NPO at MN   Begin holding coumadin  Hold heparin 6 hours prior

## 2018-06-29 NOTE — PT/OT/SLP EVAL
"Speech Language Pathology Evaluation  Cognitive/Bedside Swallow  Discharge Summary    Patient Name:  Yonathan Good Jr.   MRN:  [id number]  Admitting Diagnosis: VT (ventricular tachycardia)    Recommendations:                  General Recommendations:  Follow-up not indicated  Diet recommendations:  Regular, Thin   Aspiration Precautions: Standard aspiration precautions   General Precautions: Standard, aspiration, fall  Communication strategies:  none    History:     Past Medical History:   Diagnosis Date    AICD (automatic cardioverter/defibrillator) present     Arthritis     CHF (congestive heart failure)     Coronary artery disease     MI (myocardial infarction)     Sarcoid     Stroke        Past Surgical History:   Procedure Laterality Date    CARDIAC CATHETERIZATION      CARDIAC DEFIBRILLATOR PLACEMENT  7-12    CARDIAC DEFIBRILLATOR PLACEMENT      CORONARY STENT PLACEMENT  07/2011    FRACTURE SURGERY      LEFT HEART CATHETERIZATION N/A 6/25/2018    Procedure: Left heart cath;  Surgeon: Jordi Lui MD;  Location: [facility] CATH LAB;  Service: Cardiology;  Laterality: N/A;       Social History: Patient lives with his wife and cousin alternatively.    Prior Intubation HX:  None this admission    Modified Barium Swallow: none this admission    Chest X-Rays: 6/23/18:  Right IJ catheter tip overlies the SVC. No pneumothorax.  AICD leads appear unchanged.  Cardiomegaly with mild perihilar edema, similar to prior.    Prior diet: regular.    Occupation/hobbies/homemaking: Pt was a  and .  He enjoys playing pool.    Subjective     "It's worse at night when I am tired."  Pt stated re: word-finding deficits  Patient goals: none expressed     Pain/Comfort:  · Pain Rating 1: 0/10  · Pain Rating Post-Intervention 1: 0/10    Objective:     Cognitive Status:    Arousal/Alertness Appropriate response to stimuli  Attention No obvious deficits observed WNL  Orientation Oriented x4  Memory WNL, " Immediate Recall 100% and Qsupciv985%  Problem Solving WNL, Categories 100%, Sequencing 100% and Solutions 100%      Receptive Language:   Comprehension:      WNL  Questions Complex yes/no 100%  Commands  complex/abstract commands 100%    Pragmatics:    WNL    Expressive Language:  Verbal:    WNL across all domains of expression w/ no evidence of word-finding seen entire evaluation though pt reported particularly when tired      Motor Speech:  WNL    Voice:   WNL    Visual-Spatial:  WNL    Reading:   WNL     Written Expression:   WNL    Oral Musculature Evaluation  · Oral Musculature: WNL  · Dentition: present and adequate  · Secretion Management: adequate  · Mucosal Quality: good  · Mandibular Strength and Mobility: WNL  · Oral Labial Strength and Mobility: WNL  · Lingual Strength and Mobility: WNL  · Buccal Strength and Mobility: WNL  · Volitional Cough: adequate  · Volitional Swallow: timely  · Voice Prior to PO Intake: clear    Bedside Swallow Eval:   Consistencies Assessed:  · Thin liquids cup and straw sips  · Puree tsp bite  · Solids self fed bites     Oral Phase:   · WNL    Pharyngeal Phase:   · WNL  · no overt clinical signs/symptoms of aspiration  · no overt clinical signs/symptoms of pharyngeal dysphagia    Compensatory Strategies  · None    Treatment: Education was provided to pt and daughter re: SLP role, eval results, aspiration precautions, word-finding strategies if needed, and SLP POC.  They indicated good understanding and agreed w/ recommendations.    Assessment:     Yonathan Good Jr. is a 57 y.o. male with an SLP diagnosis of stroke.  He presents with normal swallowing and cognitive-linguistic skills at this time.  Further Skilled Speech services are not indicated.    Goals:    SLP Goals     Not on file          Multidisciplinary Problems (Resolved)        Problem: SLP Goal    Goal Priority Disciplines Outcome   SLP Goal   (Resolved)     SLP Outcome(s) achieved   Description:  Speech Language  Pathology Goals -Goals discontinued d/t transfer to ICU  Goals expected to be met by 6/30  1. Pt will tolerate regular & thin liquids with no s/s aspiration  2. Pt will participate in SLP Speech/Language/Cognitive Evaluation                     Plan:     · Patient to be seen:  4 x/week   · Plan of Care expires:  07/22/18  · Plan of Care reviewed with:  patient, daughter   · SLP Follow-Up:  No       Discharge recommendations:  Discharge Facility/Level Of Care Needs: home   Barriers to Discharge:  None    Time Tracking:     SLP Treatment Date:   06/29/18  Speech Start Time:  1113  Speech Stop Time:  1150     Speech Total Time (min):  37 min    Billable Minutes: Eval 27  and Eval Swallow and Oral Function 10    Mandy Gandhi CCC-SLP  06/29/2018

## 2018-06-29 NOTE — PROGRESS NOTES
"Patient notified RN and charge RN that his left leg was having tremors "similar to what happened after a TIA" Neuro exam performed by charge revealed slight left-sided weakness in LUE and LLE. Notified Dr. Ontiveros with heart transplant team. Will continue to monitor.   "

## 2018-06-29 NOTE — SUBJECTIVE & OBJECTIVE
Subjective:     Interval History: ***    Post-Op Info:  Procedure(s) (LRB):  Left heart cath (N/A)   4 Days Post-Op      Medications:  Continuous Infusions:   heparin (porcine) in D5W 16 Units/kg/hr (06/29/18 0702)    sodium chloride 0.9%       Scheduled Meds:   amiodarone  400 mg Oral BID    aspirin  81 mg Oral Daily    atorvastatin  40 mg Oral Daily    colchicine  0.6 mg Oral Daily    fluticasone-vilanterol  1 puff Inhalation Daily    furosemide  80 mg Oral BID    gabapentin  600 mg Oral TID    isosorbide mononitrate  90 mg Oral Daily    levETIRAcetam  500 mg Oral BID    metoprolol tartrate  25 mg Oral BID    pantoprazole  40 mg Oral Daily    potassium chloride SA  20 mEq Oral QHS    potassium chloride SA  40 mEq Oral Daily    predniSONE  10 mg Oral Every Tues, Thurs, Sat    predniSONE  20 mg Oral Every Mon, Wed, Fri    predniSONE  20 mg Oral Every Sun    spironolactone  25 mg Oral Daily    warfarin  5 mg Oral Daily     PRN Meds:   ALPRAZolam    butalbital-acetaminophen-caffeine -40 mg    heparin (PORCINE)    heparin (PORCINE)    HYDROcodone-acetaminophen    labetalol    sodium chloride 0.9%        Objective:     Vital Signs (Most Recent):  Temp: 98 °F (36.7 °C) (06/29/18 0900)  Pulse: 81 (06/29/18 1230)  Resp: 14 (06/29/18 1230)  BP: 95/63 (06/29/18 1230)  SpO2: 100 % (06/29/18 1230) Vital Signs (24h Range):  Temp:  [98 °F (36.7 °C)-98.8 °F (37.1 °C)] 98 °F (36.7 °C)  Pulse:  [74-95] 81  Resp:  [14-20] 14  SpO2:  [89 %-100 %] 100 %  BP: ()/(63-91) 95/63     Intake/Output - Last 3 Shifts       06/27 0700 - 06/28 0659 06/28 0700 - 06/29 0659 06/29 0700 - 06/30 0659    P.O. 2520 600 480    Total Intake(mL/kg) 2520 (24.9) 600 (6.2) 480 (4.9)    Urine (mL/kg/hr) 2250 (0.9) 2280 (1)     Stool 0 (0) 2 (0)     Total Output 2250 2282      Net +270 -1682 +480           Urine Occurrence 4 x      Stool Occurrence 0 x 2 x           Physical Exam    Anorectal Exam:  Anal Skin: {CRS Anal  "Skin Physical Exam:06747}    Digital Rectal Exam:  Resting Tone {desc; low/normal/high/v high:34071}  Squeeze {desc; low/normal/high/v high:27611}  Relaxation with bear down {DESC; PRESENT/ABSENT:47306::"present"}  Mass {NONE:19404}  Rectocele  {DESC; PRESENT/ABSENT:06291::"present"}  Tenderness  {DESC; PRESENT/ABSENT:83159::"present"}    Anoscopy:  Hemorrhoids  {DESC; PRESENT/ABSENT:25956::"present"}  Stigmata of bleeding  {DESC; PRESENT/ABSENT:86006::"present"}  Stigmata of prolapsed  {DESC; PRESENT/ABSENT:93926::"present"}   Distal rectal mucosa {normal, inflamed, ulcerated, radiation changes:42699}    Significant Labs:  {Select Labs:91671}    Significant Diagnostics:  {Imaging Review:25215}  "

## 2018-06-29 NOTE — CONSULTS
"  [facility]  Adult Nutrition  Consult Note    SUMMARY     Recommendations    Recommendation/Intervention:   1. Continue current low sodium diet. Pt with good appetite/intake.   2. Low sodium and wt loss education provided per pt request.   3. RD following.    Goals: Intake >/=85% EEN/EPN  Nutrition Goal Status: new  Communication of RD Recs:  (POC)    Reason for Assessment    Reason for Assessment: RD follow-up, consult  Diagnosis:  (VT, CVA)  Relevant Medical History: CHF, sarcoidosis of lung, CAD, CVA, s/p AICD, MI, Afib    General Information Comments: Pt reports good appetite, eating 100% of meals. Pt did not remember being educated last week so provided additional low sodium and plate method handout as pt reported he is trying to lose weight at home. Intentional wt loss + diuresis of 23lb noted. Following a low sodium diet and reducing snacking.    Nutrition Discharge Planning: Adequate PO intake on cardiac diet    Nutrition Risk Screen    Nutrition Risk Screen: no indicators present    Nutrition/Diet History    Patient Reported Diet/Restrictions/Preferences: low salt  Typical Food/Fluid Intake: Decreasing snacking and portion sizes to try to lose wt  Do you have any cultural, spiritual, Jew conflicts, given your current situation?: none  Factors Affecting Nutritional Intake: None identified at this time    Anthropometrics    Temp: 98 °F (36.7 °C)  Height Method: Stated  Height: 5' 11" (180.3 cm)  Height (inches): 71 in  Weight Method: Bed Scale  Weight: 97.3 kg (214 lb 8.1 oz)  Weight (lb): 214.51 lb  Ideal Body Weight (IBW), Male: 172 lb  % Ideal Body Weight, Male (lb): 124.72 lb  BMI (Calculated): 30  BMI Grade: 30 - 34.9- obesity - grade I  Weight Loss: intentional  Usual Body Weight (UBW), k.7 kg  % Usual Body Weight: 90.53  % Weight Change From Usual Weight: -9.66 %       Lab/Procedures/Meds    Pertinent Labs Reviewed: reviewed  Pertinent Labs Comments: Noted  Pertinent " Medications Reviewed: reviewed  Pertinent Medications Comments: statin, lasix, pantoprazole, KCl, prednisone, spironolactone, warfarin, heparin    Physical Findings/Assessment    Overall Physical Appearance: overweight, nourished  Oral/Mouth Cavity: WDL  Skin: intact    Estimated/Assessed Needs    Weight Used For Calorie Calculations: 97.3 kg (214 lb 8.1 oz)  Energy Calorie Requirements (kcal): 2196  Energy Need Method: [place]-St Jeor (x 1.2 (PAL))  Protein Requirements:  gm (1.0-1.2 gm/kg)  Weight Used For Protein Calculations: 97.3 kg (214 lb 8.1 oz)  Fluid Requirements (mL): per MD     RDA Method (mL): 2196       Nutrition Prescription Ordered    Current Diet Order: Low sodium    Evaluation of Received Nutrient/Fluid Intake    I/O: -1.7L  Comments: LBM 6/28 x 2  % Intake of Estimated Energy Needs: 75 - 100 %  % Meal Intake: 75 - 100 %    Nutrition Risk    Level of Risk/Frequency of Follow-up:  (F/u 1x weekly)     Assessment and Plan    Nutrition Problem  Increased nutrient needs    Related to (etiology):   Chronic disease    Signs and Symptoms (as evidenced by):   Dx of lung sarcoidosis and CHF with OHTx/LVAD work-up     Interventions/Recommendations (treatment strategy):  See recs.    Nutrition Diagnosis Status:   New       Monitor and Evaluation    Food and Nutrient Intake: energy intake, food and beverage intake  Food and Nutrient Adminstration: diet order  Knowledge/Beliefs/Attitudes: food and nutrition knowledge/skill  Physical Activity and Function: nutrition-related ADLs and IADLs  Anthropometric Measurements: weight, weight change, body mass index  Biochemical Data, Medical Tests and Procedures: electrolyte and renal panel, gastrointestinal profile, glucose/endocrine profile, inflammatory profile  Nutrition-Focused Physical Findings: overall appearance     Nutrition Follow-Up    RD Follow-up?: Yes

## 2018-06-29 NOTE — HPI
Mr. Good is a 57 y.o. male  with PMHx of pulmonary sarcoidosis, ischemic cardiomyopathy s/p stent placements in 2011, left parietal stroke, atrial fibrillation on coumadin who initially presented for stroke workup for expressive aphasia. Initial imaging negative for CVA, and no tPA was given. He was transferred to [facility] service over the weekend after having multiple episodes of Vtach on 6/23, now well controlled on amiodarone.    CRS consulted for colonoscopy as part of advanced option work up. The patient reports having a colonoscopy > 30 years ago where polyps were removed, unsure of pathology. He denies any family history of colon or rectal CA, blood in his stool, abdominal pain, weight loss. No abdominal or rectal surgeries. Of note, he was scheduled to undergo a c-scope 3 weeks ago, but when he arrived he began having chest pain and c-scope was ultimately cancelled.

## 2018-06-29 NOTE — SUBJECTIVE & OBJECTIVE
Interval History: Patient feeling well, lasix switched to oral yesterday.       Continuous Infusions:   heparin (porcine) in D5W 16 Units/kg/hr (06/29/18 0702)    sodium chloride 0.9%       Scheduled Meds:   amiodarone  400 mg Oral BID    aspirin  81 mg Oral Daily    atorvastatin  40 mg Oral Daily    colchicine  0.6 mg Oral Daily    fluticasone-vilanterol  1 puff Inhalation Daily    furosemide  80 mg Oral BID    gabapentin  600 mg Oral TID    isosorbide mononitrate  90 mg Oral Daily    levETIRAcetam  500 mg Oral BID    metoprolol tartrate  25 mg Oral BID    pantoprazole  40 mg Oral Daily    potassium chloride SA  20 mEq Oral QHS    potassium chloride SA  40 mEq Oral Daily    predniSONE  10 mg Oral Every Tues, Thurs, Sat    predniSONE  20 mg Oral Every Mon, Wed, Fri    predniSONE  20 mg Oral Every Sun    spironolactone  25 mg Oral Daily    warfarin  5 mg Oral Daily     PRN Meds:ALPRAZolam, butalbital-acetaminophen-caffeine -40 mg, heparin (PORCINE), heparin (PORCINE), HYDROcodone-acetaminophen, labetalol, sodium chloride 0.9%    Review of patient's allergies indicates:  No Known Allergies  Objective:     Vital Signs (Most Recent):  Temp: 98 °F (36.7 °C) (06/29/18 0900)  Pulse: 95 (06/29/18 1109)  Resp: 17 (06/29/18 0944)  BP: 129/88 (06/29/18 0900)  SpO2: 95 % (06/29/18 0301) Vital Signs (24h Range):  Temp:  [97.8 °F (36.6 °C)-98.8 °F (37.1 °C)] 98 °F (36.7 °C)  Pulse:  [74-95] 95  Resp:  [17-20] 17  SpO2:  [89 %-95 %] 95 %  BP: (107-132)/(68-91) 129/88     Patient Vitals for the past 72 hrs (Last 3 readings):   Weight   06/29/18 0600 97.3 kg (214 lb 8.1 oz)     Body mass index is 29.92 kg/m².      Intake/Output Summary (Last 24 hours) at 06/29/18 1140  Last data filed at 06/29/18 0623   Gross per 24 hour   Intake              600 ml   Output             1482 ml   Net             -882 ml       Hemodynamic Parameters:         Physical Exam  GEN: Alert and oriented in NAD  NECK: no JVD  appreciated   CVS: RRR, s1/s2, no MRG  PULM: CTAB no rales  ABD: NT/ND BS +  Extremities: warm and dry, palpable pulses, no edema  NEURO: Alert and oriented x 3  PSYCH: appropriate affect.         Significant Labs:  CBC:    Recent Labs  Lab 06/27/18  0657 06/28/18  0504 06/29/18  0617   WBC 6.54 8.73 7.92   RBC 4.47* 3.92* 3.99*   HGB 13.6* 11.8* 12.3*   HCT 41.9 36.5* 38.0*   * 120* 131*   MCV 94 93 95   MCH 30.4 30.1 30.8   MCHC 32.5 32.3 32.4     BNP:    Recent Labs  Lab 06/23/18  0018 06/27/18  1356   BNP 1,135* 425*     CMP:    Recent Labs  Lab 06/27/18  0657 06/28/18  0504 06/29/18  0606    107 98   CALCIUM 9.6 9.5 9.7   ALBUMIN 3.5 3.4* 3.5   PROT 7.0 6.8 6.9    133* 137   K 4.1 4.2 4.6   CO2 29 28 30*   CL 97 95 99   BUN 18 26* 16   CREATININE 1.4 1.5* 1.1   ALKPHOS 62 60 67   ALT 34 33 32   AST 27 20 19   BILITOT 0.9 0.6 0.6      Coagulation:     Recent Labs  Lab 06/23/18  0018 06/28/18  0504   INR 1.5* 1.1   APTT 22.6 22.1     LDH:    Recent Labs  Lab 06/27/18  1356   *     Microbiology:  Microbiology Results (last 7 days)     ** No results found for the last 168 hours. **          I have reviewed all pertinent labs within the past 24 hours.    Estimated Creatinine Clearance: 90.3 mL/min (based on SCr of 1.1 mg/dL).    Diagnostic Results:  I have reviewed and interpreted all pertinent imaging results/findings within the past 24 hours.

## 2018-06-29 NOTE — PROGRESS NOTES
[facility]  Heart Transplant  Progress Note    Patient Name: Yonathan Good Jr.  MRN: [id number]  Admission Date: 6/22/2018  Hospital Length of Stay: 6 days  Attending Physician: Britt Melgar MD  Primary Care Provider: Edgar Gates MD  Principal Problem:VT (ventricular tachycardia)    Subjective:     Interval History: Patient feeling well, lasix switched to oral yesterday.       Continuous Infusions:   heparin (porcine) in D5W 16 Units/kg/hr (06/29/18 0702)    sodium chloride 0.9%       Scheduled Meds:   amiodarone  400 mg Oral BID    aspirin  81 mg Oral Daily    atorvastatin  40 mg Oral Daily    colchicine  0.6 mg Oral Daily    fluticasone-vilanterol  1 puff Inhalation Daily    furosemide  80 mg Oral BID    gabapentin  600 mg Oral TID    isosorbide mononitrate  90 mg Oral Daily    levETIRAcetam  500 mg Oral BID    metoprolol tartrate  25 mg Oral BID    pantoprazole  40 mg Oral Daily    potassium chloride SA  20 mEq Oral QHS    potassium chloride SA  40 mEq Oral Daily    predniSONE  10 mg Oral Every Tues, Thurs, Sat    predniSONE  20 mg Oral Every Mon, Wed, Fri    predniSONE  20 mg Oral Every Sun    spironolactone  25 mg Oral Daily    warfarin  5 mg Oral Daily     PRN Meds:ALPRAZolam, butalbital-acetaminophen-caffeine -40 mg, heparin (PORCINE), heparin (PORCINE), HYDROcodone-acetaminophen, labetalol, sodium chloride 0.9%    Review of patient's allergies indicates:  No Known Allergies  Objective:     Vital Signs (Most Recent):  Temp: 98 °F (36.7 °C) (06/29/18 0900)  Pulse: 95 (06/29/18 1109)  Resp: 17 (06/29/18 0944)  BP: 129/88 (06/29/18 0900)  SpO2: 95 % (06/29/18 0301) Vital Signs (24h Range):  Temp:  [97.8 °F (36.6 °C)-98.8 °F (37.1 °C)] 98 °F (36.7 °C)  Pulse:  [74-95] 95  Resp:  [17-20] 17  SpO2:  [89 %-95 %] 95 %  BP: (107-132)/(68-91) 129/88     Patient Vitals for the past 72 hrs (Last 3 readings):   Weight   06/29/18 0600 97.3 kg (214 lb 8.1 oz)     Body mass index  is 29.92 kg/m².      Intake/Output Summary (Last 24 hours) at 06/29/18 1140  Last data filed at 06/29/18 0623   Gross per 24 hour   Intake              600 ml   Output             1482 ml   Net             -882 ml       Hemodynamic Parameters:         Physical Exam  GEN: Alert and oriented in NAD  NECK: no JVD appreciated   CVS: RRR, s1/s2, no MRG  PULM: CTAB no rales  ABD: NT/ND BS +  Extremities: warm and dry, palpable pulses, no edema  NEURO: Alert and oriented x 3  PSYCH: appropriate affect.         Significant Labs:  CBC:    Recent Labs  Lab 06/27/18  0657 06/28/18  0504 06/29/18  0617   WBC 6.54 8.73 7.92   RBC 4.47* 3.92* 3.99*   HGB 13.6* 11.8* 12.3*   HCT 41.9 36.5* 38.0*   * 120* 131*   MCV 94 93 95   MCH 30.4 30.1 30.8   MCHC 32.5 32.3 32.4     BNP:    Recent Labs  Lab 06/23/18  0018 06/27/18  1356   BNP 1,135* 425*     CMP:    Recent Labs  Lab 06/27/18  0657 06/28/18  0504 06/29/18  0606    107 98   CALCIUM 9.6 9.5 9.7   ALBUMIN 3.5 3.4* 3.5   PROT 7.0 6.8 6.9    133* 137   K 4.1 4.2 4.6   CO2 29 28 30*   CL 97 95 99   BUN 18 26* 16   CREATININE 1.4 1.5* 1.1   ALKPHOS 62 60 67   ALT 34 33 32   AST 27 20 19   BILITOT 0.9 0.6 0.6      Coagulation:     Recent Labs  Lab 06/23/18  0018 06/28/18  0504   INR 1.5* 1.1   APTT 22.6 22.1     LDH:    Recent Labs  Lab 06/27/18  1356   *     Microbiology:  Microbiology Results (last 7 days)     ** No results found for the last 168 hours. **          I have reviewed all pertinent labs within the past 24 hours.    Estimated Creatinine Clearance: 90.3 mL/min (based on SCr of 1.1 mg/dL).    Diagnostic Results:  I have reviewed and interpreted all pertinent imaging results/findings within the past 24 hours.    Assessment and Plan:     57 y.o. male  with h/o ischemic cardiomyopathy(negative for sarcoid on cardiac MRI 2012), H/o stents 7 years ago,Pulmonary sarcoid, left parietal stroke in the past, atrial fibrillation, CRT-D([facility]gio) who follows  Dr Berman and is being worked up as outpatient for advanced options. He was transferred to Stroke service last evening with difficulty speaking with suspected Stroke.Initial workup included CVA which was negative for acute CVA.No tPA was given. This am patient complained of chest pain and dizziness and was noted to be in Monomorphic VT -Code blue was called.He was ATP'ed out of it but he also had an external shock delivered by rapid repsonse team as he was persistently in VT but no shock was delivered.He went into Fast VT with rate>200 and his ICD shocked him out of it. He was transferred to ICU.He reports having chest pain for last 4-5 years on exertion relieved with nitro-reports stress test done in [place].Last stress test 2015 in [facility]. He was loaded with amiodarone.He had 2 more episodes of VT which were slower at 150-His device did not ATP as below VT-1 zone. He also complains of cough with pinkish sputum,SOB.He reports no more chest pain since the shock.  He was recently scheduled for colonoscopy as part of transplant workup for which his coumadin was held and he was placed on eliquis temporarily.He had chest pain on day of c-scope and he had to take a nitro and his procedure was cancelled(5 days ago). He reports he started taking his coumadin after.His INR was subtherapeutic on arrival.     He states that his pulmonary sarcoid was diagnosed in 2015 when he was evaluated for a dry non-productive cough.  States that he underwent bronchoscopy with biopsies which confirmed sarcoid.  Since then, he has been on varying doses of Prednisone He has no other organ involvement from sarcoid and states that his only symptom has been cough. Denies any alcohol or illicit drug use.  Previously worked as a .     * VT (ventricular tachycardia)    -Multiple episodes of MMVT on 6/23  -EP consulted. VT1 zone lowered as per EP. No further VT. Likely AT with aberrancy on early AM 6/25, nonsustained.   -K>4,  Mg>2  -Continue amiodarone, 400 bid x2 weeks, then 400 daily  -[facility] without overt culprit lesion  -Follow up with Dr. Telles in EP in 4-6 weeks        Heart transplant candidate    -OHT/LVAD pathway, step 3  -CT C/A/P complete  -CTS consult; discussed further whether LVAD candidate given recent VT, hx of sarcoid (no prior e/o cardiac involvement), suppressed on Amio. Okay to proceed with both LVAD and OHT workup  -Colorectal surgery consulted for colonoscopy (unable to complete previously due to CP), will bridge with heparin given hx of thromboembolic stroke  -Renal U/s        Expressive aphasia    -Initially admitted to Neuro, no TPA. CT head and CTA negative. Hx of seizures, EEG pending.  -Resolved, no further workup at this time  -SLP evaluation        Acute on chronic systolic congestive heart failure    -Central line placed removed 6/28  -Was Overloaded, LVEDP elevated to 45  -Diuresed on lasix 10/hr for 2 days, switch to 80 IV bid x1 day, Now on oral lasix   -Continue metoprolol, losartan, spironolactone  -Pathway started 6/25/18 currently step 3.         Seizure disorder    Home phenytoin        CVA (cerebrovascular accident)    -Hx of parietal CVA presumed due to LV thrombus per chart. Prior echos reviewed here, no thrombus visualzied in past  -Pt reports initiation of AC at time of MI when he had LV aneurysm  -Also reports hx of AF ([id number])  -Will stop plavix (last stent 7 yrs ago, this presentation not suspected secondary to ACS)  -Continue ASA, restart coumadin  -Heparin bridge        CAD (coronary artery disease)    -s/p LAD stent 2011  -Intermittent nonexertional chest pain, increased in frequency in recent weeks  -Troponin peaked at 2.3, did also get shocked x1  -Treated initially as ACS as per NSTEMI, no culprit lesion identified, likely scar based VT.  -Can consider PET stress  -Heparin stopped. Continue ASA, statin, BB, ARB, MRA, imdur. No recent stents, will stop plavix as restarting AC  -LHC:  LM luminal irreg, LAD 30% w/ patent stent, prox Lcx 70%, OM1 80%, RCA luminal irregularities        Sarcoidosis of lung    -prednisone per home regimen  -Consulted lung transplant to re-evaluate patient  -Okay to proceed with workup as per Lung team, appreciate recommendations  -Recommended consideration of cellcept or MTX for steroid-sparing therapy (currently on prednisone monotherapy), will discuss with staff        Biventricular cardiac pacemaker in situ    -[facility]gio device interrogated-See EP notes            Johanne Todd MD  Heart Transplant  [facility]

## 2018-06-29 NOTE — PROGRESS NOTES
O2 sat 88-89% on RA when checked with a dynamap after patient c/o SOB. RR= 20, no significant SOB noted at this time, however patient was placed on 2L NC, sats increasing. Dr. Cross and stroke team at bedside.

## 2018-06-29 NOTE — PROGRESS NOTES
Patient is complaining of weakness on his left lower extremity and numbness on the dorsum of his left foot. Unlikely that the patient's numbness is related to an acute CVA. Patient does exhibit 4/5 in terms of power in his left lower extremity knee flexion and knee extension and plantar flexion and plantar dorsiflexion. No upper extermity deficits (has an ulnar neuropathy) and has no cranial nerve deficits. Discussed with the stroke team and they will notify me if any new imaging is needed.     Vasquez Adams MD, PGY-4

## 2018-06-29 NOTE — ANESTHESIA PREPROCEDURE EVALUATION
[facility]  Anesthesia Pre-Operative Evaluation         Patient Name: Yonathan Good Jr.  YOB: 1960  MRN: [id number]    SUBJECTIVE:     Pre-operative evaluation for Procedure(s) (LRB):  COLONOSCOPY (N/A)  Scheduled for 7/2/2018    HPI 06/29/2018:  Yonathan Good Jr. is a 57 y.o. male with hx of L parietal stroke without residual deficits, ischemic cardiomyopathy (EF 5%) s/p stents 2011, CRT-D placement, afib on coumadin, CAD, seizures, sarcoidosis (lung biopsy confirmed, cough symptoms only, on prednisone)    Patient was admitted on 6/22/2018 via transfer for evaluation of aphasia, CTH was negative, no tPA given.  Morning of 6/24/2018 pt complained of chest pain and was found to be in monomorphic VT which resolved with ICD.  Started on amiodarone.  On heparin drip currently.  On 6/29/2018 pt developed weakness of LLE and numbness on dorsum of L foot, CT head ordered.  Colonoscopy scheduled to evaluate for advanced cardiac options.    Patient presents for the above procedure(s).    Previous Airway:   No prior records in Epic.    Oxygen/Ventilation Requirements:  On room air with SpO2 of 100%       Current LDA:   PIV 20G L upper arm       Peripheral IV - Single Lumen 06/28/18 1550 Left Upper Arm (Active)   Site Assessment Clean;Dry;Intact;No redness;No swelling 6/29/2018  9:00 AM   Line Status Infusing 6/29/2018  9:00 AM   Dressing Status Clean;Dry;Intact 6/29/2018  9:00 AM   Dressing Intervention Dressing reinforced 6/29/2018  9:00 AM   Dressing Change Due 07/02/18 6/29/2018  9:00 AM   Site Change Due 07/02/18 6/29/2018  9:00 AM   Reason Not Rotated Not due 6/29/2018  9:00 AM   Number of days: 1       Current Drips:   heparin (porcine) in D5W 16 Units/kg/hr (06/29/18 0702)    sodium chloride 0.9%         Patient Active Problem List   Diagnosis    Acute on chronic systolic heart failure    Biventricular cardiac pacemaker in situ    Sarcoidosis of lung    CAD (coronary artery  disease)    CVA (cerebrovascular accident)    Seizure disorder    RBBB (right bundle branch block with left anterior fascicular block)    Opacity of lung on imaging study    Lung transplant candidate    Acute on chronic systolic congestive heart failure    Expressive aphasia    VT (ventricular tachycardia)    Ventricular tachycardia    Heart transplant candidate       Review of patient's allergies indicates:  No Known Allergies    Outpatient Medications:  No current facility-administered medications on file prior to encounter.      Current Outpatient Prescriptions on File Prior to Encounter   Medication Sig Dispense Refill    albuterol (PROVENTIL) 2.5 mg /3 mL (0.083 %) nebulizer solution Take 2.5 mg by nebulization every 6 (six) hours as needed.  3    alprazolam (XANAX) 2 MG tablet Take 2 mg by mouth 2 (two) times daily as needed.       aspirin (ECOTRIN) 81 MG EC tablet Take 81 mg by mouth. 1 Tablet, Delayed Release (E.C.) Oral Every day      bumetanide (BUMEX) 1 MG tablet Take 1 mg by mouth daily as needed.  2    carisoprodol (SOMA) 350 MG tablet Take 350 mg by mouth 4 (four) times daily as needed for Muscle spasms.      colchicine 0.6 mg tablet 0.6 mg once daily.   5    furosemide (LASIX) 40 MG tablet TAKE 2 TABLETS BY MOUTH TWICE DAILY 120 tablet 0    gabapentin (NEURONTIN) 600 MG tablet Take 600 mg by mouth 2 (two) times daily. 1 Tablet Oral At bedtime      hydrocodone-acetaminophen 10-325mg (NORCO)  mg Tab Take 1 tablet by mouth 2 (two) times daily as needed.   0    isosorbide mononitrate (IMDUR) 30 MG 24 hr tablet Take 3 tablets (90 mg total) by mouth once daily. (Patient taking differently: Take 60 mg by mouth once daily. ) 90 tablet 6    losartan (COZAAR) 50 MG tablet Take 1 tablet (50 mg total) by mouth once daily. 30 tablet 6    pantoprazole (PROTONIX) 40 MG tablet Take 40 mg by mouth. 1 Tablet, Delayed Release (E.C.) Oral Every day      phenytoin (DILANTIN) 100 MG ER capsule  100 mg 2 (two) times daily.   5    potassium chloride SA (K-DUR,KLOR-CON) 20 MEQ tablet TAKE 2 TABLETS BY MOUTH EVERY MORNING AND 1 TABLET BY MOUTH EVERY EVENING 90 tablet 0    pravastatin (PRAVACHOL) 40 MG tablet Take 40 mg by mouth once daily.   5    predniSONE (DELTASONE) 10 MG tablet TK 1 T PO BID Tuesday, Thursday, Saturday  5    predniSONE (DELTASONE) 20 MG tablet Take 1 tablet by mouth 2 (two) times daily. Monday, Wednesday, and Friday, Sunday  11    sotalol (BETAPACE) 160 MG Tab TK 1 T PO  BID  4    spironolactone (ALDACTONE) 25 MG tablet TAKE 1 TABLET BY MOUTH EVERY DAY 30 tablet 0    warfarin (COUMADIN) 7.5 MG tablet 1 tablet Monday  0.5 tablet Tuesday-Sunday  5    albuterol (VENTOLIN HFA) 90 mcg/actuation inhaler Inhale 2 puffs into the lungs every 4 (four) hours as needed for Wheezing. 18 g 5    fluticasone-vilanterol (BREO ELLIPTA) 200-25 mcg/dose DsDv diskus inhaler Inhale 1 puff into the lungs once daily. Controller      nitroGLYCERIN (NITROSTAT) 0.4 MG SL tablet ONE TABLET UNDER TONGUE AS NEEDED FOR CHEST PAIN 100 tablet 0    promethazine-codeine 6.25-10 mg/5 ml (PHENERGAN WITH CODEINE) 6.25-10 mg/5 mL syrup TK 5 ML PO  Q 6 H PRN  0        Current Inpatient Medications:   amiodarone  400 mg Oral BID    aspirin  81 mg Oral Daily    atorvastatin  40 mg Oral Daily    colchicine  0.6 mg Oral Daily    fluticasone-vilanterol  1 puff Inhalation Daily    furosemide  80 mg Oral BID    gabapentin  600 mg Oral TID    isosorbide mononitrate  90 mg Oral Daily    levETIRAcetam  500 mg Oral BID    metoprolol tartrate  25 mg Oral BID    pantoprazole  40 mg Oral Daily    [START ON 7/1/2018] polyethylene glycol  4,000 mL Oral Once    potassium chloride SA  20 mEq Oral QHS    potassium chloride SA  40 mEq Oral Daily    predniSONE  10 mg Oral Every Tues, Thurs, Sat    predniSONE  20 mg Oral Every Mon, Wed, Fri    predniSONE  20 mg Oral Every Sun    spironolactone  25 mg Oral Daily     warfarin  5 mg Oral Daily       Past Surgical History:   Procedure Laterality Date    CARDIAC CATHETERIZATION      CARDIAC DEFIBRILLATOR PLACEMENT  7-12    CARDIAC DEFIBRILLATOR PLACEMENT      CORONARY STENT PLACEMENT  07/2011    FRACTURE SURGERY      LEFT HEART CATHETERIZATION N/A 6/25/2018    Procedure: Left heart cath;  Surgeon: Jordi Lui MD;  Location: [facility] CATH LAB;  Service: Cardiology;  Laterality: N/A;       Social History     Social History    Marital status: Other     Spouse name: N/A    Number of children: N/A    Years of education: N/A     Occupational History    Not on file.     Social History Main Topics    Smoking status: Never Smoker    Smokeless tobacco: Never Used    Alcohol use No    Drug use: No    Sexual activity: Not on file     Other Topics Concern    Not on file     Social History Narrative    No narrative on file       OBJECTIVE:   Weight:  Wt Readings from Last 4 Encounters:   06/29/18 97.3 kg (214 lb 8.1 oz)   06/22/18 103.4 kg (228 lb)   05/03/18 106.6 kg (235 lb)   05/03/18 107 kg (235 lb 14.3 oz)       Vital Signs Range (Last 24H):  Temp:  [36.7 °C (98 °F)-37.1 °C (98.8 °F)]   Pulse:  [74-95]   Resp:  [12-20]   BP: ()/(63-91)   SpO2:  [92 %-100 %]       CBC:   Recent Labs      06/28/18   0504  06/29/18   0617   WBC  8.73  7.92   RBC  3.92*  3.99*   HGB  11.8*  12.3*   HCT  36.5*  38.0*   PLT  120*  131*   MCV  93  95   MCH  30.1  30.8   MCHC  32.3  32.4       CMP:   Recent Labs      06/27/18   1356  06/28/18   0504  06/29/18   0606   NA   --   133*  137   K   --   4.2  4.6   CL   --   95  99   CO2   --   28  30*   BUN   --   26*  16   CREATININE   --   1.5*  1.1   GLU   --   107  98   PHOS  3.4   --    --    CALCIUM   --   9.5  9.7   ALBUMIN   --   3.4*  3.5   PROT   --   6.8  6.9   ALKPHOS   --   60  67   ALT   --   33  32   AST   --   20  19   BILITOT   --   0.6  0.6       INR:  Recent Labs      06/28/18   0504   INR  1.1   APTT  22.1       Diagnostic  Studies:  CT Head wo Contrast 2018  Pending    CT Chest/Abd/Pelvis 2018  1. Cardiomegaly, severe coronary artery atherosclerosis, left AICD and right-sided IJ central venous catheter.  2. Chronic lung changes in keeping with the patient's known history of sarcoidosis.  Superimposed ground-glass attenuation may relate to interstitial edema.  3. Enlarging right-sided dependent pleural effusion.  4. Vicarious excretion of contrast within the gallbladder.  5. Age-indeterminate compression fracture of the L2 vertebral body.  Suspected developing compression fracture of the T12 vertebral body noting superior endplate irregularity which was not present on CT dated 2018.  Additional irregularity of the superior endplate of the L4 vertebral body may relate to a prominent Schmorl's node.    EK2018  Vent. Rate : 088 BPM     Atrial Rate : 088 BPM     P-R Int : 116 ms          QRS Dur : 210 ms      QT Int : 438 ms       P-R-T Axes : 056 -37 -14 degrees     QTc Int : 529 ms    Atrial-sensed ventricular-paced rhythm with premature ventricular complex  Biventricular pacemaker detected  Abnormal ECG  When compared with ECG of 2018 13:19,  No significant change was found  Vent. rate has increased BY  23 BPM     2D Echo:  2018   1 - Severe left ventricular enlargement.     2 - Severely depressed left ventricular systolic function (EF 5-10%).     3 - Eccentric hypertrophy.     4 - Severe left atrial enlargement.     5 - Right ventricular enlargement with low normal systolic function.     6 - Mild to moderate mitral regurgitation.     7 - Trivial to mild tricuspid regurgitation.     8 - The estimated PA systolic pressure is 21 mmHg.   Results for orders placed or performed during the hospital encounter of 18   2D echo with color flow doppler   Result Value Ref Range    EF 8 (A) 55 - 65    Mitral Valve Regurgitation MILD TO MODERATE     Est. PA Systolic Pressure 21.15     Tricuspid Valve  Regurgitation TRIVIAL TO MILD          ASSESSMENT/PLAN:         Anesthesia Evaluation    I have reviewed the Patient Summary Reports.    I have reviewed the Nursing Notes.   I have reviewed the Medications.     Review of Systems  Anesthesia Hx:  No problems with previous Anesthesia Denies Hx of Anesthetic complications  History of prior surgery of interest to airway management or planning: Denies Family Hx of Anesthesia complications.   Denies Personal Hx of Anesthesia complications.   Social:  Non-Smoker, No Alcohol Use    Hematology/Oncology:     Oncology Normal    -- Anemia:   EENT/Dental:EENT/Dental Normal   Cardiovascular:   Pacemaker Past MI CAD  CABG/stent Dysrhythmias atrial fibrillation CHF ECG has been reviewed.    Pulmonary:   sarcoidosis   Hepatic/GI:  Hepatic/GI Normal    Neurological:   CVA    Endocrine:  Endocrine Normal        Physical Exam  General:  Well nourished, Obesity, Morbid Obesity    Airway/Jaw/Neck:  Airway Findings: Mouth Opening: Normal Tongue: Large  General Airway Assessment: Adult  Mallampati: III  TM Distance: Normal, at least 6 cm  Jaw/Neck Findings:  Neck ROM: Normal ROM  Neck Findings: Normal    Eyes/Ears/Nose:  EYES/EARS/NOSE FINDINGS: Normal   Dental:  Dental Findings: In tact   Chest/Lungs:  Chest/Lungs Findings: Clear to auscultation, Normal Respiratory Rate     Heart/Vascular:  Heart Findings: Rate: Normal  Rhythm: Regular Rhythm  Sounds: Normal  Heart murmur: negative       Mental Status:  Mental Status Findings:  Cooperative, Alert and Oriented         Anesthesia Plan  Type of Anesthesia, risks & benefits discussed:  Anesthesia Type:  general, MAC  Patient's Preference:   Intra-op Monitoring Plan: standard ASA monitors  Intra-op Monitoring Plan Comments:   Post Op Pain Control Plan: multimodal analgesia, IV/PO Opioids PRN and per primary service following discharge from PACU  Post Op Pain Control Plan Comments:   Induction:   IV  Beta Blocker:  Patient is on a Beta-Blocker  and has received one dose within the past 24 hours (No further documentation required).       Informed Consent: Patient understands risks and agrees with Anesthesia plan.  Questions answered. Anesthesia consent signed with patient.  ASA Score: 4     Day of Surgery Review of History & Physical:  There are no significant changes.  H&P update referred to the surgeon.         Ready For Surgery From Anesthesia Perspective.

## 2018-06-29 NOTE — PLAN OF CARE
Problem: Patient Care Overview  Goal: Plan of Care Review  Outcome: Ongoing (interventions implemented as appropriate)  No significant events overnight. VSS. NADN. Pt diuresing well on PO lasix. Heparin gtt infusing. AntiXa therapeutic. Next antiXa to be drawn with morning labs. No falls/injuries during shift. Daughter at bedside. Will continue to monitor. Regino Samaniego RN

## 2018-06-29 NOTE — PLAN OF CARE
Problem: Patient Care Overview  Goal: Plan of Care Review    Recommendations     Recommendation/Intervention:   1. Continue current low sodium diet. Pt with good appetite/intake.   2. Low sodium and wt loss education provided per pt request.   3. RD following.     Goals: Intake >/=85% EEN/EPN  Nutrition Goal Status: new

## 2018-06-29 NOTE — SUBJECTIVE & OBJECTIVE
PTA Medications   Medication Sig    albuterol (PROVENTIL) 2.5 mg /3 mL (0.083 %) nebulizer solution Take 2.5 mg by nebulization every 6 (six) hours as needed.    alprazolam (XANAX) 2 MG tablet Take 2 mg by mouth 2 (two) times daily as needed.     aspirin (ECOTRIN) 81 MG EC tablet Take 81 mg by mouth. 1 Tablet, Delayed Release (E.C.) Oral Every day    bumetanide (BUMEX) 1 MG tablet Take 1 mg by mouth daily as needed.    carisoprodol (SOMA) 350 MG tablet Take 350 mg by mouth 4 (four) times daily as needed for Muscle spasms.    colchicine 0.6 mg tablet 0.6 mg once daily.     furosemide (LASIX) 40 MG tablet TAKE 2 TABLETS BY MOUTH TWICE DAILY    gabapentin (NEURONTIN) 600 MG tablet Take 600 mg by mouth 2 (two) times daily. 1 Tablet Oral At bedtime    hydrocodone-acetaminophen 10-325mg (NORCO)  mg Tab Take 1 tablet by mouth 2 (two) times daily as needed.     isosorbide mononitrate (IMDUR) 30 MG 24 hr tablet Take 3 tablets (90 mg total) by mouth once daily. (Patient taking differently: Take 60 mg by mouth once daily. )    losartan (COZAAR) 50 MG tablet Take 1 tablet (50 mg total) by mouth once daily.    pantoprazole (PROTONIX) 40 MG tablet Take 40 mg by mouth. 1 Tablet, Delayed Release (E.C.) Oral Every day    phenytoin (DILANTIN) 100 MG ER capsule 100 mg 2 (two) times daily.     potassium chloride SA (K-DUR,KLOR-CON) 20 MEQ tablet TAKE 2 TABLETS BY MOUTH EVERY MORNING AND 1 TABLET BY MOUTH EVERY EVENING    pravastatin (PRAVACHOL) 40 MG tablet Take 40 mg by mouth once daily.     predniSONE (DELTASONE) 10 MG tablet TK 1 T PO BID Tuesday, Thursday, Saturday    predniSONE (DELTASONE) 20 MG tablet Take 1 tablet by mouth 2 (two) times daily. Monday, Wednesday, and Friday, Sunday    sotalol (BETAPACE) 160 MG Tab TK 1 T PO  BID    spironolactone (ALDACTONE) 25 MG tablet TAKE 1 TABLET BY MOUTH EVERY DAY    warfarin (COUMADIN) 7.5 MG tablet 1 tablet Monday  0.5 tablet Tuesday-Sunday    albuterol  (VENTOLIN HFA) 90 mcg/actuation inhaler Inhale 2 puffs into the lungs every 4 (four) hours as needed for Wheezing.    fluticasone-vilanterol (BREO ELLIPTA) 200-25 mcg/dose DsDv diskus inhaler Inhale 1 puff into the lungs once daily. Controller    nitroGLYCERIN (NITROSTAT) 0.4 MG SL tablet ONE TABLET UNDER TONGUE AS NEEDED FOR CHEST PAIN    promethazine-codeine 6.25-10 mg/5 ml (PHENERGAN WITH CODEINE) 6.25-10 mg/5 mL syrup TK 5 ML PO  Q 6 H PRN       Review of patient's allergies indicates:  No Known Allergies    Past Medical History:   Diagnosis Date    AICD (automatic cardioverter/defibrillator) present     Arthritis     CHF (congestive heart failure)     Coronary artery disease     MI (myocardial infarction)     Sarcoid     Stroke      Past Surgical History:   Procedure Laterality Date    CARDIAC CATHETERIZATION      CARDIAC DEFIBRILLATOR PLACEMENT  7-12    CARDIAC DEFIBRILLATOR PLACEMENT      CORONARY STENT PLACEMENT  07/2011    FRACTURE SURGERY      LEFT HEART CATHETERIZATION N/A 6/25/2018    Procedure: Left heart cath;  Surgeon: Jordi Lui MD;  Location: [facility] CATH LAB;  Service: Cardiology;  Laterality: N/A;     Family History     Problem Relation (Age of Onset)    Cancer Maternal Grandmother    Coronary artery disease Father, Sister, Sister    Heart disease Mother, Father, Sister    Hypertension Mother, Father, Sister    Kidney disease Sister    Stroke Sister    Vision loss Sister        Social History Main Topics    Smoking status: Never Smoker    Smokeless tobacco: Never Used    Alcohol use No    Drug use: No    Sexual activity: Not on file     Review of Systems   Constitutional: Positive for fatigue. Negative for appetite change, chills, fever and unexpected weight change.   Respiratory: Negative for cough and shortness of breath.    Cardiovascular: Negative for chest pain.   Gastrointestinal: Negative for abdominal distention, abdominal pain, anal bleeding, blood in stool,  constipation, diarrhea, nausea, rectal pain and vomiting.     Objective:     Vital Signs (Most Recent):  Temp: 98 °F (36.7 °C) (06/29/18 0900)  Pulse: 81 (06/29/18 1230)  Resp: 14 (06/29/18 1230)  BP: 95/63 (06/29/18 1230)  SpO2: 100 % (06/29/18 1230) Vital Signs (24h Range):  Temp:  [98 °F (36.7 °C)-98.8 °F (37.1 °C)] 98 °F (36.7 °C)  Pulse:  [74-95] 81  Resp:  [14-20] 14  SpO2:  [89 %-100 %] 100 %  BP: ()/(63-91) 95/63     Weight: 97.3 kg (214 lb 8.1 oz)  Body mass index is 29.92 kg/m².    Physical Exam   Constitutional: He is oriented to person, place, and time. He appears well-developed and well-nourished.   Eyes: Conjunctivae and EOM are normal.   Pulmonary/Chest: Effort normal. No respiratory distress.   Abdominal: Soft. He exhibits no distension. There is no tenderness.   Genitourinary: Rectal exam shows no mass and no tenderness.   Musculoskeletal: Normal range of motion.   Neurological: He is alert and oriented to person, place, and time.   Skin: Skin is warm and dry.   Psychiatric: He has a normal mood and affect. His behavior is normal.       Significant Labs:  BMP (Last 3 Results):   Recent Labs  Lab 06/24/18  0104 06/24/18  0335  06/25/18  1634  06/27/18  0657 06/28/18  0504 06/29/18  0606   * 104  < >  --   < > 109 107 98    139  < >  --   < > 138 133* 137   K 3.9 3.7  < > 4.7  < > 4.1 4.2 4.6    102  < >  --   < > 97 95 99   CO2 26 26  < >  --   < > 29 28 30*   BUN 16 15  < >  --   < > 18 26* 16   CREATININE 1.0 0.9  < >  --   < > 1.4 1.5* 1.1   CALCIUM 9.1 9.0  < >  --   < > 9.6 9.5 9.7   MG 2.1 2.1  --  2.0  --   --   --   --    < > = values in this interval not displayed.  CBC (Last 3 Results):   Recent Labs  Lab 06/27/18  0657 06/28/18  0504 06/29/18  0617   WBC 6.54 8.73 7.92   RBC 4.47* 3.92* 3.99*   HGB 13.6* 11.8* 12.3*   HCT 41.9 36.5* 38.0*   * 120* 131*   MCV 94 93 95   MCH 30.4 30.1 30.8   MCHC 32.5 32.3 32.4     CMP (Last 3 Results):   Recent Labs  Lab  06/27/18  0657 06/28/18  0504 06/29/18  0606    107 98   CALCIUM 9.6 9.5 9.7   ALBUMIN 3.5 3.4* 3.5   PROT 7.0 6.8 6.9    133* 137   K 4.1 4.2 4.6   CO2 29 28 30*   CL 97 95 99   BUN 18 26* 16   CREATININE 1.4 1.5* 1.1   ALKPHOS 62 60 67   ALT 34 33 32   AST 27 20 19   BILITOT 0.9 0.6 0.6     CRP (Last 3 Results):   Recent Labs  Lab 06/28/18  0504   CRP 49.3*       Significant Diagnostics:  None

## 2018-06-29 NOTE — ASSESSMENT & PLAN NOTE
-Central line placed removed 6/28  -Was Overloaded, LVEDP elevated to 45  -Diuresed on lasix 10/hr for 2 days, switch to 80 IV bid x1 day, Now on oral lasix   -Continue metoprolol, losartan, spironolactone  -Pathway started 6/25/18 currently step 3.

## 2018-06-29 NOTE — PLAN OF CARE
Problem: SLP Goal  Goal: SLP Goal  Speech Language Pathology Goals -Goals discontinued d/t transfer to ICU  Goals expected to be met by 6/30  1. Pt will tolerate regular & thin liquids with no s/s aspiration  2. Pt will participate in SLP Speech/Language/Cognitive Evaluation   Outcome: Outcome(s) achieved Date Met: 06/29/18  Clinical Swallow and Speech Language Cognitive evaluations completed.  Pt presents w/ functional cognitive-linguistic skills and normal oropharyngeal swallow.  No further Skilled Speech services are indicated.  ST to s/o.  REC:  Pt cont w/ regular consistency diet w/thin liquids, oral meds whole, standard aspiration precautions.  Thank you.    Mandy Gandhi, CCC-SLP  6/29/2018

## 2018-06-30 PROBLEM — R29.818 TRANSIENT NEUROLOGICAL SYMPTOMS: Status: ACTIVE | Noted: 2018-01-01

## 2018-06-30 NOTE — SUBJECTIVE & OBJECTIVE
Neurologic Chief Complaint: transient neurologic symtpoms    Subjective:     Interval History: Patient is seen for follow-up neurological assessment and treatment recommendations:  Complaints of left leg weakness/numbness/pain last night.  Symptoms improved and resolved since. Does report shooting pain from the back down the left leg.     HPI, Past Medical, Family, and Social History remains the same as documented in the initial encounter.     Review of Systems   Constitutional: Negative for fever.   Respiratory: Negative for cough.    Gastrointestinal: Negative for vomiting.   Neurological: Negative for facial asymmetry, speech difficulty, weakness and numbness.     Scheduled Meds:   amiodarone  400 mg Oral BID    aspirin  81 mg Oral Daily    atorvastatin  40 mg Oral Daily    colchicine  0.6 mg Oral Daily    fluticasone-vilanterol  1 puff Inhalation Daily    furosemide  80 mg Oral BID    gabapentin  600 mg Oral TID    isosorbide mononitrate  90 mg Oral Daily    levETIRAcetam  500 mg Oral BID    metoprolol tartrate  25 mg Oral BID    pantoprazole  40 mg Oral Daily    [START ON 7/1/2018] polyethylene glycol  4,000 mL Oral Once    potassium chloride SA  20 mEq Oral QHS    potassium chloride SA  40 mEq Oral Daily    predniSONE  10 mg Oral Every Tues, Thurs, Sat    predniSONE  20 mg Oral Every Mon, Wed, Fri    predniSONE  20 mg Oral Every Sun    spironolactone  25 mg Oral Daily    warfarin  5 mg Oral Daily     Continuous Infusions:   heparin (porcine) in D5W 16 Units/kg/hr (06/29/18 2337)    sodium chloride 0.9%       PRN Meds:ALPRAZolam, butalbital-acetaminophen-caffeine -40 mg, heparin (PORCINE), heparin (PORCINE), HYDROcodone-acetaminophen, labetalol, sodium chloride 0.9%    Objective:     Vital Signs (Most Recent):  Temp: 98.4 °F (36.9 °C) (06/30/18 0806)  Pulse: 78 (06/30/18 0806)  Resp: 18 (06/30/18 0806)  BP: 111/66 (06/30/18 0806)  SpO2: 99 % (06/30/18 0806)  BP Location: Right  arm    Vital Signs Range (Last 24H):  Temp:  [98.1 °F (36.7 °C)-98.4 °F (36.9 °C)]   Pulse:  [38-95]   Resp:  [12-18]   BP: ()/()   SpO2:  [97 %-100 %]   BP Location: Right arm    Physical Exam   Constitutional: He appears well-developed and well-nourished. No distress.   Pulmonary/Chest: Effort normal.   Skin: Skin is warm and dry.   Psychiatric: He has a normal mood and affect. His behavior is normal. Judgment and thought content normal.   Vitals reviewed.      Neurological Exam:   LOC: alert  Attention Span: Good   Language: Expressive aphasia  Articulation: No dysarthria  Orientation: Person, Place, Time   Visual Fields: Full  EOM (CN III, IV, VI): Full/intact  Pupils (CN II, III): PERRL  Facial Sensation (CN V): Normal  Facial Movement (CN VII): Symmetric facial expression    Motor: Arm left  Normal 5/5  Leg left  Normal 5/5  Arm right  Normal 5/5  Leg right Normal 5/5  Cebellar: No evidence of appendicular or axial ataxia  Sensation: Intact to light touch, temperature and vibration  Tone: Normal tone throughout    Laboratory:  CMP:     Recent Labs  Lab 06/30/18  0710   CALCIUM 9.5   ALBUMIN 3.4*   PROT 6.7   *   K 4.3   CO2 26   CL 99   BUN 19   CREATININE 1.4   ALKPHOS 61   ALT 29   AST 20   BILITOT 0.5     CBC:     Recent Labs  Lab 06/30/18  0710   WBC 7.42   RBC 3.82*   HGB 11.6*   HCT 36.1*   *   MCV 95   MCH 30.4   MCHC 32.1     Lipid Panel:     Recent Labs  Lab 06/27/18  1356   CHOL 205*   LDLCALC 111.6   HDL 73   TRIG 102     Coagulation:     Recent Labs  Lab 06/28/18  0504   INR 1.1   APTT 22.1     Platelet Aggregation Study: No results for input(s): PLTAGG, PLTAGINTERP, PLTAGREGLACO, ADPPLTAGGREG in the last 168 hours.  Hgb A1C:   No results for input(s): HGBA1C in the last 168 hours.  TSH:     Recent Labs  Lab 06/28/18  0504   TSH 0.884       Diagnostic Results       Vessel Imaging   CTA Multiphase. Date: 06/22/18  CT head:  No evidence of acute intracranial pathology.  Left  parietal lobe encephalomalacia.    CTA head and neck:  No evidence of large vessel occlusion, significant stenoses, or aneurysm. 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 Additional incidental findings as above.    6/24/2018 CT Head  No acute intracranial abnormalities  Left parietal lobe encephalomalacia, unchanged.  Mild periventricular decreased supratentorial white matter attenuation most likely related to chronic nonspecific small vessel disease.    6.29.18 CT head  No new lesions, stable from previous    Cardiac Imaging   6/23/2018 2D Echo  EF 5-10%  Severely enlarged left atrium  Multiple akinetic and hypokinetic regions

## 2018-06-30 NOTE — SIGNIFICANT EVENT
Yonathan Good Jr. is a 57 y.o. male with HF and sarcoidosis who was admitted for aphasia and stroke workup. Patient transferred to icu after code blue for vtach. Since admission, patient's aphasia has improved but patient not at his baseline. CT x2 negative for infarct.      Contacted by primary team regarding patient having new LLE for at least 1 hour. Patient got up to use restroom and fell. Stroke team arrived at bedside to assess patient. He endorses L hip pain for a few days. He denies LUE weakness. Patient with mild extensor weakness in LUE however he has baseline ulnar neuropathy. Exam of his LLE was limited 2/2 hip pain. Flexor and extensor weakness was noted in the the LLE. Plans for repeat CT. Etiology of his symptoms may be peripheral in origin but difficult to determine due to limited exam. Recommended treating patient's pain, stroke team to re-examine patient once pain is managed.

## 2018-06-30 NOTE — PROGRESS NOTES
After discussion with Stroke team, will get a stat CT head and treat his pain with his neurotin currently.     Vasquez Adams MD, PGY-4

## 2018-06-30 NOTE — PLAN OF CARE
Problem: Patient Care Overview  Goal: Plan of Care Review  Outcome: Ongoing (interventions implemented as appropriate)  Heparin gtt infusing. Colonoscopy planned for Monday. Plan of care discussed with patient, no questions at this time. Fall pre-cautions in place. Will continue to monitor.

## 2018-06-30 NOTE — PROGRESS NOTES
[facility]  Vascular Neurology  Comprehensive Stroke Center  Progress Note    Assessment/Plan:     Transient neurological symptoms     57 y.o. male with significant past medical history of prior Left sided stroke w/o residual deficits, CHF, CAD, seizure DO, sarcoidosis of the lung, s/p pacemaker placement, ?A fib-on Coumadin presented to hospital as a transfer from [facility] for evaluation of aphasia. No tPA.  No LVO on CTA MP.    May continue keppra 500 BID. Patient with one episode of seizure 12 years ago and has likely had subtherapeutic dilantin levels for quite some time.     Difficult to establish etiology for his transient neurological symptoms upon presentation.  Seizure or previous symptoms reactivation as well as new event are in the differential.  Treatment for prevention remains the same, and cardiac care management is primary concern currently.  Discharge on anticoagulation and aggressive risk factor modification for secondary prevention of future cerebrovascular events.    Regarding complaints of left leg pain/weakness: patient had recent fall and has had back pain with radiation to the left leg since.  Suspect his symptoms are related to a radiculopathy and positioning in bed.  Should be evaluated as outpatient with EMG/NCS since he is not able to get spine MRI.  Neurontin and steroids should help with the pain.    Antithrombotics for secondary stroke prevention:  Continue coumadin and asa     Statins for secondary stroke prevention and hyperlipidemia, if present:   Statins: Atorvastatin- 40 mg daily     Aggressive risk factor modification: Obesity, A-Fib, CAD, prior stroke, CHF     Rehab efforts: PT/OT/SLP to evaluate and treat New consults 6.29.18     Diagnostics ordered/pending:  None     VTE prophylaxis: On anticoagulation; Mechanical prophylaxis: Place SCDs     BP parameters: SBP<180             Ventricular tachycardia    -Cardiology following         Acute on chronic systolic congestive heart failure    -Stroke risk factor. 2D Echo now showing EF 5-10%, Severe LAE.          Seizure disorder    -Patient states he had seizure one time in 2006 and was placed on dilantin  -EEG with no evidence of seizure activity  -patient has not had seizure since 2006  -recommend switching from dilantin to keppra or discontinue AED  -dilantin not recommended to be taken with coumadin due to interaction between medications causing decreased effectiveness        CAD (coronary artery disease)    -Stroke risk factor.  S/p LAD stent 6/2011.   -Cardiology following        Sarcoidosis of lung    -management per primary team        Biventricular cardiac pacemaker in situ    -Hx of pacemaker placement.  Unable to obtain MRI.             6/23: 24 hour CT pending, echo and eeg pending, dilantin level low-loading with cerebyx  6/26:  Cardiac cath yesterday without intervention.  No new events, but continues to report speech not back to baseline.  EEG negative for seizure. Slowing left parietal region.  Cardiology following for continued wide complex tachycardia/Vtach.  6/28 patient neurologically stable  6.30: complaints of left leg weakness/numbness w pain overnight, now resolved    STROKE DOCUMENTATION   Acute Stroke Times   Last Known Normal Date: 06/22/18  Last Known Normal Time: 1000  Symptom Onset Date: 06/22/18  Symptom Onset Time: 1000  Stroke Team Called Date: 06/22/18  Stroke Team Called Time:  (Problems with the tele-stroke connection)  Stroke Team Arrival Date: 06/22/18  CT Interpretation Time: 1649    NIH Scale:  1a. Level Of Consciousness: 0-->Alert: keenly responsive  1b. LOC Questions: 0-->Answers both questions correctly  1c. LOC Commands: 0-->Performs both tasks correctly  2. Best Gaze: 0-->Normal  3. Visual: 0-->No visual loss  4. Facial Palsy: 0-->Normal symmetrical movements  5a. Motor Arm, Left: 0-->No drift: limb holds 90 (or 45) degrees for full 10 secs  5b. Motor  Arm, Right: 0-->No drift: limb holds 90 (or 45) degrees for full 10 secs  6a. Motor Leg, Left: 0-->No drift: leg holds 30 degree position for full 5 secs  6b. Motor Leg, Right: 0-->No drift: leg holds 30 degree position for full 5 secs  7. Limb Ataxia: 0-->Absent  8. Sensory: 0-->Normal: no sensory loss  9. Best Language: 0-->No aphasia: normal  10. Dysarthria: 0-->Normal  11. Extinction and Inattention (formerly Neglect): 0-->No abnormality  Total (NIH Stroke Scale): 0       Modified Dariela Score: 0  [place] Coma Scale:15   ABCD2 Score:    [id number] Score:3  HAS -BLED Score:2  ICH Score:   Hunt & Lemus Classification:      Hemorrhagic change of an Ischemic Stroke: Does this patient have an ischemic stroke with hemorrhagic changes? No     Neurologic Chief Complaint: transient neurologic symtpoms    Subjective:     Interval History: Patient is seen for follow-up neurological assessment and treatment recommendations:  Complaints of left leg weakness/numbness/pain last night.  Symptoms improved and resolved since. Does report shooting pain from the back down the left leg.     HPI, Past Medical, Family, and Social History remains the same as documented in the initial encounter.     Review of Systems   Constitutional: Negative for fever.   Respiratory: Negative for cough.    Gastrointestinal: Negative for vomiting.   Neurological: Negative for facial asymmetry, speech difficulty, weakness and numbness.     Scheduled Meds:   amiodarone  400 mg Oral BID    aspirin  81 mg Oral Daily    atorvastatin  40 mg Oral Daily    colchicine  0.6 mg Oral Daily    fluticasone-vilanterol  1 puff Inhalation Daily    furosemide  80 mg Oral BID    gabapentin  600 mg Oral TID    isosorbide mononitrate  90 mg Oral Daily    levETIRAcetam  500 mg Oral BID    metoprolol tartrate  25 mg Oral BID    pantoprazole  40 mg Oral Daily    [START ON 7/1/2018] polyethylene glycol  4,000 mL Oral Once    potassium chloride SA  20 mEq Oral QHS     potassium chloride SA  40 mEq Oral Daily    predniSONE  10 mg Oral Every Tues, Thurs, Sat    predniSONE  20 mg Oral Every Mon, Wed, Fri    predniSONE  20 mg Oral Every Sun    spironolactone  25 mg Oral Daily    warfarin  5 mg Oral Daily     Continuous Infusions:   heparin (porcine) in D5W 16 Units/kg/hr (06/29/18 8057)    sodium chloride 0.9%       PRN Meds:ALPRAZolam, butalbital-acetaminophen-caffeine -40 mg, heparin (PORCINE), heparin (PORCINE), HYDROcodone-acetaminophen, labetalol, sodium chloride 0.9%    Objective:     Vital Signs (Most Recent):  Temp: 98.4 °F (36.9 °C) (06/30/18 0806)  Pulse: 78 (06/30/18 0806)  Resp: 18 (06/30/18 0806)  BP: 111/66 (06/30/18 0806)  SpO2: 99 % (06/30/18 0806)  BP Location: Right arm    Vital Signs Range (Last 24H):  Temp:  [98.1 °F (36.7 °C)-98.4 °F (36.9 °C)]   Pulse:  [38-95]   Resp:  [12-18]   BP: ()/()   SpO2:  [97 %-100 %]   BP Location: Right arm    Physical Exam   Constitutional: He appears well-developed and well-nourished. No distress.   Pulmonary/Chest: Effort normal.   Skin: Skin is warm and dry.   Psychiatric: He has a normal mood and affect. His behavior is normal. Judgment and thought content normal.   Vitals reviewed.      Neurological Exam:   LOC: alert  Attention Span: Good   Language: Expressive aphasia  Articulation: No dysarthria  Orientation: Person, Place, Time   Visual Fields: Full  EOM (CN III, IV, VI): Full/intact  Pupils (CN II, III): PERRL  Facial Sensation (CN V): Normal  Facial Movement (CN VII): Symmetric facial expression    Motor: Arm left  Normal 5/5  Leg left  Normal 5/5  Arm right  Normal 5/5  Leg right Normal 5/5  Cebellar: No evidence of appendicular or axial ataxia  Sensation: Intact to light touch, temperature and vibration  Tone: Normal tone throughout    Laboratory:  CMP:     Recent Labs  Lab 06/30/18  0710   CALCIUM 9.5   ALBUMIN 3.4*   PROT 6.7   *   K 4.3   CO2 26   CL 99   BUN 19   CREATININE 1.4    ALKPHOS 61   ALT 29   AST 20   BILITOT 0.5     CBC:     Recent Labs  Lab 06/30/18  0710   WBC 7.42   RBC 3.82*   HGB 11.6*   HCT 36.1*   *   MCV 95   MCH 30.4   MCHC 32.1     Lipid Panel:     Recent Labs  Lab 06/27/18  1356   CHOL 205*   LDLCALC 111.6   HDL 73   TRIG 102     Coagulation:     Recent Labs  Lab 06/28/18  0504   INR 1.1   APTT 22.1     Platelet Aggregation Study: No results for input(s): PLTAGG, PLTAGINTERP, PLTAGREGLACO, ADPPLTAGGREG in the last 168 hours.  Hgb A1C:   No results for input(s): HGBA1C in the last 168 hours.  TSH:     Recent Labs  Lab 06/28/18  0504   TSH 0.884       Diagnostic Results       Vessel Imaging   CTA Multiphase. Date: 06/22/18  CT head:  No evidence of acute intracranial pathology.  Left parietal lobe encephalomalacia.    CTA head and neck:  No evidence of large vessel occlusion, significant stenoses, or aneurysm. 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 Additional incidental findings as above.    6/24/2018 CT Head  No acute intracranial abnormalities  Left parietal lobe encephalomalacia, unchanged.  Mild periventricular decreased supratentorial white matter attenuation most likely related to chronic nonspecific small vessel disease.    6.29.18 CT head  No new lesions, stable from previous    Cardiac Imaging   6/23/2018 2D Echo  EF 5-10%  Severely enlarged left atrium  Multiple akinetic and hypokinetic regions      Jordon Luis MD  Comprehensive Stroke Center  Department of Vascular Neurology   [facility]

## 2018-06-30 NOTE — TREATMENT PLAN
Coumadin held. Continue heparin gtt. Plan for split dose prep starting tomorrow (6/31). Will give 2000mL golytely tomorrow evening. CLD starting tomorrow. NPO after midnight Sunday. Take other 2000mL golytely Monday morning. Try to stop heparin gtt ~3-4 hrs prior to colonoscopy.

## 2018-06-30 NOTE — ASSESSMENT & PLAN NOTE
57 y.o. male with significant past medical history of prior Left sided stroke w/o residual deficits, CHF, CAD, seizure DO, sarcoidosis of the lung, s/p pacemaker placement, ?A fib-on Coumadin presented to hospital as a transfer from [facility] for evaluation of aphasia. No tPA.  No LVO on CTA MP.    May continue keppra 500 BID. Patient with one episode of seizure 12 years ago and has likely had subtherapeutic dilantin levels for quite some time.     Regarding complaints of left leg pain/weakness: patient had recent fall and has had back pain with radiation to the left leg since.  Suspect his symptoms are related to a radiculopathy and positioning in bed.  Should be evaluated as outpatient with EMG/NCS since he is not able to get spine MRI.  Neurontin and steroids should help with the pain.    Antithrombotics for secondary stroke prevention:  Continue coumadin and asa     Statins for secondary stroke prevention and hyperlipidemia, if present:   Statins: Atorvastatin- 40 mg daily     Aggressive risk factor modification: Obesity, A-Fib, CAD, prior stroke, CHF     Rehab efforts: PT/OT/SLP to evaluate and treat New consults 6.29.18     Diagnostics ordered/pending:  None     VTE prophylaxis: On anticoagulation; Mechanical prophylaxis: Place SCDs     BP parameters: SBP<180

## 2018-06-30 NOTE — PROGRESS NOTES
[facility]  Heart Transplant  Progress Note    Patient Name: Yonathan Good Jr.  MRN: [id number]  Admission Date: 6/22/2018  Hospital Length of Stay: 7 days  Attending Physician: Britt Melgar MD  Primary Care Provider: Edgar Gates MD  Principal Problem:VT (ventricular tachycardia)    Subjective:     Interval History: Patient feeling well, on oral lasix.       Continuous Infusions:   heparin (porcine) in D5W 16 Units/kg/hr (06/29/18 2537)    sodium chloride 0.9%       Scheduled Meds:   amiodarone  400 mg Oral BID    aspirin  81 mg Oral Daily    atorvastatin  40 mg Oral Daily    colchicine  0.6 mg Oral Daily    fluticasone-vilanterol  1 puff Inhalation Daily    furosemide  80 mg Oral BID    gabapentin  600 mg Oral TID    isosorbide mononitrate  90 mg Oral Daily    levETIRAcetam  500 mg Oral BID    metoprolol tartrate  25 mg Oral BID    pantoprazole  40 mg Oral Daily    [START ON 7/1/2018] polyethylene glycol  4,000 mL Oral Once    potassium chloride SA  20 mEq Oral QHS    potassium chloride SA  40 mEq Oral Daily    predniSONE  10 mg Oral Every Tues, Thurs, Sat    predniSONE  20 mg Oral Every Mon, Wed, Fri    predniSONE  20 mg Oral Every Sun    spironolactone  25 mg Oral Daily    warfarin  5 mg Oral Daily     PRN Meds:ALPRAZolam, butalbital-acetaminophen-caffeine -40 mg, heparin (PORCINE), heparin (PORCINE), HYDROcodone-acetaminophen, labetalol, sodium chloride 0.9%    Review of patient's allergies indicates:  No Known Allergies  Objective:     Vital Signs (Most Recent):  Temp: 98.1 °F (36.7 °C) (06/29/18 1940)  Pulse: 69 (06/30/18 0405)  Resp: 15 (06/30/18 0405)  BP: 93/64 (06/30/18 0405)  SpO2: 100 % (06/30/18 0405) Vital Signs (24h Range):  Temp:  [98 °F (36.7 °C)-98.1 °F (36.7 °C)] 98.1 °F (36.7 °C)  Pulse:  [38-95] 69  Resp:  [12-17] 15  SpO2:  [97 %-100 %] 100 %  BP: ()/() 93/64     Patient Vitals for the past 72 hrs (Last 3 readings):   Weight   06/30/18  0500 103.5 kg (228 lb 3.2 oz)   06/29/18 0600 97.3 kg (214 lb 8.1 oz)     Body mass index is 31.83 kg/m².      Intake/Output Summary (Last 24 hours) at 06/30/18 0714  Last data filed at 06/30/18 0600   Gross per 24 hour   Intake              480 ml   Output             2425 ml   Net            -1945 ml       Hemodynamic Parameters:         Physical Exam    GEN: Alert and oriented in NAD  NECK: no JVD appreciated   CVS: RRR, s1/s2, no MRG  PULM: CTAB no rales  ABD: NT/ND BS +  Extremities: warm and dry, palpable pulses, no edema  NEURO: Alert and oriented x 3  PSYCH: appropriate affect.         Significant Labs:  CBC:    Recent Labs  Lab 06/27/18  0657 06/28/18  0504 06/29/18  0617   WBC 6.54 8.73 7.92   RBC 4.47* 3.92* 3.99*   HGB 13.6* 11.8* 12.3*   HCT 41.9 36.5* 38.0*   * 120* 131*   MCV 94 93 95   MCH 30.4 30.1 30.8   MCHC 32.5 32.3 32.4     BNP:    Recent Labs  Lab 06/27/18  1356   *     CMP:    Recent Labs  Lab 06/27/18  0657 06/28/18  0504 06/29/18  0606    107 98   CALCIUM 9.6 9.5 9.7   ALBUMIN 3.5 3.4* 3.5   PROT 7.0 6.8 6.9    133* 137   K 4.1 4.2 4.6   CO2 29 28 30*   CL 97 95 99   BUN 18 26* 16   CREATININE 1.4 1.5* 1.1   ALKPHOS 62 60 67   ALT 34 33 32   AST 27 20 19   BILITOT 0.9 0.6 0.6      Coagulation:     Recent Labs  Lab 06/28/18  0504   INR 1.1   APTT 22.1     LDH:    Recent Labs  Lab 06/27/18  1356   *     Microbiology:  Microbiology Results (last 7 days)     ** No results found for the last 168 hours. **          I have reviewed all pertinent labs within the past 24 hours.    Estimated Creatinine Clearance: 92.9 mL/min (based on SCr of 1.1 mg/dL).    Diagnostic Results:  I have reviewed and interpreted all pertinent imaging results/findings within the past 24 hours.    Assessment and Plan:     57 y.o. male  with h/o ischemic cardiomyopathy(negative for sarcoid on cardiac MRI 2012), H/o stents 7 years ago,Pulmonary sarcoid, left parietal stroke in the past,  atrial fibrillation, CRT-D(st Giorgio) who follows Dr Berman and is being worked up as outpatient for advanced options. He was transferred to Stroke service last evening with difficulty speaking with suspected Stroke.Initial workup included CVA which was negative for acute CVA.No tPA was given. This am patient complained of chest pain and dizziness and was noted to be in Monomorphic VT -Code blue was called.He was ATP'ed out of it but he also had an external shock delivered by rapid repsonse team as he was persistently in VT but no shock was delivered.He went into Fast VT with rate>200 and his ICD shocked him out of it. He was transferred to ICU.He reports having chest pain for last 4-5 years on exertion relieved with nitro-reports stress test done in [place].Last stress test 2015 in [facility]. He was loaded with amiodarone.He had 2 more episodes of VT which were slower at 150-His device did not ATP as below VT-1 zone. He also complains of cough with pinkish sputum,SOB.He reports no more chest pain since the shock.  He was recently scheduled for colonoscopy as part of transplant workup for which his coumadin was held and he was placed on eliquis temporarily.He had chest pain on day of c-scope and he had to take a nitro and his procedure was cancelled(5 days ago). He reports he started taking his coumadin after.His INR was subtherapeutic on arrival.     He states that his pulmonary sarcoid was diagnosed in 2015 when he was evaluated for a dry non-productive cough.  States that he underwent bronchoscopy with biopsies which confirmed sarcoid.  Since then, he has been on varying doses of Prednisone He has no other organ involvement from sarcoid and states that his only symptom has been cough. Denies any alcohol or illicit drug use.  Previously worked as a .     * VT (ventricular tachycardia)    -Multiple episodes of MMVT on 6/23  -EP consulted. VT1 zone lowered as per EP. No further VT. Likely AT with  aberrancy on early AM 6/25, nonsustained.   -K>4, Mg>2  -Continue amiodarone, 400 bid x2 weeks, then 400 daily  -[facility] without overt culprit lesion  -Follow up with Dr. Telles in EP in 4-6 weeks        Heart transplant candidate    -OHT/LVAD pathway, step 3  -CT C/A/P complete  -CTS consult; discussed further whether LVAD candidate given recent VT, hx of sarcoid (no prior e/o cardiac involvement), suppressed on Amio. Okay to proceed with both LVAD and OHT workup  -Colorectal surgery consulted for colonoscopy (unable to complete previously due to CP), will bridge with heparin given hx of thromboembolic stroke  -Renal U/s        Expressive aphasia    -Initially admitted to Neuro, no TPA. CT head and CTA negative. Hx of seizures, EEG pending.  -Resolved, no further workup at this time  -SLP evaluation        Acute on chronic systolic congestive heart failure    -Central line placed removed 6/28  -Was Overloaded, LVEDP elevated to 45  -Diuresed on lasix 10/hr for 2 days, switch to 80 IV bid x1 day, Now on oral lasix   -Continue metoprolol, losartan, spironolactone  -Pathway started 6/25/18 currently step 3.         Seizure disorder    Home phenytoin        CVA (cerebrovascular accident)    -Hx of parietal CVA presumed due to LV thrombus per chart. Prior echos reviewed here, no thrombus visualzied in past  -Pt reports initiation of AC at time of MI when he had LV aneurysm  -Also reports hx of AF ([id number])  -Will stop plavix (last stent 7 yrs ago, this presentation not suspected secondary to ACS)  -Continue ASA, restart coumadin  -Heparin bridge        CAD (coronary artery disease)    -s/p LAD stent 2011  -Intermittent nonexertional chest pain, increased in frequency in recent weeks  -Troponin peaked at 2.3, did also get shocked x1  -Treated initially as ACS as per NSTEMI, no culprit lesion identified, likely scar based VT.  -Can consider PET stress  -Heparin stopped. Continue ASA, statin, BB, ARB, MRA, imdur. No  recent stents, will stop plavix as restarting AC  -LHC: LM luminal irreg, LAD 30% w/ patent stent, prox Lcx 70%, OM1 80%, RCA luminal irregularities        Sarcoidosis of lung    -prednisone per home regimen  -Consulted lung transplant to re-evaluate patient  -Okay to proceed with workup as per Lung team, appreciate recommendations  -Recommended consideration of cellcept or MTX for steroid-sparing therapy (currently on prednisone monotherapy), will discuss with staff        Biventricular cardiac pacemaker in situ    -St Giorgio device interrogated-See EP notes            Johanne Todd MD  Heart Transplant  [facility]

## 2018-06-30 NOTE — ASSESSMENT & PLAN NOTE
57 y.o. male with significant past medical history of prior Left sided stroke w/o residual deficits, CHF, CAD, seizure DO, sarcoidosis of the lung, s/p pacemaker placement, ?A fib-on Coumadin presented to hospital as a transfer from [facility] for evaluation of aphasia. No tPA.  No LVO on CTA MP.    May continue keppra 500 BID. Patient with one episode of seizure 12 years ago and has likely had subtherapeutic dilantin levels for quite some time.     Difficult to establish etiology for his transient neurological symptoms upon presentation.  Seizure or previous symptoms reactivation as well as new event are in the differential.  Treatment for prevention remains the same, and cardiac care management is primary concern currently.  Discharge on anticoagulation and aggressive risk factor modification for secondary prevention of future cerebrovascular events.    Regarding complaints of left leg pain/weakness: patient had recent fall and has had back pain with radiation to the left leg since.  Suspect his symptoms are related to a radiculopathy and positioning in bed.  Should be evaluated as outpatient with EMG/NCS since he is not able to get spine MRI.  Neurontin and steroids should help with the pain.    Antithrombotics for secondary stroke prevention:  Continue coumadin and asa     Statins for secondary stroke prevention and hyperlipidemia, if present:   Statins: Atorvastatin- 40 mg daily     Aggressive risk factor modification: Obesity, A-Fib, CAD, prior stroke, CHF     Rehab efforts: PT/OT/SLP to evaluate and treat     Diagnostics ordered/pending:  None     VTE prophylaxis: On anticoagulation; Mechanical prophylaxis: Place SCDs     BP parameters: SBP<180

## 2018-06-30 NOTE — SUBJECTIVE & OBJECTIVE
Interval History: Patient feeling well, on oral lasix.       Continuous Infusions:   heparin (porcine) in D5W 16 Units/kg/hr (06/29/18 2337)    sodium chloride 0.9%       Scheduled Meds:   amiodarone  400 mg Oral BID    aspirin  81 mg Oral Daily    atorvastatin  40 mg Oral Daily    colchicine  0.6 mg Oral Daily    fluticasone-vilanterol  1 puff Inhalation Daily    furosemide  80 mg Oral BID    gabapentin  600 mg Oral TID    isosorbide mononitrate  90 mg Oral Daily    levETIRAcetam  500 mg Oral BID    metoprolol tartrate  25 mg Oral BID    pantoprazole  40 mg Oral Daily    [START ON 7/1/2018] polyethylene glycol  4,000 mL Oral Once    potassium chloride SA  20 mEq Oral QHS    potassium chloride SA  40 mEq Oral Daily    predniSONE  10 mg Oral Every Tues, Thurs, Sat    predniSONE  20 mg Oral Every Mon, Wed, Fri    predniSONE  20 mg Oral Every Sun    spironolactone  25 mg Oral Daily    warfarin  5 mg Oral Daily     PRN Meds:ALPRAZolam, butalbital-acetaminophen-caffeine -40 mg, heparin (PORCINE), heparin (PORCINE), HYDROcodone-acetaminophen, labetalol, sodium chloride 0.9%    Review of patient's allergies indicates:  No Known Allergies  Objective:     Vital Signs (Most Recent):  Temp: 98.1 °F (36.7 °C) (06/29/18 1940)  Pulse: 69 (06/30/18 0405)  Resp: 15 (06/30/18 0405)  BP: 93/64 (06/30/18 0405)  SpO2: 100 % (06/30/18 0405) Vital Signs (24h Range):  Temp:  [98 °F (36.7 °C)-98.1 °F (36.7 °C)] 98.1 °F (36.7 °C)  Pulse:  [38-95] 69  Resp:  [12-17] 15  SpO2:  [97 %-100 %] 100 %  BP: ()/() 93/64     Patient Vitals for the past 72 hrs (Last 3 readings):   Weight   06/30/18 0500 103.5 kg (228 lb 3.2 oz)   06/29/18 0600 97.3 kg (214 lb 8.1 oz)     Body mass index is 31.83 kg/m².      Intake/Output Summary (Last 24 hours) at 06/30/18 0714  Last data filed at 06/30/18 0600   Gross per 24 hour   Intake              480 ml   Output             2425 ml   Net            -1945 ml        Hemodynamic Parameters:         Physical Exam    GEN: Alert and oriented in NAD  NECK: no JVD appreciated   CVS: RRR, s1/s2, no MRG  PULM: CTAB no rales  ABD: NT/ND BS +  Extremities: warm and dry, palpable pulses, no edema  NEURO: Alert and oriented x 3  PSYCH: appropriate affect.         Significant Labs:  CBC:    Recent Labs  Lab 06/27/18  0657 06/28/18  0504 06/29/18  0617   WBC 6.54 8.73 7.92   RBC 4.47* 3.92* 3.99*   HGB 13.6* 11.8* 12.3*   HCT 41.9 36.5* 38.0*   * 120* 131*   MCV 94 93 95   MCH 30.4 30.1 30.8   MCHC 32.5 32.3 32.4     BNP:    Recent Labs  Lab 06/27/18  1356   *     CMP:    Recent Labs  Lab 06/27/18  0657 06/28/18  0504 06/29/18  0606    107 98   CALCIUM 9.6 9.5 9.7   ALBUMIN 3.5 3.4* 3.5   PROT 7.0 6.8 6.9    133* 137   K 4.1 4.2 4.6   CO2 29 28 30*   CL 97 95 99   BUN 18 26* 16   CREATININE 1.4 1.5* 1.1   ALKPHOS 62 60 67   ALT 34 33 32   AST 27 20 19   BILITOT 0.9 0.6 0.6      Coagulation:     Recent Labs  Lab 06/28/18  0504   INR 1.1   APTT 22.1     LDH:    Recent Labs  Lab 06/27/18  1356   *     Microbiology:  Microbiology Results (last 7 days)     ** No results found for the last 168 hours. **          I have reviewed all pertinent labs within the past 24 hours.    Estimated Creatinine Clearance: 92.9 mL/min (based on SCr of 1.1 mg/dL).    Diagnostic Results:  I have reviewed and interpreted all pertinent imaging results/findings within the past 24 hours.

## 2018-07-01 PROBLEM — Z86.79 HISTORY OF ATRIAL FIBRILLATION: Status: ACTIVE | Noted: 2018-01-01

## 2018-07-01 PROBLEM — I51.7 LEFT ATRIAL ENLARGEMENT: Status: ACTIVE | Noted: 2018-01-01

## 2018-07-01 NOTE — ASSESSMENT & PLAN NOTE
-s/p LAD stent 2011. [facility] with diffuse CAD LM luminal irreg, LAD 30% w/ patent stent, prox Lcx 70%, OM1 80%, RCA luminal irregularities no culprit lesion. VT likely scar mediated. Continue GDMT.

## 2018-07-01 NOTE — PROGRESS NOTES
[facility]  Vascular Neurology  Comprehensive Stroke Center  Progress Note    Assessment/Plan:     * Acute on chronic systolic congestive heart failure    -Stroke risk factor. 2D Echo now showing EF 5-10%, Severe LAE.          Transient neurological symptoms     57 y.o. male with significant past medical history of prior Left sided stroke w/o residual deficits, CHF, CAD, seizure DO, sarcoidosis of the lung, s/p pacemaker placement, ?A fib-on Coumadin presented to hospital as a transfer from [facility] for evaluation of aphasia. No tPA.  No LVO on CTA MP.    May continue keppra 500 BID. Patient with one episode of seizure 12 years ago and has likely had subtherapeutic dilantin levels for quite some time.     Difficult to establish etiology for his transient neurological symptoms upon presentation.  Seizure or previous symptoms reactivation as well as new event are in the differential.  Treatment for prevention remains the same, and cardiac care management is primary concern currently.  Discharge on anticoagulation and aggressive risk factor modification for secondary prevention of future cerebrovascular events.    Regarding complaints of left leg pain/weakness: patient had recent fall and has had back pain with radiation to the left leg since.  Suspect his symptoms are related to a radiculopathy and positioning in bed.  Should be evaluated as outpatient with EMG/NCS since he is not able to get spine MRI.  Neurontin and steroids should help with the pain.    Antithrombotics for secondary stroke prevention:  Continue coumadin and asa     Statins for secondary stroke prevention and hyperlipidemia, if present:   Statins: Atorvastatin- 40 mg daily     Aggressive risk factor modification: Obesity, A-Fib, CAD, prior stroke, CHF     Rehab efforts: PT/OT/SLP to evaluate and treat     Diagnostics ordered/pending:  None     VTE prophylaxis: On anticoagulation; Mechanical  prophylaxis: Place SCDs     BP parameters: SBP<180             History of atrial fibrillation    Patient currently anticoagulated on heparin gtt        Ventricular tachycardia    -Cardiology following        Seizure disorder    -Patient states he had seizure one time in 2006 and was placed on dilantin  -EEG with no evidence of seizure activity  -patient has not had seizure since 2006  -recommend switching from dilantin to keppra or discontinue AED  -dilantin not recommended to be taken with coumadin due to interaction between medications causing decreased effectiveness        CAD (coronary artery disease)    -Stroke risk factor.  S/p LAD stent 6/2011.   -Cardiology following        Sarcoidosis of lung    -management per primary team        Biventricular cardiac pacemaker in situ    -Hx of pacemaker placement.  Unable to obtain MRI.             6/23: 24 hour CT pending, echo and eeg pending, dilantin level low-loading with cerebyx  6/26:  Cardiac cath yesterday without intervention.  No new events, but continues to report speech not back to baseline.  EEG negative for seizure. Slowing left parietal region.  Cardiology following for continued wide complex tachycardia/Vtach.  6/28 patient neurologically stable  6.30: complaints of left leg weakness/numbness w pain overnight, now resolved  7/1/18- leg issues resolved. Daughter states patient is much better, but still has some mild issues with finding words.     STROKE DOCUMENTATION   Acute Stroke Times   Last Known Normal Date: 06/22/18  Last Known Normal Time: 1000  Symptom Onset Date: 06/22/18  Symptom Onset Time: 1000  Stroke Team Called Date: 06/22/18  Stroke Team Called Time:  (Problems with the tele-stroke connection)  Stroke Team Arrival Date: 06/22/18  CT Interpretation Time: 1649    NIH Scale:  1a. Level Of Consciousness: 0-->Alert: keenly responsive  1b. LOC Questions: 0-->Answers both questions correctly  1c. LOC Commands: 0-->Performs both tasks  correctly  2. Best Gaze: 0-->Normal  3. Visual: 0-->No visual loss  4. Facial Palsy: 0-->Normal symmetrical movements  5a. Motor Arm, Left: 0-->No drift: limb holds 90 (or 45) degrees for full 10 secs  5b. Motor Arm, Right: 0-->No drift: limb holds 90 (or 45) degrees for full 10 secs  6a. Motor Leg, Left: 0-->No drift: leg holds 30 degree position for full 5 secs  6b. Motor Leg, Right: 0-->No drift: leg holds 30 degree position for full 5 secs  7. Limb Ataxia: 0-->Absent  8. Sensory: 0-->Normal: no sensory loss  9. Best Language: 0-->No aphasia: normal  10. Dysarthria: 0-->Normal  11. Extinction and Inattention (formerly Neglect): 0-->No abnormality  Total (NIH Stroke Scale): 0       Modified Dariela Score: 0  Hugh Coma Scale:    ABCD2 Score:    [id number] Score:3  HAS -BLED Score:2  ICH Score:   Hunt & Lemus Classification:      Hemorrhagic change of an Ischemic Stroke: Does this patient have an ischemic stroke with hemorrhagic changes? No     Neurologic Chief Complaint: transient neurologic symtpoms    Subjective:     Interval History: Patient is seen for follow-up neurological assessment and treatment recommendations:  Complaints of left leg weakness/numbness/pain resolved. Mild word finding issues as per daughter.    HPI, Past Medical, Family, and Social History remains the same as documented in the initial encounter.     Review of Systems   Constitutional: Negative for fever.   Respiratory: Negative for cough.    Gastrointestinal: Negative for vomiting.   Neurological: Negative for facial asymmetry, speech difficulty, weakness and numbness.     Scheduled Meds:   amiodarone  400 mg Oral BID    aspirin  81 mg Oral Daily    atorvastatin  40 mg Oral Daily    colchicine  0.6 mg Oral Daily    fluticasone-vilanterol  1 puff Inhalation Daily    gabapentin  600 mg Oral TID    isosorbide mononitrate  90 mg Oral Daily    levETIRAcetam  500 mg Oral BID    metoprolol tartrate  25 mg Oral BID    pantoprazole  40  mg Oral Daily    polyethylene glycol  2,000 mL Oral Once    [START ON 7/2/2018] polyethylene glycol  2,000 mL Oral Once    potassium chloride SA  20 mEq Oral QHS    potassium chloride SA  40 mEq Oral Daily    predniSONE  10 mg Oral Every Tues, Thurs, Sat    predniSONE  20 mg Oral Every Mon, Wed, Fri    predniSONE  20 mg Oral Every Sun    spironolactone  25 mg Oral Daily     Continuous Infusions:   heparin (porcine) in D5W 14 Units/kg/hr (07/01/18 0920)    sodium chloride 0.9%       PRN Meds:ALPRAZolam, butalbital-acetaminophen-caffeine -40 mg, heparin (PORCINE), heparin (PORCINE), HYDROcodone-acetaminophen, labetalol, sodium chloride 0.9%    Objective:     Vital Signs (Most Recent):  Temp: 97.8 °F (36.6 °C) (07/01/18 0735)  Pulse: 74 (07/01/18 1055)  Resp: 13 (07/01/18 1055)  BP: 113/81 (07/01/18 1055)  SpO2: 100 % (07/01/18 1103)  BP Location: Right arm    Vital Signs Range (Last 24H):  Temp:  [97.8 °F (36.6 °C)]   Pulse:  [64-93]   Resp:  [10-19]   BP: ()/(59-89)   SpO2:  [96 %-100 %]   BP Location: Right arm    Physical Exam   Constitutional: He appears well-developed and well-nourished. No distress.   Pulmonary/Chest: Effort normal.   Skin: Skin is warm and dry.   Psychiatric: He has a normal mood and affect. His behavior is normal. Judgment and thought content normal.   Vitals reviewed.      Neurological Exam:   LOC: alert  Attention Span: Good   Language: Expressive aphasia  Articulation: No dysarthria  Orientation: Person, Place, Time   Visual Fields: Full  EOM (CN III, IV, VI): Full/intact  Pupils (CN II, III): PERRL  Facial Sensation (CN V): Normal  Facial Movement (CN VII): Symmetric facial expression    Motor: Arm left  Normal 5/5  Leg left  Normal 5/5  Arm right  Normal 5/5  Leg right Normal 5/5  Cebellar: No evidence of appendicular or axial ataxia  Sensation: Intact to light touch, temperature and vibration  Tone: Normal tone throughout    Laboratory:  CMP:     Recent Labs  Lab  07/01/18  0741   CALCIUM 9.5   ALBUMIN 3.3*   PROT 6.6      K 4.4   CO2 24      BUN 25*   CREATININE 1.5*   ALKPHOS 71   ALT 34   AST 24   BILITOT 0.4     CBC:     Recent Labs  Lab 07/01/18  0741   WBC 6.68   RBC 3.90*   HGB 11.7*   HCT 37.5*   *   MCV 96   MCH 30.0   MCHC 31.2*     Lipid Panel:     Recent Labs  Lab 06/27/18  1356   CHOL 205*   LDLCALC 111.6   HDL 73   TRIG 102     Coagulation:     Recent Labs  Lab 06/28/18  0504 06/30/18  1257   INR 1.1 1.4*   APTT 22.1  --      Platelet Aggregation Study: No results for input(s): PLTAGG, PLTAGINTERP, PLTAGREGLACO, ADPPLTAGGREG in the last 168 hours.  Hgb A1C:   No results for input(s): HGBA1C in the last 168 hours.  TSH:     Recent Labs  Lab 06/28/18  0504   TSH 0.884       Diagnostic Results       Vessel Imaging   CTA Multiphase. Date: 06/22/18  CT head:  No evidence of acute intracranial pathology.  Left parietal lobe encephalomalacia.    CTA head and neck:  No evidence of large vessel occlusion, significant stenoses, or aneurysm. 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 Additional incidental findings as above.    6/24/2018 CT Head  No acute intracranial abnormalities  Left parietal lobe encephalomalacia, unchanged.  Mild periventricular decreased supratentorial white matter attenuation most likely related to chronic nonspecific small vessel disease.    6.29.18 CT head  No new lesions, stable from previous    Cardiac Imaging   6/23/2018 2D Echo  EF 5-10%  Severely enlarged left atrium  Multiple akinetic and hypokinetic regions      Meryl Schaffer MD  Comprehensive Stroke Center  Department of Vascular Neurology   [facility]

## 2018-07-01 NOTE — SUBJECTIVE & OBJECTIVE
Neurologic Chief Complaint: transient neurologic symtpoms    Subjective:     Interval History: Patient is seen for follow-up neurological assessment and treatment recommendations:  Complaints of left leg weakness/numbness/pain resolved. Mild word finding issues as per daughter.    HPI, Past Medical, Family, and Social History remains the same as documented in the initial encounter.     Review of Systems   Constitutional: Negative for fever.   Respiratory: Negative for cough.    Gastrointestinal: Negative for vomiting.   Neurological: Negative for facial asymmetry, speech difficulty, weakness and numbness.     Scheduled Meds:   amiodarone  400 mg Oral BID    aspirin  81 mg Oral Daily    atorvastatin  40 mg Oral Daily    colchicine  0.6 mg Oral Daily    fluticasone-vilanterol  1 puff Inhalation Daily    gabapentin  600 mg Oral TID    isosorbide mononitrate  90 mg Oral Daily    levETIRAcetam  500 mg Oral BID    metoprolol tartrate  25 mg Oral BID    pantoprazole  40 mg Oral Daily    polyethylene glycol  2,000 mL Oral Once    [START ON 7/2/2018] polyethylene glycol  2,000 mL Oral Once    potassium chloride SA  20 mEq Oral QHS    potassium chloride SA  40 mEq Oral Daily    predniSONE  10 mg Oral Every Tues, Thurs, Sat    predniSONE  20 mg Oral Every Mon, Wed, Fri    predniSONE  20 mg Oral Every Sun    spironolactone  25 mg Oral Daily     Continuous Infusions:   heparin (porcine) in D5W 14 Units/kg/hr (07/01/18 0920)    sodium chloride 0.9%       PRN Meds:ALPRAZolam, butalbital-acetaminophen-caffeine -40 mg, heparin (PORCINE), heparin (PORCINE), HYDROcodone-acetaminophen, labetalol, sodium chloride 0.9%    Objective:     Vital Signs (Most Recent):  Temp: 97.8 °F (36.6 °C) (07/01/18 0735)  Pulse: 74 (07/01/18 1055)  Resp: 13 (07/01/18 1055)  BP: 113/81 (07/01/18 1055)  SpO2: 100 % (07/01/18 1103)  BP Location: Right arm    Vital Signs Range (Last 24H):  Temp:  [97.8 °F (36.6 °C)]   Pulse:  [64-93]    Resp:  [10-19]   BP: ()/(59-89)   SpO2:  [96 %-100 %]   BP Location: Right arm    Physical Exam   Constitutional: He appears well-developed and well-nourished. No distress.   Pulmonary/Chest: Effort normal.   Skin: Skin is warm and dry.   Psychiatric: He has a normal mood and affect. His behavior is normal. Judgment and thought content normal.   Vitals reviewed.      Neurological Exam:   LOC: alert  Attention Span: Good   Language: Expressive aphasia  Articulation: No dysarthria  Orientation: Person, Place, Time   Visual Fields: Full  EOM (CN III, IV, VI): Full/intact  Pupils (CN II, III): PERRL  Facial Sensation (CN V): Normal  Facial Movement (CN VII): Symmetric facial expression    Motor: Arm left  Normal 5/5  Leg left  Normal 5/5  Arm right  Normal 5/5  Leg right Normal 5/5  Cebellar: No evidence of appendicular or axial ataxia  Sensation: Intact to light touch, temperature and vibration  Tone: Normal tone throughout    Laboratory:  CMP:     Recent Labs  Lab 07/01/18  0741   CALCIUM 9.5   ALBUMIN 3.3*   PROT 6.6      K 4.4   CO2 24      BUN 25*   CREATININE 1.5*   ALKPHOS 71   ALT 34   AST 24   BILITOT 0.4     CBC:     Recent Labs  Lab 07/01/18  0741   WBC 6.68   RBC 3.90*   HGB 11.7*   HCT 37.5*   *   MCV 96   MCH 30.0   MCHC 31.2*     Lipid Panel:     Recent Labs  Lab 06/27/18  1356   CHOL 205*   LDLCALC 111.6   HDL 73   TRIG 102     Coagulation:     Recent Labs  Lab 06/28/18  0504 06/30/18  1257   INR 1.1 1.4*   APTT 22.1  --      Platelet Aggregation Study: No results for input(s): PLTAGG, PLTAGINTERP, PLTAGREGLACO, ADPPLTAGGREG in the last 168 hours.  Hgb A1C:   No results for input(s): HGBA1C in the last 168 hours.  TSH:     Recent Labs  Lab 06/28/18  0504   TSH 0.884       Diagnostic Results       Vessel Imaging   CTA Multiphase. Date: 06/22/18  CT head:  No evidence of acute intracranial pathology.  Left parietal lobe encephalomalacia.    CTA head and neck:  No evidence of  large vessel occlusion, significant stenoses, or aneurysm. 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 Additional incidental findings as above.    6/24/2018 CT Head  No acute intracranial abnormalities  Left parietal lobe encephalomalacia, unchanged.  Mild periventricular decreased supratentorial white matter attenuation most likely related to chronic nonspecific small vessel disease.    6.29.18 CT head  No new lesions, stable from previous    Cardiac Imaging   6/23/2018 2D Echo  EF 5-10%  Severely enlarged left atrium  Multiple akinetic and hypokinetic regions

## 2018-07-01 NOTE — SUBJECTIVE & OBJECTIVE
Subjective:     Interval History:   No acute events overnight  No nausea/vomiting. +Bowel movement.  Abdomen mildly tender and distended.  Tolerating clear liquid diet.     Post-Op Info:  Procedure(s) (LRB):  Left heart cath (N/A)   6 Days Post-Op      Medications:  Continuous Infusions:   heparin (porcine) in D5W 15 Units/kg/hr (06/30/18 1845)    sodium chloride 0.9%       Scheduled Meds:   amiodarone  400 mg Oral BID    aspirin  81 mg Oral Daily    atorvastatin  40 mg Oral Daily    colchicine  0.6 mg Oral Daily    fluticasone-vilanterol  1 puff Inhalation Daily    furosemide  40 mg Oral BID    gabapentin  600 mg Oral TID    isosorbide mononitrate  90 mg Oral Daily    levETIRAcetam  500 mg Oral BID    metoprolol tartrate  25 mg Oral BID    pantoprazole  40 mg Oral Daily    polyethylene glycol  2,000 mL Oral Once    [START ON 7/2/2018] polyethylene glycol  2,000 mL Oral Once    potassium chloride SA  20 mEq Oral QHS    potassium chloride SA  40 mEq Oral Daily    predniSONE  10 mg Oral Every Tues, Thurs, Sat    predniSONE  20 mg Oral Every Mon, Wed, Fri    predniSONE  20 mg Oral Every Sun    spironolactone  25 mg Oral Daily     PRN Meds:   ALPRAZolam    butalbital-acetaminophen-caffeine -40 mg    heparin (PORCINE)    heparin (PORCINE)    HYDROcodone-acetaminophen    labetalol    sodium chloride 0.9%        Objective:     Vital Signs (Most Recent):  Temp: 97.8 °F (36.6 °C) (06/30/18 1959)  Pulse: 65 (07/01/18 0323)  Resp: 10 (07/01/18 0306)  BP: 111/78 (07/01/18 0306)  SpO2: 98 % (07/01/18 0306) Vital Signs (24h Range):  Temp:  [97.8 °F (36.6 °C)-98.4 °F (36.9 °C)] 97.8 °F (36.6 °C)  Pulse:  [65-93] 65  Resp:  [10-19] 10  SpO2:  [96 %-99 %] 98 %  BP: (102-127)/(66-89) 111/78     Intake/Output - Last 3 Shifts       06/29 0700 - 06/30 0659 06/30 0700 - 07/01 0659    P.O. 720 780    I.V. (mL/kg)  51.6 (0.5)    Total Intake(mL/kg) 720 (7) 831.6 (8)    Urine (mL/kg/hr) 2425 (1) 1073  (0.4)    Total Output 2425 1075    Net -1705 -243.4                Physical Exam   Constitutional: He is oriented to person, place, and time. He appears well-developed and well-nourished.   Neck: No JVD present. No tracheal deviation present.   Cardiovascular: Normal rate and regular rhythm.    Pulmonary/Chest: Effort normal. No respiratory distress.   On 2L nasal cannula   Abdominal: Soft. He exhibits no distension. There is no tenderness. There is no guarding.   Musculoskeletal: He exhibits no edema.   Neurological: He is alert and oriented to person, place, and time.   Skin: Skin is warm and dry.   Nursing note and vitals reviewed.      Significant Labs:  CBC (Last 3 Results):   Recent Labs  Lab 06/29/18  0617 06/30/18  0710 07/01/18  0741   WBC 7.92 7.42 6.68   RBC 3.99* 3.82* 3.90*   HGB 12.3* 11.6* 11.7*   HCT 38.0* 36.1* 37.5*   * 126* 145*   MCV 95 95 96   MCH 30.8 30.4 30.0   MCHC 32.4 32.1 31.2*     CMP (Last 3 Results):   Recent Labs  Lab 06/29/18  0606 06/30/18  0710 07/01/18  0741   GLU 98 93 95   CALCIUM 9.7 9.5 9.5   ALBUMIN 3.5 3.4* 3.3*   PROT 6.9 6.7 6.6    135* 136   K 4.6 4.3 4.4   CO2 30* 26 24   CL 99 99 101   BUN 16 19 25*   CREATININE 1.1 1.4 1.5*   ALKPHOS 67 61 71   ALT 32 29 34   AST 19 20 24   BILITOT 0.6 0.5 0.4     CRP (Last 3 Results):   Recent Labs  Lab 06/28/18  0504   CRP 49.3*       Significant Diagnostics:  I have reviewed all pertinent imaging results/findings within the past 24 hours.

## 2018-07-01 NOTE — ASSESSMENT & PLAN NOTE
Ischemic cardiomyopathy EF 10% here with CVA like symptoms found to be in VT storm. Started on amiodarone [facility] with diffuse CAD, LVEDP 45 so was diuresed with lasix gtt at 10 for 2 days then switched to 80 mg BID then transitioned to oral lasix. Seems dry today so will hold oral lasix. Will continue with GDMT. Currently on the pathway for advanced options (Step 3) will get colonoscopy tomorrow.

## 2018-07-01 NOTE — SUBJECTIVE & OBJECTIVE
Interval History: Patient feeling well, on oral lasix.       Continuous Infusions:   heparin (porcine) in D5W 14 Units/kg/hr (07/01/18 0920)    sodium chloride 0.9%       Scheduled Meds:   amiodarone  400 mg Oral BID    aspirin  81 mg Oral Daily    atorvastatin  40 mg Oral Daily    colchicine  0.6 mg Oral Daily    fluticasone-vilanterol  1 puff Inhalation Daily    gabapentin  600 mg Oral TID    isosorbide mononitrate  90 mg Oral Daily    levETIRAcetam  500 mg Oral BID    metoprolol tartrate  25 mg Oral BID    pantoprazole  40 mg Oral Daily    polyethylene glycol  2,000 mL Oral Once    [START ON 7/2/2018] polyethylene glycol  2,000 mL Oral Once    potassium chloride SA  20 mEq Oral QHS    potassium chloride SA  40 mEq Oral Daily    predniSONE  10 mg Oral Every Tues, Thurs, Sat    predniSONE  20 mg Oral Every Mon, Wed, Fri    predniSONE  20 mg Oral Every Sun    spironolactone  25 mg Oral Daily     PRN Meds:ALPRAZolam, butalbital-acetaminophen-caffeine -40 mg, heparin (PORCINE), heparin (PORCINE), HYDROcodone-acetaminophen, labetalol, sodium chloride 0.9%    Review of patient's allergies indicates:  No Known Allergies  Objective:     Vital Signs (Most Recent):  Temp: 97.8 °F (36.6 °C) (07/01/18 0735)  Pulse: 72 (07/01/18 0915)  Resp: 16 (07/01/18 0915)  BP: (!) 99/59 (07/01/18 0735)  SpO2: 98 % (07/01/18 0735) Vital Signs (24h Range):  Temp:  [97.8 °F (36.6 °C)] 97.8 °F (36.6 °C)  Pulse:  [64-93] 72  Resp:  [10-19] 16  SpO2:  [96 %-99 %] 98 %  BP: ()/(59-89) 99/59     Patient Vitals for the past 72 hrs (Last 3 readings):   Weight   06/30/18 0500 103.5 kg (228 lb 3.2 oz)   06/29/18 0600 97.3 kg (214 lb 8.1 oz)     Body mass index is 31.83 kg/m².      Intake/Output Summary (Last 24 hours) at 07/01/18 0930  Last data filed at 07/01/18 0600   Gross per 24 hour   Intake            681.9 ml   Output             1900 ml   Net          -1218.1 ml       Hemodynamic Parameters:         Physical  Exam    GEN: Alert and oriented in NAD  NECK: no JVD appreciated   CVS: RRR, s1/s2, no MRG  PULM: CTAB no rales  ABD: NT/ND BS +  Extremities: warm and dry, palpable pulses, no edema  NEURO: Alert and oriented x 3  PSYCH: appropriate affect.         Significant Labs:  CBC:    Recent Labs  Lab 06/29/18  0617 06/30/18  0710 07/01/18  0741   WBC 7.92 7.42 6.68   RBC 3.99* 3.82* 3.90*   HGB 12.3* 11.6* 11.7*   HCT 38.0* 36.1* 37.5*   * 126* 145*   MCV 95 95 96   MCH 30.8 30.4 30.0   MCHC 32.4 32.1 31.2*     BNP:    Recent Labs  Lab 06/27/18  1356   *     CMP:    Recent Labs  Lab 06/29/18  0606 06/30/18  0710 07/01/18  0741   GLU 98 93 95   CALCIUM 9.7 9.5 9.5   ALBUMIN 3.5 3.4* 3.3*   PROT 6.9 6.7 6.6    135* 136   K 4.6 4.3 4.4   CO2 30* 26 24   CL 99 99 101   BUN 16 19 25*   CREATININE 1.1 1.4 1.5*   ALKPHOS 67 61 71   ALT 32 29 34   AST 19 20 24   BILITOT 0.6 0.5 0.4      Coagulation:     Recent Labs  Lab 06/28/18  0504 06/30/18  1257   INR 1.1 1.4*   APTT 22.1  --      LDH:  No results for input(s): LDH in the last 72 hours.  Microbiology:  Microbiology Results (last 7 days)     ** No results found for the last 168 hours. **          I have reviewed all pertinent labs within the past 24 hours.    Estimated Creatinine Clearance: 68.2 mL/min (A) (based on SCr of 1.5 mg/dL (H)).    Diagnostic Results:  I have reviewed and interpreted all pertinent imaging results/findings within the past 24 hours.

## 2018-07-01 NOTE — NURSING
Patient c/o of chest pressure.  /69 MAP 85  O2 100%  HR 79    Patient stated pressure was only for a short period and relived before I arrived to the room    Patient stated has felt this feeling before.    On call \[facility] [place] [facility]\"" notified ([id number])  No interventions    Will continue to monitor.

## 2018-07-01 NOTE — PROGRESS NOTES
[facility]  Heart Transplant  Progress Note    Patient Name: Yonathan Good Jr.  MRN: [id number]  Admission Date: 6/22/2018  Hospital Length of Stay: 8 days  Attending Physician: Britt Melgar MD  Primary Care Provider: Edgar Gates MD  Principal Problem:Acute on chronic systolic congestive heart failure    Subjective:     Interval History: Patient feeling well, on oral lasix.       Continuous Infusions:   heparin (porcine) in D5W 14 Units/kg/hr (07/01/18 0920)    sodium chloride 0.9%       Scheduled Meds:   amiodarone  400 mg Oral BID    aspirin  81 mg Oral Daily    atorvastatin  40 mg Oral Daily    colchicine  0.6 mg Oral Daily    fluticasone-vilanterol  1 puff Inhalation Daily    gabapentin  600 mg Oral TID    isosorbide mononitrate  90 mg Oral Daily    levETIRAcetam  500 mg Oral BID    metoprolol tartrate  25 mg Oral BID    pantoprazole  40 mg Oral Daily    polyethylene glycol  2,000 mL Oral Once    [START ON 7/2/2018] polyethylene glycol  2,000 mL Oral Once    potassium chloride SA  20 mEq Oral QHS    potassium chloride SA  40 mEq Oral Daily    predniSONE  10 mg Oral Every Tues, Thurs, Sat    predniSONE  20 mg Oral Every Mon, Wed, Fri    predniSONE  20 mg Oral Every Sun    spironolactone  25 mg Oral Daily     PRN Meds:ALPRAZolam, butalbital-acetaminophen-caffeine -40 mg, heparin (PORCINE), heparin (PORCINE), HYDROcodone-acetaminophen, labetalol, sodium chloride 0.9%    Review of patient's allergies indicates:  No Known Allergies  Objective:     Vital Signs (Most Recent):  Temp: 97.8 °F (36.6 °C) (07/01/18 0735)  Pulse: 72 (07/01/18 0915)  Resp: 16 (07/01/18 0915)  BP: (!) 99/59 (07/01/18 0735)  SpO2: 98 % (07/01/18 0735) Vital Signs (24h Range):  Temp:  [97.8 °F (36.6 °C)] 97.8 °F (36.6 °C)  Pulse:  [64-93] 72  Resp:  [10-19] 16  SpO2:  [96 %-99 %] 98 %  BP: ()/(59-89) 99/59     Patient Vitals for the past 72 hrs (Last 3 readings):   Weight   06/30/18 0500 103.5  kg (228 lb 3.2 oz)   06/29/18 0600 97.3 kg (214 lb 8.1 oz)     Body mass index is 31.83 kg/m².      Intake/Output Summary (Last 24 hours) at 07/01/18 0930  Last data filed at 07/01/18 0600   Gross per 24 hour   Intake            681.9 ml   Output             1900 ml   Net          -1218.1 ml       Hemodynamic Parameters:         Physical Exam    GEN: Alert and oriented in NAD  NECK: no JVD appreciated   CVS: RRR, s1/s2, no MRG  PULM: CTAB no rales  ABD: NT/ND BS +  Extremities: warm and dry, palpable pulses, no edema  NEURO: Alert and oriented x 3  PSYCH: appropriate affect.         Significant Labs:  CBC:    Recent Labs  Lab 06/29/18  0617 06/30/18  0710 07/01/18  0741   WBC 7.92 7.42 6.68   RBC 3.99* 3.82* 3.90*   HGB 12.3* 11.6* 11.7*   HCT 38.0* 36.1* 37.5*   * 126* 145*   MCV 95 95 96   MCH 30.8 30.4 30.0   MCHC 32.4 32.1 31.2*     BNP:    Recent Labs  Lab 06/27/18  1356   *     CMP:    Recent Labs  Lab 06/29/18  0606 06/30/18  0710 07/01/18  0741   GLU 98 93 95   CALCIUM 9.7 9.5 9.5   ALBUMIN 3.5 3.4* 3.3*   PROT 6.9 6.7 6.6    135* 136   K 4.6 4.3 4.4   CO2 30* 26 24   CL 99 99 101   BUN 16 19 25*   CREATININE 1.1 1.4 1.5*   ALKPHOS 67 61 71   ALT 32 29 34   AST 19 20 24   BILITOT 0.6 0.5 0.4      Coagulation:     Recent Labs  Lab 06/28/18  0504 06/30/18  1257   INR 1.1 1.4*   APTT 22.1  --      LDH:  No results for input(s): LDH in the last 72 hours.  Microbiology:  Microbiology Results (last 7 days)     ** No results found for the last 168 hours. **          I have reviewed all pertinent labs within the past 24 hours.    Estimated Creatinine Clearance: 68.2 mL/min (A) (based on SCr of 1.5 mg/dL (H)).    Diagnostic Results:  I have reviewed and interpreted all pertinent imaging results/findings within the past 24 hours.    Assessment and Plan:     57 y.o. male  with h/o ischemic cardiomyopathy(negative for sarcoid on cardiac MRI 2012), H/o stents 7 years ago,Pulmonary sarcoid, left  parietal stroke in the past, atrial fibrillation, CRT-D(st Giorgio) who follows Dr Berman and is being worked up as outpatient for advanced options. He was transferred to Stroke service last evening with difficulty speaking with suspected Stroke.Initial workup included CVA which was negative for acute CVA.No tPA was given. This am patient complained of chest pain and dizziness and was noted to be in Monomorphic VT -Code blue was called.He was ATP'ed out of it but he also had an external shock delivered by rapid repsonse team as he was persistently in VT but no shock was delivered.He went into Fast VT with rate>200 and his ICD shocked him out of it. He was transferred to ICU.He reports having chest pain for last 4-5 years on exertion relieved with nitro-reports stress test done in [place].Last stress test 2015 in [facility]. He was loaded with amiodarone.He had 2 more episodes of VT which were slower at 150-His device did not ATP as below VT-1 zone. He also complains of cough with pinkish sputum,SOB.He reports no more chest pain since the shock.  He was recently scheduled for colonoscopy as part of transplant workup for which his coumadin was held and he was placed on eliquis temporarily.He had chest pain on day of c-scope and he had to take a nitro and his procedure was cancelled(5 days ago). He reports he started taking his coumadin after.His INR was subtherapeutic on arrival.     He states that his pulmonary sarcoid was diagnosed in 2015 when he was evaluated for a dry non-productive cough.  States that he underwent bronchoscopy with biopsies which confirmed sarcoid.  Since then, he has been on varying doses of Prednisone He has no other organ involvement from sarcoid and states that his only symptom has been cough. Denies any alcohol or illicit drug use.  Previously worked as a .     * Acute on chronic systolic congestive heart failure    Ischemic cardiomyopathy EF 10% here with CVA like symptoms found  to be in VT storm. Started on amiodarone [facility] with diffuse CAD, LVEDP 45 so was diuresed with lasix gtt at 10 for 2 days then switched to 80 mg BID then transitioned to oral lasix. Seems dry today so will hold oral lasix. Will continue with GDMT. Currently on the pathway for advanced options (Step 3) will get colonoscopy tomorrow.         VT (ventricular tachycardia)    Multiple episodes of MMVT on 6/23. EP consulted. VT1 zone lowered as per EP. No further VT. Likely AT with aberrancy on early AM 6/25, nonsustained. Continue amiodarone, 400 bid x2 weeks, then 400 daily. [facility] without overt culprit lesion. Follow up with Dr. Telles in EP in 4-6 weeks        Heart transplant candidate    As above currently step 3 of the pathway.         Transient neurological symptoms    -Initially admitted to Neuro, no TPA. CT head and CTA negative. Hx of seizures, EEG pending.  -Resolved, no further workup at this time  -SLP evaluation        Seizure disorder    Home phenytoin        CAD (coronary artery disease)    -s/p LAD stent 2011. [facility] with diffuse CAD LM luminal irreg, LAD 30% w/ patent stent, prox Lcx 70%, OM1 80%, RCA luminal irregularities no culprit lesion. VT likely scar mediated. Continue GDMT.         Sarcoidosis of lung    -prednisone per home regimen  -Consulted lung transplant to re-evaluate patient  -Okay to proceed with workup as per Lung team, appreciate recommendations  -Recommended consideration of cellcept or MTX for steroid-sparing therapy (currently on prednisone monotherapy), will discuss with staff        Biventricular cardiac pacemaker in situ    -St Giorgio device interrogated-See EP notes            Johanne Todd MD  Heart Transplant  [facility]

## 2018-07-01 NOTE — ASSESSMENT & PLAN NOTE
Multiple episodes of MMVT on 6/23. EP consulted. VT1 zone lowered as per EP. No further VT. Likely AT with aberrancy on early AM 6/25, nonsustained. Continue amiodarone, 400 bid x2 weeks, then 400 daily. [facility] without overt culprit lesion. Follow up with Dr. Telles in EP in 4-6 weeks

## 2018-07-01 NOTE — PLAN OF CARE
Problem: Patient Care Overview  Goal: Plan of Care Review  Outcome: Ongoing (interventions implemented as appropriate)  Pain being managed with prn medication; denies chest pain, SOB, or other discomfort. Heparin gtt infusing as ordered. Pt remains free of falls or injuries. Pt verbalizes complete understanding of plan of care. Will continue to monitor.

## 2018-07-01 NOTE — PROGRESS NOTES
[facility]  Colorectal Surgery  Progress Note    Patient Name: Yonathan Good Jr.  MRN: [id number]  Admission Date: 6/22/2018  Hospital Length of Stay: 8 days  Attending Physician: Britt Melgar MD    Subjective:     Interval History:   No acute events overnight  No nausea/vomiting. +Bowel movement.  Abdomen mildly tender and distended.  Tolerating clear liquid diet.     Post-Op Info:  Procedure(s) (LRB):  Left heart cath (N/A)   6 Days Post-Op      Medications:  Continuous Infusions:   heparin (porcine) in D5W 15 Units/kg/hr (06/30/18 1845)    sodium chloride 0.9%       Scheduled Meds:   amiodarone  400 mg Oral BID    aspirin  81 mg Oral Daily    atorvastatin  40 mg Oral Daily    colchicine  0.6 mg Oral Daily    fluticasone-vilanterol  1 puff Inhalation Daily    furosemide  40 mg Oral BID    gabapentin  600 mg Oral TID    isosorbide mononitrate  90 mg Oral Daily    levETIRAcetam  500 mg Oral BID    metoprolol tartrate  25 mg Oral BID    pantoprazole  40 mg Oral Daily    polyethylene glycol  2,000 mL Oral Once    [START ON 7/2/2018] polyethylene glycol  2,000 mL Oral Once    potassium chloride SA  20 mEq Oral QHS    potassium chloride SA  40 mEq Oral Daily    predniSONE  10 mg Oral Every Tues, Thurs, Sat    predniSONE  20 mg Oral Every Mon, Wed, Fri    predniSONE  20 mg Oral Every Sun    spironolactone  25 mg Oral Daily     PRN Meds:   ALPRAZolam    butalbital-acetaminophen-caffeine -40 mg    heparin (PORCINE)    heparin (PORCINE)    HYDROcodone-acetaminophen    labetalol    sodium chloride 0.9%        Objective:     Vital Signs (Most Recent):  Temp: 97.8 °F (36.6 °C) (06/30/18 1959)  Pulse: 65 (07/01/18 0323)  Resp: 10 (07/01/18 0306)  BP: 111/78 (07/01/18 0306)  SpO2: 98 % (07/01/18 0306) Vital Signs (24h Range):  Temp:  [97.8 °F (36.6 °C)-98.4 °F (36.9 °C)] 97.8 °F (36.6 °C)  Pulse:  [65-93] 65  Resp:  [10-19] 10  SpO2:  [96 %-99 %] 98 %  BP: (102-127)/(66-89)  111/78     Intake/Output - Last 3 Shifts       06/29 0700 - 06/30 0659 06/30 0700 - 07/01 0659    P.O. 720 780    I.V. (mL/kg)  51.6 (0.5)    Total Intake(mL/kg) 720 (7) 831.6 (8)    Urine (mL/kg/hr) 2425 (1) 1075 (0.4)    Total Output 2425 1075    Net -1705 -243.4                Physical Exam   Constitutional: He is oriented to person, place, and time. He appears well-developed and well-nourished.   Neck: No JVD present. No tracheal deviation present.   Cardiovascular: Normal rate and regular rhythm.    Pulmonary/Chest: Effort normal. No respiratory distress.   On 2L nasal cannula   Abdominal: Soft. He exhibits no distension. There is no tenderness. There is no guarding.   Musculoskeletal: He exhibits no edema.   Neurological: He is alert and oriented to person, place, and time.   Skin: Skin is warm and dry.   Nursing note and vitals reviewed.      Significant Labs:  CBC (Last 3 Results):   Recent Labs  Lab 06/29/18  0617 06/30/18  0710 07/01/18  0741   WBC 7.92 7.42 6.68   RBC 3.99* 3.82* 3.90*   HGB 12.3* 11.6* 11.7*   HCT 38.0* 36.1* 37.5*   * 126* 145*   MCV 95 95 96   MCH 30.8 30.4 30.0   MCHC 32.4 32.1 31.2*     CMP (Last 3 Results):   Recent Labs  Lab 06/29/18  0606 06/30/18  0710 07/01/18  0741   GLU 98 93 95   CALCIUM 9.7 9.5 9.5   ALBUMIN 3.5 3.4* 3.3*   PROT 6.9 6.7 6.6    135* 136   K 4.6 4.3 4.4   CO2 30* 26 24   CL 99 99 101   BUN 16 19 25*   CREATININE 1.1 1.4 1.5*   ALKPHOS 67 61 71   ALT 32 29 34   AST 19 20 24   BILITOT 0.6 0.5 0.4     CRP (Last 3 Results):   Recent Labs  Lab 06/28/18  0504   CRP 49.3*       Significant Diagnostics:  I have reviewed all pertinent imaging results/findings within the past 24 hours.    Assessment/Plan:     Heart transplant candidate    Will plan for C-scope Monday, 7/2  Clear Liquids Sunday  Go lightly prep  NPO at MN   Begin holding coumadin  Hold heparin 6 hours prior              Karen Anderson MD  Colorectal Surgery  [facility]  [place]

## 2018-07-02 PROBLEM — I47.20 VT (VENTRICULAR TACHYCARDIA): Status: ACTIVE | Noted: 2018-01-01

## 2018-07-02 NOTE — PLAN OF CARE
Plan of care discussed with pt. Pt verbalized understanding. Pt states can discuss results with his daughter

## 2018-07-02 NOTE — [employer]ATION PATIENT INSTRUCTIONS
Discharge Summary/Instructions after an Endoscopic Procedure  Patient Name: Yonathan Good  Patient MRN: [id number]  Patient YOB: 1960 Monday, July 02, 2018  Dario Kay MD  RESTRICTIONS:  During your procedure today, you received medications for sedation.  These   medications may affect your judgment, balance and coordination.  Therefore,   for 24 hours, you have the following restrictions:   - DO NOT drive a car, operate machinery, make legal/financial decisions,   sign important papers or drink alcohol.    ACTIVITY:  Today: no heavy lifting, straining or running due to procedural   sedation/anesthesia.  The following day: return to full activity including work.  DIET:  Eat and drink normally unless instructed otherwise.     TREATMENT FOR COMMON SIDE EFFECTS:  - Mild abdominal pain, nausea, belching, bloating or excessive gas:  rest,   eat lightly and use a heating pad.  - Sore Throat: treat with throat lozenges and/or gargle with warm salt   water.  - Because air was used during the procedure, expelling large amounts of air   from your rectum or belching is normal.  - If a bowel prep was taken, you may not have a bowel movement for 1-3 days.    This is normal.  SYMPTOMS TO WATCH FOR AND REPORT TO YOUR PHYSICIAN:  1. Abdominal pain or bloating, other than gas cramps.  2. Chest pain.  3. Back pain.  4. Signs of infection such as: chills or fever occurring within 24 hours   after the procedure.  5. Rectal bleeding, which would show as bright red, maroon, or black stools.   (A tablespoon of blood from the rectum is not serious, especially if   hemorrhoids are present.)  6. Vomiting.  7. Weakness or dizziness.  GO DIRECTLY TO THE NEAREST EMERGENCY ROOM IF YOU HAVE ANY OF THE FOLLOWING:      Difficulty breathing              Chills and/or fever over 101 F   Persistent vomiting and/or vomiting blood   Severe abdominal pain   Severe chest pain   Black, tarry stools   Bleeding- more than one tablespoon   Any  other symptom or condition that you feel may need urgent attention  Your doctor recommends these additional instructions:  If any biopsies were taken, your doctors clinic will contact you in 1 to 2   weeks with any results.  - Return patient to hospital cook for ongoing care.   - Advance diet as tolerated.   - Resume heparin at prior dose today.  Refer to managing physician for   further adjustment of therapy.   - Await pathology results.   - Repeat colonoscopy in 3 years for surveillance based on pathology   results.  For questions, problems or results please call your physician - Dario Kay MD at Work:  [phone].  [facility], EMERGENCY ROOM PHONE NUMBER: [phone]  IF A COMPLICATION OR EMERGENCY SITUATION ARISES AND YOU ARE UNABLE TO REACH   YOUR PHYSICIAN - GO DIRECTLY TO THE EMERGENCY ROOM.  Dario Kay MD  7/2/2018 4:22:14 PM  This report has been verified and signed electronically.  [employer]

## 2018-07-02 NOTE — ASSESSMENT & PLAN NOTE
Ischemic cardiomyopathy (LVEF 5-10%) here with CVA like symptoms found to be in VT storm. Started on amiodarone gtt since been transitioned to PO. LHC on 6/25 demonstrates diffuse CAD. Also LVEDP 45 so was diuresed with lasix gtt at 10 mg/hr for 2 days then switched to 80 mg IV BID then transitioned to oral lasix which was held on 7/1 due to hypovolemia. Patient remains clinically dry today so will continue to hold oral lasix. Will continue with GDMT. Currently on the pathway for advanced options (Step 3) will get colonoscopy today.

## 2018-07-02 NOTE — TRANSFER OF CARE
"Anesthesia Transfer of Care Note    Patient: Yonathan Good Jr.    Procedure(s) Performed: Procedure(s) (LRB):  COLONOSCOPY (N/A)    Patient location: PACU    Anesthesia Type: general    Transport from OR: Transported from OR on 2-3 L/min O2 by NC with adequate spontaneous ventilation    Post pain: adequate analgesia    Post assessment: no apparent anesthetic complications and tolerated procedure well    Post vital signs: stable    Level of consciousness: awake, alert and oriented    Nausea/Vomiting: no nausea/vomiting    Complications: none    Transfer of care protocol was followed      Last vitals:   Visit Vitals  /76   Pulse 74   Temp 36.3 °C (97.3 °F) (Temporal)   Resp 17   Ht 5' 11" (1.803 m)   Wt 103.4 kg (228 lb)   SpO2 100%   BMI 31.80 kg/m²     "

## 2018-07-02 NOTE — PROGRESS NOTES
[facility]  Heart Transplant  Progress Note    Patient Name: Yonathan Good Jr.  MRN: [id number]  Admission Date: 6/22/2018  Hospital Length of Stay: 9 days  Attending Physician: Britt Melgar MD  Primary Care Provider: Edgar Gates MD  Principal Problem:Acute on chronic systolic congestive heart failure    Subjective:     Interval History: No acute issues overnight. Patient completed bowel prep in anticipation for colonoscopy procedure today.    Patient examined at bedside this morning. Seen sitting up on edge of bed completing crossword puzzle. Endorses generalized weakness and fatigue attributed to bowel prep which kept him awake last night. Denies fever, chills, sweats, chest pain, shortness of breath, or presyncope/syncope. No issues with urinary habits.      Continuous Infusions:   heparin (porcine) in D5W Stopped (07/02/18 0604)    sodium chloride 0.9%       Scheduled Meds:   amiodarone  400 mg Oral BID    aspirin  81 mg Oral Daily    atorvastatin  40 mg Oral Daily    colchicine  0.6 mg Oral Daily    fluticasone-vilanterol  1 puff Inhalation Daily    gabapentin  600 mg Oral TID    isosorbide mononitrate  90 mg Oral Daily    levETIRAcetam  500 mg Oral BID    metoprolol tartrate  25 mg Oral BID    pantoprazole  40 mg Oral Daily    predniSONE  10 mg Oral Every Tues, Thurs, Sat    predniSONE  20 mg Oral Every Mon, Wed, Fri    predniSONE  20 mg Oral Every Sun    spironolactone  25 mg Oral Daily     PRN Meds:ALPRAZolam, butalbital-acetaminophen-caffeine -40 mg, heparin (PORCINE), heparin (PORCINE), HYDROcodone-acetaminophen, labetalol, sodium chloride 0.9%    Review of patient's allergies indicates:  No Known Allergies  Objective:     Vital Signs (Most Recent):  Temp: 97.7 °F (36.5 °C) (07/02/18 0812)  Pulse: 67 (07/02/18 1305)  Resp: 12 (07/02/18 1200)  BP: 101/67 (07/02/18 1305)  SpO2: 97 % (07/02/18 1200) Vital Signs (24h Range):  Temp:  [97.7 °F (36.5 °C)-98 °F (36.7 °C)]  97.7 °F (36.5 °C)  Pulse:  [53-99] 67  Resp:  [12-22] 12  SpO2:  [96 %-100 %] 97 %  BP: ()/(57-83) 101/67     Patient Vitals for the past 72 hrs (Last 3 readings):   Weight   06/30/18 0500 103.5 kg (228 lb 3.2 oz)     Body mass index is 31.83 kg/m².      Intake/Output Summary (Last 24 hours) at 07/02/18 1413  Last data filed at 07/02/18 1300   Gross per 24 hour   Intake             4420 ml   Output             1325 ml   Net             3095 ml       Hemodynamic Parameters:         Physical Exam   Constitutional: He is oriented to person, place, and time.   Alert and oriented, in NAD   Neck: Normal range of motion. Neck supple. No JVD present.   Cardiovascular: Normal rate, regular rhythm and normal heart sounds.  Exam reveals no gallop and no friction rub.    No murmur heard.  Pulmonary/Chest: Breath sounds normal. He has no rales.   Abdominal: Soft. Bowel sounds are normal. There is no tenderness.   Neurological: He is alert and oriented to person, place, and time.   Skin: Skin is warm and dry.   Psychiatric: He has a normal mood and affect.   Extremities: trace lower extremity edema bilaterally.    Significant Labs:  CBC:    Recent Labs  Lab 06/30/18  0710 07/01/18  0741 07/02/18  0715   WBC 7.42 6.68 9.25   RBC 3.82* 3.90* 4.11*   HGB 11.6* 11.7* 12.3*   HCT 36.1* 37.5* 39.7*   * 145* 152   MCV 95 96 97   MCH 30.4 30.0 29.9   MCHC 32.1 31.2* 31.0*     BNP:    Recent Labs  Lab 06/27/18  1356 07/02/18  0715   * 894*     CMP:    Recent Labs  Lab 06/30/18  0710 07/01/18  0741 07/02/18  0715   GLU 93 95 90   CALCIUM 9.5 9.5 10.4   ALBUMIN 3.4* 3.3* 3.8   PROT 6.7 6.6 7.4   * 136 137   K 4.3 4.4 4.5   CO2 26 24 27   CL 99 101 97   BUN 19 25* 19   CREATININE 1.4 1.5* 1.2   ALKPHOS 61 71 68   ALT 29 34 48*   AST 20 24 38   BILITOT 0.5 0.4 0.6      Coagulation:     Recent Labs  Lab 06/28/18  0504 06/30/18  1257   INR 1.1 1.4*   APTT 22.1  --      LDH:  No results for input(s): LDH in the last 72  hours.  Microbiology:  Microbiology Results (last 7 days)     ** No results found for the last 168 hours. **          I have reviewed all pertinent labs within the past 24 hours.    Estimated Creatinine Clearance: 85.2 mL/min (based on SCr of 1.2 mg/dL).    Diagnostic Results:  I have reviewed all pertinent imaging results/findings within the past 24 hours.    Assessment and Plan:     57 y.o. male  with h/o ischemic cardiomyopathy(negative for sarcoid on cardiac MRI 2012), H/o stents 7 years ago,Pulmonary sarcoid, left parietal stroke in the past, atrial fibrillation, CRT-D(st Giorgio) who follows Dr Berman and is being worked up as outpatient for advanced options. He was transferred to Stroke service last evening with difficulty speaking with suspected Stroke.Initial workup included CVA which was negative for acute CVA.No tPA was given. This am patient complained of chest pain and dizziness and was noted to be in Monomorphic VT -Code blue was called.He was ATP'ed out of it but he also had an external shock delivered by rapid repsonse team as he was persistently in VT but no shock was delivered.He went into Fast VT with rate>200 and his ICD shocked him out of it. He was transferred to ICU.He reports having chest pain for last 4-5 years on exertion relieved with nitro-reports stress test done in [place].Last stress test 2015 in [facility]. He was loaded with amiodarone.He had 2 more episodes of VT which were slower at 150-His device did not ATP as below VT-1 zone. He also complains of cough with pinkish sputum,SOB.He reports no more chest pain since the shock.  He was recently scheduled for colonoscopy as part of transplant workup for which his coumadin was held and he was placed on eliquis temporarily.He had chest pain on day of c-scope and he had to take a nitro and his procedure was cancelled(5 days ago). He reports he started taking his coumadin after.His INR was subtherapeutic on arrival.     He states that  his pulmonary sarcoid was diagnosed in 2015 when he was evaluated for a dry non-productive cough.  States that he underwent bronchoscopy with biopsies which confirmed sarcoid.  Since then, he has been on varying doses of Prednisone He has no other organ involvement from sarcoid and states that his only symptom has been cough. Denies any alcohol or illicit drug use.  Previously worked as a .     * Acute on chronic systolic congestive heart failure    Ischemic cardiomyopathy (LVEF 5-10%) here with CVA like symptoms found to be in VT storm. Started on amiodarone gtt since been transitioned to PO. LHC on 6/25 demonstrates diffuse CAD. Also LVEDP 45 so was diuresed with lasix gtt at 10 mg/hr for 2 days then switched to 80 mg IV BID then transitioned to oral lasix which was held on 7/1 due to hypovolemia. Patient remains clinically dry today so will continue to hold oral lasix. Will continue with GDMT. Currently on the pathway for advanced options (Step 3) will get colonoscopy today.         History of atrial fibrillation    Holding heparin gtt and PTA warfarin in anticipation for pretransplant colonoscopy today. Will resume anticoagulation once clinically appropriate given elevated CHADS2-VaSc of 5.        Heart transplant candidate    As above currently step 3 of the pathway.         VT (ventricular tachycardia)    Multiple episodes of MMVT on 6/23. EP consulted. VT1 zone lowered as per EP. No further VT. Likely AT with aberrancy on early AM 6/25, nonsustained. Continue amiodarone, 400 bid x2 weeks, then 400 daily. LHC without overt culprit lesion. Follow up with Dr. Telles in EP in 4-6 weeks        Transient neurological symptoms    -Initially admitted to Neuro, no TPA. CT head and CTA negative. Hx of seizures, EEG pending.  -Resolved, no further workup at this time  -SLP evaluation        Seizure disorder    Continue Keppra per Neuro recommendations (discontinued PTA Dilantin due to interaction with  warfarin).        CAD (coronary artery disease)    -s/p LAD stent 2011. [facility] with diffuse CAD LM luminal irreg, LAD 30% w/ patent stent, prox Lcx 70%, OM1 80%, RCA luminal irregularities no culprit lesion. VT likely scar mediated. Continue GDMT.         Sarcoidosis of lung    -prednisone per home regimen  -Consulted lung transplant to re-evaluate patient  -Okay to proceed with workup as per Lung team, appreciate recommendations  -Recommended consideration of cellcept or MTX for steroid-sparing therapy (currently on prednisone monotherapy), will discuss with staff        Biventricular cardiac pacemaker in situ    -St Giorgio device interrogated-See EP notes            Terrie Tran MD  Heart Transplant  [facility]

## 2018-07-02 NOTE — NURSING TRANSFER
Nursing Transfer Note      7/2/2018 1430    Transfer To: Colonoscopy    Transfer via stretcher    Transfer with cardiac monitoring    Transported by transport tech

## 2018-07-02 NOTE — PLAN OF CARE
Problem: Patient Care Overview  Goal: Plan of Care Review  Plan of care discussed with patient. Patient is free of fall/trauma/injury. Denies CP, SOB, or pain/discomfort. Heparin gtt continuous with a theraputic Anti-Xa of 0.61. Held NPO at midnight for GI scope at 1400. Patient drank half of the Golytely, will continue at 0400 with the second half.  All questions addressed. Will continue to monitor

## 2018-07-02 NOTE — ASSESSMENT & PLAN NOTE
Holding heparin gtt and PTA warfarin in anticipation for pretransplant colonoscopy today. Will resume anticoagulation once clinically appropriate given elevated CHADS2-VaSc of 5.

## 2018-07-02 NOTE — NURSING TRANSFER
Nursing Transfer Note      7/2/2018 17:05    Transfer To: Room 387    Transfer via stretcher    Transfer with cardiac monitoring    Transported by ROWENA Brown    Medicines sent: None    Chart send with patient: Yes    Notified: daughter    Patient reassessed at: 7/2/18 17:13 (date, time)    Upon arrival to floor: cardiac monitor applied, patient oriented to room, call bell in reach and bed in lowest position

## 2018-07-02 NOTE — DISCHARGE INSTRUCTIONS
Colonoscopy     A camera attached to a flexible tube with a viewing lens is used to take video pictures.     Colonoscopy is a test to view the inside of your lower digestive tract (colon and rectum). Sometimes it can show the last part of the small intestine (ileum). During the test, small pieces of tissue may be removed for testing. This is called a biopsy. Small growths, such as polyps, may also be removed.   Why is colonoscopy done?  The test is done to help look for colon cancer. And it can help find the source of abdominal pain, bleeding, and changes in bowel habits. It may be needed once a year, depending on factors such as your:  · Age  · Health history  · Family health history  · Symptoms  · Results from any prior colonoscopy  Risks and possible complications  These include:  · Bleeding               · A puncture or tear in the colon   · Risks of anesthesia  · A cancer lesion not being seen  Getting ready   To prepare for the test:  · Talk with your healthcare provider about the risks of the test (see below). Also ask your healthcare provider about alternatives to the test.  · Tell your healthcare provider about any medicines you take. Also tell him or her about any health conditions you may have.  · Make sure your rectum and colon are empty for the test. Follow the diet and bowel prep instructions exactly. If you dont, the test may need to be rescheduled.  · Plan for a friend or family member to drive you home after the test.     Colonoscopy provides an inside view of the entire colon.     You may discuss the results with your doctor right away or at a future visit.  During the test   The test is usually done in the hospital on an outpatient basis. This means you go home the same day. The procedure takes about 30 minutes. During that time:  · You are given relaxing (sedating) medicine through an IV line. You may be drowsy, or fully asleep.  · The healthcare provider will first give you a physical exam to  check for anal and rectal problems.  · Then the anus is lubricated and the scope inserted.  · If you are awake, you may have a feeling similar to needing to have a bowel movement. You may also feel pressure as air is pumped into the colon. Its OK to pass gas during the procedure.  · Biopsy, polyp removal, or other treatments may be done during the test.  After the test   You may have gas right after the test. It can help to try to pass it to help prevent later bloating. Your healthcare provider may discuss the results with you right away. Or you may need to schedule a follow-up visit to talk about the results. After the test, you can go back to your normal eating and other activities. You may be tired from the sedation and need to rest for a few hours.  Date Last Reviewed: 11/1/2016 © 2000-2017 [employer]. [address] [place] [address]. All rights reserved. This information is not intended as a substitute for professional medical care. Always follow your healthcare professional's instructions.      PATIENT INSTRUCTIONS  POST-ANESTHESIA    IMMEDIATELY FOLLOWING SURGERY:  Do not drive or operate machinery for the first twenty four hours after surgery.  Do not make any important decisions for twenty four hours after surgery or while taking narcotic pain medications or sedatives.  If you develop intractable nausea and vomiting or a severe headache please notify your doctor immediately.    FOLLOW-UP:  Please make an appointment with your surgeon as instructed. You do not need to follow up with anesthesia unless specifically instructed to do so.    WOUND CARE INSTRUCTIONS (if applicable):  Keep a dry clean dressing on the anesthesia/puncture wound site if there is drainage.  Once the wound has quit draining you may leave it open to air.  Generally you should leave the bandage intact for twenty four hours unless there is drainage.  If the epidural site drains for more than 36-48 hours please  call the anesthesia department.    QUESTIONS?:  Please feel free to call your physician or the hospital  if you have any questions, and they will be happy to assist you.       [facility] Anesthesia Department  [address] [facility]  [phone]

## 2018-07-02 NOTE — H&P
"  Colonoscopy History and Physical      Procedure : Colonoscopy    Indications:  asymptomatic screening exam    Family Hx of CRC: no    Last Colonoscopy:  1981 - age 23.  "evaluation infected colon"    Hx of sedation problems: none  FHX of sedation problems: none    Past Medical History:   Diagnosis Date    AICD (automatic cardioverter/defibrillator) present     Arthritis     Atrial fibrillation     CHF (congestive heart failure)     Coronary artery disease     Hypertension     MI (myocardial infarction)     Sarcoid     Seizures     Stroke        Past Surgical History:   Procedure Laterality Date    CARDIAC CATHETERIZATION      CARDIAC DEFIBRILLATOR PLACEMENT  7-12    CARDIAC DEFIBRILLATOR PLACEMENT      CARDIAC DEFIBRILLATOR PLACEMENT      CORONARY STENT PLACEMENT  07/2011    ESOPHAGOGASTRODUODENOSCOPY      FRACTURE SURGERY      LEFT HEART CATHETERIZATION N/A 6/25/2018    Procedure: Left heart cath;  Surgeon: Jordi Lui MD;  Location: [facility] CATH LAB;  Service: Cardiology;  Laterality: N/A;       Review of patient's allergies indicates:  No Known Allergies    No current facility-administered medications on file prior to encounter.      Current Outpatient Prescriptions on File Prior to Encounter   Medication Sig Dispense Refill    albuterol (PROVENTIL) 2.5 mg /3 mL (0.083 %) nebulizer solution Take 2.5 mg by nebulization every 6 (six) hours as needed.  3    alprazolam (XANAX) 2 MG tablet Take 2 mg by mouth 2 (two) times daily as needed.       aspirin (ECOTRIN) 81 MG EC tablet Take 81 mg by mouth. 1 Tablet, Delayed Release (E.C.) Oral Every day      bumetanide (BUMEX) 1 MG tablet Take 1 mg by mouth daily as needed.  2    carisoprodol (SOMA) 350 MG tablet Take 350 mg by mouth 4 (four) times daily as needed for Muscle spasms.      colchicine 0.6 mg tablet 0.6 mg once daily.   5    furosemide (LASIX) 40 MG tablet TAKE 2 TABLETS BY MOUTH TWICE DAILY 120 tablet 0    gabapentin (NEURONTIN) " 600 MG tablet Take 600 mg by mouth 2 (two) times daily. 1 Tablet Oral At bedtime      hydrocodone-acetaminophen 10-325mg (NORCO)  mg Tab Take 1 tablet by mouth 2 (two) times daily as needed.   0    isosorbide mononitrate (IMDUR) 30 MG 24 hr tablet Take 3 tablets (90 mg total) by mouth once daily. (Patient taking differently: Take 60 mg by mouth once daily. ) 90 tablet 6    losartan (COZAAR) 50 MG tablet Take 1 tablet (50 mg total) by mouth once daily. 30 tablet 6    pantoprazole (PROTONIX) 40 MG tablet Take 40 mg by mouth. 1 Tablet, Delayed Release (E.C.) Oral Every day      phenytoin (DILANTIN) 100 MG ER capsule 100 mg 2 (two) times daily.   5    potassium chloride SA (K-DUR,KLOR-CON) 20 MEQ tablet TAKE 2 TABLETS BY MOUTH EVERY MORNING AND 1 TABLET BY MOUTH EVERY EVENING 90 tablet 0    pravastatin (PRAVACHOL) 40 MG tablet Take 40 mg by mouth once daily.   5    predniSONE (DELTASONE) 10 MG tablet TK 1 T PO BID Tuesday, Thursday, Saturday  5    predniSONE (DELTASONE) 20 MG tablet Take 1 tablet by mouth 2 (two) times daily. Monday, Wednesday, and Friday, Sunday  11    sotalol (BETAPACE) 160 MG Tab TK 1 T PO  BID  4    spironolactone (ALDACTONE) 25 MG tablet TAKE 1 TABLET BY MOUTH EVERY DAY 30 tablet 0    warfarin (COUMADIN) 7.5 MG tablet 1 tablet Monday  0.5 tablet Tuesday-Sunday  5    albuterol (VENTOLIN HFA) 90 mcg/actuation inhaler Inhale 2 puffs into the lungs every 4 (four) hours as needed for Wheezing. 18 g 5    fluticasone-vilanterol (BREO ELLIPTA) 200-25 mcg/dose DsDv diskus inhaler Inhale 1 puff into the lungs once daily. Controller      nitroGLYCERIN (NITROSTAT) 0.4 MG SL tablet ONE TABLET UNDER TONGUE AS NEEDED FOR CHEST PAIN 100 tablet 0    promethazine-codeine 6.25-10 mg/5 ml (PHENERGAN WITH CODEINE) 6.25-10 mg/5 mL syrup TK 5 ML PO  Q 6 H PRN  0       Family History   Problem Relation Age of Onset    Hypertension Mother     Heart disease Mother     Hypertension Father     Heart  disease Father     Coronary artery disease Father     Hypertension Sister     Heart disease Sister     Stroke Sister     Vision loss Sister     Kidney disease Sister     Coronary artery disease Sister     Coronary artery disease Sister     Cancer Maternal Grandmother        Social History     Social History    Marital status: Other     Spouse name: N/A    Number of children: N/A    Years of education: N/A     Occupational History    Not on file.     Social History Main Topics    Smoking status: Never Smoker    Smokeless tobacco: Never Used    Alcohol use No    Drug use: No    Sexual activity: Not on file     Other Topics Concern    Not on file     Social History Narrative    No narrative on file       Review of Systems -   Respiratory ROS: negative  Cardiovascular ROS: negative  Gastrointestinal ROS: negative  Musculoskeletal ROS: negative  Neurological ROS: negative    Physical Exam:  General: no distress  Head: normocephalic  Oropharynx clear, Mallampati   Lungs:  normal respiratory effort  Heart: regular rate  Abdomen: soft,  Non-tender  Extremities: warm and well perfused  Neuro awake and alert    ASA: III    Patient cleared for Anesthesia:  MAC    Anesthesia/Surgery risks, benefits, and alternative options discussed and understood by patient/family.

## 2018-07-02 NOTE — NURSING TRANSFER
Nursing Transfer Note      7/2/2018     Transfer From: colonoscopy    Transfer via stretcher    Transfer with cardiac monitoring    Transported by transport tech    Medicines sent: n/a    Chart send with patient: Yes    Notified: daughter    Patient reassessed at: 7/2/2018 1717 (date, time)    Upon arrival to floor: cardiac monitor applied, patient oriented to room, call bell in reach and bed in lowest position

## 2018-07-02 NOTE — ASSESSMENT & PLAN NOTE
Continue Keppra per Neuro recommendations (discontinued PTA Dilantin due to interaction with warfarin).

## 2018-07-02 NOTE — SUBJECTIVE & OBJECTIVE
Interval History: No acute issues overnight. Patient completed bowel prep in anticipation for colonoscopy procedure today.    Patient examined at bedside this morning. Seen sitting up on edge of bed completing crossword puzzle. Endorses generalized weakness and fatigue attributed to bowel prep which kept him awake last night. Denies fever, chills, sweats, chest pain, shortness of breath, or presyncope/syncope. No issues with urinary habits.      Continuous Infusions:   heparin (porcine) in D5W Stopped (07/02/18 0604)    sodium chloride 0.9%       Scheduled Meds:   amiodarone  400 mg Oral BID    aspirin  81 mg Oral Daily    atorvastatin  40 mg Oral Daily    colchicine  0.6 mg Oral Daily    fluticasone-vilanterol  1 puff Inhalation Daily    gabapentin  600 mg Oral TID    isosorbide mononitrate  90 mg Oral Daily    levETIRAcetam  500 mg Oral BID    metoprolol tartrate  25 mg Oral BID    pantoprazole  40 mg Oral Daily    predniSONE  10 mg Oral Every Tues, Thurs, Sat    predniSONE  20 mg Oral Every Mon, Wed, Fri    predniSONE  20 mg Oral Every Sun    spironolactone  25 mg Oral Daily     PRN Meds:ALPRAZolam, butalbital-acetaminophen-caffeine -40 mg, heparin (PORCINE), heparin (PORCINE), HYDROcodone-acetaminophen, labetalol, sodium chloride 0.9%    Review of patient's allergies indicates:  No Known Allergies  Objective:     Vital Signs (Most Recent):  Temp: 97.7 °F (36.5 °C) (07/02/18 0812)  Pulse: 67 (07/02/18 1305)  Resp: 12 (07/02/18 1200)  BP: 101/67 (07/02/18 1305)  SpO2: 97 % (07/02/18 1200) Vital Signs (24h Range):  Temp:  [97.7 °F (36.5 °C)-98 °F (36.7 °C)] 97.7 °F (36.5 °C)  Pulse:  [53-99] 67  Resp:  [12-22] 12  SpO2:  [96 %-100 %] 97 %  BP: ()/(57-83) 101/67     Patient Vitals for the past 72 hrs (Last 3 readings):   Weight   06/30/18 0500 103.5 kg (228 lb 3.2 oz)     Body mass index is 31.83 kg/m².      Intake/Output Summary (Last 24 hours) at 07/02/18 1417  Last data filed at  07/02/18 1300   Gross per 24 hour   Intake             4420 ml   Output             1325 ml   Net             3095 ml       Hemodynamic Parameters:         Physical Exam   Constitutional: He is oriented to person, place, and time.   Alert and oriented, in NAD   Neck: Normal range of motion. Neck supple. No JVD present.   Cardiovascular: Normal rate, regular rhythm and normal heart sounds.  Exam reveals no gallop and no friction rub.    No murmur heard.  Pulmonary/Chest: Breath sounds normal. He has no rales.   Abdominal: Soft. Bowel sounds are normal. There is no tenderness.   Neurological: He is alert and oriented to person, place, and time.   Skin: Skin is warm and dry.   Psychiatric: He has a normal mood and affect.   Extremities: trace lower extremity edema bilaterally.    Significant Labs:  CBC:    Recent Labs  Lab 06/30/18  0710 07/01/18  0741 07/02/18  0715   WBC 7.42 6.68 9.25   RBC 3.82* 3.90* 4.11*   HGB 11.6* 11.7* 12.3*   HCT 36.1* 37.5* 39.7*   * 145* 152   MCV 95 96 97   MCH 30.4 30.0 29.9   MCHC 32.1 31.2* 31.0*     BNP:    Recent Labs  Lab 06/27/18  1356 07/02/18  0715   * 894*     CMP:    Recent Labs  Lab 06/30/18  0710 07/01/18  0741 07/02/18  0715   GLU 93 95 90   CALCIUM 9.5 9.5 10.4   ALBUMIN 3.4* 3.3* 3.8   PROT 6.7 6.6 7.4   * 136 137   K 4.3 4.4 4.5   CO2 26 24 27   CL 99 101 97   BUN 19 25* 19   CREATININE 1.4 1.5* 1.2   ALKPHOS 61 71 68   ALT 29 34 48*   AST 20 24 38   BILITOT 0.5 0.4 0.6      Coagulation:     Recent Labs  Lab 06/28/18  0504 06/30/18  1257   INR 1.1 1.4*   APTT 22.1  --      LDH:  No results for input(s): LDH in the last 72 hours.  Microbiology:  Microbiology Results (last 7 days)     ** No results found for the last 168 hours. **          I have reviewed all pertinent labs within the past 24 hours.    Estimated Creatinine Clearance: 85.2 mL/min (based on SCr of 1.2 mg/dL).    Diagnostic Results:  I have reviewed all pertinent imaging results/findings  within the past 24 hours.

## 2018-07-03 PROBLEM — Z86.010 HISTORY OF COLON POLYPS: Status: ACTIVE | Noted: 2018-01-01

## 2018-07-03 NOTE — PROGRESS NOTES
D/C note:    SW to pt's room for D/C. Pt, 2 dtrs, and 2 grandchildren present. Pt and dtrs aaox4 and communicative. Pt's dtrs report they will provide transport from the hospital and will arrange for pt to fly home. Pt's dtrs report they are both able to be caregivers for either transplant or LVAD. Dr. Berman entered room while SW was meeting with family and discussed plan for D/C either later today or tomorrow. Pt and dtrs agreeable to D/C either today or tomorrow. Pt and family report no other questions or concerns for SW at this time. SW providing psychosocial and counseling support, education, assistance, resources, and D/C planning as indicated. SW remains available.

## 2018-07-03 NOTE — PROGRESS NOTES
[facility]  Colorectal Surgery  Progress Note    Patient Name: Yonathan Good Jr.  MRN: [id number]  Admission Date: 6/22/2018  Hospital Length of Stay: 10 days  Attending Physician: Britt Melgar MD    Subjective:     Interval History: Patient seen earlier today. Doing well since colonoscopy. Denies abdominal pain. Having bowel function.    Post-Op Info:  Procedure(s) (LRB):  COLONOSCOPY (N/A)   1 Day Post-Op      Medications:  Continuous Infusions:   heparin (porcine) in D5W 13 Units/kg/hr (07/03/18 1530)     Scheduled Meds:   amiodarone  400 mg Oral BID    aspirin  81 mg Oral Daily    atorvastatin  40 mg Oral Daily    colchicine  0.6 mg Oral Daily    enoxaparin  1 mg/kg (Dosing Weight) Subcutaneous Q12H    fluticasone-vilanterol  1 puff Inhalation Daily    furosemide  80 mg Oral BID    gabapentin  600 mg Oral TID    isosorbide mononitrate  90 mg Oral Daily    levETIRAcetam  500 mg Oral BID    metoprolol tartrate  25 mg Oral BID    pantoprazole  40 mg Oral Daily    [START ON 7/4/2018] predniSONE  10 mg Oral Daily    spironolactone  25 mg Oral Daily    warfarin  7.5 mg Oral Daily     PRN Meds:   ALPRAZolam    butalbital-acetaminophen-caffeine -40 mg    heparin (PORCINE)    heparin (PORCINE)    HYDROcodone-acetaminophen    labetalol        Objective:     Vital Signs (Most Recent):  Temp: 98.4 °F (36.9 °C) (07/03/18 0400)  Pulse: 87 (07/03/18 1529)  Resp: 16 (07/03/18 1500)  BP: 111/75 (07/03/18 1500)  SpO2: 98 % (07/03/18 1500) Vital Signs (24h Range):  Temp:  [97.7 °F (36.5 °C)-98.5 °F (36.9 °C)] 98.4 °F (36.9 °C)  Pulse:  [] 87  Resp:  [9-20] 16  SpO2:  [94 %-100 %] 98 %  BP: ()/(67-83) 111/75     Intake/Output - Last 3 Shifts       07/01 0700 - 07/02 0659 07/02 0700 - 07/03 0659 07/03 0700 - 07/04 0659    P.O. 3160 2765 480    IV Piggyback  300     Total Intake(mL/kg) 3160 (30.5) 3065 (30) 480 (4.7)    Urine (mL/kg/hr) 1625 (0.7) 1350 (0.6)     Total Output  1625 1350      Net +1535 +1715 +480           Urine Occurrence  2 x 3 x    Stool Occurrence 6 x 4 x 3 x          Physical Exam    Abdomen soft NT    Significant Labs:  BMP (Last 3 Results):   Recent Labs  Lab 07/01/18  0741 07/02/18  0715 07/03/18  0516   GLU 95 90 93    137 137   K 4.4 4.5 4.6    97 102   CO2 24 27 24   BUN 25* 19 15   CREATININE 1.5* 1.2 1.1   CALCIUM 9.5 10.4 9.6     CBC (Last 3 Results):   Recent Labs  Lab 07/01/18  0741 07/02/18  0715 07/03/18  0516   WBC 6.68 9.25 6.87   RBC 3.90* 4.11* 3.87*   HGB 11.7* 12.3* 11.4*   HCT 37.5* 39.7* 37.4*   * 152 158   MCV 96 97 97   MCH 30.0 29.9 29.5   MCHC 31.2* 31.0* 30.5*     Assessment/Plan:     History of colon polyps    S/p colonoscopy 7/2 with removal of 2 polyps    -Repeat colo in 5 years, f/u pathology results.  -Will sign off, please let us know of any further questions.        Heart transplant candidate    Will plan for C-scope Monday, 7/2  Clear Liquids Sunday  Go lightly prep  NPO at MN   Begin holding coumadin  Hold heparin 6 hours prior              Talon Roberts MD  Colorectal Surgery  [facility]

## 2018-07-03 NOTE — CONSULTS
[facility]  Infectious Disease  Consult Note    Patient Name: Yonathan Good Jr.  MRN: [id number]  Admission Date: 6/22/2018  Hospital Length of Stay: 10 days  Attending Physician: Britt Melgar MD  Primary Care Provider: Edgar Gates MD       Inpatient consult to Infectious Diseases  Consult performed by: QUIQUE SOLOMON JR  Consult ordered by: GE RICHARD      Consult received.  Full consult to follow.      MIRIAM Del Valle  Infectious Disease  [facility]

## 2018-07-03 NOTE — ANESTHESIA POSTPROCEDURE EVALUATION
"Anesthesia Post Evaluation    Patient: Yonathan Good Jr.    Procedure(s) Performed: Procedure(s) (LRB):  COLONOSCOPY (N/A)    Final Anesthesia Type: general  Patient location during evaluation: PACU  Patient participation: Yes- Able to Participate  Level of consciousness: awake and alert  Post-procedure vital signs: reviewed and stable  Pain management: adequate  Airway patency: patent  PONV status at discharge: No PONV  Anesthetic complications: no      Cardiovascular status: hemodynamically stable and blood pressure returned to baseline  Respiratory status: unassisted and spontaneous ventilation  Hydration status: euvolemic  Follow-up not needed.        Visit Vitals  /80   Pulse 78   Temp 36.9 °C (98.4 °F) (Oral)   Resp 16   Ht 5' 11" (1.803 m)   Wt 102.2 kg (225 lb 5 oz)   SpO2 100%   BMI 31.42 kg/m²       Pain/Saundra Score: Pain Assessment Performed: Yes (7/3/2018  6:23 AM)  Presence of Pain: denies (7/3/2018  6:23 AM)  Pain Rating Prior to Med Admin: 4 (7/3/2018  8:45 AM)  Saundra Score: 10 (7/2/2018  5:00 PM)      "

## 2018-07-03 NOTE — PLAN OF CARE
Problem: Patient Care Overview  Goal: Plan of Care Review  Outcome: Ongoing (interventions implemented as appropriate)  Plan of care discussed with patient and daughter. VSS, on Heparin gtt, no complications noted. ID consulted. Colonoscopy complete with polyp clip and removal.  Advanced options w/u in progress. Paced on telemetry. Remains free of falls and injury this shift. Monitoring for any changes/needs.

## 2018-07-03 NOTE — ASSESSMENT & PLAN NOTE
- Continue Keppra per Neuro recommendations (discontinued PTA Dilantin due to interaction with warfarin).

## 2018-07-03 NOTE — SUBJECTIVE & OBJECTIVE
Interval History: Patient is s/p colonoscopy procedure yesterday afternoon which demonstrated diverticulosis and two 8-10 mm polyps which were resected (path pending). Overnight he developed transient left sided chest pain attributed to indigestion, lasted < 5 minutes. EKG was obtained with no changes.     Patient was examined at bedside this morning. He was awake, alert, and oriented. States he feels better compared to yesterday. No longer feels weak of fatigued. Denies fever, chills, sweats, chest pain, shortness of breath, n/v, abdominal pain, or issues with urinary/bowel habits. Denies bleeding issues since resuming heparin gtt.     Continuous Infusions:   heparin (porcine) in D5W Stopped (07/03/18 1426)     Scheduled Meds:   amiodarone  400 mg Oral BID    aspirin  81 mg Oral Daily    atorvastatin  40 mg Oral Daily    colchicine  0.6 mg Oral Daily    enoxaparin  1 mg/kg (Dosing Weight) Subcutaneous Q12H    fluticasone-vilanterol  1 puff Inhalation Daily    furosemide  80 mg Oral BID    gabapentin  600 mg Oral TID    isosorbide mononitrate  90 mg Oral Daily    levETIRAcetam  500 mg Oral BID    metoprolol tartrate  25 mg Oral BID    pantoprazole  40 mg Oral Daily    [START ON 7/4/2018] predniSONE  10 mg Oral Daily    spironolactone  25 mg Oral Daily    warfarin  7.5 mg Oral Daily     PRN Meds:ALPRAZolam, butalbital-acetaminophen-caffeine -40 mg, heparin (PORCINE), heparin (PORCINE), HYDROcodone-acetaminophen, labetalol    Review of patient's allergies indicates:  No Known Allergies  Objective:     Vital Signs (Most Recent):  Temp: 98.4 °F (36.9 °C) (07/03/18 0400)  Pulse: 76 (07/03/18 1130)  Resp: 16 (07/03/18 1130)  BP: 102/68 (07/03/18 1130)  SpO2: 96 % (07/03/18 1130) Vital Signs (24h Range):  Temp:  [97.3 °F (36.3 °C)-98.5 °F (36.9 °C)] 98.4 °F (36.9 °C)  Pulse:  [] 76  Resp:  [9-20] 16  SpO2:  [94 %-100 %] 96 %  BP: ()/(67-83) 102/68     Patient Vitals for the past 72 hrs  (Last 3 readings):   Weight   07/03/18 1152 102.2 kg (225 lb 5 oz)   07/03/18 0600 102.2 kg (225 lb 5 oz)   07/02/18 1451 103.4 kg (228 lb)     Body mass index is 31.42 kg/m².      Intake/Output Summary (Last 24 hours) at 07/03/18 1447  Last data filed at 07/03/18 1426   Gross per 24 hour   Intake             1545 ml   Output              800 ml   Net              745 ml       Hemodynamic Parameters:       Telemetry: reviewed    Physical Exam   Constitutional: He is oriented to person, place, and time. He appears well-developed and well-nourished.   HENT:   Head: Normocephalic and atraumatic.   Eyes: EOM are normal.   Neck: Normal range of motion. Neck supple. No JVD present.   Cardiovascular: Normal rate, regular rhythm and normal heart sounds.    Pulmonary/Chest: Effort normal and breath sounds normal. No respiratory distress. He has no wheezes. He has no rales.   Abdominal: Soft. Bowel sounds are normal. He exhibits no distension. There is no tenderness.   Neurological: He is alert and oriented to person, place, and time.   Skin: Skin is warm and dry. Capillary refill takes less than 2 seconds.   Psychiatric: He has a normal mood and affect. Judgment normal.       Significant Labs:  CBC:    Recent Labs  Lab 07/01/18  0741 07/02/18  0715 07/03/18  0516   WBC 6.68 9.25 6.87   RBC 3.90* 4.11* 3.87*   HGB 11.7* 12.3* 11.4*   HCT 37.5* 39.7* 37.4*   * 152 158   MCV 96 97 97   MCH 30.0 29.9 29.5   MCHC 31.2* 31.0* 30.5*     BNP:    Recent Labs  Lab 06/27/18  1356 07/02/18  0715   * 894*     CMP:    Recent Labs  Lab 07/01/18  0741 07/02/18  0715 07/03/18  0516   GLU 95 90 93   CALCIUM 9.5 10.4 9.6   ALBUMIN 3.3* 3.8 3.4*   PROT 6.6 7.4 6.8    137 137   K 4.4 4.5 4.6   CO2 24 27 24    97 102   BUN 25* 19 15   CREATININE 1.5* 1.2 1.1   ALKPHOS 71 68 64   ALT 34 48* 53*   AST 24 38 43*   BILITOT 0.4 0.6 0.6      Coagulation:     Recent Labs  Lab 06/28/18  0504 06/30/18  1257 07/03/18  0516   INR  1.1 1.4* 1.3*   APTT 22.1  --   --      LDH:  No results for input(s): LDH in the last 72 hours.  Microbiology:  Microbiology Results (last 7 days)     ** No results found for the last 168 hours. **          I have reviewed all pertinent labs within the past 24 hours.    Estimated Creatinine Clearance: 92.4 mL/min (based on SCr of 1.1 mg/dL).    Diagnostic Results:  I have reviewed all pertinent imaging results/findings within the past 24 hours.

## 2018-07-03 NOTE — ASSESSMENT & PLAN NOTE
- Elevated CHADS2-VaSc of 5. Continue therapeutic heparin gtt with bridge to warfarin given subtherapeutic INR. Will institute Lovenox 1 mg/kg q12h starting tonight at 9 PM in preparation for anticipated discharge tomorrow. Will discontinue heparin gtt once Lovenox administered.

## 2018-07-03 NOTE — PLAN OF CARE
Problem: Patient Care Overview  Goal: Plan of Care Review  Outcome: Ongoing (interventions implemented as appropriate)  Pt is A, A, Ox4. Calm, cooperative. Free of falls, trauma, and injuries. Skin intact. Pt educated on fall risk, treatment plan. Pt demonstrates and verbalizes understanding. VSS. Colonoscopy done today. Hep gtt restarted post procedure. ID consulted. Plan of care reviewed with pt.

## 2018-07-03 NOTE — PROGRESS NOTES
Pt lost IV access, heparin drip paused.  HTS team aware, bedside RNs attempted PIV placement x 4. Consult placed for midline.  Per provider, if no access achieved, RN will page team and retime lovenox injections.  Pt and family educated on plan and expressed understanding.

## 2018-07-03 NOTE — ASSESSMENT & PLAN NOTE
S/p colonoscopy 7/2 with removal of 2 polyps    -Repeat colo in 5 years, f/u pathology results.  -Will sign off, please let us know of any further questions.

## 2018-07-03 NOTE — PROGRESS NOTES
Left Ventricular Assist Device (LVAD) and Transplant Recipient Adult Psychosocial Assessment    Mailing address:  [address]  [facility] [address]       Physical address:  [address]   [facility] [address]  4.5 hours from [facility]    Telephone Information:   Mobile [phone]   Home  [phone] (home)  Work  There is no work phone number on file.  E-mail  No e-mail address on record    Sex: male  YOB: 1960  Age: 57 y.o.    Encounter Date: 7/3/2018  [place] Citizen: yes  Primary Language: English   Needed: no    Emergency Contact:  Name: Jenny Good  Relationship: sister  Address: [place], [place]  Phone Numbers: [phone] (mobile)    Family/Social Support:   Number of dependents/: Pt reports he is not responsible for any minor children. Pt reports having 3 adult dtrs, and 1 17 year old step dtr who lives with her mother.  Marital history: Pt reports  2 times. Pt reports having 3 children with his first wife who passed away in 2005. Pt reports  He and his current wife were  for 7 years, and are now . Pt's wife is going through treatment for cancer, has a trach, and is not able to speak on the phone.   Other family dynamics: Pt's oldest dtr, Kasandra (age 35), is currently at the hospital with pt. Kasandra lives in [place], [place], and reports she is willing to move to assist in pt's care if necessary. Pt's dtr Kyree lives in [place], and has 2 children. Pt's dtr reports her mother in law is able to assist with childcare so that she can assist with pt's care. Pt's dtr Doreen (age 34) lives in [place], however pt reports she is not involved in his care. Pt's step dtr Sudhakar, is 17 and lives with her mother. Pt's sister, Jenny, is 70 years old and works in a school kitchen. Pt lives with his cousin Arturo Chester and Arturo's wife Tasha. Pt's wife is going through treatment for cancer is not able to assist with pt's  care at this time.    Household Composition: Pt lives with his cousin Arturo Chester and Arturo's wife Tasha. Both drive and work during the day.     Do you and your caregivers have access to reliable transportation? yes  PRIMARY CAREGIVER: Kasandra Good, pt's dtr, will be primary caregiver, phone number [phone].      provided in-depth information to Patient and Caregiver regarding  regarding pre- and post-LVAD and pre- and post-transplant caregiver role.   strongly encourages Patient and Caregiver to have concrete plan regarding post-transplant care giving, including back-up caregiver(s) to ensure care giving needs are met as needed.    Patient and Caregiver states understanding all aspects of caregiver role/commitment and is able/willing/committed to being caregiver to the fullest extent necessary.     Patient verbalizes understanding of the education provided today and caregiver responsibilities.       remains available. Patient and Caregiver agree to contact  in a timely manner if concerns arise.      Able to take time off work without financial concerns: yes. Pt's dtr reports she is able to use FMLA while caring for pt.      Additional Significant Others who will Assist with LVAD/Transplant:     Name: Kyree Chisholm  Relationship: daughter  Address: [place], [place]  Phone Numbers:  [phone]  Does Person Drive: yes  Pt's dtr reports she does not work during the day. She is a stay at home mom (her youngest is 13 months old). Pt's dtr reports her mother in law is able to care for her children as long as necessary so that she can be pt's caregiver.    Living Will: no  Healthcare Power of : no  Advance Directives on file: <<no information> per medical record.  Verbally reviewed LW/HCPA information.   provided patient with copy of LW/HCPA documents and provided education on completion of forms    Living Donors: N/A    Highest  Education Level: Attended College/Technical School. 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  Reading Ability: 12th grade  Reports difficulty with: N/A. Pt wears glasses.  Learns Best By:  Hands on     Status: yes: [employer], date:   VA Benefits: no     Working for Income: No  If no, reason not working: Disability  Spouse/Significant Other Employment: n/a    Disabled: yes: date disability began: , due to: CHF.    Monthly Income:  SSI: $750  Able to afford all costs now and if transplanted or receives LVAD, including medications: yes. Pt reports his dtrs provide financial assistance when needed. Pt's dtr reports ability to continue providing assistance when necessary.   Pt reports secure power source? yes  Pt reports ability to afford monthly electric bill? yes  Pt reports ability to afford LVAD dressing supplies? yes  Patient and Caregiver verbalizes understanding of personal responsibilities related to LVAD and transplant costs and the importance of having a financial plan to ensure that patients LVAD and transplant costs are fully covered.       provided fundraising information/education.  Patient and Caregiver verbalizes understanding.   remains available.    Insurance:   Payor/Plan Subscr  Sex Relation Sub. Ins. ID Effective Group Num   1. MEDICAID - [place]* SUNG MCBRIDE . 1960 Male  [id number]* 11                                    [address]     Primary Insurance (for UNOS reporting): Public Insurance - Medicaid  Secondary Insurance (for UNOS reporting): None  Patient and Caregiver verbalizes clear understanding that patient may experience difficulty obtaining and/or be denied insurance coverage post-surgery. This includes and is not limited to disability insurance, life insurance, health insurance, burial insurance, long term care insurance, and other insurances.      Patient and Caregiver also reports  "understanding that future health concerns   related to or unrelated to LVAD or transplantation may not be covered by patient's insurance.  Resources and information provided and reviewed.      Patient and Caregiver provides verbal permission to release any necessary information to outside resources for patient care and discharge planning.  Resources and information provided are reviewed.      Infusion Service: patient utilizing? no  Home Health: patient utilizing? no  DME: no  Pulmonary/Cardiac Rehab: no   ADLS:  Pt reports independent in ADLs and drives.     Adherence:   Pt reports high level of adherence to attending medical appointments and taking medications as prescribed. Pt reports medium level of adherence to low sodium diet.  Adherence education and counseling provided.     Per History Section:  Past Medical History:   Diagnosis Date    AICD (automatic cardioverter/defibrillator) present     Arthritis     Atrial fibrillation     CHF (congestive heart failure)     Coronary artery disease     Hypertension     MI (myocardial infarction)     Sarcoid     Seizures     Stroke      Social History   Substance Use Topics    Smoking status: Never Smoker    Smokeless tobacco: Never Used    Alcohol use No     History   Drug Use No     History   Sexual Activity    Sexual activity: Not on file       Per Today's Psychosocial:  Tobacco: none, patient denies any use.  Alcohol: Pt reports no alcohol use over the last year. Pt reports past alcohol use of about 24 cans of beer over a weekend on "some weekends".  Illicit Drugs/Non-prescribed Medications: none, patient denies any use.    Patient and Caregiver states clear understanding of the potential impact of substance use as it relates to LVAD and transplant candidacy and is aware of possible random substance screening.  Substance abstinence/cessation counseling, education and resources provided and reviewed.     Arrests/DWI/Treatment/Rehab: yes. Pt reports " "arrested 1 year ago on "false charges" of "domestic violence," and all charges have been dropped. Pt reports no pending legal issues.     Psychiatric History:    Mental Health: anxiety  Psychiatrist/Counselor: pt denies  Medications:  Pt reports PCP prescribes Xanax.  Suicide/Homicide Issues: Pt denies.   Safety at home: Pt reports safe at home.     Knowledge: Patient and Caregiver states having clear understanding and realistic expectations regarding the potential risks and potential benefits LVAD implantation and organ transplantation and organ donation and agrees to discuss with health care team members and support system members, as well as to utilize available resources and express questions and/or concerns in order to further facilitate the pt informed decision-making.  Resources and information provided and reviewed.     Patient and Caregiver is aware of [facility]'s affiliation and/or partnership with agencies in home health care, LTAC, SNF, [facility], and other hospitals and clinics.    Understanding: Patient and Caregiver reports having a clear understanding of the many lifetime commitments involved with being an LVAD and transplant recipient, including costs, compliance, medications, lab work, procedures, appointments, concrete and financial planning, preparedness, timely and appropriate communication of concerns, abstinence (ETOH, tobacco, illicit non-prescribed drugs), adherence to all health care team recommendations, support system and caregiver involvement, appropriate and timely resource utilization and follow-through, mental health counseling as needed/recommended, and patient and caregiver responsibilities.  Social Service Handbook, resources and detailed educational information provided and reviewed.  Educational information provided.    Patient and Caregiver also reports current and expected compliance with health care regime and states having a clear understanding of the importance of compliance.   "     Patient and Caregiver reports a clear understanding that risks and benefits may be involved with LVAD heart failure treatment and organ transplantation and with organ donation.     Patient and Caregiver also reports clear understanding that psychosocial risk factors may affect patient, and include but are not limited to feelings of depression, generalized anxiety, anxiety regarding dependence on others, post traumatic stress disorder, feelings of guilt and other emotional and/or mental concerns, and/or exacerbation of existing mental health concerns.  Detailed resources provided and discussed.      Patient and Caregiver agrees to access appropriate resources in a timely manner as needed and/or as recommended, and to communicate concerns appropriately.     Patient and Caregiver also reports a clear understanding of treatment options available.      Feelings or Concerns: Pt reports concerns about LVAD, and still unsure if he is willing to have LVAD surgery.     Coping: Pt reports coping adequately at this time. Pt reports some feelings of anxiety due to medical condition. Pt reports using deep breathing and Xanax to help with symptoms of anxiety. SW providing emotional support.     Goals: Pt reports goal of returning to work.      Interview Behavior: Patient presents as alert and oriented x 4, pleasant, calm, communicative, cooperative and asking and answering questions appropriately.  Both of pt's dtrs presented as alert and oriented x 4 and communicative. Providing caregiver education proved difficult at times due to both of pt's dtrs speaking over SW while SW was providing education.          Transplant Social Work - Candidacy  Assessment/Plan:     Psychosocial Suitability: Patient presents as a suitable candidate for LVAD or transplant at this time. Based on psychosocial risk factors, patient presents as medium risk, due to both caregivers living in different cities than pt. Both of pt's dtrs report willing to  move closer to pt to assist with his care if necessary. Pt also reports a history of anxiety which he is able to manage with taking Xanax daily. Pt does report a past history of ETOH use, however pt reports no use in the last year.       Leslie Ledesma, DEVONW

## 2018-07-03 NOTE — ASSESSMENT & PLAN NOTE
- Multiple episodes of MMVT on 6/23. EP consulted. VT1 zone lowered as per EP. No further VT. Likely AT with aberrancy on early AM 6/25, nonsustained. Continue amiodarone, 400 bid x2 weeks, then 400 daily. LHC without overt culprit lesion (as above). Follow up with Dr. Telles in EP in 4-6 weeks

## 2018-07-03 NOTE — SUBJECTIVE & OBJECTIVE
Subjective:     Interval History: Patient seen earlier today. Doing well since colonoscopy. Denies abdominal pain. Having bowel function.    Post-Op Info:  Procedure(s) (LRB):  COLONOSCOPY (N/A)   1 Day Post-Op      Medications:  Continuous Infusions:   heparin (porcine) in D5W 13 Units/kg/hr (07/03/18 1530)     Scheduled Meds:   amiodarone  400 mg Oral BID    aspirin  81 mg Oral Daily    atorvastatin  40 mg Oral Daily    colchicine  0.6 mg Oral Daily    enoxaparin  1 mg/kg (Dosing Weight) Subcutaneous Q12H    fluticasone-vilanterol  1 puff Inhalation Daily    furosemide  80 mg Oral BID    gabapentin  600 mg Oral TID    isosorbide mononitrate  90 mg Oral Daily    levETIRAcetam  500 mg Oral BID    metoprolol tartrate  25 mg Oral BID    pantoprazole  40 mg Oral Daily    [START ON 7/4/2018] predniSONE  10 mg Oral Daily    spironolactone  25 mg Oral Daily    warfarin  7.5 mg Oral Daily     PRN Meds:   ALPRAZolam    butalbital-acetaminophen-caffeine -40 mg    heparin (PORCINE)    heparin (PORCINE)    HYDROcodone-acetaminophen    labetalol        Objective:     Vital Signs (Most Recent):  Temp: 98.4 °F (36.9 °C) (07/03/18 0400)  Pulse: 87 (07/03/18 1529)  Resp: 16 (07/03/18 1500)  BP: 111/75 (07/03/18 1500)  SpO2: 98 % (07/03/18 1500) Vital Signs (24h Range):  Temp:  [97.7 °F (36.5 °C)-98.5 °F (36.9 °C)] 98.4 °F (36.9 °C)  Pulse:  [] 87  Resp:  [9-20] 16  SpO2:  [94 %-100 %] 98 %  BP: ()/(67-83) 111/75     Intake/Output - Last 3 Shifts       07/01 0700 - 07/02 0659 07/02 0700 - 07/03 0659 07/03 0700 - 07/04 0659    P.O. 3160 2765 480    IV Piggyback  300     Total Intake(mL/kg) 3160 (30.5) 3065 (30) 480 (4.7)    Urine (mL/kg/hr) 1625 (0.7) 1350 (0.6)     Total Output 1625 1350      Net +1535 +1715 +480           Urine Occurrence  2 x 3 x    Stool Occurrence 6 x 4 x 3 x          Physical Exam    Abdomen soft NT    Significant Labs:  BMP (Last 3 Results):   Recent Labs  Lab  07/01/18  0741 07/02/18  0715 07/03/18  0516   GLU 95 90 93    137 137   K 4.4 4.5 4.6    97 102   CO2 24 27 24   BUN 25* 19 15   CREATININE 1.5* 1.2 1.1   CALCIUM 9.5 10.4 9.6     CBC (Last 3 Results):   Recent Labs  Lab 07/01/18  0741 07/02/18  0715 07/03/18  0516   WBC 6.68 9.25 6.87   RBC 3.90* 4.11* 3.87*   HGB 11.7* 12.3* 11.4*   HCT 37.5* 39.7* 37.4*   * 152 158   MCV 96 97 97   MCH 30.0 29.9 29.5   MCHC 31.2* 31.0* 30.5*

## 2018-07-03 NOTE — PROGRESS NOTES
Pt daughter came to  and asked PCT when would the patient be discharged as she wanted to buy a plane ticket for them to travel home.  PCT explained that she was not able to predict the discharge date or time.  The daughter then asked me when the patient would be discharged and would he be discharged on 7/3 so that she could buy a plane ticket tomorrow.  Attempted to ascertain from MD clinical notes the date and discharge plan for tomorrow but was unable to determine from today's note.  Spoke with on call MD regarding potential  Date of discharge and he reported he did not know that information.  Reported this to the patient and daughter at which time she became angry and expressed her feelings of dissatisfaction with this report.

## 2018-07-03 NOTE — ASSESSMENT & PLAN NOTE
- Initially admitted to Neuro, no TPA. CT head and CTA negative. Hx of seizures, EEG pending. Resolved, no further workup at this time.

## 2018-07-03 NOTE — ASSESSMENT & PLAN NOTE
- Patient underwent LHC on 6/25 this admission which demonstrated diffuse disease but no culprit or target lesion. LM luminal irreg, LAD 30% with patent stent, pLCx 70%, OM1 80%, RCA luminal irreg. Suspect his episode of VT storm is likely scar mediated. Continue GDMT.

## 2018-07-03 NOTE — ASSESSMENT & PLAN NOTE
- Patient appears clinically euvolemic today. Reinstituted PTA Lasix 80 mg PO BID. Continue GDMT for ischemic cardiomyopathy (LVEF 5-10%). Currently on the pathway for advanced options. Will follow up with HTS in 3 weeks.

## 2018-07-03 NOTE — ASSESSMENT & PLAN NOTE
-Tapered PTA prednisone regimen down to 10 mg PO daily.   - Pulmonary / Lung Transplant consulted earlier in patient's hospital course. May consider transitioning to MTX, AZA, Cellcept etc for steroid-sparing therapy. Will defer this decision to patient's outpatient Pulmonologist at this time.

## 2018-07-03 NOTE — NURSING
Ekg complete, no changes noted, spoke with MD on call for HTS, notified of pt status, vitals and ekg report, no new orders, pt pain free at this time, daughter remains at bedside, continue to monitor for any changes/needs. Lying in bed, resting quietly.

## 2018-07-03 NOTE — PROGRESS NOTES
[facility]  Heart Transplant  Progress Note    Patient Name: Yonathan Good Jr.  MRN: [id number]  Admission Date: 6/22/2018  Hospital Length of Stay: 10 days  Attending Physician: Britt Melgar MD  Primary Care Provider: Edgar Gates MD  Principal Problem:Acute on chronic systolic congestive heart failure    Subjective:     No new subjective & objective note has been filed under this hospital service since the last note was generated.    Assessment and Plan:     57 y.o. male  with h/o ischemic cardiomyopathy(negative for sarcoid on cardiac MRI 2012), H/o stents 7 years ago,Pulmonary sarcoid, left parietal stroke in the past, atrial fibrillation, CRT-D(st Giorgio) who follows Dr Berman and is being worked up as outpatient for advanced options. He was transferred to Stroke service last evening with difficulty speaking with suspected Stroke.Initial workup included CVA which was negative for acute CVA.No tPA was given. This am patient complained of chest pain and dizziness and was noted to be in Monomorphic VT -Code blue was called.He was ATP'ed out of it but he also had an external shock delivered by rapid repsonse team as he was persistently in VT but no shock was delivered.He went into Fast VT with rate>200 and his ICD shocked him out of it. He was transferred to ICU.He reports having chest pain for last 4-5 years on exertion relieved with nitro-reports stress test done in [place].Last stress test 2015 in [facility] system. He was loaded with amiodarone.He had 2 more episodes of VT which were slower at 150-His device did not ATP as below VT-1 zone. He also complains of cough with pinkish sputum,SOB.He reports no more chest pain since the shock.  He was recently scheduled for colonoscopy as part of transplant workup for which his coumadin was held and he was placed on eliquis temporarily.He had chest pain on day of c-scope and he had to take a nitro and his procedure was cancelled(5 days ago). He reports  he started taking his coumadin after.His INR was subtherapeutic on arrival.     He states that his pulmonary sarcoid was diagnosed in 2015 when he was evaluated for a dry non-productive cough.  States that he underwent bronchoscopy with biopsies which confirmed sarcoid.  Since then, he has been on varying doses of Prednisone He has no other organ involvement from sarcoid and states that his only symptom has been cough. Denies any alcohol or illicit drug use.  Previously worked as a .     * Acute on chronic systolic congestive heart failure    - Patient appears clinically euvolemic today. Reinstituted PTA Lasix 80 mg PO BID. Continue GDMT for ischemic cardiomyopathy (LVEF 5-10%). Currently on the pathway for advanced options. Will follow up with HTS in 3 weeks.         History of atrial fibrillation    - Elevated CHADS2-VaSc of 5. Continue therapeutic heparin gtt with bridge to warfarin given subtherapeutic INR. Will institute Lovenox 1 mg/kg q12h starting tonight at 9 PM in preparation for anticipated discharge tomorrow. Will discontinue heparin gtt once Lovenox administered.           Heart transplant candidate    - As above currently step 3 of the pathway. Will follow up with HTS in 3 weeks.         VT (ventricular tachycardia)    - Multiple episodes of MMVT on 6/23. EP consulted. VT1 zone lowered as per EP. No further VT. Likely AT with aberrancy on early AM 6/25, nonsustained. Continue amiodarone, 400 bid x2 weeks, then 400 daily. LHC without overt culprit lesion (as above). Follow up with Dr. Telles in EP in 4-6 weeks        Transient neurological symptoms    - Initially admitted to Neuro, no TPA. CT head and CTA negative. Hx of seizures, EEG pending. Resolved, no further workup at this time.          Seizure disorder    - Continue Keppra per Neuro recommendations (discontinued PTA Dilantin due to interaction with warfarin).        CAD (coronary artery disease)    - Patient underwent LHC on 6/25 this  admission which demonstrated diffuse disease but no culprit or target lesion. LM luminal irreg, LAD 30% with patent stent, pLCx 70%, OM1 80%, RCA luminal irreg. Suspect his episode of VT storm is likely scar mediated. Continue GDMT.           Sarcoidosis of lung    -Tapered PTA prednisone regimen down to 10 mg PO daily.   - Pulmonary / Lung Transplant consulted earlier in patient's hospital course. May consider transitioning to MTX, AZA, Cellcept etc for steroid-sparing therapy. Will defer this decision to patient's outpatient Pulmonologist at this time.           Biventricular cardiac pacemaker in situ    - St Giorgio device interrogated earlier in hospitalization. See EP notes            Terrie Tran MD  Heart Transplant  [facility]

## 2018-07-03 NOTE — NURSING
Called to room per pt daughter, pt c/o chest pain to left chest, lateral to sternum, 4/10 blunt pain, no radiation or SOB, VSS, called RT for stat EKG, no changes per telemetry, paced @79 on the monitor, O2 @2lnc, SPO2 96%, notified MD for HTS.

## 2018-07-04 PROBLEM — Z76.82 ORGAN TRANSPLANT CANDIDATE: Status: ACTIVE | Noted: 2018-01-01

## 2018-07-04 PROBLEM — I47.20 VENTRICULAR TACHYCARDIA: Status: RESOLVED | Noted: 2018-01-01 | Resolved: 2018-01-01

## 2018-07-04 PROBLEM — I47.20 VT (VENTRICULAR TACHYCARDIA): Status: RESOLVED | Noted: 2018-01-01 | Resolved: 2018-01-01

## 2018-07-04 PROBLEM — Z76.82 HEART TRANSPLANT CANDIDATE: Status: RESOLVED | Noted: 2018-01-01 | Resolved: 2018-01-01

## 2018-07-04 PROBLEM — Z01.818 PRE-TRANSPLANT EVALUATION FOR HEART TRANSPLANT: Status: ACTIVE | Noted: 2018-01-01

## 2018-07-04 PROBLEM — R29.818 TRANSIENT NEUROLOGICAL SYMPTOMS: Status: RESOLVED | Noted: 2018-01-01 | Resolved: 2018-01-01

## 2018-07-04 NOTE — HOSPITAL COURSE
"Yonathan Good is a 57 year old male with PMHx significant for CAD s/p PCIs, HFrEF secondary to ischemic cardiomyopathy (EF 5-10%) s/p ICD, Afib (on warfarin), pulmonary sarcoid (on chronic prednisone), hx of L parietal CVA who was initially admitted to "stroke/neuro" service on 6/22 with dysarthria and stroke-like symptoms. Extensive work up was negative for recurrent CVA and so no tPA was administered. Patient developed atypical chest pain two days into his hospital course and was noted to be in monomorphic VT (6/24) which was treated with ATP then with shock due to recurrent VT in VF zone. Given his active cardiac issues this prompted transfer to "heart failure/cardiology" service where the patient was initiated on IV amiodarone and beta blocker with subsequent resolution of his VT. He was eventually transitioned to PO amiodarone 400 mg BID x 2 weeks (from 6/25-7/9) followed by amiodarone 400 mg daily thereafter per EP recommendations. Of note patient underwent LHC on 6/25 given his extensive ischemic history which did not reveal a culprit lesion, therefore no interventions were done. He was noted to have an elevated LVEDP to 45 however and was administered IV diuresis before being transitioned back to his PO diuretic regimen. Patient also completed heart failure pathway during his hospital course (eg completed colonoscopy on 7/2) as part of his work up for advanced options. Follows with Dr. Berman of heart failure/transplant as an outpatient.     The patient was discharged home in stable condition on 7/4/2018. He will need close follow up with his PCP within 1 week as well as with EP in 4-6 weeks on discharge, a referral was placed. He will also need to follow up with the advanced heart failure / transplant clinic in 3 weeks to continue advanced options discussion/planning, a referral was placed.     He was discharged with the following medication changes:  - Start taking amiodarone 400 mg BID x 2 weeks (until " 7/9) followed by 400 mg daily given episodes of recurrent VT. Stop taking previous antiarrhythmic sotalol.  - Start taking metoprolol tartrate 25 mg BID for additional beta blockade given episodes of recurrent VT.   - Start taking atorvastatin 40 mg daily (high intensity statin). Stop taking pravastatin as a result.   - Start taking Keppra 500 mg BID per inpatient neurology recommendations and stop taking Dilantin given drug-drug interaction between Dilantin and warfarin.   - Start Lovenox 100 mg q12h for 3-5 days as bridge to warfarin given subtherapeutic INR on day of discharge and elevated CHADS2-VaSc of 5. This will need to be followed up on closely.   - His prednisone was de-escalated to 10 mg PO daily. He Should continue to follow up with his outpatient Pulmonologist to discuss possibly transitioning to alternative immunosuppression agents over prednisone (eg AZA, MTX, Cellcept, etc).  - No other changes were made to his home medications.

## 2018-07-04 NOTE — SUBJECTIVE & OBJECTIVE
Interval History: No acute issues overnight. Patient doing well this morning. Ready for discharge home. Denies f/c/s, cp, sob, n/v, abdominal complaints.     Continuous Infusions:  Scheduled Meds:   amiodarone  400 mg Oral BID    aspirin  81 mg Oral Daily    atorvastatin  40 mg Oral Daily    colchicine  0.6 mg Oral Daily    enoxaparin  1 mg/kg (Dosing Weight) Subcutaneous Q12H    fluticasone-vilanterol  1 puff Inhalation Daily    furosemide  80 mg Oral BID    gabapentin  600 mg Oral TID    isosorbide mononitrate  90 mg Oral Daily    levETIRAcetam  500 mg Oral BID    metoprolol tartrate  25 mg Oral BID    pantoprazole  40 mg Oral Daily    predniSONE  10 mg Oral Daily    spironolactone  25 mg Oral Daily    warfarin  7.5 mg Oral Daily     PRN Meds:ALPRAZolam, butalbital-acetaminophen-caffeine -40 mg, heparin (PORCINE), heparin (PORCINE), HYDROcodone-acetaminophen, labetalol    Review of patient's allergies indicates:  No Known Allergies  Objective:     Vital Signs (Most Recent):  Temp: 98.4 °F (36.9 °C) (07/04/18 0752)  Pulse: 77 (07/04/18 0830)  Resp: 18 (07/04/18 0830)  BP: 115/77 (07/04/18 0830)  SpO2: (!) 91 % (07/04/18 0752) Vital Signs (24h Range):  Temp:  [98.4 °F (36.9 °C)-98.6 °F (37 °C)] 98.4 °F (36.9 °C)  Pulse:  [] 77  Resp:  [15-20] 18  SpO2:  [91 %-98 %] 91 %  BP: ()/(65-82) 115/77     Patient Vitals for the past 72 hrs (Last 3 readings):   Weight   07/04/18 0500 101.6 kg (223 lb 15.8 oz)   07/03/18 1152 102.2 kg (225 lb 5 oz)   07/03/18 0600 102.2 kg (225 lb 5 oz)     Body mass index is 31.24 kg/m².      Intake/Output Summary (Last 24 hours) at 07/04/18 1022  Last data filed at 07/04/18 0600   Gross per 24 hour   Intake              960 ml   Output             2000 ml   Net            -1040 ml       Hemodynamic Parameters:       Telemetry: reviewed    Physical Exam   Constitutional: He is oriented to person, place, and time. He appears well-developed and well-nourished.    HENT:   Head: Normocephalic and atraumatic.   Eyes: EOM are normal.   Neck: Neck supple. No JVD present.   Cardiovascular: Normal rate and regular rhythm.    No murmur heard.  Pulmonary/Chest: Effort normal and breath sounds normal. He has no rales.   Abdominal: Soft. Bowel sounds are normal. He exhibits no distension. There is no tenderness.   Neurological: He is alert and oriented to person, place, and time.   Skin: Skin is warm and dry.   Psychiatric: He has a normal mood and affect. Judgment and thought content normal.       Significant Labs:  CBC:    Recent Labs  Lab 07/02/18  0715 07/03/18  0516 07/04/18  0608   WBC 9.25 6.87 6.38   RBC 4.11* 3.87* 3.93*   HGB 12.3* 11.4* 11.7*   HCT 39.7* 37.4* 37.2*    158 168   MCV 97 97 95   MCH 29.9 29.5 29.8   MCHC 31.0* 30.5* 31.5*     BNP:    Recent Labs  Lab 06/27/18  1356 07/02/18  0715   * 894*     CMP:    Recent Labs  Lab 07/02/18  0715 07/03/18  0516 07/04/18  0608   GLU 90 93 84   CALCIUM 10.4 9.6 10.0   ALBUMIN 3.8 3.4* 3.5   PROT 7.4 6.8 6.8    137 139   K 4.5 4.6 3.9   CO2 27 24 29   CL 97 102 99   BUN 19 15 18   CREATININE 1.2 1.1 1.4   ALKPHOS 68 64 78   ALT 48* 53* 55*   AST 38 43* 38   BILITOT 0.6 0.6 0.7      Coagulation:     Recent Labs  Lab 06/28/18  0504 06/30/18  1257 07/03/18  0516 07/04/18  0608   INR 1.1 1.4* 1.3* 1.2   APTT 22.1  --   --   --      LDH:  No results for input(s): LDH in the last 72 hours.  Microbiology:  Microbiology Results (last 7 days)     ** No results found for the last 168 hours. **          I have reviewed all pertinent labs within the past 24 hours.    Estimated Creatinine Clearance: 72.4 mL/min (based on SCr of 1.4 mg/dL).    Diagnostic Results:  I have reviewed all pertinent imaging results/findings within the past 24 hours.

## 2018-07-04 NOTE — ASSESSMENT & PLAN NOTE
- Elevated CHADS2-VaSc of 5. Continue Lovenox 1 mg/kg q12h with bridge to warfarin given subtherapeutic INR. Will need follow up with PCP / Coumadin Clinic for ongoing management on discharge.

## 2018-07-04 NOTE — NURSING
Pt discharged to home per provider orders.  IV removed, telemetry monitor discontinued.  AVS printed, copy given to patient.  All appointments and medications reviewed.  Pt educated and all questions and concerns addressed.

## 2018-07-04 NOTE — ASSESSMENT & PLAN NOTE
- Tapered PTA prednisone regimen down to 10 mg PO daily. Pulmonary / Lung Transplant consulted earlier in patient's hospital course. May consider transitioning to MTX, AZA, Cellcept etc for steroid-sparing therapy. Will defer this decision to patient's outpatient Pulmonologist at this time.

## 2018-07-04 NOTE — PLAN OF CARE
Problem: Patient Care Overview  Goal: Plan of Care Review  Plan of care discussed with patient. Patient is free of fall/trauma/injury. Heparin gtt D/C per MAR.    Patient educated on how to inject himself with Lovenox. Denies CP, SOB, or pain/discomfort. All questions addressed. Will continue to monitor.

## 2018-07-04 NOTE — CONSULTS
Pre LVAD/Transplant Infectious Diseases Consult  Heart Transplant Recipient Evaluation    Requesting Physician: Robyn Sanford PA-C    Reason for Visit:    Chief Complaint   Patient presents with    [place] Tx: CVA     transfer from [place] for neuro consult, pt has expressive aphasia onset of symptoms at 10am, no other neuro deficits noted, no TPA was given due to pt being outside of window     History of Present Illness  Yonathan Good is a 57 y.o. year old AA male with advanced Heart disease currently being evaluated for Heart transplant.  He has a history of sarcoidosis.Patient Patient denies any recent fever, chills, or infective illnesses.      1) Do you have a history of:   YES NO   Diabetes      [] [x]     Diabetic Foot Infection/Bone Infection  []        [x]     Surgical Removal of Spleen   []        [x]       2) Have you had recurrent infections involving:         Organs:   YES NO  Sinus infections  []         [x]   Sore Throat   []         [x]                 Prostate Infections  []         [x]              Bladder Infections  []         [x]                     Kidney Infections  []         [x]                               Intestinal Infections  []         [x]      Skin Infections   []         [x]       Reproductive Infections []         [x]        Periodontal Disease  []         [x]        3)Have you ever had: YES     NO     Chicken Pox   [x]         []    Shingles   []         [x]    Orolabial Herpes  [x]         []    Genital Herpes  []         [x]    Cytomegalovirus  []         [x]    Gurdeep-Barr Virus  []         [x]    Genital Warts   []         [x]    Hepatitis A   []         [x]    Hepatitis B   []         [x]     Hepatitis C   []         [x]    Syphilis   []         [x]    Gonorrhea   []         [x]   Pelvic Inflammatory   Disease   []         [x]    Chlamydia Infection  []         [x]    Intestinal parasites/worms []         [x]    Fungal Infections  []         [x]    Blood  Infections  []         []     Comment:      4) Have you ever been exposed   YES NO  To someone with tuberculosis?  []   [x]   If yes, what treatment did you receive:     5) What states have you lived in? [place], [place], [place], [place]    6) What countries have you visited for more than 2 weeks?    none                     YES NO  7) Did you have any associated travel infections? []  [x]       8) Are you planning to travel outside the    []  [x]   [place] after your transplant?    9) Household                  YES NO  Do you have pets living in your house/pet contact? []         [x]   If yes, describe:     Do you spend time or live on a farm or    []         [x]   have livestock or other farm animals?  If yes, which ones:    Do you have a fish tank?          []   [x]       Do you have a litter box?     []         [x]     Do you fish or hunt?      [x]         []     Do you clean or skin fish or animals?   [x]         []     Do you consume raw or undercooked   []         [x]   meat, fish, or shellfish?      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                                          YES NO  12)Do you garden or otherwise   work in the soil?      []         [x]     13)Do you hike, camp, or spend   time in wooded areas?     []         [x]        14) The patient's immunization history was reviewed.    Have you ever received:  YES NO UNKNOWN DATES   Routine Childhood vaccines  [x]         []  []      Influenza vaccine   [x]  []  []    Pneumovax vaccine   [x]  []  [] 1/2017    Tetanus-diptheria vaccine  []         []  [x]    Hepatitis A vaccine series       []  [x]  []    Hepatitis B vaccine series         []  [x]  []    Meningitis vaccine   []         []  [x]    Varicella vaccine   []         []  [x]        Based on the patients immunization history and serologies, immunizations were ordered:         Ordered  Not Ordered  Influenza vaccine     []    [x]    Hepatitis A vaccine series at 0 and 6 months [x]    []   Hepatitis B at 0, 1, and 6 months   []    [x]   Hepatitis B High Dose 0, 1, and 6 months  [x]    []   Prevnar vaccine     [x]    []   Pneumovax vaccine     []    [x]    TDap vaccine      [x]    []    Varicella vaccine     []    [x]   Menactra (meningitis) vaccine   []    [x]            The patient was encouraged to contact us about any problems that may develop after immunization and possible side effects were reviewed.      Previous Transplant: no    Etiology of Heart Disease: CHD    Allergies: Patient has no known allergies.  Immunization History   Administered Date(s) Administered    Influenza - Quadrivalent - PF 10/23/2014     Past Medical History:   Diagnosis Date    AICD (automatic cardioverter/defibrillator) present     Arthritis     Atrial fibrillation     CHF (congestive heart failure)     Coronary artery disease     Hypertension     MI (myocardial infarction)     Sarcoid     Seizures     Stroke      Past Surgical History:   Procedure Laterality Date    CARDIAC CATHETERIZATION      CARDIAC DEFIBRILLATOR PLACEMENT  7-12    CARDIAC DEFIBRILLATOR PLACEMENT      CARDIAC DEFIBRILLATOR PLACEMENT      COLONOSCOPY N/A 7/2/2018    Procedure: COLONOSCOPY;  Surgeon: THELMA Kay MD;  Location: [facility] ([address] [place] [address]);  Service: Endoscopy;  Laterality: N/A;    CORONARY STENT PLACEMENT  07/2011    ESOPHAGOGASTRODUODENOSCOPY      FRACTURE SURGERY      LEFT HEART CATHETERIZATION N/A 6/25/2018    Procedure: Left heart cath;  Surgeon: Jordi Lui MD;  Location: [facility] CATH LAB;  Service: Cardiology;  Laterality: N/A;      Social History     Social History    Marital status: Other     Spouse name: N/A    Number of children: N/A    Years of education: N/A     Occupational History    Not on file.     Social History Main Topics    Smoking status: Never Smoker    Smokeless tobacco: Never Used    Alcohol use No    Drug use: No    Sexual  activity: Not on file     Other Topics Concern    Not on file     Social History Narrative    No narrative on file       Review of Systems   Constitution: Positive for weight loss. Negative for chills, decreased appetite, fever, weakness, malaise/fatigue, night sweats and weight gain.   HENT: Negative for congestion, ear pain, hearing loss, hoarse voice, sore throat and tinnitus.    Eyes: Negative for blurred vision, redness and visual disturbance.   Cardiovascular: Positive for chest pain (occasional). Negative for leg swelling and palpitations.   Respiratory: Negative for cough, hemoptysis, shortness of breath, sputum production and wheezing.    Endocrine: Negative for cold intolerance and heat intolerance.   Hematologic/Lymphatic: Negative for adenopathy. Does not bruise/bleed easily.   Skin: Negative for dry skin, itching, rash and suspicious lesions.   Musculoskeletal: Negative for back pain, joint pain, myalgias and neck pain.   Gastrointestinal: Positive for heartburn. Negative for abdominal pain, constipation, diarrhea, nausea and vomiting.   Genitourinary: Negative for dysuria, flank pain, frequency, hematuria, hesitancy and urgency.   Neurological: Negative for dizziness, headaches, numbness and paresthesias.   Psychiatric/Behavioral: Negative for depression and memory loss. The patient does not have insomnia and is not nervous/anxious.    Allergic/Immunologic: Negative for environmental allergies, HIV exposure, hives and persistent infections.     Physical Exam   Constitutional: He is oriented to person, place, and time. He appears well-developed and well-nourished.       HENT:   Head: Normocephalic and atraumatic.   Mouth/Throat: Uvula is midline, oropharynx is clear and moist and mucous membranes are normal. He does not have dentures. No oral lesions. Abnormal dentition (some missing teeth). Dental caries (crowned) present. No dental abscesses or lacerations.   Eyes: Conjunctivae and lids are normal.  Pupils are equal, round, and reactive to light. No scleral icterus.   Neck: Neck supple.   Cardiovascular: Normal rate and regular rhythm.  Exam reveals no gallop and no friction rub.    No murmur heard.  Pulmonary/Chest: Effort normal and breath sounds normal. No respiratory distress. He has no decreased breath sounds. He has no wheezes. He has no rhonchi. He has no rales.   Abdominal: Soft. Normal appearance, normal aorta and bowel sounds are normal. He exhibits no distension and no mass. There is no hepatosplenomegaly. There is no tenderness. There is no rebound and no guarding.   Musculoskeletal: He exhibits no edema.   Lymphadenopathy:        Head (right side): No submental, no submandibular, no tonsillar, no preauricular, no posterior auricular and no occipital adenopathy present.        Head (left side): No submental, no submandibular, no tonsillar, no preauricular, no posterior auricular and no occipital adenopathy present.     He has no cervical adenopathy.     He has no axillary adenopathy.        Right: No inguinal, no supraclavicular and no epitrochlear adenopathy present.        Left: No inguinal, no supraclavicular and no epitrochlear adenopathy present.   Neurological: He is alert and oriented to person, place, and time. No cranial nerve deficit.   Skin: Skin is warm, dry and intact. No lesion and no rash noted. He is not diaphoretic. No erythema. No pallor.   Psychiatric: He has a normal mood and affect. His behavior is normal.     Diagnostics:   Microbiology Results (Last 90 Days)     ** No results found for the last 2160 hours. **         RPR   Date Value Ref Range Status   06/27/2018 Non-reactive Non-reactive Final     No results found for: CMVANTIBODIE  No results found for: HIV1X2  No results found for: HTLVIIIANTIB  Hepatitis B Surface Ag   Date Value Ref Range Status   06/27/2018 Negative  Final     Hep B Core Total Ab   Date Value Ref Range Status   06/27/2018 Negative  Final     Hepatitis C  Ab   Date Value Ref Range Status   06/27/2018 Negative  Final     No results found for: TOXOIGG  No components found for: TOXOIGGINTER  HSV 1 IgG   Date Value Ref Range Status   06/27/2018 Positive (A) Negative Final     HSV 2 IgG   Date Value Ref Range Status   06/27/2018 Positive (A) Negative Final     Varicella IgG   Date Value Ref Range Status   06/27/2018 2.78 (H) 0.00 - 0.90 ISR Final     Varicella Interpretation   Date Value Ref Range Status   06/27/2018 Positive (A) Negative Final     Comment:     <or=0.90     Negative        No detectable IgG antibody to Varicella zoster  by the YASH test. Such individuals are presumed to be   uninfected with Varicella zoster and to be susceptible to   primary infection.  0.91-1.09    Equivocal  >or=1.10     Positive        Indicates presence of detectable IgG antibody to Varicella   zoster by the YASH test. Indicative of previous or current   infection.       Strongyloides Ab IgG   Date Value Ref Range Status   06/27/2018 Negative Negative Final     Comment:     No detectable levels of IgG antibodies to Strongyloides.  Repeat testing in 1-2 weeks if clinically indicated.  Test Performed by:  [facility]– [place]  [address] [place] [address]       Gurdeep-Barr Virus IgG (VCA)   Date Value Ref Range Status   06/27/2018 Positive (A) Negative Final     Hep B S Ab   Date Value Ref Range Status   06/27/2018 Negative  Final     No results found for: QUANTIFERON  No results found for: HEPAIGM  No results found for: PPD       Ref. Range 6/27/2018 13:56 6/28/2018 10:04   Hep B Core Total Ab Unknown Negative    Hep B S Ab Unknown Negative    Hepatitis B Surface Ag Unknown Negative    Hepatitis C Ab Unknown Negative    Mitogen - Nil Latest Ref Range: See text IU/mL  >10.000   NIL Latest Ref Range: See text IU/mL  0.030   TB Gold Plus Unknown  Negative   TB1 - Nil Latest Ref Range: See text IU/mL  0.050   TB2 - Nil Latest Ref Range: See  text IU/mL  0.080   HIV 1/2 Ag/Ab Latest Ref Range: Negative  Negative    RPR Latest Ref Range: Non-reactive  Non-reactive    CMV IgG Interpretation Unknown Reactive (A)    Gurdeep-Barr Virus IgG (VCA) Latest Ref Range: Negative  Positive (A)    HSV 1 IgG Latest Ref Range: Negative  Positive (A)    HSV 2 IgG Latest Ref Range: Negative  Positive (A)    Strongyloides Ab IgG Latest Ref Range: Negative  Negative    Varicella IgG Latest Ref Range: 0.00 - 0.90 ISR 2.78 (H)    Varicella Interpretation Latest Ref Range: Negative  Positive (A)       Ref. Range 6/27/2018 13:56   Hepatitis A Antibody IgG Unknown Negative       Imaging:  CT Head Without Contrast [[id number]] Resulted: 06/29/18 2038   Order Status: Completed Updated: 06/29/18 2041   Narrative:     EXAMINATION:  CT HEAD WITHOUT CONTRAST    CLINICAL HISTORY:  Intracranial hemorrhage;    TECHNIQUE:  Low dose axial images were obtained through the head.  Coronal and sagittal reformations were also performed. Contrast was not administered.    COMPARISON:  06/24/2018    FINDINGS:  There is generalized cerebral volume loss.  There is hypoattenuation in a periventricular fashion, likely sequela of chronic microvascular ischemic change.There is a stable focus of encephalomalacia within the left posterior parietal occipital lobe.  There is no evidence of acute major vascular territory infarct, hemorrhage, or mass.  There is no hydrocephalus.  There are no abnormal extra-axial fluid collections.  The paranasal sinuses and mastoid air cells are clear, and there is no evidence of calvarial fracture.  The visualized soft tissues are unremarkable.   Impression:       1. Stable left parietal occipital encephalomalacia, no new low attenuating changes to suggest interval infarct.  No hemorrhage.      Electronically signed by: Sal Tavarez MD  Date: 06/29/2018  Time: 20:38   US Retroperitoneal Complete [[id number]] Resulted: 06/28/18 1149   Order Status: Completed Updated:  06/28/18 1152   Narrative:     EXAMINATION:  US RETROPERITONEAL COMPLETE    CLINICAL HISTORY:  heart transplant workup;    TECHNIQUE:  Ultrasound of the kidneys and urinary bladder was performed including color flow and Doppler evaluation of the kidneys.    COMPARISON:  CT 06/25/2018.    FINDINGS:  Right kidney: The right kidney measures 12.0 cm. Mild cortical thinning and decreased perfusion. Resistive index measures 0.72.  No mass. No renal stone. No hydronephrosis.    Left kidney: The left kidney measures 12.4 cm. Mild cortical thinning and decreased perfusion. Resistive index measures 0.82.  A simple appearing cyst is present within the upper pole measuring 1.1 x 1.1 x 1.0 cm, similar in appearance to prior CT 06/25/2018.  No mass. No renal stone. No hydronephrosis.    The bladder is partially distended at the time of scanning and has an unremarkable appearance.    The prostate measures 2.7 x 3.1 x 3.3 cm.   Impression:       Medical renal disease.    Left renal cyst.    Electronically signed by resident: Bebo Randolph  Date: 06/28/2018  Time: 11:36    Electronically signed by: Prudencio Santos MD  Date: 06/28/2018  Time: 11:49        CT CHEST WITHOUT CONTRAST   Status: Final result   [facility] Results Release     [facility] Status: Declined    PACS Images     Show images for CT CHEST WITHOUT CONTRAST   Reviewed By Fabio Holloway MD on 5/4/2018 10:19   Kirstin Mario, RN on 5/3/2018 15:08   Varsha Diaz RN on 5/3/2018 14:36   External Result Report     External Result Report   Narrative     EXAMINATION:  CT CHEST WITHOUT CONTRAST    CLINICAL HISTORY:  lung transplant consult; Sarcoidosis of lung    TECHNIQUE:  Low dose axial images, sagittal and coronal reformations were obtained from the thoracic inlet to the lung bases. Contrast was not administered.    COMPARISON:  CT chest 07/14/2015; chest radiograph 03/25/2013; chest radiograph 07/09/2012; chest radiograph 07/04/2012    FINDINGS:  Base of Neck:  Pacer device resides within the subcutaneous tissues overlying the left hemithorax with transvenous pacer wires coursing to the heart.    Thoracic soft tissues: Within normal limits.    Aorta: 3 vessel left-sided aortic arch.  Mild atherosclerotic calcification.  No aneurysm.    Heart: Heart is mildly enlarged in size.  There are coronary atherosclerotic calcifications.  Fat deposition in the wall of the left ventricle consistent with prior myocardial infarction.  No pericardial effusion.    Pulmonary vasculature: Pulmonary arteries distribute normally.  There are four pulmonary veins.    Zhane/Mediastinum: There are scattered, partially calcified mediastinal lymph nodes.  No pathologic adenopathy.    Airways: Patent.    Lungs/Pleura: There is a small right pleural effusion.  There has been significant improvement as compared to the prior examination with improved appearance of patchy opacities identified throughout both lungs.  There are persistent bandlike nodular opacities along the bronchovascular bundles with more focal areas of consolidation likely secondary to fibrosis.  Findings are compatible with patient's history of sarcoidosis (radiographic stage III).    Esophagus: Normal.    Upper Abdomen: There are calcified granulomas in the spleen.  Hypodensity in the superior pole left kidney is a cyst.    Bones: Mild degenerative changes without acute fracture.  No osseous destructive process.   Impression       Bandlike nodular opacities and more focal areas of consolidation likely secondary to fibrosis are consistent with patient's history of sarcoidosis (radiographic stage III).    Previously identified focal opacity in the left lower lobe has decreased in size when compared to prior chest CT 07/14/2015 and appears to represent a focus of consolidation.    Stable small right pleural effusion.    Mild cardiomegaly and coronary atherosclerotic disease with evidence of prior myocardial infarction.    Electronically  signed by resident: Rafa Chisholm  Date: 05/03/2018  Time: 12:43      2D echo with color flow doppler   Order: [id number]   Status:  Final result   Visible to patient:  Yes (MyChart Bedside) Next appt:  07/17/2018 at 10:00 AM in Cardiology (EKG, APPT) Dx:  Stroke   Details     Narrative     Date of Procedure: 06/23/2018        TEST DESCRIPTION   Technical Quality: This is a technically challenging study.     General: A catheter is present in the right-sided cardiac chambers.     Aorta: The aortic root is normal in size, measuring 2.9 cm at sinotubular junction and 3.4 cm at Sinuses of Valsalva. The proximal ascending aorta is normal in size, measuring 3.1 cm across.     Left Atrium: The left atrial volume index is severely enlarged, measuring 63.92 cc/m2.     Left Ventricle: The left ventricle is severely enlarged, with an end-diastolic diameter of 8.3 cm, and an end-systolic diameter of 8.0 cm. LV wall thickness is normal, with the septum measuring 0.9 cm and the posterior wall measuring 0.8 cm across.   Relative wall thickness was normal at 0.19, and the LV mass index was increased at 193.6 g/m2 consistent with moderate to severe eccentric left ventricular hypertrophy. The anterior septum is akinetic. The inferior septum is akinetic. The inferior wall   is akinetic. The apex is akinetic.   The following segments were akinetic: basal inferolateral wall, apical lateral wall, mid anterolateral wall.  The following segments were moderately hypokinetic: apical anterior wall.  The following segments were severely hypokinetic: mid anterior wall.  There is global hypokinesis. Left ventricular systolic function appears severely depressed. Visually estimated ejection fraction is 5-10%. The LV Doppler derived stroke volume equals 21.0 ccs.     Diastolic indices: Mitral inflow pattern reveals fusion of E & A waves. E/e' ratio(avg) 14.     Right Atrium: The right atrium is normal in size, measuring 4.2 cm in length and 4.1 cm  in width in the apical view.     Right Ventricle: The right ventricle is enlarged measuring 4.6 cm at the base in the apical right ventricle-focused view. Global right ventricular systolic function appears low normal. There is abnormal septal motion. Tricuspid annular plane systolic   excursion (TAPSE) is 2.6 cm. Tissue Doppler-derived tricuspid annular peak systolic velocity (S prime) is 14.8 cm/s. The estimated PA systolic pressure is 21 mmHg.     Aortic Valve:  Aortic valve is normal in structure with normal leaflet mobility.     Mitral Valve:  Mitral valve is normal in structure with normal leaflet mobility. There is mild to moderate mitral regurgitation.     Tricuspid Valve:  Tricuspid valve is normal in structure with normal leaflet mobility. There is trivial to mild tricuspid regurgitation.     Pulmonary Valve:  Pulmonary valve is normal in structure with normal leaflet mobility.     IVC: IVC is normal in size and collapses > 50% with a sniff, suggesting normal right atrial pressure of 3 mmHg.     Intracavitary: There is no evidence of pericardial effusion, intracavity mass, thrombi, or vegetation.         CONCLUSIONS     1 - Severe left ventricular enlargement.     2 - Severely depressed left ventricular systolic function (EF 5-10%).     3 - Eccentric hypertrophy.     4 - Severe left atrial enlargement.     5 - Right ventricular enlargement with low normal systolic function.     6 - Mild to moderate mitral regurgitation.     7 - Trivial to mild tricuspid regurgitation.     8 - The estimated PA systolic pressure is 21 mmHg.                   Transplant Infectious Diseases - Candidacy    Assessment/Plan:     Transplant Candidacy: Based on available information, there are no identified significant barriers to transplantation from an infectious disease standpoint pending a negative coccidioides antibody. Notify ID if positive.  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  If breakouts occur after  transplant he may need suppression.  Final determination of transplant candidacy will be made once evaluation is complete and reviewed by the Transplant Selection Committee.    MIRIAM Del Valle  Vaccine and Serology Needs:  1. Coccidioides Serum Antibody  2. Hep A Vaccine today and 6 months  3. Hep B high dose(40mcg/dose) today, 1 month and 6 months  4. Tdap t  5. Prevnar  Follow up Hep A and Hep B vaccines ordered as outpt       Counseling:I discussed with Yonathan the risk for increased susceptibility to infections following transplantation including increased risk for infection right after transplant and if rejection should occur.  The patients has been counseled on the importance of vaccinations including but not limited to a yearly flu vaccine.  Specific guidance has been provided to the patient regarding the patients occupation, hobbies and activities to avoid future infectious complications including but not limited to avoiding undercooked meats and seafood, proper hygiene, and contact with animals.

## 2018-07-04 NOTE — ASSESSMENT & PLAN NOTE
- Patient clinically euvolemic today. Continue PTA Lasix 80 mg PO BID. Continue GDMT for ischemic cardiomyopathy (LVEF 5-10%). Currently on the pathway for advanced options. Will follow up with [facility] in 3 weeks.

## 2018-07-04 NOTE — PLAN OF CARE
Problem: Patient Care Overview  Goal: Plan of Care Review  Outcome: Ongoing (interventions implemented as appropriate)  Patient vital signs stable and remains free from injury.  Patient educated on plan of care: heparin drip in place, first dose of coumadin given, will bridge with Lovenox with plans to discharge home 7/4 and reports understanding.  All questions and concerns addressed.

## 2018-07-04 NOTE — DISCHARGE SUMMARY
[facility]  Heart Transplant  Discharge Summary      Patient Name: Yonathan Good Jr.  MRN: [id number]  Admission Date: 6/22/2018  Hospital Length of Stay: 11 days  Discharge Date and Time: 07/04/2018 7:30 AM  Attending Physician: Britt Melgar MD   Discharging Provider: Terrie Tran MD  Primary Care Provider: Edgar Gates MD     HPI: 57 y.o. male  with h/o ischemic cardiomyopathy(negative for sarcoid on cardiac MRI 2012), H/o stents 7 years ago,Pulmonary sarcoid, left parietal stroke in the past, atrial fibrillation, CRT-D(st Giorgio) who follows Dr Berman and is being worked up as outpatient for advanced options. He was transferred to Stroke service last evening with difficulty speaking with suspected Stroke.Initial workup included CVA which was negative for acute CVA.No tPA was given. This am patient complained of chest pain and dizziness and was noted to be in Monomorphic VT -Code blue was called.He was ATP'ed out of it but he also had an external shock delivered by rapid repsonse team as he was persistently in VT but no shock was delivered.He went into Fast VT with rate>200 and his ICD shocked him out of it. He was transferred to ICU.He reports having chest pain for last 4-5 years on exertion relieved with nitro-reports stress test done in [place].Last stress test 2015 in [facility]. He was loaded with amiodarone.He had 2 more episodes of VT which were slower at 150-His device did not ATP as below VT-1 zone. He also complains of cough with pinkish sputum,SOB.He reports no more chest pain since the shock.  He was recently scheduled for colonoscopy as part of transplant workup for which his coumadin was held and he was placed on eliquis temporarily.He had chest pain on day of c-scope and he had to take a nitro and his procedure was cancelled(5 days ago). He reports he started taking his coumadin after.His INR was subtherapeutic on arrival.     He states that his pulmonary sarcoid was  "diagnosed in 2015 when he was evaluated for a dry non-productive cough.  States that he underwent bronchoscopy with biopsies which confirmed sarcoid.  Since then, he has been on varying doses of Prednisone He has no other organ involvement from sarcoid and states that his only symptom has been cough. Denies any alcohol or illicit drug use.  Previously worked as a .     Procedure(s) (LRB):  COLONOSCOPY (N/A)     Hospital Course: Yonathan Good is a 57 year old male with PMHx significant for CAD s/p PCIs, HFrEF secondary to ischemic cardiomyopathy (EF 5-10%) s/p ICD, Afib (on warfarin), pulmonary sarcoid (on chronic prednisone), hx of L parietal CVA who was initially admitted to "stroke/neuro" service on 6/22 with dysarthria and stroke-like symptoms. Extensive work up was negative for recurrent CVA and so no tPA was administered. Patient developed atypical chest pain two days into his hospital course and was noted to be in monomorphic VT (6/24) which was treated with ATP then with shock due to recurrent VT in VF zone. Given his active cardiac issues this prompted transfer to "heart failure/cardiology" service where the patient was initiated on IV amiodarone and beta blocker with subsequent resolution of his VT. He was eventually transitioned to PO amiodarone 400 mg BID x 2 weeks (from 6/25-7/9) followed by amiodarone 400 mg daily thereafter per EP recommendations. Of note patient underwent LHC on 6/25 given his extensive ischemic history which did not reveal a culprit lesion, therefore no interventions were done. He was noted to have an elevated LVEDP to 45 however and was administered IV diuresis before being transitioned back to his PO diuretic regimen. Patient also completed heart failure pathway during his hospital course (eg completed colonoscopy on 7/2) as part of his work up for advanced options. Follows with Dr. Berman of heart failure/transplant as an outpatient.     The patient was discharged home in " stable condition on 7/4/2018. He will need close follow up with his PCP within 1 week as well as with EP in 4-6 weeks on discharge, a referral was placed. He will also need to follow up with the advanced heart failure / transplant clinic in 3 weeks to continue advanced options discussion/planning, a referral was placed.     He was discharged with the following medication changes:  - Start taking amiodarone 400 mg BID x 2 weeks (until 7/9) followed by 400 mg daily given episodes of recurrent VT. Stop taking previous antiarrhythmic sotalol.  - Start taking metoprolol tartrate 25 mg BID for additional beta blockade given episodes of recurrent VT.   - Start taking atorvastatin 40 mg daily (high intensity statin). Stop taking pravastatin as a result.   - Start taking Keppra 500 mg BID per inpatient neurology recommendations and stop taking Dilantin given drug-drug interaction between Dilantin and warfarin.   - Start Lovenox 100 mg q12h for 3-5 days as bridge to warfarin given subtherapeutic INR on day of discharge and elevated CHADS2-VaSc of 5. This will need to be followed up on closely.   - His prednisone was de-escalated to 10 mg PO daily. He Should continue to follow up with his outpatient Pulmonologist to discuss possibly transitioning to alternative immunosuppression agents over prednisone (eg AZA, MTX, Cellcept, etc).  - No other changes were made to his home medications.        Consults         Status Ordering Provider     Inpatient consult to Cardiothoracic Surgery  Once     Provider:  (Not yet assigned)    Completed ANDERSON MTZ     Inpatient consult to Colorectal Surgery  Once     Provider:  (Not yet assigned)    Completed DWAYNE DALLAS     Inpatient consult to Electrophysiology  Once     Provider:  (Not yet assigned)    Completed JAYCOB MORENO     Inpatient consult to Infectious Diseases  Once     Provider:  (Not yet assigned)    Completed GE RICHARD     Inpatient consult to  "Interventional Cardiology  Once     Provider:  (Not yet assigned)    Completed AROLDO CALIX     Inpatient consult to Lung Transplant  Once     Provider:  (Not yet assigned)    Completed AROLDO CALIX     Inpatient consult to Midline team  Once     Provider:  (Not yet assigned)    Completed MOHSEN RODRIGUEZ     Inpatient consult to Physical Medicine Rehab  Once     Provider:  (Not yet assigned)    Completed EVETTE HINTON     Inpatient consult to PICC team ([facility])  Once     Provider:  (Not yet assigned)    Completed MOHSEN RODRIGUEZ     Inpatient Consult to Pre-VAD Coordinator  Once     Provider:  (Not yet assigned)    Completed AROLDO CALIX     Inpatient consult to Registered Dietitian/Nutritionist  Once     Provider:  (Not yet assigned)    Completed DWAYNE DALLAS     Inpatient Consult to Transplant Coordinator  Once     Provider:  (Not yet assigned)    Completed AROLDO CALIX     IP consult to case management/social work  Once     Provider:  (Not yet assigned)    Acknowledged EVETTE HINTON     IP consult to dietary  Once     Provider:  (Not yet assigned)    Completed EVETTE HINTON     Nutrition Services Referral  Once     Provider:  (Not yet assigned)    Completed EVETTE HINTON          Significant Diagnostic Studies: Labs:   CMP     Recent Labs  Lab 07/03/18  0516 07/04/18  0608    139   K 4.6 3.9    99   CO2 24 29   GLU 93 84   BUN 15 18   CREATININE 1.1 1.4   CALCIUM 9.6 10.0   PROT 6.8 6.8   ALBUMIN 3.4* 3.5   BILITOT 0.6 0.7   ALKPHOS 64 78   AST 43* 38   ALT 53* 55*   ANIONGAP 11 11   ESTGFRAFRICA >60.0 >60.0   EGFRNONAA >60.0 56.2*   , CBC     Recent Labs  Lab 07/03/18  0516 07/04/18  0608   WBC 6.87 6.38   HGB 11.4* 11.7*   HCT 37.4* 37.2*    168    and INR   Lab Results   Component Value Date    INR 1.2 07/04/2018    INR 1.3 (H) 07/03/2018    INR 1.4 (H) 06/30/2018     Cardiac Graphics:   Echocardiogram (6/23/2018): PER REPORT:   " 1 - Severe left " "ventricular enlargement.     2 - Severely depressed left ventricular systolic function (EF 5-10%).     3 - Eccentric hypertrophy.     4 - Severe left atrial enlargement.     5 - Right ventricular enlargement with low normal systolic function.     6 - Mild to moderate mitral regurgitation.     7 - Trivial to mild tricuspid regurgitation.     8 - The estimated PA systolic pressure is 21 mmHg."     Angiography (6/25/2018): PER REPORT:  "Diagnostic:        Patient has a right dominant coronary artery.      - Left Main Coronary Artery:             The LM has luminal irregularities. There is TARI 3 flow.     - Left Anterior Descending Artery:             The LAD has a 30% stenosis. There is TARI 3 flow. Patent stent.      - Left Circumflex Artery:             The proximal LCX has a 70% stenosis. There is TARI 3 flow.     - OM1:             The OM1 has a 80% stenosis. There is TARI 3 flow. Bifurcation lesion.      - Right Coronary Artery:             The RCA has luminal irregularities. There is TARI 3 flow.     - Radial:             The radial has a 30% stenosis."    Colonoscopy (7/2/2018): PER REPORT:  - Diverticulosis in the sigmoid colon and in the descending colon.  - Two 8 to 10 mm polyps in the sigmoid colon and in the descending colon, removed with a hot snare. Resected and retrieved. Ligated.   - The examined portion of the ileum was normal.    Pending Diagnostic Studies:     Procedure Component Value Units Date/Time    Toxoplasma Gondii IgG [[id number]] Collected:  06/27/18 1356    Order Status:  Sent Lab Status:  In process Updated:  06/27/18 1433    Specimen:  Blood     Von Willebrand multimeric [[id number]] Collected:  06/29/18 1304    Order Status:  Sent Lab Status:  In process Updated:  06/29/18 1332    Specimen:  Blood from Blood         Final Active Diagnoses:    Diagnosis Date Noted POA    PRINCIPAL PROBLEM:  Acute on chronic systolic congestive heart failure [I50.23] 06/22/2018 Yes    History of colon " polyps [Z86.010] 07/03/2018 Not Applicable    Left atrial enlargement [I51.7] 07/01/2018 Yes    History of atrial fibrillation [Z86.79] 07/01/2018 Not Applicable    Lung transplant candidate [Z76.82]  Not Applicable    Biventricular cardiac pacemaker in situ [Z95.0] 08/13/2012 Yes    CAD (coronary artery disease) [I25.10] 08/13/2012 Yes    Sarcoidosis of lung [D86.0] 08/13/2012 Yes    Seizure disorder [G40.909] 08/13/2012 Yes      Problems Resolved During this Admission:    Diagnosis Date Noted Date Resolved POA    VT (ventricular tachycardia) [I47.2] 07/02/2018 07/04/2018 Yes    Heart transplant candidate [Z76.82] 06/28/2018 07/04/2018 Not Applicable    Ventricular tachycardia [I47.2] 06/26/2018 07/04/2018 Yes    NSTEMI (non-ST elevated myocardial infarction) [I21.4] 06/25/2018 06/27/2018 Unknown    Chest pain [R07.9]  06/25/2018 Unknown    Transient neurological symptoms [R29.818] 06/23/2018 07/04/2018 Yes    Fluency disorder associated with underlying disease [R47.82] 06/22/2018 06/25/2018 Yes      Discharged Condition: good    Disposition: Home.     Follow Up:  Follow-up Information     Edgar Gates MD In 1 week.    Specialty:  Family Medicine  Contact information:  [address] [place] [id number] [phone]               - PCP follow up in 1 week.  - Heart failure / Transplant clinic follow up in 3 weeks.   - Electrophysiology follow up in 4-6 weeks.     Patient Instructions:     Ambulatory Referral to Electrophysiology   Referral Priority: Routine Referral Type: Consultation   Referral Reason: Specialty Services Required    Requested Specialty: Electrophysiology    Number of Visits Requested: 1      Ambulatory Referral to Transplant, Heart   Referral Priority: Routine Referral Type: Transplants   Referred to Provider: FLORIN GALLEGOS    Number of Visits Requested: 1        Medications:  Reconciled Home Medications:      Medication List      START taking these medications    amiodarone  400 MG tablet  Commonly known as:  PACERONE  Take 1 tablet (400 mg total) by mouth 2 (two) times daily until 7/9/2018. Then take 1 tablet (400 mg total) by mouth 1 (one) time daily thereafter.     atorvastatin 40 MG tablet  Commonly known as:  LIPITOR  Take 1 tablet (40 mg total) by mouth once daily.     enoxaparin 100 mg/mL Syrg  Commonly known as:  LOVENOX  Inject 1 mL (100 mg total) into the skin every 12 (twelve) hours.     levETIRAcetam 500 MG Tab  Commonly known as:  KEPPRA  Take 1 tablet (500 mg total) by mouth 2 (two) times daily.     metoprolol tartrate 25 MG tablet  Commonly known as:  LOPRESSOR  Take 1 tablet (25 mg total) by mouth 2 (two) times daily.        CHANGE how you take these medications    isosorbide mononitrate 30 MG 24 hr tablet  Commonly known as:  IMDUR  Take 3 tablets (90 mg total) by mouth once daily.  What changed:  how much to take     predniSONE 10 MG tablet  Commonly known as:  DELTASONE  Take 1 tablet (10 mg total) by mouth once daily.  What changed:  See the new instructions.        CONTINUE taking these medications    * albuterol 90 mcg/actuation inhaler  Commonly known as:  VENTOLIN HFA  Inhale 2 puffs into the lungs every 4 (four) hours as needed for Wheezing.     * albuterol 2.5 mg /3 mL (0.083 %) nebulizer solution  Commonly known as:  PROVENTIL  Take 2.5 mg by nebulization every 6 (six) hours as needed.     ALPRAZolam 2 MG Tab  Commonly known as:  XANAX  Take 2 mg by mouth 2 (two) times daily as needed.     aspirin 81 MG EC tablet  Commonly known as:  ECOTRIN  Take 81 mg by mouth. 1 Tablet, Delayed Release (E.C.) Oral Every day     BREO ELLIPTA 200-25 mcg/dose Dsdv diskus inhaler  Generic drug:  fluticasone-vilanterol  Inhale 1 puff into the lungs once daily. Controller     bumetanide 1 MG tablet  Commonly known as:  BUMEX  Take 1 mg by mouth daily as needed.     carisoprodol 350 MG tablet  Commonly known as:  SOMA  Take 350 mg by mouth 4 (four) times daily as needed for Muscle  spasms.     colchicine 0.6 mg tablet  0.6 mg once daily.     furosemide 40 MG tablet  Commonly known as:  LASIX  TAKE 2 TABLETS BY MOUTH TWICE DAILY     HYDROcodone-acetaminophen  mg per tablet  Commonly known as:  NORCO  Take 1 tablet by mouth 2 (two) times daily as needed.     losartan 50 MG tablet  Commonly known as:  COZAAR  Take 1 tablet (50 mg total) by mouth once daily.     NEURONTIN 600 MG tablet  Generic drug:  gabapentin  Take 600 mg by mouth 2 (two) times daily. 1 Tablet Oral At bedtime     nitroGLYCERIN 0.4 MG SL tablet  Commonly known as:  NITROSTAT  ONE TABLET UNDER TONGUE AS NEEDED FOR CHEST PAIN     potassium chloride SA 20 MEQ tablet  Commonly known as:  K-DUR,KLOR-CON  TAKE 2 TABLETS BY MOUTH EVERY MORNING AND 1 TABLET BY MOUTH EVERY EVENING     promethazine-codeine 6.25-10 mg/5 ml 6.25-10 mg/5 mL syrup  Commonly known as:  PHENERGAN with CODEINE  TK 5 ML PO  Q 6 H PRN     PROTONIX 40 MG tablet  Generic drug:  pantoprazole  Take 40 mg by mouth. 1 Tablet, Delayed Release (E.C.) Oral Every day     spironolactone 25 MG tablet  Commonly known as:  ALDACTONE  TAKE 1 TABLET BY MOUTH EVERY DAY     warfarin 7.5 MG tablet  Commonly known as:  COUMADIN  1 tablet Monday 0.5 tablet Tuesday-Sunday        * This list has 2 medication(s) that are the same as other medications prescribed for you. Read the directions carefully, and ask your doctor or other care provider to review them with you.            STOP taking these medications    phenytoin 100 MG ER capsule  Commonly known as:  DILANTIN     pravastatin 40 MG tablet  Commonly known as:  PRAVACHOL     sotalol 160 MG Tab  Commonly known as:  BETAPACE            Terrie Tran MD  Heart Transplant  [facility]

## 2018-07-04 NOTE — PROGRESS NOTES
[facility]  Heart Transplant  Progress Note    Patient Name: Yonathan Good Jr.  MRN: [id number]  Admission Date: 6/22/2018  Hospital Length of Stay: 11 days  Attending Physician: Britt Melgar MD  Primary Care Provider: Edgar Gates MD  Principal Problem:Acute on chronic systolic congestive heart failure    Subjective:     Interval History: No acute issues overnight. Patient doing well this morning. Ready for discharge home. Denies f/c/s, cp, sob, n/v, abdominal complaints.     Continuous Infusions:  Scheduled Meds:   amiodarone  400 mg Oral BID    aspirin  81 mg Oral Daily    atorvastatin  40 mg Oral Daily    colchicine  0.6 mg Oral Daily    enoxaparin  1 mg/kg (Dosing Weight) Subcutaneous Q12H    fluticasone-vilanterol  1 puff Inhalation Daily    furosemide  80 mg Oral BID    gabapentin  600 mg Oral TID    isosorbide mononitrate  90 mg Oral Daily    levETIRAcetam  500 mg Oral BID    metoprolol tartrate  25 mg Oral BID    pantoprazole  40 mg Oral Daily    predniSONE  10 mg Oral Daily    spironolactone  25 mg Oral Daily    warfarin  7.5 mg Oral Daily     PRN Meds:ALPRAZolam, butalbital-acetaminophen-caffeine -40 mg, heparin (PORCINE), heparin (PORCINE), HYDROcodone-acetaminophen, labetalol    Review of patient's allergies indicates:  No Known Allergies  Objective:     Vital Signs (Most Recent):  Temp: 98.4 °F (36.9 °C) (07/04/18 0752)  Pulse: 77 (07/04/18 0830)  Resp: 18 (07/04/18 0830)  BP: 115/77 (07/04/18 0830)  SpO2: (!) 91 % (07/04/18 0752) Vital Signs (24h Range):  Temp:  [98.4 °F (36.9 °C)-98.6 °F (37 °C)] 98.4 °F (36.9 °C)  Pulse:  [] 77  Resp:  [15-20] 18  SpO2:  [91 %-98 %] 91 %  BP: ()/(65-82) 115/77     Patient Vitals for the past 72 hrs (Last 3 readings):   Weight   07/04/18 0500 101.6 kg (223 lb 15.8 oz)   07/03/18 1152 102.2 kg (225 lb 5 oz)   07/03/18 0600 102.2 kg (225 lb 5 oz)     Body mass index is 31.24 kg/m².      Intake/Output Summary (Last 24  hours) at 07/04/18 1022  Last data filed at 07/04/18 0600   Gross per 24 hour   Intake              960 ml   Output             2000 ml   Net            -1040 ml       Hemodynamic Parameters:       Telemetry: reviewed    Physical Exam   Constitutional: He is oriented to person, place, and time. He appears well-developed and well-nourished.   HENT:   Head: Normocephalic and atraumatic.   Eyes: EOM are normal.   Neck: Neck supple. No JVD present.   Cardiovascular: Normal rate and regular rhythm.    No murmur heard.  Pulmonary/Chest: Effort normal and breath sounds normal. He has no rales.   Abdominal: Soft. Bowel sounds are normal. He exhibits no distension. There is no tenderness.   Neurological: He is alert and oriented to person, place, and time.   Skin: Skin is warm and dry.   Psychiatric: He has a normal mood and affect. Judgment and thought content normal.       Significant Labs:  CBC:    Recent Labs  Lab 07/02/18  0715 07/03/18  0516 07/04/18  0608   WBC 9.25 6.87 6.38   RBC 4.11* 3.87* 3.93*   HGB 12.3* 11.4* 11.7*   HCT 39.7* 37.4* 37.2*    158 168   MCV 97 97 95   MCH 29.9 29.5 29.8   MCHC 31.0* 30.5* 31.5*     BNP:    Recent Labs  Lab 06/27/18  1356 07/02/18  0715   * 894*     CMP:    Recent Labs  Lab 07/02/18  0715 07/03/18  0516 07/04/18  0608   GLU 90 93 84   CALCIUM 10.4 9.6 10.0   ALBUMIN 3.8 3.4* 3.5   PROT 7.4 6.8 6.8    137 139   K 4.5 4.6 3.9   CO2 27 24 29   CL 97 102 99   BUN 19 15 18   CREATININE 1.2 1.1 1.4   ALKPHOS 68 64 78   ALT 48* 53* 55*   AST 38 43* 38   BILITOT 0.6 0.6 0.7      Coagulation:     Recent Labs  Lab 06/28/18  0504 06/30/18  1257 07/03/18  0516 07/04/18  0608   INR 1.1 1.4* 1.3* 1.2   APTT 22.1  --   --   --      LDH:  No results for input(s): LDH in the last 72 hours.  Microbiology:  Microbiology Results (last 7 days)     ** No results found for the last 168 hours. **          I have reviewed all pertinent labs within the past 24 hours.    Estimated  Creatinine Clearance: 72.4 mL/min (based on SCr of 1.4 mg/dL).    Diagnostic Results:  I have reviewed all pertinent imaging results/findings within the past 24 hours.    Assessment and Plan:     57 y.o. male  with h/o ischemic cardiomyopathy(negative for sarcoid on cardiac MRI 2012), H/o stents 7 years ago,Pulmonary sarcoid, left parietal stroke in the past, atrial fibrillation, CRT-D(st Giorgio) who follows Dr Berman and is being worked up as outpatient for advanced options. He was transferred to Stroke service last evening with difficulty speaking with suspected Stroke.Initial workup included CVA which was negative for acute CVA.No tPA was given. This am patient complained of chest pain and dizziness and was noted to be in Monomorphic VT -Code blue was called.He was ATP'ed out of it but he also had an external shock delivered by rapid repsonse team as he was persistently in VT but no shock was delivered.He went into Fast VT with rate>200 and his ICD shocked him out of it. He was transferred to ICU.He reports having chest pain for last 4-5 years on exertion relieved with nitro-reports stress test done in [place].Last stress test 2015 in [facility]. He was loaded with amiodarone.He had 2 more episodes of VT which were slower at 150-His device did not ATP as below VT-1 zone. He also complains of cough with pinkish sputum,SOB.He reports no more chest pain since the shock.  He was recently scheduled for colonoscopy as part of transplant workup for which his coumadin was held and he was placed on eliquis temporarily.He had chest pain on day of c-scope and he had to take a nitro and his procedure was cancelled(5 days ago). He reports he started taking his coumadin after.His INR was subtherapeutic on arrival.     He states that his pulmonary sarcoid was diagnosed in 2015 when he was evaluated for a dry non-productive cough.  States that he underwent bronchoscopy with biopsies which confirmed sarcoid.  Since then, he  has been on varying doses of Prednisone He has no other organ involvement from sarcoid and states that his only symptom has been cough. Denies any alcohol or illicit drug use.  Previously worked as a .     * Acute on chronic systolic congestive heart failure    - Patient clinically euvolemic today. Continue PTA Lasix 80 mg PO BID. Continue GDMT for ischemic cardiomyopathy (LVEF 5-10%). Currently on the pathway for advanced options. Will follow up with HTS in 3 weeks.         History of atrial fibrillation    - Elevated CHADS2-VaSc of 5. Continue Lovenox 1 mg/kg q12h with bridge to warfarin given subtherapeutic INR. Will need follow up with PCP / Coumadin Clinic for ongoing management on discharge.           Seizure disorder    - Continue Keppra per Neuro recommendations (discontinued PTA Dilantin due to interaction with warfarin).        CAD (coronary artery disease)    - Patient underwent LHC on 6/25 this admission which demonstrated diffuse disease but no culprit or target lesion. LM luminal irreg, LAD 30% with patent stent, pLCx 70%, OM1 80%, RCA luminal irreg. Suspect his episode of VT storm is likely scar mediated. Continue GDMT.           Sarcoidosis of lung    - Tapered PTA prednisone regimen down to 10 mg PO daily. Pulmonary / Lung Transplant consulted earlier in patient's hospital course. May consider transitioning to MTX, AZA, Cellcept etc for steroid-sparing therapy. Will defer this decision to patient's outpatient Pulmonologist at this time.           Biventricular cardiac pacemaker in situ    - St Giorgio device interrogated earlier in hospitalization. See EP notes          This patient was discussed with Dr. Berman.     Terrie Tran MD  Heart Transplant  [facility]

## 2018-07-04 NOTE — TELEPHONE ENCOUNTER
"  Reason for Disposition   General information question, no triage required and triager able to answer question    Answer Assessment - Initial Assessment Questions  1. REASON FOR CALL or QUESTION: "What is your reason for calling today?" or "How can I best help you?" or "What question do you have that I can help answer?"      Pt discharged from hospital today. Pharmacy is closed so he can't get meds. Wants them called to [facility] [place].    Protocols used: ST INFORMATION ONLY CALL-A-    meds called to [facility] [place] pharmacy per pt request-pharmacist advised they will not be ready this pm due to time. Will inform pt who was just a few minutes away.  "

## 2018-07-05 NOTE — PROGRESS NOTES
C3 nurse attempted to contact patient. No answer. The following message was left for the patient to return the call:  Good afternoon I am a nurse calling on behalf of [facility] from the [facility].  This is a Transitional Care Call for Yonathan Good Jr. . When you have a moment please contact us at [phone] or [phone] Monday through Friday, between the hours of 8 am to 4 pm. We look forward to speaking with you. On behalf of [facility] have a nice day.    The patient does not have a scheduled HOSFU appointment within 7 days post hospital discharge date 7\4.

## 2018-07-05 NOTE — PHYSICIAN QUERY
PT Name: Yonathan Good Jr.  MR #: [id number]     Physician Query Form - Documentation Clarification      CDS/: Latoya Segovia RN, CCDS              Contact information: [email or web address]    This form is a permanent document in the medical record.     Query Date: July 5, 2018    By submitting this query, we are merely seeking further clarification of documentation. Please utilize your independent clinical judgment when addressing the question(s) below.    The Medical record reflects the following:    Supporting Clinical Findings Location in Medical Record     Acute on chronic systolic CHF    BNP upon admission found to be >1000, and he underwent aggressive diuresis per HTS, now on IV lasix 80 mg BID currently for acute on chronic diastolic heart failure.   6/26-7/4 prog notes, 7/4 d/c summary    6/28 transplant lung prog note     CONCLUSIONS     1 - Severe left ventricular enlargement.     2 - Severely depressed left ventricular systolic function (EF 5-10%).     3 - Eccentric hypertrophy.     4 - Severe left atrial enlargement.     5 - Right ventricular enlargement with low normal systolic function.     6 - Mild to moderate mitral regurgitation.     7 - Trivial to mild tricuspid regurgitation.     8 - The estimated PA systolic pressure is 21 mmHg.        6/23 echo                                                                             Doctor, Please specify diagnosis or diagnoses associated with above clinical findings.  Please clarify conflicting documentation.    Provider Use Only      (   x )   Acute on chronic Systolic HF    (    )   Acute on chronic Diastolic HF                                                                                                                         [  ] Clinically undetermined

## 2018-07-06 PROBLEM — K92.2 GI BLEED: Status: ACTIVE | Noted: 2018-01-01

## 2018-07-06 NOTE — PROGRESS NOTES
7/3/18 - Per Dr. Berman, no options for LVAD at this time r/t VT and VF. Pt also has h/o seizures and possible recent TIA .  Will cancel VAD education for now. Pt had been referred by Dr. Chuck Antunez and was previously met by VAD team in 6/2015.  Pt removed from LVAD potential list.  Will resume VAD education when or if it is indicated.

## 2018-07-07 PROBLEM — I50.22 CHRONIC SYSTOLIC CHF (CONGESTIVE HEART FAILURE): Status: ACTIVE | Noted: 2018-01-01

## 2018-07-07 PROBLEM — N17.9 ACUTE RENAL FAILURE: Status: ACTIVE | Noted: 2018-01-01

## 2018-07-07 NOTE — CONSULTS
[facility]  Gastroenterology  Consult Note    Patient Name: Yonathan Good Jr.  MRN: [id number]  Admission Date: 7/7/2018  Hospital Length of Stay: 0 days  Code Status: Full Code   Attending Provider: Chana Berman MD   Consulting Provider: Rafaela Gamez MD  Primary Care Physician: Edgar Gates MD  Principal Problem:Lower GI bleeding    Inpatient consult to Gastroenterology  Consult performed by: RAFAELA GAMEZ  Consult ordered by: MAX TAVERAS  Reason for consult: Pt c/o blood per rectum        Subjective:     HPI:  57 year old gentleman, known case of CAD on aspirin and Plavix, HF (with EF 5-10%), Afib on warfarin and Lovenox. Patient got discharged on 7/2/2018 after having a hospital course with cardiology team and evaluation for heart transplant. During his last admission colonoscopy was done as a part of his transplant evaluation he was found to have two pedunculated polyps were found in the sigmoid colon and descending colon, the polyps were removed by hot snare. Pt got discharged w/ stable condition    He presented to the ED C/O bleeding per rectum X 1 d. He states that he had 5-6 bloody stools, that was bright red mixed with stools and on toilet paper. Last bloody stools he had was yesterday, and today he had 2 normal looking bowel movements. He denies nausea, vomiting, bloody vomitus, constipation, diarrhea, painful urination, pain w/passing stools, blood in urine, fever, bleeding from any other sites. Pt has no Hx of straining/diffculty w/ passing stool nor urine. No any similar episodes in the past    Past Medical History:   Diagnosis Date    AICD (automatic cardioverter/defibrillator) present     Arthritis     Atrial fibrillation     CHF (congestive heart failure)     Coronary artery disease     Hypertension     MI (myocardial infarction)     Sarcoid     Seizures     Stroke        Past Surgical History:   Procedure Laterality Date    CARDIAC CATHETERIZATION       CARDIAC DEFIBRILLATOR PLACEMENT  7-12    CARDIAC DEFIBRILLATOR PLACEMENT      CARDIAC DEFIBRILLATOR PLACEMENT      COLONOSCOPY N/A 7/2/2018    Procedure: COLONOSCOPY;  Surgeon: THELMA Kay MD;  Location: [facility] ENDO ([address] [place] [address]);  Service: Endoscopy;  Laterality: N/A;    CORONARY STENT PLACEMENT  07/2011    ESOPHAGOGASTRODUODENOSCOPY      FRACTURE SURGERY      LEFT HEART CATHETERIZATION N/A 6/25/2018    Procedure: Left heart cath;  Surgeon: Jordi Lui MD;  Location: [facility] CATH LAB;  Service: Cardiology;  Laterality: N/A;       Review of patient's allergies indicates:  No Known Allergies  Family History     Problem Relation (Age of Onset)    Cancer Maternal Grandmother    Coronary artery disease Father, Sister, Sister    Heart disease Mother, Father, Sister    Hypertension Mother, Father, Sister    Kidney disease Sister    Stroke Sister    Vision loss Sister        Social History Main Topics    Smoking status: Never Smoker    Smokeless tobacco: Never Used    Alcohol use No    Drug use: No    Sexual activity: Not on file     Review of Systems   Constitutional: Negative for activity change, appetite change, chills, fatigue and fever.   Gastrointestinal: Positive for blood in stool. Negative for abdominal distention, abdominal pain, constipation, diarrhea, nausea, rectal pain and vomiting.   Genitourinary: Negative for difficulty urinating, dysuria, flank pain, hematuria and urgency.     Objective:     Vital Signs (Most Recent):  Temp: 97.8 °F (36.6 °C) (07/07/18 0745)  Pulse: 77 (07/07/18 1121)  Resp: 18 (07/07/18 1121)  BP: 108/67 (07/07/18 0745)  SpO2: (!) 94 % (07/07/18 1121) Vital Signs (24h Range):  Temp:  [97.8 °F (36.6 °C)-98.4 °F (36.9 °C)] 97.8 °F (36.6 °C)  Pulse:  [76-98] 77  Resp:  [15-19] 18  SpO2:  [94 %-100 %] 94 %  BP: ()/(52-68) 108/67     Weight: 100 kg (220 lb 7.4 oz) (07/07/18 0201)  Body mass index is 30.75 kg/m².      Intake/Output Summary (Last 24 hours) at 07/07/18  1408  Last data filed at 07/07/18 1200   Gross per 24 hour   Intake              120 ml   Output                0 ml   Net              120 ml       Lines/Drains/Airways     Peripheral Intravenous Line                 Peripheral IV - Single Lumen 07/06/18 Left Forearm 1 day                Physical Exam    Significant Labs:  CBC:   Recent Labs  Lab 07/07/18  0222 07/07/18  0756 07/07/18  1341   WBC 6.90 7.39 9.95   HGB 10.1* 10.4* 10.1*   HCT 31.6* 32.7* 31.9*    188 188     Coagulation:   Recent Labs  Lab 07/07/18  0756   INR 2.1*     Liver Function Test:   Recent Labs  Lab 07/07/18  0222   ALT 51*   AST 40   ALKPHOS 87   BILITOT 0.6   PROT 6.5   ALBUMIN 3.5       Significant Imaging:      Assessment/Plan:     * Lower GI bleeding    57 year old gentleman, with extensive cardiac Hx. During his last admission colonoscopy was done as a part of his transplant evaluation he was found to have two pedunculated polyps were found in the sigmoid colon and descending colon, the polyps were removed by hot snare. Pt got discharged w/ stable condition. GI team was consulted in regards his bleeding per rectum. Pt case is most likely consistent w/ bleeding from the location of the snared polyps.    Plan:  - re-start pt on anticoagulation.  - monitor the pt for any signs of bleeding.  - We notified colorectal surgery.  - Start pt on clear fluid diet, if further bleeding will plan for colonoscopy with GI Vs. Colorectal             Thank you for your consult. I will follow-up with patient. Please contact us if you have any additional questions.    Mallika Arias MD  Gastroenterology  [facility]

## 2018-07-07 NOTE — PLAN OF CARE
Pt is AAOx4; afebrile; vital signs stable. He had one BM today with no visible blood. Inaccurate I's and O's done as patient had 3 unmeasured urinations, but he is now voiding per urinal. Diet has been advanced to clear liquids. He is up independently.

## 2018-07-07 NOTE — ASSESSMENT & PLAN NOTE
Likely prerenal ATN due to hypotensive/hypovolemic episodes  Monitor daily now that he is normotensive  Will not give back fluid given EF  Continuing to make urine

## 2018-07-07 NOTE — PLAN OF CARE
Problem: Patient Care Overview  Goal: Plan of Care Review  Outcome: Ongoing (interventions implemented as appropriate)  AAOx4, VSS, SpO- % on 1 L NC, Tele- paced rhythm. HR- 60's-70's  H/H upon arrival 10.1/31.6 + 1 non bloody BM. Continue to monitor with CBC's q6h + GI consulted. Patient placed NPO started on PO protonix.   20 G to L arm remains saline locked, no complaints overnight, sleeping well. Non skid socks worn, call light within reach.

## 2018-07-07 NOTE — ASSESSMENT & PLAN NOTE
Currently compensated  I/o/dw  Diuretics on hold given akilah and euvolemia on exam  Patient urinating well  He is on lasix 80mg po bid and bumex prn which will be stopped (advised by primary)  Aldactone and losartan also on hold given akilah and hyperkalemia

## 2018-07-07 NOTE — ASSESSMENT & PLAN NOTE
57 year old gentleman, with extensive cardiac Hx. During his last admission colonoscopy was done as a part of his transplant evaluation he was found to have two pedunculated polyps were found in the sigmoid colon and descending colon, the polyps were removed by hot snare. Pt got discharged w/ stable condition. GI team was consulted in regards his bleeding per rectum. Pt case is most likely consistent w/ bleeding from the location of the snared polyps.    Plan:  - re-start pt on anticoagulation.  - monitor the pt for any signs of bleeding.  - We notified colorectal surgery.  - Start pt on clear fluid diet, if further bleeding will plan for colonoscopy with GI Vs. Colorectal

## 2018-07-07 NOTE — ASSESSMENT & PLAN NOTE
inr therapeutic  Hold warfarin  Not actively bleeding at this moment so will not reverse  Indication is afib so if we need to reverse/if patient actively bleeding, we can reverse  NPO  Increase protonix to 40mg po bid though highly doubt this is upper gib  inr daily  Hold lovenox  Rectal with small flecks of blood amidst brown stool  Most recent BM on admission non-bloody

## 2018-07-07 NOTE — SUBJECTIVE & OBJECTIVE
Past Medical History:   Diagnosis Date    AICD (automatic cardioverter/defibrillator) present     Arthritis     Atrial fibrillation     CHF (congestive heart failure)     Coronary artery disease     Hypertension     MI (myocardial infarction)     Sarcoid     Seizures     Stroke        Past Surgical History:   Procedure Laterality Date    CARDIAC CATHETERIZATION      CARDIAC DEFIBRILLATOR PLACEMENT  7-12    CARDIAC DEFIBRILLATOR PLACEMENT      CARDIAC DEFIBRILLATOR PLACEMENT      COLONOSCOPY N/A 7/2/2018    Procedure: COLONOSCOPY;  Surgeon: THELMA Kay MD;  Location: [facility] ENDO ([address] [place] [address]);  Service: Endoscopy;  Laterality: N/A;    CORONARY STENT PLACEMENT  07/2011    ESOPHAGOGASTRODUODENOSCOPY      FRACTURE SURGERY      LEFT HEART CATHETERIZATION N/A 6/25/2018    Procedure: Left heart cath;  Surgeon: Jordi Lui MD;  Location: [facility] CATH LAB;  Service: Cardiology;  Laterality: N/A;       Review of patient's allergies indicates:  No Known Allergies  Family History     Problem Relation (Age of Onset)    Cancer Maternal Grandmother    Coronary artery disease Father, Sister, Sister    Heart disease Mother, Father, Sister    Hypertension Mother, Father, Sister    Kidney disease Sister    Stroke Sister    Vision loss Sister        Social History Main Topics    Smoking status: Never Smoker    Smokeless tobacco: Never Used    Alcohol use No    Drug use: No    Sexual activity: Not on file     Review of Systems   Constitutional: Negative for activity change, appetite change, chills, fatigue and fever.   Gastrointestinal: Positive for blood in stool. Negative for abdominal distention, abdominal pain, constipation, diarrhea, nausea, rectal pain and vomiting.   Genitourinary: Negative for difficulty urinating, dysuria, flank pain, hematuria and urgency.     Objective:     Vital Signs (Most Recent):  Temp: 97.8 °F (36.6 °C) (07/07/18 0745)  Pulse: 77 (07/07/18 1121)  Resp: 18 (07/07/18 1121)  BP:  108/67 (07/07/18 0745)  SpO2: (!) 94 % (07/07/18 1121) Vital Signs (24h Range):  Temp:  [97.8 °F (36.6 °C)-98.4 °F (36.9 °C)] 97.8 °F (36.6 °C)  Pulse:  [76-98] 77  Resp:  [15-19] 18  SpO2:  [94 %-100 %] 94 %  BP: ()/(52-68) 108/67     Weight: 100 kg (220 lb 7.4 oz) (07/07/18 0201)  Body mass index is 30.75 kg/m².      Intake/Output Summary (Last 24 hours) at 07/07/18 1408  Last data filed at 07/07/18 1200   Gross per 24 hour   Intake              120 ml   Output                0 ml   Net              120 ml       Lines/Drains/Airways     Peripheral Intravenous Line                 Peripheral IV - Single Lumen 07/06/18 Left Forearm 1 day                Physical Exam    Significant Labs:  CBC:   Recent Labs  Lab 07/07/18  0222 07/07/18  0756 07/07/18  1341   WBC 6.90 7.39 9.95   HGB 10.1* 10.4* 10.1*   HCT 31.6* 32.7* 31.9*    188 188     Coagulation:   Recent Labs  Lab 07/07/18  0756   INR 2.1*     Liver Function Test:   Recent Labs  Lab 07/07/18  0222   ALT 51*   AST 40   ALKPHOS 87   BILITOT 0.6   PROT 6.5   ALBUMIN 3.5       Significant Imaging:

## 2018-07-07 NOTE — H&P
"[facility]  Heart Transplant  H&P    Patient Name: Yonathan Good Jr.  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      He was initially admitted to "stroke/neuro" service on 6/22 with dysarthria and stroke-like symptoms. Extensive work up was negative for recurrent CVA and so no tPA was administered. Patient developed atypical chest pain two days into his hospital course and was noted to be in monomorphic VT (6/24) which was treated with ATP then with shock due to recurrent VT in VF zone. Given his active cardiac issues this prompted transfer to heart failure service where the patient was initiated on IV amiodarone and beta blocker with subsequent resolution of his VT. He was eventually transitioned to PO amiodarone 400 mg BID x 2 weeks (from 6/25-7/9) followed by amiodarone 400 mg daily thereafter per EP recommendations. Of note patient underwent LHC on 6/25 given his extensive ischemic history which did not reveal a culprit lesion, therefore no interventions were done. He was noted to have an elevated LVEDP to 45 however and was administered IV diuresis before being transitioned back to his PO diuretic regimen. Patient also completed heart failure pathway during his hospital course (eg completed colonoscopy on 7/2) as part of his work up for advanced options. Follows with Dr. Berman of heart failure/transplant as an outpatient.      The patient was discharged home in stable condition on 7/4/2018. He was dc'ed on warf/lovenox bridge given CHADS2-VaSc of 5.  Of note, he is no longer a candidate for AO.  He presented yesterday " to [facility] with BRBPR and was found to be in acute renal failure as well.  He notes that, on the night of the 5th and morning of 6th, he had 6 bright red bloody bm's.  He went to his PMD who noted he might have internal hemmorhoids.  He was told to stop his lovenox.  He then returned home and flet very lightheaded and dizzy.  He wisely took his blood pressure and noted it was 70-80/50s whereas it is normally 117/70s.  He went in to ED immediately.  There he was noted to have the following pertinent lab values:  Hg 9.2 (11.7 prior)  INR 2.9  Na 131 (139 prior)  K 6.18  BUN 43 (18 prior)  Cr 2.88 (1.1-1.4 at baseline)  proBNP 2755  He was transferred to [facility] for further evaluation and care.  Of note, he underwent screening c-scope on 7/2 during which the following was noted and done: Diverticulosis in the sigmoid colon and in the descending colon, Two 8 to 10 mm polyps in the sigmoid colon and in the descending colon, removed with a hot snare, resected and retrieved, ligated.        Past Medical History:   Diagnosis Date    AICD (automatic cardioverter/defibrillator) present     Arthritis     Atrial fibrillation     CHF (congestive heart failure)     Coronary artery disease     Hypertension     MI (myocardial infarction)     Sarcoid     Seizures     Stroke        Past Surgical History:   Procedure Laterality Date    CARDIAC CATHETERIZATION      CARDIAC DEFIBRILLATOR PLACEMENT  7-12    CARDIAC DEFIBRILLATOR PLACEMENT      CARDIAC DEFIBRILLATOR PLACEMENT      COLONOSCOPY N/A 7/2/2018    Procedure: COLONOSCOPY;  Surgeon: THELMA Kay MD;  Location: [facility] ENDO ([address] [place] [address]);  Service: Endoscopy;  Laterality: N/A;    CORONARY STENT PLACEMENT  07/2011    ESOPHAGOGASTRODUODENOSCOPY      FRACTURE SURGERY      LEFT HEART CATHETERIZATION N/A 6/25/2018    Procedure: Left heart cath;  Surgeon: Jordi Lui MD;  Location: [facility] CATH LAB;  Service: Cardiology;  Laterality: N/A;       Review of  patient's allergies indicates:  No Known Allergies    No current facility-administered medications for this encounter.      Family History     Problem Relation (Age of Onset)    Cancer Maternal Grandmother    Coronary artery disease Father, Sister, Sister    Heart disease Mother, Father, Sister    Hypertension Mother, Father, Sister    Kidney disease Sister    Stroke Sister    Vision loss Sister        Social History Main Topics    Smoking status: Never Smoker    Smokeless tobacco: Never Used    Alcohol use No    Drug use: No    Sexual activity: Not on file     Review of Systems   Constitutional: Negative.    HENT: Negative.    Eyes: Negative.    Respiratory: Negative.    Cardiovascular: Negative.    Gastrointestinal: Positive for blood in stool.   Endocrine: Negative.    Genitourinary: Negative.    Musculoskeletal: Negative.    Skin: Negative.    Allergic/Immunologic: Negative.    Neurological: Positive for dizziness and light-headedness.   Hematological: Negative.    Psychiatric/Behavioral: Negative.      Objective:     Vital Signs (Most Recent):  Temp: 98.4 °F (36.9 °C) (07/07/18 0126)  Pulse: 86 (07/07/18 0126)  Resp: 19 (07/07/18 0126)  BP: 117/67 (07/07/18 0126)  SpO2: 95 % (07/07/18 0126) Vital Signs (24h Range):  Temp:  [98 °F (36.7 °C)-98.4 °F (36.9 °C)] 98.4 °F (36.9 °C)  Pulse:  [76-86] 86  Resp:  [15-19] 19  SpO2:  [95 %-100 %] 95 %  BP: ()/(52-68) 117/67     No data found.    There is no height or weight on file to calculate BMI.    No intake or output data in the 24 hours ending 07/07/18 0142    Physical Exam   Constitutional: He is oriented to person, place, and time. He appears well-developed and well-nourished.   HENT:   Head: Normocephalic and atraumatic.   Eyes: EOM are normal. Pupils are equal, round, and reactive to light.   Neck: Normal range of motion. No JVD present.   Cardiovascular: Normal rate, regular rhythm, normal heart sounds and intact distal pulses.  Exam reveals no gallop  and no friction rub.    No murmur heard.  Pulmonary/Chest: Effort normal and breath sounds normal. No respiratory distress. He has no wheezes. He has no rales. He exhibits no tenderness.   Abdominal: Soft. Bowel sounds are normal. He exhibits no distension. There is no tenderness.   Genitourinary: Rectal exam shows guaiac positive stool (rectal exam with flecks of blood, guiac not done).   Musculoskeletal: Normal range of motion. He exhibits no edema.   Neurological: He is alert and oriented to person, place, and time.   Skin: Skin is warm and dry. Capillary refill takes less than 2 seconds.       Significant Labs:  CBC:    Recent Labs  Lab 07/02/18  0715 07/03/18  0516 07/04/18  0608   WBC 9.25 6.87 6.38   RBC 4.11* 3.87* 3.93*   HGB 12.3* 11.4* 11.7*   HCT 39.7* 37.4* 37.2*    158 168   MCV 97 97 95   MCH 29.9 29.5 29.8   MCHC 31.0* 30.5* 31.5*     BNP:    Recent Labs  Lab 07/02/18  0715   *     CMP:    Recent Labs  Lab 07/02/18  0715 07/03/18  0516 07/04/18  0608   GLU 90 93 84   CALCIUM 10.4 9.6 10.0   ALBUMIN 3.8 3.4* 3.5   PROT 7.4 6.8 6.8    137 139   K 4.5 4.6 3.9   CO2 27 24 29   CL 97 102 99   BUN 19 15 18   CREATININE 1.2 1.1 1.4   ALKPHOS 68 64 78   ALT 48* 53* 55*   AST 38 43* 38   BILITOT 0.6 0.6 0.7      Coagulation:     Recent Labs  Lab 06/30/18  1257 07/03/18  0516 07/04/18  0608   INR 1.4* 1.3* 1.2     LDH:  No results for input(s): LDH in the last 72 hours.  Microbiology:  Microbiology Results (last 7 days)     ** No results found for the last 168 hours. **          I have reviewed all pertinent labs within the past 24 hours.    Diagnostic Results:    CONCLUSIONS     1 - Severe left ventricular enlargement.     2 - Severely depressed left ventricular systolic function (EF 5-10%).     3 - Eccentric hypertrophy.     4 - Severe left atrial enlargement.     5 - Right ventricular enlargement with low normal systolic function.     6 - Mild to moderate mitral regurgitation.     7 -  Trivial to mild tricuspid regurgitation.     8 - The estimated PA systolic pressure is 21 mmHg.     Assessment/Plan:     * GI bleed    inr therapeutic  Hold warfarin  Not actively bleeding at this moment so will not reverse  Indication is afib so if we need to reverse/if patient actively bleeding, we can reverse  NPO  Increase protonix to 40mg po bid though highly doubt this is upper gib  inr daily  Hold lovenox  Rectal with small flecks of blood amidst brown stool  Most recent BM on admission non-bloody        Acute renal failure    Likely prerenal ATN due to hypotensive/hypovolemic episodes  Monitor daily now that he is normotensive  Will not give back fluid given EF  Continuing to make urine        Chronic systolic CHF (congestive heart failure)    Currently compensated  I/o/dw  Diuretics on hold given akilah and euvolemia on exam  Patient urinating well  He is on lasix 80mg po bid and bumex prn which will be stopped (advised by primary)  Aldactone and losartan also on hold given akilah and hyperkalemia        History of atrial fibrillation    Hold on warfarin for now, continue amio (though for VT)        Seizure disorder    Continue keppra        CAD (coronary artery disease)    Continue atorvastatin  Continue imdur  Continue nitro prn  Continue lopressor        Sarcoidosis of lung    Continue prednisone, breo-ellipta, and albuterol prn  Currently without complaint          Discussed with staff  Paulino Rubi MD  Heart Transplant  [facility]

## 2018-07-07 NOTE — HPI
"57 year old male with PMHx significant for CAD s/p PCIs, HFrEF secondary to ischemic cardiomyopathy (EF 5-10%) s/p ICD, Afib (on warfarin), pulmonary sarcoid (on chronic prednisone), hx of L parietal CVA recently dc'ed on 7/4.      He was initially admitted to "stroke/neuro" service on 6/22 with dysarthria and stroke-like symptoms. Extensive work up was negative for recurrent CVA and so no tPA was administered. Patient developed atypical chest pain two days into his hospital course and was noted to be in monomorphic VT (6/24) which was treated with ATP then with shock due to recurrent VT in VF zone. Given his active cardiac issues this prompted transfer to heart failure service where the patient was initiated on IV amiodarone and beta blocker with subsequent resolution of his VT. He was eventually transitioned to PO amiodarone 400 mg BID x 2 weeks (from 6/25-7/9) followed by amiodarone 400 mg daily thereafter per EP recommendations. Of note patient underwent LHC on 6/25 given his extensive ischemic history which did not reveal a culprit lesion, therefore no interventions were done. He was noted to have an elevated LVEDP to 45 however and was administered IV diuresis before being transitioned back to his PO diuretic regimen. Patient also completed heart failure pathway during his hospital course (eg completed colonoscopy on 7/2) as part of his work up for advanced options. Follows with Dr. Berman of heart failure/transplant as an outpatient.      The patient was discharged home in stable condition on 7/4/2018. He was dc'ed on warf/lovenox bridge given CHADS2-VaSc of 5.  Of note, he is no longer a candidate for AO.  He presented yesterday to [facility] with BRBPR and was found to be in acute renal failure as well.  He notes that, on the night of the 5th and morning of 6th, he had 6 bright red bloody bm's.  He went to his PMD who noted he might have internal hemmorhoids.  He was told to stop his lovenox.  He " then returned home and flet very lightheaded and dizzy.  He wisely took his blood pressure and noted it was 70-80/50s whereas it is normally 117/70s.  He went in to ED immediately.  There he was noted to have the following pertinent lab values:  Hg 9.2 (11.7 prior)  INR 2.9  Na 131 (139 prior)  K 6.18  BUN 43 (18 prior)  Cr 2.88 (1.1-1.4 at baseline)  proBNP 2755  [place] [facility] [place] and [place].  Of note, he underwent screening c-scope on 7/2 during which the following was noted and done: Diverticulosis in the sigmoid colon and in the descending colon, Two 8 to 10 mm polyps in the sigmoid colon and in the descending colon, removed with a hot snare, resected and retrieved, ligated.

## 2018-07-07 NOTE — HPI
57 year old gentleman, known case of CAD on aspirin and Plavix, HF (with EF 5-10%), Afib on warfarin and Lovenox. Patient got discharged on 7/2/2018 after having a hospital course with cardiology team and evaluation for heart transplant. During his last admission colonoscopy was done as a part of his transplant evaluation he was found to have two pedunculated polyps were found in the sigmoid colon and descending colon, the polyps were removed by hot snare. Pt got discharged w/ stable condition    He presented to the ED C/O bleeding per rectum X 1 d. He states that he had 5-6 bloody stools, that was bright red mixed with stools and on toilet paper. Last bloody stools he had was yesterday, and today he had 2 normal looking bowel movements. He denies nausea, vomiting, bloody vomitus, constipation, diarrhea, painful urination, pain w/passing stools, blood in urine, fever, bleeding from any other sites. Pt has no Hx of straining/diffculty w/ passing stool nor urine. No any similar episodes in the past

## 2018-07-07 NOTE — SUBJECTIVE & OBJECTIVE
Past Medical History:   Diagnosis Date    AICD (automatic cardioverter/defibrillator) present     Arthritis     Atrial fibrillation     CHF (congestive heart failure)     Coronary artery disease     Hypertension     MI (myocardial infarction)     Sarcoid     Seizures     Stroke        Past Surgical History:   Procedure Laterality Date    CARDIAC CATHETERIZATION      CARDIAC DEFIBRILLATOR PLACEMENT  7-12    CARDIAC DEFIBRILLATOR PLACEMENT      CARDIAC DEFIBRILLATOR PLACEMENT      COLONOSCOPY N/A 7/2/2018    Procedure: COLONOSCOPY;  Surgeon: THELMA Kay MD;  Location: [facility] ENDO ([address] [place] [address]);  Service: Endoscopy;  Laterality: N/A;    CORONARY STENT PLACEMENT  07/2011    ESOPHAGOGASTRODUODENOSCOPY      FRACTURE SURGERY      LEFT HEART CATHETERIZATION N/A 6/25/2018    Procedure: Left heart cath;  Surgeon: Jordi Lui MD;  Location: [facility] CATH LAB;  Service: Cardiology;  Laterality: N/A;       Review of patient's allergies indicates:  No Known Allergies    No current facility-administered medications for this encounter.      Family History     Problem Relation (Age of Onset)    Cancer Maternal Grandmother    Coronary artery disease Father, Sister, Sister    Heart disease Mother, Father, Sister    Hypertension Mother, Father, Sister    Kidney disease Sister    Stroke Sister    Vision loss Sister        Social History Main Topics    Smoking status: Never Smoker    Smokeless tobacco: Never Used    Alcohol use No    Drug use: No    Sexual activity: Not on file     Review of Systems   Constitutional: Negative.    HENT: Negative.    Eyes: Negative.    Respiratory: Negative.    Cardiovascular: Negative.    Gastrointestinal: Positive for blood in stool.   Endocrine: Negative.    Genitourinary: Negative.    Musculoskeletal: Negative.    Skin: Negative.    Allergic/Immunologic: Negative.    Neurological: Positive for dizziness and light-headedness.   Hematological: Negative.    Psychiatric/Behavioral:  Negative.      Objective:     Vital Signs (Most Recent):  Temp: 98.4 °F (36.9 °C) (07/07/18 0126)  Pulse: 86 (07/07/18 0126)  Resp: 19 (07/07/18 0126)  BP: 117/67 (07/07/18 0126)  SpO2: 95 % (07/07/18 0126) Vital Signs (24h Range):  Temp:  [98 °F (36.7 °C)-98.4 °F (36.9 °C)] 98.4 °F (36.9 °C)  Pulse:  [76-86] 86  Resp:  [15-19] 19  SpO2:  [95 %-100 %] 95 %  BP: ()/(52-68) 117/67     No data found.    There is no height or weight on file to calculate BMI.    No intake or output data in the 24 hours ending 07/07/18 0142    Physical Exam   Constitutional: He is oriented to person, place, and time. He appears well-developed and well-nourished.   HENT:   Head: Normocephalic and atraumatic.   Eyes: EOM are normal. Pupils are equal, round, and reactive to light.   Neck: Normal range of motion. No JVD present.   Cardiovascular: Normal rate, regular rhythm, normal heart sounds and intact distal pulses.  Exam reveals no gallop and no friction rub.    No murmur heard.  Pulmonary/Chest: Effort normal and breath sounds normal. No respiratory distress. He has no wheezes. He has no rales. He exhibits no tenderness.   Abdominal: Soft. Bowel sounds are normal. He exhibits no distension. There is no tenderness.   Genitourinary: Rectal exam shows guaiac positive stool (rectal exam with flecks of blood, guiac not done).   Musculoskeletal: Normal range of motion. He exhibits no edema.   Neurological: He is alert and oriented to person, place, and time.   Skin: Skin is warm and dry. Capillary refill takes less than 2 seconds.       Significant Labs:  CBC:    Recent Labs  Lab 07/02/18  0715 07/03/18  0516 07/04/18  0608   WBC 9.25 6.87 6.38   RBC 4.11* 3.87* 3.93*   HGB 12.3* 11.4* 11.7*   HCT 39.7* 37.4* 37.2*    158 168   MCV 97 97 95   MCH 29.9 29.5 29.8   MCHC 31.0* 30.5* 31.5*     BNP:    Recent Labs  Lab 07/02/18  0715   *     CMP:    Recent Labs  Lab 07/02/18  0715 07/03/18  0516 07/04/18  0608   GLU 90 93 84    CALCIUM 10.4 9.6 10.0   ALBUMIN 3.8 3.4* 3.5   PROT 7.4 6.8 6.8    137 139   K 4.5 4.6 3.9   CO2 27 24 29   CL 97 102 99   BUN 19 15 18   CREATININE 1.2 1.1 1.4   ALKPHOS 68 64 78   ALT 48* 53* 55*   AST 38 43* 38   BILITOT 0.6 0.6 0.7      Coagulation:     Recent Labs  Lab 06/30/18  1257 07/03/18  0516 07/04/18  0608   INR 1.4* 1.3* 1.2     LDH:  No results for input(s): LDH in the last 72 hours.  Microbiology:  Microbiology Results (last 7 days)     ** No results found for the last 168 hours. **          I have reviewed all pertinent labs within the past 24 hours.    Diagnostic Results:    CONCLUSIONS     1 - Severe left ventricular enlargement.     2 - Severely depressed left ventricular systolic function (EF 5-10%).     3 - Eccentric hypertrophy.     4 - Severe left atrial enlargement.     5 - Right ventricular enlargement with low normal systolic function.     6 - Mild to moderate mitral regurgitation.     7 - Trivial to mild tricuspid regurgitation.     8 - The estimated PA systolic pressure is 21 mmHg.

## 2018-07-08 PROBLEM — N17.9 ACUTE RENAL FAILURE: Status: ACTIVE | Noted: 2018-01-01

## 2018-07-08 PROBLEM — I50.22 CHRONIC SYSTOLIC CHF (CONGESTIVE HEART FAILURE): Status: ACTIVE | Noted: 2018-01-01

## 2018-07-08 NOTE — SUBJECTIVE & OBJECTIVE
Interval History: no events overnight, coughing a bit more this am    Continuous Infusions:  Scheduled Meds:   albuterol sulfate  2.5 mg Nebulization Q4H    amiodarone  400 mg Oral BID    atorvastatin  40 mg Oral Daily    fluticasone-vilanterol  1 puff Inhalation Daily    furosemide  80 mg Oral BID    gabapentin  600 mg Oral BID    isosorbide mononitrate  60 mg Oral Daily    levETIRAcetam  500 mg Oral BID    losartan  50 mg Oral Daily    metoprolol tartrate  25 mg Oral BID    pantoprazole  40 mg Oral BID    predniSONE  10 mg Oral Daily    sodium chloride 0.9%  3 mL Intravenous Q8H    warfarin  5 mg Oral Daily     PRN Meds:ALPRAZolam, carisoprodol, HYDROcodone-acetaminophen, magnesium oxide, magnesium oxide, nitroGLYCERIN, potassium chloride 10%, potassium chloride 10%, potassium chloride 10%, potassium, sodium phosphates, potassium, sodium phosphates, potassium, sodium phosphates, promethazine-codeine 6.25-10 mg/5 ml    Review of patient's allergies indicates:  No Known Allergies  Objective:     Vital Signs (Most Recent):  Temp: 98 °F (36.7 °C) (07/08/18 1131)  Pulse: 77 (07/08/18 1131)  Resp: 16 (07/08/18 1131)  BP: (!) 111/56 (07/08/18 1131)  SpO2: (!) 93 % (07/08/18 1131) Vital Signs (24h Range):  Temp:  [97.5 °F (36.4 °C)-98.1 °F (36.7 °C)] 98 °F (36.7 °C)  Pulse:  [72-98] 77  Resp:  [16-20] 16  SpO2:  [91 %-99 %] 93 %  BP: ()/(55-77) 111/56     Patient Vitals for the past 72 hrs (Last 3 readings):   Weight   07/08/18 0600 100.1 kg (220 lb 10.9 oz)   07/07/18 0201 100 kg (220 lb 7.4 oz)     Body mass index is 30.78 kg/m².      Intake/Output Summary (Last 24 hours) at 07/08/18 1209  Last data filed at 07/08/18 1100   Gross per 24 hour   Intake             1800 ml   Output             1400 ml   Net              400 ml       Hemodynamic Parameters:       Telemetry: no events    Physical Exam   Constitutional: He is oriented to person, place, and time. He appears well-developed and  well-nourished.   HENT:   Head: Normocephalic and atraumatic.   Eyes: EOM are normal. Pupils are equal, round, and reactive to light.   Neck: Normal range of motion. No JVD present.   Cardiovascular: Normal rate, regular rhythm, normal heart sounds and intact distal pulses.  Exam reveals no gallop and no friction rub.    No murmur heard.  Pulmonary/Chest: Effort normal. No respiratory distress. He has no wheezes. He has rales (bibasilar). He exhibits no tenderness.   Abdominal: Soft. Bowel sounds are normal. He exhibits no distension. There is no tenderness.   Genitourinary: Rectal exam shows guaiac negative stool.   Musculoskeletal: Normal range of motion. He exhibits edema.   Neurological: He is alert and oriented to person, place, and time.   Skin: Skin is warm and dry. Capillary refill takes less than 2 seconds.       Significant Labs:  CBC:    Recent Labs  Lab 07/07/18  1920 07/08/18  0132 07/08/18  0836   WBC 6.28 6.04 7.10   RBC 3.08* 2.91* 3.13*   HGB 9.2* 8.6* 9.4*   HCT 29.2* 27.4* 29.7*    159 176   MCV 95 94 95   MCH 29.9 29.6 30.0   MCHC 31.5* 31.4* 31.6*     BNP:    Recent Labs  Lab 07/02/18  0715   *     CMP:    Recent Labs  Lab 07/04/18  0608 07/07/18  0222 07/08/18  0132   GLU 84 98 114*   CALCIUM 10.0 9.4 9.0   ALBUMIN 3.5 3.5 3.1*   PROT 6.8 6.5 5.9*    137 135*   K 3.9 5.0 4.3   CO2 29 25 26   CL 99 102 103   BUN 18 39* 24*   CREATININE 1.4 2.3* 1.3   ALKPHOS 78 87 66   ALT 55* 51* 58*   AST 38 40 46*   BILITOT 0.7 0.6 0.6      Coagulation:     Recent Labs  Lab 07/04/18  0608 07/07/18  0756 07/08/18  0132   INR 1.2 2.1* 2.0*     LDH:  No results for input(s): LDH in the last 72 hours.  Microbiology:  Microbiology Results (last 7 days)     ** No results found for the last 168 hours. **          I have reviewed all pertinent labs within the past 24 hours.    Estimated Creatinine Clearance: 77.4 mL/min (based on SCr of 1.3 mg/dL).    Diagnostic Results:  I have reviewed and  interpreted all pertinent imaging results/findings within the past 24 hours.

## 2018-07-08 NOTE — PROGRESS NOTES
[facility]  Colorectal Surgery  Progress Note    Patient Name: Yonathan Good Jr.  MRN: [id number]  Admission Date: 7/7/2018  Hospital Length of Stay: 1 days  Attending Physician: Chana Berman MD    Subjective:     Interval History: No real complaints this morning. Drowsy after receiving xanax prior to my exam. Feels better today. No blood in stools.    Post-Op Info:  * No surgery found *          Medications:  Continuous Infusions:  Scheduled Meds:   albuterol sulfate  2.5 mg Nebulization Q4H    amiodarone  400 mg Oral BID    atorvastatin  40 mg Oral Daily    fluticasone-vilanterol  1 puff Inhalation Daily    furosemide  80 mg Oral BID    gabapentin  600 mg Oral BID    isosorbide mononitrate  60 mg Oral Daily    levETIRAcetam  500 mg Oral BID    losartan  50 mg Oral Daily    metoprolol tartrate  25 mg Oral BID    pantoprazole  40 mg Oral BID    predniSONE  10 mg Oral Daily    sodium chloride 0.9%  3 mL Intravenous Q8H    warfarin  5 mg Oral Daily     PRN Meds:   ALPRAZolam    carisoprodol    HYDROcodone-acetaminophen    magnesium oxide    magnesium oxide    nitroGLYCERIN    potassium chloride 10%    potassium chloride 10%    potassium chloride 10%    potassium, sodium phosphates    potassium, sodium phosphates    potassium, sodium phosphates    promethazine-codeine 6.25-10 mg/5 ml        Objective:     Vital Signs (Most Recent):  Temp: 98.2 °F (36.8 °C) (07/08/18 1457)  Pulse: 78 (07/08/18 1654)  Resp: 18 (07/08/18 1649)  BP: (!) 87/52 (07/08/18 1654)  SpO2: 98 % (07/08/18 1649) Vital Signs (24h Range):  Temp:  [97.5 °F (36.4 °C)-98.2 °F (36.8 °C)] 98.2 °F (36.8 °C)  Pulse:  [72-98] 78  Resp:  [12-20] 18  SpO2:  [88 %-98 %] 98 %  BP: ()/(52-77) 87/52     Intake/Output - Last 3 Shifts       07/06 0700 - 07/07 0659 07/07 0700 - 07/08 0659 07/08 0700 - 07/09 0659    P.O.  1080 840    Total Intake(mL/kg)  1080 (10.8) 840 (8.4)    Urine (mL/kg/hr)  850 (0.4) 830 (0.8)     Emesis/NG output  0 (0)     Other  0 (0)     Stool  0 (0)     Blood  0 (0)     Total Output   850 830    Net   +230 +10           Urine Occurrence  3 x     Stool Occurrence  2 x     Emesis Occurrence  0 x           Physical Exam   Constitutional: He is oriented to person, place, and time. He appears well-developed and well-nourished.   HENT:   Head: Normocephalic and atraumatic.   Neck: Normal range of motion. Neck supple.   Cardiovascular: Normal rate and regular rhythm.    Pulmonary/Chest: Effort normal and breath sounds normal.   Abdominal: Soft. Bowel sounds are normal. He exhibits no distension. There is no tenderness.   Musculoskeletal: Normal range of motion.   Neurological: He is alert and oriented to person, place, and time.   Skin: Skin is warm and dry.       Anorectal Exam:  Anal Skin: Normal    Digital Rectal Exam:  Resting Tone normal  Squeeze normal  Relaxation with bear down present  Mass none  Rectocele  absent  Tenderness  absent    Anoscopy:  Hemorrhoids  absent  Stigmata of bleeding  absent  Stigmata of prolapsed  absent   Distal rectal mucosa normal    Significant Labs:  BMP (Last 3 Results):   Recent Labs  Lab 07/04/18  0608 07/07/18  0222 07/08/18  0132   GLU 84 98 114*    137 135*   K 3.9 5.0 4.3   CL 99 102 103   CO2 29 25 26   BUN 18 39* 24*   CREATININE 1.4 2.3* 1.3   CALCIUM 10.0 9.4 9.0   MG  --  2.0 2.0     CBC (Last 3 Results):   Recent Labs  Lab 07/08/18  0132 07/08/18  0836 07/08/18  1341   WBC 6.04 7.10 8.16   RBC 2.91* 3.13* 2.92*   HGB 8.6* 9.4* 8.8*   HCT 27.4* 29.7* 27.7*    176 176   MCV 94 95 95   MCH 29.6 30.0 30.1   MCHC 31.4* 31.6* 31.8*       Significant Diagnostics:  None    Assessment/Plan:     * Lower GI bleeding    - Appreciate GI assistance with patient  - Agree with re-starting anticoagulation. Bleeding likely related to polypectomy, which placed patient at a higher risk for bleeding since he was on chronic anticoagulation. Currently stable.  - Monitor  blood counts  - If he re-bleeds, this may warrant a repeat scope and endoscopic therapies to stop bleeding.  - Discussed with Dr. Kay.              Laury Hale MD  Colorectal Surgery  [facility]

## 2018-07-08 NOTE — PROGRESS NOTES
Prince RICHARDS notified of H/H trending down. At 0130 pts H/H- 8.6/27.4 (1930 H/H= 9.1/29.2) Hemodynamically stable--/64 P- 74. Denies dizziness + fatigue. No new orders at this time, will continue to monitor.

## 2018-07-08 NOTE — PROGRESS NOTES
[facility]  Heart Transplant  Progress Note    Patient Name: Yonathan Good Jr.  MRN: [id number]  Admission Date: 7/7/2018  Hospital Length of Stay: 1 days  Attending Physician: Chana Berman MD  Primary Care Provider: Edgar Gates MD  Principal Problem:Lower GI bleeding    Subjective:     Interval History: no events overnight, coughing a bit more this am    Continuous Infusions:  Scheduled Meds:   albuterol sulfate  2.5 mg Nebulization Q4H    amiodarone  400 mg Oral BID    atorvastatin  40 mg Oral Daily    fluticasone-vilanterol  1 puff Inhalation Daily    furosemide  80 mg Oral BID    gabapentin  600 mg Oral BID    isosorbide mononitrate  60 mg Oral Daily    levETIRAcetam  500 mg Oral BID    losartan  50 mg Oral Daily    metoprolol tartrate  25 mg Oral BID    pantoprazole  40 mg Oral BID    predniSONE  10 mg Oral Daily    sodium chloride 0.9%  3 mL Intravenous Q8H    warfarin  5 mg Oral Daily     PRN Meds:ALPRAZolam, carisoprodol, HYDROcodone-acetaminophen, magnesium oxide, magnesium oxide, nitroGLYCERIN, potassium chloride 10%, potassium chloride 10%, potassium chloride 10%, potassium, sodium phosphates, potassium, sodium phosphates, potassium, sodium phosphates, promethazine-codeine 6.25-10 mg/5 ml    Review of patient's allergies indicates:  No Known Allergies  Objective:     Vital Signs (Most Recent):  Temp: 98 °F (36.7 °C) (07/08/18 1131)  Pulse: 77 (07/08/18 1131)  Resp: 16 (07/08/18 1131)  BP: (!) 111/56 (07/08/18 1131)  SpO2: (!) 93 % (07/08/18 1131) Vital Signs (24h Range):  Temp:  [97.5 °F (36.4 °C)-98.1 °F (36.7 °C)] 98 °F (36.7 °C)  Pulse:  [72-98] 77  Resp:  [16-20] 16  SpO2:  [91 %-99 %] 93 %  BP: ()/(55-77) 111/56     Patient Vitals for the past 72 hrs (Last 3 readings):   Weight   07/08/18 0600 100.1 kg (220 lb 10.9 oz)   07/07/18 0201 100 kg (220 lb 7.4 oz)     Body mass index is 30.78 kg/m².      Intake/Output Summary (Last 24 hours) at 07/08/18 1209  Last  data filed at 07/08/18 1100   Gross per 24 hour   Intake             1800 ml   Output             1400 ml   Net              400 ml       Hemodynamic Parameters:       Telemetry: no events    Physical Exam   Constitutional: He is oriented to person, place, and time. He appears well-developed and well-nourished.   HENT:   Head: Normocephalic and atraumatic.   Eyes: EOM are normal. Pupils are equal, round, and reactive to light.   Neck: Normal range of motion. No JVD present.   Cardiovascular: Normal rate, regular rhythm, normal heart sounds and intact distal pulses.  Exam reveals no gallop and no friction rub.    No murmur heard.  Pulmonary/Chest: Effort normal. No respiratory distress. He has no wheezes. He has rales (bibasilar). He exhibits no tenderness.   Abdominal: Soft. Bowel sounds are normal. He exhibits no distension. There is no tenderness.   Genitourinary: Rectal exam shows guaiac negative stool.   Musculoskeletal: Normal range of motion. He exhibits edema.   Neurological: He is alert and oriented to person, place, and time.   Skin: Skin is warm and dry. Capillary refill takes less than 2 seconds.       Significant Labs:  CBC:    Recent Labs  Lab 07/07/18  1920 07/08/18  0132 07/08/18  0836   WBC 6.28 6.04 7.10   RBC 3.08* 2.91* 3.13*   HGB 9.2* 8.6* 9.4*   HCT 29.2* 27.4* 29.7*    159 176   MCV 95 94 95   MCH 29.9 29.6 30.0   MCHC 31.5* 31.4* 31.6*     BNP:    Recent Labs  Lab 07/02/18  0715   *     CMP:    Recent Labs  Lab 07/04/18  0608 07/07/18  0222 07/08/18  0132   GLU 84 98 114*   CALCIUM 10.0 9.4 9.0   ALBUMIN 3.5 3.5 3.1*   PROT 6.8 6.5 5.9*    137 135*   K 3.9 5.0 4.3   CO2 29 25 26   CL 99 102 103   BUN 18 39* 24*   CREATININE 1.4 2.3* 1.3   ALKPHOS 78 87 66   ALT 55* 51* 58*   AST 38 40 46*   BILITOT 0.7 0.6 0.6      Coagulation:     Recent Labs  Lab 07/04/18  0608 07/07/18  0756 07/08/18  0132   INR 1.2 2.1* 2.0*     LDH:  No results for input(s): LDH in the last 72  "hours.  Microbiology:  Microbiology Results (last 7 days)     ** No results found for the last 168 hours. **          I have reviewed all pertinent labs within the past 24 hours.    Estimated Creatinine Clearance: 77.4 mL/min (based on SCr of 1.3 mg/dL).    Diagnostic Results:  I have reviewed and interpreted all pertinent imaging results/findings within the past 24 hours.    Assessment and Plan:     57 year old male with PMHx significant for CAD s/p PCIs, HFrEF secondary to ischemic cardiomyopathy (EF 5-10%) s/p ICD, Afib (on warfarin), pulmonary sarcoid (on chronic prednisone), hx of L parietal CVA recently AZ'ed on 7/4.      He was initially admitted to "stroke/neuro" service on 6/22 with dysarthria and stroke-like symptoms. Extensive work up was negative for recurrent CVA and so no tPA was administered. Patient developed atypical chest pain two days into his hospital course and was noted to be in monomorphic VT (6/24) which was treated with ATP then with shock due to recurrent VT in VF zone. Given his active cardiac issues this prompted transfer to heart failure service where the patient was initiated on IV amiodarone and beta blocker with subsequent resolution of his VT. He was eventually transitioned to PO amiodarone 400 mg BID x 2 weeks (from 6/25-7/9) followed by amiodarone 400 mg daily thereafter per EP recommendations. Of note patient underwent LHC on 6/25 given his extensive ischemic history which did not reveal a culprit lesion, therefore no interventions were done. He was noted to have an elevated LVEDP to 45 however and was administered IV diuresis before being transitioned back to his PO diuretic regimen. Patient also completed heart failure pathway during his hospital course (eg completed colonoscopy on 7/2) as part of his work up for advanced options. Follows with Dr. Berman of heart failure/transplant as an outpatient.      The patient was discharged home in stable condition on 7/4/2018. He was " NE'ed on warf/lovenox bridge given CHADS2-VaSc of 5.  Of note, he is no longer a candidate for AO.  He presented yesterday to [facility] with BRBPR and was found to be in acute renal failure as well.  He notes that, on the night of the 5th and morning of 6th, he had 6 bright red bloody bm's.  He went to his PMD who noted he might have internal hemmorhoids.  He was told to stop his lovenox.  He then returned home and flet very lightheaded and dizzy.  He wisely took his blood pressure and noted it was 70-80/50s whereas it is normally 117/70s.  He went in to ED immediately.  There he was noted to have the following pertinent lab values:  Hg 9.2 (11.7 prior)  INR 2.9  Na 131 (139 prior)  K 6.18  BUN 43 (18 prior)  Cr 2.88 (1.1-1.4 at baseline)  proBNP 2755  He was transferred to [facility] for further evaluation and care.  Of note, he underwent screening c-scope on 7/2 during which the following was noted and done: Diverticulosis in the sigmoid colon and in the descending colon, Two 8 to 10 mm polyps in the sigmoid colon and in the descending colon, removed with a hot snare, resected and retrieved, ligated.        * Lower GI bleeding    inr therapeutic  restart warfarin  Clears, advance to full tonight  Increase protonix to 40mg po bid though highly doubt this is upper gib  inr daily  Most recent BM on admission non-bloody        Acute renal failure    resolved        Chronic systolic CHF (congestive heart failure)    I/o/dw  Restarted diuretics and losartan  Slightly overloaded this morning with increased cough and crackles  Getting cxr  Patient urinating less than when on diuretics  He is on lasix 80mg po bid and bumex prn which will be stopped (advised by primary)  Restart aldactone as tolerated        History of atrial fibrillation    Restarted warfarin, continue amio (though for VT)        Seizure disorder    Continue keppra        CAD (coronary artery disease)    Continue atorvastatin  Continue imdur  Continue  nitro prn  Continue lopressor        Sarcoidosis of lung    Continue prednisone, breo-ellipta, and albuterol prn  Currently without complaint            Paulino Rubi MD  Heart Transplant  [facility]

## 2018-07-08 NOTE — ASSESSMENT & PLAN NOTE
I/o/dw  Restarted diuretics and losartan  Slightly overloaded this morning with increased cough and crackles  Getting cxr  Patient urinating less than when on diuretics  He is on lasix 80mg po bid and bumex prn which will be stopped (advised by primary)  Restart aldactone as tolerated

## 2018-07-08 NOTE — ASSESSMENT & PLAN NOTE
- Appreciate GI assistance with patient  - Agree with re-starting anticoagulation. Bleeding likely related to polypectomy, which placed patient at a higher risk for bleeding since he was on chronic anticoagulation. Currently stable.  - Monitor blood counts  - If he re-bleeds, this may warrant a repeat scope and endoscopic therapies to stop bleeding.  - Discussed with Dr. Kay.

## 2018-07-08 NOTE — PLAN OF CARE
Problem: Patient Care Overview  Goal: Plan of Care Review  Outcome: Ongoing (interventions implemented as appropriate)  AAOx4, VSS, Tele- paced rhythm. HR- 72-80  H/H at 1930= 9.2/29.2 + 0130 this AM=8.6/27.4., trending downward, asymptomatic + BP stable.  Continue to monitor with CBC's q6h + GI consulted. Patient on clear liquid diet, PO protonix continued. Possible colonoscopy planned  20 G to L arm remains saline locked, no complaints overnight, sleeping well. Non skid socks worn, call light within reach.

## 2018-07-08 NOTE — PLAN OF CARE
Pt AAOx4, afebrile, with stable vs throughout the day on room air. He c/o chest pain (6/10) this morning, which was treated and responded to 2 doses of nitroglycerin. Dr. Rubi notified, EKG done. 80mg lasix given IV and oral dose started BID. Xanax given PRN anxiety. Lortab given for back pain. Pt had a dry cough this morning, which later became a productive cough with crackles heard in bases of lungs. This has improved since giving lasix. Promethazine/codeine also given for cough. He is up independently. This afternoon, BP dropped to the 80's/50's. MAP >60, pt was asymptomatic. Dr. Simms notified. O2 dropping to 88% while sleeping, 1L nasal cannula placed while sleeping.

## 2018-07-08 NOTE — ASSESSMENT & PLAN NOTE
inr therapeutic  restart warfarin  Clears, advance to full tonight  Increase protonix to 40mg po bid though highly doubt this is upper gib  inr daily  Most recent BM on admission non-bloody

## 2018-07-08 NOTE — HPI
56 yo M with a PMH of ICM, LVEF 10% (previously 25% in 2012 here at [facility]), CAD /sp SONG, left parietal stroke associated with presumed LV thrombus, pulmonary sarcoidosis, HTN, seizure disorder, left ulnar neuropathy who underwent a screening colonoscopy on 7/2/18 by Dr. Kay. He had two pedunculated polyps in the sigmoid colon removed with hot snare and tolerated the procedure well. He was discharged home from the hospital on 7/4/18. While at home, he noted melena as well as bright red blood from his rectum when he had bowel movements. He presented to the ED, was evaluated and transferred to [facility] for further care. He has been stable here. No significant hypotension, nausea, vomiting, abdominal pain or rectal bleeding. He last had blood in his stools yesterday. Has had 2 regular bowel movements since then. His anticoagulation has been held and will be restarted today.

## 2018-07-08 NOTE — SUBJECTIVE & OBJECTIVE
Subjective:     Interval History: No real complaints this morning. Drowsy after receiving xanax prior to my exam. Feels better today. No blood in stools.    Post-Op Info:  * No surgery found *          Medications:  Continuous Infusions:  Scheduled Meds:   albuterol sulfate  2.5 mg Nebulization Q4H    amiodarone  400 mg Oral BID    atorvastatin  40 mg Oral Daily    fluticasone-vilanterol  1 puff Inhalation Daily    furosemide  80 mg Oral BID    gabapentin  600 mg Oral BID    isosorbide mononitrate  60 mg Oral Daily    levETIRAcetam  500 mg Oral BID    losartan  50 mg Oral Daily    metoprolol tartrate  25 mg Oral BID    pantoprazole  40 mg Oral BID    predniSONE  10 mg Oral Daily    sodium chloride 0.9%  3 mL Intravenous Q8H    warfarin  5 mg Oral Daily     PRN Meds:   ALPRAZolam    carisoprodol    HYDROcodone-acetaminophen    magnesium oxide    magnesium oxide    nitroGLYCERIN    potassium chloride 10%    potassium chloride 10%    potassium chloride 10%    potassium, sodium phosphates    potassium, sodium phosphates    potassium, sodium phosphates    promethazine-codeine 6.25-10 mg/5 ml        Objective:     Vital Signs (Most Recent):  Temp: 98.2 °F (36.8 °C) (07/08/18 1457)  Pulse: 78 (07/08/18 1654)  Resp: 18 (07/08/18 1649)  BP: (!) 87/52 (07/08/18 1654)  SpO2: 98 % (07/08/18 1649) Vital Signs (24h Range):  Temp:  [97.5 °F (36.4 °C)-98.2 °F (36.8 °C)] 98.2 °F (36.8 °C)  Pulse:  [72-98] 78  Resp:  [12-20] 18  SpO2:  [88 %-98 %] 98 %  BP: ()/(52-77) 87/52     Intake/Output - Last 3 Shifts       07/06 0700 - 07/07 0659 07/07 0700 - 07/08 0659 07/08 0700 - 07/09 0659    P.O.  1080 840    Total Intake(mL/kg)  1080 (10.8) 840 (8.4)    Urine (mL/kg/hr)  850 (0.4) 830 (0.8)    Emesis/NG output  0 (0)     Other  0 (0)     Stool  0 (0)     Blood  0 (0)     Total Output   850 830    Net   +230 +10           Urine Occurrence  3 x     Stool Occurrence  2 x     Emesis Occurrence  0 x            Physical Exam   Constitutional: He is oriented to person, place, and time. He appears well-developed and well-nourished.   HENT:   Head: Normocephalic and atraumatic.   Neck: Normal range of motion. Neck supple.   Cardiovascular: Normal rate and regular rhythm.    Pulmonary/Chest: Effort normal and breath sounds normal.   Abdominal: Soft. Bowel sounds are normal. He exhibits no distension. There is no tenderness.   Musculoskeletal: Normal range of motion.   Neurological: He is alert and oriented to person, place, and time.   Skin: Skin is warm and dry.       Anorectal Exam:  Anal Skin: Normal    Digital Rectal Exam:  Resting Tone normal  Squeeze normal  Relaxation with bear down present  Mass none  Rectocele  absent  Tenderness  absent    Anoscopy:  Hemorrhoids  absent  Stigmata of bleeding  absent  Stigmata of prolapsed  absent   Distal rectal mucosa normal    Significant Labs:  BMP (Last 3 Results):   Recent Labs  Lab 07/04/18  0608 07/07/18  0222 07/08/18  0132   GLU 84 98 114*    137 135*   K 3.9 5.0 4.3   CL 99 102 103   CO2 29 25 26   BUN 18 39* 24*   CREATININE 1.4 2.3* 1.3   CALCIUM 10.0 9.4 9.0   MG  --  2.0 2.0     CBC (Last 3 Results):   Recent Labs  Lab 07/08/18  0132 07/08/18  0836 07/08/18  1341   WBC 6.04 7.10 8.16   RBC 2.91* 3.13* 2.92*   HGB 8.6* 9.4* 8.8*   HCT 27.4* 29.7* 27.7*    176 176   MCV 94 95 95   MCH 29.6 30.0 30.1   MCHC 31.4* 31.6* 31.8*       Significant Diagnostics:  None

## 2018-07-09 PROBLEM — I50.9 CHF (CONGESTIVE HEART FAILURE): Status: ACTIVE | Noted: 2018-01-01

## 2018-07-09 PROBLEM — I95.9 HYPOTENSION: Status: ACTIVE | Noted: 2018-01-01

## 2018-07-09 NOTE — SUBJECTIVE & OBJECTIVE
Subjective:     Interval History: no acute events. Tolerating diet. Denies pain. Having normal BM. No bleeding in 3 days    Post-Op Info:  * No surgery found *          Medications:  Continuous Infusions:  Scheduled Meds:   albuterol sulfate  2.5 mg Nebulization Q4H    amiodarone  400 mg Oral BID    atorvastatin  40 mg Oral Daily    fluticasone-vilanterol  1 puff Inhalation Daily    gabapentin  600 mg Oral BID    levETIRAcetam  500 mg Oral BID    [START ON 7/10/2018] losartan  50 mg Oral QHS    [START ON 7/10/2018] metoprolol tartrate  25 mg Oral BID    pantoprazole  40 mg Oral BID    predniSONE  10 mg Oral Daily    sodium chloride 0.9%  3 mL Intravenous Q8H    spironolactone  25 mg Oral Daily    warfarin  5 mg Oral Daily     PRN Meds:   ALPRAZolam    carisoprodol    HYDROcodone-acetaminophen    magnesium oxide    magnesium oxide    nitroGLYCERIN    potassium chloride 10%    potassium chloride 10%    potassium chloride 10%    potassium, sodium phosphates    potassium, sodium phosphates    potassium, sodium phosphates    promethazine-codeine 6.25-10 mg/5 ml        Objective:     Vital Signs (Most Recent):  Temp: 97.7 °F (36.5 °C) (07/09/18 1136)  Pulse: 80 (07/09/18 1330)  Resp: 12 (07/09/18 1330)  BP: (!) 73/45 (07/09/18 1330)  SpO2: 97 % (07/09/18 1330) Vital Signs (24h Range):  Temp:  [97.7 °F (36.5 °C)-98.8 °F (37.1 °C)] 97.7 °F (36.5 °C)  Pulse:  [68-91] 80  Resp:  [12-18] 12  SpO2:  [88 %-100 %] 97 %  BP: ()/(45-69) 73/45     Intake/Output - Last 3 Shifts       07/07 0700 - 07/08 0659 07/08 0700 - 07/09 0659 07/09 0700 - 07/10 0659    P.O. 1080 2350 360    Total Intake(mL/kg) 1080 (10.8) 2350 (23.3) 360 (3.6)    Urine (mL/kg/hr) 850 (0.4) 2825 (1.2) 150 (0.2)    Emesis/NG output 0 (0) 0 (0)     Other 0 (0) 0 (0)     Stool 0 (0) 0 (0)     Blood 0 (0) 0 (0)     Total Output 850 2825 150    Net +230 -475 +210           Urine Occurrence 3 x 0 x     Stool Occurrence 2 x 1 x      Emesis Occurrence 0 x 0 x           Physical Exam   Constitutional: He is oriented to person, place, and time. He appears well-developed and well-nourished.   HENT:   Head: Normocephalic and atraumatic.   Neck: Normal range of motion. Neck supple.   Cardiovascular: Normal rate and regular rhythm.    Pulmonary/Chest: Effort normal and breath sounds normal.   Abdominal: Soft. Bowel sounds are normal. He exhibits no distension. There is no tenderness.   Musculoskeletal: Normal range of motion.   Neurological: He is alert and oriented to person, place, and time.   Skin: Skin is warm and dry.         Significant Labs:  BMP (Last 3 Results):   Recent Labs  Lab 07/07/18  0222 07/08/18  0132 07/09/18  0142   GLU 98 114* 104    135* 131*   K 5.0 4.3 4.3    103 98   CO2 25 26 25   BUN 39* 24* 21*   CREATININE 2.3* 1.3 1.5*   CALCIUM 9.4 9.0 8.7   MG 2.0 2.0 2.0     CBC (Last 3 Results):   Recent Labs  Lab 07/08/18  1946 07/09/18  0143 07/09/18  0957   WBC 7.25 6.58 8.34   RBC 2.90* 2.81* 3.04*   HGB 8.7* 8.4* 9.1*   HCT 27.8* 26.8* 29.1*    176 189   MCV 96 95 96   MCH 30.0 29.9 29.9   MCHC 31.3* 31.3* 31.3*       Significant Diagnostics:  None

## 2018-07-09 NOTE — ASSESSMENT & PLAN NOTE
- Cr much improved, though slightly up today (Na down, JVD up) may be due to hypervolemia  - give lasix 80 IV push and monitor  - can add back aldactone today

## 2018-07-09 NOTE — PROGRESS NOTES
[facility]  Colorectal Surgery  Progress Note    Patient Name: Yonathan Good Jr.  MRN: [id number]  Admission Date: 7/7/2018  Hospital Length of Stay: 2 days  Attending Physician: Chana Berman MD    Subjective:     Interval History: no acute events. Tolerating diet. Denies pain. Having normal BM. No bleeding in 3 days    Post-Op Info:  * No surgery found *          Medications:  Continuous Infusions:  Scheduled Meds:   albuterol sulfate  2.5 mg Nebulization Q4H    amiodarone  400 mg Oral BID    atorvastatin  40 mg Oral Daily    fluticasone-vilanterol  1 puff Inhalation Daily    gabapentin  600 mg Oral BID    levETIRAcetam  500 mg Oral BID    [START ON 7/10/2018] losartan  50 mg Oral QHS    [START ON 7/10/2018] metoprolol tartrate  25 mg Oral BID    pantoprazole  40 mg Oral BID    predniSONE  10 mg Oral Daily    sodium chloride 0.9%  3 mL Intravenous Q8H    spironolactone  25 mg Oral Daily    warfarin  5 mg Oral Daily     PRN Meds:   ALPRAZolam    carisoprodol    HYDROcodone-acetaminophen    magnesium oxide    magnesium oxide    nitroGLYCERIN    potassium chloride 10%    potassium chloride 10%    potassium chloride 10%    potassium, sodium phosphates    potassium, sodium phosphates    potassium, sodium phosphates    promethazine-codeine 6.25-10 mg/5 ml        Objective:     Vital Signs (Most Recent):  Temp: 97.7 °F (36.5 °C) (07/09/18 1136)  Pulse: 80 (07/09/18 1330)  Resp: 12 (07/09/18 1330)  BP: (!) 73/45 (07/09/18 1330)  SpO2: 97 % (07/09/18 1330) Vital Signs (24h Range):  Temp:  [97.7 °F (36.5 °C)-98.8 °F (37.1 °C)] 97.7 °F (36.5 °C)  Pulse:  [68-91] 80  Resp:  [12-18] 12  SpO2:  [88 %-100 %] 97 %  BP: ()/(45-69) 73/45     Intake/Output - Last 3 Shifts       07/07 0700 - 07/08 0659 07/08 0700 - 07/09 0659 07/09 0700 - 07/10 0659    P.O. 1080 2350 360    Total Intake(mL/kg) 1080 (10.8) 2350 (23.3) 360 (3.6)    Urine (mL/kg/hr) 850 (0.4) 2825 (1.2) 150 (0.2)     Emesis/NG output 0 (0) 0 (0)     Other 0 (0) 0 (0)     Stool 0 (0) 0 (0)     Blood 0 (0) 0 (0)     Total Output 850 2825 150    Net +230 -475 +210           Urine Occurrence 3 x 0 x     Stool Occurrence 2 x 1 x     Emesis Occurrence 0 x 0 x           Physical Exam   Constitutional: He is oriented to person, place, and time. He appears well-developed and well-nourished.   HENT:   Head: Normocephalic and atraumatic.   Neck: Normal range of motion. Neck supple.   Cardiovascular: Normal rate and regular rhythm.    Pulmonary/Chest: Effort normal and breath sounds normal.   Abdominal: Soft. Bowel sounds are normal. He exhibits no distension. There is no tenderness.   Musculoskeletal: Normal range of motion.   Neurological: He is alert and oriented to person, place, and time.   Skin: Skin is warm and dry.         Significant Labs:  BMP (Last 3 Results):   Recent Labs  Lab 07/07/18  0222 07/08/18  0132 07/09/18  0142   GLU 98 114* 104    135* 131*   K 5.0 4.3 4.3    103 98   CO2 25 26 25   BUN 39* 24* 21*   CREATININE 2.3* 1.3 1.5*   CALCIUM 9.4 9.0 8.7   MG 2.0 2.0 2.0     CBC (Last 3 Results):   Recent Labs  Lab 07/08/18  1946 07/09/18  0143 07/09/18  0957   WBC 7.25 6.58 8.34   RBC 2.90* 2.81* 3.04*   HGB 8.7* 8.4* 9.1*   HCT 27.8* 26.8* 29.1*    176 189   MCV 96 95 96   MCH 30.0 29.9 29.9   MCHC 31.3* 31.3* 31.3*       Significant Diagnostics:  None    Assessment/Plan:     * GI bleed    - Appreciate GI assistance with patient  - Agree with re-starting anticoagulation. Bleeding likely related to polypectomy, which placed patient at a higher risk for bleeding since he was on chronic anticoagulation. Currently stable.  - Started on coumadin, INR 2.1 today  - OK for DC from CRS standpoint; return to ER if any bleeding resumes              Max Flores MD  Colorectal Surgery  [facility]

## 2018-07-09 NOTE — ASSESSMENT & PLAN NOTE
- Appreciate GI assistance with patient  - Agree with re-starting anticoagulation. Bleeding likely related to polypectomy, which placed patient at a higher risk for bleeding since he was on chronic anticoagulation. Currently stable.  - Started on coumadin, INR 2.1 today  - OK for DC from CRS standpoint; return to ER if any bleeding resumes

## 2018-07-09 NOTE — PROGRESS NOTES
Pt arrived from TSU.  Pt transferred from bed to bed per self.  Pt oriented X3, following commands.  Pt connected to monitor and oxygen.  Pt denies any pain for discomfort.

## 2018-07-09 NOTE — SUBJECTIVE & OBJECTIVE
Interval History: No complaints overnight. Normal BM (denies BRB, denies melena) again last night. Denies chest pain, SOB, NVD. Ok with staying extra night for IV diuresis and to watch H & H (slowly trending down since admit but is relatively stable overnight).    Continuous Infusions:  Scheduled Meds:   albuterol sulfate  2.5 mg Nebulization Q4H    amiodarone  400 mg Oral BID    atorvastatin  40 mg Oral Daily    fluticasone-vilanterol  1 puff Inhalation Daily    furosemide  80 mg Intravenous Once    gabapentin  600 mg Oral BID    isosorbide mononitrate  60 mg Oral Daily    levETIRAcetam  500 mg Oral BID    losartan  50 mg Oral Daily    metoprolol tartrate  25 mg Oral BID    pantoprazole  40 mg Oral BID    predniSONE  10 mg Oral Daily    sodium chloride 0.9%  3 mL Intravenous Q8H    warfarin  5 mg Oral Daily     PRN Meds:ALPRAZolam, carisoprodol, HYDROcodone-acetaminophen, magnesium oxide, magnesium oxide, nitroGLYCERIN, potassium chloride 10%, potassium chloride 10%, potassium chloride 10%, potassium, sodium phosphates, potassium, sodium phosphates, potassium, sodium phosphates, promethazine-codeine 6.25-10 mg/5 ml    Review of patient's allergies indicates:  No Known Allergies  Objective:     Vital Signs (Most Recent):  Temp: 97.9 °F (36.6 °C) (07/09/18 0740)  Pulse: 85 (07/09/18 0933)  Resp: 18 (07/09/18 0933)  BP: 107/69 (07/09/18 0740)  SpO2: 97 % (07/09/18 0810) Vital Signs (24h Range):  Temp:  [97.9 °F (36.6 °C)-98.2 °F (36.8 °C)] 97.9 °F (36.6 °C)  Pulse:  [73-94] 85  Resp:  [12-18] 18  SpO2:  [88 %-100 %] 97 %  BP: ()/(50-69) 107/69     Patient Vitals for the past 72 hrs (Last 3 readings):   Weight   07/09/18 0400 100.9 kg (222 lb 7.1 oz)   07/08/18 0600 100.1 kg (220 lb 10.9 oz)   07/07/18 0201 100 kg (220 lb 7.4 oz)     Body mass index is 31.02 kg/m².      Intake/Output Summary (Last 24 hours) at 07/09/18 1040  Last data filed at 07/09/18 0800   Gross per 24 hour   Intake              2230 ml   Output             2525 ml   Net             -295 ml     Hemodynamic Parameters:    Telemetry: no events    Physical Exam   Constitutional: He is oriented to person, place, and time. He appears well-developed and well-nourished.   HENT:   Head: Normocephalic and atraumatic.   Eyes: EOM are normal. Pupils are equal, round, and reactive to light.   Neck: Normal range of motion. JVD present.   Cardiovascular: Normal rate, regular rhythm, normal heart sounds and intact distal pulses.  Exam reveals no gallop and no friction rub.    No murmur heard.  Pulmonary/Chest: Effort normal. No respiratory distress. He has no wheezes. He has rales (bibasilar). He exhibits no tenderness.   Abdominal: Soft. Bowel sounds are normal. He exhibits no distension. There is no tenderness.   Genitourinary: Rectal exam shows guaiac negative stool.   Musculoskeletal: Normal range of motion. He exhibits edema (1+ bilaterally in LE).   Neurological: He is alert and oriented to person, place, and time.   Skin: Skin is warm and dry. Capillary refill takes less than 2 seconds.     Significant Labs:  CBC:    Recent Labs  Lab 07/08/18  1946 07/09/18  0143 07/09/18  0957   WBC 7.25 6.58 8.34   RBC 2.90* 2.81* 3.04*   HGB 8.7* 8.4* 9.1*   HCT 27.8* 26.8* 29.1*    176 189   MCV 96 95 96   MCH 30.0 29.9 29.9   MCHC 31.3* 31.3* 31.3*     BNP:  No results for input(s): BNP in the last 168 hours.    Invalid input(s): BNPTRIAGELBLO  CMP:    Recent Labs  Lab 07/07/18  0222 07/08/18  0132 07/09/18  0142   GLU 98 114* 104   CALCIUM 9.4 9.0 8.7   ALBUMIN 3.5 3.1* 3.3*   PROT 6.5 5.9* 6.0    135* 131*   K 5.0 4.3 4.3   CO2 25 26 25    103 98   BUN 39* 24* 21*   CREATININE 2.3* 1.3 1.5*   ALKPHOS 87 66 66   ALT 51* 58* 57*   AST 40 46* 40   BILITOT 0.6 0.6 0.6      Coagulation:     Recent Labs  Lab 07/07/18  0756 07/08/18  0132 07/09/18  0143   INR 2.1* 2.0* 2.1*     LDH:  No results for input(s): LDH in the last 72  hours.  Microbiology:  Microbiology Results (last 7 days)     ** No results found for the last 168 hours. **          I have reviewed all pertinent labs within the past 24 hours.    Estimated Creatinine Clearance: 67.3 mL/min (A) (based on SCr of 1.5 mg/dL (H)).    Diagnostic Results:  I have reviewed and interpreted all pertinent imaging results/findings within the past 24 hours.

## 2018-07-09 NOTE — TREATMENT PLAN
GI Treatment Plan    Patient seen and examined. He notes that he feels well and has not had any further episodes of GI bleeding sine hospitalized. No BM during the current day. No abdominal pain, n/v/d. Last BM was brown.    Lab Results   Component Value Date    WBC 7.25 07/08/2018    HGB 8.7 (L) 07/08/2018    HCT 27.8 (L) 07/08/2018    MCV 96 07/08/2018     07/08/2018     Lab Results   Component Value Date    INR 2.0 (H) 07/08/2018    INR 2.1 (H) 07/07/2018    INR 1.2 07/04/2018         H&H remains stable since admission without transfusion requirement.    Plan  - patient is therapeutic INR and without further episodes of bleeding  - if remains stable on 7/9 should be good for discharge from GI bleeding standpoint  - if further bleeding will d/w colorectal who performed initial colonoscopy regarding which team will scope

## 2018-07-09 NOTE — PROGRESS NOTES
[facility]  Heart Transplant  Progress Note    Patient Name: Yonathan Good Jr.  MRN: [id number]  Admission Date: 7/7/2018  Hospital Length of Stay: 2 days  Attending Physician: Chana Berman MD  Primary Care Provider: Edgar Gates MD  Principal Problem:GI bleed    Subjective:     Interval History: No complaints overnight. Normal BM (denies BRB, denies melena) again last night. Denies chest pain, SOB, NVD. Ok with staying extra night for IV diuresis and to watch H & H (slowly trending down since admit but is relatively stable overnight).    Continuous Infusions:  Scheduled Meds:   albuterol sulfate  2.5 mg Nebulization Q4H    amiodarone  400 mg Oral BID    atorvastatin  40 mg Oral Daily    fluticasone-vilanterol  1 puff Inhalation Daily    furosemide  80 mg Intravenous Once    gabapentin  600 mg Oral BID    isosorbide mononitrate  60 mg Oral Daily    levETIRAcetam  500 mg Oral BID    losartan  50 mg Oral Daily    metoprolol tartrate  25 mg Oral BID    pantoprazole  40 mg Oral BID    predniSONE  10 mg Oral Daily    sodium chloride 0.9%  3 mL Intravenous Q8H    warfarin  5 mg Oral Daily     PRN Meds:ALPRAZolam, carisoprodol, HYDROcodone-acetaminophen, magnesium oxide, magnesium oxide, nitroGLYCERIN, potassium chloride 10%, potassium chloride 10%, potassium chloride 10%, potassium, sodium phosphates, potassium, sodium phosphates, potassium, sodium phosphates, promethazine-codeine 6.25-10 mg/5 ml    Review of patient's allergies indicates:  No Known Allergies  Objective:     Vital Signs (Most Recent):  Temp: 97.9 °F (36.6 °C) (07/09/18 0740)  Pulse: 85 (07/09/18 0933)  Resp: 18 (07/09/18 0933)  BP: 107/69 (07/09/18 0740)  SpO2: 97 % (07/09/18 0810) Vital Signs (24h Range):  Temp:  [97.9 °F (36.6 °C)-98.2 °F (36.8 °C)] 97.9 °F (36.6 °C)  Pulse:  [73-94] 85  Resp:  [12-18] 18  SpO2:  [88 %-100 %] 97 %  BP: ()/(50-69) 107/69     Patient Vitals for the past 72 hrs (Last 3 readings):    Weight   07/09/18 0400 100.9 kg (222 lb 7.1 oz)   07/08/18 0600 100.1 kg (220 lb 10.9 oz)   07/07/18 0201 100 kg (220 lb 7.4 oz)     Body mass index is 31.02 kg/m².      Intake/Output Summary (Last 24 hours) at 07/09/18 1040  Last data filed at 07/09/18 0800   Gross per 24 hour   Intake             2230 ml   Output             2525 ml   Net             -295 ml     Hemodynamic Parameters:    Telemetry: no events    Physical Exam   Constitutional: He is oriented to person, place, and time. He appears well-developed and well-nourished.   HENT:   Head: Normocephalic and atraumatic.   Eyes: EOM are normal. Pupils are equal, round, and reactive to light.   Neck: Normal range of motion. JVD present.   Cardiovascular: Normal rate, regular rhythm, normal heart sounds and intact distal pulses.  Exam reveals no gallop and no friction rub.    No murmur heard.  Pulmonary/Chest: Effort normal. No respiratory distress. He has no wheezes. He has rales (bibasilar). He exhibits no tenderness.   Abdominal: Soft. Bowel sounds are normal. He exhibits no distension. There is no tenderness.   Genitourinary: Rectal exam shows guaiac negative stool.   Musculoskeletal: Normal range of motion. He exhibits edema (1+ bilaterally in LE).   Neurological: He is alert and oriented to person, place, and time.   Skin: Skin is warm and dry. Capillary refill takes less than 2 seconds.     Significant Labs:  CBC:    Recent Labs  Lab 07/08/18  1946 07/09/18  0143 07/09/18  0957   WBC 7.25 6.58 8.34   RBC 2.90* 2.81* 3.04*   HGB 8.7* 8.4* 9.1*   HCT 27.8* 26.8* 29.1*    176 189   MCV 96 95 96   MCH 30.0 29.9 29.9   MCHC 31.3* 31.3* 31.3*     BNP:  No results for input(s): BNP in the last 168 hours.    Invalid input(s): BNPTRIAGELBLO  CMP:    Recent Labs  Lab 07/07/18  0222 07/08/18  0132 07/09/18  0142   GLU 98 114* 104   CALCIUM 9.4 9.0 8.7   ALBUMIN 3.5 3.1* 3.3*   PROT 6.5 5.9* 6.0    135* 131*   K 5.0 4.3 4.3   CO2 25 26 25    103  "98   BUN 39* 24* 21*   CREATININE 2.3* 1.3 1.5*   ALKPHOS 87 66 66   ALT 51* 58* 57*   AST 40 46* 40   BILITOT 0.6 0.6 0.6      Coagulation:     Recent Labs  Lab 07/07/18  0756 07/08/18  0132 07/09/18  0143   INR 2.1* 2.0* 2.1*     LDH:  No results for input(s): LDH in the last 72 hours.  Microbiology:  Microbiology Results (last 7 days)     ** No results found for the last 168 hours. **          I have reviewed all pertinent labs within the past 24 hours.    Estimated Creatinine Clearance: 67.3 mL/min (A) (based on SCr of 1.5 mg/dL (H)).    Diagnostic Results:  I have reviewed and interpreted all pertinent imaging results/findings within the past 24 hours.    Assessment and Plan:     57 year old male with PMHx significant for CAD s/p PCIs, HFrEF secondary to ischemic cardiomyopathy (EF 5-10%) s/p ICD, Afib (on warfarin), pulmonary sarcoid (on chronic prednisone), hx of L parietal CVA recently NY'ed on 7/4.      He was initially admitted to "stroke/neuro" service on 6/22 with dysarthria and stroke-like symptoms. Extensive work up was negative for recurrent CVA and so no tPA was administered. Patient developed atypical chest pain two days into his hospital course and was noted to be in monomorphic VT (6/24) which was treated with ATP then with shock due to recurrent VT in VF zone. Given his active cardiac issues this prompted transfer to heart failure service where the patient was initiated on IV amiodarone and beta blocker with subsequent resolution of his VT. He was eventually transitioned to PO amiodarone 400 mg BID x 2 weeks (from 6/25-7/9) followed by amiodarone 400 mg daily thereafter per EP recommendations. Of note patient underwent LHC on 6/25 given his extensive ischemic history which did not reveal a culprit lesion, therefore no interventions were done. He was noted to have an elevated LVEDP to 45 however and was administered IV diuresis before being transitioned back to his PO diuretic regimen. Patient " also completed heart failure pathway during his hospital course (eg completed colonoscopy on 7/2) as part of his work up for advanced options. Follows with Dr. Berman of heart failure/transplant as an outpatient.      The patient was discharged home in stable condition on 7/4/2018. He was dc'ed on warf/lovenox bridge given CHADS2-VaSc of 5.  Of note, he is no longer a candidate for AO.  He presented yesterday to [facility] with BRBPR and was found to be in acute renal failure as well.  He notes that, on the night of the 5th and morning of 6th, he had 6 bright red bloody bm's.  He went to his PMD who noted he might have internal hemmorhoids.  He was told to stop his lovenox.  He then returned home and flet very lightheaded and dizzy.  He wisely took his blood pressure and noted it was 70-80/50s whereas it is normally 117/70s.  He went in to ED immediately.  There he was noted to have the following pertinent lab values:  Hg 9.2 (11.7 prior)  INR 2.9  Na 131 (139 prior)  K 6.18  BUN 43 (18 prior)  Cr 2.88 (1.1-1.4 at baseline)  proBNP 2755  He was transferred to [facility] for further evaluation and care.  Of note, he underwent screening c-scope on 7/2 during which the following was noted and done: Diverticulosis in the sigmoid colon and in the descending colon, Two 8 to 10 mm polyps in the sigmoid colon and in the descending colon, removed with a hot snare, resected and retrieved, ligated.        * GI bleed    - INR therapeutic  - restarted warfarin, home dose 7.5 Monday, 5 all other days  - can advance to full diet   - Increase protonix to 40mg po bid though highly doubt this is upper GIB. GI states patient is Ok to d/c from their standpoint.    - had screening colonoscopy with hot snare polypectomy 5 days ago, most likely culprit post polypectomy bleeding  - H & H stabilizing, normal BMs since admission        Acute renal failure    - Cr much improved, though slightly up today (Na down, JVD up) may be due to  hypervolemia  - give lasix 80 IV push and monitor  - can add back aldactone today        Chronic systolic CHF (congestive heart failure)    - Restarted diuretics and losartan  - Slightly overloaded this morning with increased cough and crackles, will give IV lasix x1, check BNP and monitor response  - He is on lasix 80mg po bid and bumex prn @ home which will be stopped (advised by primary)  - Restart aldactone as tolerated        History of atrial fibrillation    - Restarted warfarin, continue amio (though for VT)        Seizure disorder    - Continue keppra        CAD (coronary artery disease)    - Continue atorvastatin  - Continue imdur  - Continue nitro prn  - Continue lopressor        Sarcoidosis of lung    - Continue prednisone, breo-ellipta, and albuterol prn  - Currently without complaint            JUANPABLO GuyC  Heart Transplant  [facility]

## 2018-07-09 NOTE — ASSESSMENT & PLAN NOTE
- Restarted diuretics and losartan  - Slightly overloaded this morning with increased cough and crackles, will give IV lasix x1, check BNP and monitor response  - He is on lasix 80mg po bid and bumex prn @ home which will be stopped (advised by primary)  - Restart aldactone as tolerated

## 2018-07-09 NOTE — ASSESSMENT & PLAN NOTE
- INR therapeutic  - restarted warfarin, home dose 7.5 Monday, 5 all other days  - can advance to full diet   - Increase protonix to 40mg po bid though highly doubt this is upper GIB. GI states patient is Ok to d/c from their standpoint.    - had screening colonoscopy with hot snare polypectomy 5 days ago, most likely culprit post polypectomy bleeding  - H & H stabilizing, normal BMs since admission

## 2018-07-09 NOTE — PROGRESS NOTES
PARTHA Torres PA at bedside speaking with pt.  PA notified of vs, uop, lab results, cvp 8, svo2 60, and cxr done.

## 2018-07-09 NOTE — PLAN OF CARE
Problem: Patient Care Overview  Goal: Plan of Care Review  Outcome: Ongoing (interventions implemented as appropriate)  AAOx4, VSS-- held PM dose of metoprolol, Tele- paced rhythm. HR- 70's-80's, weaned off NC overnight, sats remain stable, denies SOB.   H/H at 1930= 8.7/27.8 + 0130 this AM=8.4/26.8., trending downward, asymptomatic + BP stable.  Continue to monitor with CBC's q6h. Restarted coumadin treatment. If H/H remains stable, possible d/c. 1 non bloody, formed BM overnight  Refer to flowsheet for total UOP. Cr trending down.   No acute events overnight, slept well. Bed in lowest position non skid socks worn, call light within reach. Will continue to monitor.

## 2018-07-09 NOTE — PLAN OF CARE
Pt's BP dropped to 80-70's/50's; MAP in the 40's-60's. Notifed MIRIAM Landrum. Pt c/o feeling slightly weak. He also had 5/10 chest pain which resolved quickly with no intervention. BP meds adjusted and patient transferred to ICU for central line placement and closer monitoring. Report given to Shara. Pt brought by myself and charge with pulse ox and telemetry monitoring on 2L O2. Chart and personal belongings brought with patient.

## 2018-07-09 NOTE — SIGNIFICANT EVENT
"Called by nurse about patient with MAPs in 60-50's and down trending. Patient c/o mild 5/10 chest pain, central near base of neck, burning in nature and "feeling funny".  Stat EKG, troponin, CMP and CBC drawn  EKG- biV paced @ 82, no evidence of acute infarction  CMP with RONALD (Cr up from 1.5 to 2.2) and decreasing UOP  CBC with H & H down from 9.1 to 8 (no BM, no BRBPR)  Troponin +jesenia (.323) but at baseline in patient with CHF (2 weeks ago troponin 2.3, underwent LHC with TARI 3). Chest pain had resolved within 10 minutes of call.     Patient was transferred to the ICU for RIJ central line placement to assess hemodynamics due to hypotension:  CVP 8,   CO/CI 7.26/3.2  SvO2 60  Map while assessing CVP was 70, systolic in low 100's, much improved without intervention. Chest pain and dizziness has completely resolved.    Hypotension most likely iatrogenic due to BP meds (recenly started on BB last admission, was only out of hospital 2 days, per patient he was feeling dizzy at and was hypotensive on admission to the ED)  Holding losartan, holding metoprolol, holding isosorbide.  Hold home dose of Bumex, can add back tomorrow after AM labs and CVP.  Will restart and uptitrate as tolerated as renal function recovers. Prefer to start losartan and BB for HF therapy and not d/c isosorbide mononitrate.     Anna Landrum PA-C  "

## 2018-07-09 NOTE — PLAN OF CARE
Problem: Patient Care Overview  Goal: Individualization & Mutuality  Pt likes to drink black coffee with 1 sugar

## 2018-07-10 PROBLEM — R57.0 CARDIOGENIC SHOCK: Status: ACTIVE | Noted: 2018-01-01

## 2018-07-10 PROBLEM — I47.29 POLYMORPHIC VENTRICULAR TACHYCARDIA: Status: ACTIVE | Noted: 2018-01-01

## 2018-07-10 PROBLEM — I49.01 VENTRICULAR FIBRILLATION: Status: ACTIVE | Noted: 2018-01-01

## 2018-07-10 NOTE — ASSESSMENT & PLAN NOTE
- Creatinine trending down. Gentle hydration given overnight.   - continue to hold nephrotoxic agents

## 2018-07-10 NOTE — PROGRESS NOTES
Notified MD of pt's BP after fluid bolus. TORBV to recheck BP in 20 min. Will continue to monitor.

## 2018-07-10 NOTE — ANESTHESIA PROCEDURE NOTES
Intubation    Diagnosis: Acute Respiratory Failure  Patient location during procedure: ICU  Procedure start time: 7/10/2018 3:21 PM  Timeout: 7/10/2018 3:20 PM  Procedure end time: 7/10/2018 3:23 PM  Staffing  Anesthesiologist: BORIS PERKINS II  Resident/CRNA: FE NOONAN  Performed: resident/CRNA   Anesthesiologist was present at the time of the procedure.  Preanesthetic Checklist  Completed: patient identified, site marked, surgical consent, pre-op evaluation, timeout performed, IV checked, risks and benefits discussed, monitors and equipment checked and anesthesia consent given  Intubation  Indication: respiratory distress, respiratory failure, hypoxemia  Pre-oxygenation. Induction: intravenous, Mask assessment prior to intubation: Rapid Sequence.  Intubation: postinduction, laryngoscopy glidescope, John 3.  Endotracheal Tube: oral, 8.0 mm ID, cuffed (inflated to minimal occlusive pressure)  Attempts: 1, Grade I - full view of cords  Complicating Factors: none  Tube secured at 23 cm at the lips.  Findings post-intubation: bilateral breath sounds, positive ETCO2, atraumatic / condition of teeth unchanged  Position Confirmation: auscultation  Eye Care: taped after intubation  Additional Notes  Following intubation, pt suffered polymorphic VT, sustained, then into VF to which his AICD defibrillated. A pulse was regained again but continued to go back into a wide complex VT with a pulse.  Mr. Good was then synchronized cardioverted @ 200J of the following times: 1707, 1708 and 1711 with successful conversion into a more stable V-Paced rhythm with intermittent polymorphic PVC's. A third amiodarone load was also administered at 1709. During this episode there were two occurences when his PPM/AICD was overdrive pacing with a successful capture. He again regained cardiopulmonary stability at the current level of support with a reduced incidence of ventricular ectopy.

## 2018-07-10 NOTE — SUBJECTIVE & OBJECTIVE
Subjective:     Interval History:     Patient seen earlier this morning, at that time no issues overnight. No further bleeding. Tolerating cardiac diet.    Post-Op Info:  * No surgery found *          Medications:  Continuous Infusions:   amiodarone in dextrose 5% 1 mg/min (07/10/18 1505)    amiodarone in dextrose 5%      fentanyl 2,500 mcg (07/10/18 1644)    lidocaine 2 mg/min (07/10/18 1700)    norepinephrine bitartrate-D5W 0.7 mcg/kg/min (07/10/18 1700)    propofol 30 mcg/kg/min (07/10/18 1630)     Scheduled Meds:   albuterol sulfate  2.5 mg Nebulization Q4H    [START ON 7/11/2018] aspirin  81 mg Oral Daily    atorvastatin  40 mg Oral Daily    chlorhexidine  15 mL Mouth/Throat BID    chlorhexidine  15 mL Mouth/Throat BID    fluticasone-vilanterol  1 puff Inhalation Daily    gabapentin  600 mg Oral BID    levETIRAcetam  500 mg Oral BID    lidocaine HCL 10 mg/ml (1%)  1 mL Other Once    magnesium sulfate IVPB  2 g Intravenous Once    pantoprazole  40 mg Oral BID    predniSONE  10 mg Oral Daily    sodium chloride 0.9%  3 mL Intravenous Q8H     PRN Meds:   sodium chloride    ALPRAZolam    benzonatate    calcium gluconate IVPB    calcium gluconate IVPB    calcium gluconate IVPB    carisoprodol    HYDROcodone-acetaminophen    magnesium sulfate IVPB    magnesium sulfate IVPB    nitroGLYCERIN    potassium chloride in water    And    potassium chloride in water    And    potassium chloride in water    promethazine-codeine 6.25-10 mg/5 ml    sodium phosphate IVPB    sodium phosphate IVPB    sodium phosphate IVPB        Objective:     Vital Signs (Most Recent):  Temp: 97.8 °F (36.6 °C) (07/10/18 1100)  Pulse: 95 (07/10/18 1540)  Resp: (!) 24 (07/10/18 1540)  BP: (!) 143/61 (07/10/18 1540)  SpO2: 100 % (07/10/18 1540) Vital Signs (24h Range):  Temp:  [97.7 °F (36.5 °C)-98.3 °F (36.8 °C)] 97.8 °F (36.6 °C)  Pulse:  [] 95  Resp:  [11-45] 24  SpO2:  [96 %-100 %] 100 %  BP:  ()/(43-83) 143/61     Intake/Output - Last 3 Shifts       07/08 0700 - 07/09 0659 07/09 0700 - 07/10 0659 07/10 0700 - 07/11 0659    P.O. 2350 1350     I.V. (mL/kg)   363.3 (3.6)    Total Intake(mL/kg) 2350 (23.3) 1350 (13.4) 363.3 (3.6)    Urine (mL/kg/hr) 2825 (1.2) 775 (0.3) 400 (0.4)    Emesis/NG output 0 (0)      Other 0 (0)      Stool 0 (0)      Blood 0 (0)      Total Output 2825 775 400    Net -475 +575 -36.8           Urine Occurrence 0 x 651 x     Stool Occurrence 1 x 1 x     Emesis Occurrence 0 x            Physical Exam   Constitutional: He is oriented to person, place, and time. He appears well-developed and well-nourished.   HENT:   Head: Normocephalic and atraumatic.   Neck: Normal range of motion. Neck supple.   Cardiovascular: Normal rate and regular rhythm.    Pulmonary/Chest: Effort normal and breath sounds normal.   Abdominal: Soft. Bowel sounds are normal. He exhibits no distension. There is no tenderness.   Musculoskeletal: Normal range of motion.   Neurological: He is alert and oriented to person, place, and time.   Skin: Skin is warm and dry.     Significant Labs:  BMP (Last 3 Results):   Recent Labs  Lab 07/09/18  1750 07/10/18  0336 07/10/18  1022 07/10/18  1628    94 99 344*   * 133* 133* 129*   K 5.1 4.5 4.0 5.4*   CL 98 99 100 98   CO2 26 26 23 19*   BUN 26* 26* 24* 25*   CREATININE 2.1* 1.8* 1.7* 2.5*   CALCIUM 8.8 8.5* 8.8 9.8   MG 1.8 2.1  --  2.9*     CBC (Last 3 Results):   Recent Labs  Lab 07/10/18  0336 07/10/18  1022 07/10/18  1628   WBC 6.40 7.82 28.24*   RBC 2.82* 3.01* 3.17*   HGB 8.4* 9.0* 9.6*   HCT 27.2* 28.4* 30.5*    181 229   MCV 97 94 96   MCH 29.8 29.9 30.3   MCHC 30.9* 31.7* 31.5*

## 2018-07-10 NOTE — PLAN OF CARE
Problem: Patient Care Overview  Goal: Plan of Care Review  Outcome: Ongoing (interventions implemented as appropriate)  Sats % on room air. Bolus x 2 administered this shift to keep map above 65. A&Ox4 and following commands. Free from fall/injury overnight. Repositioned q2h to prevent skin breakdown. Voiding per urinal; UOP adequate. Accuchecks and labs monitored. Daughter remains at bedside. Plan of care reviewed with pt and wife. All questions and concerns addressed. See flowsheet for full assessment details.

## 2018-07-10 NOTE — CONSULTS
[facility]  Section of Transfusion Medicine and Histocompatibility  HLA Note    Case Details   Diagnosis:  Heart transplant candidate  Blood Type: B NEG  HLA Type:   Class I:  Lab Results   Component Value Date    ZIPS2FN 30 06/27/2018    XMHD4UZ 68 06/27/2018    WOFJ7RL 60 06/27/2018    ZOBR2LM 42 06/27/2018    XWURN2FF 6 06/27/2018    OZSYL1GH XX 06/27/2018    LZHXC2EE 10 06/27/2018    WKEZM9QZ 17 06/27/2018     Class II:  Lab Results   Component Value Date    ZRMLDA64WA 18 06/27/2018    IJHGFI82ZO 13 06/27/2018    YELELY693KF 52 06/27/2018    PSXDNF9412 XX 06/27/2018    PKNAI2ST 4 06/27/2018    LHGZG6YO 6 06/27/2018     Recent Antibody Screen/ID Results:   Lab Results   Component Value Date    DA1FFEH Negative 06/27/2018    CIIAB Negative 06/27/2018     Auto T Cell Crossmatch Results:  Lab Results   Component Value Date    XMTCELLRES Negative 06/27/2018     Auto B Cell Crossmatch Results:  Lab Results   Component Value Date    BCELLRES Negative 06/27/2018     Assessment     Interpretation: As below    Strongly Recommended Unacceptable Antigens: None    Crossmatch Expectations: A virtual/retrospective crossmatch is recommended.    Please call the HLA Lab [phone] with any concerns or questions.    Kacy Castro MD , PhD  DELORIS Farley MD, CECILIA  Section of Transfusion Medicine & Histocompatibility  Department of Pathology and Laboratory Medicine  [facility]  07/10/2018

## 2018-07-10 NOTE — ASSESSMENT & PLAN NOTE
-Likely combination of blood loss prior to admit + antihypertensives  -Seems to have resolved with holding BP meds.

## 2018-07-10 NOTE — EICU
Mr. Good was noted to be in polymorphic VT, sustained, then into VF to which his AICD defibrillated. A pulse was regained again but continued to go back into a wide complex VT with a pulse.  Mr. Good was then synchronized cardioverted with 200J at the following times: 1707, 1708 and 1711 with successful conversion into a more stable V-Paced rhythm with intermittent polymorphic PVC's. A third amiodarone load was also administered at 1709. During this episode there were two occurences when his PPM/AICD was overdrive pacing with a successful capture. He again regained cardiopulmonary stability at the current level of support with a reduced incidence of ventricular ectopy.

## 2018-07-10 NOTE — PROGRESS NOTES
MD at the bedside. Pt in trendelenburg position without change in BP. New orders being placed. Will continue to closely monitor.

## 2018-07-10 NOTE — EICU
Mr. Mcbride was again noted to be in persistent VT with bouts of polymorphic VT then VF with an external unsynchronized 200J Defibrillation @ 1737, then a pulse was immediately regained. During this episode his AICD defibrillated two more occurences and overdrive pacing was active as well. IABP is 1:1 @ 100% augmentation. NaHCO3, B-Blocker, and a further amiodarone load were given during this episode with an improvement in hemodynamic stability & drastic reduction of ectopy.    Total External Shocks Delivered at this point: 4  Total Internal Shocks Delivered at this point: 12    Drug Administration dose & Time: NaHCO3 50mEq IVP @ 1738       NaHCO3 50mEq IVP @ 1744       Lopressor 2.5mg IVP @ 1749       Amiodarone 150mg IVP @ 1751    Results for SUNG MCBRIDE JR. (MRN [id number]) as of 7/10/2018 17:38   Ref. Range 7/10/2018 16:28   WBC Latest Ref Range: 3.90 - 12.70 K/uL 28.24 (H)   RBC Latest Ref Range: 4.60 - 6.20 M/uL 3.17 (L)   Hemoglobin Latest Ref Range: 14.0 - 18.0 g/dL 9.6 (L)   Hematocrit Latest Ref Range: 40.0 - 54.0 % 30.5 (L)   MCV Latest Ref Range: 82 - 98 fL 96   MCH Latest Ref Range: 27.0 - 31.0 pg 30.3   MCHC Latest Ref Range: 32.0 - 36.0 g/dL 31.5 (L)   RDW Latest Ref Range: 11.5 - 14.5 % 14.6 (H)   Platelets Latest Ref Range: 150 - 350 K/uL 229   MPV Latest Ref Range: 9.2 - 12.9 fL 12.0   Gran% Latest Ref Range: 38.0 - 73.0 % 74.0 (H)   Immature Granulocytes Latest Ref Range: 0.0 - 0.5 % CANCELED   Immature Grans (Abs) Latest Ref Range: 0.00 - 0.04 K/uL CANCELED   Lymph% Latest Ref Range: 18.0 - 48.0 % 3.0 (L)   Lymph # Latest Ref Range: 1.0 - 4.8 K/uL CANCELED   Mono% Latest Ref Range: 4.0 - 15.0 % 4.0   Mono # Latest Ref Range: 0.3 - 1.0 K/uL CANCELED   Eosinophil% Latest Ref Range: 0.0 - 8.0 % 0.0   Eos # Latest Ref Range: 0.0 - 0.5 K/uL CANCELED   Basophil% Latest Ref Range: 0.0 - 1.9 % 0.0   Baso # Latest Ref Range: 0.00 - 0.20 K/uL CANCELED   nRBC Latest Ref Range: 0 /100 WBC 0   Protime Latest  Ref Range: 9.0 - 12.5 sec 24.9 (H)   Coumadin Monitoring INR Latest Ref Range: 0.8 - 1.2  2.6 (H)     Results for SUNG MCBRIDE JR. (MRN [id number]) as of 7/10/2018 17:38   Ref. Range 7/10/2018 16:28   Sodium Latest Ref Range: 136 - 145 mmol/L 129 (L)   Potassium Latest Ref Range: 3.5 - 5.1 mmol/L 5.4 (H)   Chloride Latest Ref Range: 95 - 110 mmol/L 98   CO2 Latest Ref Range: 23 - 29 mmol/L 19 (L)   Anion Gap Latest Ref Range: 8 - 16 mmol/L 12   BUN, Bld Latest Ref Range: 6 - 20 mg/dL 25 (H)   Creatinine Latest Ref Range: 0.5 - 1.4 mg/dL 2.5 (H)   eGFR if non African American Latest Ref Range: >60 mL/min/1.73 m^2 27.9 (A)   eGFR if African American Latest Ref Range: >60 mL/min/1.73 m^2 32.2 (A)   Glucose Latest Ref Range: 70 - 110 mg/dL 344 (H)   Calcium Latest Ref Range: 8.7 - 10.5 mg/dL 9.8   Magnesium Latest Ref Range: 1.6 - 2.6 mg/dL 2.9 (H)   Alkaline Phosphatase Latest Ref Range: 55 - 135 U/L 93   Total Protein Latest Ref Range: 6.0 - 8.4 g/dL 6.5   Albumin Latest Ref Range: 3.5 - 5.2 g/dL 3.4 (L)   Total Bilirubin Latest Ref Range: 0.1 - 1.0 mg/dL 0.4   AST Latest Ref Range: 10 - 40 U/L 29   ALT Latest Ref Range: 10 - 44 U/L 52 (H)     EXAMINATION:  XR CHEST 1 VIEW    CLINICAL HISTORY:  lines;    TECHNIQUE:  Single frontal view of the chest was performed.    COMPARISON:  07/10/2018 07/09/2018    FINDINGS:  Three frontal chest radiographs.  In the series, right central venous catheter appears stable in positioning.  Endotracheal tube lies approximately 4.2 cm above the fredrick, stable on the images provided.  Nasogastric tube courses below the diaphragm.  Intra-aortic balloon pump marker projects at the level of the aortic arch, on the final image, possibly migrated somewhat distally although projects in the region of the distal aortic arch.  Left chest wall pacer stable.  No acute detrimental change in the cardiopulmonary status.   Impression       As above      Electronically signed by: Sal Tavarez,  MD  Date: 07/10/2018  Time: 18:06

## 2018-07-10 NOTE — ASSESSMENT & PLAN NOTE
- Continue to hold diuretics and GDMT until RONALD Resolves, BP stable  - CVP 5-7 overnight. SVO2 57% today. Keep central line for now.   - Pathway completed during last admission; plan for presentation at selection tomorrow

## 2018-07-10 NOTE — PROGRESS NOTES
"Called by RN at ~1345 that patient was oozing from central line which was precipitated by coughing spell. Patient also reported to have chest pain. 12-lead EKG ordered STAT, troponin ordered, RN reported to me he was going to pull nitroglycerin.    As I was walking to bedside, RN called again that patient had ~10 second run of VT. Upon arriving to bedside shortly there after, patient had stream of blood coming from central line when coughing. Gel foam ordered to bedside, RN began holding pressure near central line site. Patient had received last breathing treatment after lunch. Patient began having runs of monomorphic and polymorphic VT. Amio loading dose and gtt ordered. While waiting for amio to arrive bedside, patient continued to have frequent runs of VT (approx 10-30 seconds in length) that he was able to vagal out of. At this time we began discussing intubation for airway protection and possible IABP if we were not able to stabilize his rhythm and control his coughing/shortness of breath with patient. He stated to "do everything you can." We also discussed this at length with his daughter. SVO2 drawn from his central line 47% (down from 57%). His feet were noted to be cold to touch bilaterally (signidicant change from this morning). At this time decision was made to formally consult interventional cardiology for IABP. Discussed case with Dr. Jules. Decision made for intubation as patient was unable to lie flat for IABP and respiratory status seemed to be worsening. Anesthesia called for emergent intubated and came quickly to bedside to intubate patient. During intubation procedure, Dr. Cross and I met with daughter to update her and discuss plan of care. His daughter re-iterated the same wishes for everything to be done.     I was called by RN during our meeting with daughter that patient was coding. Came back to bedside and learned patient had gone into sustained polymorphic VT following intubation which " degenerated into VF and eventually was defibrillated by his AICD. Called interventional cardiology immediately and asked for bedside IABP insertion.     IABP was inserted at bedside by Dr. Arnold and Dr. Jules. Patient again went into sustained VT during procedure, shocked by AICD several times, received chest compressions/ACLS protocol and ROSC obtained. Additional amio bolus + lidocaine bolus given. As patient continued to have NSVT intermittently, lidocaine gtt also ordered and started. EP consulted.     Patient was moving all limbs spontaneously when I left bedside at 1640. Fentanyl gtt was added to propofol (already was receiving 50 mcg/kg/min and still appeared agitated) to maintain RASS -3.     Updated daughter (who will be staying at his bedside) on her father's clinical status prior to leaving bedside.     STAT labs ordered and were in process when left in addition to q 4 hour labs ordered for this evening.     Called again by JOANNA Hunter at ~1505 that patient was in polymorphic VT which appeared to be degenerating into VF. Notified Dr. Cross and Dr. Tran ([facility] fellow on call) who went to patient's bedside.     Uninterrupted Critical Care/Counseling Time (not including procedures): 160 minutes

## 2018-07-10 NOTE — PROGRESS NOTES
[facility]  Colorectal Surgery  Progress Note    Patient Name: Yonathan Good Jr.  MRN: [id number]  Admission Date: 7/7/2018  Hospital Length of Stay: 3 days  Attending Physician: Mohan Cross MD    Subjective:     Interval History:     Patient seen earlier this morning, at that time no issues overnight. No further bleeding. Tolerating cardiac diet.    Post-Op Info:  * No surgery found *          Medications:  Continuous Infusions:   amiodarone in dextrose 5% 1 mg/min (07/10/18 1505)    amiodarone in dextrose 5%      fentanyl 2,500 mcg (07/10/18 1644)    lidocaine 2 mg/min (07/10/18 1700)    norepinephrine bitartrate-D5W 0.7 mcg/kg/min (07/10/18 1700)    propofol 30 mcg/kg/min (07/10/18 1630)     Scheduled Meds:   albuterol sulfate  2.5 mg Nebulization Q4H    [START ON 7/11/2018] aspirin  81 mg Oral Daily    atorvastatin  40 mg Oral Daily    chlorhexidine  15 mL Mouth/Throat BID    chlorhexidine  15 mL Mouth/Throat BID    fluticasone-vilanterol  1 puff Inhalation Daily    gabapentin  600 mg Oral BID    levETIRAcetam  500 mg Oral BID    lidocaine HCL 10 mg/ml (1%)  1 mL Other Once    magnesium sulfate IVPB  2 g Intravenous Once    pantoprazole  40 mg Oral BID    predniSONE  10 mg Oral Daily    sodium chloride 0.9%  3 mL Intravenous Q8H     PRN Meds:   sodium chloride    ALPRAZolam    benzonatate    calcium gluconate IVPB    calcium gluconate IVPB    calcium gluconate IVPB    carisoprodol    HYDROcodone-acetaminophen    magnesium sulfate IVPB    magnesium sulfate IVPB    nitroGLYCERIN    potassium chloride in water    And    potassium chloride in water    And    potassium chloride in water    promethazine-codeine 6.25-10 mg/5 ml    sodium phosphate IVPB    sodium phosphate IVPB    sodium phosphate IVPB        Objective:     Vital Signs (Most Recent):  Temp: 97.8 °F (36.6 °C) (07/10/18 1100)  Pulse: 95 (07/10/18 1540)  Resp: (!) 24 (07/10/18 1540)  BP: (!) 143/61  (07/10/18 1540)  SpO2: 100 % (07/10/18 1540) Vital Signs (24h Range):  Temp:  [97.7 °F (36.5 °C)-98.3 °F (36.8 °C)] 97.8 °F (36.6 °C)  Pulse:  [] 95  Resp:  [11-45] 24  SpO2:  [96 %-100 %] 100 %  BP: ()/(43-83) 143/61     Intake/Output - Last 3 Shifts       07/08 0700 - 07/09 0659 07/09 0700 - 07/10 0659 07/10 0700 - 07/11 0659    P.O. 2350 1350     I.V. (mL/kg)   363.3 (3.6)    Total Intake(mL/kg) 2350 (23.3) 1350 (13.4) 363.3 (3.6)    Urine (mL/kg/hr) 2825 (1.2) 775 (0.3) 400 (0.4)    Emesis/NG output 0 (0)      Other 0 (0)      Stool 0 (0)      Blood 0 (0)      Total Output 2825 775 400    Net -475 +575 -36.8           Urine Occurrence 0 x 651 x     Stool Occurrence 1 x 1 x     Emesis Occurrence 0 x            Physical Exam   Constitutional: He is oriented to person, place, and time. He appears well-developed and well-nourished.   HENT:   Head: Normocephalic and atraumatic.   Neck: Normal range of motion. Neck supple.   Cardiovascular: Normal rate and regular rhythm.    Pulmonary/Chest: Effort normal and breath sounds normal.   Abdominal: Soft. Bowel sounds are normal. He exhibits no distension. There is no tenderness.   Musculoskeletal: Normal range of motion.   Neurological: He is alert and oriented to person, place, and time.   Skin: Skin is warm and dry.     Significant Labs:  BMP (Last 3 Results):   Recent Labs  Lab 07/09/18  1750 07/10/18  0336 07/10/18  1022 07/10/18  1628    94 99 344*   * 133* 133* 129*   K 5.1 4.5 4.0 5.4*   CL 98 99 100 98   CO2 26 26 23 19*   BUN 26* 26* 24* 25*   CREATININE 2.1* 1.8* 1.7* 2.5*   CALCIUM 8.8 8.5* 8.8 9.8   MG 1.8 2.1  --  2.9*     CBC (Last 3 Results):   Recent Labs  Lab 07/10/18  0336 07/10/18  1022 07/10/18  1628   WBC 6.40 7.82 28.24*   RBC 2.82* 3.01* 3.17*   HGB 8.4* 9.0* 9.6*   HCT 27.2* 28.4* 30.5*    181 229   MCV 97 94 96   MCH 29.8 29.9 30.3   MCHC 30.9* 31.7* 31.5*     Assessment/Plan:     GI bleed    -No further bleeding  overnight. INR 2.7 this AM, on coumadin.  -Defer further management to ICU and consulting services, from a CRS standpoint continue diet as tolerated.              Talon Roberts MD  Colorectal Surgery  [facility]

## 2018-07-10 NOTE — PLAN OF CARE
Problem: Patient Care Overview  Goal: Plan of Care Review  Outcome: Ongoing (interventions implemented as appropriate)  Pt intubated for IABP placement, remains ventilated on100% fiO2, rate of 14, PEEP of 10. Interventional cardiology at bedside for emergent IABP placement. Balloon pump currently in place, appropriate waveforms and pressure, no bleeding at site, pulses intact distally. Patient has remained with multiple episodes of dysrhythmias requiring defibrillation, both internally and externally as well as Epi, bicarb, and multiple rounds of amiodarone. Lopressor added for additional rate control.  Family notified of patient current unstable condition and updated frequently throughout the afternoon by staff at bedside. Patient currently on 1gtt of amio, 1mcg/kg levo, 30mcg/kg propofol, 50mcg/kg fentanyl, 2mg/hr lidocaine. Insulin gtt ordered for hyperglycemia. Labs drawn and MDs updated of lab values. Family updated with plan of care by MDs and RNs, will continue to monitor closely.

## 2018-07-10 NOTE — PROCEDURES
Type of Device [employer] Unify Quadra CRT-D    Leads a lead, RV lead, LV lead    Mode DDD    Lower rate 55 --> 80    Events VT episodes x 15 treated with ATP x 15 and shock x 12    Rafa Petit MD  PGY-IV

## 2018-07-10 NOTE — ASSESSMENT & PLAN NOTE
-No further bleeding overnight. INR 2.7 this AM, on coumadin.  -Defer further management to ICU and consulting services, from a CRS standpoint continue diet as tolerated.

## 2018-07-10 NOTE — HPI
Mr. Good is a 57 year old male with PMH AF on warfarin (no EKGs in chart demonstrate AF), HFrEF due to ICM (EF 5-10%) s/p CRT-D ([employer]), h/o VT storm in 2/18, pulmonary sarcoidosis, CVA admitted for GI bleed, consulted to EP for polymorphic VT. The patient was recently admitted, at that time had chest pain, found to be in VT which lead to ATP and subsequent discharge from his device for VT >200 bpm. He had 2 more episodes of VT at a rate of 150 bpm, ICD was reprogrammed. It was then thought that the patient had experienced nonsustained AT with aberrancy as the intrinsic rhythm with the same morphology, however was more narrow. The patient was then loaded with amiodarone and given beta blockers. He underwent LHC on 6/25/18 which did not lead to intervention as no culprit lesion was found (LAD 30% with patent stent, proximal LCx 70%, OM1 80%). He initially presented to [facility] with BRBPR and was transferred to [facility] for further evaluation. Per nursing staff, the patient developed a coughing fit and was noted to be in V Tach. The patient experienced cardiac arrest, ROSC achieved at 6 min 13 sec, balloon pump placed by Interventional cardiology. Device interrogation revealed 15 episodes of VT treated with ATP x 15 and shocked x 12. Review of telemetry reveals high burned on VT; there are both non-sustained and sustained runs of VT, primarily monomorphic but also episodes of PMVT. The patient is sedated and intubated.

## 2018-07-10 NOTE — ASSESSMENT & PLAN NOTE
- INR therapeutic  - restarted warfarin, home dose 7.5 Monday, 5 all other days. Decrease dose tonight given jump from 2.1-->2.7  - Protonix increased to 40 mg BID.   -Had screening colonoscopy with hot snare polypectomy 5 days ago, most likely culprit post polypectomy bleeding  - H & H stabilizing, normal BMs since admission

## 2018-07-10 NOTE — SUBJECTIVE & OBJECTIVE
Interval History: Patient reports being asymptomatic most of the night. Denies chest pain.     Continuous Infusions:  Scheduled Meds:   albuterol sulfate  2.5 mg Nebulization Q4H    amiodarone  400 mg Oral BID    atorvastatin  40 mg Oral Daily    fluticasone-vilanterol  1 puff Inhalation Daily    gabapentin  600 mg Oral BID    levETIRAcetam  500 mg Oral BID    lidocaine HCL 10 mg/ml (1%)  1 mL Other Once    pantoprazole  40 mg Oral BID    predniSONE  10 mg Oral Daily    sodium chloride 0.9%  3 mL Intravenous Q8H    warfarin  2.5 mg Oral Daily     PRN Meds:ALPRAZolam, carisoprodol, HYDROcodone-acetaminophen, magnesium oxide, magnesium oxide, nitroGLYCERIN, potassium chloride 10%, potassium chloride 10%, potassium chloride 10%, potassium, sodium phosphates, potassium, sodium phosphates, potassium, sodium phosphates, promethazine-codeine 6.25-10 mg/5 ml    Review of patient's allergies indicates:  No Known Allergies  Objective:     Vital Signs (Most Recent):  Temp: 97.8 °F (36.6 °C) (07/10/18 1100)  Pulse: 82 (07/10/18 1300)  Resp: 13 (07/10/18 1300)  BP: (!) 96/57 (07/10/18 1300)  SpO2: 100 % (07/10/18 1300) Vital Signs (24h Range):  Temp:  [97.7 °F (36.5 °C)-98.3 °F (36.8 °C)] 97.8 °F (36.6 °C)  Pulse:  [69-95] 82  Resp:  [11-45] 13  SpO2:  [96 %-100 %] 100 %  BP: ()/(43-71) 96/57     Patient Vitals for the past 72 hrs (Last 3 readings):   Weight   07/09/18 0400 100.9 kg (222 lb 7.1 oz)   07/08/18 0600 100.1 kg (220 lb 10.9 oz)     Body mass index is 31.02 kg/m².      Intake/Output Summary (Last 24 hours) at 07/10/18 1310  Last data filed at 07/10/18 1000   Gross per 24 hour   Intake              990 ml   Output             1025 ml   Net              -35 ml       Hemodynamic Parameters:       Telemetry:reviewed. Occasional PVCs    Physical Exam   Constitutional: He is oriented to person, place, and time. He appears well-developed and well-nourished.   HENT:   Head: Normocephalic and atraumatic.    Eyes: EOM are normal. Pupils are equal, round, and reactive to light.   Neck: Normal range of motion. Neck supple. No JVD present.   Right CVC in place   Cardiovascular: Normal rate and regular rhythm.    Warm and well perfused.    Pulmonary/Chest: Effort normal. No respiratory distress. He has no wheezes.   Coarse breath sounds to LLL & LML   Abdominal: Soft. Bowel sounds are normal. He exhibits no distension. There is no tenderness.   Musculoskeletal: Normal range of motion. He exhibits no edema.   Neurological: He is alert and oriented to person, place, and time.   Skin: Skin is warm and dry.   Nursing note and vitals reviewed.      Significant Labs:  CBC:    Recent Labs  Lab 07/09/18  1750 07/09/18  2020 07/10/18  0336 07/10/18  1022   WBC 6.37  --  6.40 7.82   RBC 2.88*  --  2.82* 3.01*   HGB 8.6* 8.2* 8.4* 9.0*   HCT 27.7* 25.6* 27.2* 28.4*     --  177 181   MCV 96  --  97 94   MCH 29.9  --  29.8 29.9   MCHC 31.0*  --  30.9* 31.7*     BNP:    Recent Labs  Lab 07/09/18  0957   *     CMP:    Recent Labs  Lab 07/09/18  1348 07/09/18  1750 07/10/18  0336 07/10/18  1022   * 110 94 99   CALCIUM 8.8 8.8 8.5* 8.8   ALBUMIN 3.0* 3.2* 3.2*  --    PROT 5.6* 5.8* 5.9*  --    * 131* 133* 133*   K 4.8 5.1 4.5 4.0   CO2 21* 26 26 23   CL 99 98 99 100   BUN 25* 26* 26* 24*   CREATININE 2.2* 2.1* 1.8* 1.7*   ALKPHOS 61 71 72  --    ALT 52* 52* 47*  --    AST 35 31 25  --    BILITOT 0.6 0.6 0.4  --       Coagulation:     Recent Labs  Lab 07/08/18  0132 07/09/18  0143 07/10/18  0336   INR 2.0* 2.1* 2.7*     LDH:  No results for input(s): LDH in the last 72 hours.  Microbiology:  Microbiology Results (last 7 days)     ** No results found for the last 168 hours. **          I have reviewed all pertinent labs within the past 24 hours.    Estimated Creatinine Clearance: 59.4 mL/min (A) (based on SCr of 1.7 mg/dL (H)).    Diagnostic Results:  I have reviewed and interpreted all pertinent imaging  results/findings within the past 24 hours.

## 2018-07-10 NOTE — PROGRESS NOTES
Patient complaints of recurrent coughing fits, causing oozing at site of CVP dressing. Patient had 3 second run of VT, float notified and patient returned to bed. Respiratory notified of decline in patient SATs, MDs notified of patient condition. Patient placed on increasing amounts of oxygen for increasing demands. MDs notified, see flow sheetfor more code details.

## 2018-07-10 NOTE — HPI
58 y/o WM with hx of Afib on coumadin, pulmonary sarcoidosis, L parietal CVA, ICM EF 10% s/p ICD, CAD s/p PCI LAD 2007 with patent stent and diffuse CAD per [facility] 6/25/18 who we are consulted for emergent IABP for refractory PMVT. Pt currently intubated, sedated, and on amio after VT arrest

## 2018-07-10 NOTE — SUBJECTIVE & OBJECTIVE
Past Medical History:   Diagnosis Date    AICD (automatic cardioverter/defibrillator) present     Arthritis     Atrial fibrillation     CHF (congestive heart failure)     Coronary artery disease     Hypertension     MI (myocardial infarction)     Sarcoid     Seizures     Stroke        Past Surgical History:   Procedure Laterality Date    CARDIAC CATHETERIZATION      CARDIAC DEFIBRILLATOR PLACEMENT  7-12    CARDIAC DEFIBRILLATOR PLACEMENT      CARDIAC DEFIBRILLATOR PLACEMENT      COLONOSCOPY N/A 7/2/2018    Procedure: COLONOSCOPY;  Surgeon: THELMA Kay MD;  Location: [facility] ENDO ([address] [place] [address]);  Service: Endoscopy;  Laterality: N/A;    CORONARY STENT PLACEMENT  07/2011    ESOPHAGOGASTRODUODENOSCOPY      FRACTURE SURGERY      LEFT HEART CATHETERIZATION N/A 6/25/2018    Procedure: Left heart cath;  Surgeon: Jordi Lui MD;  Location: [facility] CATH LAB;  Service: Cardiology;  Laterality: N/A;       Review of patient's allergies indicates:  No Known Allergies    No current facility-administered medications on file prior to encounter.      Current Outpatient Prescriptions on File Prior to Encounter   Medication Sig    albuterol (PROVENTIL) 2.5 mg /3 mL (0.083 %) nebulizer solution Take 2.5 mg by nebulization every 6 (six) hours as needed.    albuterol (VENTOLIN HFA) 90 mcg/actuation inhaler Inhale 2 puffs into the lungs every 4 (four) hours as needed for Wheezing.    alprazolam (XANAX) 2 MG tablet Take 2 mg by mouth 2 (two) times daily as needed.     amiodarone (PACERONE) 400 MG tablet Take 1 tablet (400 mg total) by mouth 2 (two) times daily until 7/9/2018. Then take 1 tablet (400 mg total) by mouth 1 (one) time daily thereafter.    aspirin (ECOTRIN) 81 MG EC tablet Take 81 mg by mouth. 1 Tablet, Delayed Release (E.C.) Oral Every day    atorvastatin (LIPITOR) 40 MG tablet Take 1 tablet (40 mg total) by mouth once daily.    bumetanide (BUMEX) 1 MG tablet Take 1 mg by mouth daily as needed.     carisoprodol (SOMA) 350 MG tablet Take 350 mg by mouth 4 (four) times daily as needed for Muscle spasms.    colchicine 0.6 mg tablet 0.6 mg once daily.     enoxaparin (LOVENOX) 100 mg/mL Syrg Inject 1 mL (100 mg total) into the skin every 12 (twelve) hours.    fluticasone-vilanterol (BREO ELLIPTA) 200-25 mcg/dose DsDv diskus inhaler Inhale 1 puff into the lungs once daily. Controller    gabapentin (NEURONTIN) 600 MG tablet Take 600 mg by mouth 2 (two) times daily. 1 Tablet Oral At bedtime    hydrocodone-acetaminophen 10-325mg (NORCO)  mg Tab Take 1 tablet by mouth 2 (two) times daily as needed.     isosorbide mononitrate (IMDUR) 30 MG 24 hr tablet Take 3 tablets (90 mg total) by mouth once daily. (Patient taking differently: Take 60 mg by mouth once daily. )    levETIRAcetam (KEPPRA) 500 MG Tab Take 1 tablet (500 mg total) by mouth 2 (two) times daily.    losartan (COZAAR) 50 MG tablet Take 1 tablet (50 mg total) by mouth once daily.    metoprolol tartrate (LOPRESSOR) 25 MG tablet Take 1 tablet (25 mg total) by mouth 2 (two) times daily.    nitroGLYCERIN (NITROSTAT) 0.4 MG SL tablet ONE TABLET UNDER TONGUE AS NEEDED FOR CHEST PAIN    pantoprazole (PROTONIX) 40 MG tablet Take 40 mg by mouth. 1 Tablet, Delayed Release (E.C.) Oral Every day    potassium chloride SA (K-DUR,KLOR-CON) 20 MEQ tablet TAKE 2 TABLETS BY MOUTH EVERY MORNING AND 1 TABLET BY MOUTH EVERY EVENING    predniSONE (DELTASONE) 10 MG tablet Take 1 tablet (10 mg total) by mouth once daily.    promethazine-codeine 6.25-10 mg/5 ml (PHENERGAN WITH CODEINE) 6.25-10 mg/5 mL syrup TK 5 ML PO  Q 6 H PRN    spironolactone (ALDACTONE) 25 MG tablet TAKE 1 TABLET BY MOUTH EVERY DAY    warfarin (COUMADIN) 7.5 MG tablet 1 tablet Monday  0.5 tablet Tuesday-Sunday     Family History     Problem Relation (Age of Onset)    Cancer Maternal Grandmother    Coronary artery disease Father, Sister, Sister    Heart disease Mother, Father, Sister     Hypertension Mother, Father, Sister    Kidney disease Sister    Stroke Sister    Vision loss Sister        Social History Main Topics    Smoking status: Never Smoker    Smokeless tobacco: Never Used    Alcohol use No    Drug use: No    Sexual activity: Not on file     Review of Systems   Unable to perform ROS: acuity of condition     Objective:     Vital Signs (Most Recent):  Temp: 97.8 °F (36.6 °C) (07/10/18 1100)  Pulse: 71 (07/10/18 1828)  Resp: 13 (07/10/18 1828)  BP: 109/65 (07/10/18 1815)  SpO2: 100 % (07/10/18 1828) Vital Signs (24h Range):  Temp:  [97.7 °F (36.5 °C)-98.3 °F (36.8 °C)] 97.8 °F (36.6 °C)  Pulse:  [] 71  Resp:  [11-49] 13  SpO2:  [59 %-100 %] 100 %  BP: ()/(43-83) 109/65     Weight: 100.9 kg (222 lb 7.1 oz)  Body mass index is 31.02 kg/m².    SpO2: 100 %  O2 Device (Oxygen Therapy): ventilator    Physical Exam   Constitutional: He is oriented to person, place, and time. He appears well-developed and well-nourished.   Sedated, intubated   HENT:   Head: Normocephalic and atraumatic.   Eyes: Pupils are equal, round, and reactive to light.   Neck: Normal range of motion. No JVD present.   Cardiovascular: Intact distal pulses.    Audible balloon pump. Left groin balloon pump inserted.   Pulmonary/Chest: Effort normal and breath sounds normal. No respiratory distress. He has no wheezes. He has no rales.   Abdominal: Soft. Bowel sounds are normal. He exhibits no distension. There is no tenderness.   Musculoskeletal: Normal range of motion. He exhibits edema (2+ bilateral lower extremity edema to the thighs).   Neurological: He is alert and oriented to person, place, and time.   Skin: Skin is dry. No rash noted.   Mottled appearance   Psychiatric: He has a normal mood and affect. His behavior is normal.       Significant Labs:     Recent Results (from the past 24 hour(s))   Hemoglobin    Collection Time: 07/09/18  8:20 PM   Result Value Ref Range    Hemoglobin 8.2 (L) 14.0 - 18.0 g/dL    Hematocrit    Collection Time: 07/09/18  8:20 PM   Result Value Ref Range    Hematocrit 25.6 (L) 40.0 - 54.0 %   Type & Screen    Collection Time: 07/09/18  8:30 PM   Result Value Ref Range    Group & Rh B NEG     Indirect Amanda NEG    Prepare Fresh Frozen Plasma 1 Unit    Collection Time: 07/09/18  8:30 PM   Result Value Ref Range    UNIT NUMBER [id number]     PRODUCT CODE W9443R60     DISPENSE STATUS CROSSMATCHED     CODING SYSTEM [id number]     Unit Blood Type Code 1700     Unit Blood Type B NEG     Unit Expiration [id number]    POCT glucose    Collection Time: 07/09/18  8:36 PM   Result Value Ref Range    POCT Glucose 126 (H) 70 - 110 mg/dL   Comprehensive metabolic panel - if not done in ED    Collection Time: 07/10/18  3:36 AM   Result Value Ref Range    Sodium 133 (L) 136 - 145 mmol/L    Potassium 4.5 3.5 - 5.1 mmol/L    Chloride 99 95 - 110 mmol/L    CO2 26 23 - 29 mmol/L    Glucose 94 70 - 110 mg/dL    BUN, Bld 26 (H) 6 - 20 mg/dL    Creatinine 1.8 (H) 0.5 - 1.4 mg/dL    Calcium 8.5 (L) 8.7 - 10.5 mg/dL    Total Protein 5.9 (L) 6.0 - 8.4 g/dL    Albumin 3.2 (L) 3.5 - 5.2 g/dL    Total Bilirubin 0.4 0.1 - 1.0 mg/dL    Alkaline Phosphatase 72 55 - 135 U/L    AST 25 10 - 40 U/L    ALT 47 (H) 10 - 44 U/L    Anion Gap 8 8 - 16 mmol/L    eGFR if African American 47.9 (A) >60 mL/min/1.73 m^2    eGFR if non  41.4 (A) >60 mL/min/1.73 m^2   Magnesium - if not done in ED    Collection Time: 07/10/18  3:36 AM   Result Value Ref Range    Magnesium 2.1 1.6 - 2.6 mg/dL   Protime-INR    Collection Time: 07/10/18  3:36 AM   Result Value Ref Range    Prothrombin Time 25.8 (H) 9.0 - 12.5 sec    INR 2.7 (H) 0.8 - 1.2   CBC auto differential    Collection Time: 07/10/18  3:36 AM   Result Value Ref Range    WBC 6.40 3.90 - 12.70 K/uL    RBC 2.82 (L) 4.60 - 6.20 M/uL    Hemoglobin 8.4 (L) 14.0 - 18.0 g/dL    Hematocrit 27.2 (L) 40.0 - 54.0 %    MCV 97 82 - 98 fL    MCH 29.8 27.0 - 31.0 pg    MCHC 30.9 (L)  32.0 - 36.0 g/dL    RDW 14.8 (H) 11.5 - 14.5 %    Platelets 177 150 - 350 K/uL    MPV 11.2 9.2 - 12.9 fL    Immature Granulocytes 2.0 (H) 0.0 - 0.5 %    Gran # (ANC) 4.5 1.8 - 7.7 K/uL    Immature Grans (Abs) 0.13 (H) 0.00 - 0.04 K/uL    Lymph # 0.9 (L) 1.0 - 4.8 K/uL    Mono # 0.8 0.3 - 1.0 K/uL    Eos # 0.1 0.0 - 0.5 K/uL    Baso # 0.02 0.00 - 0.20 K/uL    nRBC 0 0 /100 WBC    Gran% 70.5 38.0 - 73.0 %    Lymph% 13.3 (L) 18.0 - 48.0 %    Mono% 12.2 4.0 - 15.0 %    Eosinophil% 1.7 0.0 - 8.0 %    Basophil% 0.3 0.0 - 1.9 %    Differential Method Automated    Cortisol    Collection Time: 07/10/18  3:36 AM   Result Value Ref Range    Cortisol 1.1 ug/dL   Phosphorus    Collection Time: 07/10/18  3:36 AM   Result Value Ref Range    Phosphorus 4.9 (H) 2.7 - 4.5 mg/dL   ISTAT PROCEDURE    Collection Time: 07/10/18  8:19 AM   Result Value Ref Range    POC PH 7.306 (L) 7.35 - 7.45    POC PCO2 52.5 (H) 35 - 45 mmHg    POC PO2 32 (LL) 40 - 60 mmHg    POC HCO3 26.2 24 - 28 mmol/L    POC BE 0 -2 to 2 mmol/L    POC SATURATED O2 56 (L) 95 - 100 %    POC TCO2 28 24 - 29 mmol/L    Sample VENOUS     Site Other     Allens Test N/A     DelSys Nasal Can     Mode SPONT     FiO2 28     Sp02 100    Troponin I    Collection Time: 07/10/18 10:22 AM   Result Value Ref Range    Troponin I 0.182 (H) 0.000 - 0.026 ng/mL   Basic metabolic panel    Collection Time: 07/10/18 10:22 AM   Result Value Ref Range    Sodium 133 (L) 136 - 145 mmol/L    Potassium 4.0 3.5 - 5.1 mmol/L    Chloride 100 95 - 110 mmol/L    CO2 23 23 - 29 mmol/L    Glucose 99 70 - 110 mg/dL    BUN, Bld 24 (H) 6 - 20 mg/dL    Creatinine 1.7 (H) 0.5 - 1.4 mg/dL    Calcium 8.8 8.7 - 10.5 mg/dL    Anion Gap 10 8 - 16 mmol/L    eGFR if African American 51.3 (A) >60 mL/min/1.73 m^2    eGFR if non African American 44.4 (A) >60 mL/min/1.73 m^2   CBC auto differential    Collection Time: 07/10/18 10:22 AM   Result Value Ref Range    WBC 7.82 3.90 - 12.70 K/uL    RBC 3.01 (L) 4.60 - 6.20  M/uL    Hemoglobin 9.0 (L) 14.0 - 18.0 g/dL    Hematocrit 28.4 (L) 40.0 - 54.0 %    MCV 94 82 - 98 fL    MCH 29.9 27.0 - 31.0 pg    MCHC 31.7 (L) 32.0 - 36.0 g/dL    RDW 14.7 (H) 11.5 - 14.5 %    Platelets 181 150 - 350 K/uL    MPV 11.7 9.2 - 12.9 fL    Immature Granulocytes 1.5 (H) 0.0 - 0.5 %    Gran # (ANC) 6.0 1.8 - 7.7 K/uL    Immature Grans (Abs) 0.12 (H) 0.00 - 0.04 K/uL    Lymph # 0.7 (L) 1.0 - 4.8 K/uL    Mono # 0.9 0.3 - 1.0 K/uL    Eos # 0.1 0.0 - 0.5 K/uL    Baso # 0.02 0.00 - 0.20 K/uL    nRBC 0 0 /100 WBC    Gran% 76.3 (H) 38.0 - 73.0 %    Lymph% 9.2 (L) 18.0 - 48.0 %    Mono% 11.0 4.0 - 15.0 %    Eosinophil% 1.7 0.0 - 8.0 %    Basophil% 0.3 0.0 - 1.9 %    Differential Method Automated    ISTAT PROCEDURE    Collection Time: 07/10/18  2:37 PM   Result Value Ref Range    POC PH 7.303 (L) 7.35 - 7.45    POC PCO2 48.0 (H) 35 - 45 mmHg    POC PO2 29 (LL) 40 - 60 mmHg    POC HCO3 23.8 (L) 24 - 28 mmol/L    POC BE -3 -2 to 2 mmol/L    POC SATURATED O2 47 (L) 95 - 100 %    POC TCO2 25 24 - 29 mmol/L    Sample VENOUS     Site Other     Allens Test N/A     DelSys Nasal Can     Mode SPONT     Sp02 97    ISTAT PROCEDURE    Collection Time: 07/10/18  2:58 PM   Result Value Ref Range    POC PH 7.275 (L) 7.35 - 7.45    POC PCO2 51.3 (H) 35 - 45 mmHg    POC PO2 26 (LL) 40 - 60 mmHg    POC HCO3 23.8 (L) 24 - 28 mmol/L    POC BE -3 -2 to 2 mmol/L    POC SATURATED O2 40 (L) 95 - 100 %    POC TCO2 25 24 - 29 mmol/L    Sample VENOUS     Site Other     Allens Test N/A     DelSys Venti Mask     Mode SPONT     Flow 15     FiO2 50     Sp02 100    Lactic acid, plasma    Collection Time: 07/10/18  4:28 PM   Result Value Ref Range    Lactate (Lactic Acid) 4.9 (HH) 0.5 - 2.2 mmol/L   Comprehensive metabolic panel    Collection Time: 07/10/18  4:28 PM   Result Value Ref Range    Sodium 129 (L) 136 - 145 mmol/L    Potassium 5.4 (H) 3.5 - 5.1 mmol/L    Chloride 98 95 - 110 mmol/L    CO2 19 (L) 23 - 29 mmol/L    Glucose 344 (H) 70 -  110 mg/dL    BUN, Bld 25 (H) 6 - 20 mg/dL    Creatinine 2.5 (H) 0.5 - 1.4 mg/dL    Calcium 9.8 8.7 - 10.5 mg/dL    Total Protein 6.5 6.0 - 8.4 g/dL    Albumin 3.4 (L) 3.5 - 5.2 g/dL    Total Bilirubin 0.4 0.1 - 1.0 mg/dL    Alkaline Phosphatase 93 55 - 135 U/L    AST 29 10 - 40 U/L    ALT 52 (H) 10 - 44 U/L    Anion Gap 12 8 - 16 mmol/L    eGFR if African American 32.2 (A) >60 mL/min/1.73 m^2    eGFR if non  27.9 (A) >60 mL/min/1.73 m^2   CBC auto differential    Collection Time: 07/10/18  4:28 PM   Result Value Ref Range    WBC 28.24 (H) 3.90 - 12.70 K/uL    RBC 3.17 (L) 4.60 - 6.20 M/uL    Hemoglobin 9.6 (L) 14.0 - 18.0 g/dL    Hematocrit 30.5 (L) 40.0 - 54.0 %    MCV 96 82 - 98 fL    MCH 30.3 27.0 - 31.0 pg    MCHC 31.5 (L) 32.0 - 36.0 g/dL    RDW 14.6 (H) 11.5 - 14.5 %    Platelets 229 150 - 350 K/uL    MPV 12.0 9.2 - 12.9 fL    Immature Granulocytes CANCELED 0.0 - 0.5 %    Immature Grans (Abs) CANCELED 0.00 - 0.04 K/uL    Lymph # CANCELED 1.0 - 4.8 K/uL    Mono # CANCELED 0.3 - 1.0 K/uL    Eos # CANCELED 0.0 - 0.5 K/uL    Baso # CANCELED 0.00 - 0.20 K/uL    nRBC 0 0 /100 WBC    Gran% 74.0 (H) 38.0 - 73.0 %    Lymph% 3.0 (L) 18.0 - 48.0 %    Mono% 4.0 4.0 - 15.0 %    Eosinophil% 0.0 0.0 - 8.0 %    Basophil% 0.0 0.0 - 1.9 %    Bands 19.0 %    Aniso Slight     Poly Occasional     Differential Method Manual    Magnesium    Collection Time: 07/10/18  4:28 PM   Result Value Ref Range    Magnesium 2.9 (H) 1.6 - 2.6 mg/dL   Protime-INR    Collection Time: 07/10/18  4:28 PM   Result Value Ref Range    Prothrombin Time 24.9 (H) 9.0 - 12.5 sec    INR 2.6 (H) 0.8 - 1.2   POCT glucose    Collection Time: 07/10/18  4:37 PM   Result Value Ref Range    POCT Glucose 328 (H) 70 - 110 mg/dL   POCT glucose    Collection Time: 07/10/18  4:38 PM   Result Value Ref Range    POCT Glucose 316 (H) 70 - 110 mg/dL   ISTAT PROCEDURE    Collection Time: 07/10/18  6:23 PM   Result Value Ref Range    POC PH 7.316 (L) 7.35 -  7.45    POC PCO2 54.5 (H) 35 - 45 mmHg    POC PO2 268 (H) 80 - 100 mmHg    POC HCO3 27.9 24 - 28 mmol/L    POC BE 2 -2 to 2 mmol/L    POC SATURATED O2 100 95 - 100 %    POC TCO2 30 (H) 23 - 27 mmol/L    Rate 14     Sample ARTERIAL     Site RR     Allens Test N/A     DelSys Adult Vent     Mode AC/PRVC     Vt 500     PEEP 10     PiP 32     FiO2 100     Min Vol 7.66     Sp02 100          Significant Imaging:     Narrative     EXAMINATION:  XR CHEST 1 VIEW    CLINICAL HISTORY:  lines;    TECHNIQUE:  Single frontal view of the chest was performed.    COMPARISON:  07/10/2018 07/09/2018    FINDINGS:  Three frontal chest radiographs.  In the series, right central venous catheter appears stable in positioning.  Endotracheal tube lies approximately 4.2 cm above the fredrick, stable on the images provided.  Nasogastric tube courses below the diaphragm.  Intra-aortic balloon pump marker projects at the level of the aortic arch, on the final image, possibly migrated somewhat distally although projects in the region of the distal aortic arch.  Left chest wall pacer stable.  No acute detrimental change in the cardiopulmonary status.   Impression       As above      Electronically signed by: Sal Tavarez MD  Date: 07/10/2018  Time: 18:06

## 2018-07-10 NOTE — EICU
Called into bedside electronically via the eICU system to remote document/assist in code blue proceedings. Mr. Good was found to be in a rapidly deteriorating condition where he was in and out of polymorphic VT with a pulse, accelerated junctional, and a V-Paced rhythm with multiple AICD discharges. He was successfully preventatively intubated in a controlled fashion prior to loss of pulse. He then converted into sustained pulseless polymorphic VT and a code blue was called and the pulseless VT ACLS algorithm was initiated as follows with successful ROSC and subsequent successful IABP insertion    Code Blue Start Time:  15:33:02 ACLS End Time:  15:39:16  ACLS Run Time: 6min 13sec  End Result: Successful ROSC    Defibrilation: 6 AICD Discharges in total    ACLS Medications Administered:  Epinephrine  2mg       Lidocaine  100mg       Magnesium 2g       Amiodarone 150mg     Time Event Details User             15:33 Code Start INITIAL RHYTHM: Polymorphic VT W/Pulse & AICD Discharges before Pulseless Polymorphic VT KS     15:33 Staff Arrived Mohan Cross MD; Best Castellanos RN; Joleen Singh RN; Teagan Collins RN KS     15:33 Info Prior To Code Info Prior To Code - Code Location: Inpatient Rapid Response Team: Responded To Code Witnessed: Yes Event Type: Cardiac Conscious at Onset: Yes Pulse Present at Onset: Yes Respirations Present at Onset: Yes Ventilated Pre-Arrest: Yes Pre-Arrest Vent/Sp02/A-B/ECG: Vent; Pulse Ox; ECG Comments: VT with a Pulse W/AICD Discharges prior to pulseless VT KS     15:33 Condition on Arrival Code Condition on Arrival in ED Code - Code Location: Inpatient Pulses Present: No Resp Present: Bagging via ET Tube Mental Status: Unresponsive Cardiac Rhythm: VT Comments: Lost Pulse KS     15:33 Rhythms - ABCs Cardiac Rhythm - Ventricular Rhythm: ventricular tachycardia; polymorphic  ABCs - Airway: Clear Breathing: Absent Pulses: Absent KS     15:33 Interventions Interventions -  Compressions: Started Airway: Intubated (Intubated prior to code blue) Oxygen Percent (% O2): 100 KS     15:34 lidocaine (cardiac) injection Ordered and Given Dose: 100 mg Route: Intravenous  Line:  Percutaneous Central Line Insertion/Assessment - triple lumen 07/09/18 1500 right internal jugular  Ordered by: Mohan Cross MD AB     15:34 Interventions Interventions - Compressions: Stopped (Pulse check) Airway: Intubated Oxygen Percent (% O2): 100 KS     15:34 Rhythms - ABCs Cardiac Rhythm - Ventricular Rhythm: PEA (pulseless electrical activity); polymorphic; ventricular tachycardia (AICD Discharge)  ABCs - Airway: Clear Breathing: Absent Pulses: Absent KS     15:34 Interventions Interventions - Compressions: Resumed Airway: Intubated Oxygen Percent (% O2): 100 KS     15:34 EPINEPHrine 0.1 mg/mL injection Ordered and Given Dose: 1 mg Route: Intravenous  Line:  Percutaneous Central Line Insertion/Assessment - triple lumen 07/09/18 1500 right internal jugular  Ordered by: Mohan Cross MD AB     15:36 Interventions Interventions - Compressions: Stopped (Pulse Check) Airway: Intubated Oxygen Percent (% O2): 100 KS     15:37 Rhythms - ABCs Cardiac Rhythm - Ventricular Rhythm: PEA (pulseless electrical activity); polymorphic; ventricular tachycardia (Back and forth between PEA & PMVT)  ABCs - Airway: Clear Breathing: Absent Pulses: Absent KS     15:37 Interventions Interventions - Compressions: Resumed Airway: Intubated Oxygen Percent (% O2): 100 KS     15:37 magnesium sulfate 4 mEq/mL (50 %) injection Ordered and Given Dose: 2 g Route: Intravenous  Line:  Percutaneous Central Line Insertion/Assessment - triple lumen 07/09/18 1500 right internal jugular  Ordered by: Mohan Cross MD AB     15:38 EPINEPHrine 0.1 mg/mL injection Ordered and Given Dose: 1 mg Route: Intravenous  Line:  Percutaneous Central Line Insertion/Assessment - triple lumen 07/09/18 1500 right internal jugular  Ordered by: Mohan Cross MD  AB     15:39 Interventions Interventions - Compressions: Stopped (Pulse Check) Airway: Intubated Oxygen Percent (% O2): 100 KS     15:39 Rhythms - ABCs Cardiac Rhythm - Junctional Rhythm: accelerated junctional rhythm Ventricular Rhythm: ventricular tachycardia (Back and forth between an accelerated junctional, polymorphic VT w/pulse & V-paced rhythm)  ABCs - Airway: Clear Breathing: Absent Pulses: Carotid Present; Femoral Present Comments: SUCCESSFUL ROSC KS     15:39 Orders Placed lidocaine 2000 mg in dextrose 5% 250 mL infusion MB     15:39 amiodarone injection Ordered and Given Dose: 150 mg Route: Intravenous  Line:  Percutaneous Central Line Insertion/Assessment - triple lumen 07/09/18 1500 right internal jugular  Ordered by: Mohan Cross MD AB     15:40 Vitals Vitals - Pulse: 95 Heart Rate Source: Monitor; Continuous Resp: 24 BP: 143/61 Patient Position: Lying SpO2: 100 % Pulse Ox Source: Continuous KS     15:40 norepinephrine 4 mg in dextrose 5% 250 mL infusion (premix) (titrating) Verify Only Dose: 1 mcg/kg/min Rate: 378.4 mL/hr Route: Intravenous KS     15:40 Oxygen Therapy Oxygen Therapy - Pulse Oximetry Type: Continuous Oxygen Concentration (%): 100 O2 Device (Oxygen Therapy): ambu bag KS     15:40 Pain Scale Number Scale - Pain Rating: Activity: 0 KS     15:40 Notifications Notifications - Notified: Family KS     15:40 Code Outcome Outcome - Survival: Yes Code Termination Due to: Return of Spontaneous Circulation Transfer To Critical Care: Other (Comment) (Already in critical care) KS     15:40 Code End SUCCESSFUL ROSC KS     15:40 Staff Departed Mohan Cross MD (Automatically marked out by Code End event); Best Castellanos, JOANAN (Automatically marked out by Code End event); Joleen Singh, RN (Automatically marked out by Code End event); Teagan Collins, JOANNA (Automatically marked out by Code End event) KS        Results for SUNG MCBRIDE JR. (MRN [id number]) as of 7/10/2018 16:43   Ref. Range  7/10/2018 03:36 7/10/2018 10:22   WBC Latest Ref Range: 3.90 - 12.70 K/uL 6.40 7.82   RBC Latest Ref Range: 4.60 - 6.20 M/uL 2.82 (L) 3.01 (L)   Hemoglobin Latest Ref Range: 14.0 - 18.0 g/dL 8.4 (L) 9.0 (L)   Hematocrit Latest Ref Range: 40.0 - 54.0 % 27.2 (L) 28.4 (L)   MCV Latest Ref Range: 82 - 98 fL 97 94   MCH Latest Ref Range: 27.0 - 31.0 pg 29.8 29.9   MCHC Latest Ref Range: 32.0 - 36.0 g/dL 30.9 (L) 31.7 (L)   RDW Latest Ref Range: 11.5 - 14.5 % 14.8 (H) 14.7 (H)   Platelets Latest Ref Range: 150 - 350 K/uL 177 181   MPV Latest Ref Range: 9.2 - 12.9 fL 11.2 11.7   Gran% Latest Ref Range: 38.0 - 73.0 % 70.5 76.3 (H)   Gran # (ANC) Latest Ref Range: 1.8 - 7.7 K/uL 4.5 6.0   Immature Granulocytes Latest Ref Range: 0.0 - 0.5 % 2.0 (H) 1.5 (H)   Immature Grans (Abs) Latest Ref Range: 0.00 - 0.04 K/uL 0.13 (H) 0.12 (H)   Lymph% Latest Ref Range: 18.0 - 48.0 % 13.3 (L) 9.2 (L)   Lymph # Latest Ref Range: 1.0 - 4.8 K/uL 0.9 (L) 0.7 (L)   Mono% Latest Ref Range: 4.0 - 15.0 % 12.2 11.0   Mono # Latest Ref Range: 0.3 - 1.0 K/uL 0.8 0.9   Eosinophil% Latest Ref Range: 0.0 - 8.0 % 1.7 1.7   Eos # Latest Ref Range: 0.0 - 0.5 K/uL 0.1 0.1   Basophil% Latest Ref Range: 0.0 - 1.9 % 0.3 0.3   Baso # Latest Ref Range: 0.00 - 0.20 K/uL 0.02 0.02   nRBC Latest Ref Range: 0 /100 WBC 0 0   Protime Latest Ref Range: 9.0 - 12.5 sec 25.8 (H)    Coumadin Monitoring INR Latest Ref Range: 0.8 - 1.2  2.7 (H)      Results for DAYRONDEEPTI SUNGCHERYL PATEL (MRN [id number]) as of 7/10/2018 16:43   Ref. Range 7/10/2018 03:36 7/10/2018 10:22   Sodium Latest Ref Range: 136 - 145 mmol/L 133 (L) 133 (L)   Potassium Latest Ref Range: 3.5 - 5.1 mmol/L 4.5 4.0   Chloride Latest Ref Range: 95 - 110 mmol/L 99 100   CO2 Latest Ref Range: 23 - 29 mmol/L 26 23   Anion Gap Latest Ref Range: 8 - 16 mmol/L 8 10   BUN, Bld Latest Ref Range: 6 - 20 mg/dL 26 (H) 24 (H)   Creatinine Latest Ref Range: 0.5 - 1.4 mg/dL 1.8 (H) 1.7 (H)   eGFR if non  Latest Ref  Range: >60 mL/min/1.73 m^2 41.4 (A) 44.4 (A)   eGFR if African American Latest Ref Range: >60 mL/min/1.73 m^2 47.9 (A) 51.3 (A)   Glucose Latest Ref Range: 70 - 110 mg/dL 94 99   Calcium Latest Ref Range: 8.7 - 10.5 mg/dL 8.5 (L) 8.8   Phosphorus Latest Ref Range: 2.7 - 4.5 mg/dL 4.9 (H)    Magnesium Latest Ref Range: 1.6 - 2.6 mg/dL 2.1    Alkaline Phosphatase Latest Ref Range: 55 - 135 U/L 72    Total Protein Latest Ref Range: 6.0 - 8.4 g/dL 5.9 (L)    Albumin Latest Ref Range: 3.5 - 5.2 g/dL 3.2 (L)    Total Bilirubin Latest Ref Range: 0.1 - 1.0 mg/dL 0.4    AST Latest Ref Range: 10 - 40 U/L 25    ALT Latest Ref Range: 10 - 44 U/L 47 (H)    Troponin I Latest Ref Range: 0.000 - 0.026 ng/mL  0.182 (H)   Cortisol Latest Units: ug/dL 1.1      Results for SUNG MCBRIDE JR. (MRN [id number]) as of 7/10/2018 16:43   Ref. Range 7/10/2018 08:19 7/10/2018 14:37 7/10/2018 14:58 7/10/2018 16:37 7/10/2018 16:38   POCT Glucose Latest Ref Range: 70 - 110 mg/dL    328 (H) 316 (H)   POC PH Latest Ref Range: 7.35 - 7.45  7.306 (L) 7.303 (L) 7.275 (L)     POC PCO2 Latest Ref Range: 35 - 45 mmHg 52.5 (H) 48.0 (H) 51.3 (H)     POC PO2 Latest Ref Range: 40 - 60 mmHg 32 (LL) 29 (LL) 26 (LL)     POC BE Latest Ref Range: -2 to 2 mmol/L 0 -3 -3     POC HCO3 Latest Ref Range: 24 - 28 mmol/L 26.2 23.8 (L) 23.8 (L)     POC SATURATED O2 Latest Ref Range: 95 - 100 % 56 (L) 47 (L) 40 (L)     POC TCO2 Latest Ref Range: 24 - 29 mmol/L 28 25 25     FiO2 Unknown 28  50     Flow Unknown   15     Sample Unknown VENOUS VENOUS VENOUS     DelSys Unknown Nasal Can Nasal Can Venti Mask     Allens Test Unknown N/A N/A N/A     Site Unknown Other Other Other     Mode Unknown SPONT SPONT SPONT

## 2018-07-10 NOTE — PROGRESS NOTES
Patient intubated by the bedside with a size 8.0 ETT.  ETT is placed 25 @ lip with commercial tube rivas in place.  ETT is secured, intact and patent.  Patient is currently on documented settings.  Will continue to monitor.

## 2018-07-10 NOTE — ASSESSMENT & PLAN NOTE
- Continue atorvastatin, nitro prn  - Hold lopressor for now  - ASA on hold since admit, presumably for GIB concern. Will discuss resumption

## 2018-07-10 NOTE — CONSULTS
[facility]  Cardiac Electrophysiology  Consult Note    Admission Date: 7/7/2018  Code Status: Full Code   Attending Provider: Mohan Cross MD  Consulting Provider: Tony Petit MD  Principal Problem:Hypotension    Inpatient consult to Electrophysiology  Consult performed by: TONY PETIT  Consult ordered by: DWAYNE DALLAS        Subjective:     Chief Complaint: VT storm    HPI:   Mr. Good is a 57 year old male with PMH AF on warfarin (no EKGs in chart demonstrate AF), HFrEF due to ICM (EF 5-10%) s/p CRT-D ([facility]), h/o VT storm in 2/18, pulmonary sarcoidosis, CVA admitted for GI bleed, consulted to EP for polymorphic VT. The patient was recently admitted, at that time had chest pain, found to be in VT which lead to ATP and subsequent discharge from his device for VT >200 bpm. He had 2 more episodes of VT at a rate of 150 bpm, ICD was reprogrammed. It was then thought that the patient had experienced nonsustained AT with aberrancy as the intrinsic rhythm with the same morphology, however was more narrow. The patient was then loaded with amiodarone and given beta blockers. He underwent LHC on 6/25/18 which did not lead to intervention as no culprit lesion was found (LAD 30% with patent stent, proximal LCx 70%, OM1 80%). He initially presented to [facility] with BRBPR and was transferred to [facility] for further evaluation. Per nursing staff, the patient developed a coughing fit and was noted to be in V Tach. The patient experienced cardiac arrest, ROSC achieved at 6 min 13 sec, balloon pump placed by Interventional cardiology. Device interrogation revealed 15 episodes of VT treated with ATP x 15 and shocked x 12. Review of telemetry reveals high burned on VT; there are both non-sustained and sustained runs of VT, primarily monomorphic but also episodes of PMVT. The patient is sedated and intubated.    Past Medical History:   Diagnosis Date    AICD (automatic  cardioverter/defibrillator) present     Arthritis     Atrial fibrillation     CHF (congestive heart failure)     Coronary artery disease     Hypertension     MI (myocardial infarction)     Sarcoid     Seizures     Stroke        Past Surgical History:   Procedure Laterality Date    CARDIAC CATHETERIZATION      CARDIAC DEFIBRILLATOR PLACEMENT  7-12    CARDIAC DEFIBRILLATOR PLACEMENT      CARDIAC DEFIBRILLATOR PLACEMENT      COLONOSCOPY N/A 7/2/2018    Procedure: COLONOSCOPY;  Surgeon: THELMA Kay MD;  [facility]  Service: Endoscopy;  Laterality: N/A;    CORONARY STENT PLACEMENT  07/2011    ESOPHAGOGASTRODUODENOSCOPY      FRACTURE SURGERY      LEFT HEART CATHETERIZATION N/A 6/25/2018    Procedure: Left heart cath;  Surgeon: Jordi Lui MD;  Location: [facility] CATH LAB;  Service: Cardiology;  Laterality: N/A;       Review of patient's allergies indicates:  No Known Allergies    No current facility-administered medications on file prior to encounter.      Current Outpatient Prescriptions on File Prior to Encounter   Medication Sig    albuterol (PROVENTIL) 2.5 mg /3 mL (0.083 %) nebulizer solution Take 2.5 mg by nebulization every 6 (six) hours as needed.    albuterol (VENTOLIN HFA) 90 mcg/actuation inhaler Inhale 2 puffs into the lungs every 4 (four) hours as needed for Wheezing.    alprazolam (XANAX) 2 MG tablet Take 2 mg by mouth 2 (two) times daily as needed.     amiodarone (PACERONE) 400 MG tablet Take 1 tablet (400 mg total) by mouth 2 (two) times daily until 7/9/2018. Then take 1 tablet (400 mg total) by mouth 1 (one) time daily thereafter.    aspirin (ECOTRIN) 81 MG EC tablet Take 81 mg by mouth. 1 Tablet, Delayed Release (E.C.) Oral Every day    atorvastatin (LIPITOR) 40 MG tablet Take 1 tablet (40 mg total) by mouth once daily.    bumetanide (BUMEX) 1 MG tablet Take 1 mg by mouth daily as needed.    carisoprodol (SOMA) 350 MG tablet Take 350 mg by mouth 4  (four) times daily as needed for Muscle spasms.    colchicine 0.6 mg tablet 0.6 mg once daily.     enoxaparin (LOVENOX) 100 mg/mL Syrg Inject 1 mL (100 mg total) into the skin every 12 (twelve) hours.    fluticasone-vilanterol (BREO ELLIPTA) 200-25 mcg/dose DsDv diskus inhaler Inhale 1 puff into the lungs once daily. Controller    gabapentin (NEURONTIN) 600 MG tablet Take 600 mg by mouth 2 (two) times daily. 1 Tablet Oral At bedtime    hydrocodone-acetaminophen 10-325mg (NORCO)  mg Tab Take 1 tablet by mouth 2 (two) times daily as needed.     isosorbide mononitrate (IMDUR) 30 MG 24 hr tablet Take 3 tablets (90 mg total) by mouth once daily. (Patient taking differently: Take 60 mg by mouth once daily. )    levETIRAcetam (KEPPRA) 500 MG Tab Take 1 tablet (500 mg total) by mouth 2 (two) times daily.    losartan (COZAAR) 50 MG tablet Take 1 tablet (50 mg total) by mouth once daily.    metoprolol tartrate (LOPRESSOR) 25 MG tablet Take 1 tablet (25 mg total) by mouth 2 (two) times daily.    nitroGLYCERIN (NITROSTAT) 0.4 MG SL tablet ONE TABLET UNDER TONGUE AS NEEDED FOR CHEST PAIN    pantoprazole (PROTONIX) 40 MG tablet Take 40 mg by mouth. 1 Tablet, Delayed Release (E.C.) Oral Every day    potassium chloride SA (K-DUR,KLOR-CON) 20 MEQ tablet TAKE 2 TABLETS BY MOUTH EVERY MORNING AND 1 TABLET BY MOUTH EVERY EVENING    predniSONE (DELTASONE) 10 MG tablet Take 1 tablet (10 mg total) by mouth once daily.    promethazine-codeine 6.25-10 mg/5 ml (PHENERGAN WITH CODEINE) 6.25-10 mg/5 mL syrup TK 5 ML PO  Q 6 H PRN    spironolactone (ALDACTONE) 25 MG tablet TAKE 1 TABLET BY MOUTH EVERY DAY    warfarin (COUMADIN) 7.5 MG tablet 1 tablet Monday  0.5 tablet Tuesday-Sunday     Family History     Problem Relation (Age of Onset)    Cancer Maternal Grandmother    Coronary artery disease Father, Sister, Sister    Heart disease Mother, Father, Sister    Hypertension Mother, Father, Sister    Kidney disease Sister     Stroke Sister    Vision loss Sister        Social History Main Topics    Smoking status: Never Smoker    Smokeless tobacco: Never Used    Alcohol use No    Drug use: No    Sexual activity: Not on file     Review of Systems   Unable to perform ROS: acuity of condition     Objective:     Vital Signs (Most Recent):  Temp: 97.8 °F (36.6 °C) (07/10/18 1100)  Pulse: 71 (07/10/18 1828)  Resp: 13 (07/10/18 1828)  BP: 109/65 (07/10/18 1815)  SpO2: 100 % (07/10/18 1828) Vital Signs (24h Range):  Temp:  [97.7 °F (36.5 °C)-98.3 °F (36.8 °C)] 97.8 °F (36.6 °C)  Pulse:  [] 71  Resp:  [11-49] 13  SpO2:  [59 %-100 %] 100 %  BP: ()/(43-83) 109/65     Weight: 100.9 kg (222 lb 7.1 oz)  Body mass index is 31.02 kg/m².    SpO2: 100 %  O2 Device (Oxygen Therapy): ventilator    Physical Exam   Constitutional: He is oriented to person, place, and time. He appears well-developed and well-nourished.   Sedated, intubated   HENT:   Head: Normocephalic and atraumatic.   Eyes: Pupils are equal, round, and reactive to light.   Neck: Normal range of motion. No JVD present.   Cardiovascular: Intact distal pulses.    Audible balloon pump. Left groin balloon pump inserted.   Pulmonary/Chest: Effort normal and breath sounds normal. No respiratory distress. He has no wheezes. He has no rales.   Abdominal: Soft. Bowel sounds are normal. He exhibits no distension. There is no tenderness.   Musculoskeletal: Normal range of motion. He exhibits edema (2+ bilateral lower extremity edema to the thighs).   Neurological: He is alert and oriented to person, place, and time.   Skin: Skin is dry. No rash noted.   Mottled appearance   Psychiatric: He has a normal mood and affect. His behavior is normal.       Significant Labs:     Recent Results (from the past 24 hour(s))   Hemoglobin    Collection Time: 07/09/18  8:20 PM   Result Value Ref Range    Hemoglobin 8.2 (L) 14.0 - 18.0 g/dL   Hematocrit    Collection Time: 07/09/18  8:20 PM   Result  Value Ref Range    Hematocrit 25.6 (L) 40.0 - 54.0 %   Type & Screen    Collection Time: 07/09/18  8:30 PM   Result Value Ref Range    Group & Rh B NEG     Indirect Amanda NEG    Prepare Fresh Frozen Plasma 1 Unit    Collection Time: 07/09/18  8:30 PM   Result Value Ref Range    UNIT NUMBER [id number]     PRODUCT CODE R6366S35     DISPENSE STATUS CROSSMATCHED     CODING SYSTEM [id number]     Unit Blood Type Code 1700     Unit Blood Type B NEG     Unit Expiration [id number]    POCT glucose    Collection Time: 07/09/18  8:36 PM   Result Value Ref Range    POCT Glucose 126 (H) 70 - 110 mg/dL   Comprehensive metabolic panel - if not done in ED    Collection Time: 07/10/18  3:36 AM   Result Value Ref Range    Sodium 133 (L) 136 - 145 mmol/L    Potassium 4.5 3.5 - 5.1 mmol/L    Chloride 99 95 - 110 mmol/L    CO2 26 23 - 29 mmol/L    Glucose 94 70 - 110 mg/dL    BUN, Bld 26 (H) 6 - 20 mg/dL    Creatinine 1.8 (H) 0.5 - 1.4 mg/dL    Calcium 8.5 (L) 8.7 - 10.5 mg/dL    Total Protein 5.9 (L) 6.0 - 8.4 g/dL    Albumin 3.2 (L) 3.5 - 5.2 g/dL    Total Bilirubin 0.4 0.1 - 1.0 mg/dL    Alkaline Phosphatase 72 55 - 135 U/L    AST 25 10 - 40 U/L    ALT 47 (H) 10 - 44 U/L    Anion Gap 8 8 - 16 mmol/L    eGFR if African American 47.9 (A) >60 mL/min/1.73 m^2    eGFR if non  41.4 (A) >60 mL/min/1.73 m^2   Magnesium - if not done in ED    Collection Time: 07/10/18  3:36 AM   Result Value Ref Range    Magnesium 2.1 1.6 - 2.6 mg/dL   Protime-INR    Collection Time: 07/10/18  3:36 AM   Result Value Ref Range    Prothrombin Time 25.8 (H) 9.0 - 12.5 sec    INR 2.7 (H) 0.8 - 1.2   CBC auto differential    Collection Time: 07/10/18  3:36 AM   Result Value Ref Range    WBC 6.40 3.90 - 12.70 K/uL    RBC 2.82 (L) 4.60 - 6.20 M/uL    Hemoglobin 8.4 (L) 14.0 - 18.0 g/dL    Hematocrit 27.2 (L) 40.0 - 54.0 %    MCV 97 82 - 98 fL    MCH 29.8 27.0 - 31.0 pg    MCHC 30.9 (L) 32.0 - 36.0 g/dL    RDW 14.8 (H) 11.5 - 14.5 %    Platelets 177  150 - 350 K/uL    MPV 11.2 9.2 - 12.9 fL    Immature Granulocytes 2.0 (H) 0.0 - 0.5 %    Gran # (ANC) 4.5 1.8 - 7.7 K/uL    Immature Grans (Abs) 0.13 (H) 0.00 - 0.04 K/uL    Lymph # 0.9 (L) 1.0 - 4.8 K/uL    Mono # 0.8 0.3 - 1.0 K/uL    Eos # 0.1 0.0 - 0.5 K/uL    Baso # 0.02 0.00 - 0.20 K/uL    nRBC 0 0 /100 WBC    Gran% 70.5 38.0 - 73.0 %    Lymph% 13.3 (L) 18.0 - 48.0 %    Mono% 12.2 4.0 - 15.0 %    Eosinophil% 1.7 0.0 - 8.0 %    Basophil% 0.3 0.0 - 1.9 %    Differential Method Automated    Cortisol    Collection Time: 07/10/18  3:36 AM   Result Value Ref Range    Cortisol 1.1 ug/dL   Phosphorus    Collection Time: 07/10/18  3:36 AM   Result Value Ref Range    Phosphorus 4.9 (H) 2.7 - 4.5 mg/dL   ISTAT PROCEDURE    Collection Time: 07/10/18  8:19 AM   Result Value Ref Range    POC PH 7.306 (L) 7.35 - 7.45    POC PCO2 52.5 (H) 35 - 45 mmHg    POC PO2 32 (LL) 40 - 60 mmHg    POC HCO3 26.2 24 - 28 mmol/L    POC BE 0 -2 to 2 mmol/L    POC SATURATED O2 56 (L) 95 - 100 %    POC TCO2 28 24 - 29 mmol/L    Sample VENOUS     Site Other     Allens Test N/A     DelSys Nasal Can     Mode SPONT     FiO2 28     Sp02 100    Troponin I    Collection Time: 07/10/18 10:22 AM   Result Value Ref Range    Troponin I 0.182 (H) 0.000 - 0.026 ng/mL   Basic metabolic panel    Collection Time: 07/10/18 10:22 AM   Result Value Ref Range    Sodium 133 (L) 136 - 145 mmol/L    Potassium 4.0 3.5 - 5.1 mmol/L    Chloride 100 95 - 110 mmol/L    CO2 23 23 - 29 mmol/L    Glucose 99 70 - 110 mg/dL    BUN, Bld 24 (H) 6 - 20 mg/dL    Creatinine 1.7 (H) 0.5 - 1.4 mg/dL    Calcium 8.8 8.7 - 10.5 mg/dL    Anion Gap 10 8 - 16 mmol/L    eGFR if African American 51.3 (A) >60 mL/min/1.73 m^2    eGFR if non African American 44.4 (A) >60 mL/min/1.73 m^2   CBC auto differential    Collection Time: 07/10/18 10:22 AM   Result Value Ref Range    WBC 7.82 3.90 - 12.70 K/uL    RBC 3.01 (L) 4.60 - 6.20 M/uL    Hemoglobin 9.0 (L) 14.0 - 18.0 g/dL    Hematocrit 28.4  (L) 40.0 - 54.0 %    MCV 94 82 - 98 fL    MCH 29.9 27.0 - 31.0 pg    MCHC 31.7 (L) 32.0 - 36.0 g/dL    RDW 14.7 (H) 11.5 - 14.5 %    Platelets 181 150 - 350 K/uL    MPV 11.7 9.2 - 12.9 fL    Immature Granulocytes 1.5 (H) 0.0 - 0.5 %    Gran # (ANC) 6.0 1.8 - 7.7 K/uL    Immature Grans (Abs) 0.12 (H) 0.00 - 0.04 K/uL    Lymph # 0.7 (L) 1.0 - 4.8 K/uL    Mono # 0.9 0.3 - 1.0 K/uL    Eos # 0.1 0.0 - 0.5 K/uL    Baso # 0.02 0.00 - 0.20 K/uL    nRBC 0 0 /100 WBC    Gran% 76.3 (H) 38.0 - 73.0 %    Lymph% 9.2 (L) 18.0 - 48.0 %    Mono% 11.0 4.0 - 15.0 %    Eosinophil% 1.7 0.0 - 8.0 %    Basophil% 0.3 0.0 - 1.9 %    Differential Method Automated    ISTAT PROCEDURE    Collection Time: 07/10/18  2:37 PM   Result Value Ref Range    POC PH 7.303 (L) 7.35 - 7.45    POC PCO2 48.0 (H) 35 - 45 mmHg    POC PO2 29 (LL) 40 - 60 mmHg    POC HCO3 23.8 (L) 24 - 28 mmol/L    POC BE -3 -2 to 2 mmol/L    POC SATURATED O2 47 (L) 95 - 100 %    POC TCO2 25 24 - 29 mmol/L    Sample VENOUS     Site Other     Allens Test N/A     DelSys Nasal Can     Mode SPONT     Sp02 97    ISTAT PROCEDURE    Collection Time: 07/10/18  2:58 PM   Result Value Ref Range    POC PH 7.275 (L) 7.35 - 7.45    POC PCO2 51.3 (H) 35 - 45 mmHg    POC PO2 26 (LL) 40 - 60 mmHg    POC HCO3 23.8 (L) 24 - 28 mmol/L    POC BE -3 -2 to 2 mmol/L    POC SATURATED O2 40 (L) 95 - 100 %    POC TCO2 25 24 - 29 mmol/L    Sample VENOUS     Site Other     Allens Test N/A     DelSys Venti Mask     Mode SPONT     Flow 15     FiO2 50     Sp02 100    Lactic acid, plasma    Collection Time: 07/10/18  4:28 PM   Result Value Ref Range    Lactate (Lactic Acid) 4.9 (HH) 0.5 - 2.2 mmol/L   Comprehensive metabolic panel    Collection Time: 07/10/18  4:28 PM   Result Value Ref Range    Sodium 129 (L) 136 - 145 mmol/L    Potassium 5.4 (H) 3.5 - 5.1 mmol/L    Chloride 98 95 - 110 mmol/L    CO2 19 (L) 23 - 29 mmol/L    Glucose 344 (H) 70 - 110 mg/dL    BUN, Bld 25 (H) 6 - 20 mg/dL    Creatinine 2.5 (H)  0.5 - 1.4 mg/dL    Calcium 9.8 8.7 - 10.5 mg/dL    Total Protein 6.5 6.0 - 8.4 g/dL    Albumin 3.4 (L) 3.5 - 5.2 g/dL    Total Bilirubin 0.4 0.1 - 1.0 mg/dL    Alkaline Phosphatase 93 55 - 135 U/L    AST 29 10 - 40 U/L    ALT 52 (H) 10 - 44 U/L    Anion Gap 12 8 - 16 mmol/L    eGFR if African American 32.2 (A) >60 mL/min/1.73 m^2    eGFR if non  27.9 (A) >60 mL/min/1.73 m^2   CBC auto differential    Collection Time: 07/10/18  4:28 PM   Result Value Ref Range    WBC 28.24 (H) 3.90 - 12.70 K/uL    RBC 3.17 (L) 4.60 - 6.20 M/uL    Hemoglobin 9.6 (L) 14.0 - 18.0 g/dL    Hematocrit 30.5 (L) 40.0 - 54.0 %    MCV 96 82 - 98 fL    MCH 30.3 27.0 - 31.0 pg    MCHC 31.5 (L) 32.0 - 36.0 g/dL    RDW 14.6 (H) 11.5 - 14.5 %    Platelets 229 150 - 350 K/uL    MPV 12.0 9.2 - 12.9 fL    Immature Granulocytes CANCELED 0.0 - 0.5 %    Immature Grans (Abs) CANCELED 0.00 - 0.04 K/uL    Lymph # CANCELED 1.0 - 4.8 K/uL    Mono # CANCELED 0.3 - 1.0 K/uL    Eos # CANCELED 0.0 - 0.5 K/uL    Baso # CANCELED 0.00 - 0.20 K/uL    nRBC 0 0 /100 WBC    Gran% 74.0 (H) 38.0 - 73.0 %    Lymph% 3.0 (L) 18.0 - 48.0 %    Mono% 4.0 4.0 - 15.0 %    Eosinophil% 0.0 0.0 - 8.0 %    Basophil% 0.0 0.0 - 1.9 %    Bands 19.0 %    Aniso Slight     Poly Occasional     Differential Method Manual    Magnesium    Collection Time: 07/10/18  4:28 PM   Result Value Ref Range    Magnesium 2.9 (H) 1.6 - 2.6 mg/dL   Protime-INR    Collection Time: 07/10/18  4:28 PM   Result Value Ref Range    Prothrombin Time 24.9 (H) 9.0 - 12.5 sec    INR 2.6 (H) 0.8 - 1.2   POCT glucose    Collection Time: 07/10/18  4:37 PM   Result Value Ref Range    POCT Glucose 328 (H) 70 - 110 mg/dL   POCT glucose    Collection Time: 07/10/18  4:38 PM   Result Value Ref Range    POCT Glucose 316 (H) 70 - 110 mg/dL   ISTAT PROCEDURE    Collection Time: 07/10/18  6:23 PM   Result Value Ref Range    POC PH 7.316 (L) 7.35 - 7.45    POC PCO2 54.5 (H) 35 - 45 mmHg    POC PO2 268 (H) 80 - 100  mmHg    POC HCO3 27.9 24 - 28 mmol/L    POC BE 2 -2 to 2 mmol/L    POC SATURATED O2 100 95 - 100 %    POC TCO2 30 (H) 23 - 27 mmol/L    Rate 14     Sample ARTERIAL     Site RR     Allens Test N/A     DelSys Adult Vent     Mode AC/PRVC     Vt 500     PEEP 10     PiP 32     FiO2 100     Min Vol 7.66     Sp02 100          Significant Imaging:     Narrative     EXAMINATION:  XR CHEST 1 VIEW    CLINICAL HISTORY:  lines;    TECHNIQUE:  Single frontal view of the chest was performed.    COMPARISON:  07/10/2018 07/09/2018    FINDINGS:  Three frontal chest radiographs.  In the series, right central venous catheter appears stable in positioning.  Endotracheal tube lies approximately 4.2 cm above the fredrick, stable on the images provided.  Nasogastric tube courses below the diaphragm.  Intra-aortic balloon pump marker projects at the level of the aortic arch, on the final image, possibly migrated somewhat distally although projects in the region of the distal aortic arch.  Left chest wall pacer stable.  No acute detrimental change in the cardiopulmonary status.   Impression       As above      Electronically signed by: Sal Tavarez MD  Date: 07/10/2018  Time: 18:06         Assessment and Plan:     Ventricular fibrillation    VT storm with multiple events of both sustained and non-sustained VT, as well as MMVT and PMVT  BI-V ICD interrogation reveals 15 episodes of VT treated with ATP x 15 and shocks x 12  Patient's native rate following stabilization was bradycardic in the 50's above his base rate set by his ICD; rate increased to 80, no changes made to therapy zones  Patient was discharged on amiodarone 400 mg BID from a recent admission, currently receiving amiodarone 1mg/min, lidocaine 2 mg/min  We recommend continuing amiodarone, can consider decreasing dose to 0.5 mg/min tomorrow  Lopressor 2.5 mg IV every 4 hours            Thank you for your consult. I will follow-up with patient. Please contact us if you have any  additional questions.    Rafa Petit MD  Cardiac Electrophysiology  [facility]

## 2018-07-10 NOTE — ASSESSMENT & PLAN NOTE
- Pt with PMVT and cardiac arrest.   - Pt intubated/sedated and IABP placed via 7.5F sheath in L CFA emergently at bedside  - CXR confirmed good positioning  - Cont daily CXR, bedrest, and q1hr neurovascular checks while IABP in palce

## 2018-07-10 NOTE — ASSESSMENT & PLAN NOTE
VT storm with multiple events of both sustained and non-sustained VT, as well as MMVT and PMVT  BI-V ICD interrogation reveals 15 episodes of VT treated with ATP x 15 and shocks x 12  Patient's native rate following stabilization was bradycardic in the 50's above his base rate set by his ICD; rate increased to 80, no changes made to therapy zones  Patient was discharged on amiodarone 400 mg BID from a recent admission, currently receiving amiodarone 1mg/min, lidocaine 2 mg/min  We recommend continuing amiodarone, can consider decreasing dose to 0.5 mg/min tomorrow  Lopressor 2.5 mg IV every 4 hours

## 2018-07-10 NOTE — CONSULTS
[facility]  Interventional Cardiology  Consult Note    Patient Name: Yonathan Good Jr.  MRN: [id number]  Admission Date: 7/7/2018  Hospital Length of Stay: 3 days  Code Status: Full Code   Attending Provider: Mohan Cross MD  Consulting Provider: Dustin Jules MD  Primary Care Physician: Edgar Gates MD  Principal Problem:Hypotension    Patient information was obtained from records.     Inpatient consult to Interventional Cardiology  Consult performed by: DUSTIN JULES  Consult ordered by: DWAYNE DALLAS        Subjective:     Chief Complaint:  PMVT     HPI:  58 y/o WM with hx of Afib on coumadin, pulmonary sarcoidosis, L parietal CVA, ICM EF 10% s/p ICD, CAD s/p PCI LAD 2007 with patent stent and diffuse CAD per [facility] 6/25/18 who we are consulted for emergent IABP for refractory PMVT. Pt currently intubated, sedated, and on amio after VT arrest     Past Medical History:   Diagnosis Date    AICD (automatic cardioverter/defibrillator) present     Arthritis     Atrial fibrillation     CHF (congestive heart failure)     Coronary artery disease     Hypertension     MI (myocardial infarction)     Sarcoid     Seizures     Stroke        Past Surgical History:   Procedure Laterality Date    CARDIAC CATHETERIZATION      CARDIAC DEFIBRILLATOR PLACEMENT  7-12    CARDIAC DEFIBRILLATOR PLACEMENT      CARDIAC DEFIBRILLATOR PLACEMENT      COLONOSCOPY N/A 7/2/2018    Procedure: COLONOSCOPY;  Surgeon: THELMA Kay MD;  Location: [facility] ENDO ([address] [place] [address]);  Service: Endoscopy;  Laterality: N/A;    CORONARY STENT PLACEMENT  07/2011    ESOPHAGOGASTRODUODENOSCOPY      FRACTURE SURGERY      LEFT HEART CATHETERIZATION N/A 6/25/2018    Procedure: Left heart cath;  Surgeon: Jordi Lui MD;  Location: [facility] CATH LAB;  Service: Cardiology;  Laterality: N/A;       Review of patient's allergies indicates:  No Known Allergies    PTA Medications   Medication Sig    albuterol (PROVENTIL) 2.5 mg /3  mL (0.083 %) nebulizer solution Take 2.5 mg by nebulization every 6 (six) hours as needed.    albuterol (VENTOLIN HFA) 90 mcg/actuation inhaler Inhale 2 puffs into the lungs every 4 (four) hours as needed for Wheezing.    alprazolam (XANAX) 2 MG tablet Take 2 mg by mouth 2 (two) times daily as needed.     amiodarone (PACERONE) 400 MG tablet Take 1 tablet (400 mg total) by mouth 2 (two) times daily until 7/9/2018. Then take 1 tablet (400 mg total) by mouth 1 (one) time daily thereafter.    aspirin (ECOTRIN) 81 MG EC tablet Take 81 mg by mouth. 1 Tablet, Delayed Release (E.C.) Oral Every day    atorvastatin (LIPITOR) 40 MG tablet Take 1 tablet (40 mg total) by mouth once daily.    bumetanide (BUMEX) 1 MG tablet Take 1 mg by mouth daily as needed.    carisoprodol (SOMA) 350 MG tablet Take 350 mg by mouth 4 (four) times daily as needed for Muscle spasms.    colchicine 0.6 mg tablet 0.6 mg once daily.     enoxaparin (LOVENOX) 100 mg/mL Syrg Inject 1 mL (100 mg total) into the skin every 12 (twelve) hours.    fluticasone-vilanterol (BREO ELLIPTA) 200-25 mcg/dose DsDv diskus inhaler Inhale 1 puff into the lungs once daily. Controller    gabapentin (NEURONTIN) 600 MG tablet Take 600 mg by mouth 2 (two) times daily. 1 Tablet Oral At bedtime    hydrocodone-acetaminophen 10-325mg (NORCO)  mg Tab Take 1 tablet by mouth 2 (two) times daily as needed.     isosorbide mononitrate (IMDUR) 30 MG 24 hr tablet Take 3 tablets (90 mg total) by mouth once daily. (Patient taking differently: Take 60 mg by mouth once daily. )    levETIRAcetam (KEPPRA) 500 MG Tab Take 1 tablet (500 mg total) by mouth 2 (two) times daily.    losartan (COZAAR) 50 MG tablet Take 1 tablet (50 mg total) by mouth once daily.    metoprolol tartrate (LOPRESSOR) 25 MG tablet Take 1 tablet (25 mg total) by mouth 2 (two) times daily.    nitroGLYCERIN (NITROSTAT) 0.4 MG SL tablet ONE TABLET UNDER TONGUE AS NEEDED FOR CHEST PAIN    pantoprazole  (PROTONIX) 40 MG tablet Take 40 mg by mouth. 1 Tablet, Delayed Release (E.C.) Oral Every day    potassium chloride SA (K-DUR,KLOR-CON) 20 MEQ tablet TAKE 2 TABLETS BY MOUTH EVERY MORNING AND 1 TABLET BY MOUTH EVERY EVENING    predniSONE (DELTASONE) 10 MG tablet Take 1 tablet (10 mg total) by mouth once daily.    promethazine-codeine 6.25-10 mg/5 ml (PHENERGAN WITH CODEINE) 6.25-10 mg/5 mL syrup TK 5 ML PO  Q 6 H PRN    spironolactone (ALDACTONE) 25 MG tablet TAKE 1 TABLET BY MOUTH EVERY DAY    warfarin (COUMADIN) 7.5 MG tablet 1 tablet Monday  0.5 tablet Tuesday-Sunday    [DISCONTINUED] furosemide (LASIX) 40 MG tablet TAKE 2 TABLETS BY MOUTH TWICE DAILY     Family History     Problem Relation (Age of Onset)    Cancer Maternal Grandmother    Coronary artery disease Father, Sister, Sister    Heart disease Mother, Father, Sister    Hypertension Mother, Father, Sister    Kidney disease Sister    Stroke Sister    Vision loss Sister        Social History Main Topics    Smoking status: Never Smoker    Smokeless tobacco: Never Used    Alcohol use No    Drug use: No    Sexual activity: Not on file     Review of Systems   Reason unable to perform ROS: Intubated/sedated.     Objective:     Vital Signs (Most Recent):  Temp: 97.8 °F (36.6 °C) (07/10/18 1100)  Pulse: 95 (07/10/18 1540)  Resp: (!) 24 (07/10/18 1540)  BP: (!) 143/61 (07/10/18 1540)  SpO2: 100 % (07/10/18 1540) Vital Signs (24h Range):  Temp:  [97.7 °F (36.5 °C)-98.3 °F (36.8 °C)] 97.8 °F (36.6 °C)  Pulse:  [] 95  Resp:  [11-45] 24  SpO2:  [96 %-100 %] 100 %  BP: ()/(43-83) 143/61     Weight: 100.9 kg (222 lb 7.1 oz)  Body mass index is 31.02 kg/m².    SpO2: 100 %  O2 Device (Oxygen Therapy): ambu bag      Intake/Output Summary (Last 24 hours) at 07/10/18 6221  Last data filed at 07/10/18 1000   Gross per 24 hour   Intake              840 ml   Output             1025 ml   Net             -185 ml       Lines/Drains/Airways     Central Venous  Catheter Line                 Percutaneous Central Line Insertion/Assessment - triple lumen  07/09/18 1500 right internal jugular 1 day         Introducer 07/10/18 1548 other (see comments) less than 1 day          Drain                 NG/OG Tube 07/10/18 1557 [place] sump 16 Fr. Left nostril less than 1 day         Urethral Catheter 07/10/18 1646 Double-lumen;Latex 16 Fr. less than 1 day          Airway                 Airway - Non-Surgical 07/10/18 1523 Endotracheal Tube less than 1 day          Line                 IABP 07/10/18 1550 7.5 Fr. 40 mL less than 1 day          Peripheral Intravenous Line                 Peripheral IV - Single Lumen 07/08/18 1123 Right Forearm 2 days                Physical Exam   Constitutional: He appears well-developed and well-nourished.   HENT:   Head: Normocephalic and atraumatic.   Mouth/Throat: No oropharyngeal exudate.   Eyes: EOM are normal. Pupils are equal, round, and reactive to light.   Neck: Normal range of motion. Neck supple. No JVD present.   Cardiovascular: Normal rate and regular rhythm.  Exam reveals gallop and S3. Exam reveals no friction rub.    No murmur heard.  Pulses:       Carotid pulses are 1+ on the right side, and 1+ on the left side.       Radial pulses are 1+ on the right side, and 1+ on the left side.        Femoral pulses are 1+ on the right side, and 1+ on the left side.       Popliteal pulses are 1+ on the right side, and 1+ on the left side.        Dorsalis pedis pulses are 1+ on the right side, and 1+ on the left side.        Posterior tibial pulses are 1+ on the right side, and 1+ on the left side.   Pulmonary/Chest: Effort normal. No respiratory distress. He has wheezes. He has rales.   Abdominal: Soft. Bowel sounds are normal. He exhibits no distension.   Musculoskeletal: Normal range of motion. He exhibits edema.   Neurological:   Sedated. Moving all ext   Skin: No rash noted.   BL LE cool to touch       Significant Labs: All pertinent lab  results from the last 24 hours have been reviewed.    Significant Imaging: X-Ray: CXR: X-Ray Chest 1 View (CXR):   Results for orders placed or performed during the hospital encounter of 07/07/18   X-Ray Chest 1 View    Narrative    EXAMINATION:  XR CHEST 1 VIEW    CLINICAL HISTORY:  central line placement;    TECHNIQUE:  Single frontal view of the chest was performed.    COMPARISON:  July 8, 2018 at 12:52 p.m.    FINDINGS:  A right jugular catheter is been placed with its tip in the superior vena cava.  No pneumothorax is seen.  The heart remains enlarged.  Pacemaker is in place.  Pulmonary vascularity is congested and there are interstitial changes in the lungs suggesting edema.      Impression    Interval placement of right-sided central venous catheter with tip in the superior vena cava      Electronically signed by: Jordi Wang MD  Date:    07/09/2018  Time:    16:02     Assessment and Plan:     Polymorphic Ventricular Tachycardia with Cardiac Arrest  - Pt intubated/sedated and IABP placed via 7.5F sheath in L CFA emergently at bedside  - CXR confirmed good positioning  - Cont daily CXR, bedrest, and q1hr neurovascular checks while IABP in place  - Cont treatment per HTS team    Pt's daughter gave informed written consent for emergent procedure. The risks (including death, heart attack, limb loss, paralysis, emergency surgery, bleeding, renal failure, and stroke), the potential benefits of the procedure, and the alternatives to the procedure, were discussed in detail and accepted by the patien's daughtert. All questions were answered.       Dustin Jules MD  Interventional Cardiology   [facility]

## 2018-07-10 NOTE — TREATMENT PLAN
Patient reviewed today.  No reported further episodes of hematochezia.   H/H remain stable.  No further GI interventions indicated.  If the patient develops signs and symptoms of GI bleeding or complications from polypectomy, please contact colorectal surgery for assistance.

## 2018-07-10 NOTE — SUBJECTIVE & OBJECTIVE
Past Medical History:   Diagnosis Date    AICD (automatic cardioverter/defibrillator) present     Arthritis     Atrial fibrillation     CHF (congestive heart failure)     Coronary artery disease     Hypertension     MI (myocardial infarction)     Sarcoid     Seizures     Stroke        Past Surgical History:   Procedure Laterality Date    CARDIAC CATHETERIZATION      CARDIAC DEFIBRILLATOR PLACEMENT  7-12    CARDIAC DEFIBRILLATOR PLACEMENT      CARDIAC DEFIBRILLATOR PLACEMENT      COLONOSCOPY N/A 7/2/2018    Procedure: COLONOSCOPY;  Surgeon: THELMA Kay MD;  Location: [facility] ENDO ([address] [place] [address]);  Service: Endoscopy;  Laterality: N/A;    CORONARY STENT PLACEMENT  07/2011    ESOPHAGOGASTRODUODENOSCOPY      FRACTURE SURGERY      LEFT HEART CATHETERIZATION N/A 6/25/2018    Procedure: Left heart cath;  Surgeon: Jordi Lui MD;  Location: [facility] CATH LAB;  Service: Cardiology;  Laterality: N/A;       Review of patient's allergies indicates:  No Known Allergies    PTA Medications   Medication Sig    albuterol (PROVENTIL) 2.5 mg /3 mL (0.083 %) nebulizer solution Take 2.5 mg by nebulization every 6 (six) hours as needed.    albuterol (VENTOLIN HFA) 90 mcg/actuation inhaler Inhale 2 puffs into the lungs every 4 (four) hours as needed for Wheezing.    alprazolam (XANAX) 2 MG tablet Take 2 mg by mouth 2 (two) times daily as needed.     amiodarone (PACERONE) 400 MG tablet Take 1 tablet (400 mg total) by mouth 2 (two) times daily until 7/9/2018. Then take 1 tablet (400 mg total) by mouth 1 (one) time daily thereafter.    aspirin (ECOTRIN) 81 MG EC tablet Take 81 mg by mouth. 1 Tablet, Delayed Release (E.C.) Oral Every day    atorvastatin (LIPITOR) 40 MG tablet Take 1 tablet (40 mg total) by mouth once daily.    bumetanide (BUMEX) 1 MG tablet Take 1 mg by mouth daily as needed.    carisoprodol (SOMA) 350 MG tablet Take 350 mg by mouth 4 (four) times daily as needed for Muscle spasms.    colchicine 0.6  mg tablet 0.6 mg once daily.     enoxaparin (LOVENOX) 100 mg/mL Syrg Inject 1 mL (100 mg total) into the skin every 12 (twelve) hours.    fluticasone-vilanterol (BREO ELLIPTA) 200-25 mcg/dose DsDv diskus inhaler Inhale 1 puff into the lungs once daily. Controller    gabapentin (NEURONTIN) 600 MG tablet Take 600 mg by mouth 2 (two) times daily. 1 Tablet Oral At bedtime    hydrocodone-acetaminophen 10-325mg (NORCO)  mg Tab Take 1 tablet by mouth 2 (two) times daily as needed.     isosorbide mononitrate (IMDUR) 30 MG 24 hr tablet Take 3 tablets (90 mg total) by mouth once daily. (Patient taking differently: Take 60 mg by mouth once daily. )    levETIRAcetam (KEPPRA) 500 MG Tab Take 1 tablet (500 mg total) by mouth 2 (two) times daily.    losartan (COZAAR) 50 MG tablet Take 1 tablet (50 mg total) by mouth once daily.    metoprolol tartrate (LOPRESSOR) 25 MG tablet Take 1 tablet (25 mg total) by mouth 2 (two) times daily.    nitroGLYCERIN (NITROSTAT) 0.4 MG SL tablet ONE TABLET UNDER TONGUE AS NEEDED FOR CHEST PAIN    pantoprazole (PROTONIX) 40 MG tablet Take 40 mg by mouth. 1 Tablet, Delayed Release (E.C.) Oral Every day    potassium chloride SA (K-DUR,KLOR-CON) 20 MEQ tablet TAKE 2 TABLETS BY MOUTH EVERY MORNING AND 1 TABLET BY MOUTH EVERY EVENING    predniSONE (DELTASONE) 10 MG tablet Take 1 tablet (10 mg total) by mouth once daily.    promethazine-codeine 6.25-10 mg/5 ml (PHENERGAN WITH CODEINE) 6.25-10 mg/5 mL syrup TK 5 ML PO  Q 6 H PRN    spironolactone (ALDACTONE) 25 MG tablet TAKE 1 TABLET BY MOUTH EVERY DAY    warfarin (COUMADIN) 7.5 MG tablet 1 tablet Monday  0.5 tablet Tuesday-Sunday    [DISCONTINUED] furosemide (LASIX) 40 MG tablet TAKE 2 TABLETS BY MOUTH TWICE DAILY     Family History     Problem Relation (Age of Onset)    Cancer Maternal Grandmother    Coronary artery disease Father, Sister, Sister    Heart disease Mother, Father, Sister    Hypertension Mother, Father, Sister     Kidney disease Sister    Stroke Sister    Vision loss Sister        Social History Main Topics    Smoking status: Never Smoker    Smokeless tobacco: Never Used    Alcohol use No    Drug use: No    Sexual activity: Not on file     Review of Systems   Reason unable to perform ROS: Intubated/sedated.     Objective:     Vital Signs (Most Recent):  Temp: 97.8 °F (36.6 °C) (07/10/18 1100)  Pulse: 95 (07/10/18 1540)  Resp: (!) 24 (07/10/18 1540)  BP: (!) 143/61 (07/10/18 1540)  SpO2: 100 % (07/10/18 1540) Vital Signs (24h Range):  Temp:  [97.7 °F (36.5 °C)-98.3 °F (36.8 °C)] 97.8 °F (36.6 °C)  Pulse:  [] 95  Resp:  [11-45] 24  SpO2:  [96 %-100 %] 100 %  BP: ()/(43-83) 143/61     Weight: 100.9 kg (222 lb 7.1 oz)  Body mass index is 31.02 kg/m².    SpO2: 100 %  O2 Device (Oxygen Therapy): ambu bag      Intake/Output Summary (Last 24 hours) at 07/10/18 1655  Last data filed at 07/10/18 1000   Gross per 24 hour   Intake              840 ml   Output             1025 ml   Net             -185 ml       Lines/Drains/Airways     Central Venous Catheter Line                 Percutaneous Central Line Insertion/Assessment - triple lumen  07/09/18 1500 right internal jugular 1 day         Introducer 07/10/18 1548 other (see comments) less than 1 day          Drain                 NG/OG Tube 07/10/18 1557 [place] sump 16 Fr. Left nostril less than 1 day         Urethral Catheter 07/10/18 1646 Double-lumen;Latex 16 Fr. less than 1 day          Airway                 Airway - Non-Surgical 07/10/18 1523 Endotracheal Tube less than 1 day          Line                 IABP 07/10/18 1550 7.5 Fr. 40 mL less than 1 day          Peripheral Intravenous Line                 Peripheral IV - Single Lumen 07/08/18 1123 Right Forearm 2 days                Physical Exam   Constitutional: He appears well-developed and well-nourished.   HENT:   Head: Normocephalic and atraumatic.   Mouth/Throat: No oropharyngeal exudate.   Eyes: EOM are  normal. Pupils are equal, round, and reactive to light.   Neck: Normal range of motion. Neck supple. No JVD present.   Cardiovascular: Normal rate and regular rhythm.  Exam reveals gallop and S3. Exam reveals no friction rub.    No murmur heard.  Pulses:       Carotid pulses are 1+ on the right side, and 1+ on the left side.       Radial pulses are 1+ on the right side, and 1+ on the left side.        Femoral pulses are 1+ on the right side, and 1+ on the left side.       Popliteal pulses are 1+ on the right side, and 1+ on the left side.        Dorsalis pedis pulses are 1+ on the right side, and 1+ on the left side.        Posterior tibial pulses are 1+ on the right side, and 1+ on the left side.   Pulmonary/Chest: Effort normal. No respiratory distress. He has wheezes. He has rales.   Abdominal: Soft. Bowel sounds are normal. He exhibits no distension.   Musculoskeletal: Normal range of motion. He exhibits edema.   Neurological:   Sedated. Moving all ext   Skin: No rash noted.   BL LE cool to touch       Significant Labs: All pertinent lab results from the last 24 hours have been reviewed.    Significant Imaging: X-Ray: CXR: X-Ray Chest 1 View (CXR):   Results for orders placed or performed during the hospital encounter of 07/07/18   X-Ray Chest 1 View    Narrative    EXAMINATION:  XR CHEST 1 VIEW    CLINICAL HISTORY:  central line placement;    TECHNIQUE:  Single frontal view of the chest was performed.    COMPARISON:  July 8, 2018 at 12:52 p.m.    FINDINGS:  A right jugular catheter is been placed with its tip in the superior vena cava.  No pneumothorax is seen.  The heart remains enlarged.  Pacemaker is in place.  Pulmonary vascularity is congested and there are interstitial changes in the lungs suggesting edema.      Impression    Interval placement of right-sided central venous catheter with tip in the superior vena cava      Electronically signed by: Jordi Wang MD  Date:    07/09/2018  Time:    16:02

## 2018-07-11 PROBLEM — Z45.09 ENCOUNTER FOR MANAGEMENT OF INTRA-AORTIC BALLOON PUMP: Status: ACTIVE | Noted: 2018-01-01

## 2018-07-11 PROBLEM — I47.29 VENTRICULAR TACHYCARDIA, INCESSANT: Status: ACTIVE | Noted: 2018-01-01

## 2018-07-11 PROBLEM — J96.90 RESPIRATORY FAILURE REQUIRING INTUBATION: Status: ACTIVE | Noted: 2018-01-01

## 2018-07-11 NOTE — ASSESSMENT & PLAN NOTE
- INR therapeutic  - Coumadin on hold given events of yesterday  - Protonix increased to 40 mg BID (changed to IV)  -Had screening colonoscopy with hot snare polypectomy during last admission; most likely culprit post polypectomy bleeding  - H & H stable, normal BMs since admission

## 2018-07-11 NOTE — PLAN OF CARE
Problem: Patient Care Overview  Goal: Plan of Care Review  Recommendations  Recommendation/Intervention:   RD consulted for possible TF initiation if unable to extubate.   1. When able to extubate, ADAT to Cardiac with texture per SLP.   2. If unable to extubate, recommend initiating TF of Peptamen Intense VHP at a goal rate of 55 mL/hr - to provide 1320 kcal/day (1800 kcal w/propofol), 121g protein/day, and 1109mL free fluid/day.    -If patient no longer on propofol, recommend Impact Peptide 1.5 at a goal rate of 55 mL/hr - to provide 1980 kcal/day, 124g protein/day, and 1016mL free fluid/day.   RD to monitor.    Goals: Patient to receive nutrition by RD follow-up  Nutrition Goal Status: new    Full assessment completed, see RD Note 7/11/2018.

## 2018-07-11 NOTE — PROGRESS NOTES
[facility]  Heart Transplant  Progress Note    Patient Name: Yonathan Good Jr.  MRN: [id number]  Admission Date: 7/7/2018  Hospital Length of Stay: 4 days  Attending Physician: Mohan Cross MD  Primary Care Provider: Edgar Gates MD  Principal Problem:Hypotension    Subjective:     Interval History: No more VT overnight. Hemodynamically stable with IABP 1:1, amio 0.5, levo 0.14, lidocaine 2. IABP advanced this am. Family at bedside, updated on plan of care.     Continuous Infusions:   amiodarone in dextrose 5% 0.5 mg/min (07/11/18 1100)    fentanyl 5 mL/hr at 07/11/18 1100    insulin (HUMAN R) infusion (adults) 2.3 Units/hr (07/11/18 1100)    lidocaine 2 mg/min (07/11/18 1100)    norepinephrine bitartrate-D5W 0.09 mcg/kg/min (07/11/18 1100)    propofol 35 mcg/kg/min (07/11/18 1100)     Scheduled Meds:   albuterol sulfate  2.5 mg Nebulization Q4H    amiodarone  150 mg Intravenous Once    aspirin  81 mg Oral Daily    atorvastatin  40 mg Oral Daily    chlorhexidine  15 mL Mouth/Throat BID    chlorhexidine  15 mL Mouth/Throat BID    fluticasone-vilanterol  1 puff Inhalation Daily    gabapentin  600 mg Oral BID    hydrocortisone sodium succinate  100 mg Intravenous Q8H    levetiracetam IVPB  500 mg Intravenous Q12H    lidocaine HCL 10 mg/ml (1%)  1 mL Other Once    magnesium sulfate IVPB  2 g Intravenous Once    metoprolol  2.5 mg Intravenous Q4H    pantoprazole  40 mg Intravenous BID    sodium chloride 0.9%  3 mL Intravenous Q8H     PRN Meds:sodium chloride, ALPRAZolam, benzonatate, calcium gluconate IVPB, calcium gluconate IVPB, calcium gluconate IVPB, carisoprodol, dextrose 50%, dextrose 50%, HYDROcodone-acetaminophen, magnesium sulfate IVPB, magnesium sulfate IVPB, nitroGLYCERIN, potassium chloride in water **AND** potassium chloride in water **AND** potassium chloride in water, promethazine-codeine 6.25-10 mg/5 ml, sodium phosphate IVPB, sodium phosphate IVPB, sodium  phosphate IVPB    Review of patient's allergies indicates:  No Known Allergies  Objective:     Vital Signs (Most Recent):  Temp: 97.4 °F (36.3 °C) (07/11/18 1100)  Pulse: 80 (07/11/18 1115)  Resp: 16 (07/11/18 1115)  BP: (!) 110/55 (07/11/18 1115)  SpO2: 100 % (07/11/18 1115) Vital Signs (24h Range):  Temp:  [97.4 °F (36.3 °C)-98.3 °F (36.8 °C)] 97.4 °F (36.3 °C)  Pulse:  [] 80  Resp:  [0-49] 16  SpO2:  [59 %-100 %] 100 %  BP: ()/(48-83) 110/55     Patient Vitals for the past 72 hrs (Last 3 readings):   Weight   07/09/18 0400 100.9 kg (222 lb 7.1 oz)     Body mass index is 31.02 kg/m².      Intake/Output Summary (Last 24 hours) at 07/11/18 1141  Last data filed at 07/11/18 1100   Gross per 24 hour   Intake          3718.25 ml   Output             2915 ml   Net           803.25 ml       Hemodynamic Parameters:       Telemetry:reviewed. paced    Physical Exam   Constitutional: He appears well-developed and well-nourished. He is sedated and intubated.   HENT:   Head: Normocephalic and atraumatic.   Eyes: EOM are normal. Pupils are equal, round, and reactive to light.   Neck: Normal range of motion. Neck supple. No JVD (difficult to assess (patient on ventilator & flat in bed)) present.   Right CVC in place   Cardiovascular: Normal rate and regular rhythm.    IABP 1:1. Warm to touch   Pulmonary/Chest: Effort normal. He is intubated. No respiratory distress.   Ventilated   Abdominal: Soft. He exhibits no distension. Bowel sounds are decreased. There is no tenderness.   Musculoskeletal: Normal range of motion. He exhibits no edema.   Skin: Skin is warm and dry.   Nursing note and vitals reviewed.      Significant Labs:  CBC:    Recent Labs  Lab 07/10/18  1022 07/10/18  1628 07/11/18  0405   WBC 7.82 28.24* 11.32   RBC 3.01* 3.17* 2.96*   HGB 9.0* 9.6* 8.9*   HCT 28.4* 30.5* 27.3*    229 194   MCV 94 96 92   MCH 29.9 30.3 30.1   MCHC 31.7* 31.5* 32.6     BNP:    Recent Labs  Lab 07/09/18  0957   *  "    CMP:    Recent Labs  Lab 07/11/18  0001 07/11/18  0405 07/11/18  0831   * 196* 184*   CALCIUM 8.9 8.9 8.9   ALBUMIN 3.1* 3.0* 2.9*   PROT 6.0 5.8* 5.7*   * 130* 132*   K 4.6 4.8 4.6   CO2 26 25 26   CL 96 96 97   BUN 21* 20 19   CREATININE 1.8* 1.5* 1.4   ALKPHOS 79 72 75   ALT 45* 42 41   AST 36 37 34   BILITOT 0.4 0.4 0.4      Coagulation:     Recent Labs  Lab 07/10/18  0336 07/10/18  1628 07/11/18  0405   INR 2.7* 2.6* 2.8*     LDH:  No results for input(s): LDH in the last 72 hours.  Microbiology:  Microbiology Results (last 7 days)     ** No results found for the last 168 hours. **          I have reviewed all pertinent labs within the past 24 hours.    Estimated Creatinine Clearance: 72.1 mL/min (based on SCr of 1.4 mg/dL).    Diagnostic Results:  I have reviewed and interpreted all pertinent imaging results/findings within the past 24 hours.    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      He was initially admitted to "stroke/neuro" service on 6/22 with dysarthria and stroke-like symptoms. Extensive work up was negative for recurrent CVA and so no tPA was administered. Patient developed atypical chest pain two days into his hospital course and was noted to be in monomorphic VT (6/24) which was treated with ATP then with shock due to recurrent VT in VF zone. Given his active cardiac issues this prompted transfer to heart failure service where the patient was initiated on IV amiodarone and beta blocker with subsequent resolution of his VT. He was eventually transitioned to PO amiodarone 400 mg BID x 2 weeks (from 6/25-7/9) followed by amiodarone 400 mg daily thereafter per EP recommendations. Of note patient underwent LHC on 6/25 given his extensive ischemic history which did not reveal " a culprit lesion, therefore no interventions were done. He was noted to have an elevated LVEDP to 45 however and was administered IV diuresis before being transitioned back to his PO diuretic regimen. Patient also completed heart failure pathway during his hospital course (eg completed colonoscopy on 7/2) as part of his work up for advanced options. Follows with Dr. Berman of heart failure/transplant as an outpatient.      The patient was discharged home in stable condition on 7/4/2018. He was dc'ed on warf/lovenox bridge given CHADS2-VaSc of 5.  Of note, he is no longer a candidate for AO.  He presented yesterday to [facility] with BRBPR and was found to be in acute renal failure as well.  He notes that, on the night of the 5th and morning of 6th, he had 6 bright red bloody bm's.  He went to his PMD who noted he might have internal hemmorhoids.  He was told to stop his lovenox.  He then returned home and flet very lightheaded and dizzy.  He wisely took his blood pressure and noted it was 70-80/50s whereas it is normally 117/70s.  He went in to ED immediately.  There he was noted to have the following pertinent lab values:  Hg 9.2 (11.7 prior)  INR 2.9  Na 131 (139 prior)  K 6.18  BUN 43 (18 prior)  Cr 2.88 (1.1-1.4 at baseline)  proBNP 2755  He was transferred to [facility] for further evaluation and care.  Of note, he underwent screening c-scope on 7/2 during which the following was noted and done: Diverticulosis in the sigmoid colon and in the descending colon, Two 8 to 10 mm polyps in the sigmoid colon and in the descending colon, removed with a hot snare, resected and retrieved, ligated.        * VT Storm    -VT storm 7/10/18. Polymorphic and monomorphic. Degenerated into VF. 12 ICD shocks  -Intubated, IABP placed emergently at bedside.   -Amio loaded x 2, gtt started per protocol. 0.5 mg/min today.   -Lidocaine gtt started at 1, increased to 2 when patient had more VT-->VF and AICD shocks. Continue for  now  -EP consulted, appreciate their assistance. Pacing rate increased to 80, metoprolol IV added   -Continue support with IABP 1:1  -K goal >4.5, Mg >2.5        Hypotension    -Levophed started during code yesterday  -Wean for MAP >65 to d/c (using IABP for MAP)        Cardiogenic shock    -IABP placed emergently 7/10/18  -Augmenting well at 1:1. SVO2 68%  -CVP 12-14 overnight. Lasix 40 mg IVP x 1 this am and assess response          Encounter for management of intra-aortic balloon pump    -IABP placed 7/10/2018        Respiratory failure requiring intubation    -Emergently intubated 7/10/18 for impending respiratory failure/need for IABP and inability to lay fat  -History of pulmonary sarcoidosis  -Methylpred given 7/10/18, hydrocortisone 100 mg q8 started today  -Pulmonary consult for assistance with sarcoid & vent management  -CXR worse this am, gentle diuresis this am with lasix 40 mg IVP x 1        Ventricular fibrillation    -See VT        Acute renal failure    - Creatinine peaked at 2.5 after VT storm. Trending down, 1.5 today.  -Avoid nephrotoxic agents        Chronic systolic CHF (congestive heart failure)    - See cardiogenic shock        GI bleed    - INR therapeutic  - Coumadin on hold given events of yesterday  - Protonix increased to 40 mg BID (changed to IV)  -Had screening colonoscopy with hot snare polypectomy during last admission; most likely culprit post polypectomy bleeding  - H & H stable, normal BMs since admission        History of atrial fibrillation    - Restarted warfarin, continue amio (though for VT)        Seizure disorder    - Continue keppra (changed to IV)        CAD (coronary artery disease)    - Continue atorvastatin, nitro prn  - Hold lopressor for now  - ASA on hold since admit, presumably for GIB concern. Will discuss resumption        Sarcoidosis of lung    - Continue prednisone, breo-ellipta, and albuterol prn          Uninterrupted Critical Care/Counseling Time (not  including procedures): 45 minutes    Catalina Paredes PA-C  Heart Transplant  [facility]

## 2018-07-11 NOTE — ASSESSMENT & PLAN NOTE
-VT storm 7/10/18. Polymorphic and monomorphic. Degenerated into VF. 12 ICD shocks  -Intubated, IABP placed emergently at bedside.   -Amio loaded x 2, gtt started per protocol. 0.5 mg/min today.   -Lidocaine gtt started at 1, increased to 2 when patient had more VT-->VF and AICD shocks. Continue for now  -EP consulted, appreciate their assistance. Pacing rate increased to 80, metoprolol IV added   -Continue support with IABP 1:1  -K goal >4.5, Mg >2.5

## 2018-07-11 NOTE — COMMITTEE REVIEW
Dx:  ICM  Case was presented in today's Heart Selection Committee meeting for consideration for advanced heart failure options:  OHT/LVAD.  It was the decision of the committee to defer OHT at this time as patient is currently too ill for transplantation following VT storm with Vfib arrest last pm; pt now with IABP and intubated.  Additionally, it was determined that patient is not a candidate for LVAD due to ongoing VT/VF.  Staff to discuss with patient's family on inpatient rounds.   I was present at the meeting and attest to the decision of the committee  Referring notfiied by mail  I spoke to family at bedside myself

## 2018-07-11 NOTE — SUBJECTIVE & OBJECTIVE
Past Medical History:   Diagnosis Date    AICD (automatic cardioverter/defibrillator) present     Arthritis     Atrial fibrillation     CHF (congestive heart failure)     Coronary artery disease     Hypertension     MI (myocardial infarction)     Sarcoid     Seizures     Stroke        Past Surgical History:   Procedure Laterality Date    CARDIAC CATHETERIZATION      CARDIAC DEFIBRILLATOR PLACEMENT  7-12    CARDIAC DEFIBRILLATOR PLACEMENT      CARDIAC DEFIBRILLATOR PLACEMENT      COLONOSCOPY N/A 7/2/2018    Procedure: COLONOSCOPY;  Surgeon: THELMA Kay MD;  Location: [facility] ENDO ([address] [place] [address]);  Service: Endoscopy;  Laterality: N/A;    CORONARY STENT PLACEMENT  07/2011    ESOPHAGOGASTRODUODENOSCOPY      FRACTURE SURGERY      LEFT HEART CATHETERIZATION N/A 6/25/2018    Procedure: Left heart cath;  Surgeon: Jordi Lui MD;  Location: [facility] CATH LAB;  Service: Cardiology;  Laterality: N/A;       Review of patient's allergies indicates:  No Known Allergies    Family History     Problem Relation (Age of Onset)    Cancer Maternal Grandmother    Coronary artery disease Father, Sister, Sister    Heart disease Mother, Father, Sister    Hypertension Mother, Father, Sister    Kidney disease Sister    Stroke Sister    Vision loss Sister        Social History Main Topics    Smoking status: Never Smoker    Smokeless tobacco: Never Used    Alcohol use No    Drug use: No    Sexual activity: Not on file         Review of Systems  Objective:     Vital Signs (Most Recent):  Temp: 97.4 °F (36.3 °C) (07/11/18 1100)  Pulse: 80 (07/11/18 1106)  Resp: 15 (07/11/18 1106)  BP: (!) 112/52 (07/11/18 1100)  SpO2: 100 % (07/11/18 1106) Vital Signs (24h Range):  Temp:  [97.4 °F (36.3 °C)-98.3 °F (36.8 °C)] 97.4 °F (36.3 °C)  Pulse:  [] 80  Resp:  [0-49] 15  SpO2:  [59 %-100 %] 100 %  BP: ()/(48-83) 112/52     Weight: 100.9 kg (222 lb 7.1 oz)  Body mass index is 31.02 kg/m².      Intake/Output Summary (Last 24  hours) at 07/11/18 1109  Last data filed at 07/11/18 1100   Gross per 24 hour   Intake          3688.25 ml   Output             2915 ml   Net           773.25 ml       Physical Exam   Constitutional: He is sedated and intubated.   Pulmonary/Chest: He is intubated.       Vents:  Vent Mode: A/C (07/11/18 0915)  Set Rate: 14 bmp (07/11/18 0915)  Vt Set: 500 mL (07/11/18 0915)  PEEP/CPAP: 8 cmH20 (07/11/18 0915)  Oxygen Concentration (%): 40 (07/11/18 1100)  Peak Airway Pressure: 31 cmH2O (07/11/18 0915)  Plateau Pressure: 0 cmH20 (07/11/18 0915)  Total Ve: 7.22 mL (07/11/18 0915)  F/VT Ratio<105 (RSBI): (!) 27.13 (07/11/18 0915)    Lines/Drains/Airways     Central Venous Catheter Line                 Percutaneous Central Line Insertion/Assessment - triple lumen  07/09/18 1500 right internal jugular 1 day         Introducer 07/10/18 1548 other (see comments) less than 1 day          Drain                 NG/OG Tube 07/10/18 1557 [place] sump 16 Fr. Left nostril less than 1 day         Urethral Catheter 07/10/18 1646 Double-lumen;Latex 16 Fr. less than 1 day          Airway                 Airway - Non-Surgical 07/10/18 1523 Endotracheal Tube less than 1 day          Line                 IABP 07/10/18 1550 7.5 Fr. 40 mL less than 1 day          Peripheral Intravenous Line                 Peripheral IV - Single Lumen 07/08/18 1123 Right Forearm 2 days                Significant Labs:    CBC/Anemia Profile:    Recent Labs  Lab 07/10/18  1022 07/10/18  1628 07/11/18  0405   WBC 7.82 28.24* 11.32   HGB 9.0* 9.6* 8.9*   HCT 28.4* 30.5* 27.3*    229 194   MCV 94 96 92   RDW 14.7* 14.6* 14.4        Chemistries:    Recent Labs  Lab 07/09/18  1750 07/10/18  0336  07/10/18  1628  07/11/18  0001 07/11/18  0405 07/11/18  0831   * 133*  < > 129*  < > 131* 130* 132*   K 5.1 4.5  < > 5.4*  < > 4.6 4.8 4.6   CL 98 99  < > 98  < > 96 96 97   CO2 26 26  < > 19*  < > 26 25 26   BUN 26* 26*  < > 25*  < > 21* 20 19   CREATININE  2.1* 1.8*  < > 2.5*  < > 1.8* 1.5* 1.4   CALCIUM 8.8 8.5*  < > 9.8  < > 8.9 8.9 8.9   ALBUMIN 3.2* 3.2*  --  3.4*  < > 3.1* 3.0* 2.9*   PROT 5.8* 5.9*  --  6.5  < > 6.0 5.8* 5.7*   BILITOT 0.6 0.4  --  0.4  < > 0.4 0.4 0.4   ALKPHOS 71 72  --  93  < > 79 72 75   ALT 52* 47*  --  52*  < > 45* 42 41   AST 31 25  --  29  < > 36 37 34   MG 1.8 2.1  --  2.9*  --   --  2.3  --    PHOS 4.8* 4.9*  --   --   --   --   --   --    < > = values in this interval not displayed.    All pertinent labs within the past 24 hours have been reviewed.    Significant Imaging:   I have reviewed all pertinent imaging results/findings within the past 24 hours.

## 2018-07-11 NOTE — CONSULTS
"  [facility]  Adult Nutrition  Consult Note    SUMMARY     Recommendations  Recommendation/Intervention:   RD consulted for possible TF initiation if unable to extubate.   1. When able to extubate, ADAT to Cardiac with texture per SLP.   2. If unable to extubate, recommend initiating TF of Peptamen Intense VHP at a goal rate of 55 mL/hr - to provide 1320 kcal/day (1800 kcal w/propofol), 121g protein/day, and 1109mL free fluid/day.    -If patient no longer on propofol, recommend Impact Peptide 1.5 at a goal rate of 55 mL/hr - to provide 1980 kcal/day, 124g protein/day, and 1016mL free fluid/day.   RD to monitor.    Goals: Patient to receive nutrition by RD follow-up  Nutrition Goal Status: new  Communication of RD Recs:  (POC)    Reason for Assessment  Reason for Assessment: consult  Diagnosis: cardiac disease (VT storm)  Relevant Medical History: CAD, CHF, NICM, Afib, CVA, HTN  General Information Comments: S/p code yesterday, emergently intubated and IABP placed. (RD does not feel patient meets crtieria for malnutrition at this time.)  Nutrition Discharge Planning: Unable to determine at this time.    Nutrition Risk Screen  Nutrition Risk Screen: no indicators present    Nutrition/Diet History  Do you have any cultural, spiritual, Advent conflicts, given your current situation?: no  Factors Affecting Nutritional Intake: NPO, on mechanical ventilation    Anthropometrics  Temp: 97.4 °F (36.3 °C)  Height: 5' 11" (180.3 cm)  Height (inches): 71 in  Weight Method: Standard Scale  Weight: 100.9 kg (222 lb 7.1 oz)  Weight (lb): 222.45 lb  Ideal Body Weight (IBW), Male: 172 lb  % Ideal Body Weight, Male (lb): 128.17 lb  BMI (Calculated): 30.8  BMI Grade: 30 - 34.9- obesity - grade I  Usual Body Weight (UBW), k.4 kg (3/2018 per chart review)  % Usual Body Weight: 97.79  % Weight Change From Usual Weight: -2.42 %    Lab/Procedures/Meds  Pertinent Labs Reviewed: reviewed  Pertinent Labs Comments: " Na 133, Glu 165, Alb 2.9  Pertinent Medications Reviewed: reviewed  Pertinent Medications Comments: amiodarone, fentanyl, insulin drip, levophed, propofol    Physical Findings/Assessment  Overall Physical Appearance: on ventilator support, nourished  Tubes: nasogastric tube (LIWS)  Oral/Mouth Cavity: tooth/teeth missing  Skin: edema    Estimated/Assessed Needs  Weight Used For Calorie Calculations: 100.9 kg (222 lb 7.1 oz)  Energy Calorie Requirements (kcal): 1960 kcal/day  Energy Need Method: [facility]  Protein Requirements: 118-157 g/day (1.5-2.0 g/kg)  Weight Used For Protein Calculations: 78.2 kg (172 lb 6.4 oz) (IBW)  Fluid Requirements (mL): per MD  RDA Method (mL): 1960    Nutrition Prescription Ordered  Current Diet Order: NPO    Evaluation of Received Nutrient/Fluid Intake  Other Calories (kcal): 480 (propofol)  I/O: +1.2L x 24hrs, +1.6L since admit  Comments: LBM 7/10  % Intake of Estimated Energy Needs: 0 - 25 %  % Meal Intake: NPO    Nutrition Risk  Level of Risk/Frequency of Follow-up: high (2x/week)     Assessment and Plan  Nutrition Problem  Inadequate energy intake    Related to (etiology):   Decreased ability to consume sufficient energy    Signs and Symptoms (as evidenced by):   NPO, intubated/sedated with no alternative means of nutrition at this time     Nutrition Diagnosis Status:   New    Monitor and Evaluation  Food and Nutrient Intake: energy intake, food and beverage intake, enteral nutrition intake  Food and Nutrient Adminstration: diet order, enteral and parenteral nutrition administration  Anthropometric Measurements: weight, weight change, body mass index  Biochemical Data, Medical Tests and Procedures: electrolyte and renal panel, gastrointestinal profile, inflammatory profile  Nutrition-Focused Physical Findings: overall appearance     Nutrition Follow-Up  RD Follow-up?: Yes

## 2018-07-11 NOTE — ASSESSMENT & PLAN NOTE
-Emergently intubated 7/10/18 for impending respiratory failure/need for IABP and inability to lay fat  -History of pulmonary sarcoidosis  -Methylpred given 7/10/18, hydrocortisone 100 mg q8 started today  -Pulmonary consult for assistance with sarcoid & vent management  -CXR worse this am, gentle diuresis this am with lasix 40 mg IVP x 1

## 2018-07-11 NOTE — ASSESSMENT & PLAN NOTE
CXR shows likely bilateral pulmonary edema and small amount of pleural fluid on right. Positive fluid balance.    - Recommend continuing to diurese, and weaning sedation when primary team feels they have achieved adequate diuresis and hemodynamic stability.    -Daily spontaneous breathing trials.    - will continue to follow.

## 2018-07-11 NOTE — SUBJECTIVE & OBJECTIVE
Interval History: Patient sedated and intubated overnight, no new events on telemetry. V Paced at 80.     Review of Systems   Unable to perform ROS: acuity of condition     Objective:     Vital Signs (Most Recent):  Temp: 98.3 °F (36.8 °C) (07/11/18 0300)  Pulse: 80 (07/11/18 0707)  Resp: 14 (07/11/18 0707)  BP: 135/62 (07/11/18 0700)  SpO2: 100 % (07/11/18 0707) Vital Signs (24h Range):  Temp:  [97.8 °F (36.6 °C)-98.3 °F (36.8 °C)] 98.3 °F (36.8 °C)  Pulse:  [] 80  Resp:  [0-49] 14  SpO2:  [59 %-100 %] 100 %  BP: ()/(48-83) 135/62     Weight: 100.9 kg (222 lb 7.1 oz)  Body mass index is 31.02 kg/m².     SpO2: 100 %  O2 Device (Oxygen Therapy): ventilator    Physical Exam   Constitutional: He is oriented to person, place, and time. He appears well-developed and well-nourished.   Sedated, intubated   HENT:   Head: Normocephalic and atraumatic.   Eyes: Pupils are equal, round, and reactive to light.   Neck: Normal range of motion. No JVD present.   Cardiovascular: Intact distal pulses.    Audible balloon pump. Left groin balloon pump inserted.   Pulmonary/Chest: Effort normal and breath sounds normal. No respiratory distress. He has no wheezes. He has no rales.   Abdominal: Soft. Bowel sounds are normal. He exhibits no distension. There is no tenderness.   Musculoskeletal: Normal range of motion. He exhibits edema (2+ bilateral lower extremity edema to the thighs).   Neurological: He is alert and oriented to person, place, and time.   Skin: Skin is dry. No rash noted.   Mottled appearance   Psychiatric: He has a normal mood and affect. His behavior is normal.       Significant Labs:     Recent Results (from the past 24 hour(s))   ISTAT PROCEDURE    Collection Time: 07/10/18  8:19 AM   Result Value Ref Range    POC PH 7.306 (L) 7.35 - 7.45    POC PCO2 52.5 (H) 35 - 45 mmHg    POC PO2 32 (LL) 40 - 60 mmHg    POC HCO3 26.2 24 - 28 mmol/L    POC BE 0 -2 to 2 mmol/L    POC SATURATED O2 56 (L) 95 - 100 %    POC  TCO2 28 24 - 29 mmol/L    Sample VENOUS     Site Other     Allens Test N/A     DelSys Nasal Can     Mode SPONT     FiO2 28     Sp02 100    Troponin I    Collection Time: 07/10/18 10:22 AM   Result Value Ref Range    Troponin I 0.182 (H) 0.000 - 0.026 ng/mL   Basic metabolic panel    Collection Time: 07/10/18 10:22 AM   Result Value Ref Range    Sodium 133 (L) 136 - 145 mmol/L    Potassium 4.0 3.5 - 5.1 mmol/L    Chloride 100 95 - 110 mmol/L    CO2 23 23 - 29 mmol/L    Glucose 99 70 - 110 mg/dL    BUN, Bld 24 (H) 6 - 20 mg/dL    Creatinine 1.7 (H) 0.5 - 1.4 mg/dL    Calcium 8.8 8.7 - 10.5 mg/dL    Anion Gap 10 8 - 16 mmol/L    eGFR if African American 51.3 (A) >60 mL/min/1.73 m^2    eGFR if non African American 44.4 (A) >60 mL/min/1.73 m^2   CBC auto differential    Collection Time: 07/10/18 10:22 AM   Result Value Ref Range    WBC 7.82 3.90 - 12.70 K/uL    RBC 3.01 (L) 4.60 - 6.20 M/uL    Hemoglobin 9.0 (L) 14.0 - 18.0 g/dL    Hematocrit 28.4 (L) 40.0 - 54.0 %    MCV 94 82 - 98 fL    MCH 29.9 27.0 - 31.0 pg    MCHC 31.7 (L) 32.0 - 36.0 g/dL    RDW 14.7 (H) 11.5 - 14.5 %    Platelets 181 150 - 350 K/uL    MPV 11.7 9.2 - 12.9 fL    Immature Granulocytes 1.5 (H) 0.0 - 0.5 %    Gran # (ANC) 6.0 1.8 - 7.7 K/uL    Immature Grans (Abs) 0.12 (H) 0.00 - 0.04 K/uL    Lymph # 0.7 (L) 1.0 - 4.8 K/uL    Mono # 0.9 0.3 - 1.0 K/uL    Eos # 0.1 0.0 - 0.5 K/uL    Baso # 0.02 0.00 - 0.20 K/uL    nRBC 0 0 /100 WBC    Gran% 76.3 (H) 38.0 - 73.0 %    Lymph% 9.2 (L) 18.0 - 48.0 %    Mono% 11.0 4.0 - 15.0 %    Eosinophil% 1.7 0.0 - 8.0 %    Basophil% 0.3 0.0 - 1.9 %    Differential Method Automated    ISTAT PROCEDURE    Collection Time: 07/10/18  2:37 PM   Result Value Ref Range    POC PH 7.303 (L) 7.35 - 7.45    POC PCO2 48.0 (H) 35 - 45 mmHg    POC PO2 29 (LL) 40 - 60 mmHg    POC HCO3 23.8 (L) 24 - 28 mmol/L    POC BE -3 -2 to 2 mmol/L    POC SATURATED O2 47 (L) 95 - 100 %    POC TCO2 25 24 - 29 mmol/L    Sample VENOUS     Site Other      Allens Test N/A     DelSys Nasal Can     Mode SPONT     Sp02 97    ISTAT PROCEDURE    Collection Time: 07/10/18  2:58 PM   Result Value Ref Range    POC PH 7.275 (L) 7.35 - 7.45    POC PCO2 51.3 (H) 35 - 45 mmHg    POC PO2 26 (LL) 40 - 60 mmHg    POC HCO3 23.8 (L) 24 - 28 mmol/L    POC BE -3 -2 to 2 mmol/L    POC SATURATED O2 40 (L) 95 - 100 %    POC TCO2 25 24 - 29 mmol/L    Sample VENOUS     Site Other     Allens Test N/A     DelSys Venti Mask     Mode SPONT     Flow 15     FiO2 50     Sp02 100    Lactic acid, plasma    Collection Time: 07/10/18  4:28 PM   Result Value Ref Range    Lactate (Lactic Acid) 4.9 (HH) 0.5 - 2.2 mmol/L   Comprehensive metabolic panel    Collection Time: 07/10/18  4:28 PM   Result Value Ref Range    Sodium 129 (L) 136 - 145 mmol/L    Potassium 5.4 (H) 3.5 - 5.1 mmol/L    Chloride 98 95 - 110 mmol/L    CO2 19 (L) 23 - 29 mmol/L    Glucose 344 (H) 70 - 110 mg/dL    BUN, Bld 25 (H) 6 - 20 mg/dL    Creatinine 2.5 (H) 0.5 - 1.4 mg/dL    Calcium 9.8 8.7 - 10.5 mg/dL    Total Protein 6.5 6.0 - 8.4 g/dL    Albumin 3.4 (L) 3.5 - 5.2 g/dL    Total Bilirubin 0.4 0.1 - 1.0 mg/dL    Alkaline Phosphatase 93 55 - 135 U/L    AST 29 10 - 40 U/L    ALT 52 (H) 10 - 44 U/L    Anion Gap 12 8 - 16 mmol/L    eGFR if African American 32.2 (A) >60 mL/min/1.73 m^2    eGFR if non  27.9 (A) >60 mL/min/1.73 m^2   CBC auto differential    Collection Time: 07/10/18  4:28 PM   Result Value Ref Range    WBC 28.24 (H) 3.90 - 12.70 K/uL    RBC 3.17 (L) 4.60 - 6.20 M/uL    Hemoglobin 9.6 (L) 14.0 - 18.0 g/dL    Hematocrit 30.5 (L) 40.0 - 54.0 %    MCV 96 82 - 98 fL    MCH 30.3 27.0 - 31.0 pg    MCHC 31.5 (L) 32.0 - 36.0 g/dL    RDW 14.6 (H) 11.5 - 14.5 %    Platelets 229 150 - 350 K/uL    MPV 12.0 9.2 - 12.9 fL    Immature Granulocytes CANCELED 0.0 - 0.5 %    Immature Grans (Abs) CANCELED 0.00 - 0.04 K/uL    Lymph # CANCELED 1.0 - 4.8 K/uL    Mono # CANCELED 0.3 - 1.0 K/uL    Eos # CANCELED 0.0 - 0.5 K/uL     Baso # CANCELED 0.00 - 0.20 K/uL    nRBC 0 0 /100 WBC    Gran% 74.0 (H) 38.0 - 73.0 %    Lymph% 3.0 (L) 18.0 - 48.0 %    Mono% 4.0 4.0 - 15.0 %    Eosinophil% 0.0 0.0 - 8.0 %    Basophil% 0.0 0.0 - 1.9 %    Bands 19.0 %    Aniso Slight     Poly Occasional     Differential Method Manual    Magnesium    Collection Time: 07/10/18  4:28 PM   Result Value Ref Range    Magnesium 2.9 (H) 1.6 - 2.6 mg/dL   Protime-INR    Collection Time: 07/10/18  4:28 PM   Result Value Ref Range    Prothrombin Time 24.9 (H) 9.0 - 12.5 sec    INR 2.6 (H) 0.8 - 1.2   POCT glucose    Collection Time: 07/10/18  4:37 PM   Result Value Ref Range    POCT Glucose 328 (H) 70 - 110 mg/dL   POCT glucose    Collection Time: 07/10/18  4:38 PM   Result Value Ref Range    POCT Glucose 316 (H) 70 - 110 mg/dL   ISTAT PROCEDURE    Collection Time: 07/10/18  6:23 PM   Result Value Ref Range    POC PH 7.316 (L) 7.35 - 7.45    POC PCO2 54.5 (H) 35 - 45 mmHg    POC PO2 268 (H) 80 - 100 mmHg    POC HCO3 27.9 24 - 28 mmol/L    POC BE 2 -2 to 2 mmol/L    POC SATURATED O2 100 95 - 100 %    POC TCO2 30 (H) 23 - 27 mmol/L    Rate 14     Sample ARTERIAL     Site RR     Allens Test N/A     DelSys Adult Vent     Mode AC/PRVC     Vt 500     PEEP 10     PiP 32     FiO2 100     Min Vol 7.66     Sp02 100    Lactic acid, plasma    Collection Time: 07/10/18  8:03 PM   Result Value Ref Range    Lactate (Lactic Acid) 1.7 0.5 - 2.2 mmol/L   Comprehensive metabolic panel - if not done in ED    Collection Time: 07/10/18  8:03 PM   Result Value Ref Range    Sodium 131 (L) 136 - 145 mmol/L    Potassium 5.0 3.5 - 5.1 mmol/L    Chloride 96 95 - 110 mmol/L    CO2 26 23 - 29 mmol/L    Glucose 272 (H) 70 - 110 mg/dL    BUN, Bld 23 (H) 6 - 20 mg/dL    Creatinine 2.1 (H) 0.5 - 1.4 mg/dL    Calcium 8.6 (L) 8.7 - 10.5 mg/dL    Total Protein 6.1 6.0 - 8.4 g/dL    Albumin 3.2 (L) 3.5 - 5.2 g/dL    Total Bilirubin 0.5 0.1 - 1.0 mg/dL    Alkaline Phosphatase 79 55 - 135 U/L    AST 32 10 - 40  U/L    ALT 47 (H) 10 - 44 U/L    Anion Gap 9 8 - 16 mmol/L    eGFR if African American 39.8 (A) >60 mL/min/1.73 m^2    eGFR if non  34.4 (A) >60 mL/min/1.73 m^2   POCT glucose    Collection Time: 07/10/18  8:20 PM   Result Value Ref Range    POCT Glucose 277 (H) 70 - 110 mg/dL   POCT glucose    Collection Time: 07/10/18  9:01 PM   Result Value Ref Range    POCT Glucose 244 (H) 70 - 110 mg/dL   ISTAT PROCEDURE    Collection Time: 07/10/18  9:07 PM   Result Value Ref Range    POC PH 7.299 (L) 7.35 - 7.45    POC PCO2 55.3 (H) 35 - 45 mmHg    POC PO2 44 40 - 60 mmHg    POC HCO3 27.1 24 - 28 mmol/L    POC BE 1 -2 to 2 mmol/L    POC SATURATED O2 74 (L) 95 - 100 %    POC TCO2 29 24 - 29 mmol/L    Sample VENOUS     Site Other     Allens Test N/A    POCT glucose    Collection Time: 07/10/18 10:16 PM   Result Value Ref Range    POCT Glucose 213 (H) 70 - 110 mg/dL   POCT glucose    Collection Time: 07/10/18 11:09 PM   Result Value Ref Range    POCT Glucose 167 (H) 70 - 110 mg/dL   Lactic acid, plasma    Collection Time: 07/11/18 12:01 AM   Result Value Ref Range    Lactate (Lactic Acid) 2.0 0.5 - 2.2 mmol/L   Comprehensive metabolic panel - if not done in ED    Collection Time: 07/11/18 12:01 AM   Result Value Ref Range    Sodium 131 (L) 136 - 145 mmol/L    Potassium 4.6 3.5 - 5.1 mmol/L    Chloride 96 95 - 110 mmol/L    CO2 26 23 - 29 mmol/L    Glucose 135 (H) 70 - 110 mg/dL    BUN, Bld 21 (H) 6 - 20 mg/dL    Creatinine 1.8 (H) 0.5 - 1.4 mg/dL    Calcium 8.9 8.7 - 10.5 mg/dL    Total Protein 6.0 6.0 - 8.4 g/dL    Albumin 3.1 (L) 3.5 - 5.2 g/dL    Total Bilirubin 0.4 0.1 - 1.0 mg/dL    Alkaline Phosphatase 79 55 - 135 U/L    AST 36 10 - 40 U/L    ALT 45 (H) 10 - 44 U/L    Anion Gap 9 8 - 16 mmol/L    eGFR if African American 47.9 (A) >60 mL/min/1.73 m^2    eGFR if non  41.4 (A) >60 mL/min/1.73 m^2   POCT glucose    Collection Time: 07/11/18 12:11 AM   Result Value Ref Range    POCT Glucose 149  (H) 70 - 110 mg/dL   ISTAT PROCEDURE    Collection Time: 07/11/18 12:15 AM   Result Value Ref Range    POC PH 7.330 (L) 7.35 - 7.45    POC PCO2 52.9 (H) 35 - 45 mmHg    POC PO2 40 40 - 60 mmHg    POC HCO3 27.9 24 - 28 mmol/L    POC BE 2 -2 to 2 mmol/L    POC SATURATED O2 70 (L) 95 - 100 %    POC TCO2 29 24 - 29 mmol/L    Sample VENOUS     Site Other     Allens Test N/A    POCT glucose    Collection Time: 07/11/18  1:04 AM   Result Value Ref Range    POCT Glucose 140 (H) 70 - 110 mg/dL   POCT glucose    Collection Time: 07/11/18  2:06 AM   Result Value Ref Range    POCT Glucose 164 (H) 70 - 110 mg/dL   POCT glucose    Collection Time: 07/11/18  3:02 AM   Result Value Ref Range    POCT Glucose 196 (H) 70 - 110 mg/dL   POCT glucose    Collection Time: 07/11/18  4:01 AM   Result Value Ref Range    POCT Glucose 194 (H) 70 - 110 mg/dL   CBC auto differential    Collection Time: 07/11/18  4:05 AM   Result Value Ref Range    WBC 11.32 3.90 - 12.70 K/uL    RBC 2.96 (L) 4.60 - 6.20 M/uL    Hemoglobin 8.9 (L) 14.0 - 18.0 g/dL    Hematocrit 27.3 (L) 40.0 - 54.0 %    MCV 92 82 - 98 fL    MCH 30.1 27.0 - 31.0 pg    MCHC 32.6 32.0 - 36.0 g/dL    RDW 14.4 11.5 - 14.5 %    Platelets 194 150 - 350 K/uL    MPV 11.2 9.2 - 12.9 fL    Immature Granulocytes 1.5 (H) 0.0 - 0.5 %    Gran # (ANC) 10.4 (H) 1.8 - 7.7 K/uL    Immature Grans (Abs) 0.17 (H) 0.00 - 0.04 K/uL    Lymph # 0.6 (L) 1.0 - 4.8 K/uL    Mono # 0.2 (L) 0.3 - 1.0 K/uL    Eos # 0.0 0.0 - 0.5 K/uL    Baso # 0.02 0.00 - 0.20 K/uL    nRBC 0 0 /100 WBC    Gran% 91.6 (H) 38.0 - 73.0 %    Lymph% 4.9 (L) 18.0 - 48.0 %    Mono% 1.8 (L) 4.0 - 15.0 %    Eosinophil% 0.0 0.0 - 8.0 %    Basophil% 0.2 0.0 - 1.9 %    Differential Method Automated    Protime-INR    Collection Time: 07/11/18  4:05 AM   Result Value Ref Range    Prothrombin Time 26.8 (H) 9.0 - 12.5 sec    INR 2.8 (H) 0.8 - 1.2   Magnesium - if not done in ED    Collection Time: 07/11/18  4:05 AM   Result Value Ref Range     Magnesium 2.3 1.6 - 2.6 mg/dL   Lactic acid, plasma    Collection Time: 07/11/18  4:05 AM   Result Value Ref Range    Lactate (Lactic Acid) 1.4 0.5 - 2.2 mmol/L   Comprehensive metabolic panel - if not done in ED    Collection Time: 07/11/18  4:05 AM   Result Value Ref Range    Sodium 130 (L) 136 - 145 mmol/L    Potassium 4.8 3.5 - 5.1 mmol/L    Chloride 96 95 - 110 mmol/L    CO2 25 23 - 29 mmol/L    Glucose 196 (H) 70 - 110 mg/dL    BUN, Bld 20 6 - 20 mg/dL    Creatinine 1.5 (H) 0.5 - 1.4 mg/dL    Calcium 8.9 8.7 - 10.5 mg/dL    Total Protein 5.8 (L) 6.0 - 8.4 g/dL    Albumin 3.0 (L) 3.5 - 5.2 g/dL    Total Bilirubin 0.4 0.1 - 1.0 mg/dL    Alkaline Phosphatase 72 55 - 135 U/L    AST 37 10 - 40 U/L    ALT 42 10 - 44 U/L    Anion Gap 9 8 - 16 mmol/L    eGFR if African American 59.7 (A) >60 mL/min/1.73 m^2    eGFR if non  51.7 (A) >60 mL/min/1.73 m^2   ISTAT PROCEDURE    Collection Time: 07/11/18  4:21 AM   Result Value Ref Range    POC PH 7.357 7.35 - 7.45    POC PCO2 49.3 (H) 35 - 45 mmHg    POC PO2 38 (LL) 40 - 60 mmHg    POC HCO3 27.7 24 - 28 mmol/L    POC BE 2 -2 to 2 mmol/L    POC SATURATED O2 68 (L) 95 - 100 %    POC TCO2 29 24 - 29 mmol/L    Sample VENOUS     Site Other     Allens Test N/A    POCT glucose    Collection Time: 07/11/18  5:01 AM   Result Value Ref Range    POCT Glucose 182 (H) 70 - 110 mg/dL   POCT glucose    Collection Time: 07/11/18  6:02 AM   Result Value Ref Range    POCT Glucose 195 (H) 70 - 110 mg/dL         Significant Imaging:     CXR    Narrative     EXAMINATION:  XR CHEST AP PORTABLE    CLINICAL HISTORY:  IABP placement;    COMPARISON:  Comparison is made to 07/10/2018 at 3:05 a.m..    FINDINGS:  Endotracheal tube tip lies at the level of the aortic arch, 2.5 cm above the fredrick.  Metallic marker of the intra-aortic balloon pump lies in the proximal descending thoracic aorta, as before.  Cardioverter/defibrillator and a right jugular origin venous catheter are again  seen, the latter having its tip in the superior vena cava.  Cardiomediastinal silhouette is again noted to be markedly prominent, but the degree of cardiomegaly and the appearance of the cardiomediastinal silhouette have not changed appreciably since the exam referenced above.  Areas of pulmonary parenchymal opacity consistent with bilateral airspace consolidation are again seen, particularly in the perihilar/mid lung zones, with the airspace consolidation in the right perihilar region appearing slightly more pronounced than on the previous study.  A small amount of pleural fluid is now seen on the right, with no definite pleural fluid on the left.  Linear opacity representing subsegmental atelectasis in the left mid lung zone laterally is incidentally noted.  No pneumothorax.   Impression       Marked cardiomegaly, bilateral airspace consolidation which may represent pulmonary edema, particularly evident in both perihilar lung zones, and a small amount of pleural fluid on the right are observed.  Findings are similar to 07/10/2018 at 3:05 p.m., though the perihilar airspace consolidation on the right side appears slightly more pronounced on the current exam and a small amount of pleural fluid has developed on this side.      Electronically signed by: Edgar Sow MD  Date: 07/11/2018  Time: 06:58

## 2018-07-11 NOTE — ASSESSMENT & PLAN NOTE
-IABP placed emergently 7/10/18  -Augmenting well at 1:1. SVO2 68%  -CVP 12-14 overnight. Lasix 40 mg IVP x 1 this am and assess response

## 2018-07-11 NOTE — SUBJECTIVE & OBJECTIVE
Interval History: No more VT overnight. Hemodynamically stable with IABP 1:1, amio 0.5, levo 0.14, lidocaine 2. IABP advanced this am. Family at bedside, updated on plan of care.     Continuous Infusions:   amiodarone in dextrose 5% 0.5 mg/min (07/11/18 1100)    fentanyl 5 mL/hr at 07/11/18 1100    insulin (HUMAN R) infusion (adults) 2.3 Units/hr (07/11/18 1100)    lidocaine 2 mg/min (07/11/18 1100)    norepinephrine bitartrate-D5W 0.09 mcg/kg/min (07/11/18 1100)    propofol 35 mcg/kg/min (07/11/18 1100)     Scheduled Meds:   albuterol sulfate  2.5 mg Nebulization Q4H    amiodarone  150 mg Intravenous Once    aspirin  81 mg Oral Daily    atorvastatin  40 mg Oral Daily    chlorhexidine  15 mL Mouth/Throat BID    chlorhexidine  15 mL Mouth/Throat BID    fluticasone-vilanterol  1 puff Inhalation Daily    gabapentin  600 mg Oral BID    hydrocortisone sodium succinate  100 mg Intravenous Q8H    levetiracetam IVPB  500 mg Intravenous Q12H    lidocaine HCL 10 mg/ml (1%)  1 mL Other Once    magnesium sulfate IVPB  2 g Intravenous Once    metoprolol  2.5 mg Intravenous Q4H    pantoprazole  40 mg Intravenous BID    sodium chloride 0.9%  3 mL Intravenous Q8H     PRN Meds:sodium chloride, ALPRAZolam, benzonatate, calcium gluconate IVPB, calcium gluconate IVPB, calcium gluconate IVPB, carisoprodol, dextrose 50%, dextrose 50%, HYDROcodone-acetaminophen, magnesium sulfate IVPB, magnesium sulfate IVPB, nitroGLYCERIN, potassium chloride in water **AND** potassium chloride in water **AND** potassium chloride in water, promethazine-codeine 6.25-10 mg/5 ml, sodium phosphate IVPB, sodium phosphate IVPB, sodium phosphate IVPB    Review of patient's allergies indicates:  No Known Allergies  Objective:     Vital Signs (Most Recent):  Temp: 97.4 °F (36.3 °C) (07/11/18 1100)  Pulse: 80 (07/11/18 1115)  Resp: 16 (07/11/18 1115)  BP: (!) 110/55 (07/11/18 1115)  SpO2: 100 % (07/11/18 1115) Vital Signs (24h Range):  Temp:   [97.4 °F (36.3 °C)-98.3 °F (36.8 °C)] 97.4 °F (36.3 °C)  Pulse:  [] 80  Resp:  [0-49] 16  SpO2:  [59 %-100 %] 100 %  BP: ()/(48-83) 110/55     Patient Vitals for the past 72 hrs (Last 3 readings):   Weight   07/09/18 0400 100.9 kg (222 lb 7.1 oz)     Body mass index is 31.02 kg/m².      Intake/Output Summary (Last 24 hours) at 07/11/18 1141  Last data filed at 07/11/18 1100   Gross per 24 hour   Intake          3718.25 ml   Output             2915 ml   Net           803.25 ml       Hemodynamic Parameters:       Telemetry:reviewed. Occasional PVCs    Physical Exam   Constitutional: He appears well-developed and well-nourished. He is sedated and intubated.   HENT:   Head: Normocephalic and atraumatic.   Eyes: EOM are normal. Pupils are equal, round, and reactive to light.   Neck: Normal range of motion. Neck supple. No JVD (difficult to assess (patient on ventilator & flat in bed)) present.   Right CVC in place   Cardiovascular: Normal rate and regular rhythm.    IABP 1:1. Warm to touch   Pulmonary/Chest: Effort normal. He is intubated. No respiratory distress.   Ventilated   Abdominal: Soft. He exhibits no distension. Bowel sounds are decreased. There is no tenderness.   Musculoskeletal: Normal range of motion. He exhibits no edema.   Skin: Skin is warm and dry.   Nursing note and vitals reviewed.      Significant Labs:  CBC:    Recent Labs  Lab 07/10/18  1022 07/10/18  1628 07/11/18  0405   WBC 7.82 28.24* 11.32   RBC 3.01* 3.17* 2.96*   HGB 9.0* 9.6* 8.9*   HCT 28.4* 30.5* 27.3*    229 194   MCV 94 96 92   MCH 29.9 30.3 30.1   MCHC 31.7* 31.5* 32.6     BNP:    Recent Labs  Lab 07/09/18  0957   *     CMP:    Recent Labs  Lab 07/11/18  0001 07/11/18  0405 07/11/18  0831   * 196* 184*   CALCIUM 8.9 8.9 8.9   ALBUMIN 3.1* 3.0* 2.9*   PROT 6.0 5.8* 5.7*   * 130* 132*   K 4.6 4.8 4.6   CO2 26 25 26   CL 96 96 97   BUN 21* 20 19   CREATININE 1.8* 1.5* 1.4   ALKPHOS 79 72 75   ALT 45*  42 41   AST 36 37 34   BILITOT 0.4 0.4 0.4      Coagulation:     Recent Labs  Lab 07/10/18  0336 07/10/18  1628 07/11/18  0405   INR 2.7* 2.6* 2.8*     LDH:  No results for input(s): LDH in the last 72 hours.  Microbiology:  Microbiology Results (last 7 days)     ** No results found for the last 168 hours. **          I have reviewed all pertinent labs within the past 24 hours.    Estimated Creatinine Clearance: 72.1 mL/min (based on SCr of 1.4 mg/dL).    Diagnostic Results:  I have reviewed and interpreted all pertinent imaging results/findings within the past 24 hours.

## 2018-07-11 NOTE — SIGNIFICANT EVENT
POST CATH FOLLOW UP NOTE    Procedure: IABP via R CFA with 7.5F sheath    Subjective:  Pt stable overnight. No more VT      Vital Signs Range (Last 24H):  Temp:  [97.5 °F (36.4 °C)-98.3 °F (36.8 °C)]   Pulse:  []   Resp:  [0-49]   BP: ()/(48-83)   SpO2:  [59 %-100 %]     PE:   GEN: Intubated/sedated  NECK: No JCD  CAR: rrr no m/r/g  PULM: crackles BL  PULSES: able to doppler BL DP/PT biphasic    LABS:  CBC:   Recent Labs  Lab 07/10/18  1022 07/10/18  1628 07/11/18  0405   WBC 7.82 28.24* 11.32   RBC 3.01* 3.17* 2.96*   HGB 9.0* 9.6* 8.9*   HCT 28.4* 30.5* 27.3*    229 194   MCV 94 96 92   MCH 29.9 30.3 30.1   MCHC 31.7* 31.5* 32.6     BMP:   Recent Labs  Lab 07/10/18  2003 07/11/18  0001 07/11/18  0405   * 135* 196*   * 131* 130*   K 5.0 4.6 4.8   CL 96 96 96   CO2 26 26 25   BUN 23* 21* 20   CREATININE 2.1* 1.8* 1.5*   CALCIUM 8.6* 8.9 8.9   MG  --   --  2.3     Coagulation:   Recent Labs  Lab 07/11/18  0405   INR 2.8*       Assessment/Plan:  Polymorphic Ventricular Tachycardia with Cardiac Arrest  - Pt intubated/sedated and IABP placed via 7.5F sheath in L CFA emergently at bedside 7/10/18  - IABP advanced one centimeter this AM as appeared to have moved distaly on AM CXR. Will repeat CXR  - Cont daily CXR, bedrest, and q1hr neurovascular checks while IABP in place  - Cont treatment per HTS team

## 2018-07-11 NOTE — CONSULTS
[facility]  Pulmonology  Consult Note    Patient Name: Yonathan Good Jr.  MRN: [id number]  Admission Date: 7/7/2018  Hospital Length of Stay: 4 days  Code Status: Full Code  Attending Physician: Mohan Cross MD  Primary Care Provider: Edgar Gates MD   Principal Problem: Hypotension    Inpatient consult to Pulmonology  Consult performed by: TED MELCHOR  Consult ordered by: DWAYNE DALLAS        Subjective:     HPI:  57M with complex history of HFrEF (EF 10-15%) with AICD, pulmonary sarcoid, CVA, admitted for GI bleed. Now s/p polymorphic VT with cardiac arrest and synchronized cardioversion x3 on 7/10 with new IABP. Now sedated, intubated, and on ventilator. we were consulted on 7/11 for ventilator management and recommendations.    Patient initially presented 6/22 with stroke-like symptoms and discharged 7/4 on warf/lovenox. Came back to ED with bright red stools and hypotension. He began experiencing chest discomfort and shortness of breath and was found to be in polymorphic v. Tach.  His condition continued to deteriorate and he developed respiratory distress requiring intubation.  He then entered asystole and ACLS protocol ensued for 16 minutes before ROSC.  He is now intubated, sedated (propofol, fentanyl), on pressors (levophed), insulin gtt, and anti-arrhythmics (amiodarone, lidocaine).  He has an intra-aortic balloon pump inserted in the left axilla, and CVC in the right IJ.  Pulmonary medicine was consulted for management of vent.    Past Medical History:   Diagnosis Date    AICD (automatic cardioverter/defibrillator) present     Arthritis     Atrial fibrillation     CHF (congestive heart failure)     Coronary artery disease     Hypertension     MI (myocardial infarction)     Sarcoid     Seizures     Stroke        Past Surgical History:   Procedure Laterality Date    CARDIAC CATHETERIZATION      CARDIAC DEFIBRILLATOR PLACEMENT  7-12    CARDIAC DEFIBRILLATOR PLACEMENT       CARDIAC DEFIBRILLATOR PLACEMENT      COLONOSCOPY N/A 7/2/2018    Procedure: COLONOSCOPY;  Surgeon: THELMA Kay MD;  Location: [facility] ENDO ([address] [place] [address]);  Service: Endoscopy;  Laterality: N/A;    CORONARY STENT PLACEMENT  07/2011    ESOPHAGOGASTRODUODENOSCOPY      FRACTURE SURGERY      LEFT HEART CATHETERIZATION N/A 6/25/2018    Procedure: Left heart cath;  Surgeon: Jordi Lui MD;  Location: [facility] CATH LAB;  Service: Cardiology;  Laterality: N/A;       Review of patient's allergies indicates:  No Known Allergies    Family History     Problem Relation (Age of Onset)    Cancer Maternal Grandmother    Coronary artery disease Father, Sister, Sister    Heart disease Mother, Father, Sister    Hypertension Mother, Father, Sister    Kidney disease Sister    Stroke Sister    Vision loss Sister        Social History Main Topics    Smoking status: Never Smoker    Smokeless tobacco: Never Used    Alcohol use No    Drug use: No    Sexual activity: Not on file         Review of Systems  Objective:     Vital Signs (Most Recent):  Temp: 97.4 °F (36.3 °C) (07/11/18 1100)  Pulse: 80 (07/11/18 1106)  Resp: 15 (07/11/18 1106)  BP: (!) 112/52 (07/11/18 1100)  SpO2: 100 % (07/11/18 1106) Vital Signs (24h Range):  Temp:  [97.4 °F (36.3 °C)-98.3 °F (36.8 °C)] 97.4 °F (36.3 °C)  Pulse:  [] 80  Resp:  [0-49] 15  SpO2:  [59 %-100 %] 100 %  BP: ()/(48-83) 112/52     Weight: 100.9 kg (222 lb 7.1 oz)  Body mass index is 31.02 kg/m².      Intake/Output Summary (Last 24 hours) at 07/11/18 1109  Last data filed at 07/11/18 1100   Gross per 24 hour   Intake          3688.25 ml   Output             2915 ml   Net           773.25 ml       Physical Exam   Constitutional: He is sedated and intubated.   Pulmonary/Chest: He is intubated.       Vents:  Vent Mode: A/C (07/11/18 0915)  Set Rate: 14 bmp (07/11/18 0915)  Vt Set: 500 mL (07/11/18 0915)  PEEP/CPAP: 8 cmH20 (07/11/18 0915)  Oxygen Concentration (%): 40 (07/11/18  1100)  Peak Airway Pressure: 31 cmH2O (07/11/18 0915)  Plateau Pressure: 0 cmH20 (07/11/18 0915)  Total Ve: 7.22 mL (07/11/18 0915)  F/VT Ratio<105 (RSBI): (!) 27.13 (07/11/18 0915)    Lines/Drains/Airways     Central Venous Catheter Line                 Percutaneous Central Line Insertion/Assessment - triple lumen  07/09/18 1500 right internal jugular 1 day         Introducer 07/10/18 1548 other (see comments) less than 1 day          Drain                 NG/OG Tube 07/10/18 1557 [place] sump 16 Fr. Left nostril less than 1 day         Urethral Catheter 07/10/18 1646 Double-lumen;Latex 16 Fr. less than 1 day          Airway                 Airway - Non-Surgical 07/10/18 1523 Endotracheal Tube less than 1 day          Line                 IABP 07/10/18 1550 7.5 Fr. 40 mL less than 1 day          Peripheral Intravenous Line                 Peripheral IV - Single Lumen 07/08/18 1123 Right Forearm 2 days                Significant Labs:    CBC/Anemia Profile:    Recent Labs  Lab 07/10/18  1022 07/10/18  1628 07/11/18  0405   WBC 7.82 28.24* 11.32   HGB 9.0* 9.6* 8.9*   HCT 28.4* 30.5* 27.3*    229 194   MCV 94 96 92   RDW 14.7* 14.6* 14.4        Chemistries:    Recent Labs  Lab 07/09/18  1750 07/10/18  0336  07/10/18  1628  07/11/18  0001 07/11/18  0405 07/11/18  0831   * 133*  < > 129*  < > 131* 130* 132*   K 5.1 4.5  < > 5.4*  < > 4.6 4.8 4.6   CL 98 99  < > 98  < > 96 96 97   CO2 26 26  < > 19*  < > 26 25 26   BUN 26* 26*  < > 25*  < > 21* 20 19   CREATININE 2.1* 1.8*  < > 2.5*  < > 1.8* 1.5* 1.4   CALCIUM 8.8 8.5*  < > 9.8  < > 8.9 8.9 8.9   ALBUMIN 3.2* 3.2*  --  3.4*  < > 3.1* 3.0* 2.9*   PROT 5.8* 5.9*  --  6.5  < > 6.0 5.8* 5.7*   BILITOT 0.6 0.4  --  0.4  < > 0.4 0.4 0.4   ALKPHOS 71 72  --  93  < > 79 72 75   ALT 52* 47*  --  52*  < > 45* 42 41   AST 31 25  --  29  < > 36 37 34   MG 1.8 2.1  --  2.9*  --   --  2.3  --    PHOS 4.8* 4.9*  --   --   --   --   --   --    < > = values in this interval  not displayed.    All pertinent labs within the past 24 hours have been reviewed.    Significant Imaging:   I have reviewed all pertinent imaging results/findings within the past 24 hours.    Assessment/Plan:     Respiratory failure requiring intubation    CXR shows likely bilateral pulmonary edema and small amount of pleural fluid on right. Positive fluid balance.    - Recommend continuing to diurese, and weaning sedation when primary team feels they have achieved adequate diuresis and hemodynamic stability.    -Daily spontaneous breathing trials.    - will continue to follow.              Thank you for your consult. I will follow-up with patient. Please contact us if you have any additional questions.     Dontrell Zeng MD  Pulmonology  [facility]

## 2018-07-11 NOTE — PROGRESS NOTES
[facility]  Cardiac Electrophysiology  Progress Note    Admission Date: 7/7/2018  Code Status: Full Code   Attending Physician: Mohan Cross MD   Expected Discharge Date: 7/13/2018  Principal Problem:Hypotension    Subjective:     Interval History: Patient sedated and intubated overnight, no new events on telemetry. V Paced at 80.     Review of Systems   Unable to perform ROS: acuity of condition     Objective:     Vital Signs (Most Recent):  Temp: 98.3 °F (36.8 °C) (07/11/18 0300)  Pulse: 80 (07/11/18 0707)  Resp: 14 (07/11/18 0707)  BP: 135/62 (07/11/18 0700)  SpO2: 100 % (07/11/18 0707) Vital Signs (24h Range):  Temp:  [97.8 °F (36.6 °C)-98.3 °F (36.8 °C)] 98.3 °F (36.8 °C)  Pulse:  [] 80  Resp:  [0-49] 14  SpO2:  [59 %-100 %] 100 %  BP: ()/(48-83) 135/62     Weight: 100.9 kg (222 lb 7.1 oz)  Body mass index is 31.02 kg/m².     SpO2: 100 %  O2 Device (Oxygen Therapy): ventilator    Physical Exam   Constitutional: He is oriented to person, place, and time. He appears well-developed and well-nourished.   Sedated, intubated   HENT:   Head: Normocephalic and atraumatic.   Eyes: Pupils are equal, round, and reactive to light.   Neck: Normal range of motion. No JVD present.   Cardiovascular: Intact distal pulses.    Audible balloon pump. Left groin balloon pump inserted.   Pulmonary/Chest: Effort normal and breath sounds normal. No respiratory distress. He has no wheezes. He has no rales.   Abdominal: Soft. Bowel sounds are normal. He exhibits no distension. There is no tenderness.   Musculoskeletal: Normal range of motion. He exhibits edema (2+ bilateral lower extremity edema to the thighs).   Neurological: He is alert and oriented to person, place, and time.   Skin: Skin is dry. No rash noted.   Mottled appearance   Psychiatric: He has a normal mood and affect. His behavior is normal.       Significant Labs:     Recent Results (from the past 24 hour(s))   ISTAT PROCEDURE     Collection Time: 07/10/18  8:19 AM   Result Value Ref Range    POC PH 7.306 (L) 7.35 - 7.45    POC PCO2 52.5 (H) 35 - 45 mmHg    POC PO2 32 (LL) 40 - 60 mmHg    POC HCO3 26.2 24 - 28 mmol/L    POC BE 0 -2 to 2 mmol/L    POC SATURATED O2 56 (L) 95 - 100 %    POC TCO2 28 24 - 29 mmol/L    Sample VENOUS     Site Other     Allens Test N/A     DelSys Nasal Can     Mode SPONT     FiO2 28     Sp02 100    Troponin I    Collection Time: 07/10/18 10:22 AM   Result Value Ref Range    Troponin I 0.182 (H) 0.000 - 0.026 ng/mL   Basic metabolic panel    Collection Time: 07/10/18 10:22 AM   Result Value Ref Range    Sodium 133 (L) 136 - 145 mmol/L    Potassium 4.0 3.5 - 5.1 mmol/L    Chloride 100 95 - 110 mmol/L    CO2 23 23 - 29 mmol/L    Glucose 99 70 - 110 mg/dL    BUN, Bld 24 (H) 6 - 20 mg/dL    Creatinine 1.7 (H) 0.5 - 1.4 mg/dL    Calcium 8.8 8.7 - 10.5 mg/dL    Anion Gap 10 8 - 16 mmol/L    eGFR if African American 51.3 (A) >60 mL/min/1.73 m^2    eGFR if non African American 44.4 (A) >60 mL/min/1.73 m^2   CBC auto differential    Collection Time: 07/10/18 10:22 AM   Result Value Ref Range    WBC 7.82 3.90 - 12.70 K/uL    RBC 3.01 (L) 4.60 - 6.20 M/uL    Hemoglobin 9.0 (L) 14.0 - 18.0 g/dL    Hematocrit 28.4 (L) 40.0 - 54.0 %    MCV 94 82 - 98 fL    MCH 29.9 27.0 - 31.0 pg    MCHC 31.7 (L) 32.0 - 36.0 g/dL    RDW 14.7 (H) 11.5 - 14.5 %    Platelets 181 150 - 350 K/uL    MPV 11.7 9.2 - 12.9 fL    Immature Granulocytes 1.5 (H) 0.0 - 0.5 %    Gran # (ANC) 6.0 1.8 - 7.7 K/uL    Immature Grans (Abs) 0.12 (H) 0.00 - 0.04 K/uL    Lymph # 0.7 (L) 1.0 - 4.8 K/uL    Mono # 0.9 0.3 - 1.0 K/uL    Eos # 0.1 0.0 - 0.5 K/uL    Baso # 0.02 0.00 - 0.20 K/uL    nRBC 0 0 /100 WBC    Gran% 76.3 (H) 38.0 - 73.0 %    Lymph% 9.2 (L) 18.0 - 48.0 %    Mono% 11.0 4.0 - 15.0 %    Eosinophil% 1.7 0.0 - 8.0 %    Basophil% 0.3 0.0 - 1.9 %    Differential Method Automated    ISTAT PROCEDURE    Collection Time: 07/10/18  2:37 PM   Result Value Ref Range     POC PH 7.303 (L) 7.35 - 7.45    POC PCO2 48.0 (H) 35 - 45 mmHg    POC PO2 29 (LL) 40 - 60 mmHg    POC HCO3 23.8 (L) 24 - 28 mmol/L    POC BE -3 -2 to 2 mmol/L    POC SATURATED O2 47 (L) 95 - 100 %    POC TCO2 25 24 - 29 mmol/L    Sample VENOUS     Site Other     Allens Test N/A     DelSys Nasal Can     Mode SPONT     Sp02 97    ISTAT PROCEDURE    Collection Time: 07/10/18  2:58 PM   Result Value Ref Range    POC PH 7.275 (L) 7.35 - 7.45    POC PCO2 51.3 (H) 35 - 45 mmHg    POC PO2 26 (LL) 40 - 60 mmHg    POC HCO3 23.8 (L) 24 - 28 mmol/L    POC BE -3 -2 to 2 mmol/L    POC SATURATED O2 40 (L) 95 - 100 %    POC TCO2 25 24 - 29 mmol/L    Sample VENOUS     Site Other     Allens Test N/A     DelSys Venti Mask     Mode SPONT     Flow 15     FiO2 50     Sp02 100    Lactic acid, plasma    Collection Time: 07/10/18  4:28 PM   Result Value Ref Range    Lactate (Lactic Acid) 4.9 (HH) 0.5 - 2.2 mmol/L   Comprehensive metabolic panel    Collection Time: 07/10/18  4:28 PM   Result Value Ref Range    Sodium 129 (L) 136 - 145 mmol/L    Potassium 5.4 (H) 3.5 - 5.1 mmol/L    Chloride 98 95 - 110 mmol/L    CO2 19 (L) 23 - 29 mmol/L    Glucose 344 (H) 70 - 110 mg/dL    BUN, Bld 25 (H) 6 - 20 mg/dL    Creatinine 2.5 (H) 0.5 - 1.4 mg/dL    Calcium 9.8 8.7 - 10.5 mg/dL    Total Protein 6.5 6.0 - 8.4 g/dL    Albumin 3.4 (L) 3.5 - 5.2 g/dL    Total Bilirubin 0.4 0.1 - 1.0 mg/dL    Alkaline Phosphatase 93 55 - 135 U/L    AST 29 10 - 40 U/L    ALT 52 (H) 10 - 44 U/L    Anion Gap 12 8 - 16 mmol/L    eGFR if African American 32.2 (A) >60 mL/min/1.73 m^2    eGFR if non  27.9 (A) >60 mL/min/1.73 m^2   CBC auto differential    Collection Time: 07/10/18  4:28 PM   Result Value Ref Range    WBC 28.24 (H) 3.90 - 12.70 K/uL    RBC 3.17 (L) 4.60 - 6.20 M/uL    Hemoglobin 9.6 (L) 14.0 - 18.0 g/dL    Hematocrit 30.5 (L) 40.0 - 54.0 %    MCV 96 82 - 98 fL    MCH 30.3 27.0 - 31.0 pg    MCHC 31.5 (L) 32.0 - 36.0 g/dL    RDW 14.6 (H) 11.5 -  14.5 %    Platelets 229 150 - 350 K/uL    MPV 12.0 9.2 - 12.9 fL    Immature Granulocytes CANCELED 0.0 - 0.5 %    Immature Grans (Abs) CANCELED 0.00 - 0.04 K/uL    Lymph # CANCELED 1.0 - 4.8 K/uL    Mono # CANCELED 0.3 - 1.0 K/uL    Eos # CANCELED 0.0 - 0.5 K/uL    Baso # CANCELED 0.00 - 0.20 K/uL    nRBC 0 0 /100 WBC    Gran% 74.0 (H) 38.0 - 73.0 %    Lymph% 3.0 (L) 18.0 - 48.0 %    Mono% 4.0 4.0 - 15.0 %    Eosinophil% 0.0 0.0 - 8.0 %    Basophil% 0.0 0.0 - 1.9 %    Bands 19.0 %    [place]iso Slight     Poly Occasional     Differential Method Manual    Magnesium    Collection Time: 07/10/18  4:28 PM   Result Value Ref Range    Magnesium 2.9 (H) 1.6 - 2.6 mg/dL   Protime-INR    Collection Time: 07/10/18  4:28 PM   Result Value Ref Range    Prothrombin Time 24.9 (H) 9.0 - 12.5 sec    INR 2.6 (H) 0.8 - 1.2   POCT glucose    Collection Time: 07/10/18  4:37 PM   Result Value Ref Range    POCT Glucose 328 (H) 70 - 110 mg/dL   POCT glucose    Collection Time: 07/10/18  4:38 PM   Result Value Ref Range    POCT Glucose 316 (H) 70 - 110 mg/dL   ISTAT PROCEDURE    Collection Time: 07/10/18  6:23 PM   Result Value Ref Range    POC PH 7.316 (L) 7.35 - 7.45    POC PCO2 54.5 (H) 35 - 45 mmHg    POC PO2 268 (H) 80 - 100 mmHg    POC HCO3 27.9 24 - 28 mmol/L    POC BE 2 -2 to 2 mmol/L    POC SATURATED O2 100 95 - 100 %    POC TCO2 30 (H) 23 - 27 mmol/L    Rate 14     Sample ARTERIAL     Site RR     Allens Test N/A     DelSys Adult Vent     Mode AC/PRVC     Vt 500     PEEP 10     PiP 32     FiO2 100     Min Vol 7.66     Sp02 100    Lactic acid, plasma    Collection Time: 07/10/18  8:03 PM   Result Value Ref Range    Lactate (Lactic Acid) 1.7 0.5 - 2.2 mmol/L   Comprehensive metabolic panel - if not done in ED    Collection Time: 07/10/18  8:03 PM   Result Value Ref Range    Sodium 131 (L) 136 - 145 mmol/L    Potassium 5.0 3.5 - 5.1 mmol/L    Chloride 96 95 - 110 mmol/L    CO2 26 23 - 29 mmol/L    Glucose 272 (H) 70 - 110 mg/dL    BUN,  Bld 23 (H) 6 - 20 mg/dL    Creatinine 2.1 (H) 0.5 - 1.4 mg/dL    Calcium 8.6 (L) 8.7 - 10.5 mg/dL    Total Protein 6.1 6.0 - 8.4 g/dL    Albumin 3.2 (L) 3.5 - 5.2 g/dL    Total Bilirubin 0.5 0.1 - 1.0 mg/dL    Alkaline Phosphatase 79 55 - 135 U/L    AST 32 10 - 40 U/L    ALT 47 (H) 10 - 44 U/L    Anion Gap 9 8 - 16 mmol/L    eGFR if African American 39.8 (A) >60 mL/min/1.73 m^2    eGFR if non  34.4 (A) >60 mL/min/1.73 m^2   POCT glucose    Collection Time: 07/10/18  8:20 PM   Result Value Ref Range    POCT Glucose 277 (H) 70 - 110 mg/dL   POCT glucose    Collection Time: 07/10/18  9:01 PM   Result Value Ref Range    POCT Glucose 244 (H) 70 - 110 mg/dL   ISTAT PROCEDURE    Collection Time: 07/10/18  9:07 PM   Result Value Ref Range    POC PH 7.299 (L) 7.35 - 7.45    POC PCO2 55.3 (H) 35 - 45 mmHg    POC PO2 44 40 - 60 mmHg    POC HCO3 27.1 24 - 28 mmol/L    POC BE 1 -2 to 2 mmol/L    POC SATURATED O2 74 (L) 95 - 100 %    POC TCO2 29 24 - 29 mmol/L    Sample VENOUS     Site Other     Allens Test N/A    POCT glucose    Collection Time: 07/10/18 10:16 PM   Result Value Ref Range    POCT Glucose 213 (H) 70 - 110 mg/dL   POCT glucose    Collection Time: 07/10/18 11:09 PM   Result Value Ref Range    POCT Glucose 167 (H) 70 - 110 mg/dL   Lactic acid, plasma    Collection Time: 07/11/18 12:01 AM   Result Value Ref Range    Lactate (Lactic Acid) 2.0 0.5 - 2.2 mmol/L   Comprehensive metabolic panel - if not done in ED    Collection Time: 07/11/18 12:01 AM   Result Value Ref Range    Sodium 131 (L) 136 - 145 mmol/L    Potassium 4.6 3.5 - 5.1 mmol/L    Chloride 96 95 - 110 mmol/L    CO2 26 23 - 29 mmol/L    Glucose 135 (H) 70 - 110 mg/dL    BUN, Bld 21 (H) 6 - 20 mg/dL    Creatinine 1.8 (H) 0.5 - 1.4 mg/dL    Calcium 8.9 8.7 - 10.5 mg/dL    Total Protein 6.0 6.0 - 8.4 g/dL    Albumin 3.1 (L) 3.5 - 5.2 g/dL    Total Bilirubin 0.4 0.1 - 1.0 mg/dL    Alkaline Phosphatase 79 55 - 135 U/L    AST 36 10 - 40 U/L    ALT  45 (H) 10 - 44 U/L    Anion Gap 9 8 - 16 mmol/L    eGFR if African American 47.9 (A) >60 mL/min/1.73 m^2    eGFR if non  41.4 (A) >60 mL/min/1.73 m^2   POCT glucose    Collection Time: 07/11/18 12:11 AM   Result Value Ref Range    POCT Glucose 149 (H) 70 - 110 mg/dL   ISTAT PROCEDURE    Collection Time: 07/11/18 12:15 AM   Result Value Ref Range    POC PH 7.330 (L) 7.35 - 7.45    POC PCO2 52.9 (H) 35 - 45 mmHg    POC PO2 40 40 - 60 mmHg    POC HCO3 27.9 24 - 28 mmol/L    POC BE 2 -2 to 2 mmol/L    POC SATURATED O2 70 (L) 95 - 100 %    POC TCO2 29 24 - 29 mmol/L    Sample VENOUS     Site Other     Allens Test N/A    POCT glucose    Collection Time: 07/11/18  1:04 AM   Result Value Ref Range    POCT Glucose 140 (H) 70 - 110 mg/dL   POCT glucose    Collection Time: 07/11/18  2:06 AM   Result Value Ref Range    POCT Glucose 164 (H) 70 - 110 mg/dL   POCT glucose    Collection Time: 07/11/18  3:02 AM   Result Value Ref Range    POCT Glucose 196 (H) 70 - 110 mg/dL   POCT glucose    Collection Time: 07/11/18  4:01 AM   Result Value Ref Range    POCT Glucose 194 (H) 70 - 110 mg/dL   CBC auto differential    Collection Time: 07/11/18  4:05 AM   Result Value Ref Range    WBC 11.32 3.90 - 12.70 K/uL    RBC 2.96 (L) 4.60 - 6.20 M/uL    Hemoglobin 8.9 (L) 14.0 - 18.0 g/dL    Hematocrit 27.3 (L) 40.0 - 54.0 %    MCV 92 82 - 98 fL    MCH 30.1 27.0 - 31.0 pg    MCHC 32.6 32.0 - 36.0 g/dL    RDW 14.4 11.5 - 14.5 %    Platelets 194 150 - 350 K/uL    MPV 11.2 9.2 - 12.9 fL    Immature Granulocytes 1.5 (H) 0.0 - 0.5 %    Gran # (ANC) 10.4 (H) 1.8 - 7.7 K/uL    Immature Grans (Abs) 0.17 (H) 0.00 - 0.04 K/uL    Lymph # 0.6 (L) 1.0 - 4.8 K/uL    Mono # 0.2 (L) 0.3 - 1.0 K/uL    Eos # 0.0 0.0 - 0.5 K/uL    Baso # 0.02 0.00 - 0.20 K/uL    nRBC 0 0 /100 WBC    Gran% 91.6 (H) 38.0 - 73.0 %    Lymph% 4.9 (L) 18.0 - 48.0 %    Mono% 1.8 (L) 4.0 - 15.0 %    Eosinophil% 0.0 0.0 - 8.0 %    Basophil% 0.2 0.0 - 1.9 %    Differential  Method Automated    Protime-INR    Collection Time: 07/11/18  4:05 AM   Result Value Ref Range    Prothrombin Time 26.8 (H) 9.0 - 12.5 sec    INR 2.8 (H) 0.8 - 1.2   Magnesium - if not done in ED    Collection Time: 07/11/18  4:05 AM   Result Value Ref Range    Magnesium 2.3 1.6 - 2.6 mg/dL   Lactic acid, plasma    Collection Time: 07/11/18  4:05 AM   Result Value Ref Range    Lactate (Lactic Acid) 1.4 0.5 - 2.2 mmol/L   Comprehensive metabolic panel - if not done in ED    Collection Time: 07/11/18  4:05 AM   Result Value Ref Range    Sodium 130 (L) 136 - 145 mmol/L    Potassium 4.8 3.5 - 5.1 mmol/L    Chloride 96 95 - 110 mmol/L    CO2 25 23 - 29 mmol/L    Glucose 196 (H) 70 - 110 mg/dL    BUN, Bld 20 6 - 20 mg/dL    Creatinine 1.5 (H) 0.5 - 1.4 mg/dL    Calcium 8.9 8.7 - 10.5 mg/dL    Total Protein 5.8 (L) 6.0 - 8.4 g/dL    Albumin 3.0 (L) 3.5 - 5.2 g/dL    Total Bilirubin 0.4 0.1 - 1.0 mg/dL    Alkaline Phosphatase 72 55 - 135 U/L    AST 37 10 - 40 U/L    ALT 42 10 - 44 U/L    Anion Gap 9 8 - 16 mmol/L    eGFR if African American 59.7 (A) >60 mL/min/1.73 m^2    eGFR if non  51.7 (A) >60 mL/min/1.73 m^2   ISTAT PROCEDURE    Collection Time: 07/11/18  4:21 AM   Result Value Ref Range    POC PH 7.357 7.35 - 7.45    POC PCO2 49.3 (H) 35 - 45 mmHg    POC PO2 38 (LL) 40 - 60 mmHg    POC HCO3 27.7 24 - 28 mmol/L    POC BE 2 -2 to 2 mmol/L    POC SATURATED O2 68 (L) 95 - 100 %    POC TCO2 29 24 - 29 mmol/L    Sample VENOUS     Site Other     Allens Test N/A    POCT glucose    Collection Time: 07/11/18  5:01 AM   Result Value Ref Range    POCT Glucose 182 (H) 70 - 110 mg/dL   POCT glucose    Collection Time: 07/11/18  6:02 AM   Result Value Ref Range    POCT Glucose 195 (H) 70 - 110 mg/dL         Significant Imaging:     CXR    Narrative     EXAMINATION:  XR CHEST AP PORTABLE    CLINICAL HISTORY:  IABP placement;    COMPARISON:  Comparison is made to 07/10/2018 at 3:05 a.m..    FINDINGS:  Endotracheal tube  tip lies at the level of the aortic arch, 2.5 cm above the fredrick.  Metallic marker of the intra-aortic balloon pump lies in the proximal descending thoracic aorta, as before.  Cardioverter/defibrillator and a right jugular origin venous catheter are again seen, the latter having its tip in the superior vena cava.  Cardiomediastinal silhouette is again noted to be markedly prominent, but the degree of cardiomegaly and the appearance of the cardiomediastinal silhouette have not changed appreciably since the exam referenced above.  Areas of pulmonary parenchymal opacity consistent with bilateral airspace consolidation are again seen, particularly in the perihilar/mid lung zones, with the airspace consolidation in the right perihilar region appearing slightly more pronounced than on the previous study.  A small amount of pleural fluid is now seen on the right, with no definite pleural fluid on the left.  Linear opacity representing subsegmental atelectasis in the left mid lung zone laterally is incidentally noted.  No pneumothorax.   Impression       Marked cardiomegaly, bilateral airspace consolidation which may represent pulmonary edema, particularly evident in both perihilar lung zones, and a small amount of pleural fluid on the right are observed.  Findings are similar to 07/10/2018 at 3:05 p.m., though the perihilar airspace consolidation on the right side appears slightly more pronounced on the current exam and a small amount of pleural fluid has developed on this side.      Electronically signed by: Edgar Sow MD  Date: 07/11/2018  Time: 06:58         Assessment and Plan:     Ventricular fibrillation    VT storm with multiple events of both sustained and non-sustained VT, as well as MMVT and PMVT  BI-V ICD interrogation reveals 15 episodes of VT treated with ATP x 15 and shocks x 12  Patient's native rate following stabilization was bradycardic in the 50's above his base rate set by his ICD; rate increased to  80, no changes made to therapy zones  Patient was discharged on amiodarone 400 mg BID from a recent admission, currently receiving amiodarone 1mg/min, lidocaine 2 mg/min  We recommend continuing amiodarone, can consider decreasing dose to 0.5 mg/min tomorrow  Lopressor 2.5 mg IV every 4 hours            Rafa Petit MD  Cardiac Electrophysiology  [facility]

## 2018-07-11 NOTE — PLAN OF CARE
Problem: Patient Care Overview  Goal: Plan of Care Review  Outcome: Ongoing (interventions implemented as appropriate)  Pt remains intubated on assist control 40% and 5 PEEP. Pt awakens to voice and follows commands. IABP in place 1:1 EKG trigger. Levo gtt infusing to maintain MAP >65. Lidocaine at 2 mg/min and amiodarone at 0.5 mg/min. Fentanyl and propofol gtts titrated to maintain RASS goal. CVP 9-10 flat. Daughters at bedside and updated on plan of care.

## 2018-07-12 NOTE — ASSESSMENT & PLAN NOTE
- INR supratherapeutic  - Coumadin on hold   - Protonix increased to 40 mg BID (changed to IV)  -Had screening colonoscopy with hot snare polypectomy during last admission; most likely culprit post polypectomy bleeding  - H & H stable, normal BMs since admission

## 2018-07-12 NOTE — ASSESSMENT & PLAN NOTE
-Levophed started during code 7/10/18  -Wean for MAP >65 to d/c (using IABP for MAP). Should be able to d/c today.

## 2018-07-12 NOTE — SUBJECTIVE & OBJECTIVE
Past Medical History:   Diagnosis Date    AICD (automatic cardioverter/defibrillator) present     Arthritis     Atrial fibrillation     CHF (congestive heart failure)     Coronary artery disease     Hypertension     MI (myocardial infarction)     Sarcoid     Seizures     Stroke        Past Surgical History:   Procedure Laterality Date    CARDIAC CATHETERIZATION      CARDIAC DEFIBRILLATOR PLACEMENT  7-12    CARDIAC DEFIBRILLATOR PLACEMENT      CARDIAC DEFIBRILLATOR PLACEMENT      COLONOSCOPY N/A 7/2/2018    Procedure: COLONOSCOPY;  Surgeon: THELMA Kay MD;  Location: [facility] ENDO ([address] [place] [address]);  Service: Endoscopy;  Laterality: N/A;    CORONARY STENT PLACEMENT  07/2011    ESOPHAGOGASTRODUODENOSCOPY      FRACTURE SURGERY      LEFT HEART CATHETERIZATION N/A 6/25/2018    Procedure: Left heart cath;  Surgeon: Jordi Lui MD;  Location: [facility] CATH LAB;  Service: Cardiology;  Laterality: N/A;       Review of patient's allergies indicates:  No Known Allergies    Family History     Problem Relation (Age of Onset)    Cancer Maternal Grandmother    Coronary artery disease Father, Sister, Sister    Heart disease Mother, Father, Sister    Hypertension Mother, Father, Sister    Kidney disease Sister    Stroke Sister    Vision loss Sister        Social History Main Topics    Smoking status: Never Smoker    Smokeless tobacco: Never Used    Alcohol use No    Drug use: No    Sexual activity: Not on file         Review of Systems   Unable to perform ROS: Patient unresponsive     Objective:     Vital Signs (Most Recent):  Temp: 97.6 °F (36.4 °C) (07/12/18 0715)  Pulse: 80 (07/12/18 1200)  Resp: 16 (07/12/18 1200)  BP: 130/69 (07/12/18 1145)  SpO2: 96 % (07/12/18 1200) Vital Signs (24h Range):  Temp:  [97.4 °F (36.3 °C)-97.6 °F (36.4 °C)] 97.6 °F (36.4 °C)  Pulse:  [80] 80  Resp:  [16-21] 16  SpO2:  [95 %-100 %] 96 %  BP: (102-163)/(53-83) 130/69     Weight: 109.8 kg (242 lb 1 oz)  Body mass index is 33.76  kg/m².      Intake/Output Summary (Last 24 hours) at 07/12/18 1206  Last data filed at 07/12/18 1100   Gross per 24 hour   Intake          2284.65 ml   Output             2198 ml   Net            86.65 ml       Physical Exam   Constitutional: He appears well-developed and well-nourished. He is sedated and intubated.   HENT:   Head: Normocephalic and atraumatic.   Nose: Nose normal.   Neck: JVD present. No tracheal deviation present.   Cardiovascular: Regular rhythm.    Holosystolic murmur present, AIBP in place via left axilla.  Heart sounds distant   Pulmonary/Chest: No stridor. He is intubated. He has no wheezes. He exhibits no tenderness.   On mechanical ventilation, orally intubated.  Diffuse crackles present in all lung fields.   Abdominal: Soft. Bowel sounds are normal. He exhibits no distension.   Neurological:   sedated   Skin: Skin is warm and dry.       Vents:  Vent Mode: A/C (07/12/18 1111)  Set Rate: 16 bmp (07/12/18 1111)  Vt Set: 450 mL (07/12/18 1111)  PEEP/CPAP: 5 cmH20 (07/12/18 1111)  Oxygen Concentration (%): 30 (07/12/18 1200)  Peak Airway Pressure: 34 cmH2O (07/12/18 1111)  Plateau Pressure: 23 cmH20 (07/12/18 1111)  Total Ve: 7.4 mL (07/12/18 1111)  F/VT Ratio<105 (RSBI): (!) 34.56 (07/12/18 1111)    Lines/Drains/Airways     Central Venous Catheter Line                 Percutaneous Central Line Insertion/Assessment - triple lumen  07/09/18 1500 right internal jugular 2 days         Introducer 07/10/18 1548 other (see comments) 1 day          Drain                 NG/OG Tube 07/10/18 1557 Hardy sump 16 Fr. Left nostril 1 day         Urethral Catheter 07/10/18 1646 Double-lumen;Latex 16 Fr. 1 day          Airway                 Airway - Non-Surgical 07/10/18 1523 Endotracheal Tube 1 day          Line                 IABP 07/10/18 1550 7.5 Fr. 40 mL 1 day          Peripheral Intravenous Line                 Peripheral IV - Single Lumen 07/08/18 1123 Right Forearm 4 days         Peripheral IV -  Single Lumen 07/12/18 1100 Right Forearm less than 1 day                Significant Labs:    CBC/Anemia Profile:    Recent Labs  Lab 07/10/18  1628 07/11/18  0405 07/12/18  0400   WBC 28.24* 11.32 14.12*   HGB 9.6* 8.9* 8.5*   HCT 30.5* 27.3* 25.9*    194 186   MCV 96 92 92   RDW 14.6* 14.4 14.2        Chemistries:    Recent Labs  Lab 07/10/18  1628  07/11/18  0405  07/12/18  0400 07/12/18  0807 07/12/18  1108   *  < > 130*  < > 133* 131* 131*   K 5.4*  < > 4.8  < > 4.5 4.6 4.6   CL 98  < > 96  < > 97 97 96   CO2 19*  < > 25  < > 27 24 25   BUN 25*  < > 20  < > 23* 25* 27*   CREATININE 2.5*  < > 1.5*  < > 1.6* 1.7* 1.8*   CALCIUM 9.8  < > 8.9  < > 8.7 8.6* 8.4*   ALBUMIN 3.4*  < > 3.0*  < > 2.8* 2.7* 2.7*   PROT 6.5  < > 5.8*  < > 5.5* 5.4* 5.4*   BILITOT 0.4  < > 0.4  < > 0.4 0.4 0.3   ALKPHOS 93  < > 72  < > 64 62 63   ALT 52*  < > 42  < > 33 32 30   AST 29  < > 37  < > 25 24 21   MG 2.9*  --  2.3  --  2.1  --   --    PHOS  --   --   --   --  5.6*  --   --    < > = values in this interval not displayed.    All pertinent labs within the past 24 hours have been reviewed.    Significant Imaging:   I have reviewed all pertinent imaging results/findings within the past 24 hours.

## 2018-07-12 NOTE — PROGRESS NOTES
[facility]  Pulmonology  Progress Note    Patient Name: Yonathan Good Jr.  MRN: [id number]  Admission Date: 7/7/2018  Hospital Length of Stay: 5 days  Code Status: Full Code  Attending Provider: Mohan Cross MD  Primary Care Provider: Edgar Gates MD   Principal Problem: Ventricular tachycardia, incessant    Subjective:     Past Medical History:   Diagnosis Date    AICD (automatic cardioverter/defibrillator) present     Arthritis     Atrial fibrillation     CHF (congestive heart failure)     Coronary artery disease     Hypertension     MI (myocardial infarction)     Sarcoid     Seizures     Stroke        Past Surgical History:   Procedure Laterality Date    CARDIAC CATHETERIZATION      CARDIAC DEFIBRILLATOR PLACEMENT  7-12    CARDIAC DEFIBRILLATOR PLACEMENT      CARDIAC DEFIBRILLATOR PLACEMENT      COLONOSCOPY N/A 7/2/2018    Procedure: COLONOSCOPY;  Surgeon: THELMA Kay MD;  Location: [facility] ENDO ([address] [place] [address]);  Service: Endoscopy;  Laterality: N/A;    CORONARY STENT PLACEMENT  07/2011    ESOPHAGOGASTRODUODENOSCOPY      FRACTURE SURGERY      LEFT HEART CATHETERIZATION N/A 6/25/2018    Procedure: Left heart cath;  Surgeon: Jordi Lui MD;  Location: [facility] CATH LAB;  Service: Cardiology;  Laterality: N/A;       Review of patient's allergies indicates:  No Known Allergies    Family History     Problem Relation (Age of Onset)    Cancer Maternal Grandmother    Coronary artery disease Father, Sister, Sister    Heart disease Mother, Father, Sister    Hypertension Mother, Father, Sister    Kidney disease Sister    Stroke Sister    Vision loss Sister        Social History Main Topics    Smoking status: Never Smoker    Smokeless tobacco: Never Used    Alcohol use No    Drug use: No    Sexual activity: Not on file         Review of Systems   Unable to perform ROS: Patient unresponsive     Objective:     Vital Signs (Most Recent):  Temp: 97.6 °F (36.4 °C) (07/12/18 0715)  Pulse:  80 (07/12/18 1200)  Resp: 16 (07/12/18 1200)  BP: 130/69 (07/12/18 1145)  SpO2: 96 % (07/12/18 1200) Vital Signs (24h Range):  Temp:  [97.4 °F (36.3 °C)-97.6 °F (36.4 °C)] 97.6 °F (36.4 °C)  Pulse:  [80] 80  Resp:  [16-21] 16  SpO2:  [95 %-100 %] 96 %  BP: (102-163)/(53-83) 130/69     Weight: 109.8 kg (242 lb 1 oz)  Body mass index is 33.76 kg/m².      Intake/Output Summary (Last 24 hours) at 07/12/18 1206  Last data filed at 07/12/18 1100   Gross per 24 hour   Intake          2284.65 ml   Output             2198 ml   Net            86.65 ml       Physical Exam   Constitutional: He appears well-developed and well-nourished. He is sedated and intubated.   HENT:   Head: Normocephalic and atraumatic.   Nose: Nose normal.   Neck: JVD present. No tracheal deviation present.   Cardiovascular: Regular rhythm.    Holosystolic murmur present, AIBP in place via left axilla.  Heart sounds distant   Pulmonary/Chest: No stridor. He is intubated. He has no wheezes. He exhibits no tenderness.   On mechanical ventilation, orally intubated.  Diffuse crackles present in all lung fields.   Abdominal: Soft. Bowel sounds are normal. He exhibits no distension.   Neurological:   sedated   Skin: Skin is warm and dry.       Vents:  Vent Mode: A/C (07/12/18 1111)  Set Rate: 16 bmp (07/12/18 1111)  Vt Set: 450 mL (07/12/18 1111)  PEEP/CPAP: 5 cmH20 (07/12/18 1111)  Oxygen Concentration (%): 30 (07/12/18 1200)  Peak Airway Pressure: 34 cmH2O (07/12/18 1111)  Plateau Pressure: 23 cmH20 (07/12/18 1111)  Total Ve: 7.4 mL (07/12/18 1111)  F/VT Ratio<105 (RSBI): (!) 34.56 (07/12/18 1111)    Lines/Drains/Airways     Central Venous Catheter Line                 Percutaneous Central Line Insertion/Assessment - triple lumen  07/09/18 1500 right internal jugular 2 days         Introducer 07/10/18 1548 other (see comments) 1 day          Drain                 NG/OG Tube 07/10/18 1557 Scarlett peres 16 Fr. Left nostril 1 day         Urethral Catheter 07/10/18  1646 Double-lumen;Latex 16 Fr. 1 day          Airway                 Airway - Non-Surgical 07/10/18 1523 Endotracheal Tube 1 day          Line                 IABP 07/10/18 1550 7.5 Fr. 40 mL 1 day          Peripheral Intravenous Line                 Peripheral IV - Single Lumen 07/08/18 1123 Right Forearm 4 days         Peripheral IV - Single Lumen 07/12/18 1100 Right Forearm less than 1 day                Significant Labs:    CBC/Anemia Profile:    Recent Labs  Lab 07/10/18  1628 07/11/18  0405 07/12/18  0400   WBC 28.24* 11.32 14.12*   HGB 9.6* 8.9* 8.5*   HCT 30.5* 27.3* 25.9*    194 186   MCV 96 92 92   RDW 14.6* 14.4 14.2        Chemistries:    Recent Labs  Lab 07/10/18  1628  07/11/18  0405  07/12/18  0400 07/12/18  0807 07/12/18  1108   *  < > 130*  < > 133* 131* 131*   K 5.4*  < > 4.8  < > 4.5 4.6 4.6   CL 98  < > 96  < > 97 97 96   CO2 19*  < > 25  < > 27 24 25   BUN 25*  < > 20  < > 23* 25* 27*   CREATININE 2.5*  < > 1.5*  < > 1.6* 1.7* 1.8*   CALCIUM 9.8  < > 8.9  < > 8.7 8.6* 8.4*   ALBUMIN 3.4*  < > 3.0*  < > 2.8* 2.7* 2.7*   PROT 6.5  < > 5.8*  < > 5.5* 5.4* 5.4*   BILITOT 0.4  < > 0.4  < > 0.4 0.4 0.3   ALKPHOS 93  < > 72  < > 64 62 63   ALT 52*  < > 42  < > 33 32 30   AST 29  < > 37  < > 25 24 21   MG 2.9*  --  2.3  --  2.1  --   --    PHOS  --   --   --   --  5.6*  --   --    < > = values in this interval not displayed.    All pertinent labs within the past 24 hours have been reviewed.    Significant Imaging:   I have reviewed all pertinent imaging results/findings within the past 24 hours.    Assessment/Plan:     Respiratory failure requiring intubation    CXR shows likely bilateral pulmonary edema and small amount of pleural fluid on right. Positive fluid balance.    - Recommend continuing to diurese, and weaning sedation when primary team feels they have achieved adequate diuresis and hemodynamic stability.    -Daily spontaneous breathing trials.    - will continue to follow.         Sarcoidosis of lung    Sarcoid appears to have minimal contribution to current condition.  It seems well controlled and responsive to steroids at this time.    - we do not feel that the patient is in need of lung transplantation at this time.  - continue steroids as prescribed and taper once condition improves.               Dontrell Zeng MD  Pulmonology  [facility]

## 2018-07-12 NOTE — PROGRESS NOTES
[facility]  Heart Transplant  Progress Note    Patient Name: Yonathan Good Jr.  MRN: [id number]  Admission Date: 7/7/2018  Hospital Length of Stay: 5 days  Attending Physician: Mohan Cross MD  Primary Care Provider: Edgar Gates MD  Principal Problem:Ventricular tachycardia, incessant    Subjective:     Interval History: No more VT overnight. Hemodynamically stable with IABP 1:1, amio 0.5, levo 0.04, lidocaine 2. Net neg 1 L over last 24 hours.     Continuous Infusions:   amiodarone in dextrose 5% 0.5 mg/min (07/12/18 1100)    fentanyl 7.5 mL/hr at 07/12/18 1100    insulin (HUMAN R) infusion (adults) 1 Units/hr (07/12/18 1000)    lidocaine 2 mg/min (07/12/18 1100)    norepinephrine bitartrate-D5W 0.04 mcg/kg/min (07/12/18 1100)    propofol 35 mcg/kg/min (07/12/18 1000)     Scheduled Meds:   albuterol sulfate  2.5 mg Nebulization Q4H    amiodarone  150 mg Intravenous Once    aspirin  81 mg Oral Daily    atorvastatin  40 mg Oral Daily    chlorhexidine  15 mL Mouth/Throat BID    chlorhexidine  15 mL Mouth/Throat BID    fluticasone-vilanterol  1 puff Inhalation Daily    gabapentin  600 mg Oral BID    hydrocortisone sodium succinate  100 mg Intravenous Q8H    levetiracetam IVPB  500 mg Intravenous Q12H    lidocaine HCL 10 mg/ml (1%)  1 mL Other Once    magnesium sulfate IVPB  2 g Intravenous Once    metoprolol  2.5 mg Intravenous Q4H    pantoprozole (PROTONIX) 40 mg/100 mL D5W IVPB  40 mg Intravenous BID    sodium chloride 0.9%  3 mL Intravenous Q8H     PRN Meds:sodium chloride, ALPRAZolam, benzonatate, calcium gluconate IVPB, calcium gluconate IVPB, calcium gluconate IVPB, carisoprodol, dextrose 50%, dextrose 50%, HYDROcodone-acetaminophen, magnesium sulfate IVPB, magnesium sulfate IVPB, nitroGLYCERIN, potassium chloride in water **AND** potassium chloride in water **AND** potassium chloride in water, sodium phosphate IVPB, sodium phosphate IVPB, sodium phosphate IVPB    Review  of patient's allergies indicates:  No Known Allergies  Objective:     Vital Signs (Most Recent):  Temp: 97.6 °F (36.4 °C) (07/12/18 0715)  Pulse: 80 (07/12/18 1045)  Resp: 16 (07/12/18 1045)  BP: 132/73 (07/12/18 1045)  SpO2: 96 % (07/12/18 1045) Vital Signs (24h Range):  Temp:  [97.4 °F (36.3 °C)-97.6 °F (36.4 °C)] 97.6 °F (36.4 °C)  Pulse:  [80] 80  Resp:  [15-21] 16  SpO2:  [95 %-100 %] 96 %  BP: (102-163)/(53-83) 132/73     Patient Vitals for the past 72 hrs (Last 3 readings):   Weight   07/12/18 0400 109.8 kg (242 lb 1 oz)     Body mass index is 33.76 kg/m².      Intake/Output Summary (Last 24 hours) at 07/12/18 1100  Last data filed at 07/12/18 1000   Gross per 24 hour   Intake          2284.65 ml   Output             2568 ml   Net          -283.35 ml       Hemodynamic Parameters:       Telemetry: reviewed. Paced. No NSVT/VT    Physical Exam   Constitutional: He is oriented to person, place, and time. He appears well-developed and well-nourished. He is sedated and intubated.   HENT:   Head: Normocephalic and atraumatic.   Eyes: EOM are normal. Pupils are equal, round, and reactive to light.   Neck: Normal range of motion. Neck supple. No JVD (difficult to assess (patient on ventilator & flat in bed)) present.   Right CVC in place   Cardiovascular: Normal rate and regular rhythm.    IABP 1:1. IABP site clean/dry/intact. Feet slightly cool to touch, otherwise warm and well perfused.    Pulmonary/Chest: Effort normal. He is intubated. No respiratory distress.   Coarse breath sounds. Ventilated   Abdominal: Soft. He exhibits no distension. Bowel sounds are decreased. There is no tenderness.   Musculoskeletal: Normal range of motion. He exhibits no edema.   Neurological: He is alert and oriented to person, place, and time.   Skin: Skin is warm and dry.   Nursing note and vitals reviewed.      Significant Labs:  CBC:    Recent Labs  Lab 07/10/18  1628 07/11/18  0405 07/12/18  0400   WBC 28.24* 11.32 14.12*   RBC  "3.17* 2.96* 2.83*   HGB 9.6* 8.9* 8.5*   HCT 30.5* 27.3* 25.9*    194 186   MCV 96 92 92   MCH 30.3 30.1 30.0   MCHC 31.5* 32.6 32.8     BNP:    Recent Labs  Lab 07/09/18  0957   *     CMP:    Recent Labs  Lab 07/11/18  2355 07/12/18  0400 07/12/18  0807   * 147* 141*   CALCIUM 8.7 8.7 8.6*   ALBUMIN 2.7* 2.8* 2.7*   PROT 5.6* 5.5* 5.4*   * 133* 131*   K 4.4 4.5 4.6   CO2 27 27 24   CL 97 97 97   BUN 21* 23* 25*   CREATININE 1.3 1.6* 1.7*   ALKPHOS 67 64 62   ALT 35 33 32   AST 28 25 24   BILITOT 0.4 0.4 0.4      Coagulation:     Recent Labs  Lab 07/10/18  1628 07/11/18  0405 07/12/18  0400   INR 2.6* 2.8* 3.6*     LDH:  No results for input(s): LDH in the last 72 hours.  Microbiology:  Microbiology Results (last 7 days)     ** No results found for the last 168 hours. **          I have reviewed all pertinent labs within the past 24 hours.    Estimated Creatinine Clearance: 61.9 mL/min (A) (based on SCr of 1.7 mg/dL (H)).    Diagnostic Results:  I have reviewed and interpreted all pertinent imaging results/findings within the past 24 hours.    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      He was initially admitted to "stroke/neuro" service on 6/22 with dysarthria and stroke-like symptoms. Extensive work up was negative for recurrent CVA and so no tPA was administered. Patient developed atypical chest pain two days into his hospital course and was noted to be in monomorphic VT (6/24) which was treated with ATP then with shock due to recurrent VT in VF zone. Given his active cardiac issues this prompted transfer to heart failure service where the patient was initiated on IV amiodarone and beta blocker with subsequent resolution of his VT. He was eventually transitioned to PO " amiodarone 400 mg BID x 2 weeks (from 6/25-7/9) followed by amiodarone 400 mg daily thereafter per EP recommendations. Of note patient underwent LHC on 6/25 given his extensive ischemic history which did not reveal a culprit lesion, therefore no interventions were done. He was noted to have an elevated LVEDP to 45 however and was administered IV diuresis before being transitioned back to his PO diuretic regimen. Patient also completed heart failure pathway during his hospital course (eg completed colonoscopy on 7/2) as part of his work up for advanced options. Follows with Dr. Berman of heart failure/transplant as an outpatient.      The patient was discharged home in stable condition on 7/4/2018. He was dc'ed on warf/lovenox bridge given CHADS2-VaSc of 5.  Of note, he is no longer a candidate for AO.  He presented yesterday to [facility] with BRBPR and was found to be in acute renal failure as well.  He notes that, on the night of the 5th and morning of 6th, he had 6 bright red bloody bm's.  He went to his PMD who noted he might have internal hemmorhoids.  He was told to stop his lovenox.  He then returned home and flet very lightheaded and dizzy.  He wisely took his blood pressure and noted it was 70-80/50s whereas it is normally 117/70s.  He went in to ED immediately.  There he was noted to have the following pertinent lab values:  Hg 9.2 (11.7 prior)  INR 2.9  Na 131 (139 prior)  K 6.18  BUN 43 (18 prior)  Cr 2.88 (1.1-1.4 at baseline)  proBNP 2755  He was transferred to [facility] for further evaluation and care.  Of note, he underwent screening c-scope on 7/2 during which the following was noted and done: Diverticulosis in the sigmoid colon and in the descending colon, Two 8 to 10 mm polyps in the sigmoid colon and in the descending colon, removed with a hot snare, resected and retrieved, ligated.        * VT Storm    -VT storm 7/10/18. Polymorphic and monomorphic. Degenerated into VF. 12 ICD  shocks  -Intubated, IABP placed emergently at bedside.   -Amio loaded x 2, gtt started per protocol. Continue 0.5 mg/min today.  -EP consulted, appreciate their assistance. Pacing rate increased to 80, metoprolol IV added   -Lidocaine gtt started at 1, increased to 2 when patient had more VT-->VF and AICD shocks. Will decrease to 1 mg/min. Await EP recs.   -Continue support with IABP 1:1  -K goal >4.5, Mg >2.5        Hypotension    -Levophed started during code 7/10/18  -Wean for MAP >65 to d/c (using IABP for MAP). Should be able to d/c today.         Cardiogenic shock    -IABP placed emergently 7/10/18  -Augmenting well at 1:1. SVO2 65%  -CVP 9 this am. Hold lasix, repeat labs this afternoon. Re-dose as able with creatinine.        Encounter for management of intra-aortic balloon pump    -IABP placed 7/10/2018        Respiratory failure requiring intubation    -Emergently intubated 7/10/18 for impending respiratory failure/need for IABP and inability to lay fat  -History of pulmonary sarcoidosis  -Methylpred given 7/10/18, hydrocortisone 100 mg q8 started 7/11/18  -Pulmonary consult for assistance with sarcoid & vent management. Appreciate their assistance.   -CXR daily        Ventricular fibrillation    -See VT        Acute renal failure    - Creatinine peaked at 2.5 after VT storm. Trended down to 1.4, slightly up today at 1.6 after diuresis.   -Avoid nephrotoxic agents        Chronic systolic CHF (congestive heart failure)    - See cardiogenic shock        GI bleed    - INR supratherapeutic  - Coumadin on hold   - Protonix increased to 40 mg BID (changed to IV)  -Had screening colonoscopy with hot snare polypectomy during last admission; most likely culprit post polypectomy bleeding  - H & H stable, normal BMs since admission        History of atrial fibrillation    -  continue amio         Seizure disorder    - Continue keppra (changed to IV)        CAD (coronary artery disease)    - Continue atorvastatin,  nitro prn  - Hold lopressor for now  - ASA on hold since admit, presumably for GIB concern. Will discuss resumption        Sarcoidosis of lung    - Continue prednisone, breo-ellipta, and albuterol prn          Start trickle feeds today  PT    Uninterrupted Critical Care/Counseling Time (not including procedures): 50 minutes    Catalina Paredes PA-C  Heart Transplant  [facility]

## 2018-07-12 NOTE — ASSESSMENT & PLAN NOTE
Sarcoid appears to have minimal contribution to current condition.  It seems well controlled and responsive to steroids at this time.    - we do not feel that the patient is in need of lung transplantation at this time.  - continue steroids as prescribed and taper once condition improves.

## 2018-07-12 NOTE — PLAN OF CARE
Problem: Patient Care Overview  Goal: Plan of Care Review  Outcome: Ongoing (interventions implemented as appropriate)  Pt remains intubated on assist control 30% and 5 PEEP. Pt awakens to voice and follows commands. IABP in place 1:1 EKG trigger. Levo gtt infusing to maintain MAP >65. Lidocaine decreased from 2 to 1 mg/min and amiodarone at 0.5 mg/min. Fentanyl and propofol gtts titrated to maintain RASS goal. CVP 11-12 flat. One-time dose of lasix administered. Irvin with urine output  cc/hr. Tube feedings started at 10 cc/hr; plan to advance as tolerated per orders. Daughter at bedside and updated on plan of care.

## 2018-07-12 NOTE — PROGRESS NOTES
[facility]  Cardiac Electrophysiology  Progress Note    Admission Date: 7/7/2018  Code Status: Full Code   Attending Physician: Mohan Cross MD   Expected Discharge Date: 7/13/2018  Principal Problem:Ventricular tachycardia, incessant    Subjective:     Interval History: Patient currently sedated. No events overnight according to RN. Bi-V paced at 80, no events, ectopy or arrhythmias on telemetry for nearly 48 hours.    Review of Systems   Unable to perform ROS: intubated     Objective:     Vital Signs (Most Recent):  Temp: 97.6 °F (36.4 °C) (07/12/18 0715)  Pulse: 80 (07/12/18 0845)  Resp: 16 (07/12/18 0845)  BP: (!) 103/59 (07/12/18 0845)  SpO2: 99 % (07/12/18 0845) Vital Signs (24h Range):  Temp:  [97.4 °F (36.3 °C)-97.6 °F (36.4 °C)] 97.6 °F (36.4 °C)  Pulse:  [80] 80  Resp:  [14-21] 16  SpO2:  [95 %-100 %] 99 %  BP: (102-163)/(52-83) 103/59     Weight: 109.8 kg (242 lb 1 oz)  Body mass index is 33.76 kg/m².     SpO2: 99 %  O2 Device (Oxygen Therapy): ventilator    Physical Exam   Constitutional: He is oriented to person, place, and time. He appears well-developed and well-nourished.   Sedated, intubated   HENT:   Head: Normocephalic and atraumatic.   Eyes: Pupils are equal, round, and reactive to light.   Neck: Normal range of motion. No JVD present.   Cardiovascular: Intact distal pulses.    Audible balloon pump. Left groin balloon pump inserted.   Pulmonary/Chest: Effort normal and breath sounds normal. No respiratory distress. He has no wheezes. He has no rales.   Abdominal: Soft. Bowel sounds are normal. He exhibits no distension. There is no tenderness.   Musculoskeletal: Normal range of motion. He exhibits edema (1+ bilateral lower extremity edema to the knees).   Neurological: He is alert and oriented to person, place, and time.   Skin: Skin is dry. No rash noted.   Mottled appearance   Psychiatric: He has a normal mood and affect. His behavior is normal.       Significant Labs:      Recent Results (from the past 24 hour(s))   POCT glucose    Collection Time: 07/11/18  9:10 AM   Result Value Ref Range    POCT Glucose 169 (H) 70 - 110 mg/dL   POCT glucose    Collection Time: 07/11/18 10:19 AM   Result Value Ref Range    POCT Glucose 160 (H) 70 - 110 mg/dL   POCT glucose    Collection Time: 07/11/18 11:05 AM   Result Value Ref Range    POCT Glucose 173 (H) 70 - 110 mg/dL   ISTAT PROCEDURE    Collection Time: 07/11/18 11:14 AM   Result Value Ref Range    POC PH 7.366 7.35 - 7.45    POC PCO2 51.7 (H) 35 - 45 mmHg    POC PO2 35 (LL) 40 - 60 mmHg    POC HCO3 29.6 (H) 24 - 28 mmol/L    POC BE 4 -2 to 2 mmol/L    POC SATURATED O2 63 (L) 95 - 100 %    POC TCO2 31 (H) 24 - 29 mmol/L    Rate 14     Sample VENOUS     Site Other     Allens Test N/A     DelSys Adult Vent     Mode AC/PRVC     Vt 500     PEEP 5     PiP 33     FiO2 40     Min Vol 7.6     Sp02 100    POCT glucose    Collection Time: 07/11/18 11:56 AM   Result Value Ref Range    POCT Glucose 164 (H) 70 - 110 mg/dL   Lactic acid, plasma    Collection Time: 07/11/18 12:01 PM   Result Value Ref Range    Lactate (Lactic Acid) 1.5 0.5 - 2.2 mmol/L   Comprehensive metabolic panel - if not done in ED    Collection Time: 07/11/18 12:01 PM   Result Value Ref Range    Sodium 133 (L) 136 - 145 mmol/L    Potassium 4.5 3.5 - 5.1 mmol/L    Chloride 98 95 - 110 mmol/L    CO2 27 23 - 29 mmol/L    Glucose 165 (H) 70 - 110 mg/dL    BUN, Bld 19 6 - 20 mg/dL    Creatinine 1.2 0.5 - 1.4 mg/dL    Calcium 8.9 8.7 - 10.5 mg/dL    Total Protein 5.7 (L) 6.0 - 8.4 g/dL    Albumin 2.9 (L) 3.5 - 5.2 g/dL    Total Bilirubin 0.4 0.1 - 1.0 mg/dL    Alkaline Phosphatase 68 55 - 135 U/L    AST 35 10 - 40 U/L    ALT 40 10 - 44 U/L    Anion Gap 8 8 - 16 mmol/L    eGFR if African American >60.0 >60 mL/min/1.73 m^2    eGFR if non African American >60.0 >60 mL/min/1.73 m^2   POCT glucose    Collection Time: 07/11/18  1:08 PM   Result Value Ref Range    POCT Glucose 151 (H) 70 - 110  mg/dL   POCT glucose    Collection Time: 07/11/18  2:03 PM   Result Value Ref Range    POCT Glucose 157 (H) 70 - 110 mg/dL   POCT glucose    Collection Time: 07/11/18  3:01 PM   Result Value Ref Range    POCT Glucose 165 (H) 70 - 110 mg/dL   ISTAT PROCEDURE    Collection Time: 07/11/18  3:54 PM   Result Value Ref Range    POC PH 7.418 7.35 - 7.45    POC PCO2 49.8 (H) 35 - 45 mmHg    POC PO2 29 (LL) 40 - 60 mmHg    POC HCO3 32.1 (H) 24 - 28 mmol/L    POC BE 8 -2 to 2 mmol/L    POC SATURATED O2 54 (L) 95 - 100 %    POC TCO2 34 (H) 24 - 29 mmol/L    Rate 16     Sample VENOUS     Site Other     Allens Test N/A     DelSys Adult Vent     Mode AC/PRVC     Vt 450     PEEP 5     PiP 32     FiO2 40     Min Vol 7.40     Sp02 100    POCT glucose    Collection Time: 07/11/18  3:57 PM   Result Value Ref Range    POCT Glucose 174 (H) 70 - 110 mg/dL   Lactic acid, plasma    Collection Time: 07/11/18  3:58 PM   Result Value Ref Range    Lactate (Lactic Acid) 1.8 0.5 - 2.2 mmol/L   Comprehensive metabolic panel - if not done in ED    Collection Time: 07/11/18  3:58 PM   Result Value Ref Range    Sodium 134 (L) 136 - 145 mmol/L    Potassium 4.5 3.5 - 5.1 mmol/L    Chloride 98 95 - 110 mmol/L    CO2 26 23 - 29 mmol/L    Glucose 155 (H) 70 - 110 mg/dL    BUN, Bld 18 6 - 20 mg/dL    Creatinine 1.3 0.5 - 1.4 mg/dL    Calcium 9.1 8.7 - 10.5 mg/dL    Total Protein 5.5 (L) 6.0 - 8.4 g/dL    Albumin 2.8 (L) 3.5 - 5.2 g/dL    Total Bilirubin 0.4 0.1 - 1.0 mg/dL    Alkaline Phosphatase 69 55 - 135 U/L    AST 32 10 - 40 U/L    ALT 37 10 - 44 U/L    Anion Gap 10 8 - 16 mmol/L    eGFR if African American >60.0 >60 mL/min/1.73 m^2    eGFR if non African American >60.0 >60 mL/min/1.73 m^2   ISTAT PROCEDURE    Collection Time: 07/11/18  4:57 PM   Result Value Ref Range    POC PH 7.390 7.35 - 7.45    POC PCO2 49.4 (H) 35 - 45 mmHg    POC PO2 32 (LL) 40 - 60 mmHg    POC HCO3 29.9 (H) 24 - 28 mmol/L    POC BE 5 -2 to 2 mmol/L    POC SATURATED O2 60 (L)  95 - 100 %    POC TCO2 31 (H) 24 - 29 mmol/L    Rate 16     Sample VENOUS     Site Other     Allens Test N/A     DelSys Adult Vent     Mode AC/PRVC     Vt 450     PEEP 5     PiP 32     FiO2 40     Min Vol 7.40     Sp02 100    POCT glucose    Collection Time: 07/11/18  4:58 PM   Result Value Ref Range    POCT Glucose 179 (H) 70 - 110 mg/dL   POCT glucose    Collection Time: 07/11/18  6:04 PM   Result Value Ref Range    POCT Glucose 159 (H) 70 - 110 mg/dL   POCT glucose    Collection Time: 07/11/18  7:16 PM   Result Value Ref Range    POCT Glucose 153 (H) 70 - 110 mg/dL   Comprehensive metabolic panel - if not done in ED    Collection Time: 07/11/18  8:00 PM   Result Value Ref Range    Sodium 134 (L) 136 - 145 mmol/L    Potassium 4.4 3.5 - 5.1 mmol/L    Chloride 98 95 - 110 mmol/L    CO2 26 23 - 29 mmol/L    Glucose 158 (H) 70 - 110 mg/dL    BUN, Bld 19 6 - 20 mg/dL    Creatinine 1.4 0.5 - 1.4 mg/dL    Calcium 8.8 8.7 - 10.5 mg/dL    Total Protein 5.5 (L) 6.0 - 8.4 g/dL    Albumin 2.8 (L) 3.5 - 5.2 g/dL    Total Bilirubin 0.4 0.1 - 1.0 mg/dL    Alkaline Phosphatase 67 55 - 135 U/L    AST 30 10 - 40 U/L    ALT 35 10 - 44 U/L    Anion Gap 10 8 - 16 mmol/L    eGFR if African American >60.0 >60 mL/min/1.73 m^2    eGFR if non  56.2 (A) >60 mL/min/1.73 m^2   POCT glucose    Collection Time: 07/11/18  8:10 PM   Result Value Ref Range    POCT Glucose 189 (H) 70 - 110 mg/dL   ISTAT PROCEDURE    Collection Time: 07/11/18  8:14 PM   Result Value Ref Range    POC PH 7.397 7.35 - 7.45    POC PCO2 48.6 (H) 35 - 45 mmHg    POC PO2 31 (LL) 40 - 60 mmHg    POC HCO3 29.9 (H) 24 - 28 mmol/L    POC BE 5 -2 to 2 mmol/L    POC SATURATED O2 59 (L) 95 - 100 %    POC TCO2 31 (H) 24 - 29 mmol/L    Sample VENOUS     Site Other     Allens Test N/A    POCT glucose    Collection Time: 07/11/18  9:01 PM   Result Value Ref Range    POCT Glucose 142 (H) 70 - 110 mg/dL   POCT glucose    Collection Time: 07/11/18 10:06 PM   Result Value  Ref Range    POCT Glucose 149 (H) 70 - 110 mg/dL   POCT glucose    Collection Time: 07/11/18 11:01 PM   Result Value Ref Range    POCT Glucose 151 (H) 70 - 110 mg/dL   Comprehensive metabolic panel - if not done in ED    Collection Time: 07/11/18 11:55 PM   Result Value Ref Range    Sodium 133 (L) 136 - 145 mmol/L    Potassium 4.4 3.5 - 5.1 mmol/L    Chloride 97 95 - 110 mmol/L    CO2 27 23 - 29 mmol/L    Glucose 163 (H) 70 - 110 mg/dL    BUN, Bld 21 (H) 6 - 20 mg/dL    Creatinine 1.3 0.5 - 1.4 mg/dL    Calcium 8.7 8.7 - 10.5 mg/dL    Total Protein 5.6 (L) 6.0 - 8.4 g/dL    Albumin 2.7 (L) 3.5 - 5.2 g/dL    Total Bilirubin 0.4 0.1 - 1.0 mg/dL    Alkaline Phosphatase 67 55 - 135 U/L    AST 28 10 - 40 U/L    ALT 35 10 - 44 U/L    Anion Gap 9 8 - 16 mmol/L    eGFR if African American >60.0 >60 mL/min/1.73 m^2    eGFR if non African American >60.0 >60 mL/min/1.73 m^2   POCT glucose    Collection Time: 07/11/18 11:58 PM   Result Value Ref Range    POCT Glucose 167 (H) 70 - 110 mg/dL   ISTAT PROCEDURE    Collection Time: 07/12/18 12:01 AM   Result Value Ref Range    POC PH 7.393 7.35 - 7.45    POC PCO2 47.5 (H) 35 - 45 mmHg    POC PO2 31 (LL) 40 - 60 mmHg    POC HCO3 29.0 (H) 24 - 28 mmol/L    POC BE 4 -2 to 2 mmol/L    POC SATURATED O2 58 (L) 95 - 100 %    POC TCO2 30 (H) 24 - 29 mmol/L    Sample VENOUS     Site Other     Allens Test N/A    POCT glucose    Collection Time: 07/12/18  1:08 AM   Result Value Ref Range    POCT Glucose 161 (H) 70 - 110 mg/dL   POCT glucose    Collection Time: 07/12/18  1:57 AM   Result Value Ref Range    POCT Glucose 164 (H) 70 - 110 mg/dL   POCT glucose    Collection Time: 07/12/18  3:09 AM   Result Value Ref Range    POCT Glucose 160 (H) 70 - 110 mg/dL   CBC auto differential    Collection Time: 07/12/18  4:00 AM   Result Value Ref Range    WBC 14.12 (H) 3.90 - 12.70 K/uL    RBC 2.83 (L) 4.60 - 6.20 M/uL    Hemoglobin 8.5 (L) 14.0 - 18.0 g/dL    Hematocrit 25.9 (L) 40.0 - 54.0 %    MCV 92  82 - 98 fL    MCH 30.0 27.0 - 31.0 pg    MCHC 32.8 32.0 - 36.0 g/dL    RDW 14.2 11.5 - 14.5 %    Platelets 186 150 - 350 K/uL    MPV 11.3 9.2 - 12.9 fL    Immature Granulocytes 1.3 (H) 0.0 - 0.5 %    Gran # (ANC) 12.4 (H) 1.8 - 7.7 K/uL    Immature Grans (Abs) 0.19 (H) 0.00 - 0.04 K/uL    Lymph # 0.5 (L) 1.0 - 4.8 K/uL    Mono # 1.0 0.3 - 1.0 K/uL    Eos # 0.0 0.0 - 0.5 K/uL    Baso # 0.02 0.00 - 0.20 K/uL    nRBC 0 0 /100 WBC    Gran% 88.2 (H) 38.0 - 73.0 %    Lymph% 3.3 (L) 18.0 - 48.0 %    Mono% 7.1 4.0 - 15.0 %    Eosinophil% 0.0 0.0 - 8.0 %    Basophil% 0.1 0.0 - 1.9 %    Differential Method Automated    Protime-INR    Collection Time: 07/12/18  4:00 AM   Result Value Ref Range    Prothrombin Time 35.0 (H) 9.0 - 12.5 sec    INR 3.6 (H) 0.8 - 1.2   Magnesium - if not done in ED    Collection Time: 07/12/18  4:00 AM   Result Value Ref Range    Magnesium 2.1 1.6 - 2.6 mg/dL   Comprehensive metabolic panel - if not done in ED    Collection Time: 07/12/18  4:00 AM   Result Value Ref Range    Sodium 133 (L) 136 - 145 mmol/L    Potassium 4.5 3.5 - 5.1 mmol/L    Chloride 97 95 - 110 mmol/L    CO2 27 23 - 29 mmol/L    Glucose 147 (H) 70 - 110 mg/dL    BUN, Bld 23 (H) 6 - 20 mg/dL    Creatinine 1.6 (H) 0.5 - 1.4 mg/dL    Calcium 8.7 8.7 - 10.5 mg/dL    Total Protein 5.5 (L) 6.0 - 8.4 g/dL    Albumin 2.8 (L) 3.5 - 5.2 g/dL    Total Bilirubin 0.4 0.1 - 1.0 mg/dL    Alkaline Phosphatase 64 55 - 135 U/L    AST 25 10 - 40 U/L    ALT 33 10 - 44 U/L    Anion Gap 9 8 - 16 mmol/L    eGFR if African American 55.2 (A) >60 mL/min/1.73 m^2    eGFR if non  47.8 (A) >60 mL/min/1.73 m^2   Lactic acid, plasma    Collection Time: 07/12/18  4:00 AM   Result Value Ref Range    Lactate (Lactic Acid) 1.8 0.5 - 2.2 mmol/L   Phosphorus    Collection Time: 07/12/18  4:00 AM   Result Value Ref Range    Phosphorus 5.6 (H) 2.7 - 4.5 mg/dL   POCT glucose    Collection Time: 07/12/18  4:04 AM   Result Value Ref Range    POCT Glucose  171 (H) 70 - 110 mg/dL   ISTAT PROCEDURE    Collection Time: 07/12/18  4:09 AM   Result Value Ref Range    POC PH 7.395 7.35 - 7.45    POC PCO2 48.1 (H) 35 - 45 mmHg    POC PO2 34 (LL) 40 - 60 mmHg    POC HCO3 29.5 (H) 24 - 28 mmol/L    POC BE 5 -2 to 2 mmol/L    POC SATURATED O2 65 (L) 95 - 100 %    POC TCO2 31 (H) 24 - 29 mmol/L    Sample VENOUS     Site Other     Allens Test N/A    POCT glucose    Collection Time: 07/12/18  5:01 AM   Result Value Ref Range    POCT Glucose 152 (H) 70 - 110 mg/dL   POCT glucose    Collection Time: 07/12/18  5:55 AM   Result Value Ref Range    POCT Glucose 166 (H) 70 - 110 mg/dL   POCT glucose    Collection Time: 07/12/18  7:07 AM   Result Value Ref Range    POCT Glucose 137 (H) 70 - 110 mg/dL   ISTAT PROCEDURE    Collection Time: 07/12/18  7:17 AM   Result Value Ref Range    POC PH 7.404 7.35 - 7.45    POC PCO2 45.7 (H) 35 - 45 mmHg    POC PO2 33 (LL) 40 - 60 mmHg    POC HCO3 28.6 (H) 24 - 28 mmol/L    POC BE 4 -2 to 2 mmol/L    POC SATURATED O2 63 (L) 95 - 100 %    POC TCO2 30 (H) 24 - 29 mmol/L    Rate 16     Sample VENOUS     Site Other     Allens Test N/A     DelSys Adult Vent     Mode AC/PRVC     Vt 450     PEEP 5     PiP 30     FiO2 40     Min Vol 7.6     Sp02 100    POCT glucose    Collection Time: 07/12/18  8:01 AM   Result Value Ref Range    POCT Glucose 151 (H) 70 - 110 mg/dL   Comprehensive metabolic panel - if not done in ED    Collection Time: 07/12/18  8:07 AM   Result Value Ref Range    Sodium 131 (L) 136 - 145 mmol/L    Potassium 4.6 3.5 - 5.1 mmol/L    Chloride 97 95 - 110 mmol/L    CO2 24 23 - 29 mmol/L    Glucose 141 (H) 70 - 110 mg/dL    BUN, Bld 25 (H) 6 - 20 mg/dL    Creatinine 1.7 (H) 0.5 - 1.4 mg/dL    Calcium 8.6 (L) 8.7 - 10.5 mg/dL    Total Protein 5.4 (L) 6.0 - 8.4 g/dL    Albumin 2.7 (L) 3.5 - 5.2 g/dL    Total Bilirubin 0.4 0.1 - 1.0 mg/dL    Alkaline Phosphatase 62 55 - 135 U/L    AST 24 10 - 40 U/L    ALT 32 10 - 44 U/L    Anion Gap 10 8 - 16 mmol/L     eGFR if  51.3 (A) >60 mL/min/1.73 m^2    eGFR if non African American 44.4 (A) >60 mL/min/1.73 m^2         Assessment and Plan:     Ventricular fibrillation    VT storm with multiple events of both sustained and non-sustained VT, as well as MMVT and PMVT  BI-V ICD interrogation revealed 15 episodes of VT treated with ATP x 15 and shocks x 12  Patient's native rate following stabilization was bradycardic in the 50's above his base rate set by his ICD; rate increased to 80, no changes made to therapy zones  Patient was discharged on amiodarone 400 mg BID from a recent admission, currently receiving amiodarone and lidocaine infusions  No events on telemetry for nearly 48 hours, CRT-D functioning well  Patient may benefit from toprol 150 mg, mexetil 200 mg BID and amiodarone 200 mg TID x 1 week, 200 mg BID x 4 weeks and then 300 mg daily  EP will sign off, please call with any questions or concerns            Rafa Petit MD  Cardiac Electrophysiology  [facility]

## 2018-07-12 NOTE — SUBJECTIVE & OBJECTIVE
Interval History: Patient currently sedated. No events overnight according to RN. Bi-V paced at 80, no events, ectopy or arrhythmias on telemetry for nearly 48 hours.    Review of Systems   Unable to perform ROS: intubated     Objective:     Vital Signs (Most Recent):  Temp: 97.6 °F (36.4 °C) (07/12/18 0715)  Pulse: 80 (07/12/18 0845)  Resp: 16 (07/12/18 0845)  BP: (!) 103/59 (07/12/18 0845)  SpO2: 99 % (07/12/18 0845) Vital Signs (24h Range):  Temp:  [97.4 °F (36.3 °C)-97.6 °F (36.4 °C)] 97.6 °F (36.4 °C)  Pulse:  [80] 80  Resp:  [14-21] 16  SpO2:  [95 %-100 %] 99 %  BP: (102-163)/(52-83) 103/59     Weight: 109.8 kg (242 lb 1 oz)  Body mass index is 33.76 kg/m².     SpO2: 99 %  O2 Device (Oxygen Therapy): ventilator    Physical Exam   Constitutional: He is oriented to person, place, and time. He appears well-developed and well-nourished.   Sedated, intubated   HENT:   Head: Normocephalic and atraumatic.   Eyes: Pupils are equal, round, and reactive to light.   Neck: Normal range of motion. No JVD present.   Cardiovascular: Intact distal pulses.    Audible balloon pump. Left groin balloon pump inserted.   Pulmonary/Chest: Effort normal and breath sounds normal. No respiratory distress. He has no wheezes. He has no rales.   Abdominal: Soft. Bowel sounds are normal. He exhibits no distension. There is no tenderness.   Musculoskeletal: Normal range of motion. He exhibits edema (1+ bilateral lower extremity edema to the knees).   Neurological: He is alert and oriented to person, place, and time.   Skin: Skin is dry. No rash noted.   Mottled appearance   Psychiatric: He has a normal mood and affect. His behavior is normal.       Significant Labs:     Recent Results (from the past 24 hour(s))   POCT glucose    Collection Time: 07/11/18  9:10 AM   Result Value Ref Range    POCT Glucose 169 (H) 70 - 110 mg/dL   POCT glucose    Collection Time: 07/11/18 10:19 AM   Result Value Ref Range    POCT Glucose 160 (H) 70 - 110 mg/dL    POCT glucose    Collection Time: 07/11/18 11:05 AM   Result Value Ref Range    POCT Glucose 173 (H) 70 - 110 mg/dL   ISTAT PROCEDURE    Collection Time: 07/11/18 11:14 AM   Result Value Ref Range    POC PH 7.366 7.35 - 7.45    POC PCO2 51.7 (H) 35 - 45 mmHg    POC PO2 35 (LL) 40 - 60 mmHg    POC HCO3 29.6 (H) 24 - 28 mmol/L    POC BE 4 -2 to 2 mmol/L    POC SATURATED O2 63 (L) 95 - 100 %    POC TCO2 31 (H) 24 - 29 mmol/L    Rate 14     Sample VENOUS     Site Other     Allens Test N/A     DelSys Adult Vent     Mode AC/PRVC     Vt 500     PEEP 5     PiP 33     FiO2 40     Min Vol 7.6     Sp02 100    POCT glucose    Collection Time: 07/11/18 11:56 AM   Result Value Ref Range    POCT Glucose 164 (H) 70 - 110 mg/dL   Lactic acid, plasma    Collection Time: 07/11/18 12:01 PM   Result Value Ref Range    Lactate (Lactic Acid) 1.5 0.5 - 2.2 mmol/L   Comprehensive metabolic panel - if not done in ED    Collection Time: 07/11/18 12:01 PM   Result Value Ref Range    Sodium 133 (L) 136 - 145 mmol/L    Potassium 4.5 3.5 - 5.1 mmol/L    Chloride 98 95 - 110 mmol/L    CO2 27 23 - 29 mmol/L    Glucose 165 (H) 70 - 110 mg/dL    BUN, Bld 19 6 - 20 mg/dL    Creatinine 1.2 0.5 - 1.4 mg/dL    Calcium 8.9 8.7 - 10.5 mg/dL    Total Protein 5.7 (L) 6.0 - 8.4 g/dL    Albumin 2.9 (L) 3.5 - 5.2 g/dL    Total Bilirubin 0.4 0.1 - 1.0 mg/dL    Alkaline Phosphatase 68 55 - 135 U/L    AST 35 10 - 40 U/L    ALT 40 10 - 44 U/L    Anion Gap 8 8 - 16 mmol/L    eGFR if African American >60.0 >60 mL/min/1.73 m^2    eGFR if non African American >60.0 >60 mL/min/1.73 m^2   POCT glucose    Collection Time: 07/11/18  1:08 PM   Result Value Ref Range    POCT Glucose 151 (H) 70 - 110 mg/dL   POCT glucose    Collection Time: 07/11/18  2:03 PM   Result Value Ref Range    POCT Glucose 157 (H) 70 - 110 mg/dL   POCT glucose    Collection Time: 07/11/18  3:01 PM   Result Value Ref Range    POCT Glucose 165 (H) 70 - 110 mg/dL   ISTAT PROCEDURE    Collection Time:  07/11/18  3:54 PM   Result Value Ref Range    POC PH 7.418 7.35 - 7.45    POC PCO2 49.8 (H) 35 - 45 mmHg    POC PO2 29 (LL) 40 - 60 mmHg    POC HCO3 32.1 (H) 24 - 28 mmol/L    POC BE 8 -2 to 2 mmol/L    POC SATURATED O2 54 (L) 95 - 100 %    POC TCO2 34 (H) 24 - 29 mmol/L    Rate 16     Sample VENOUS     Site Other     Allens Test N/A     DelSys Adult Vent     Mode AC/PRVC     Vt 450     PEEP 5     PiP 32     FiO2 40     Min Vol 7.40     Sp02 100    POCT glucose    Collection Time: 07/11/18  3:57 PM   Result Value Ref Range    POCT Glucose 174 (H) 70 - 110 mg/dL   Lactic acid, plasma    Collection Time: 07/11/18  3:58 PM   Result Value Ref Range    Lactate (Lactic Acid) 1.8 0.5 - 2.2 mmol/L   Comprehensive metabolic panel - if not done in ED    Collection Time: 07/11/18  3:58 PM   Result Value Ref Range    Sodium 134 (L) 136 - 145 mmol/L    Potassium 4.5 3.5 - 5.1 mmol/L    Chloride 98 95 - 110 mmol/L    CO2 26 23 - 29 mmol/L    Glucose 155 (H) 70 - 110 mg/dL    BUN, Bld 18 6 - 20 mg/dL    Creatinine 1.3 0.5 - 1.4 mg/dL    Calcium 9.1 8.7 - 10.5 mg/dL    Total Protein 5.5 (L) 6.0 - 8.4 g/dL    Albumin 2.8 (L) 3.5 - 5.2 g/dL    Total Bilirubin 0.4 0.1 - 1.0 mg/dL    Alkaline Phosphatase 69 55 - 135 U/L    AST 32 10 - 40 U/L    ALT 37 10 - 44 U/L    Anion Gap 10 8 - 16 mmol/L    eGFR if African American >60.0 >60 mL/min/1.73 m^2    eGFR if non African American >60.0 >60 mL/min/1.73 m^2   ISTAT PROCEDURE    Collection Time: 07/11/18  4:57 PM   Result Value Ref Range    POC PH 7.390 7.35 - 7.45    POC PCO2 49.4 (H) 35 - 45 mmHg    POC PO2 32 (LL) 40 - 60 mmHg    POC HCO3 29.9 (H) 24 - 28 mmol/L    POC BE 5 -2 to 2 mmol/L    POC SATURATED O2 60 (L) 95 - 100 %    POC TCO2 31 (H) 24 - 29 mmol/L    Rate 16     Sample VENOUS     Site Other     Allens Test N/A     DelSys Adult Vent     Mode AC/PRVC     Vt 450     PEEP 5     PiP 32     FiO2 40     Min Vol 7.40     Sp02 100    POCT glucose    Collection Time: 07/11/18  4:58 PM    Result Value Ref Range    POCT Glucose 179 (H) 70 - 110 mg/dL   POCT glucose    Collection Time: 07/11/18  6:04 PM   Result Value Ref Range    POCT Glucose 159 (H) 70 - 110 mg/dL   POCT glucose    Collection Time: 07/11/18  7:16 PM   Result Value Ref Range    POCT Glucose 153 (H) 70 - 110 mg/dL   Comprehensive metabolic panel - if not done in ED    Collection Time: 07/11/18  8:00 PM   Result Value Ref Range    Sodium 134 (L) 136 - 145 mmol/L    Potassium 4.4 3.5 - 5.1 mmol/L    Chloride 98 95 - 110 mmol/L    CO2 26 23 - 29 mmol/L    Glucose 158 (H) 70 - 110 mg/dL    BUN, Bld 19 6 - 20 mg/dL    Creatinine 1.4 0.5 - 1.4 mg/dL    Calcium 8.8 8.7 - 10.5 mg/dL    Total Protein 5.5 (L) 6.0 - 8.4 g/dL    Albumin 2.8 (L) 3.5 - 5.2 g/dL    Total Bilirubin 0.4 0.1 - 1.0 mg/dL    Alkaline Phosphatase 67 55 - 135 U/L    AST 30 10 - 40 U/L    ALT 35 10 - 44 U/L    Anion Gap 10 8 - 16 mmol/L    eGFR if African American >60.0 >60 mL/min/1.73 m^2    eGFR if non  56.2 (A) >60 mL/min/1.73 m^2   POCT glucose    Collection Time: 07/11/18  8:10 PM   Result Value Ref Range    POCT Glucose 189 (H) 70 - 110 mg/dL   ISTAT PROCEDURE    Collection Time: 07/11/18  8:14 PM   Result Value Ref Range    POC PH 7.397 7.35 - 7.45    POC PCO2 48.6 (H) 35 - 45 mmHg    POC PO2 31 (LL) 40 - 60 mmHg    POC HCO3 29.9 (H) 24 - 28 mmol/L    POC BE 5 -2 to 2 mmol/L    POC SATURATED O2 59 (L) 95 - 100 %    POC TCO2 31 (H) 24 - 29 mmol/L    Sample VENOUS     Site Other     Allens Test N/A    POCT glucose    Collection Time: 07/11/18  9:01 PM   Result Value Ref Range    POCT Glucose 142 (H) 70 - 110 mg/dL   POCT glucose    Collection Time: 07/11/18 10:06 PM   Result Value Ref Range    POCT Glucose 149 (H) 70 - 110 mg/dL   POCT glucose    Collection Time: 07/11/18 11:01 PM   Result Value Ref Range    POCT Glucose 151 (H) 70 - 110 mg/dL   Comprehensive metabolic panel - if not done in ED    Collection Time: 07/11/18 11:55 PM   Result Value Ref  Range    Sodium 133 (L) 136 - 145 mmol/L    Potassium 4.4 3.5 - 5.1 mmol/L    Chloride 97 95 - 110 mmol/L    CO2 27 23 - 29 mmol/L    Glucose 163 (H) 70 - 110 mg/dL    BUN, Bld 21 (H) 6 - 20 mg/dL    Creatinine 1.3 0.5 - 1.4 mg/dL    Calcium 8.7 8.7 - 10.5 mg/dL    Total Protein 5.6 (L) 6.0 - 8.4 g/dL    Albumin 2.7 (L) 3.5 - 5.2 g/dL    Total Bilirubin 0.4 0.1 - 1.0 mg/dL    Alkaline Phosphatase 67 55 - 135 U/L    AST 28 10 - 40 U/L    ALT 35 10 - 44 U/L    Anion Gap 9 8 - 16 mmol/L    eGFR if African American >60.0 >60 mL/min/1.73 m^2    eGFR if non African American >60.0 >60 mL/min/1.73 m^2   POCT glucose    Collection Time: 07/11/18 11:58 PM   Result Value Ref Range    POCT Glucose 167 (H) 70 - 110 mg/dL   ISTAT PROCEDURE    Collection Time: 07/12/18 12:01 AM   Result Value Ref Range    POC PH 7.393 7.35 - 7.45    POC PCO2 47.5 (H) 35 - 45 mmHg    POC PO2 31 (LL) 40 - 60 mmHg    POC HCO3 29.0 (H) 24 - 28 mmol/L    POC BE 4 -2 to 2 mmol/L    POC SATURATED O2 58 (L) 95 - 100 %    POC TCO2 30 (H) 24 - 29 mmol/L    Sample VENOUS     Site Other     Allens Test N/A    POCT glucose    Collection Time: 07/12/18  1:08 AM   Result Value Ref Range    POCT Glucose 161 (H) 70 - 110 mg/dL   POCT glucose    Collection Time: 07/12/18  1:57 AM   Result Value Ref Range    POCT Glucose 164 (H) 70 - 110 mg/dL   POCT glucose    Collection Time: 07/12/18  3:09 AM   Result Value Ref Range    POCT Glucose 160 (H) 70 - 110 mg/dL   CBC auto differential    Collection Time: 07/12/18  4:00 AM   Result Value Ref Range    WBC 14.12 (H) 3.90 - 12.70 K/uL    RBC 2.83 (L) 4.60 - 6.20 M/uL    Hemoglobin 8.5 (L) 14.0 - 18.0 g/dL    Hematocrit 25.9 (L) 40.0 - 54.0 %    MCV 92 82 - 98 fL    MCH 30.0 27.0 - 31.0 pg    MCHC 32.8 32.0 - 36.0 g/dL    RDW 14.2 11.5 - 14.5 %    Platelets 186 150 - 350 K/uL    MPV 11.3 9.2 - 12.9 fL    Immature Granulocytes 1.3 (H) 0.0 - 0.5 %    Gran # (ANC) 12.4 (H) 1.8 - 7.7 K/uL    Immature Grans (Abs) 0.19 (H) 0.00 -  0.04 K/uL    Lymph # 0.5 (L) 1.0 - 4.8 K/uL    Mono # 1.0 0.3 - 1.0 K/uL    Eos # 0.0 0.0 - 0.5 K/uL    Baso # 0.02 0.00 - 0.20 K/uL    nRBC 0 0 /100 WBC    Gran% 88.2 (H) 38.0 - 73.0 %    Lymph% 3.3 (L) 18.0 - 48.0 %    Mono% 7.1 4.0 - 15.0 %    Eosinophil% 0.0 0.0 - 8.0 %    Basophil% 0.1 0.0 - 1.9 %    Differential Method Automated    Protime-INR    Collection Time: 07/12/18  4:00 AM   Result Value Ref Range    Prothrombin Time 35.0 (H) 9.0 - 12.5 sec    INR 3.6 (H) 0.8 - 1.2   Magnesium - if not done in ED    Collection Time: 07/12/18  4:00 AM   Result Value Ref Range    Magnesium 2.1 1.6 - 2.6 mg/dL   Comprehensive metabolic panel - if not done in ED    Collection Time: 07/12/18  4:00 AM   Result Value Ref Range    Sodium 133 (L) 136 - 145 mmol/L    Potassium 4.5 3.5 - 5.1 mmol/L    Chloride 97 95 - 110 mmol/L    CO2 27 23 - 29 mmol/L    Glucose 147 (H) 70 - 110 mg/dL    BUN, Bld 23 (H) 6 - 20 mg/dL    Creatinine 1.6 (H) 0.5 - 1.4 mg/dL    Calcium 8.7 8.7 - 10.5 mg/dL    Total Protein 5.5 (L) 6.0 - 8.4 g/dL    Albumin 2.8 (L) 3.5 - 5.2 g/dL    Total Bilirubin 0.4 0.1 - 1.0 mg/dL    Alkaline Phosphatase 64 55 - 135 U/L    AST 25 10 - 40 U/L    ALT 33 10 - 44 U/L    Anion Gap 9 8 - 16 mmol/L    eGFR if African American 55.2 (A) >60 mL/min/1.73 m^2    eGFR if non  47.8 (A) >60 mL/min/1.73 m^2   Lactic acid, plasma    Collection Time: 07/12/18  4:00 AM   Result Value Ref Range    Lactate (Lactic Acid) 1.8 0.5 - 2.2 mmol/L   Phosphorus    Collection Time: 07/12/18  4:00 AM   Result Value Ref Range    Phosphorus 5.6 (H) 2.7 - 4.5 mg/dL   POCT glucose    Collection Time: 07/12/18  4:04 AM   Result Value Ref Range    POCT Glucose 171 (H) 70 - 110 mg/dL   ISTAT PROCEDURE    Collection Time: 07/12/18  4:09 AM   Result Value Ref Range    POC PH 7.395 7.35 - 7.45    POC PCO2 48.1 (H) 35 - 45 mmHg    POC PO2 34 (LL) 40 - 60 mmHg    POC HCO3 29.5 (H) 24 - 28 mmol/L    POC BE 5 -2 to 2 mmol/L    POC SATURATED  O2 65 (L) 95 - 100 %    POC TCO2 31 (H) 24 - 29 mmol/L    Sample VENOUS     Site Other     Allens Test N/A    POCT glucose    Collection Time: 07/12/18  5:01 AM   Result Value Ref Range    POCT Glucose 152 (H) 70 - 110 mg/dL   POCT glucose    Collection Time: 07/12/18  5:55 AM   Result Value Ref Range    POCT Glucose 166 (H) 70 - 110 mg/dL   POCT glucose    Collection Time: 07/12/18  7:07 AM   Result Value Ref Range    POCT Glucose 137 (H) 70 - 110 mg/dL   ISTAT PROCEDURE    Collection Time: 07/12/18  7:17 AM   Result Value Ref Range    POC PH 7.404 7.35 - 7.45    POC PCO2 45.7 (H) 35 - 45 mmHg    POC PO2 33 (LL) 40 - 60 mmHg    POC HCO3 28.6 (H) 24 - 28 mmol/L    POC BE 4 -2 to 2 mmol/L    POC SATURATED O2 63 (L) 95 - 100 %    POC TCO2 30 (H) 24 - 29 mmol/L    Rate 16     Sample VENOUS     Site Other     Allens Test N/A     DelSys Adult Vent     Mode AC/PRVC     Vt 450     PEEP 5     PiP 30     FiO2 40     Min Vol 7.6     Sp02 100    POCT glucose    Collection Time: 07/12/18  8:01 AM   Result Value Ref Range    POCT Glucose 151 (H) 70 - 110 mg/dL   Comprehensive metabolic panel - if not done in ED    Collection Time: 07/12/18  8:07 AM   Result Value Ref Range    Sodium 131 (L) 136 - 145 mmol/L    Potassium 4.6 3.5 - 5.1 mmol/L    Chloride 97 95 - 110 mmol/L    CO2 24 23 - 29 mmol/L    Glucose 141 (H) 70 - 110 mg/dL    BUN, Bld 25 (H) 6 - 20 mg/dL    Creatinine 1.7 (H) 0.5 - 1.4 mg/dL    Calcium 8.6 (L) 8.7 - 10.5 mg/dL    Total Protein 5.4 (L) 6.0 - 8.4 g/dL    Albumin 2.7 (L) 3.5 - 5.2 g/dL    Total Bilirubin 0.4 0.1 - 1.0 mg/dL    Alkaline Phosphatase 62 55 - 135 U/L    AST 24 10 - 40 U/L    ALT 32 10 - 44 U/L    Anion Gap 10 8 - 16 mmol/L    eGFR if African American 51.3 (A) >60 mL/min/1.73 m^2    eGFR if non African American 44.4 (A) >60 mL/min/1.73 m^2

## 2018-07-12 NOTE — SUBJECTIVE & OBJECTIVE
Interval History: No more VT overnight. Hemodynamically stable with IABP 1:1, amio 0.5, levo 0.04, lidocaine 2. Net neg 1 L over last 24 hours.     Continuous Infusions:   amiodarone in dextrose 5% 0.5 mg/min (07/12/18 1100)    fentanyl 7.5 mL/hr at 07/12/18 1100    insulin (HUMAN R) infusion (adults) 1 Units/hr (07/12/18 1000)    lidocaine 2 mg/min (07/12/18 1100)    norepinephrine bitartrate-D5W 0.04 mcg/kg/min (07/12/18 1100)    propofol 35 mcg/kg/min (07/12/18 1000)     Scheduled Meds:   albuterol sulfate  2.5 mg Nebulization Q4H    amiodarone  150 mg Intravenous Once    aspirin  81 mg Oral Daily    atorvastatin  40 mg Oral Daily    chlorhexidine  15 mL Mouth/Throat BID    chlorhexidine  15 mL Mouth/Throat BID    fluticasone-vilanterol  1 puff Inhalation Daily    gabapentin  600 mg Oral BID    hydrocortisone sodium succinate  100 mg Intravenous Q8H    levetiracetam IVPB  500 mg Intravenous Q12H    lidocaine HCL 10 mg/ml (1%)  1 mL Other Once    magnesium sulfate IVPB  2 g Intravenous Once    metoprolol  2.5 mg Intravenous Q4H    pantoprozole (PROTONIX) 40 mg/100 mL D5W IVPB  40 mg Intravenous BID    sodium chloride 0.9%  3 mL Intravenous Q8H     PRN Meds:sodium chloride, ALPRAZolam, benzonatate, calcium gluconate IVPB, calcium gluconate IVPB, calcium gluconate IVPB, carisoprodol, dextrose 50%, dextrose 50%, HYDROcodone-acetaminophen, magnesium sulfate IVPB, magnesium sulfate IVPB, nitroGLYCERIN, potassium chloride in water **AND** potassium chloride in water **AND** potassium chloride in water, sodium phosphate IVPB, sodium phosphate IVPB, sodium phosphate IVPB    Review of patient's allergies indicates:  No Known Allergies  Objective:     Vital Signs (Most Recent):  Temp: 97.6 °F (36.4 °C) (07/12/18 0715)  Pulse: 80 (07/12/18 1045)  Resp: 16 (07/12/18 1045)  BP: 132/73 (07/12/18 1045)  SpO2: 96 % (07/12/18 1045) Vital Signs (24h Range):  Temp:  [97.4 °F (36.3 °C)-97.6 °F (36.4 °C)] 97.6 °F  (36.4 °C)  Pulse:  [80] 80  Resp:  [15-21] 16  SpO2:  [95 %-100 %] 96 %  BP: (102-163)/(53-83) 132/73     Patient Vitals for the past 72 hrs (Last 3 readings):   Weight   07/12/18 0400 109.8 kg (242 lb 1 oz)     Body mass index is 33.76 kg/m².      Intake/Output Summary (Last 24 hours) at 07/12/18 1100  Last data filed at 07/12/18 1000   Gross per 24 hour   Intake          2284.65 ml   Output             2568 ml   Net          -283.35 ml       Hemodynamic Parameters:       Telemetry: reviewed. Paced. No NSVT/VT    Physical Exam   Constitutional: He is oriented to person, place, and time. He appears well-developed and well-nourished. He is sedated and intubated.   HENT:   Head: Normocephalic and atraumatic.   Eyes: EOM are normal. Pupils are equal, round, and reactive to light.   Neck: Normal range of motion. Neck supple. No JVD (difficult to assess (patient on ventilator & flat in bed)) present.   Right CVC in place   Cardiovascular: Normal rate and regular rhythm.    IABP 1:1. IABP site clean/dry/intact. Feet slightly cool to touch, otherwise warm and well perfused.    Pulmonary/Chest: Effort normal. He is intubated. No respiratory distress.   Coarse breath sounds. Ventilated   Abdominal: Soft. He exhibits no distension. Bowel sounds are decreased. There is no tenderness.   Musculoskeletal: Normal range of motion. He exhibits no edema.   Neurological: He is alert and oriented to person, place, and time.   Skin: Skin is warm and dry.   Nursing note and vitals reviewed.      Significant Labs:  CBC:    Recent Labs  Lab 07/10/18  1628 07/11/18  0405 07/12/18  0400   WBC 28.24* 11.32 14.12*   RBC 3.17* 2.96* 2.83*   HGB 9.6* 8.9* 8.5*   HCT 30.5* 27.3* 25.9*    194 186   MCV 96 92 92   MCH 30.3 30.1 30.0   MCHC 31.5* 32.6 32.8     BNP:    Recent Labs  Lab 07/09/18  0957   *     CMP:    Recent Labs  Lab 07/11/18  2355 07/12/18  0400 07/12/18  0807   * 147* 141*   CALCIUM 8.7 8.7 8.6*   ALBUMIN 2.7*  2.8* 2.7*   PROT 5.6* 5.5* 5.4*   * 133* 131*   K 4.4 4.5 4.6   CO2 27 27 24   CL 97 97 97   BUN 21* 23* 25*   CREATININE 1.3 1.6* 1.7*   ALKPHOS 67 64 62   ALT 35 33 32   AST 28 25 24   BILITOT 0.4 0.4 0.4      Coagulation:     Recent Labs  Lab 07/10/18  1628 07/11/18  0405 07/12/18  0400   INR 2.6* 2.8* 3.6*     LDH:  No results for input(s): LDH in the last 72 hours.  Microbiology:  Microbiology Results (last 7 days)     ** No results found for the last 168 hours. **          I have reviewed all pertinent labs within the past 24 hours.    Estimated Creatinine Clearance: 61.9 mL/min (A) (based on SCr of 1.7 mg/dL (H)).    Diagnostic Results:  I have reviewed and interpreted all pertinent imaging results/findings within the past 24 hours.

## 2018-07-12 NOTE — PROGRESS NOTES
Pt began breathing over vent at 30 breaths per minute, moderate amount of thick white secretions suctioned from ET tube. Twitching noted from mouth and upper extremities. Propofol gtt paused; pt slow to awaken from sedation and not followings commands; withdraws from pain to lower extremities but not upper extremities. Cardiology resident on-call, Dr. Tran notified and came to bedside to assess.     1816: Dr. Tran at bedside to assess pt. Pt now able to squeeze hands and move lower extremities upon command. STAT ABG and labs ordered. MAP in the 90s, SBPs 90s-low 100s on balloon pump currently; levo gtt stopped.     1830: Updated Dr. Tran on ABG results and lab results.

## 2018-07-12 NOTE — ASSESSMENT & PLAN NOTE
-IABP placed emergently 7/10/18  -Augmenting well at 1:1. SVO2 65%  -CVP 9 this am. Hold lasix, repeat labs this afternoon. Re-dose as able with creatinine.

## 2018-07-12 NOTE — ASSESSMENT & PLAN NOTE
VT storm with multiple events of both sustained and non-sustained VT, as well as MMVT and PMVT  BI-V ICD interrogation revealed 15 episodes of VT treated with ATP x 15 and shocks x 12  Patient's native rate following stabilization was bradycardic in the 50's above his base rate set by his ICD; rate increased to 80, no changes made to therapy zones  Patient was discharged on amiodarone 400 mg BID from a recent admission, currently receiving amiodarone and lidocaine infusions  No events on telemetry for nearly 48 hours, CRT-D functioning well  EP will sign off, please call with any questions or concerns

## 2018-07-12 NOTE — ASSESSMENT & PLAN NOTE
-Emergently intubated 7/10/18 for impending respiratory failure/need for IABP and inability to lay fat  -History of pulmonary sarcoidosis  -Methylpred given 7/10/18, hydrocortisone 100 mg q8 started 7/11/18  -Pulmonary consult for assistance with sarcoid & vent management. Appreciate their assistance.   -CXR daily

## 2018-07-12 NOTE — ASSESSMENT & PLAN NOTE
-VT storm 7/10/18. Polymorphic and monomorphic. Degenerated into VF. 12 ICD shocks  -Intubated, IABP placed emergently at bedside.   -Amio loaded x 2, gtt started per protocol. Continue 0.5 mg/min today.  -EP consulted, appreciate their assistance. Pacing rate increased to 80, metoprolol IV added   -Lidocaine gtt started at 1, increased to 2 when patient had more VT-->VF and AICD shocks. Will decrease to 1 mg/min. Await EP recs.   -Continue support with IABP 1:1  -K goal >4.5, Mg >2.5

## 2018-07-12 NOTE — PHYSICIAN QUERY
"PT Name: Yonathan Good Jr.  MR #: [id number]    Physician Query Form - Heart  Condition Clarification     CDS/: Jennifer Puckett               Contact information: [email or web address]   This form is a permanent document in the medical record.     Query Date: July 12, 2018    By submitting this query, we are merely seeking further clarification of documentation. Please utilize your independent clinical judgment when addressing the question(s) below.    The medical record contains the following   Indicators     Supporting Clinical Findings Location in Medical Record   X BNP    Lab 7/9   X EF  (EF 5-10%) Transplant PN 7/12    Radiology findings     X Echo Results ETT, right-sided central venous catheter, IABP and enteric catheter are in similar position.  Biventricular AICD demonstrates leads in similar position.  Lungs remain underexpanded with moderate pulmonary edema on a right-sided dependent pleural effusion.  No pneumothorax.  No free air beneath the diaphragm. CXR 7/12    "Ascites" documented      "SOB" or "MONTERO" documented      "Hypoxia" documented     X Heart Failure documented Chronic systolic CHF    Transplant PN 7/9   X "Edema" documented He exhibits edema (2+ bilateral lower extremity edema to the thighs).  Arrhythmia consult 7/10    Diuretics/Meds     X Treatment: - Restarted diuretics and losartan  - Slightly overloaded this morning with increased cough and crackles, will give IV lasix x1, check BNP and monitor response  - He is on lasix 80mg po bid and bumex prn @ home which will be stopped (advised by primary)  - Restart aldactone as tolerated     Transplant PN 7/9    Other:      Heart failure (HF) can be acute, chronic or both. It is generally further specificed as systolic, diastolic, or combined. Lastly, it is important to identify an underlying etiology if known or suspected.     Common clues to acute exacerbation:  Rapidly progressive symptoms (w/in 2 weeks of presentation), " using IV diuretics to treat, using supplemental O2, pulmonary edema on Xray, MI w/in 4 weeks, and/or BNP >500    Systolic Heart Failure: is defined as chart documentation of a left ventricular ejection fraction (LVEF) less than 40%     Diastolic Heart Failure: is defined as a left ventricular ejection fraction (LVEF) greater than 40%   +      Evidence of diastolic dysfunction on echocardiography OR    Right heart catheterization wedge pressure above 12 mm Hg OR    Left heart catheterization left ventricular end diastolic pressure 18 mm Hg or above.    References: *American Heart Association    The clinical guidelines noted below are only system guidelines, and do not replace the providers clinical judgment.     Provider, please specify the diagnosis associated with above clinical findings    [  X ] Acute on Chronic Systolic Heart Failure- Pre-existing systolic HF diagnosis.  EF < 40%  and acute HF symptoms documented    [   ] Chronic Systolic Heart Failure - Pre-existing systolic HF diagnosis.  EF < 40%  without  acute HF symptoms documented    [   ] Other Type of Heart Failure (please specify type): _________________________    [   ] Clinically Undetermined                          Please document in your progress notes daily for the duration of treatment until resolved and include in your discharge summary.

## 2018-07-12 NOTE — ASSESSMENT & PLAN NOTE
- Creatinine peaked at 2.5 after VT storm. Trended down to 1.4, slightly up today at 1.6 after diuresis.   -Avoid nephrotoxic agents

## 2018-07-13 PROBLEM — D72.829 LEUKOCYTOSIS: Status: ACTIVE | Noted: 2018-01-01

## 2018-07-13 PROBLEM — Z86.73 HISTORY OF STROKE: Status: ACTIVE | Noted: 2018-01-01

## 2018-07-13 PROBLEM — D72.819 LEUKOCYTOPENIA: Status: ACTIVE | Noted: 2018-01-01

## 2018-07-13 NOTE — PT/OT/SLP PROGRESS
Physical Therapy      Patient Name:  Yonathan Good Jr.   MRN:  [id number]    Patient not seen today secondary to pt possibly inappropriate for PT services at this time.  Attempted to treat pt at 1045 and 1450.  Pt is currently intubated, unable to provide pt history and actively participate in PT eval. Will consult with ICU specialist to determine pt appropriateness. Will follow-up with pt at the next scheduled tx session.    Henrietta Jaimes, PT

## 2018-07-13 NOTE — ASSESSMENT & PLAN NOTE
CXR worsening dramatically this AM and concerning for the development of ARDS.  Reportedly patient may have unfortunately had aspiration event during intubation.    - Recommend continuing to diurese, and weaning sedation when primary team feels they have achieved adequate diuresis and hemodynamic stability.  -Daily spontaneous breathing trials.  - agree with suctioned respiratory culture, and broad spectrum antibiotics.  Narrow as cultures return.  - if cultures are non-diagnostic, will consider bronchoscopy.  - may be beneficial to involve palliative services at this time.

## 2018-07-13 NOTE — ASSESSMENT & PLAN NOTE
-Emergently intubated 7/10/18 for impending respiratory failure/need for IABP and inability to lay fat  -History of pulmonary sarcoidosis  -Methylpred given 7/10/18, hydrocortisone 100 mg q8 started 7/11/18  -Pulmonary consult for assistance with sarcoid & vent management.  -CXR daily  -Spoke with pulm at bedside today, Concern for VAP vs ARDS in setting of lower pO2 and CXR appearance today. They will adjust vent settings as needed. Appreciate recs.    - started on empiric abx and consult ID.

## 2018-07-13 NOTE — ASSESSMENT & PLAN NOTE
- Creatinine peaked at 2.5 after VT storm. Trended down to 1.4, slightly up today at 1.9 after diuresis. Will monitor.  - Avoid nephrotoxic agents

## 2018-07-13 NOTE — PLAN OF CARE
"Problem: Patient Care Overview  Goal: Plan of Care Review  Outcome: Ongoing (interventions implemented as appropriate)  Patient remains intubated on A/C mode of ventilation, FiO2 30%, PEEP of 5, and rate of 16.  Gtts/Infusions:  Propofol gtt @ 30 mcg/kg/min, Fentanyl gtt @ 100 mcg/hr, Lidocaine gtt @ 1 mg/min, Levo gtt @ 0.06 mcg/kg/min, Amio gtt @ 0.5 mg/min; Insulin gtt wean to off overnight.  Elytes replaced as needed.  IABP 1:1 EKG trigger, no issues.  Head CT overnight d/t "twitching".  EEG monitoring started.  Patient does follow commands but shakes a lot when awake and is hard to redirect at times.  Safety maintained overnight; pt remained free from falls, injury, and skin breakdown overnight.  Plan of care reviewed with the patient's daughter who is at the patient's bedside.  All questions and concerns answered and addressed.       "

## 2018-07-13 NOTE — ASSESSMENT & PLAN NOTE
58 yo male on whom ID is consulted for possible VAP. On chronic steroids for sarcoidosis.    -currently on vanco and cefepime  -has leukocytosis and increase in secretions per nursing  -CXR could be consistent with infection vs pulmonary edema  -however, has no change in vent settings or fever and its possible that the leukocytosis is due to stress dose steroids  -I think pulmonary infection is less likely based on the above information but given that he is acutely ill will continue vanco and cefepime for now  -however, will consider quick de-escalation if no further evidence of infection   -await pending cx

## 2018-07-13 NOTE — SUBJECTIVE & OBJECTIVE
Past Medical History:   Diagnosis Date    AICD (automatic cardioverter/defibrillator) present     Arthritis     Atrial fibrillation     CHF (congestive heart failure)     Coronary artery disease     History of stroke 7/13/2018    2005, 2006, no deficits per wife.    Hypertension     MI (myocardial infarction)     Sarcoid     Seizures     Stroke        Past Surgical History:   Procedure Laterality Date    CARDIAC CATHETERIZATION      CARDIAC DEFIBRILLATOR PLACEMENT  7-12    CARDIAC DEFIBRILLATOR PLACEMENT      CARDIAC DEFIBRILLATOR PLACEMENT      COLONOSCOPY N/A 7/2/2018    Procedure: COLONOSCOPY;  Surgeon: THELMA Kay MD;  Location: [facility] ENDO ([address] [place] [address]);  Service: Endoscopy;  Laterality: N/A;    CORONARY STENT PLACEMENT  07/2011    ESOPHAGOGASTRODUODENOSCOPY      FRACTURE SURGERY      LEFT HEART CATHETERIZATION N/A 6/25/2018    Procedure: Left heart cath;  Surgeon: Jordi Lui MD;  Location: [facility] CATH LAB;  Service: Cardiology;  Laterality: N/A;       Review of patient's allergies indicates:  No Known Allergies    Medications:  Prescriptions Prior to Admission   Medication Sig    albuterol (PROVENTIL) 2.5 mg /3 mL (0.083 %) nebulizer solution Take 2.5 mg by nebulization every 6 (six) hours as needed.    albuterol (VENTOLIN HFA) 90 mcg/actuation inhaler Inhale 2 puffs into the lungs every 4 (four) hours as needed for Wheezing.    alprazolam (XANAX) 2 MG tablet Take 2 mg by mouth 2 (two) times daily as needed.     amiodarone (PACERONE) 400 MG tablet Take 1 tablet (400 mg total) by mouth 2 (two) times daily until 7/9/2018. Then take 1 tablet (400 mg total) by mouth 1 (one) time daily thereafter.    aspirin (ECOTRIN) 81 MG EC tablet Take 81 mg by mouth. 1 Tablet, Delayed Release (E.C.) Oral Every day    atorvastatin (LIPITOR) 40 MG tablet Take 1 tablet (40 mg total) by mouth once daily.    bumetanide (BUMEX) 1 MG tablet Take 1 mg by mouth daily as needed.    carisoprodol (SOMA) 350  MG tablet Take 350 mg by mouth 4 (four) times daily as needed for Muscle spasms.    colchicine 0.6 mg tablet 0.6 mg once daily.     enoxaparin (LOVENOX) 100 mg/mL Syrg Inject 1 mL (100 mg total) into the skin every 12 (twelve) hours.    fluticasone-vilanterol (BREO ELLIPTA) 200-25 mcg/dose DsDv diskus inhaler Inhale 1 puff into the lungs once daily. Controller    gabapentin (NEURONTIN) 600 MG tablet Take 600 mg by mouth 2 (two) times daily. 1 Tablet Oral At bedtime    hydrocodone-acetaminophen 10-325mg (NORCO)  mg Tab Take 1 tablet by mouth 2 (two) times daily as needed.     isosorbide mononitrate (IMDUR) 30 MG 24 hr tablet Take 3 tablets (90 mg total) by mouth once daily. (Patient taking differently: Take 60 mg by mouth once daily. )    levETIRAcetam (KEPPRA) 500 MG Tab Take 1 tablet (500 mg total) by mouth 2 (two) times daily.    losartan (COZAAR) 50 MG tablet Take 1 tablet (50 mg total) by mouth once daily.    metoprolol tartrate (LOPRESSOR) 25 MG tablet Take 1 tablet (25 mg total) by mouth 2 (two) times daily.    nitroGLYCERIN (NITROSTAT) 0.4 MG SL tablet ONE TABLET UNDER TONGUE AS NEEDED FOR CHEST PAIN    pantoprazole (PROTONIX) 40 MG tablet Take 40 mg by mouth. 1 Tablet, Delayed Release (E.C.) Oral Every day    potassium chloride SA (K-DUR,KLOR-CON) 20 MEQ tablet TAKE 2 TABLETS BY MOUTH EVERY MORNING AND 1 TABLET BY MOUTH EVERY EVENING    predniSONE (DELTASONE) 10 MG tablet Take 1 tablet (10 mg total) by mouth once daily.    promethazine-codeine 6.25-10 mg/5 ml (PHENERGAN WITH CODEINE) 6.25-10 mg/5 mL syrup TK 5 ML PO  Q 6 H PRN    spironolactone (ALDACTONE) 25 MG tablet TAKE 1 TABLET BY MOUTH EVERY DAY    warfarin (COUMADIN) 7.5 MG tablet 1 tablet Monday  0.5 tablet Tuesday-Sunday    [DISCONTINUED] furosemide (LASIX) 40 MG tablet TAKE 2 TABLETS BY MOUTH TWICE DAILY     Antibiotics     Start     Stop Route Frequency Ordered    07/13/18 1145  ceFEPIme injection 2 g      -- IV Every 8  hours (non-standard times) 07/13/18 1031    07/13/18 1145  vancomycin in dextrose 5 % 1 gram/250 mL IVPB 1,000 mg  (Vancomycin IVPB with levels panel)      -- IV Every 12 hours (non-standard times) 07/13/18 1031        Antifungals     None        Antivirals     None           Immunization History   Administered Date(s) Administered    Influenza - Quadrivalent - PF 10/23/2014       Family History     Problem Relation (Age of Onset)    Cancer Maternal Grandmother    Coronary artery disease Father, Sister, Sister    Heart disease Mother, Father, Sister    Hypertension Mother, Father, Sister    Kidney disease Sister    Stroke Sister    Vision loss Sister        Social History     Social History    Marital status: Other     Spouse name: N/A    Number of children: N/A    Years of education: N/A     Social History Main Topics    Smoking status: Never Smoker    Smokeless tobacco: Never Used    Alcohol use No    Drug use: No    Sexual activity: Not Asked     Other Topics Concern    None     Social History Narrative    None     Review of Systems   Unable to perform ROS: Intubated     Objective:     Vital Signs (Most Recent):  Temp: 98.3 °F (36.8 °C) (07/13/18 1100)  Pulse: 80 (07/13/18 1230)  Resp: 16 (07/13/18 1230)  BP: (!) 98/58 (07/13/18 1215)  SpO2: 97 % (07/13/18 1230) Vital Signs (24h Range):  Temp:  [97.4 °F (36.3 °C)-98.6 °F (37 °C)] 98.3 °F (36.8 °C)  Pulse:  [80] 80  Resp:  [10-39] 16  SpO2:  [93 %-100 %] 97 %  BP: ()/(52-93) 98/58     Weight: 110.5 kg (243 lb 9.7 oz)  Body mass index is 33.98 kg/m².    Estimated Creatinine Clearance: 55.5 mL/min (A) (based on SCr of 1.9 mg/dL (H)).    Physical Exam   Constitutional: He is oriented to person, place, and time. He appears well-developed and well-nourished. He is sedated and intubated.   HENT:   Head: Normocephalic and atraumatic.   Eyes: EOM are normal. Pupils are equal, round, and reactive to light.   Neck: Normal range of motion. Neck supple. No  JVD (difficult to assess (patient on ventilator & flat in bed)) present.   Right CVC in place   Cardiovascular: Normal rate and regular rhythm.    IABP 1:1. IABP site clean/dry/intact. Feet slightly cool to touch, otherwise warm and well perfused.    Pulmonary/Chest: Effort normal. He is intubated. No respiratory distress.   Coarse breath sounds. Ventilated   Abdominal: Soft. He exhibits no distension. Bowel sounds are decreased. There is no tenderness.   Musculoskeletal: Normal range of motion. He exhibits no edema.   Neurological: He is alert and oriented to person, place, and time.   Skin: Skin is warm and dry.   Nursing note and vitals reviewed.      Significant Labs: All pertinent labs within the past 24 hours have been reviewed.    Significant Imaging: I have reviewed all pertinent imaging results/findings within the past 24 hours.

## 2018-07-13 NOTE — PROGRESS NOTES
[facility]  Pulmonology  Progress Note    Patient Name: Yonathan Good Jr.  MRN: [id number]  Admission Date: 7/7/2018  Hospital Length of Stay: 6 days  Code Status: Full Code  Attending Provider: Mohan Cross MD  Primary Care Provider: Edgar Gates MD   Principal Problem: Ventricular tachycardia, incessant    Subjective:     Past Medical History:   Diagnosis Date    AICD (automatic cardioverter/defibrillator) present     Arthritis     Atrial fibrillation     CHF (congestive heart failure)     Coronary artery disease     History of stroke 7/13/2018 2005, 2006, no deficits per wife.    Hypertension     MI (myocardial infarction)     Sarcoid     Seizures     Stroke        Past Surgical History:   Procedure Laterality Date    CARDIAC CATHETERIZATION      CARDIAC DEFIBRILLATOR PLACEMENT  7-12    CARDIAC DEFIBRILLATOR PLACEMENT      CARDIAC DEFIBRILLATOR PLACEMENT      COLONOSCOPY N/A 7/2/2018    Procedure: COLONOSCOPY;  Surgeon: THELMA Kay MD;  Location: [facility] ENDO ([address] [place] [address]);  Service: Endoscopy;  Laterality: N/A;    CORONARY STENT PLACEMENT  07/2011    ESOPHAGOGASTRODUODENOSCOPY      FRACTURE SURGERY      LEFT HEART CATHETERIZATION N/A 6/25/2018    Procedure: Left heart cath;  Surgeon: Jordi Lui MD;  Location: [facility] CATH LAB;  Service: Cardiology;  Laterality: N/A;       Review of patient's allergies indicates:  No Known Allergies    Family History     Problem Relation (Age of Onset)    Cancer Maternal Grandmother    Coronary artery disease Father, Sister, Sister    Heart disease Mother, Father, Sister    Hypertension Mother, Father, Sister    Kidney disease Sister    Stroke Sister    Vision loss Sister        Social History Main Topics    Smoking status: Never Smoker    Smokeless tobacco: Never Used    Alcohol use No    Drug use: No    Sexual activity: Not on file         Review of Systems   Unable to perform ROS: Patient unresponsive     Objective:     Vital  Signs (Most Recent):  Temp: 98.4 °F (36.9 °C) (07/13/18 1500)  Pulse: 80 (07/13/18 1815)  Resp: 16 (07/13/18 1815)  BP: 106/64 (07/13/18 1815)  SpO2: 95 % (07/13/18 1815) Vital Signs (24h Range):  Temp:  [98.3 °F (36.8 °C)-98.6 °F (37 °C)] 98.4 °F (36.9 °C)  Pulse:  [80] 80  Resp:  [10-39] 16  SpO2:  [93 %-100 %] 95 %  BP: ()/(53-87) 106/64     Weight: 110.5 kg (243 lb 9.7 oz)  Body mass index is 33.98 kg/m².      Intake/Output Summary (Last 24 hours) at 07/13/18 1827  Last data filed at 07/13/18 1800   Gross per 24 hour   Intake          2654.81 ml   Output             2350 ml   Net           304.81 ml       Physical Exam   Constitutional: He appears well-developed and well-nourished. He is sedated and intubated.   HENT:   Head: Normocephalic and atraumatic.   Nose: Nose normal.   Neck: No tracheal deviation present.   Cardiovascular: Regular rhythm.    Holosystolic murmur present, AIBP in place via left axilla.  Heart sounds distant   Pulmonary/Chest: No stridor. He is intubated. He has no wheezes. He exhibits no tenderness.   On mechanical ventilation, orally intubated.  Diffuse crackles present in all lung fields.   Abdominal: Soft. Bowel sounds are normal. He exhibits no distension.   Neurological:   sedated   Skin: Skin is warm and dry.       Vents:  Vent Mode: A/C (07/13/18 1705)  Set Rate: 16 bmp (07/13/18 1705)  Vt Set: 450 mL (07/13/18 1705)  PEEP/CPAP: 5 cmH20 (07/13/18 1705)  Oxygen Concentration (%): 30 (07/13/18 1815)  Peak Airway Pressure: 38 cmH2O (07/13/18 1705)  Plateau Pressure: 0 cmH20 (07/13/18 1705)  Total Ve: 7.43 mL (07/13/18 1705)  F/VT Ratio<105 (RSBI): (!) 34.48 (07/13/18 1705)    Lines/Drains/Airways     Central Venous Catheter Line                 Percutaneous Central Line Insertion/Assessment - triple lumen  07/09/18 1500 right internal jugular 4 days         Introducer 07/10/18 1548 other (see comments) 3 days          Drain                 NG/OG Tube 07/10/18 1557 Scarlett mayorgap 16  Fr. Left nostril 3 days         Urethral Catheter 07/10/18 1646 Double-lumen;Latex 16 Fr. 3 days          Airway                 Airway - Non-Surgical 07/10/18 1523 Endotracheal Tube 3 days          Line                 IABP 07/10/18 1550 7.5 Fr. 40 mL 3 days          Peripheral Intravenous Line                 Peripheral IV - Single Lumen 07/12/18 1100 Right Forearm 1 day                Significant Labs:    CBC/Anemia Profile:    Recent Labs  Lab 07/12/18  0400 07/12/18  1817 07/13/18  0317   WBC 14.12* 14.23* 17.12*   HGB 8.5* 9.1* 8.8*   HCT 25.9* 27.9* 27.2*    181 201   MCV 92 92 92   RDW 14.2 14.4 14.3        Chemistries:    Recent Labs  Lab 07/12/18  0400  07/12/18  1108  07/12/18  1817 07/13/18  0317 07/13/18  1159 07/13/18  1621   *  < > 131*  < > 131* 132* 131* 132*   K 4.5  < > 4.6  < > 4.7 4.9 4.9 4.9   CL 97  < > 96  < > 95 96 95 97   CO2 27  < > 25  < > 24 25 25 26   BUN 23*  < > 27*  < > 31* 36* 42* 42*   CREATININE 1.6*  < > 1.8*  < > 1.9* 1.9* 2.0* 1.8*   CALCIUM 8.7  < > 8.4*  < > 8.6* 8.6* 8.4* 8.7   ALBUMIN 2.8*  < > 2.7*  --  2.9* 2.8*  --   --    PROT 5.5*  < > 5.4*  --  5.8* 5.8*  --   --    BILITOT 0.4  < > 0.3  --  0.3 0.3  --   --    ALKPHOS 64  < > 63  --  67 70  --   --    ALT 33  < > 30  --  33 30  --   --    AST 25  < > 21  --  21 18  --   --    MG 2.1  --   --   --  2.2 1.9  --   --    PHOS 5.6*  --   --   --  6.3* 6.3*  --   --    < > = values in this interval not displayed.    All pertinent labs within the past 24 hours have been reviewed.    Significant Imaging:   I have reviewed all pertinent imaging results/findings within the past 24 hours.    Assessment/Plan:     Respiratory failure requiring intubation    CXR worsening dramatically this AM and concerning for the development of ARDS.  Reportedly patient may have unfortunately had aspiration event during intubation.    - Recommend continuing to diurese, and weaning sedation when primary team feels they have achieved  adequate diuresis and hemodynamic stability.  -Daily spontaneous breathing trials.  - agree with suctioned respiratory culture, and broad spectrum antibiotics.  Narrow as cultures return.  - if cultures are non-diagnostic, will consider bronchoscopy.  - may be beneficial to involve palliative services at this time.        Sarcoidosis of lung    Sarcoid appears to have minimal contribution to current condition.     - continue steroids as prescribed and taper once condition improves.               Dontrell Zeng MD  Pulmonology  [facility]

## 2018-07-13 NOTE — PLAN OF CARE
Problem: Patient Care Overview  Goal: Plan of Care Review  Outcome: Ongoing (interventions implemented as appropriate)  Pt remains intubated on assist control 30% and 5 PEEP. Pt awakens to voice and stimulation and follows commands. IABP in place 1:1 EKG trigger. Levo gtt infusing to maintain MAP >65. Lidocaine at 1 mg/min and amiodarone transitioned from IV to PO. Amiodarone gtt to be turned off at 1930 per orders. Fentanyl and propofol gtts titrated to maintain RASS goal. CVP 13 flat this AM. Lasix BID started today, CVP 10 flat after 1059 dose given. Irvin with urine output  cc/hr. Tube feedings restarted; currently at 20 cc/hr with minimal residuals; plan to advance as tolerated per orders. Neurology and infectious disease consulted; blood cultures and sputum cultures collected. No breakdown noted to elbows, sacrum, nor heels. Daughter at bedside and updated on plan of care.

## 2018-07-13 NOTE — ASSESSMENT & PLAN NOTE
- IABP placed emergently 7/10/18  - Augmenting well at 1:1. SVO2 65%  - CVP 12 this am (up from yesterday and patient net +jesenia overnight). Will increase lasix to 80 BID per Dr. Lazo.

## 2018-07-13 NOTE — ASSESSMENT & PLAN NOTE
Most likely due to hypoperfusion due to cardiogenic shock likely accompanied by volume depletion due to GI bleeding  - initial Cr 2.8 (baseline 1.1)  - has been trending down, this morning 1.9  - will f/u and adjust keppra accordingly

## 2018-07-13 NOTE — ASSESSMENT & PLAN NOTE
- Levophed started during code 7/10/18  - Wean for MAP >65 to d/c (using IABP for MAP). Should be able to d/c today.

## 2018-07-13 NOTE — SUBJECTIVE & OBJECTIVE
Interval History: No more VT overnight. Intubated and sedated. Events from overnight noted. Daughter at bedside.     Continuous Infusions:   amiodarone in dextrose 5% 0.5 mg/min (07/13/18 1100)    fentanyl 10 mL/hr at 07/13/18 1100    insulin (HUMAN R) infusion (adults) Stopped (07/12/18 2000)    lidocaine 1 mg/min (07/13/18 1200)    norepinephrine bitartrate-D5W 0.03 mcg/kg/min (07/13/18 1200)    propofol 30 mcg/kg/min (07/13/18 1200)     Scheduled Meds:   albuterol sulfate  2.5 mg Nebulization Q4H    aspirin  81 mg Oral Daily    atorvastatin  40 mg Oral Daily    ceFEPime (MAXIPIME) IVPB  2 g Intravenous Q8H    chlorhexidine  15 mL Mouth/Throat BID    chlorhexidine  15 mL Mouth/Throat BID    docusate  100 mg Oral Daily    fluticasone-vilanterol  1 puff Inhalation Daily    furosemide  80 mg Intravenous BID    gabapentin  600 mg Oral BID    hydrocortisone sodium succinate  100 mg Intravenous Q8H    levetiracetam IVPB  1,000 mg Intravenous Q12H    metoprolol  2.5 mg Intravenous Q4H    pantoprozole (PROTONIX) 40 mg/100 mL D5W IVPB  40 mg Intravenous BID    sodium chloride 0.9%  3 mL Intravenous Q8H    vancomycin (VANCOCIN) IVPB  1,000 mg Intravenous Q12H     PRN Meds:sodium chloride, ALPRAZolam, benzonatate, calcium gluconate IVPB, calcium gluconate IVPB, calcium gluconate IVPB, carisoprodol, dextrose 50%, dextrose 50%, HYDROcodone-acetaminophen, magnesium sulfate IVPB, magnesium sulfate IVPB, nitroGLYCERIN, potassium chloride in water **AND** potassium chloride in water **AND** potassium chloride in water, sodium phosphate IVPB, sodium phosphate IVPB, sodium phosphate IVPB    Review of patient's allergies indicates:  No Known Allergies  Objective:     Vital Signs (Most Recent):  Temp: 98.3 °F (36.8 °C) (07/13/18 1100)  Pulse: 80 (07/13/18 1200)  Resp: 16 (07/13/18 1200)  BP: (!) 98/56 (07/13/18 1200)  SpO2: 96 % (07/13/18 1200) Vital Signs (24h Range):  Temp:  [97.4 °F (36.3 °C)-98.6 °F (37 °C)]  98.3 °F (36.8 °C)  Pulse:  [80] 80  Resp:  [10-39] 16  SpO2:  [93 %-100 %] 96 %  BP: ()/(51-93) 98/56     Patient Vitals for the past 72 hrs (Last 3 readings):   Weight   07/13/18 0300 110.5 kg (243 lb 9.7 oz)   07/12/18 0400 109.8 kg (242 lb 1 oz)     Body mass index is 33.98 kg/m².      Intake/Output Summary (Last 24 hours) at 07/13/18 1228  Last data filed at 07/13/18 1200   Gross per 24 hour   Intake          2164.47 ml   Output             1865 ml   Net           299.47 ml       Hemodynamic Parameters:       Telemetry: reviewed. Paced @ 80 bmp. No NSVT/VT    Physical Exam   Constitutional: He is oriented to person, place, and time. He appears well-developed and well-nourished. He is sedated and intubated.   HENT:   Head: Normocephalic and atraumatic.   Eyes: EOM are normal. Pupils are equal, round, and reactive to light.   Neck: Normal range of motion. Neck supple. No JVD (difficult to assess (patient on ventilator & flat in bed)) present.   Right CVC in place   Cardiovascular: Normal rate and regular rhythm.    IABP 1:1. IABP site clean/dry/intact. Feet slightly cool to touch, otherwise warm and well perfused.    Pulmonary/Chest: Effort normal. He is intubated. No respiratory distress.   Coarse breath sounds. Ventilated   Abdominal: Soft. He exhibits no distension. Bowel sounds are decreased. There is no tenderness.   Musculoskeletal: Normal range of motion. He exhibits no edema.   Neurological: He is alert and oriented to person, place, and time.   Skin: Skin is warm and dry.   Nursing note and vitals reviewed.      Significant Labs:  CBC:    Recent Labs  Lab 07/12/18  0400 07/12/18  1817 07/13/18  0317   WBC 14.12* 14.23* 17.12*   RBC 2.83* 3.03* 2.96*   HGB 8.5* 9.1* 8.8*   HCT 25.9* 27.9* 27.2*    181 201   MCV 92 92 92   MCH 30.0 30.0 29.7   MCHC 32.8 32.6 32.4     BNP:    Recent Labs  Lab 07/09/18  0957   *     CMP:    Recent Labs  Lab 07/12/18  1108 07/12/18  1400 07/12/18  1817  07/13/18  0317   * 140* 140* 154*   CALCIUM 8.4* 8.6* 8.6* 8.6*   ALBUMIN 2.7*  --  2.9* 2.8*   PROT 5.4*  --  5.8* 5.8*   * 133* 131* 132*   K 4.6 4.6 4.7 4.9   CO2 25 26 24 25   CL 96 98 95 96   BUN 27* 28* 31* 36*   CREATININE 1.8* 1.7* 1.9* 1.9*   ALKPHOS 63  --  67 70   ALT 30  --  33 30   AST 21  --  21 18   BILITOT 0.3  --  0.3 0.3      Coagulation:     Recent Labs  Lab 07/12/18  0400 07/12/18  1817 07/13/18 0317   INR 3.6* 3.1* 2.4*     LDH:  No results for input(s): LDH in the last 72 hours.  Microbiology:  Microbiology Results (last 7 days)     Procedure Component Value Units Date/Time    Culture, Respiratory with Gram Stain [[id number]] Collected:  07/13/18 1129    Order Status:  Sent Specimen:  Respiratory from Endotracheal Aspirate Updated:  07/13/18 1130          I have reviewed all pertinent labs within the past 24 hours.    Estimated Creatinine Clearance: 55.5 mL/min (A) (based on SCr of 1.9 mg/dL (H)).    Diagnostic Results:  I have reviewed and interpreted all pertinent imaging results/findings within the past 24 hours.

## 2018-07-13 NOTE — PROGRESS NOTES
[facility]  Heart Transplant  Progress Note    Patient Name: Yonathan Good Jr.  MRN: [id number]  Admission Date: 7/7/2018  Hospital Length of Stay: 6 days  Attending Physician: Mohan Cross MD  Primary Care Provider: Edgar Gates MD  Principal Problem:Ventricular tachycardia, incessant    Subjective:     Interval History: No more VT overnight. Intubated and sedated. Events from overnight noted. Daughter at bedside.     Continuous Infusions:   amiodarone in dextrose 5% 0.5 mg/min (07/13/18 1100)    fentanyl 10 mL/hr at 07/13/18 1100    insulin (HUMAN R) infusion (adults) Stopped (07/12/18 2000)    lidocaine 1 mg/min (07/13/18 1200)    norepinephrine bitartrate-D5W 0.03 mcg/kg/min (07/13/18 1200)    propofol 30 mcg/kg/min (07/13/18 1200)     Scheduled Meds:   albuterol sulfate  2.5 mg Nebulization Q4H    aspirin  81 mg Oral Daily    atorvastatin  40 mg Oral Daily    ceFEPime (MAXIPIME) IVPB  2 g Intravenous Q8H    chlorhexidine  15 mL Mouth/Throat BID    chlorhexidine  15 mL Mouth/Throat BID    docusate  100 mg Oral Daily    fluticasone-vilanterol  1 puff Inhalation Daily    furosemide  80 mg Intravenous BID    gabapentin  600 mg Oral BID    hydrocortisone sodium succinate  100 mg Intravenous Q8H    levetiracetam IVPB  1,000 mg Intravenous Q12H    metoprolol  2.5 mg Intravenous Q4H    pantoprozole (PROTONIX) 40 mg/100 mL D5W IVPB  40 mg Intravenous BID    sodium chloride 0.9%  3 mL Intravenous Q8H    vancomycin (VANCOCIN) IVPB  1,000 mg Intravenous Q12H     PRN Meds:sodium chloride, ALPRAZolam, benzonatate, calcium gluconate IVPB, calcium gluconate IVPB, calcium gluconate IVPB, carisoprodol, dextrose 50%, dextrose 50%, HYDROcodone-acetaminophen, magnesium sulfate IVPB, magnesium sulfate IVPB, nitroGLYCERIN, potassium chloride in water **AND** potassium chloride in water **AND** potassium chloride in water, sodium phosphate IVPB, sodium phosphate IVPB, sodium phosphate  IVPB    Review of patient's allergies indicates:  No Known Allergies  Objective:     Vital Signs (Most Recent):  Temp: 98.3 °F (36.8 °C) (07/13/18 1100)  Pulse: 80 (07/13/18 1200)  Resp: 16 (07/13/18 1200)  BP: (!) 98/56 (07/13/18 1200)  SpO2: 96 % (07/13/18 1200) Vital Signs (24h Range):  Temp:  [97.4 °F (36.3 °C)-98.6 °F (37 °C)] 98.3 °F (36.8 °C)  Pulse:  [80] 80  Resp:  [10-39] 16  SpO2:  [93 %-100 %] 96 %  BP: ()/(51-93) 98/56     Patient Vitals for the past 72 hrs (Last 3 readings):   Weight   07/13/18 0300 110.5 kg (243 lb 9.7 oz)   07/12/18 0400 109.8 kg (242 lb 1 oz)     Body mass index is 33.98 kg/m².      Intake/Output Summary (Last 24 hours) at 07/13/18 1228  Last data filed at 07/13/18 1200   Gross per 24 hour   Intake          2164.47 ml   Output             1865 ml   Net           299.47 ml       Hemodynamic Parameters:       Telemetry: reviewed. Paced @ 80 bmp. No NSVT/VT    Physical Exam   Constitutional: He is oriented to person, place, and time. He appears well-developed and well-nourished. He is sedated and intubated.   HENT:   Head: Normocephalic and atraumatic.   Eyes: EOM are normal. Pupils are equal, round, and reactive to light.   Neck: Normal range of motion. Neck supple. No JVD (difficult to assess (patient on ventilator & flat in bed)) present.   Right CVC in place   Cardiovascular: Normal rate and regular rhythm.    IABP 1:1. IABP site clean/dry/intact. Feet slightly cool to touch, otherwise warm and well perfused.    Pulmonary/Chest: Effort normal. He is intubated. No respiratory distress.   Coarse breath sounds. Ventilated   Abdominal: Soft. He exhibits no distension. Bowel sounds are decreased. There is no tenderness.   Musculoskeletal: Normal range of motion. He exhibits no edema.   Neurological: He is alert and oriented to person, place, and time.   Skin: Skin is warm and dry.   Nursing note and vitals reviewed.      Significant Labs:  CBC:    Recent Labs  Lab 07/12/18  0400  "07/12/18  1817 07/13/18  0317   WBC 14.12* 14.23* 17.12*   RBC 2.83* 3.03* 2.96*   HGB 8.5* 9.1* 8.8*   HCT 25.9* 27.9* 27.2*    181 201   MCV 92 92 92   MCH 30.0 30.0 29.7   MCHC 32.8 32.6 32.4     BNP:    Recent Labs  Lab 07/09/18  0957   *     CMP:    Recent Labs  Lab 07/12/18  1108 07/12/18  1400 07/12/18  1817 07/13/18  0317   * 140* 140* 154*   CALCIUM 8.4* 8.6* 8.6* 8.6*   ALBUMIN 2.7*  --  2.9* 2.8*   PROT 5.4*  --  5.8* 5.8*   * 133* 131* 132*   K 4.6 4.6 4.7 4.9   CO2 25 26 24 25   CL 96 98 95 96   BUN 27* 28* 31* 36*   CREATININE 1.8* 1.7* 1.9* 1.9*   ALKPHOS 63  --  67 70   ALT 30  --  33 30   AST 21  --  21 18   BILITOT 0.3  --  0.3 0.3      Coagulation:     Recent Labs  Lab 07/12/18  0400 07/12/18 1817 07/13/18 0317   INR 3.6* 3.1* 2.4*     LDH:  No results for input(s): LDH in the last 72 hours.  Microbiology:  Microbiology Results (last 7 days)     Procedure Component Value Units Date/Time    Culture, Respiratory with Gram Stain [[id number]] Collected:  07/13/18 1129    Order Status:  Sent Specimen:  Respiratory from Endotracheal Aspirate Updated:  07/13/18 1130          I have reviewed all pertinent labs within the past 24 hours.    Estimated Creatinine Clearance: 55.5 mL/min (A) (based on SCr of 1.9 mg/dL (H)).    Diagnostic Results:  I have reviewed and interpreted all pertinent imaging results/findings within the past 24 hours.    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      He was initially admitted to "stroke/neuro" service on 6/22 with dysarthria and stroke-like symptoms. Extensive work up was negative for recurrent CVA and so no tPA was administered. Patient developed atypical chest pain two days into his hospital course and was noted to be in monomorphic " VT (6/24) which was treated with ATP then with shock due to recurrent VT in VF zone. Given his active cardiac issues this prompted transfer to heart failure service where the patient was initiated on IV amiodarone and beta blocker with subsequent resolution of his VT. He was eventually transitioned to PO amiodarone 400 mg BID x 2 weeks (from 6/25-7/9) followed by amiodarone 400 mg daily thereafter per EP recommendations. Of note patient underwent LHC on 6/25 given his extensive ischemic history which did not reveal a culprit lesion, therefore no interventions were done. He was noted to have an elevated LVEDP to 45 however and was administered IV diuresis before being transitioned back to his PO diuretic regimen. Patient also completed heart failure pathway during his hospital course (eg completed colonoscopy on 7/2) as part of his work up for advanced options. Follows with Dr. Berman of heart failure/transplant as an outpatient.      The patient was discharged home in stable condition on 7/4/2018. He was dc'ed on warf/lovenox bridge given CHADS2-VaSc of 5.  Of note, he is no longer a candidate for AO.  He presented yesterday to [facility] with BRBPR and was found to be in acute renal failure as well.  He notes that, on the night of the 5th and morning of 6th, he had 6 bright red bloody bm's.  He went to his PMD who noted he might have internal hemmorhoids.  He was told to stop his lovenox.  He then returned home and flet very lightheaded and dizzy.  He wisely took his blood pressure and noted it was 70-80/50s whereas it is normally 117/70s.  He went in to ED immediately.  There he was noted to have the following pertinent lab values:  Hg 9.2 (11.7 prior)  INR 2.9  Na 131 (139 prior)  K 6.18  BUN 43 (18 prior)  Cr 2.88 (1.1-1.4 at baseline)  proBNP 2755  He was transferred to [facility] for further evaluation and care.  Of note, he underwent screening c-scope on 7/2 during which the following was noted and done:  Diverticulosis in the sigmoid colon and in the descending colon, Two 8 to 10 mm polyps in the sigmoid colon and in the descending colon, removed with a hot snare, resected and retrieved, ligated.        * VT Storm    -VT storm 7/10/18. Polymorphic and monomorphic. Degenerated into VF. 12 ICD shocks  -Intubated, IABP placed emergently at bedside.   -Amio loaded x 2, gtt started per protocol. Continue 0.5 mg/min today. Can switch to PO via NG tube if patient tolerates trickle tube feeds today. Lidocaine gtt started at 1, increased to 2 when patient had more VT-->VF and AICD shocks. Continue at 1 mg/min for now.  -EP consulted, appreciate their assistance. Pacing rate increased to 80, metoprolol IV added.  -Continue support with IABP 1:1  -K goal >4.5, Mg >2.5    - regimen per EP recs one patient is able to take PO   - Toprol 150 mg, mexiletine 200 BID, Amio 200 TID for 1 weeks, 200 BID for 4 weeks, 300 QD thereafter        Leukocytosis    - Blood cultures x 2 today  - sputum cultures pending  - will cover for VAP, vomiting during code and intubation (aspiration risk) and worsening opacities on CXR today with rising leukocytosis  - continue vanc and cefepime for now and await ID recs  - last BM 6/9, KUB reviewed in EPIC.  Start liquid colace for bowel regimen.        Encounter for management of intra-aortic balloon pump    -IABP placed 7/10/2018        Respiratory failure requiring intubation    -Emergently intubated 7/10/18 for impending respiratory failure/need for IABP and inability to lay fat  -History of pulmonary sarcoidosis  -Methylpred given 7/10/18, hydrocortisone 100 mg q8 started 7/11/18  -Pulmonary consult for assistance with sarcoid & vent management.  -CXR daily  -Spoke with pulm at bedside today, Concern for VAP vs ARDS in setting of lower pO2 and CXR appearance today. They will adjust vent settings as needed. Appreciate recs.    - started on empiric abx and consult ID.        Cardiogenic shock    - IABP  placed emergently 7/10/18  - Augmenting well at 1:1. SVO2 65%  - CVP 12 this am (up from yesterday and patient net +jesenia overnight). Will increase lasix to 80 BID per Dr. Lazo.         Ventricular fibrillation    -See VT        Hypotension    - Levophed started during code 7/10/18  - Wean for MAP >65 to d/c (using IABP for MAP). Should be able to d/c today.         Acute renal failure    - Creatinine peaked at 2.5 after VT storm. Trended down to 1.4, slightly up today at 1.9 after diuresis. Will monitor.  - Avoid nephrotoxic agents        Chronic systolic CHF (congestive heart failure)    - See cardiogenic shock        GI bleed    - INR supratherapeutic  - Coumadin on hold   - Protonix increased to 40 mg BID (changed to IV)  -Had screening colonoscopy with hot snare polypectomy during last admission; most likely culprit post polypectomy bleeding  - H & H stable, normal BMs since admission        History of atrial fibrillation    -  continue amio         Seizure disorder    - Continue keppra (changed to IV)  - possible seizure activity noted on 7/12/18 (twitching right face and UEs lasting 5 min)   - neuro consulted and increased keppra to 1000 IV 12   - continuous EEG read and negative for seizure activity   - will de-escalation of keppra with neuro        CAD (coronary artery disease)    - Continue atorvastatin, nitro prn  - Hold lopressor for now  - ASA on hold since admit, presumably for GIB concern. Will discuss resumption        Sarcoidosis of lung    - Continue prednisone, breo-ellipta, and albuterol prn            Anna Landrum PA-C  Heart Transplant  [facility]    Uninterrupted Critical Care/Counseling Time (not including procedures): 45 minutes

## 2018-07-13 NOTE — PROCEDURES
ICU EEG/VIDEO MONITORING REPORT    Yonathan Good Jr.  [id number]  1960    DATE OF SERVICE: 7/12-13/18    DATE OF ADMISSION: 7/7/2018  1:15 AM    ADMITTING PROVIDER: Chana Berman MD    REASON FOR CONSULT: 58yo M with multiple medical co-morbidities, with hx of recent L CVA.    MEDICATIONS:   Current Facility-Administered Medications   Medication    0.9%  NaCl infusion (for blood administration)    albuterol nebulizer solution 2.5 mg    ALPRAZolam tablet 2 mg    amiodarone 360 mg/200 mL (1.8 mg/mL) infusion    amiodarone injection 150 mg    aspirin chewable tablet 81 mg    atorvastatin tablet 40 mg    benzonatate capsule 100 mg    calcium gluconate 1g in dextrose 5% 100mL (ready to mix system)    calcium gluconate 1g in dextrose 5% 100mL (ready to mix system)    calcium gluconate 1g in dextrose 5% 100mL (ready to mix system)    carisoprodol tablet 350 mg    chlorhexidine 0.12 % solution 15 mL    chlorhexidine 0.12 % solution 15 mL    dextrose 50% injection 12.5 g    dextrose 50% injection 25 g    fentaNYL 2500 mcg in 0.9% sodium chloride 250 mL infusion premix (titrating)    fluticasone-vilanterol 200-25 mcg/dose diskus inhaler 1 puff    gabapentin capsule 600 mg    HYDROcodone-acetaminophen  mg per tablet 1 tablet    hydrocortisone sodium succinate injection 100 mg    insulin regular (Humulin R) 100 Units in sodium chloride 0.9% 100 mL infusion    levETIRAcetam in NaCl (iso-os) IVPB 1,000 mg    lidocaine 2000 mg in dextrose 5% 250 mL infusion    lidocaine HCL 10 mg/ml (1%) injection 1 mL    magnesium sulfate 1 g in dextrose 5 % 50 mL IVPB    magnesium sulfate 2g in water 50mL IVPB (premix)    magnesium sulfate 2g in water 50mL IVPB (premix)    magnesium sulfate 2g in water 50mL IVPB (premix)    metoprolol injection 2.5 mg    nitroGLYCERIN SL tablet 0.4 mg    norepinephrine 4 mg in dextrose 5% 250 mL infusion (premix) (titrating)    pantoprazole (PROTONIX) 40 mg in dextrose  5 % 100 mL IVPB    potassium chloride 40 mEq in 100 mL IVPB (FOR CENTRAL LINE ADMINISTRATION ONLY)    And    potassium chloride 20 mEq in 100 mL IVPB (FOR CENTRAL LINE ADMINISTRATION ONLY)    And    potassium chloride 40 mEq in 100 mL IVPB (FOR CENTRAL LINE ADMINISTRATION ONLY)    propofol (DIPRIVAN) 10 mg/mL infusion    sodium chloride 0.9% flush 3 mL    sodium phosphate 15 mmol in dextrose 5 % 250 mL IVPB    sodium phosphate 20.01 mmol in dextrose 5 % 250 mL IVPB    sodium phosphate 30 mmol in dextrose 5 % 250 mL IVPB     METHODOLOGY-cap   Electroencephalographic (EEG) recording is with electrodes placed according to the International 10-20 placement system.  Thirty two (32) channels of digital signal are simultaneously recorded from the scalp and may include EKG, EMG, and/or eye monitors.   Recording band pass was 0.1 to 512 hz.  Digital video recording of the patient is simultaneously recorded with the EEG.  The nursing staff report clinical symptoms and may press an event button when the patient has symptoms of clinical interest to the treating physicians.  EEG and video recording is stored and archived in digital format.  The entire recording is visually reviewed and the times identified by computer analysis as being spikes or seizures are reviewed again.  Activation procedures which include photic stimulation, hyperventilation and instructing patients to perform simple task are done in selected patients.   Compresses spectral analysis (CSA) is also performed on the activity recorded from each individual channel.  This is displayed as a power display of frequencies from 0 to 30 Hz over time.   The CSA analysis is done and displayed continuously.  This is reviewed for asymmetries in power between homologous areas of the scalp and for presence of changes in power which canbe seen when seizures occur.  Sections of suspected abnormalities on the CSA is then compared with the original EEG recording.      [employer] software was also utilized in the review of this study.  This software suite analyzes the EEG recording in multiple domains.  Coherence and rhythmicity is computed to identify EEG sections which may contain organized seizures.  Each channel undergoes analysis to detect presence of spike and sharp waves which have special and morphological characteristic of epileptic activity.  The routine EEG recording is converted from spacial into frequency domain.  This is then displayed comparing homologous areas to identify areas of significant asymmetry.  Algorithm to identify non-cortically generated artifact is used to separate eye movement, EMG and other artifact from the EEG.      Recording Times  Start on 7/12/18, 23:45  Stop on 7/13/18, 08:28    A total of 8 hours and 43 minutes of EEG was recorded.    EEG FINDINGS  Background activity:   The entire study comprises of sleep, that appears symmetric and continuous.  No awake portion is recorded.      Abnormal activity:   No epileptiform discharges, periodic discharges, lateralized rhythmic delta activity or electrographic seizures were seen.    IMPRESSION:   This is a normal cap study done only during sleep.    CLINICAL CORRELATION IS RECOMMENDED.    Radha Mitchell MD, RAY(), ASHA MULLEN.  Neurology-Epilepsy.  [facility].

## 2018-07-13 NOTE — NURSING
Dr. Tran at the patient's bedside.  Patient's family updated on CT scan results per MD.  MD notified about decreased UOP over the last few hours.  CVP 15mmHg.  Will draw SvO2 at this time.

## 2018-07-13 NOTE — HPI
The patient is a 58 y/o AAM with previous CVA x2 and subsequent seizures (on AED) and extensive cardiac Hx, currently admitted to the -ICU with cardiogenic shock and respiratory failure, who is consulted to neurology for twitching movements in the face and upper extremities.    The daughter at the bedside is providing the Hx. In brief, patient with HFrEF (EF 5-10%) due to ICM s/p ICD and a candidate for heart transplant, sarcoidosis on chronic steroids, and 2 CVAs (L parietal, w/o residual deficits), with seizures episodes x5 immediately the next day after the second stroke, and since then he has been on phenytoin, w/o recurrence of the episodes. Had a recent admission (6/22-7/4) for dysarthria and concerns for stroke, which was negative in the work up, however during that admission he had several episodes of VT and A.fib and therefore after cardiology work up was discharged on amiodarone/warfarin/Lovenox bridge and phenytoin was changed to levetiracetam due to interactions with warfarin.   on 7/8 he presented with hematochezia and hypotension down to 70s. At OSH he was noted to have a 2.5 unit drop in Hgb and increase in Cr up to 2.8, he was transferred to [facility] and received IABP on 7/10 due to cardiogenic shock, currently on vasopressors. Also due to impending respiratory failure he was intubated and started on sedation. Yesterday evening he was noted by the daughter and the nurse to have twitching movements in the facial muscles and bilateral upper extremities that took 5 minutes and has not recurred since.

## 2018-07-13 NOTE — ASSESSMENT & PLAN NOTE
Nidus for the seizures  Most recent A1C on 6/23/18: 6.5, glucose running ~150-160 during this admission  Most recent lipid panel on 7/27/18: , Chol 205  - continue on secondary stroke prevention with ASA and high dose statin daily

## 2018-07-13 NOTE — PROGRESS NOTES
Dr. Lazo and cardiology team at bedside. Updated on pt's current vitals, gtts, CVP, and urine output. Orders entered for sputum culture, scheduled lasix, and to restart trickle feeds at this time.

## 2018-07-13 NOTE — CONSULTS
"[facility]  Infectious Disease  Consult Note    Patient Name: Yonathan Good Jr.  MRN: [id number]  Admission Date: 7/7/2018  Hospital Length of Stay: 6 days  Attending Physician: Mohan Cross MD  Primary Care Provider: Acacia Gates MD     Isolation Status: No active isolations    Patient information was obtained from past medical records.      Inpatient consult to Infectious Diseases  Consult performed by: ACACIA TEJADA  Consult ordered by: DALE OWEN  Reason for consult: leukocytosis        Assessment/Plan:     Leukocytosis    58 yo male on whom ID is consulted for possible VAP. On chronic steroids for sarcoidosis.    -currently on vanco and cefepime  -has leukocytosis and increase in secretions per nursing  -CXR could be consistent with infection vs pulmonary edema  -however, has no change in vent settings or fever and its possible that the leukocytosis is due to stress dose steroids  -I think pulmonary infection is less likely based on the above information but given that he is acutely ill will continue vanco and cefepime for now  -however, will consider quick de-escalation if no further evidence of infection   -await pending cx             Thank you for your consult. I will follow-up with patient. Please contact us if you have any additional questions.    Acacia Tejada MD  Infectious Disease  [facility]    Subjective:     Principal Problem: Ventricular tachycardia, incessant    HPI: Patient is intubated so hx is from chart: Per H&P:    57 year old male with PMHx significant for CAD s/p PCIs, HFrEF secondary to ischemic cardiomyopathy (EF 5-10%) s/p ICD, Afib (on warfarin), pulmonary sarcoid (on chronic prednisone), hx of L parietal CVA recently dc'ed on 7/4.       He was initially admitted to "stroke/neuro" service on 6/22 with dysarthria and stroke-like symptoms. Extensive work up was negative for recurrent CVA and so no tPA was administered. Patient " developed atypical chest pain two days into his hospital course and was noted to be in monomorphic VT (6/24) which was treated with ATP then with shock due to recurrent VT in VF zone. Given his active cardiac issues this prompted transfer to heart failure service where the patient was initiated on IV amiodarone and beta blocker with subsequent resolution of his VT. He was eventually transitioned to PO amiodarone 400 mg BID x 2 weeks (from 6/25-7/9) followed by amiodarone 400 mg daily thereafter per EP recommendations. Of note patient underwent LHC on 6/25 given his extensive ischemic history which did not reveal a culprit lesion, therefore no interventions were done. He was noted to have an elevated LVEDP to 45 however and was administered IV diuresis before being transitioned back to his PO diuretic regimen. Patient also completed heart failure pathway during his hospital course (eg completed colonoscopy on 7/2) as part of his work up for advanced options. Follows with Dr. Berman of heart failure/transplant as an outpatient.      The patient was discharged home in stable condition on 7/4/2018. He was dc'ed on warf/lovenox bridge given CHADS2-VaSc of 5.  Of note, he is no longer a candidate for AO.  He presented yesterday to [facility] with BRBPR and was found to be in acute renal failure as well.  He notes that, on the night of the 5th and morning of 6th, he had 6 bright red bloody bm's.  He went to his PMD who noted he might have internal hemmorhoids.  He was told to stop his lovenox.  He then returned home and flet very lightheaded and dizzy.  He wisely took his blood pressure and noted it was 70-80/50s whereas it is normally 117/70s.  He went in to ED immediately.       He is now intubated in the ICU. He is on stress dose steroids. ID is consulted for possible VAP.    Past Medical History:   Diagnosis Date    AICD (automatic cardioverter/defibrillator) present     Arthritis     Atrial fibrillation      CHF (congestive heart failure)     Coronary artery disease     History of stroke 7/13/2018    2005, 2006, no deficits per wife.    Hypertension     MI (myocardial infarction)     Sarcoid     Seizures     Stroke        Past Surgical History:   Procedure Laterality Date    CARDIAC CATHETERIZATION      CARDIAC DEFIBRILLATOR PLACEMENT  7-12    CARDIAC DEFIBRILLATOR PLACEMENT      CARDIAC DEFIBRILLATOR PLACEMENT      COLONOSCOPY N/A 7/2/2018    Procedure: COLONOSCOPY;  Surgeon: THELMA Kay MD;  Location: [facility] ENDO ([address] [place] [address]);  Service: Endoscopy;  Laterality: N/A;    CORONARY STENT PLACEMENT  07/2011    ESOPHAGOGASTRODUODENOSCOPY      FRACTURE SURGERY      LEFT HEART CATHETERIZATION N/A 6/25/2018    Procedure: Left heart cath;  Surgeon: Jordi Lui MD;  Location: [facility] CATH LAB;  Service: Cardiology;  Laterality: N/A;       Review of patient's allergies indicates:  No Known Allergies    Medications:  Prescriptions Prior to Admission   Medication Sig    albuterol (PROVENTIL) 2.5 mg /3 mL (0.083 %) nebulizer solution Take 2.5 mg by nebulization every 6 (six) hours as needed.    albuterol (VENTOLIN HFA) 90 mcg/actuation inhaler Inhale 2 puffs into the lungs every 4 (four) hours as needed for Wheezing.    alprazolam (XANAX) 2 MG tablet Take 2 mg by mouth 2 (two) times daily as needed.     amiodarone (PACERONE) 400 MG tablet Take 1 tablet (400 mg total) by mouth 2 (two) times daily until 7/9/2018. Then take 1 tablet (400 mg total) by mouth 1 (one) time daily thereafter.    aspirin (ECOTRIN) 81 MG EC tablet Take 81 mg by mouth. 1 Tablet, Delayed Release (E.C.) Oral Every day    atorvastatin (LIPITOR) 40 MG tablet Take 1 tablet (40 mg total) by mouth once daily.    bumetanide (BUMEX) 1 MG tablet Take 1 mg by mouth daily as needed.    carisoprodol (SOMA) 350 MG tablet Take 350 mg by mouth 4 (four) times daily as needed for Muscle spasms.    colchicine 0.6 mg tablet 0.6 mg once daily.      enoxaparin (LOVENOX) 100 mg/mL Syrg Inject 1 mL (100 mg total) into the skin every 12 (twelve) hours.    fluticasone-vilanterol (BREO ELLIPTA) 200-25 mcg/dose DsDv diskus inhaler Inhale 1 puff into the lungs once daily. Controller    gabapentin (NEURONTIN) 600 MG tablet Take 600 mg by mouth 2 (two) times daily. 1 Tablet Oral At bedtime    hydrocodone-acetaminophen 10-325mg (NORCO)  mg Tab Take 1 tablet by mouth 2 (two) times daily as needed.     isosorbide mononitrate (IMDUR) 30 MG 24 hr tablet Take 3 tablets (90 mg total) by mouth once daily. (Patient taking differently: Take 60 mg by mouth once daily. )    levETIRAcetam (KEPPRA) 500 MG Tab Take 1 tablet (500 mg total) by mouth 2 (two) times daily.    losartan (COZAAR) 50 MG tablet Take 1 tablet (50 mg total) by mouth once daily.    metoprolol tartrate (LOPRESSOR) 25 MG tablet Take 1 tablet (25 mg total) by mouth 2 (two) times daily.    nitroGLYCERIN (NITROSTAT) 0.4 MG SL tablet ONE TABLET UNDER TONGUE AS NEEDED FOR CHEST PAIN    pantoprazole (PROTONIX) 40 MG tablet Take 40 mg by mouth. 1 Tablet, Delayed Release (E.C.) Oral Every day    potassium chloride SA (K-DUR,KLOR-CON) 20 MEQ tablet TAKE 2 TABLETS BY MOUTH EVERY MORNING AND 1 TABLET BY MOUTH EVERY EVENING    predniSONE (DELTASONE) 10 MG tablet Take 1 tablet (10 mg total) by mouth once daily.    promethazine-codeine 6.25-10 mg/5 ml (PHENERGAN WITH CODEINE) 6.25-10 mg/5 mL syrup TK 5 ML PO  Q 6 H PRN    spironolactone (ALDACTONE) 25 MG tablet TAKE 1 TABLET BY MOUTH EVERY DAY    warfarin (COUMADIN) 7.5 MG tablet 1 tablet Monday  0.5 tablet Tuesday-Sunday    [DISCONTINUED] furosemide (LASIX) 40 MG tablet TAKE 2 TABLETS BY MOUTH TWICE DAILY     Antibiotics     Start     Stop Route Frequency Ordered    07/13/18 1145  ceFEPIme injection 2 g      -- IV Every 8 hours (non-standard times) 07/13/18 1031    07/13/18 1145  vancomycin in dextrose 5 % 1 gram/250 mL IVPB 1,000 mg  (Vancomycin IVPB with  levels panel)      -- IV Every 12 hours (non-standard times) 07/13/18 1031        Antifungals     None        Antivirals     None           Immunization History   Administered Date(s) Administered    Influenza - Quadrivalent - PF 10/23/2014       Family History     Problem Relation (Age of Onset)    Cancer Maternal Grandmother    Coronary artery disease Father, Sister, Sister    Heart disease Mother, Father, Sister    Hypertension Mother, Father, Sister    Kidney disease Sister    Stroke Sister    Vision loss Sister        Social History     Social History    Marital status: Other     Spouse name: N/A    Number of children: N/A    Years of education: N/A     Social History Main Topics    Smoking status: Never Smoker    Smokeless tobacco: Never Used    Alcohol use No    Drug use: No    Sexual activity: Not Asked     Other Topics Concern    None     Social History Narrative    None     Review of Systems   Unable to perform ROS: Intubated     Objective:     Vital Signs (Most Recent):  Temp: 98.3 °F (36.8 °C) (07/13/18 1100)  Pulse: 80 (07/13/18 1230)  Resp: 16 (07/13/18 1230)  BP: (!) 98/58 (07/13/18 1215)  SpO2: 97 % (07/13/18 1230) Vital Signs (24h Range):  Temp:  [97.4 °F (36.3 °C)-98.6 °F (37 °C)] 98.3 °F (36.8 °C)  Pulse:  [80] 80  Resp:  [10-39] 16  SpO2:  [93 %-100 %] 97 %  BP: ()/(52-93) 98/58     Weight: 110.5 kg (243 lb 9.7 oz)  Body mass index is 33.98 kg/m².    Estimated Creatinine Clearance: 55.5 mL/min (A) (based on SCr of 1.9 mg/dL (H)).    Physical Exam   Constitutional: He is oriented to person, place, and time. He appears well-developed and well-nourished. He is sedated and intubated.   HENT:   Head: Normocephalic and atraumatic.   Eyes: EOM are normal. Pupils are equal, round, and reactive to light.   Neck: Normal range of motion. Neck supple. No JVD (difficult to assess (patient on ventilator & flat in bed)) present.   Right CVC in place   Cardiovascular: Normal rate and regular  rhythm.    IABP 1:1. IABP site clean/dry/intact. Feet slightly cool to touch, otherwise warm and well perfused.    Pulmonary/Chest: Effort normal. He is intubated. No respiratory distress.   Coarse breath sounds. Ventilated   Abdominal: Soft. He exhibits no distension. Bowel sounds are decreased. There is no tenderness.   Musculoskeletal: Normal range of motion. He exhibits no edema.   Neurological: He is alert and oriented to person, place, and time.   Skin: Skin is warm and dry.   Nursing note and vitals reviewed.      Significant Labs: All pertinent labs within the past 24 hours have been reviewed.    Significant Imaging: I have reviewed all pertinent imaging results/findings within the past 24 hours.

## 2018-07-13 NOTE — SUBJECTIVE & OBJECTIVE
Past Medical History:   Diagnosis Date    AICD (automatic cardioverter/defibrillator) present     Arthritis     Atrial fibrillation     CHF (congestive heart failure)     Coronary artery disease     History of stroke 7/13/2018    2005, 2006, no deficits per wife.    Hypertension     MI (myocardial infarction)     Sarcoid     Seizures     Stroke        Past Surgical History:   Procedure Laterality Date    CARDIAC CATHETERIZATION      CARDIAC DEFIBRILLATOR PLACEMENT  7-12    CARDIAC DEFIBRILLATOR PLACEMENT      CARDIAC DEFIBRILLATOR PLACEMENT      COLONOSCOPY N/A 7/2/2018    Procedure: COLONOSCOPY;  Surgeon: THELMA Kay MD;  Location: [facility] ENDO ([address] [place] [address]);  Service: Endoscopy;  Laterality: N/A;    CORONARY STENT PLACEMENT  07/2011    ESOPHAGOGASTRODUODENOSCOPY      FRACTURE SURGERY      LEFT HEART CATHETERIZATION N/A 6/25/2018    Procedure: Left heart cath;  Surgeon: Jordi Lui MD;  Location: [facility] CATH LAB;  Service: Cardiology;  Laterality: N/A;       Review of patient's allergies indicates:  No Known Allergies    Family History     Problem Relation (Age of Onset)    Cancer Maternal Grandmother    Coronary artery disease Father, Sister, Sister    Heart disease Mother, Father, Sister    Hypertension Mother, Father, Sister    Kidney disease Sister    Stroke Sister    Vision loss Sister        Social History Main Topics    Smoking status: Never Smoker    Smokeless tobacco: Never Used    Alcohol use No    Drug use: No    Sexual activity: Not on file         Review of Systems   Unable to perform ROS: Patient unresponsive     Objective:     Vital Signs (Most Recent):  Temp: 98.4 °F (36.9 °C) (07/13/18 1500)  Pulse: 80 (07/13/18 1815)  Resp: 16 (07/13/18 1815)  BP: 106/64 (07/13/18 1815)  SpO2: 95 % (07/13/18 1815) Vital Signs (24h Range):  Temp:  [98.3 °F (36.8 °C)-98.6 °F (37 °C)] 98.4 °F (36.9 °C)  Pulse:  [80] 80  Resp:  [10-39] 16  SpO2:  [93 %-100 %] 95 %  BP: ()/(53-87)  106/64     Weight: 110.5 kg (243 lb 9.7 oz)  Body mass index is 33.98 kg/m².      Intake/Output Summary (Last 24 hours) at 07/13/18 1827  Last data filed at 07/13/18 1800   Gross per 24 hour   Intake          2654.81 ml   Output             2350 ml   Net           304.81 ml       Physical Exam   Constitutional: He appears well-developed and well-nourished. He is sedated and intubated.   HENT:   Head: Normocephalic and atraumatic.   Nose: Nose normal.   Neck: No tracheal deviation present.   Cardiovascular: Regular rhythm.    Holosystolic murmur present, AIBP in place via left axilla.  Heart sounds distant   Pulmonary/Chest: No stridor. He is intubated. He has no wheezes. He exhibits no tenderness.   On mechanical ventilation, orally intubated.  Diffuse crackles present in all lung fields.   Abdominal: Soft. Bowel sounds are normal. He exhibits no distension.   Neurological:   sedated   Skin: Skin is warm and dry.       Vents:  Vent Mode: A/C (07/13/18 1705)  Set Rate: 16 bmp (07/13/18 1705)  Vt Set: 450 mL (07/13/18 1705)  PEEP/CPAP: 5 cmH20 (07/13/18 1705)  Oxygen Concentration (%): 30 (07/13/18 1815)  Peak Airway Pressure: 38 cmH2O (07/13/18 1705)  Plateau Pressure: 0 cmH20 (07/13/18 1705)  Total Ve: 7.43 mL (07/13/18 1705)  F/VT Ratio<105 (RSBI): (!) 34.48 (07/13/18 1705)    Lines/Drains/Airways     Central Venous Catheter Line                 Percutaneous Central Line Insertion/Assessment - triple lumen  07/09/18 1500 right internal jugular 4 days         Introducer 07/10/18 1548 other (see comments) 3 days          Drain                 NG/OG Tube 07/10/18 1557 Foster sump 16 Fr. Left nostril 3 days         Urethral Catheter 07/10/18 1646 Double-lumen;Latex 16 Fr. 3 days          Airway                 Airway - Non-Surgical 07/10/18 1523 Endotracheal Tube 3 days          Line                 IABP 07/10/18 1550 7.5 Fr. 40 mL 3 days          Peripheral Intravenous Line                 Peripheral IV - Single Lumen  07/12/18 1100 Right Forearm 1 day                Significant Labs:    CBC/Anemia Profile:    Recent Labs  Lab 07/12/18  0400 07/12/18  1817 07/13/18  0317   WBC 14.12* 14.23* 17.12*   HGB 8.5* 9.1* 8.8*   HCT 25.9* 27.9* 27.2*    181 201   MCV 92 92 92   RDW 14.2 14.4 14.3        Chemistries:    Recent Labs  Lab 07/12/18  0400  07/12/18  1108  07/12/18  1817 07/13/18  0317 07/13/18  1159 07/13/18  1621   *  < > 131*  < > 131* 132* 131* 132*   K 4.5  < > 4.6  < > 4.7 4.9 4.9 4.9   CL 97  < > 96  < > 95 96 95 97   CO2 27  < > 25  < > 24 25 25 26   BUN 23*  < > 27*  < > 31* 36* 42* 42*   CREATININE 1.6*  < > 1.8*  < > 1.9* 1.9* 2.0* 1.8*   CALCIUM 8.7  < > 8.4*  < > 8.6* 8.6* 8.4* 8.7   ALBUMIN 2.8*  < > 2.7*  --  2.9* 2.8*  --   --    PROT 5.5*  < > 5.4*  --  5.8* 5.8*  --   --    BILITOT 0.4  < > 0.3  --  0.3 0.3  --   --    ALKPHOS 64  < > 63  --  67 70  --   --    ALT 33  < > 30  --  33 30  --   --    AST 25  < > 21  --  21 18  --   --    MG 2.1  --   --   --  2.2 1.9  --   --    PHOS 5.6*  --   --   --  6.3* 6.3*  --   --    < > = values in this interval not displayed.    All pertinent labs within the past 24 hours have been reviewed.    Significant Imaging:   I have reviewed all pertinent imaging results/findings within the past 24 hours.

## 2018-07-13 NOTE — SUBJECTIVE & OBJECTIVE
Past Medical History:   Diagnosis Date    AICD (automatic cardioverter/defibrillator) present     Arthritis     Atrial fibrillation     CHF (congestive heart failure)     Coronary artery disease     History of stroke 7/13/2018    2005, 2006, no deficits per wife.    Hypertension     MI (myocardial infarction)     Sarcoid     Seizures     Stroke        Past Surgical History:   Procedure Laterality Date    CARDIAC CATHETERIZATION      CARDIAC DEFIBRILLATOR PLACEMENT  7-12    CARDIAC DEFIBRILLATOR PLACEMENT      CARDIAC DEFIBRILLATOR PLACEMENT      COLONOSCOPY N/A 7/2/2018    Procedure: COLONOSCOPY;  Surgeon: THELMA Kay MD;  Location: [facility] ENDO ([address] [place] [address]);  Service: Endoscopy;  Laterality: N/A;    CORONARY STENT PLACEMENT  07/2011    ESOPHAGOGASTRODUODENOSCOPY      FRACTURE SURGERY      LEFT HEART CATHETERIZATION N/A 6/25/2018    Procedure: Left heart cath;  Surgeon: Jordi Lui MD;  Location: [facility] CATH LAB;  Service: Cardiology;  Laterality: N/A;       Review of patient's allergies indicates:  No Known Allergies    Current Neurological Medications:   Levetiracetam 1 gr q12    No current facility-administered medications on file prior to encounter.      Current Outpatient Prescriptions on File Prior to Encounter   Medication Sig    albuterol (PROVENTIL) 2.5 mg /3 mL (0.083 %) nebulizer solution Take 2.5 mg by nebulization every 6 (six) hours as needed.    albuterol (VENTOLIN HFA) 90 mcg/actuation inhaler Inhale 2 puffs into the lungs every 4 (four) hours as needed for Wheezing.    alprazolam (XANAX) 2 MG tablet Take 2 mg by mouth 2 (two) times daily as needed.     amiodarone (PACERONE) 400 MG tablet Take 1 tablet (400 mg total) by mouth 2 (two) times daily until 7/9/2018. Then take 1 tablet (400 mg total) by mouth 1 (one) time daily thereafter.    aspirin (ECOTRIN) 81 MG EC tablet Take 81 mg by mouth. 1 Tablet, Delayed Release (E.C.) Oral Every day    atorvastatin (LIPITOR) 40 MG  tablet Take 1 tablet (40 mg total) by mouth once daily.    bumetanide (BUMEX) 1 MG tablet Take 1 mg by mouth daily as needed.    carisoprodol (SOMA) 350 MG tablet Take 350 mg by mouth 4 (four) times daily as needed for Muscle spasms.    colchicine 0.6 mg tablet 0.6 mg once daily.     enoxaparin (LOVENOX) 100 mg/mL Syrg Inject 1 mL (100 mg total) into the skin every 12 (twelve) hours.    fluticasone-vilanterol (BREO ELLIPTA) 200-25 mcg/dose DsDv diskus inhaler Inhale 1 puff into the lungs once daily. Controller    gabapentin (NEURONTIN) 600 MG tablet Take 600 mg by mouth 2 (two) times daily. 1 Tablet Oral At bedtime    hydrocodone-acetaminophen 10-325mg (NORCO)  mg Tab Take 1 tablet by mouth 2 (two) times daily as needed.     isosorbide mononitrate (IMDUR) 30 MG 24 hr tablet Take 3 tablets (90 mg total) by mouth once daily. (Patient taking differently: Take 60 mg by mouth once daily. )    levETIRAcetam (KEPPRA) 500 MG Tab Take 1 tablet (500 mg total) by mouth 2 (two) times daily.    losartan (COZAAR) 50 MG tablet Take 1 tablet (50 mg total) by mouth once daily.    metoprolol tartrate (LOPRESSOR) 25 MG tablet Take 1 tablet (25 mg total) by mouth 2 (two) times daily.    nitroGLYCERIN (NITROSTAT) 0.4 MG SL tablet ONE TABLET UNDER TONGUE AS NEEDED FOR CHEST PAIN    pantoprazole (PROTONIX) 40 MG tablet Take 40 mg by mouth. 1 Tablet, Delayed Release (E.C.) Oral Every day    potassium chloride SA (K-DUR,KLOR-CON) 20 MEQ tablet TAKE 2 TABLETS BY MOUTH EVERY MORNING AND 1 TABLET BY MOUTH EVERY EVENING    predniSONE (DELTASONE) 10 MG tablet Take 1 tablet (10 mg total) by mouth once daily.    promethazine-codeine 6.25-10 mg/5 ml (PHENERGAN WITH CODEINE) 6.25-10 mg/5 mL syrup TK 5 ML PO  Q 6 H PRN    spironolactone (ALDACTONE) 25 MG tablet TAKE 1 TABLET BY MOUTH EVERY DAY    warfarin (COUMADIN) 7.5 MG tablet 1 tablet Monday  0.5 tablet Tuesday-Sunday     Family History     Problem Relation (Age of Onset)     Cancer Maternal Grandmother    Coronary artery disease Father, Sister, Sister    Heart disease Mother, Father, Sister    Hypertension Mother, Father, Sister    Kidney disease Sister    Stroke Sister    Vision loss Sister        Social History Main Topics    Smoking status: Never Smoker    Smokeless tobacco: Never Used    Alcohol use No    Drug use: No    Sexual activity: Not on file     Review of Systems   Unable to perform ROS: Intubated   Constitutional: Negative for fever.     Objective:     Vital Signs (Most Recent):  Temp: 98.4 °F (36.9 °C) (07/13/18 1500)  Pulse: 80 (07/13/18 1615)  Resp: 16 (07/13/18 1615)  BP: 114/61 (07/13/18 1615)  SpO2: 96 % (07/13/18 1615) Vital Signs (24h Range):  Temp:  [98.2 °F (36.8 °C)-98.6 °F (37 °C)] 98.4 °F (36.9 °C)  Pulse:  [80] 80  Resp:  [10-39] 16  SpO2:  [93 %-100 %] 96 %  BP: ()/(53-93) 114/61     Weight: 110.5 kg (243 lb 9.7 oz)  Body mass index is 33.98 kg/m².    Physical Exam   Constitutional: He appears well-developed and well-nourished. He is intubated.   HENT:   Head: Normocephalic.   Cardiovascular: Normal rate.    Pulmonary/Chest: No tachypnea. He is intubated.   Musculoskeletal: He exhibits no edema.   Neurological: He is unresponsive. He displays no seizure activity.   Patient on propofol and fentanyl, therefore unable to assess neurologically  Pupils pinpoint bilaterally   No abnormal twitching movements were noticed in the facial or UE muscles  Tone normal overall   Nursing note and vitals reviewed.           Significant Labs:   CBC:   Recent Labs  Lab 07/12/18  0400 07/12/18 1817 07/13/18  0317   WBC 14.12* 14.23* 17.12*   HGB 8.5* 9.1* 8.8*   HCT 25.9* 27.9* 27.2*    181 201     CMP:   Recent Labs  Lab 07/12/18  0400  07/12/18  1108  07/12/18 1817 07/13/18  0317 07/13/18  1159   *  < > 158*  < > 140* 154* 159*   *  < > 131*  < > 131* 132* 131*   K 4.5  < > 4.6  < > 4.7 4.9 4.9   CL 97  < > 96  < > 95 96 95   CO2 27  < > 25   < > 24 25 25   BUN 23*  < > 27*  < > 31* 36* 42*   CREATININE 1.6*  < > 1.8*  < > 1.9* 1.9* 2.0*   CALCIUM 8.7  < > 8.4*  < > 8.6* 8.6* 8.4*   MG 2.1  --   --   --  2.2 1.9  --    PROT 5.5*  < > 5.4*  --  5.8* 5.8*  --    ALBUMIN 2.8*  < > 2.7*  --  2.9* 2.8*  --    BILITOT 0.4  < > 0.3  --  0.3 0.3  --    ALKPHOS 64  < > 63  --  67 70  --    AST 25  < > 21  --  21 18  --    ALT 33  < > 30  --  33 30  --    ANIONGAP 9  < > 10  < > 12 11 11   EGFRNONAA 47.8*  < > 41.4*  < > 38.8* 38.8* 36.5*   < > = values in this interval not displayed.    Significant Imaging: CT: I have reviewed all pertinent results/findings within the past 24 hours and my personal findings are:  CTH showed L parieto-occipital encephalomalacia w/o any acute intracranial pathology  CXR: I have reviewed all pertinent results/findings within the past 24 hours and my personal findings are:  bilateral infiltrations and nodular opacities + right-sided pleural effusion  EEG: I have reviewed all pertinent results/findings within the past 24 hours and my personal findings are:  pending read

## 2018-07-13 NOTE — PROGRESS NOTES
Update:    SW to pt's room to provide support to pt's family. Pt intubated and sedated. Pt's dtr present, aaox4, calm, and pleasant. Pt's dtr appropriately tearful at times. Pt's dtr reports she understand's MD's prognosis, however she is believing for the best. Pt's dtr reports her sister will be arriving at the hospital later today. SW providing emotional support. SW providing ongoing psychosocial and counseling support, education, assistance, resources, and discharge planning as indicated. SW continuing to follow and remains available.

## 2018-07-13 NOTE — NURSING
Patient transported to 1st floor CT scan via ICU bed accompanied by SICU RN x3, PCT, and RT.  Safety maintained throughout transport; portable cardiac monitoring in place, and portable vent for travel.  After CT, patient was safety transported back to SICU [address], and reconnected to the bedside cardiac monitor and bedside ventilator.  IABP power cable plugged back into the wall.  Bed wheels locked, bed in lowest position.

## 2018-07-13 NOTE — ASSESSMENT & PLAN NOTE
- Blood cultures x 2 today  - will cover for VAP, vomiting during code and intubation (aspiration risk) and worsening opacities on CXR today with rising leukocytosis  - continue vanc and cefepime for now and await ID recs

## 2018-07-13 NOTE — NURSING
"Dr. Tran at the patient's bedside; MD spoke to the Neurology service in regards to the patient's "twitching" episodes.  New orders were placed for STAT head CT, 24 hr video EEG monitoring, and orders to increase Keppra dose to 1,000 mg.  Orders received and acknowledged.    "

## 2018-07-13 NOTE — ASSESSMENT & PLAN NOTE
- Blood cultures x 2 today  - will cover for VAP, vomiting during code and intubation (aspiration risk) and worsening opacities on CXR today with rising leukocytosis  - continue vanc and cefepime for now and await ID recs  - last BM 6/9, KUB reviewed in EPIC.  Start liquid colace for bowel regimen.

## 2018-07-13 NOTE — NURSING
Dr. Tran notified about SvO2 of 58%.  New orders to administer 40mg of Lasix IV push.  Will carry out new orders.  Will continue to monitor patient.

## 2018-07-13 NOTE — ASSESSMENT & PLAN NOTE
-VT storm 7/10/18. Polymorphic and monomorphic. Degenerated into VF. 12 ICD shocks  -Intubated, IABP placed emergently at bedside.   -Amio loaded x 2, gtt started per protocol. Continue 0.5 mg/min today. Can switch to PO via NG tube if patient tolerates trickle tube feeds today. Lidocaine gtt started at 1, increased to 2 when patient had more VT-->VF and AICD shocks. Continue at 1 mg/min for now.  -EP consulted, appreciate their assistance. Pacing rate increased to 80, metoprolol IV added.  -Continue support with IABP 1:1  -K goal >4.5, Mg >2.5    - regimen per EP recs one patient is able to take PO   - Toprol 150 mg, mexiletine 200 BID, Amio 200 TID for 1 weeks, 200 BID for 4 weeks, 300 QD thereafter

## 2018-07-13 NOTE — ASSESSMENT & PLAN NOTE
Most likely lower threshold for seizures due to medical illness (hypoxia, renal failure, electrolyte abnormalities) as well as recent change in AEDs.  currently unable to examine properly due to sedation, however no twitching movements in facial muscles or abnormal eye movements were noted    Recommendations:  - obtained CTH > w/o acute intracranial pathology  - EEG pending, will f/u the results  - please try to address electrolytes derangements  - levetiracetam was increased to 1 gr q12 yesterday, continue current dose

## 2018-07-13 NOTE — ASSESSMENT & PLAN NOTE
Sarcoid appears to have minimal contribution to current condition.     - continue steroids as prescribed and taper once condition improves.

## 2018-07-13 NOTE — ASSESSMENT & PLAN NOTE
- Continue keppra (changed to IV)  - possible seizure activity noted on 7/12/18 (twitching right face and UEs lasting 5 min)   - neuro consulted and increased keppra to 1000 IV 12   - continuous EEG read and negative for seizure activity   - will de-escalation of keppra with neuro

## 2018-07-13 NOTE — PLAN OF CARE
Consult to General Neurology acknowledged and case was briefly discussed with the consulting service resident. Consult for new twitches in the UEs and the perioral area.    Patient has previous history of left parietal infarct and has been on Keppra 500 mg Q12, uncertain if only for prophylaxis or if he has had true seizures since infarction. No history of seizures prior to the stroke as far as my chart check reveals.    Patient is in hospital for cardiogenic shock with acute hypoxic respiratory failure, now with persistent twitching of the mouth and UEs reported. These may be myoclonic activity s/p hypoxia, or may be epileptic given his medical history. Recommendations:     - EEG now to rule out status epilepticus. If status, increase propofol to burst suppression and contact neuro critical care service.   - Increase Keppra to 1000 mg Q12. Keppra can also help to control myoclonus if the twitching is not epileptiform. Creatinine noted to be 1.9   - CT Head when feasible   - Keep K > 4.0 and Mg > 2.0    We will follow up with the patient and full note and further recommendations in the morning.    Ruben Gonzáles MD  [facility]

## 2018-07-14 NOTE — SUBJECTIVE & OBJECTIVE
Interval History: No events overnight    Objective:     Vital Signs (Most Recent):  Temp: 98.5 °F (36.9 °C) (07/14/18 1100)  Pulse: 80 (07/14/18 1200)  Resp: 20 (07/14/18 1200)  BP: 94/64 (07/14/18 1200)  SpO2: 100 % (07/14/18 1200) Vital Signs (24h Range):  Temp:  [97.5 °F (36.4 °C)-98.7 °F (37.1 °C)] 98.5 °F (36.9 °C)  Pulse:  [80-87] 80  Resp:  [15-39] 20  SpO2:  [87 %-100 %] 100 %  BP: ()/(50-93) 94/64     Weight: 111.2 kg (245 lb 2.4 oz)  Body mass index is 34.19 kg/m².      Intake/Output Summary (Last 24 hours) at 07/14/18 1227  Last data filed at 07/14/18 1100   Gross per 24 hour   Intake          3417.11 ml   Output             3885 ml   Net          -467.89 ml       Physical Exam   Constitutional: He appears well-developed and well-nourished.   HENT:   Head: Normocephalic and atraumatic.   Cardiovascular: Normal rate and regular rhythm.    Balloon pump in place   Pulmonary/Chest: Effort normal and breath sounds normal.   Abdominal: Soft. Bowel sounds are normal.   Nursing note and vitals reviewed.      Vents:  Vent Mode: A/C (07/14/18 1148)  Set Rate: 20 bmp (07/14/18 1148)  Vt Set: 450 mL (07/14/18 1148)  PEEP/CPAP: 10 cmH20 (07/14/18 1148)  Oxygen Concentration (%): 40 (07/14/18 1200)  Peak Airway Pressure: 28 cmH2O (07/14/18 1148)  Plateau Pressure: 26 cmH20 (07/14/18 1148)  Total Ve: 9.35 mL (07/14/18 1148)  F/VT Ratio<105 (RSBI): (!) 42.74 (07/14/18 1148)    Lines/Drains/Airways     Central Venous Catheter Line                 Percutaneous Central Line Insertion/Assessment - triple lumen  07/09/18 1500 right internal jugular 4 days         Introducer 07/10/18 1548 other (see comments) 3 days          Drain                 NG/OG Tube 07/10/18 1557 Coffey sump 16 Fr. Left nostril 3 days         Urethral Catheter 07/10/18 1646 Double-lumen;Latex 16 Fr. 3 days          Airway                 Airway - Non-Surgical 07/10/18 1523 Endotracheal Tube 3 days          Line                 IABP 07/10/18 1550  7.5 Fr. 40 mL 3 days          Peripheral Intravenous Line                 Peripheral IV - Single Lumen 07/12/18 1100 Right Forearm 2 days                Significant Labs:    CBC/Anemia Profile:    Recent Labs  Lab 07/12/18 1817 07/13/18 0317 07/14/18  0400   WBC 14.23* 17.12* 13.19*   HGB 9.1* 8.8* 8.3*   HCT 27.9* 27.2* 25.9*    201 160   MCV 92 92 94   RDW 14.4 14.3 14.6*        Chemistries:    Recent Labs  Lab 07/12/18 1817 07/13/18 0317  07/14/18  0000 07/14/18  0400 07/14/18  0956   * 132*  < > 133* 131* 136   K 4.7 4.9  < > 4.8 4.8  4.8 4.9   CL 95 96  < > 99 97 100   CO2 24 25  < > 25 26 26   BUN 31* 36*  < > 48* 49* 53*   CREATININE 1.9* 1.9*  < > 1.8* 1.8* 1.7*   CALCIUM 8.6* 8.6*  < > 8.0* 8.2* 8.6*   ALBUMIN 2.9* 2.8*  --   --  2.5*  --    PROT 5.8* 5.8*  --   --  5.6*  --    BILITOT 0.3 0.3  --   --  0.3  --    ALKPHOS 67 70  --   --  59  --    ALT 33 30  --   --  22  --    AST 21 18  --   --  12  --    MG 2.2 1.9  --   --  2.6  --    PHOS 6.3* 6.3*  --   --   --   --    < > = values in this interval not displayed.    All pertinent labs within the past 24 hours have been reviewed.    Significant Imaging:  I have reviewed all pertinent imaging results/findings within the past 24 hours.

## 2018-07-14 NOTE — PLAN OF CARE
VS currently stable Pt remains intubated A/C 40% FiO2 and 10 of peep. IABP trigger EKG, 1:1 Site clean dry and intact. Neurovascular checks performed q1h see flowsheets. CVP currently 12. Adequate UO. Norepinephrine gtt off an on currently at 0.02mcg/kg/min, Lidocaine remains at 1mg/min, Propfol and fentanyl titrated for RASS of -3. Pt awakens and will follow commands when sedation is off, but is very slow to do so. Tube feeds at goal of 55cc/hr with minimal residual. Bilateral soft wrist restraints monitored per policy..Plan of care reviewed with daughter. Will continue to monitor.

## 2018-07-14 NOTE — PROGRESS NOTES
[facility]  Heart Transplant  Progress Note    Patient Name: Yonathan Good Jr.  MRN: [id number]  Admission Date: 7/7/2018  Hospital Length of Stay: 7 days  Attending Physician: Mohan Cross MD  Primary Care Provider: Edgar Gates MD  Principal Problem:Ventricular tachycardia, incessant    Subjective:     Interval History: No sustained VT, O2 dropped overnight, vent settings per pulm, daughter at bedside this AM.    Continuous Infusions:   fentanyl 10 mcg/hr (07/14/18 0900)    lidocaine 1 mg/min (07/14/18 0900)    norepinephrine bitartrate-D5W 0.02 mcg/kg/min (07/14/18 0900)    propofol 40 mcg/kg/min (07/14/18 0900)     Scheduled Meds:   albuterol sulfate  2.5 mg Nebulization Q4H    amiodarone  200 mg Oral TID    aspirin  81 mg Oral Daily    atorvastatin  40 mg Oral Daily    ceFEPime (MAXIPIME) IVPB  2 g Intravenous Q8H    chlorhexidine  15 mL Mouth/Throat BID    chlorhexidine  15 mL Mouth/Throat BID    docusate  100 mg Oral Daily    fluticasone-vilanterol  1 puff Inhalation Daily    furosemide  80 mg Intravenous BID    gabapentin  600 mg Oral BID    hydrocortisone sodium succinate  100 mg Intravenous Q8H    levetiracetam IVPB  1,000 mg Intravenous Q12H    metoprolol  2.5 mg Intravenous Q4H    pantoprozole (PROTONIX) 40 mg/100 mL D5W IVPB  40 mg Intravenous BID    sodium chloride 0.9%  3 mL Intravenous Q8H    vancomycin (VANCOCIN) IVPB  1,000 mg Intravenous Q12H     PRN Meds:sodium chloride, ALPRAZolam, benzonatate, calcium gluconate IVPB, calcium gluconate IVPB, calcium gluconate IVPB, carisoprodol, dextrose 50%, glucagon (human recombinant), HYDROcodone-acetaminophen, insulin aspart U-100, magnesium sulfate IVPB, magnesium sulfate IVPB, nitroGLYCERIN, potassium chloride in water **AND** potassium chloride in water **AND** potassium chloride in water, sodium phosphate IVPB, sodium phosphate IVPB, sodium phosphate IVPB    Review of patient's allergies indicates:  No Known  Allergies  Objective:     Vital Signs (Most Recent):  Temp: 98.7 °F (37.1 °C) (07/14/18 0700)  Pulse: 80 (07/14/18 1000)  Resp: 16 (07/14/18 1000)  BP: (!) 103/57 (07/14/18 1000)  SpO2: 98 % (07/14/18 1000) Vital Signs (24h Range):  Temp:  [97.5 °F (36.4 °C)-98.7 °F (37.1 °C)] 98.7 °F (37.1 °C)  Pulse:  [80-87] 80  Resp:  [15-39] 16  SpO2:  [87 %-100 %] 98 %  BP: ()/(50-93) 103/57     Patient Vitals for the past 72 hrs (Last 3 readings):   Weight   07/14/18 0400 111.2 kg (245 lb 2.4 oz)   07/13/18 0300 110.5 kg (243 lb 9.7 oz)   07/12/18 0400 109.8 kg (242 lb 1 oz)     Body mass index is 34.19 kg/m².      Intake/Output Summary (Last 24 hours) at 07/14/18 1025  Last data filed at 07/14/18 0933   Gross per 24 hour   Intake          3362.11 ml   Output             4000 ml   Net          -637.89 ml       Hemodynamic Parameters:       Telemetry: no sustained VT    Physical Exam   Constitutional: He is oriented to person, place, and time. He appears well-developed and well-nourished. He is sedated and intubated.   HENT:   Head: Normocephalic and atraumatic.   Eyes: EOM are normal. Pupils are equal, round, and reactive to light.   Neck: Normal range of motion. Neck supple. No JVD (difficult to assess (patient on ventilator & flat in bed)) present.   Right CVC in place   Cardiovascular: Normal rate, regular rhythm and intact distal pulses.    IABP 1:1. IABP site clean/dry/intact. Feet slightly cool to touch, otherwise warm and well perfused.    Pulmonary/Chest: Effort normal. He is intubated. No respiratory distress.   Coarse breath sounds. Ventilated   Abdominal: Soft. He exhibits no distension. Bowel sounds are decreased. There is no tenderness.   Musculoskeletal: Normal range of motion. He exhibits no edema.   Neurological: He is alert and oriented to person, place, and time.   Skin: Skin is warm and dry.   Nursing note and vitals reviewed.      Significant Labs:  CBC:    Recent Labs  Lab 07/12/18  1817  07/13/18 0317 07/14/18  0400   WBC 14.23* 17.12* 13.19*   RBC 3.03* 2.96* 2.75*   HGB 9.1* 8.8* 8.3*   HCT 27.9* 27.2* 25.9*    201 160   MCV 92 92 94   MCH 30.0 29.7 30.2   MCHC 32.6 32.4 32.0     BNP:    Recent Labs  Lab 07/09/18  0957   *     CMP:    Recent Labs  Lab 07/12/18 1817 07/13/18 0317  07/13/18  1621 07/14/18  0000 07/14/18  0400   * 154*  < > 134* 149* 152*   CALCIUM 8.6* 8.6*  < > 8.7 8.0* 8.2*   ALBUMIN 2.9* 2.8*  --   --   --  2.5*   PROT 5.8* 5.8*  --   --   --  5.6*   * 132*  < > 132* 133* 131*   K 4.7 4.9  < > 4.9 4.8 4.8  4.8   CO2 24 25  < > 26 25 26   CL 95 96  < > 97 99 97   BUN 31* 36*  < > 42* 48* 49*   CREATININE 1.9* 1.9*  < > 1.8* 1.8* 1.8*   ALKPHOS 67 70  --   --   --  59   ALT 33 30  --   --   --  22   AST 21 18  --   --   --  12   BILITOT 0.3 0.3  --   --   --  0.3   < > = values in this interval not displayed.   Coagulation:     Recent Labs  Lab 07/12/18 1817 07/13/18 0317 07/14/18  0400   INR 3.1* 2.4* 1.4*     LDH:  No results for input(s): LDH in the last 72 hours.  Microbiology:  Microbiology Results (last 7 days)     Procedure Component Value Units Date/Time    Blood culture [[id number]] Collected:  07/13/18 1309    Order Status:  Completed Specimen:  Blood from Peripheral, Hand, Left Updated:  07/14/18 0115     Blood Culture, Routine No Growth to date    Blood culture [[id number]] Collected:  07/13/18 1350    Order Status:  Completed Specimen:  Blood from Peripheral, Hand, Right Updated:  07/14/18 0115     Blood Culture, Routine No Growth to date    Culture, Respiratory with Gram Stain [[id number]] Collected:  07/13/18 1129    Order Status:  Completed Specimen:  Respiratory from Endotracheal Aspirate Updated:  07/13/18 1809     Gram Stain (Respiratory) Few WBC's     Gram Stain (Respiratory) No organisms seen          I have reviewed all pertinent labs within the past 24 hours.    Estimated Creatinine Clearance: 58.8 mL/min (A) (based on SCr of 1.8  "mg/dL (H)).    Diagnostic Results:  I have reviewed all pertinent imaging results/findings within the past 24 hours.    Assessment and Plan:     57 year old male with PMHx significant for CAD s/p PCIs, HFrEF secondary to ischemic cardiomyopathy (EF 5-10%) s/p ICD, Afib (on warfarin), pulmonary sarcoid (on chronic prednisone), hx of L parietal CVA recently AL'ed on 7/4.      He was initially admitted to "stroke/neuro" service on 6/22 with dysarthria and stroke-like symptoms. Extensive work up was negative for recurrent CVA and so no tPA was administered. Patient developed atypical chest pain two days into his hospital course and was noted to be in monomorphic VT (6/24) which was treated with ATP then with shock due to recurrent VT in VF zone. Given his active cardiac issues this prompted transfer to heart failure service where the patient was initiated on IV amiodarone and beta blocker with subsequent resolution of his VT. He was eventually transitioned to PO amiodarone 400 mg BID x 2 weeks (from 6/25-7/9) followed by amiodarone 400 mg daily thereafter per EP recommendations. Of note patient underwent LHC on 6/25 given his extensive ischemic history which did not reveal a culprit lesion, therefore no interventions were done. He was noted to have an elevated LVEDP to 45 however and was administered IV diuresis before being transitioned back to his PO diuretic regimen. Patient also completed heart failure pathway during his hospital course (eg completed colonoscopy on 7/2) as part of his work up for advanced options. Follows with Dr. Berman of heart failure/transplant as an outpatient.      The patient was discharged home in stable condition on 7/4/2018. He was dc'ed on warf/lovenox bridge given CHADS2-VaSc of 5.  Of note, he is no longer a candidate for AO.  He presented yesterday to [facility] with BRBPR and was found to be in acute renal failure as well.  He notes that, on the night of the 5th and morning " of 6th, he had 6 bright red bloody bm's.  He went to his PMD who noted he might have internal hemmorhoids.  He was told to stop his lovenox.  He then returned home and flet very lightheaded and dizzy.  He wisely took his blood pressure and noted it was 70-80/50s whereas it is normally 117/70s.  He went in to ED immediately.  There he was noted to have the following pertinent lab values:  Hg 9.2 (11.7 prior)  INR 2.9  Na 131 (139 prior)  K 6.18  BUN 43 (18 prior)  Cr 2.88 (1.1-1.4 at baseline)  proBNP 2755  [place] [facility] [place].  Of note, he underwent screening c-scope on 7/2 during which the following was noted and done: Diverticulosis in the sigmoid colon and in the descending colon, Two 8 to 10 mm polyps in the sigmoid colon and in the descending colon, removed with a hot snare, resected and retrieved, ligated.        * VT Storm    -VT storm 7/10/18. Polymorphic and monomorphic. Degenerated into VF. 12 ICD shocks  -Intubated, IABP placed emergently at bedside.   -Amio loaded x 2, gtt started per protocol.  Now on amio 200mg PO TID. Lidocaine gtt started at 1, increased to 2 when patient had more VT-->VF and AICD shocks. Will d/c lido and start mexitil 150 TID today  -EP consulted, appreciate their assistance. Pacing rate increased to 80, metoprolol IV added.  -Continue support with IABP 1:1  -K goal >4.5, Mg >2.5    - regimen per EP recs one patient is able to take PO   - Toprol 150 mg, mexiletine 200 BID, Amio 200 TID for 1 weeks, 200 BID for 4 weeks, 300 QD thereafter        Leukocytosis    - Blood cultures x 2 today  - will cover for VAP, vomiting during code and intubation (aspiration risk) and worsening opacities on CXR today with rising leukocytosis  - continue vanc and cefepime for now and await ID recs  - last BM 6/9, KUB reviewed in EPIC.  Start liquid colace for bowel regimen.        Encounter for management of intra-aortic balloon pump    -IABP placed 7/10/2018         Respiratory failure requiring intubation    -Emergently intubated 7/10/18 for impending respiratory failure/need for IABP and inability to lay fat  -History of pulmonary sarcoidosis  -Methylpred given 7/10/18, hydrocortisone 100 mg q8 started 7/11/18  -Pulmonary consult for assistance with sarcoid & vent management.  -CXR daily  -Spoke with pulm at bedside today, Concern for VAP vs ARDS in setting of lower pO2 and CXR appearance today. They will adjust vent settings as needed. Appreciate recs.    - started on empiric abx and consult ID.        Cardiogenic shock    - IABP placed emergently 7/10/18  - Augmenting well at 1:1. SVO2 73%  - CVP 12 this am (patient net -jesenia overnight). Continue  lasix 80 BID per Dr. Lazo.         Ventricular fibrillation    -See VT        Hypotension    - Levophed started during code 7/10/18  - Wean for MAP >65 to d/c (using IABP for MAP). Will try to D/C.        Acute renal failure    - Creatinine peaked at 2.5 after VT storm. Trended down to 1.4, holding 1.8-1.9. Will monitor.  - Avoid nephrotoxic agents        Chronic systolic CHF (congestive heart failure)    - See cardiogenic shock        GI bleed    - INR 1.9 today  - Coumadin on hold, will discuss resumption  - Protonix increased to 40 mg BID (changed to IV)  -Had screening colonoscopy with hot snare polypectomy during last admission; most likely culprit post polypectomy bleeding  - H & H stable, normal BMs since admission        History of atrial fibrillation    -  continue amio         Seizure disorder    - Continue keppra (changed to IV)  - possible seizure activity noted on 7/12/18 (twitching right face and UEs lasting 5 min)   - neuro consulted and increased keppra to 1000 IV 12   - continuous EEG read and negative for seizure activity   - will de-escalation of keppra with neuro        CAD (coronary artery disease)    - Continue atorvastatin, nitro prn  - Hold lopressor for now  - ASA on hold since admit, presumably for  GIB concern. Will discuss resumption        Sarcoidosis of lung    - Continue prednisone, breo-ellipta, and albuterol prn              Ethan Byrne DO  Cardiology Fellow    Heart Transplant  [facility]

## 2018-07-14 NOTE — ASSESSMENT & PLAN NOTE
- Levophed started during code 7/10/18  - Wean for MAP >65 to d/c (using IABP for MAP). Will try to D/C.

## 2018-07-14 NOTE — SUBJECTIVE & OBJECTIVE
Interval History: No sustained VT, O2 dropped overnight, vent settings per pulm, daughter at bedside this AM.    Continuous Infusions:   fentanyl 10 mcg/hr (07/14/18 0900)    lidocaine 1 mg/min (07/14/18 0900)    norepinephrine bitartrate-D5W 0.02 mcg/kg/min (07/14/18 0900)    propofol 40 mcg/kg/min (07/14/18 0900)     Scheduled Meds:   albuterol sulfate  2.5 mg Nebulization Q4H    amiodarone  200 mg Oral TID    aspirin  81 mg Oral Daily    atorvastatin  40 mg Oral Daily    ceFEPime (MAXIPIME) IVPB  2 g Intravenous Q8H    chlorhexidine  15 mL Mouth/Throat BID    chlorhexidine  15 mL Mouth/Throat BID    docusate  100 mg Oral Daily    fluticasone-vilanterol  1 puff Inhalation Daily    furosemide  80 mg Intravenous BID    gabapentin  600 mg Oral BID    hydrocortisone sodium succinate  100 mg Intravenous Q8H    levetiracetam IVPB  1,000 mg Intravenous Q12H    metoprolol  2.5 mg Intravenous Q4H    pantoprozole (PROTONIX) 40 mg/100 mL D5W IVPB  40 mg Intravenous BID    sodium chloride 0.9%  3 mL Intravenous Q8H    vancomycin (VANCOCIN) IVPB  1,000 mg Intravenous Q12H     PRN Meds:sodium chloride, ALPRAZolam, benzonatate, calcium gluconate IVPB, calcium gluconate IVPB, calcium gluconate IVPB, carisoprodol, dextrose 50%, glucagon (human recombinant), HYDROcodone-acetaminophen, insulin aspart U-100, magnesium sulfate IVPB, magnesium sulfate IVPB, nitroGLYCERIN, potassium chloride in water **AND** potassium chloride in water **AND** potassium chloride in water, sodium phosphate IVPB, sodium phosphate IVPB, sodium phosphate IVPB    Review of patient's allergies indicates:  No Known Allergies  Objective:     Vital Signs (Most Recent):  Temp: 98.7 °F (37.1 °C) (07/14/18 0700)  Pulse: 80 (07/14/18 1000)  Resp: 16 (07/14/18 1000)  BP: (!) 103/57 (07/14/18 1000)  SpO2: 98 % (07/14/18 1000) Vital Signs (24h Range):  Temp:  [97.5 °F (36.4 °C)-98.7 °F (37.1 °C)] 98.7 °F (37.1 °C)  Pulse:  [80-87] 80  Resp:   [15-39] 16  SpO2:  [87 %-100 %] 98 %  BP: ()/(50-93) 103/57     Patient Vitals for the past 72 hrs (Last 3 readings):   Weight   07/14/18 0400 111.2 kg (245 lb 2.4 oz)   07/13/18 0300 110.5 kg (243 lb 9.7 oz)   07/12/18 0400 109.8 kg (242 lb 1 oz)     Body mass index is 34.19 kg/m².      Intake/Output Summary (Last 24 hours) at 07/14/18 1025  Last data filed at 07/14/18 0933   Gross per 24 hour   Intake          3362.11 ml   Output             4000 ml   Net          -637.89 ml       Hemodynamic Parameters:       Telemetry: no sustained VT    Physical Exam   Constitutional: He is oriented to person, place, and time. He appears well-developed and well-nourished. He is sedated and intubated.   HENT:   Head: Normocephalic and atraumatic.   Eyes: EOM are normal. Pupils are equal, round, and reactive to light.   Neck: Normal range of motion. Neck supple. No JVD (difficult to assess (patient on ventilator & flat in bed)) present.   Right CVC in place   Cardiovascular: Normal rate, regular rhythm and intact distal pulses.    IABP 1:1. IABP site clean/dry/intact. Feet slightly cool to touch, otherwise warm and well perfused.    Pulmonary/Chest: Effort normal. He is intubated. No respiratory distress.   Coarse breath sounds. Ventilated   Abdominal: Soft. He exhibits no distension. Bowel sounds are decreased. There is no tenderness.   Musculoskeletal: Normal range of motion. He exhibits no edema.   Neurological: He is alert and oriented to person, place, and time.   Skin: Skin is warm and dry.   Nursing note and vitals reviewed.      Significant Labs:  CBC:    Recent Labs  Lab 07/12/18  1817 07/13/18  0317 07/14/18  0400   WBC 14.23* 17.12* 13.19*   RBC 3.03* 2.96* 2.75*   HGB 9.1* 8.8* 8.3*   HCT 27.9* 27.2* 25.9*    201 160   MCV 92 92 94   MCH 30.0 29.7 30.2   MCHC 32.6 32.4 32.0     BNP:    Recent Labs  Lab 07/09/18  0957   *     CMP:    Recent Labs  Lab 07/12/18  1817 07/13/18  0317  07/13/18  1621  07/14/18  0000 07/14/18  0400   * 154*  < > 134* 149* 152*   CALCIUM 8.6* 8.6*  < > 8.7 8.0* 8.2*   ALBUMIN 2.9* 2.8*  --   --   --  2.5*   PROT 5.8* 5.8*  --   --   --  5.6*   * 132*  < > 132* 133* 131*   K 4.7 4.9  < > 4.9 4.8 4.8  4.8   CO2 24 25  < > 26 25 26   CL 95 96  < > 97 99 97   BUN 31* 36*  < > 42* 48* 49*   CREATININE 1.9* 1.9*  < > 1.8* 1.8* 1.8*   ALKPHOS 67 70  --   --   --  59   ALT 33 30  --   --   --  22   AST 21 18  --   --   --  12   BILITOT 0.3 0.3  --   --   --  0.3   < > = values in this interval not displayed.   Coagulation:     Recent Labs  Lab 07/12/18  1817 07/13/18  0317 07/14/18  0400   INR 3.1* 2.4* 1.4*     LDH:  No results for input(s): LDH in the last 72 hours.  Microbiology:  Microbiology Results (last 7 days)     Procedure Component Value Units Date/Time    Blood culture [[id number]] Collected:  07/13/18 1309    Order Status:  Completed Specimen:  Blood from Peripheral, Hand, Left Updated:  07/14/18 0115     Blood Culture, Routine No Growth to date    Blood culture [[id number]] Collected:  07/13/18 1350    Order Status:  Completed Specimen:  Blood from Peripheral, Hand, Right Updated:  07/14/18 0115     Blood Culture, Routine No Growth to date    Culture, Respiratory with Gram Stain [[id number]] Collected:  07/13/18 1129    Order Status:  Completed Specimen:  Respiratory from Endotracheal Aspirate Updated:  07/13/18 1806     Gram Stain (Respiratory) Few WBC's     Gram Stain (Respiratory) No organisms seen          I have reviewed all pertinent labs within the past 24 hours.    Estimated Creatinine Clearance: 58.8 mL/min (A) (based on SCr of 1.8 mg/dL (H)).    Diagnostic Results:  I have reviewed all pertinent imaging results/findings within the past 24 hours.

## 2018-07-14 NOTE — PROGRESS NOTES
HTS MD notified of pt desatting to 84-85% sustained. MD asked to please talk to pulmonary critical care as they were helping to manage ventilator requirements on patient. Received orders from Dr. Douglas with Pulmonary Critical care to go to 40% FiO2 and 10 peep. Pt currently satting 93%. Will continue to monitor closely.

## 2018-07-14 NOTE — ASSESSMENT & PLAN NOTE
- INR 1.9 today  - Coumadin on hold, will discuss resumption  - Protonix increased to 40 mg BID (changed to IV)  -Had screening colonoscopy with hot snare polypectomy during last admission; most likely culprit post polypectomy bleeding  - H & H stable, normal BMs since admission

## 2018-07-14 NOTE — PROGRESS NOTES
[facility]  Pulmonology  Progress Note    Patient Name: Yonathan Good Jr.  MRN: [id number]  Admission Date: 7/7/2018  Hospital Length of Stay: 7 days  Code Status: Full Code  Attending Provider: Mohan Cross MD  Primary Care Provider: Edgar Gates MD   Principal Problem: Ventricular tachycardia, incessant    Subjective:     Interval History: No events overnight    Objective:     Vital Signs (Most Recent):  Temp: 98.5 °F (36.9 °C) (07/14/18 1100)  Pulse: 80 (07/14/18 1200)  Resp: 20 (07/14/18 1200)  BP: 94/64 (07/14/18 1200)  SpO2: 100 % (07/14/18 1200) Vital Signs (24h Range):  Temp:  [97.5 °F (36.4 °C)-98.7 °F (37.1 °C)] 98.5 °F (36.9 °C)  Pulse:  [80-87] 80  Resp:  [15-39] 20  SpO2:  [87 %-100 %] 100 %  BP: ()/(50-93) 94/64     Weight: 111.2 kg (245 lb 2.4 oz)  Body mass index is 34.19 kg/m².      Intake/Output Summary (Last 24 hours) at 07/14/18 1227  Last data filed at 07/14/18 1100   Gross per 24 hour   Intake          3417.11 ml   Output             3885 ml   Net          -467.89 ml       Physical Exam   Constitutional: He appears well-developed and well-nourished.   HENT:   Head: Normocephalic and atraumatic.   Cardiovascular: Normal rate and regular rhythm.    Balloon pump in place   Pulmonary/Chest: Effort normal and breath sounds normal.   Abdominal: Soft. Bowel sounds are normal.   Nursing note and vitals reviewed.      Vents:  Vent Mode: A/C (07/14/18 1148)  Set Rate: 20 bmp (07/14/18 1148)  Vt Set: 450 mL (07/14/18 1148)  PEEP/CPAP: 10 cmH20 (07/14/18 1148)  Oxygen Concentration (%): 40 (07/14/18 1200)  Peak Airway Pressure: 28 cmH2O (07/14/18 1148)  Plateau Pressure: 26 cmH20 (07/14/18 1148)  Total Ve: 9.35 mL (07/14/18 1148)  F/VT Ratio<105 (RSBI): (!) 42.74 (07/14/18 1148)    Lines/Drains/Airways     Central Venous Catheter Line                 Percutaneous Central Line Insertion/Assessment - triple lumen  07/09/18 1500 right internal jugular 4 days         Introducer  07/10/18 1548 other (see comments) 3 days          Drain                 NG/OG Tube 07/10/18 1557 Porter sump 16 Fr. Left nostril 3 days         Urethral Catheter 07/10/18 1646 Double-lumen;Latex 16 Fr. 3 days          Airway                 Airway - Non-Surgical 07/10/18 1523 Endotracheal Tube 3 days          Line                 IABP 07/10/18 1550 7.5 Fr. 40 mL 3 days          Peripheral Intravenous Line                 Peripheral IV - Single Lumen 07/12/18 1100 Right Forearm 2 days                Significant Labs:    CBC/Anemia Profile:    Recent Labs  Lab 07/12/18 1817 07/13/18 0317 07/14/18  0400   WBC 14.23* 17.12* 13.19*   HGB 9.1* 8.8* 8.3*   HCT 27.9* 27.2* 25.9*    201 160   MCV 92 92 94   RDW 14.4 14.3 14.6*        Chemistries:    Recent Labs  Lab 07/12/18 1817 07/13/18 0317  07/14/18  0000 07/14/18  0400 07/14/18  0956   * 132*  < > 133* 131* 136   K 4.7 4.9  < > 4.8 4.8  4.8 4.9   CL 95 96  < > 99 97 100   CO2 24 25  < > 25 26 26   BUN 31* 36*  < > 48* 49* 53*   CREATININE 1.9* 1.9*  < > 1.8* 1.8* 1.7*   CALCIUM 8.6* 8.6*  < > 8.0* 8.2* 8.6*   ALBUMIN 2.9* 2.8*  --   --  2.5*  --    PROT 5.8* 5.8*  --   --  5.6*  --    BILITOT 0.3 0.3  --   --  0.3  --    ALKPHOS 67 70  --   --  59  --    ALT 33 30  --   --  22  --    AST 21 18  --   --  12  --    MG 2.2 1.9  --   --  2.6  --    PHOS 6.3* 6.3*  --   --   --   --    < > = values in this interval not displayed.    All pertinent labs within the past 24 hours have been reviewed.    Significant Imaging:  I have reviewed all pertinent imaging results/findings within the past 24 hours.    Assessment/Plan:     Respiratory failure requiring intubation    · Oxygen saturation is 100% on an FiO2 of 40% + 10 PEEP  · Infection unlikely   · Respiratory cultures NGTD, blood cultures NGTD  · Afebrile  · Leukocytosis mild and not following an upward trajectory (received steroids as recently as 7/10)  · Without copious or purulent  secretions  · Procalcitonin ordered  · Recommend aggressive diuresis as tolerated               Sal Barron MD  Pulmonology  [facility]

## 2018-07-14 NOTE — ASSESSMENT & PLAN NOTE
-VT storm 7/10/18. Polymorphic and monomorphic. Degenerated into VF. 12 ICD shocks  -Intubated, IABP placed emergently at bedside.   -Amio loaded x 2, gtt started per protocol.  Now on amio 200mg PO TID. Lidocaine gtt started at 1, increased to 2 when patient had more VT-->VF and AICD shocks. Will d/c lido and start mexitil 150 TID today  -EP consulted, appreciate their assistance. Pacing rate increased to 80, metoprolol IV added.  -Continue support with IABP 1:1  -K goal >4.5, Mg >2.5    - regimen per EP recs one patient is able to take PO   - Toprol 150 mg, mexiletine 200 BID, Amio 200 TID for 1 weeks, 200 BID for 4 weeks, 300 QD thereafter

## 2018-07-14 NOTE — ASSESSMENT & PLAN NOTE
- IABP placed emergently 7/10/18  - Augmenting well at 1:1. SVO2 73%  - CVP 12 this am (patient net -jesenia overnight). Continue  lasix 80 BID per Dr. Lazo.

## 2018-07-14 NOTE — PLAN OF CARE
Problem: Patient Care Overview  Goal: Plan of Care Review  Outcome: Ongoing (interventions implemented as appropriate)  Dr. Panda at the bedside to discuss the plan of care in regards to continuation of monitoring patient heart and lungs.  Dr. Panda explained that she will be discontinuing the Lidocaine infusion that is currently infusing at 1mg/min and continuing with the patient's po antiarrhythmic (amiodarone).  Dr. Panda also discussed that she will continue with IAPB management and monitoring, antibiotic management, and  ventilation management.  All questions and concerns were answered and addressed.  The daughter verbalized understanding with the current plan of care.    Problem: Infection, Risk/Actual (Adult)  Goal: Identify Related Risk Factors and Signs and Symptoms  Related risk factors and signs and symptoms are identified upon initiation of Human Response Clinical Practice Guideline (CPG)   Outcome: Ongoing (interventions implemented as appropriate)  Antibiotic management continued.  Daughter verbalized understanding to treatment plan    Problem: Cardiac: Heart Failure (Adult)  Goal: Signs and Symptoms of Listed Potential Problems Will be Absent, Minimized or Managed (Cardiac: Heart Failure)  Signs and symptoms of listed potential problems will be absent, minimized or managed by discharge/transition of care (reference Cardiac: Heart Failure (Adult) CPG).   Outcome: Ongoing (interventions implemented as appropriate)  IAPB management and monitoring continued.  Daughter verbalized understanding to plan of care    Problem: Ventilation, Mechanical Invasive (Adult)  Goal: Signs and Symptoms of Listed Potential Problems Will be Absent, Minimized or Managed (Ventilation, Mechanical Invasive)  Signs and symptoms of listed potential problems will be absent, minimized or managed by discharge/transition of care (reference Ventilation, Mechanical Invasive (Adult) CPG).   Outcome: Ongoing (interventions  implemented as appropriate)  Ventilation management continued.  Daughter verbalized understanding to plan of care.

## 2018-07-14 NOTE — ASSESSMENT & PLAN NOTE
- Creatinine peaked at 2.5 after VT storm. Trended down to 1.4, holding 1.8-1.9. Will monitor.  - Avoid nephrotoxic agents

## 2018-07-14 NOTE — ASSESSMENT & PLAN NOTE
· Oxygen saturation is 100% on an FiO2 of 40% + 10 PEEP  · Infection unlikely   · Respiratory cultures NGTD, blood cultures NGTD  · Afebrile  · Leukocytosis mild and not following an upward trajectory (received steroids as recently as 7/10)  · Without copious or purulent secretions  · Procalcitonin ordered  · Recommend aggressive diuresis as tolerated

## 2018-07-15 PROBLEM — N17.9 ACUTE RENAL FAILURE: Status: RESOLVED | Noted: 2018-01-01 | Resolved: 2018-01-01

## 2018-07-15 PROBLEM — Z86.73 HISTORY OF STROKE: Status: RESOLVED | Noted: 2018-01-01 | Resolved: 2018-01-01

## 2018-07-15 NOTE — PLAN OF CARE
Problem: Patient Care Overview  Goal: Plan of Care Review  Outcome: Ongoing (interventions implemented as appropriate)  VS currently stable Pt remains intubated A/C 35% FiO2 and 8 of peep. IABP trigger EKG, 1:1 Site clean dry and intact. Neurovascular checks performed q1h see flowsheets. CVP currently 10. Adequate UO. Norepinephrine gtt currently at 0.04mcg/kg/min, Propofol and fentanyl titrated for RASS of -3. Pt awakens and will follow commands when sedation is off, but is very slow to do so. Tube feeds at goal of 55cc/hr with minimal residual. Bilateral soft wrist restraints monitored per policy. Plan of care reviewed with daughter. Will continue to monitor.

## 2018-07-15 NOTE — PLAN OF CARE
Problem: Patient Care Overview  Goal: Plan of Care Review  Outcome: Ongoing (interventions implemented as appropriate)  Dr. Lazo at the bedside to discuss the plan of care in regards to continuation of ventilation therapy, IABP, and hemodynamic stabilization.  All questions and concerns were answered and addressed.  Daughter verbalized understanding to plan of care.    Problem: Infection, Risk/Actual (Adult)  Goal: Identify Related Risk Factors and Signs and Symptoms  Related risk factors and signs and symptoms are identified upon initiation of Human Response Clinical Practice Guideline (CPG)   Outcome: Ongoing (interventions implemented as appropriate)  Antibiotic therapy continued.  All questions and concerns were answered and addressed.  Daughter verbalized understanding.    Problem: Pressure Ulcer Risk (Daniel Scale) (Adult,Obstetrics,Pediatric)  Goal: Identify Related Risk Factors and Signs and Symptoms  Related risk factors and signs and symptoms are identified upon initiation of Human Response Clinical Practice Guideline (CPG)   Outcome: Ongoing (interventions implemented as appropriate)  Patient shifted off of his bony prominences every two hours.     Problem: Cardiac: Heart Failure (Adult)  Goal: Signs and Symptoms of Listed Potential Problems Will be Absent, Minimized or Managed (Cardiac: Heart Failure)  Signs and symptoms of listed potential problems will be absent, minimized or managed by discharge/transition of care (reference Cardiac: Heart Failure (Adult) CPG).   Outcome: Ongoing (interventions implemented as appropriate)  IABP therapy continued.  1:1 ECG    Problem: Ventilation, Mechanical Invasive (Adult)  Goal: Signs and Symptoms of Listed Potential Problems Will be Absent, Minimized or Managed (Ventilation, Mechanical Invasive)  Signs and symptoms of listed potential problems will be absent, minimized or managed by discharge/transition of care (reference Ventilation, Mechanical Invasive (Adult)  CPG).   Outcome: Ongoing (interventions implemented as appropriate)  Ventilation management and monitoring continued.  All questions and concerns and answered and addressed.

## 2018-07-15 NOTE — SUBJECTIVE & OBJECTIVE
Subjective:     Interval History:   Daughter and nurse report that he responds, follows commands.  No abnormal movements.    From initial consult 7/13/18:  The patient is a 58 y/o AAM with previous CVA x2 and subsequent seizures (on AED) and extensive cardiac Hx, currently admitted to the -ICU with cardiogenic shock and respiratory failure, who is consulted to neurology for twitching movements in the face and upper extremities.     The daughter at the bedside is providing the Hx. In brief, patient with HFrEF (EF 5-10%) due to ICM s/p ICD and a candidate for heart transplant, sarcoidosis on chronic steroids, and 2 CVAs (L parietal, w/o residual deficits), with seizures episodes x5 immediately the next day after the second stroke, and since then he has been on phenytoin, w/o recurrence of the episodes. Had a recent admission (6/22-7/4) for dysarthria and concerns for stroke, which was negative in the work up, however during that admission he had several episodes of VT and A.fib and therefore after cardiology work up was discharged on amiodarone/warfarin/Lovenox bridge and phenytoin was changed to levetiracetam due to interactions with warfarin.   on 7/8 he presented with hematochezia and hypotension down to 70s. At OSH he was noted to have a 2.5 unit drop in Hgb and increase in Cr up to 2.8, [place] [facility] [place]AB[place] on [place]/10 due to cardiogenic shock, currently on vasopressors. Also due to impending respiratory failure he was intubated and started on sedation. Yesterday evening he was noted by the daughter and the nurse to have twitching movements in the facial muscles and bilateral upper extremities that took 5 minutes and has not recurred since.    Current Neurological Medications:   keppra 500mg bid 7/7-7/12  Keppra 1gm bid since 7/12- present    Current Facility-Administered Medications   Medication Dose Route Frequency Provider Last Rate Last Dose    0.9%  NaCl infusion (for blood  administration)   Intravenous Q24H PRN Catalina F. Reggie, PA-C        albuterol nebulizer solution 2.5 mg  2.5 mg Nebulization Q4H Paulino Rubi MD   2.5 mg at 07/15/18 0715    ALPRAZolam tablet 2 mg  2 mg Oral BID PRN Paulino Rubi MD   2 mg at 07/08/18 1000    amiodarone 5 mg/mL oral suspension  200 mg Oral TID Anna Landrum PA-C   200 mg at 07/15/18 1042    aspirin chewable tablet 81 mg  81 mg Oral Daily Catalina F. Reggie, PA-C   81 mg at 07/15/18 0909    atorvastatin tablet 40 mg  40 mg Oral Daily Paulino Rubi MD   40 mg at 07/15/18 0910    benzonatate capsule 100 mg  100 mg Oral TID PRN Catalina F. Reggie, PA-C        calcium gluconate 1g in dextrose 5% 100mL (ready to mix system)  1 g Intravenous PRN Catalina F. Reggie, PA-C        calcium gluconate 1g in dextrose 5% 100mL (ready to mix system)  1 g Intravenous PRN Catalina F. Reggie, PA-C        calcium gluconate 1g in dextrose 5% 100mL (ready to mix system)  1 g Intravenous PRN Catalina F. Reggie, PA-C        carisoprodol tablet 350 mg  350 mg Oral QID PRN Paulino Rubi MD        ceFEPIme injection 2 g  2 g Intravenous Q8H Anna Landrum PA-C   2 g at 07/15/18 0410    chlorhexidine 0.12 % solution 15 mL  15 mL Mouth/Throat BID Catalina F. Reggie, PA-C   15 mL at 07/15/18 0923    chlorhexidine 0.12 % solution 15 mL  15 mL Mouth/Throat BID Catalina F. Reggie, PA-C   15 mL at 07/15/18 0923    dextrose 50% injection 12.5 g  12.5 g Intravenous PRN Ethan Byrne MD        docusate 50 mg/5 mL liquid 100 mg  100 mg Oral Daily Anna Lnadrum PA-C   100 mg at 07/15/18 0910    fentaNYL 2500 mcg in 0.9% sodium chloride 250 mL infusion premix (titrating)   Intravenous Continuous Catalina F. MERRITT Paredes 15 mL/hr at 07/15/18 1100 150 mcg/hr at 07/15/18 1100    fluticasone-vilanterol 200-25 mcg/dose diskus inhaler 1 puff  1 puff Inhalation Daily Paulino Rubi MD   Stopped at 07/11/18 0900    furosemide injection 80 mg  80 mg  Intravenous BID Kimberly Lazo MD   80 mg at 07/15/18 0909    gabapentin capsule 600 mg  600 mg Oral BID Paulino Rubi MD   600 mg at 07/15/18 0909    glucagon (human recombinant) injection 1 mg  1 mg Intramuscular PRN Ethan Byrne MD        heparin (porcine) injection 5,000 Units  5,000 Units Subcutaneous Q8H Ethan Byrne MD   5,000 Units at 07/15/18 0501    HYDROcodone-acetaminophen  mg per tablet 1 tablet  1 tablet Oral BID PRN Paulino Rubi MD   1 tablet at 07/09/18 2122    hydrocortisone sodium succinate injection 100 mg  100 mg Intravenous Q8H Mohan Cross MD   100 mg at 07/15/18 0500    insulin aspart U-100 pen 0-5 Units  0-5 Units Subcutaneous Q4H PRN Ethan Byrne MD   1 Units at 07/14/18 2011    levETIRAcetam in NaCl (iso-os) IVPB 1,000 mg  1,000 mg Intravenous Q12H Terrie Tran  mL/hr at 07/15/18 0909 1,000 mg at 07/15/18 0909    magnesium sulfate 2g in water 50mL IVPB (premix)  2 g Intravenous PRN Catalina Paredes PA-C   2 g at 07/13/18 0528    magnesium sulfate 2g in water 50mL IVPB (premix)  4 g Intravenous PRN Catalina Paredes PA-C        metoprolol injection 2.5 mg  2.5 mg Intravenous Q4H Terrie Tran MD   2.5 mg at 07/15/18 1044    mexiletine capsule 150 mg  150 mg Oral Q8H Ethan Byrne MD   150 mg at 07/15/18 0500    nitroGLYCERIN SL tablet 0.4 mg  0.4 mg Sublingual PRN Paulino Rubi MD   0.4 mg at 07/10/18 1430    norepinephrine 4 mg in dextrose 5% 250 mL infusion (premix) (titrating)  0.02 mcg/kg/min Intravenous Continuous Catalinagiovanni Paredes PA-C 7.6 mL/hr at 07/15/18 1100 0.02 mcg/kg/min at 07/15/18 1100    pantoprazole (PROTONIX) 40 mg in dextrose 5 % 100 mL IVPB  40 mg Intravenous BID Mohan Cross  mL/hr at 07/15/18 0909 40 mg at 07/15/18 0909    potassium chloride 40 mEq in 100 mL IVPB (FOR CENTRAL LINE ADMINISTRATION ONLY)  40 mEq Intravenous PRN Catalina Paredes PA-C 25 mL/hr at 07/15/18 0952 40  mEq at 07/15/18 0952    And    potassium chloride 20 mEq in 100 mL IVPB (FOR CENTRAL LINE ADMINISTRATION ONLY)  60 mEq Intravenous PRN Catalina F. Reggie, PA-C        And    potassium chloride 40 mEq in 100 mL IVPB (FOR CENTRAL LINE ADMINISTRATION ONLY)  80 mEq Intravenous PRN Catalina F. Reggie, PA-C        propofol (DIPRIVAN) 10 mg/mL infusion  5 mcg/kg/min Intravenous Continuous Catalina F. Reggie, PA-C 24.2 mL/hr at 07/15/18 1102 40 mcg/kg/min at 07/15/18 1102    sodium chloride 0.9% flush 3 mL  3 mL Intravenous Q8H Paulino Rubi MD   3 mL at 07/14/18 2143    sodium phosphate 15 mmol in dextrose 5 % 250 mL IVPB  15 mmol Intravenous PRN Catalina F. Reggie, PA-C        sodium phosphate 20.01 mmol in dextrose 5 % 250 mL IVPB  20.01 mmol Intravenous PRN Catalina F. Reggie, PA-C        sodium phosphate 30 mmol in dextrose 5 % 250 mL IVPB  30 mmol Intravenous PRN Catalina F. Reggie, PA-C        vancomycin in dextrose 5 % 1 gram/250 mL IVPB 1,000 mg  1,000 mg Intravenous Q12H Anna Landrum PA-C   Stopped at 07/15/18 1145       Review of Systems  Objective:     Vital Signs (Most Recent):  Temp: 98.6 °F (37 °C) (07/15/18 0700)  Pulse: 80 (07/15/18 1000)  Resp: 20 (07/15/18 1000)  BP: 108/62 (07/15/18 1000)  SpO2: 100 % (07/15/18 1000) Vital Signs (24h Range):  Temp:  [98.6 °F (37 °C)-99 °F (37.2 °C)] 98.6 °F (37 °C)  Pulse:  [79-88] 80  Resp:  [16-30] 20  SpO2:  [96 %-100 %] 100 %  BP: ()/(48-72) 108/62     Weight: 109.4 kg (241 lb 2.9 oz)  Body mass index is 33.64 kg/m².    Physical Exam    General:  Intubated, no distress  Resp:  Vent     Neuro:  Mental status:  opens eyes to voice, tries to look toward examiner, but appears to fall back asleep.  With encouragement and continued arousals, squeezed right hand to command.     Cranial nerves:  Vision:  + blink to threat.   Pupils:  4mm bilaterally  Corneal reflex:  present  Extraocular movements:  intact to oculocephalic    Facial symmetry:   Present  Hearing: opens eyes to voice     Motor:  Tone:  Reduced in LUE  Strength:  w/d to pain in RUE, weakly in LUE; spontaneous movements in legs        Significant Labs:   CBC:   Recent Labs  Lab 07/14/18  0400 07/15/18  0400   WBC 13.19* 11.38   HGB 8.3* 7.4*   HCT 25.9* 23.3*    146*     CMP:   Recent Labs  Lab 07/14/18  0400  07/14/18  2316 07/15/18  0400 07/15/18  0849   *  < > 137* 171* 125*   *  < > 138 138 140   K 4.8  4.8  < > 4.2 4.0 3.9   CL 97  < > 102 101 103   CO2 26  < > 27 28 28   BUN 49*  < > 53* 53* 54*   CREATININE 1.8*  < > 1.2 1.1 1.0   CALCIUM 8.2*  < > 8.2* 8.3* 8.7   MG 2.6  --   --  2.4  --    PROT 5.6*  --   --  5.2*  --    ALBUMIN 2.5*  --   --  2.2*  --    BILITOT 0.3  --   --  0.3  --    ALKPHOS 59  --   --  55  --    AST 12  --   --  10  --    ALT 22  --   --  17  --    ANIONGAP 8  < > 9 9 9   EGFRNONAA 41.4*  < > >60.0 >60.0 >60.0   < > = values in this interval not displayed.    Significant Imaging: I have reviewed all pertinent imaging results/findings within the past 24 hours.

## 2018-07-15 NOTE — SUBJECTIVE & OBJECTIVE
Interval History: Remains intubated, sedated, good UOP on a touch of levophed    Continuous Infusions:   fentanyl 150 mcg/hr (07/15/18 1100)    norepinephrine bitartrate-D5W 0.02 mcg/kg/min (07/15/18 1100)    propofol 40 mcg/kg/min (07/15/18 1102)     Scheduled Meds:   albuterol sulfate  2.5 mg Nebulization Q4H    amiodarone  200 mg Oral TID    aspirin  81 mg Oral Daily    atorvastatin  40 mg Oral Daily    ceFEPime (MAXIPIME) IVPB  2 g Intravenous Q8H    chlorhexidine  15 mL Mouth/Throat BID    chlorhexidine  15 mL Mouth/Throat BID    docusate  100 mg Oral Daily    fluticasone-vilanterol  1 puff Inhalation Daily    furosemide  80 mg Intravenous BID    gabapentin  600 mg Oral BID    heparin (porcine)  5,000 Units Subcutaneous Q8H    hydrocortisone sodium succinate  100 mg Intravenous Q8H    levetiracetam IVPB  1,000 mg Intravenous Q12H    metoprolol  2.5 mg Intravenous Q4H    mexiletine  150 mg Oral Q8H    pantoprozole (PROTONIX) 40 mg/100 mL D5W IVPB  40 mg Intravenous BID    sodium chloride 0.9%  3 mL Intravenous Q8H    vancomycin (VANCOCIN) IVPB  1,000 mg Intravenous Q12H     PRN Meds:sodium chloride, ALPRAZolam, benzonatate, calcium gluconate IVPB, calcium gluconate IVPB, calcium gluconate IVPB, carisoprodol, dextrose 50%, glucagon (human recombinant), HYDROcodone-acetaminophen, insulin aspart U-100, magnesium sulfate IVPB, magnesium sulfate IVPB, nitroGLYCERIN, potassium chloride in water **AND** potassium chloride in water **AND** potassium chloride in water, sodium phosphate IVPB, sodium phosphate IVPB, sodium phosphate IVPB    Review of patient's allergies indicates:  No Known Allergies  Objective:     Vital Signs (Most Recent):  Temp: 98.8 °F (37.1 °C) (07/15/18 1100)  Pulse: 80 (07/15/18 1000)  Resp: 20 (07/15/18 1000)  BP: 108/62 (07/15/18 1000)  SpO2: 100 % (07/15/18 1000) Vital Signs (24h Range):  Temp:  [98.6 °F (37 °C)-99 °F (37.2 °C)] 98.8 °F (37.1 °C)  Pulse:  [79-88] 80  Resp:   [16-30] 20  SpO2:  [96 %-100 %] 100 %  BP: ()/(48-72) 108/62     Patient Vitals for the past 72 hrs (Last 3 readings):   Weight   07/15/18 0400 109.4 kg (241 lb 2.9 oz)   07/14/18 0400 111.2 kg (245 lb 2.4 oz)   07/13/18 0300 110.5 kg (243 lb 9.7 oz)     Body mass index is 33.64 kg/m².      Intake/Output Summary (Last 24 hours) at 07/15/18 1113  Last data filed at 07/15/18 1100   Gross per 24 hour   Intake           3810.4 ml   Output             5295 ml   Net          -1484.6 ml       Hemodynamic Parameters:       Telemetry: No events    Physical Exam  Gen: intubated, sedated  Neck: no JVD, no carotid bruits  CV: regular rate and rhythm, normal S1/2, no murmurs, rubs, gallops  Resp: intubated, crackles anteriorly  GI: soft, nontender nondistended, normal bowel sounds  Ext: warm well perfused, no edema, no clubbing or cyanosis    Significant Labs:  CBC:    Recent Labs  Lab 07/13/18  0317 07/14/18  0400 07/15/18  0400   WBC 17.12* 13.19* 11.38   RBC 2.96* 2.75* 2.51*   HGB 8.8* 8.3* 7.4*   HCT 27.2* 25.9* 23.3*    160 146*   MCV 92 94 93   MCH 29.7 30.2 29.5   MCHC 32.4 32.0 31.8*     BNP:    Recent Labs  Lab 07/09/18  0957   *     CMP:    Recent Labs  Lab 07/13/18  0317  07/14/18  0400  07/14/18  2316 07/15/18  0400 07/15/18  0849   *  < > 152*  < > 137* 171* 125*   CALCIUM 8.6*  < > 8.2*  < > 8.2* 8.3* 8.7   ALBUMIN 2.8*  --  2.5*  --   --  2.2*  --    PROT 5.8*  --  5.6*  --   --  5.2*  --    *  < > 131*  < > 138 138 140   K 4.9  < > 4.8  4.8  < > 4.2 4.0 3.9   CO2 25  < > 26  < > 27 28 28   CL 96  < > 97  < > 102 101 103   BUN 36*  < > 49*  < > 53* 53* 54*   CREATININE 1.9*  < > 1.8*  < > 1.2 1.1 1.0   ALKPHOS 70  --  59  --   --  55  --    ALT 30  --  22  --   --  17  --    AST 18  --  12  --   --  10  --    BILITOT 0.3  --  0.3  --   --  0.3  --    < > = values in this interval not displayed.   Coagulation:     Recent Labs  Lab 07/13/18  0317 07/14/18  0400 07/15/18  0400   INR  2.4* 1.4* 1.1     LDH:  No results for input(s): LDH in the last 72 hours.  Microbiology:  Microbiology Results (last 7 days)     Procedure Component Value Units Date/Time    Blood culture [[id number]] Collected:  07/13/18 1309    Order Status:  Completed Specimen:  Blood from Peripheral, Hand, Left Updated:  07/14/18 1812     Blood Culture, Routine No Growth to date     Blood Culture, Routine No Growth to date    Blood culture [[id number]] Collected:  07/13/18 1350    Order Status:  Completed Specimen:  Blood from Peripheral, Hand, Right Updated:  07/14/18 1812     Blood Culture, Routine No Growth to date     Blood Culture, Routine No Growth to date    Culture, Respiratory with Gram Stain [[id number]] Collected:  07/13/18 1129    Order Status:  Completed Specimen:  Respiratory from Endotracheal Aspirate Updated:  07/14/18 1138     Respiratory Culture Normal respiratory peng     Gram Stain (Respiratory) Few WBC's     Gram Stain (Respiratory) No organisms seen          I have reviewed all pertinent labs within the past 24 hours.    Estimated Creatinine Clearance: 105 mL/min (based on SCr of 1 mg/dL).    Diagnostic Results:  I have reviewed all pertinent imaging results/findings within the past 24 hours.

## 2018-07-15 NOTE — SUBJECTIVE & OBJECTIVE
Interval History: No events overnight    Objective:     Vital Signs (Most Recent):  Temp: 98.6 °F (37 °C) (07/15/18 0700)  Pulse: 80 (07/15/18 0800)  Resp: (!) 22 (07/15/18 0800)  BP: 106/60 (07/15/18 0800)  SpO2: 99 % (07/15/18 0800) Vital Signs (24h Range):  Temp:  [98.5 °F (36.9 °C)-99 °F (37.2 °C)] 98.6 °F (37 °C)  Pulse:  [79-88] 80  Resp:  [16-30] 22  SpO2:  [96 %-100 %] 99 %  BP: ()/(48-72) 106/60     Weight: 109.4 kg (241 lb 2.9 oz)  Body mass index is 33.64 kg/m².      Intake/Output Summary (Last 24 hours) at 07/15/18 0923  Last data filed at 07/15/18 0800   Gross per 24 hour   Intake           3800.4 ml   Output             4615 ml   Net           -814.6 ml       Physical Exam   Constitutional: He appears well-developed and well-nourished.   HENT:   Head: Normocephalic and atraumatic.   Cardiovascular: Normal rate and regular rhythm.    Balloon pump in place   Pulmonary/Chest: Effort normal and breath sounds normal.   Abdominal: Soft. Bowel sounds are normal.   Neurological:   Follows simple commands (moving legs, squeezing hand)   Nursing note and vitals reviewed.      Vents:  Vent Mode: A/C (07/15/18 0716)  Set Rate: 20 bmp (07/15/18 0716)  Vt Set: 450 mL (07/15/18 0716)  PEEP/CPAP: 8 cmH20 (07/15/18 0716)  Oxygen Concentration (%): 35 (07/15/18 0800)  Peak Airway Pressure: 28 cmH2O (07/15/18 0716)  Plateau Pressure: 26 cmH20 (07/15/18 0716)  Total Ve: 9.24 mL (07/15/18 0716)  F/VT Ratio<105 (RSBI): (!) 52.52 (07/15/18 0716)    Lines/Drains/Airways     Central Venous Catheter Line                 Percutaneous Central Line Insertion/Assessment - triple lumen  07/09/18 1500 right internal jugular 5 days         Introducer 07/10/18 1548 other (see comments) 4 days          Drain                 NG/OG Tube 07/10/18 1557 [place] sump 16 Fr. Left nostril 4 days         Urethral Catheter 07/10/18 1646 Double-lumen;Latex 16 Fr. 4 days          Airway                 Airway - Non-Surgical 07/10/18 1523  Endotracheal Tube 4 days          Line                 IABP 07/10/18 1550 7.5 Fr. 40 mL 4 days          Peripheral Intravenous Line                 Peripheral IV - Single Lumen 07/12/18 1100 Right Forearm 2 days                Significant Labs:    CBC/Anemia Profile:    Recent Labs  Lab 07/14/18  0400 07/15/18  0400   WBC 13.19* 11.38   HGB 8.3* 7.4*   HCT 25.9* 23.3*    146*   MCV 94 93   RDW 14.6* 14.6*        Chemistries:    Recent Labs  Lab 07/14/18  0400  07/14/18  1625 07/14/18  2316 07/15/18  0400   *  < > 134* 138 138   K 4.8  4.8  < > 4.5 4.2 4.0   CL 97  < > 99 102 101   CO2 26  < > 27 27 28   BUN 49*  < > 54* 53* 53*   CREATININE 1.8*  < > 1.4 1.2 1.1   CALCIUM 8.2*  < > 8.3* 8.2* 8.3*   ALBUMIN 2.5*  --   --   --  2.2*   PROT 5.6*  --   --   --  5.2*   BILITOT 0.3  --   --   --  0.3   ALKPHOS 59  --   --   --  55   ALT 22  --   --   --  17   AST 12  --   --   --  10   MG 2.6  --   --   --  2.4   < > = values in this interval not displayed.    All pertinent labs within the past 24 hours have been reviewed.    Significant Imaging:  I have reviewed all pertinent imaging results/findings within the past 24 hours.

## 2018-07-15 NOTE — PROGRESS NOTES
[facility]  Pulmonology  Progress Note    Patient Name: Yonathan Good Jr.  MRN: [id number]  Admission Date: 7/7/2018  Hospital Length of Stay: 8 days  Code Status: Full Code  Attending Provider: Mohan Cross MD  Primary Care Provider: Edgar Gates MD   Principal Problem: Ventricular tachycardia, incessant    Subjective:     Interval History: No events overnight    Objective:     Vital Signs (Most Recent):  Temp: 98.6 °F (37 °C) (07/15/18 0700)  Pulse: 80 (07/15/18 0800)  Resp: (!) 22 (07/15/18 0800)  BP: 106/60 (07/15/18 0800)  SpO2: 99 % (07/15/18 0800) Vital Signs (24h Range):  Temp:  [98.5 °F (36.9 °C)-99 °F (37.2 °C)] 98.6 °F (37 °C)  Pulse:  [79-88] 80  Resp:  [16-30] 22  SpO2:  [96 %-100 %] 99 %  BP: ()/(48-72) 106/60     Weight: 109.4 kg (241 lb 2.9 oz)  Body mass index is 33.64 kg/m².      Intake/Output Summary (Last 24 hours) at 07/15/18 0923  Last data filed at 07/15/18 0800   Gross per 24 hour   Intake           3800.4 ml   Output             4615 ml   Net           -814.6 ml       Physical Exam   Constitutional: He appears well-developed and well-nourished.   HENT:   Head: Normocephalic and atraumatic.   Cardiovascular: Normal rate and regular rhythm.    Balloon pump in place   Pulmonary/Chest: Effort normal and breath sounds normal.   Abdominal: Soft. Bowel sounds are normal.   Neurological:   Follows simple commands (moving legs, squeezing hand)   Nursing note and vitals reviewed.      Vents:  Vent Mode: A/C (07/15/18 0716)  Set Rate: 20 bmp (07/15/18 0716)  Vt Set: 450 mL (07/15/18 0716)  PEEP/CPAP: 8 cmH20 (07/15/18 0716)  Oxygen Concentration (%): 35 (07/15/18 0800)  Peak Airway Pressure: 28 cmH2O (07/15/18 0716)  Plateau Pressure: 26 cmH20 (07/15/18 0716)  Total Ve: 9.24 mL (07/15/18 0716)  F/VT Ratio<105 (RSBI): (!) 52.52 (07/15/18 0716)    Lines/Drains/Airways     Central Venous Catheter Line                 Percutaneous Central Line Insertion/Assessment - triple lumen   07/09/18 1500 right internal jugular 5 days         Introducer 07/10/18 1548 other (see comments) 4 days          Drain                 NG/OG Tube 07/10/18 1557 [place] sump 16 Fr. Left nostril 4 days         Urethral Catheter 07/10/18 1646 Double-lumen;Latex 16 Fr. 4 days          Airway                 Airway - Non-Surgical 07/10/18 1523 Endotracheal Tube 4 days          Line                 IABP 07/10/18 1550 7.5 Fr. 40 mL 4 days          Peripheral Intravenous Line                 Peripheral IV - Single Lumen 07/12/18 1100 Right Forearm 2 days                Significant Labs:    CBC/Anemia Profile:    Recent Labs  Lab 07/14/18  0400 07/15/18  0400   WBC 13.19* 11.38   HGB 8.3* 7.4*   HCT 25.9* 23.3*    146*   MCV 94 93   RDW 14.6* 14.6*        Chemistries:    Recent Labs  Lab 07/14/18  0400  07/14/18  1625 07/14/18  2316 07/15/18  0400   *  < > 134* 138 138   K 4.8  4.8  < > 4.5 4.2 4.0   CL 97  < > 99 102 101   CO2 26  < > 27 27 28   BUN 49*  < > 54* 53* 53*   CREATININE 1.8*  < > 1.4 1.2 1.1   CALCIUM 8.2*  < > 8.3* 8.2* 8.3*   ALBUMIN 2.5*  --   --   --  2.2*   PROT 5.6*  --   --   --  5.2*   BILITOT 0.3  --   --   --  0.3   ALKPHOS 59  --   --   --  55   ALT 22  --   --   --  17   AST 12  --   --   --  10   MG 2.6  --   --   --  2.4   < > = values in this interval not displayed.    All pertinent labs within the past 24 hours have been reviewed.    Significant Imaging:  I have reviewed all pertinent imaging results/findings within the past 24 hours.    Assessment/Plan:     Respiratory failure requiring intubation    · Oxygen saturation is 100% on an FiO2 of 35% + 8 PEEP  · Infection unlikely   · Respiratory cultures NGTD, blood cultures NGTD  · Afebrile  · Continue diuresis per primary team  · No plans to extubate while IABP remains        Sarcoidosis of lung    · Sarcoid appears to have minimal contribution to current condition.   · Continue steroids as prescribed and taper once condition  improves.               Sal Barron MD  Pulmonology  [facility]

## 2018-07-15 NOTE — ASSESSMENT & PLAN NOTE
· Sarcoid appears to have minimal contribution to current condition.   · Continue steroids as prescribed and taper once condition improves.

## 2018-07-15 NOTE — ASSESSMENT & PLAN NOTE
- IABP placed emergently 7/10/18  - Augmenting well at 1:1. SVO2 73%  - CVP 10 this am (patient net -jesenia overnight). Continue  lasix 80 BID per Dr. Lazo.

## 2018-07-15 NOTE — ASSESSMENT & PLAN NOTE
- INR 1.1 today  - Coumadin on hold, will discuss resumption  - Protonix increased to 40 mg BID (changed to IV)  -Had screening colonoscopy with hot snare polypectomy during last admission; most likely culprit post polypectomy bleeding  - H & H stable, normal BMs since admission

## 2018-07-15 NOTE — ASSESSMENT & PLAN NOTE
-VT storm 7/10/18. Polymorphic and monomorphic. Degenerated into VF. 12 ICD shocks  -Intubated, IABP placed emergently at bedside.   -Amio loaded x 2, gtt started per protocol.  Now on amio 200mg PO TID. Lidocaine gtt started at 1, increased to 2 when patient had more VT-->VF and AICD shocks. Off lidocaine, now mexilitene TID  -EP consulted, appreciate their assistance. Pacing rate increased to 80, metoprolol IV added.  -Continue support with IABP 1:1  -K goal >4.5, Mg >2.5    - regimen per EP recs one patient is able to take PO   - Toprol 150 mg, mexiletine 200 BID, Amio 200 TID for 1 weeks, 200 BID for 4 weeks, 300 QD thereafter

## 2018-07-15 NOTE — ASSESSMENT & PLAN NOTE
A possible nidus for seizures, but eeg negative during events.    - continue on secondary stroke prevention with ASA and high dose statin daily

## 2018-07-15 NOTE — ASSESSMENT & PLAN NOTE
· Oxygen saturation is 100% on an FiO2 of 35% + 8 PEEP  · Infection unlikely   · Respiratory cultures NGTD, blood cultures NGTD  · Afebrile  · Continue diuresis per primary team  · No plans to extubate while IABP remains

## 2018-07-15 NOTE — PROGRESS NOTES
[facility]  Heart Transplant  Progress Note    Patient Name: Yonathan Good Jr.  MRN: [id number]  Admission Date: 7/7/2018  Hospital Length of Stay: 8 days  Attending Physician: Mohan Cross MD  Primary Care Provider: Edgar Gates MD  Principal Problem:Ventricular tachycardia, incessant    Subjective:     Interval History: Remains intubated, sedated, good UOP on a touch of levophed    Continuous Infusions:   fentanyl 150 mcg/hr (07/15/18 1100)    norepinephrine bitartrate-D5W 0.02 mcg/kg/min (07/15/18 1100)    propofol 40 mcg/kg/min (07/15/18 1102)     Scheduled Meds:   albuterol sulfate  2.5 mg Nebulization Q4H    amiodarone  200 mg Oral TID    aspirin  81 mg Oral Daily    atorvastatin  40 mg Oral Daily    ceFEPime (MAXIPIME) IVPB  2 g Intravenous Q8H    chlorhexidine  15 mL Mouth/Throat BID    chlorhexidine  15 mL Mouth/Throat BID    docusate  100 mg Oral Daily    fluticasone-vilanterol  1 puff Inhalation Daily    furosemide  80 mg Intravenous BID    gabapentin  600 mg Oral BID    heparin (porcine)  5,000 Units Subcutaneous Q8H    hydrocortisone sodium succinate  100 mg Intravenous Q8H    levetiracetam IVPB  1,000 mg Intravenous Q12H    metoprolol  2.5 mg Intravenous Q4H    mexiletine  150 mg Oral Q8H    pantoprozole (PROTONIX) 40 mg/100 mL D5W IVPB  40 mg Intravenous BID    sodium chloride 0.9%  3 mL Intravenous Q8H    vancomycin (VANCOCIN) IVPB  1,000 mg Intravenous Q12H     PRN Meds:sodium chloride, ALPRAZolam, benzonatate, calcium gluconate IVPB, calcium gluconate IVPB, calcium gluconate IVPB, carisoprodol, dextrose 50%, glucagon (human recombinant), HYDROcodone-acetaminophen, insulin aspart U-100, magnesium sulfate IVPB, magnesium sulfate IVPB, nitroGLYCERIN, potassium chloride in water **AND** potassium chloride in water **AND** potassium chloride in water, sodium phosphate IVPB, sodium phosphate IVPB, sodium phosphate IVPB    Review of patient's allergies  indicates:  No Known Allergies  Objective:     Vital Signs (Most Recent):  Temp: 98.8 °F (37.1 °C) (07/15/18 1100)  Pulse: 80 (07/15/18 1000)  Resp: 20 (07/15/18 1000)  BP: 108/62 (07/15/18 1000)  SpO2: 100 % (07/15/18 1000) Vital Signs (24h Range):  Temp:  [98.6 °F (37 °C)-99 °F (37.2 °C)] 98.8 °F (37.1 °C)  Pulse:  [79-88] 80  Resp:  [16-30] 20  SpO2:  [96 %-100 %] 100 %  BP: ()/(48-72) 108/62     Patient Vitals for the past 72 hrs (Last 3 readings):   Weight   07/15/18 0400 109.4 kg (241 lb 2.9 oz)   07/14/18 0400 111.2 kg (245 lb 2.4 oz)   07/13/18 0300 110.5 kg (243 lb 9.7 oz)     Body mass index is 33.64 kg/m².      Intake/Output Summary (Last 24 hours) at 07/15/18 1113  Last data filed at 07/15/18 1100   Gross per 24 hour   Intake           3810.4 ml   Output             5295 ml   Net          -1484.6 ml       Hemodynamic Parameters:       Telemetry: No events    Physical Exam  Gen: intubated, sedated  Neck: no JVD, no carotid bruits  CV: regular rate and rhythm, normal S1/2, no murmurs, rubs, gallops  Resp: intubated, crackles anteriorly  GI: soft, nontender nondistended, normal bowel sounds  Ext: warm well perfused, no edema, no clubbing or cyanosis    Significant Labs:  CBC:    Recent Labs  Lab 07/13/18  0317 07/14/18  0400 07/15/18  0400   WBC 17.12* 13.19* 11.38   RBC 2.96* 2.75* 2.51*   HGB 8.8* 8.3* 7.4*   HCT 27.2* 25.9* 23.3*    160 146*   MCV 92 94 93   MCH 29.7 30.2 29.5   MCHC 32.4 32.0 31.8*     BNP:    Recent Labs  Lab 07/09/18  0957   *     CMP:    Recent Labs  Lab 07/13/18  0317  07/14/18  0400  07/14/18  2316 07/15/18  0400 07/15/18  0849   *  < > 152*  < > 137* 171* 125*   CALCIUM 8.6*  < > 8.2*  < > 8.2* 8.3* 8.7   ALBUMIN 2.8*  --  2.5*  --   --  2.2*  --    PROT 5.8*  --  5.6*  --   --  5.2*  --    *  < > 131*  < > 138 138 140   K 4.9  < > 4.8  4.8  < > 4.2 4.0 3.9   CO2 25  < > 26  < > 27 28 28   CL 96  < > 97  < > 102 101 103   BUN 36*  < > 49*  < > 53*  "53* 54*   CREATININE 1.9*  < > 1.8*  < > 1.2 1.1 1.0   ALKPHOS 70  --  59  --   --  55  --    ALT 30  --  22  --   --  17  --    AST 18  --  12  --   --  10  --    BILITOT 0.3  --  0.3  --   --  0.3  --    < > = values in this interval not displayed.   Coagulation:     Recent Labs  Lab 07/13/18  0317 07/14/18  0400 07/15/18  0400   INR 2.4* 1.4* 1.1     LDH:  No results for input(s): LDH in the last 72 hours.  Microbiology:  Microbiology Results (last 7 days)     Procedure Component Value Units Date/Time    Blood culture [[id number]] Collected:  07/13/18 1309    Order Status:  Completed Specimen:  Blood from Peripheral, Hand, Left Updated:  07/14/18 1812     Blood Culture, Routine No Growth to date     Blood Culture, Routine No Growth to date    Blood culture [[id number]] Collected:  07/13/18 1350    Order Status:  Completed Specimen:  Blood from Peripheral, Hand, Right Updated:  07/14/18 1812     Blood Culture, Routine No Growth to date     Blood Culture, Routine No Growth to date    Culture, Respiratory with Gram Stain [[id number]] Collected:  07/13/18 1129    Order Status:  Completed Specimen:  Respiratory from Endotracheal Aspirate Updated:  07/14/18 1138     Respiratory Culture Normal respiratory peng     Gram Stain (Respiratory) Few WBC's     Gram Stain (Respiratory) No organisms seen          I have reviewed all pertinent labs within the past 24 hours.    Estimated Creatinine Clearance: 105 mL/min (based on SCr of 1 mg/dL).    Diagnostic Results:  I have reviewed all pertinent imaging results/findings within the past 24 hours.    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      He was initially admitted to "stroke/neuro" service on 6/22 with dysarthria and stroke-like symptoms. Extensive work up was " negative for recurrent CVA and so no tPA was administered. Patient developed atypical chest pain two days into his hospital course and was noted to be in monomorphic VT (6/24) which was treated with ATP then with shock due to recurrent VT in VF zone. Given his active cardiac issues this prompted transfer to heart failure service where the patient was initiated on IV amiodarone and beta blocker with subsequent resolution of his VT. He was eventually transitioned to PO amiodarone 400 mg BID x 2 weeks (from 6/25-7/9) followed by amiodarone 400 mg daily thereafter per EP recommendations. Of note patient underwent LHC on 6/25 given his extensive ischemic history which did not reveal a culprit lesion, therefore no interventions were done. He was noted to have an elevated LVEDP to 45 however and was administered IV diuresis before being transitioned back to his PO diuretic regimen. Patient also completed heart failure pathway during his hospital course (eg completed colonoscopy on 7/2) as part of his work up for advanced options. Follows with Dr. Berman of heart failure/transplant as an outpatient.      The patient was discharged home in stable condition on 7/4/2018. He was dc'ed on warf/lovenox bridge given CHADS2-VaSc of 5.  Of note, he is no longer a candidate for AO.  He presented yesterday to [facility] with BRBPR and was found to be in acute renal failure as well.  He notes that, on the night of the 5th and morning of 6th, he had 6 bright red bloody bm's.  He went to his PMD who noted he might have internal hemmorhoids.  He was told to stop his lovenox.  He then returned home and flet very lightheaded and dizzy.  He wisely took his blood pressure and noted it was 70-80/50s whereas it is normally 117/70s.  He went in to ED immediately.  There he was noted to have the following pertinent lab values:  Hg 9.2 (11.7 prior)  INR 2.9  Na 131 (139 prior)  K 6.18  BUN 43 (18 prior)  Cr 2.88 (1.1-1.4 at  baseline)  proBNP 2755  He was transferred to [facility] for further evaluation and care.  Of note, he underwent screening c-scope on 7/2 during which the following was noted and done: Diverticulosis in the sigmoid colon and in the descending colon, Two 8 to 10 mm polyps in the sigmoid colon and in the descending colon, removed with a hot snare, resected and retrieved, ligated.        * VT Storm    -VT storm 7/10/18. Polymorphic and monomorphic. Degenerated into VF. 12 ICD shocks  -Intubated, IABP placed emergently at bedside.   -Amio loaded x 2, gtt started per protocol.  Now on amio 200mg PO TID. Lidocaine gtt started at 1, increased to 2 when patient had more VT-->VF and AICD shocks. Off lidocaine, now mexilitene TID  -EP consulted, appreciate their assistance. Pacing rate increased to 80, metoprolol IV added.  -Continue support with IABP 1:1  -K goal >4.5, Mg >2.5    - regimen per EP recs one patient is able to take PO   - Toprol 150 mg, mexiletine 200 BID, Amio 200 TID for 1 weeks, 200 BID for 4 weeks, 300 QD thereafter        Leukocytosis    - Blood cultures x 2 today  - will cover for VAP, vomiting during code and intubation (aspiration risk) and worsening opacities on CXR today with rising leukocytosis  - continue vanc and cefepime for now and await ID recs  - last BM 6/9, KUB reviewed in EPIC.  Start liquid colace for bowel regimen.        Encounter for management of intra-aortic balloon pump    -IABP placed 7/10/2018        Respiratory failure requiring intubation    -Emergently intubated 7/10/18 for impending respiratory failure/need for IABP and inability to lay fat  -History of pulmonary sarcoidosis  -Methylpred given 7/10/18, hydrocortisone 100 mg q8 started 7/11/18  -Pulmonary consult for assistance with sarcoid & vent management.  -CXR daily  -Spoke with pulm at bedside today, Concern for VAP vs ARDS in setting of lower pO2 and CXR appearance today. They will adjust vent settings as needed. Appreciate recs.     - started on empiric abx and consult ID.        Cardiogenic shock    - IABP placed emergently 7/10/18  - Augmenting well at 1:1. SVO2 73%  - CVP 10 this am (patient net -jesenia overnight). Continue  lasix 80 BID per Dr. Lazo.         Ventricular fibrillation    -See VT        Hypotension    - Levophed started during code 7/10/18  - Wean for MAP >65 to d/c (using IABP for MAP). Will try to D/C.        Chronic systolic CHF (congestive heart failure)    - See cardiogenic shock        GI bleed    - INR 1.1 today  - Coumadin on hold, will discuss resumption  - Protonix increased to 40 mg BID (changed to IV)  -Had screening colonoscopy with hot snare polypectomy during last admission; most likely culprit post polypectomy bleeding  - H & H stable, normal BMs since admission        History of atrial fibrillation    -  continue amio         Abnormal involuntary movements    - Continue keppra (changed to IV)  - possible seizure activity noted on 7/12/18 (twitching right face and UEs lasting 5 min)   - neuro consulted and increased keppra to 1000 IV 12   - continuous EEG read and negative for seizure activity   - will de-escalation of keppra with neuro        CAD (coronary artery disease)    - Continue atorvastatin, nitro prn  - Hold lopressor for now  - ASA restarted        Sarcoidosis of lung    - Continue prednisone, breo-ellipta, and albuterol prn            Lowell Mason MD  Heart Transplant  [facility]

## 2018-07-15 NOTE — PROGRESS NOTES
[facility]  Neurology  Progress Note    Patient Name: Yonathan Good Jr.  MRN: [id number]  Admission Date: 7/7/2018  Hospital Length of Stay: 8 days  Code Status: Full Code   Attending Provider: Mohan Cross MD  Primary Care Physician: Edgar Gates MD   Principal Problem:Ventricular tachycardia, incessant      Subjective:     Interval History:   Daughter and nurse report that he responds, follows commands.  No abnormal movements.    From initial consult 7/13/18:  The patient is a 58 y/o AAM with previous CVA x2 and subsequent seizures (on AED) and extensive cardiac Hx, currently admitted to the -ICU with cardiogenic shock and respiratory failure, who is consulted to neurology for twitching movements in the face and upper extremities.     The daughter at the bedside is providing the Hx. In brief, patient with HFrEF (EF 5-10%) due to ICM s/p ICD and a candidate for heart transplant, sarcoidosis on chronic steroids, and 2 CVAs (L parietal, w/o residual deficits), with seizures episodes x5 immediately the next day after the second stroke, and since then he has been on phenytoin, w/o recurrence of the episodes. Had a recent admission (6/22-7/4) for dysarthria and concerns for stroke, which was negative in the work up, however during that admission he had several episodes of VT and A.fib and therefore after cardiology work up was discharged on amiodarone/warfarin/Lovenox bridge and phenytoin was changed to levetiracetam due to interactions with warfarin.   on 7/8 he presented with hematochezia and hypotension down to 70s. At OSH he was noted to have a 2.5 unit drop in Hgb and increase in Cr up to 2.8, he was transferred to [facility] and received IABP on 7/10 due to cardiogenic shock, currently on vasopressors. Also due to impending respiratory failure he was intubated and started on sedation. Yesterday evening he was noted by the daughter and the nurse to have twitching movements in the facial muscles and  bilateral upper extremities that took 5 minutes and has not recurred since.    Current Neurological Medications:   keppra 500mg bid 7/7-7/12  Keppra 1gm bid since 7/12- present    Current Facility-Administered Medications   Medication Dose Route Frequency Provider Last Rate Last Dose    0.9%  NaCl infusion (for blood administration)   Intravenous Q24H PRN Catalina F. Reggie, PA-C        albuterol nebulizer solution 2.5 mg  2.5 mg Nebulization Q4H Paulino Rubi MD   2.5 mg at 07/15/18 0715    ALPRAZolam tablet 2 mg  2 mg Oral BID PRN Paulino Rubi MD   2 mg at 07/08/18 1000    amiodarone 5 mg/mL oral suspension  200 mg Oral TID Anna Landrum PA-C   200 mg at 07/15/18 1042    aspirin chewable tablet 81 mg  81 mg Oral Daily Catalina F. Reggie, PA-C   81 mg at 07/15/18 0909    atorvastatin tablet 40 mg  40 mg Oral Daily Paulino Rubi MD   40 mg at 07/15/18 0910    benzonatate capsule 100 mg  100 mg Oral TID PRN Catalina F. Reggie, PA-C        calcium gluconate 1g in dextrose 5% 100mL (ready to mix system)  1 g Intravenous PRN Catalina F. Reggie, PA-C        calcium gluconate 1g in dextrose 5% 100mL (ready to mix system)  1 g Intravenous PRN Catalina F. Reggie, PA-C        calcium gluconate 1g in dextrose 5% 100mL (ready to mix system)  1 g Intravenous PRN Catalina F. Reggie, PA-C        carisoprodol tablet 350 mg  350 mg Oral QID PRN Paulino Rubi MD        ceFEPIme injection 2 g  2 g Intravenous Q8H Anna Landrum PA-C   2 g at 07/15/18 0410    chlorhexidine 0.12 % solution 15 mL  15 mL Mouth/Throat BID Catalina F. Reggie, PA-C   15 mL at 07/15/18 0923    chlorhexidine 0.12 % solution 15 mL  15 mL Mouth/Throat BID Catalina F. Reggie, PA-C   15 mL at 07/15/18 0923    dextrose 50% injection 12.5 g  12.5 g Intravenous PRN Ethan Byrne MD        docusate 50 mg/5 mL liquid 100 mg  100 mg Oral Daily Anna Landrum PA-C   100 mg at 07/15/18 0910    fentaNYL 2500 mcg in 0.9% sodium  chloride 250 mL infusion premix (titrating)   Intravenous Continuous Catalina FAurelio Paredes PA-C 15 mL/hr at 07/15/18 1100 150 mcg/hr at 07/15/18 1100    fluticasone-vilanterol 200-25 mcg/dose diskus inhaler 1 puff  1 puff Inhalation Daily Paulino Rubi MD   Stopped at 07/11/18 0900    furosemide injection 80 mg  80 mg Intravenous BID Kimberly Lazo MD   80 mg at 07/15/18 0909    gabapentin capsule 600 mg  600 mg Oral BID Paulino Rubi MD   600 mg at 07/15/18 0909    glucagon (human recombinant) injection 1 mg  1 mg Intramuscular PRN Ethan Byrne MD        heparin (porcine) injection 5,000 Units  5,000 Units Subcutaneous Q8H Ethan Byrne MD   5,000 Units at 07/15/18 0501    HYDROcodone-acetaminophen  mg per tablet 1 tablet  1 tablet Oral BID PRN Paulino Rubi MD   1 tablet at 07/09/18 2122    hydrocortisone sodium succinate injection 100 mg  100 mg Intravenous Q8H Mohan Cross MD   100 mg at 07/15/18 0500    insulin aspart U-100 pen 0-5 Units  0-5 Units Subcutaneous Q4H PRN Ethan Byrne MD   1 Units at 07/14/18 2011    levETIRAcetam in NaCl (iso-os) IVPB 1,000 mg  1,000 mg Intravenous Q12H Terrie Tran  mL/hr at 07/15/18 0909 1,000 mg at 07/15/18 0909    magnesium sulfate 2g in water 50mL IVPB (premix)  2 g Intravenous PRN Catalina Paredes PA-C   2 g at 07/13/18 0528    magnesium sulfate 2g in water 50mL IVPB (premix)  4 g Intravenous PRN Catalina Paredes PA-C        metoprolol injection 2.5 mg  2.5 mg Intravenous Q4H Terrie Tran MD   2.5 mg at 07/15/18 1044    mexiletine capsule 150 mg  150 mg Oral Q8H Ethan Byrne MD   150 mg at 07/15/18 0500    nitroGLYCERIN SL tablet 0.4 mg  0.4 mg Sublingual PRN Paulino Rubi MD   0.4 mg at 07/10/18 1430    norepinephrine 4 mg in dextrose 5% 250 mL infusion (premix) (titrating)  0.02 mcg/kg/min Intravenous Continuous Catalina Paredes PA-C 7.6 mL/hr at 07/15/18 1100 0.02 mcg/kg/min at  07/15/18 1100    pantoprazole (PROTONIX) 40 mg in dextrose 5 % 100 mL IVPB  40 mg Intravenous BID Mohan Cross  mL/hr at 07/15/18 0909 40 mg at 07/15/18 0909    potassium chloride 40 mEq in 100 mL IVPB (FOR CENTRAL LINE ADMINISTRATION ONLY)  40 mEq Intravenous PRN Catalina F. Reggie, PA-C 25 mL/hr at 07/15/18 0952 40 mEq at 07/15/18 0952    And    potassium chloride 20 mEq in 100 mL IVPB (FOR CENTRAL LINE ADMINISTRATION ONLY)  60 mEq Intravenous PRN Catalina F. Reggie, PA-C        And    potassium chloride 40 mEq in 100 mL IVPB (FOR CENTRAL LINE ADMINISTRATION ONLY)  80 mEq Intravenous PRN Catalina F. Reggie, PA-C        propofol (DIPRIVAN) 10 mg/mL infusion  5 mcg/kg/min Intravenous Continuous Catalina F. Reggie, PA-C 24.2 mL/hr at 07/15/18 1102 40 mcg/kg/min at 07/15/18 1102    sodium chloride 0.9% flush 3 mL  3 mL Intravenous Q8H Paulino Rubi MD   3 mL at 07/14/18 2143    sodium phosphate 15 mmol in dextrose 5 % 250 mL IVPB  15 mmol Intravenous PRN Catalina F. Reggie, PA-C        sodium phosphate 20.01 mmol in dextrose 5 % 250 mL IVPB  20.01 mmol Intravenous PRN Catalina F. Reggie, PA-C        sodium phosphate 30 mmol in dextrose 5 % 250 mL IVPB  30 mmol Intravenous PRN Catalina F. Reggie, PA-C        vancomycin in dextrose 5 % 1 gram/250 mL IVPB 1,000 mg  1,000 mg Intravenous Q12H MIRIAM Christie-C   Stopped at 07/15/18 1145       Review of Systems  Objective:     Vital Signs (Most Recent):  Temp: 98.6 °F (37 °C) (07/15/18 0700)  Pulse: 80 (07/15/18 1000)  Resp: 20 (07/15/18 1000)  BP: 108/62 (07/15/18 1000)  SpO2: 100 % (07/15/18 1000) Vital Signs (24h Range):  Temp:  [98.6 °F (37 °C)-99 °F (37.2 °C)] 98.6 °F (37 °C)  Pulse:  [79-88] 80  Resp:  [16-30] 20  SpO2:  [96 %-100 %] 100 %  BP: ()/(48-72) 108/62     Weight: 109.4 kg (241 lb 2.9 oz)  Body mass index is 33.64 kg/m².    Physical Exam    General:  Intubated, no distress  Resp:  Vent     Neuro:  Mental status:  opens eyes to voice,  tries to look toward examiner, but appears to fall back asleep.  With encouragement and continued arousals, squeezed right hand to command.     Cranial nerves:  Vision:  + blink to threat.   Pupils:  4mm bilaterally  Corneal reflex:  present  Extraocular movements:  intact to oculocephalic    Facial symmetry:  Present  Hearing: opens eyes to voice     Motor:  Tone:  Reduced  in LUE  Strength:  w/d to pain in RUE, weakly in LUE; spontaneous movements in legs        Significant Labs:   CBC:   Recent Labs  Lab 07/14/18  0400 07/15/18  0400   WBC 13.19* 11.38   HGB 8.3* 7.4*   HCT 25.9* 23.3*    146*     CMP:   Recent Labs  Lab 07/14/18 0400 07/14/18  2316 07/15/18  0400 07/15/18  0849   *  < > 137* 171* 125*   *  < > 138 138 140   K 4.8  4.8  < > 4.2 4.0 3.9   CL 97  < > 102 101 103   CO2 26  < > 27 28 28   BUN 49*  < > 53* 53* 54*   CREATININE 1.8*  < > 1.2 1.1 1.0   CALCIUM 8.2*  < > 8.2* 8.3* 8.7   MG 2.6  --   --  2.4  --    PROT 5.6*  --   --  5.2*  --    ALBUMIN 2.5*  --   --  2.2*  --    BILITOT 0.3  --   --  0.3  --    ALKPHOS 59  --   --  55  --    AST 12  --   --  10  --    ALT 22  --   --  17  --    ANIONGAP 8  < > 9 9 9   EGFRNONAA 41.4*  < > >60.0 >60.0 >60.0   < > = values in this interval not displayed.    Significant Imaging: I have reviewed all pertinent imaging results/findings within the past 24 hours.    Assessment and Plan:     Seizure disorder    2006 after stroke, put on dilantin, no seizures since.  7/12/2018:  Twitching right face, upper extremities 5 min, during admit for heart failure, ARF. Not ictal on eeg.      Events of 7/12 were most likely due to lower threshold for seizures secondary to medical illness (hypoxia, renal failure, electrolyte abnormalities).  No events since the 12th and the cap eeg was negative at time of movements.  His past history of seizures were secondary to the strokes and treated with subtherapeutic dilantin for past 12 years, so I do not feel  he needs any antiepileptic.    Recommendations:   -> taper off of keppra by reducing back to 500mg bid for 4 days, then stop.          History of stroke    2005, 2006, no deficits per daughter      A possible nidus for seizures, but eeg negative during events.    - continue on secondary stroke prevention with ASA and high dose statin daily              Emma Tony MD  Neurology  [facility]

## 2018-07-15 NOTE — ASSESSMENT & PLAN NOTE
Events of 7/12 were most likely due to lower threshold for seizures secondary to medical illness (hypoxia, renal failure, electrolyte abnormalities).  No events since the 12th and the cap eeg was negative at time of movements.  His past history of seizures were secondary to the strokes and treated with subtherapeutic dilantin for past 12 years, so I do not feel he needs any antiepileptic.    Recommendations:   -> taper off of keppra by reducing back to 500mg bid for 4 days, then stop.

## 2018-07-15 NOTE — SIGNIFICANT EVENT
Dr. Panda at the bedside.  Spoke with her about the Vancomycin lab result of 27.5.  Instructed to hold a.m. Dose.  Dr. Panda wants a Vancomycin Trough before tonight's scheduled dose.  Will carryout order and continue to monitor patient.

## 2018-07-16 PROBLEM — D72.829 LEUKOCYTOSIS: Status: RESOLVED | Noted: 2018-01-01 | Resolved: 2018-01-01

## 2018-07-16 PROBLEM — I50.9 CHF (CONGESTIVE HEART FAILURE): Status: RESOLVED | Noted: 2018-01-01 | Resolved: 2018-01-01

## 2018-07-16 PROBLEM — I50.43 ACUTE ON CHRONIC COMBINED SYSTOLIC AND DIASTOLIC CONGESTIVE HEART FAILURE: Status: ACTIVE | Noted: 2018-01-01

## 2018-07-16 PROBLEM — G40.909 SEIZURE DISORDER: Status: ACTIVE | Noted: 2018-01-01

## 2018-07-16 NOTE — SUBJECTIVE & OBJECTIVE
Interval History: Patient following commands during sedation vacation this morning. CVP 8-9 overnight. Off levo since 6 am.    Continuous Infusions:   fentanyl 15 mL/hr at 07/16/18 0900    furosemide (LASIX) 2 mg/mL infusion (non-titrating) 10 mg/hr (07/16/18 1029)    propofol 35 mcg/kg/min (07/16/18 1034)     Scheduled Meds:   albuterol sulfate  2.5 mg Nebulization Q4H    amiodarone  200 mg Oral TID    aspirin  81 mg Oral Daily    atorvastatin  40 mg Oral Daily    budesonide  0.5 mg Nebulization Q12H    ceFEPime (MAXIPIME) IVPB  2 g Intravenous Q8H    chlorhexidine  15 mL Mouth/Throat BID    chlorhexidine  15 mL Mouth/Throat BID    docusate  100 mg Oral Daily    fluticasone-vilanterol  1 puff Inhalation Daily    gabapentin  600 mg Oral BID    heparin (porcine)  5,000 Units Subcutaneous Q8H    levETIRAcetam  500 mg Oral BID    metoprolol  2.5 mg Intravenous Q4H    mexiletine  150 mg Oral Q8H    pantoprozole (PROTONIX) 40 mg/100 mL D5W IVPB  40 mg Intravenous BID    [START ON 7/17/2018] predniSONE  40 mg Oral Daily    sodium chloride 0.9%  3 mL Intravenous Q8H    vancomycin (VANCOCIN) IVPB  1,000 mg Intravenous Q12H     PRN Meds:sodium chloride, ALPRAZolam, benzonatate, calcium gluconate IVPB, calcium gluconate IVPB, calcium gluconate IVPB, carisoprodol, dextrose 50%, glucagon (human recombinant), HYDROcodone-acetaminophen, insulin aspart U-100, magnesium sulfate IVPB, magnesium sulfate IVPB, nitroGLYCERIN, potassium chloride in water **AND** potassium chloride in water **AND** potassium chloride in water, sodium phosphate IVPB, sodium phosphate IVPB, sodium phosphate IVPB    Review of patient's allergies indicates:  No Known Allergies  Objective:     Vital Signs (Most Recent):  Temp: 100 °F (37.8 °C) (07/16/18 0700)  Pulse: 80 (07/16/18 1000)  Resp: 18 (07/16/18 1000)  BP: (!) 91/50 (07/16/18 1000)  SpO2: 96 % (07/16/18 1000) Vital Signs (24h Range):  Temp:  [98.5 °F (36.9 °C)-100 °F (37.8 °C)]  100 °F (37.8 °C)  Pulse:  [80-98] 80  Resp:  [15-38] 18  SpO2:  [88 %-100 %] 96 %  BP: ()/(49-70) 91/50     Patient Vitals for the past 72 hrs (Last 3 readings):   Weight   07/16/18 0400 111.5 kg (245 lb 13 oz)   07/15/18 0400 109.4 kg (241 lb 2.9 oz)   07/14/18 0400 111.2 kg (245 lb 2.4 oz)     Body mass index is 34.28 kg/m².      Intake/Output Summary (Last 24 hours) at 07/16/18 1117  Last data filed at 07/16/18 0900   Gross per 24 hour   Intake          3080.03 ml   Output             3155 ml   Net           -74.97 ml       Hemodynamic Parameters:       Telemetry: paced at 80 bpm    Physical Exam   Constitutional: He appears well-developed and well-nourished. He is intubated.   Daughter and brother at bedside.    HENT:   Head: Normocephalic and atraumatic.   Neck: Normal range of motion. Neck supple. JVD: difficult to assess on ventialtor.   Right TLC   Cardiovascular:   Warm and well perfused in upper and lower extremities    Pulmonary/Chest: He is intubated.   Coarse breath sounds, ventilated.    Abdominal: Soft. Bowel sounds are normal. He exhibits no distension. There is no tenderness.   Musculoskeletal: He exhibits no edema.   Neurological:   Following commands, able to move all 4 extremities on command   Skin: Skin is warm and dry.   Nursing note and vitals reviewed.      Significant Labs:  CBC:    Recent Labs  Lab 07/14/18  0400 07/15/18  0400 07/16/18  0418   WBC 13.19* 11.38 14.20*   RBC 2.75* 2.51* 2.62*   HGB 8.3* 7.4* 7.6*   HCT 25.9* 23.3* 24.8*    146* 157   MCV 94 93 95   MCH 30.2 29.5 29.0   MCHC 32.0 31.8* 30.6*     BNP:  No results for input(s): BNP in the last 168 hours.    Invalid input(s): BNPTRIAGELBLO  CMP:    Recent Labs  Lab 07/14/18  0400  07/15/18  0400  07/16/18  0030 07/16/18  0418 07/16/18  0800   *  < > 171*  < > 189* 153* 172*   CALCIUM 8.2*  < > 8.3*  < > 8.7 8.7 8.7   ALBUMIN 2.5*  --  2.2*  --   --  2.2*  --    PROT 5.6*  --  5.2*  --   --  5.9*  --    NA  131*  < > 138  < > 141 143 141   K 4.8  4.8  < > 4.0  < > 4.6 4.6 4.6   CO2 26  < > 28  < > 28 30* 28   CL 97  < > 101  < > 103 105 105   BUN 49*  < > 53*  < > 60* 57* 56*   CREATININE 1.8*  < > 1.1  < > 1.2 1.1 1.0   ALKPHOS 59  --  55  --   --  55  --    ALT 22  --  17  --   --  15  --    AST 12  --  10  --   --  11  --    BILITOT 0.3  --  0.3  --   --  0.4  --    < > = values in this interval not displayed.   Coagulation:     Recent Labs  Lab 07/14/18  0400 07/15/18  0400 07/16/18  0418   INR 1.4* 1.1 1.0     LDH:  No results for input(s): LDH in the last 72 hours.  Microbiology:  Microbiology Results (last 7 days)     Procedure Component Value Units Date/Time    Blood culture [[id number]] Collected:  07/13/18 1309    Order Status:  Completed Specimen:  Blood from Peripheral, Hand, Left Updated:  07/15/18 1812     Blood Culture, Routine No Growth to date     Blood Culture, Routine No Growth to date     Blood Culture, Routine No Growth to date    Blood culture [[id number]] Collected:  07/13/18 1350    Order Status:  Completed Specimen:  Blood from Peripheral, Hand, Right Updated:  07/15/18 1812     Blood Culture, Routine No Growth to date     Blood Culture, Routine No Growth to date     Blood Culture, Routine No Growth to date    Culture, Respiratory with Gram Stain [[id number]] Collected:  07/13/18 1129    Order Status:  Completed Specimen:  Respiratory from Endotracheal Aspirate Updated:  07/14/18 1138     Respiratory Culture Normal respiratory peng     Gram Stain (Respiratory) Few WBC's     Gram Stain (Respiratory) No organisms seen          I have reviewed all pertinent labs within the past 24 hours.    Estimated Creatinine Clearance: 106 mL/min (based on SCr of 1 mg/dL).    Diagnostic Results:  I have reviewed and interpreted all pertinent imaging results/findings within the past 24 hours.

## 2018-07-16 NOTE — PLAN OF CARE
Problem: Patient Care Overview  Goal: Individualization & Mutuality  Admit 7/7 GIB    Hx:  CAD s/p PCI LAD 2007, NICM EF 5-10%, pulmonary sarcoidosis, Afib home coumadin, ICD placement, L parietal CVA no residual deficits    7/9  Transfer from TSU to SICU hypotension  TLC placement   7/10:  Intubated.  VTach/VFib arrest (6 min arrest).  MMVT and PMVT.  Amio bolus and gtt, lido gtt.  IABP placed  7/12:  Head CT (neg); EEG monitoring started  7/13: blood cultures, sputum culture    Nursing:  MAP>65          Outcome: Ongoing (interventions implemented as appropriate)  Remains sedated however wakes when sedation decreased. Remains on 1:1 IABP. Paced @ 80. Afebrile.No issues with pump or pressures over night. Remains vented with continued thick white secretions. Tolerating TF @ 55 without residuals. Irvin with adequate clear urine output. Gtts: propofol, fentanyl. Levo paused at 0600. Plan of care discussed with family. Remain free from injury/falls

## 2018-07-16 NOTE — ASSESSMENT & PLAN NOTE
-VT storm 7/10/18. Polymorphic and monomorphic. Degenerated into VF. 12 ICD shocks  -Intubated, IABP placed emergently at bedside.   -Amio loaded x 2, gtt started per protocol.  Now on amio 200mg PO TID. Lidocaine gtt started at 1, increased to 2 when patient had more VT-->VF and AICD shocks. Off lidocaine, continue mexilitene TID  -EP consulted, appreciate their assistance. Pacing rate increased to 80, metoprolol IV added.  -Continue support with IABP 1:1   -K goal >4.5, Mg >2.5  - regimen per EP recs one patient is able to take PO   - Toprol 150 mg, mexiletine 200 BID, Amio 200 TID for 1 weeks, 200 BID for 4 weeks, 300 QD thereafter

## 2018-07-16 NOTE — ASSESSMENT & PLAN NOTE
- Continue keppra (changed to IV)  - possible seizure activity noted on 7/12/18 (twitching right face and UEs lasting 5 min)   - neuro consulted and increased keppra to 1000 IV 12   - continuous EEG read and negative for seizure activity   - will start de-escalation of keppra today and change to PO

## 2018-07-16 NOTE — SUBJECTIVE & OBJECTIVE
Interval History: No acute events overnight. Patient seen and examined in the morning with family at bedside. Patient remains intubated. On fentanyl and propofol gtt. Obeys commands.     Objective:     Vital Signs (Most Recent):  Temp: 100 °F (37.8 °C) (07/16/18 0700)  Pulse: 80 (07/16/18 0815)  Resp: (!) 24 (07/16/18 0815)  BP: 101/63 (07/16/18 0815)  SpO2: 100 % (07/16/18 0815) Vital Signs (24h Range):  Temp:  [98.5 °F (36.9 °C)-100 °F (37.8 °C)] 100 °F (37.8 °C)  Pulse:  [80-98] 80  Resp:  [15-38] 24  SpO2:  [88 %-100 %] 100 %  BP: ()/(49-70) 101/63     Weight: 111.5 kg (245 lb 13 oz)  Body mass index is 34.28 kg/m².      Intake/Output Summary (Last 24 hours) at 07/16/18 0826  Last data filed at 07/16/18 0800   Gross per 24 hour   Intake          3390.03 ml   Output             3795 ml   Net          -404.97 ml       Physical Exam   HENT:   Head: Normocephalic and atraumatic.   Mouth/Throat: Oropharynx is clear and moist.   Eyes: Pupils are equal, round, and reactive to light.   Cardiovascular: Normal rate and intact distal pulses.    On IABP   Pulmonary/Chest:   Intubated and on ventilator   Decreased breath sounds bilaterally.   Rhonchi present bilaterally.    Abdominal: Soft.   Hypoactive bowel sounds   Musculoskeletal: He exhibits edema (2+ pitting).   Neurological: He is alert. GCS eye subscore is 3. GCS verbal subscore is 1. GCS motor subscore is 6.   RAAS -3    Skin: Skin is warm. Capillary refill takes less than 2 seconds.       Vents:  Vent Mode: A/C (07/16/18 0815)  Set Rate: 16 bmp (07/16/18 0815)  Vt Set: 500 mL (07/16/18 0815)  PEEP/CPAP: 6 cmH20 (07/16/18 0815)  Oxygen Concentration (%): 36 (07/16/18 0815)  Peak Airway Pressure: 29 cmH2O (07/16/18 0815)  Plateau Pressure: 30 cmH20 (07/16/18 0815)  Total Ve: 10.3 mL (07/16/18 0815)  F/VT Ratio<105 (RSBI): (!) 45.54 (07/16/18 0815)    Lines/Drains/Airways     Central Venous Catheter Line                 Percutaneous Central Line  Insertion/Assessment - triple lumen  07/09/18 1500 right internal jugular 6 days         Introducer 07/10/18 1548 other (see comments) 5 days          Drain                 NG/OG Tube 07/10/18 1557 [place] sump 16 Fr. Left nostril 5 days         Urethral Catheter 07/10/18 1646 Double-lumen;Latex 16 Fr. 5 days          Airway                 Airway - Non-Surgical 07/10/18 1523 Endotracheal Tube 5 days          Line                 IABP 07/10/18 1550 7.5 Fr. 40 mL 5 days          Peripheral Intravenous Line                 Peripheral IV - Single Lumen 07/12/18 1100 Right Forearm 3 days                Significant Labs:    CBC/Anemia Profile:    Recent Labs  Lab 07/15/18  0400 07/16/18  0418   WBC 11.38 14.20*   HGB 7.4* 7.6*   HCT 23.3* 24.8*   * 157   MCV 93 95   RDW 14.6* 15.1*        Chemistries:    Recent Labs  Lab 07/15/18  0400  07/15/18  1602 07/16/18  0030 07/16/18 0418     < > 141 141 143   K 4.0  < > 4.6 4.6 4.6     < > 104 103 105   CO2 28  < > 27 28 30*   BUN 53*  < > 58* 60* 57*   CREATININE 1.1  < > 1.2 1.2 1.1   CALCIUM 8.3*  < > 8.8 8.7 8.7   ALBUMIN 2.2*  --   --   --  2.2*   PROT 5.2*  --   --   --  5.9*   BILITOT 0.3  --   --   --  0.4   ALKPHOS 55  --   --   --  55   ALT 17  --   --   --  15   AST 10  --   --   --  11   MG 2.4  --   --   --  2.6   < > = values in this interval not displayed.    Recent Lab Results       07/16/18  0824 07/16/18  0818 07/16/18 0418 07/16/18 0417 07/16/18  0030      Immature Granulocytes   CANCELED  Comment:  Result canceled by the ancillary       Immature Grans (Abs)   CANCELED  Comment:  Mild elevation in immature granulocytes is non specific and   can be seen in a variety of conditions including stress response,   acute inflammation, trauma and pregnancy. Correlation with other   laboratory and clinical findings is essential.    Result canceled by the ancillary         Albumin   2.2(L)       Alkaline Phosphatase   55       Allens Test N/A N/A         ALT   15       Anion Gap   8  10     Aniso   Slight       AST   11       BANDS   3.0       Basophilic Stippling   Occasional       Basophil%   0.0       Total Bilirubin   0.4  Comment:  For infants and newborns, interpretation of results should be based  on gestational age, weight and in agreement with clinical  observations.  Premature Infant recommended reference ranges:  Up to 24 hours.............<8.0 mg/dL  Up to 48 hours............<12.0 mg/dL  3-5 days..................<15.0 mg/dL  6-29 days.................<15.0 mg/dL         Site Other [facility]        BUN, Bld   57(H)  60(H)     Calcium   8.7  8.7     Chloride   105  103     CO2   30(H)  28     Creatinine   1.1  1.2     DelSys Adult Vent Adult Vent        Differential Method   Manual       eGFR if    >60.0  >60.0     eGFR if non    >60.0  Comment:  Calculation used to obtain the estimated glomerular filtration  rate (eGFR) is the CKD-EPI equation.     >60.0  Comment:  Calculation used to obtain the estimated glomerular filtration  rate (eGFR) is the CKD-EPI equation.        Eosinophil%   0.5       FiO2 35 35        Large/Giant Platelets   Present       Glucose   153(H)  189(H)     Gran%   87.5(H)       Hematocrit   24.8(L)       Hemoglobin   7.6(L)       Coumadin Monitoring INR   1.0  Comment:  Coumadin Therapy:  2.0 - 3.0 for INR for all indicators except mechanical heart valves  and antiphospholipid syndromes which should use 2.5 - 3.5.         Lymph%   2.5(L)       Magnesium   2.6       MCH   29.0       MCHC   30.6(L)       MCV   95       Metamyelocytes   0.5       Mode AC/PRVC AC/PRVC        Mono%   4.5       MPV   11.4       Myelocytes   1.5       nRBC   1(A)       PEEP          Platelet Estimate   Appears normal       Platelets   157       POC BE 8 4        POC HCO3 31.6(H) 27.6        POC PCO2 45.8(H) 35.9        POC PH 7.447 7.494(H)        POC PO2 30(LL) 71(L)        POC SATURATED O2 58(L) 95        POC TCO2  33(H) 29(H)        POCT Glucose    178(H)      Poly   Occasional       Potassium   4.6  4.6     Total Protein   5.9(L)       Protime   10.3       Rate 16 16        RBC   2.62(L)       RDW   15.1(H)       Sample VENOUS ARTERIAL        Sodium   143  141     Sp02          Vancomycin, Random          Vancomycin-Trough          Vt 500 500        WBC   14.20(H)                   07/16/18  0029 07/15/18  2215 07/15/18  2128 07/15/18  1958 07/15/18  1607      Immature Granulocytes          Immature Grans (Abs)          Albumin          Alkaline Phosphatase          Allens Test   N/A       ALT          Anion Gap          Aniso          AST          BANDS          Basophilic Stippling          Basophil%          Total Bilirubin          Site   Other       BUN, Bld          Calcium          Chloride          CO2          Creatinine          DelSys   Adult Vent       Differential Method          eGFR if           eGFR if non           Eosinophil%          FiO2   35       Large/Giant Platelets          Glucose          Gran%          Hematocrit          Hemoglobin          Coumadin Monitoring INR          Lymph%          Magnesium          MCH          MCHC          MCV          Metamyelocytes          Mode   AC/PRVC       Mono%          MPV          Myelocytes          nRBC          PEEP   6       Platelet Estimate          Platelets          POC BE   8       POC HCO3   33.6(H)       POC PCO2   63.9(H)       POC PH   7.328(L)       POC PO2   34(LL)       POC SATURATED O2   59(L)       POC TCO2   35(H)       POCT Glucose 207(H)   165(H) 206(H)     Poly          Potassium          Total Protein          Protime          Rate   16       RBC          RDW          Sample   VENOUS       Sodium          Sp02   100       Vancomycin, Random          Vancomycin-Trough  16.1        Vt   500       WBC                      07/15/18  1602 07/15/18  1213 07/15/18  1158 07/15/18  0851 07/15/18  0849       Immature Granulocytes          Immature Grans (Abs)          Albumin          Alkaline Phosphatase          Allens Test   N/A       ALT          Anion Gap 10    9     Aniso          AST          BANDS          Basophilic Stippling          Basophil%          Total Bilirubin          Site   Other       BUN, Bld 58(H)    54(H)     Calcium 8.8    8.7     Chloride 104    103     CO2 27    28     Creatinine 1.2    1.0     DelSys   Adult Vent       Differential Method          eGFR if  >60.0    >60.0     eGFR if non  >60.0  Comment:  Calculation used to obtain the estimated glomerular filtration  rate (eGFR) is the CKD-EPI equation.       >60.0  Comment:  Calculation used to obtain the estimated glomerular filtration  rate (eGFR) is the CKD-EPI equation.        Eosinophil%          FiO2   35       Large/Giant Platelets          Glucose 190(H)    125(H)     Gran%          Hematocrit          Hemoglobin          Coumadin Monitoring INR          Lymph%          Magnesium          MCH          MCHC          MCV          Metamyelocytes          Mode   AC/PRVC       Mono%          MPV          Myelocytes          nRBC          PEEP   8       Platelet Estimate          Platelets          POC BE   7       POC HCO3   32.9(H)       POC PCO2   60.2(H)       POC PH   7.346(L)       POC PO2   31(LL)       POC SATURATED O2   54(L)       POC TCO2   35(H)       POCT Glucose  133(H)  138(H)      Poly          Potassium 4.6    3.9     Total Protein          Protime          Rate   16       RBC          RDW          Sample   VENOUS       Sodium 141    140     Sp02   100       Vancomycin, Random     27.5     Vancomycin-Trough          Vt   500       WBC                        All pertinent labs within the past 24 hours have been reviewed.    Significant Imaging:  I have reviewed all pertinent imaging results/findings within the past 24 hours.  I have reviewed and interpreted all pertinent imaging  results/findings within the past 24 hours.

## 2018-07-16 NOTE — ASSESSMENT & PLAN NOTE
- IABP placed emergently 7/10/18  - Daily CXR   - Augmenting well at 1:1. SVO2 58%. CO ~ 9 L/min, CI 3.9   - CVP 8-9 overnight. Increase lasix today

## 2018-07-16 NOTE — PROGRESS NOTES
MIRIAM Gibbons notified BP 82/48 (60). MD ordered to start Levophed IV drip to maintain MAP >60. Will carry out order and continue to monitor patient closely.

## 2018-07-16 NOTE — ASSESSMENT & PLAN NOTE
· Oxygen saturation is 100% on an FiO2 of 35% + 6 PEEP  · Infection unlikely   · Respiratory cultures NGTD, blood cultures NGTD  · Afebrile  · Continue diuresis per primary team - Net fluid balance -265 mL in last 24 hours.   · No plans to extubate while IABP remains   · Morning AB.49 / 36 / 71 / 4 / 27.6 / 95%  with vent settings RR 16, , Peep 6, FiO2 35%. Plateau pressure 30.

## 2018-07-16 NOTE — PROGRESS NOTES
" [facility]  Adult Nutrition  Progress Note    SUMMARY       Recommendations  Recommendation/Intervention:   1. Continue current TF - meets EEN (w/propofol) and EPN.    -If patient no longer on propofol, recommend Impact Peptide 1.5 at a goal rate of 55 mL/hr - to provide 1980 kcal/day, 124g protein/day, and 1016mL free fluid/day.   2. When able to extubate, ADAT to Cardiac with texture per SLP.   RD to monitor.    Goals: Patient to receive nutrition by RD follow-up  Nutrition Goal Status: goal met  Communication of RD Recs:  (POC)    Reason for Assessment  Reason for Assessment: RD follow-up  Diagnosis: cardiac disease (VT storm)  Relevant Medical History: CAD, CHF, NICM, Afib, CVA, HTN  General Information Comments: Patient intubated, sedated. IABP in place. Tolerating TF at goal rate. (Noted patient appears well nourished and no weight loss per chart review. RD does not feel patient meets crtieria for malnutrition at this time.)  Nutrition Discharge Planning: Unable to determine at this time.    Nutrition Risk Screen  Nutrition Risk Screen: no indicators present    Nutrition/Diet History  Do you have any cultural, spiritual, Alevism conflicts, given your current situation?: no  Factors Affecting Nutritional Intake: NPO, on mechanical ventilation    Anthropometrics  Temp: 98.8 °F (37.1 °C)  Height: 5' 11" (180.3 cm)  Height (inches): 71 in  Weight Method: Bed Scale  Weight: 111.5 kg (245 lb 13 oz)  Weight (lb): 245.82 lb  Ideal Body Weight (IBW), Male: 172 lb  % Ideal Body Weight, Male (lb): 128.17 lb  BMI (Calculated): 30.8  BMI Grade: 30 - 34.9- obesity - grade I  Usual Body Weight (UBW), k.4 kg (3/2018 per chart review)  % Usual Body Weight: 97.79  % Weight Change From Usual Weight: -2.42 %    Lab/Procedures/Meds  Pertinent Labs Reviewed: reviewed  Pertinent Labs Comments: BUN 57, Glu 153, Alb 2.2  Pertinent Medications Reviewed: reviewed  Pertinent Medications Comments: docusate, " pantoprazole, fentanyl, levophed, propofol    Physical Findings/Assessment  Overall Physical Appearance: on ventilator support, nourished  Tubes: nasogastric tube  Oral/Mouth Cavity: tooth/teeth missing  Skin: edema    Estimated/Assessed Needs  Weight Used For Calorie Calculations: 100 kg (220 lb 7.4 oz) (admit weight)  Energy Calorie Requirements (kcal): 2143 kcal/day  Energy Need Method: [facility]  Protein Requirements: 118-157 g/day (1.5-2.0 g/kg)  Weight Used For Protein Calculations: 78.2 kg (172 lb 6.4 oz) (IBW)  Fluid Requirements (mL): per MD  RDA Method (mL): 2143    Nutrition Prescription Ordered  Current Diet Order: NPO  Current Nutrition Support Formula Ordered: Peptamen Intense VHP  Current Nutrition Support Rate Ordered: 55 (ml)  Current Nutrition Support Frequency Ordered: mL/hr    Evaluation of Received Nutrient/Fluid Intake  Enteral Calories (kcal): 1320  Enteral Protein (gm): 121  Enteral (Free Water) Fluid (mL): 1109  Other Calories (kcal): 560 (propofol)  Total Calories (kcal): 1880  % Kcal Needs: 88  % Protein Needs: 100  I/O: Noted  Energy Calories Required: meeting needs  Protein Required: meeting needs  Fluid Required:  (per MD)  Comments: LBM 7/10  Tolerance: tolerating  % Intake of Estimated Energy Needs: 75 - 100 %  % Meal Intake: NPO    Nutrition Risk  Level of Risk/Frequency of Follow-up: high (2x/week)     Assessment and Plan  Nutrition Problem  Inadequate energy intake     Related to (etiology):   Decreased ability to consume sufficient energy     Signs and Symptoms (as evidenced by):   NPO, intubated/sedated with no alternative means of nutrition at this time      Nutrition Diagnosis Status:   Resolved    Monitor and Evaluation  Food and Nutrient Intake: energy intake, enteral nutrition intake  Food and Nutrient Adminstration: enteral and parenteral nutrition administration  Anthropometric Measurements: weight, weight change, body mass index  Biochemical Data, Medical Tests and  Procedures: electrolyte and renal panel, gastrointestinal profile, inflammatory profile  Nutrition-Focused Physical Findings: overall appearance     Nutrition Follow-Up  RD Follow-up?: Yes

## 2018-07-16 NOTE — SIGNIFICANT EVENT
POST CATH FOLLOW UP NOTE    Procedure: IABP via R CFA with 7.5F sheath    Subjective:  NAEO. IABP position looks lower on CXR today      Vital Signs Range (Last 24H):  Temp:  [98.5 °F (36.9 °C)-100 °F (37.8 °C)]   Pulse:  [80-98]   Resp:  [15-38]   BP: ()/(48-75)   SpO2:  [88 %-100 %]     PE:   GEN: Intubated/sedated  NECK: No JVD  CAR: rrr no m/r/g  PULM: crackles BL  PULSES: able to doppler BL DP/PT biphasic    LABS:  CBC:     Recent Labs  Lab 07/14/18  0400 07/15/18  0400 07/16/18  0418   WBC 13.19* 11.38 14.20*   RBC 2.75* 2.51* 2.62*   HGB 8.3* 7.4* 7.6*   HCT 25.9* 23.3* 24.8*    146* 157   MCV 94 93 95   MCH 30.2 29.5 29.0   MCHC 32.0 31.8* 30.6*     BMP:     Recent Labs  Lab 07/16/18  0030 07/16/18  0418 07/16/18  0800 07/16/18  1227   * 153* 172*  --     143 141  --    K 4.6 4.6 4.6  --     105 105  --    CO2 28 30* 28  --    BUN 60* 57* 56*  --    CREATININE 1.2 1.1 1.0  --    CALCIUM 8.7 8.7 8.7  --    MG  --  2.6  --  2.6     Coagulation:     Recent Labs  Lab 07/16/18 0418   INR 1.0       Assessment/Plan:  Polymorphic Ventricular Tachycardia with Cardiac Arrest  - Pt intubated/sedated and IABP placed via 7.5F sheath in L CFA emergently at bedside 7/10/18  - IABP advanced 2 centimeters today as appeared to have moved distaly on AM CXR. Will repeat CXR  - Cont daily CXR, bedrest, and q1hr neurovascular checks while IABP in place  - Cont treatment per HTS team

## 2018-07-16 NOTE — PT/OT/SLP PROGRESS
Occupational Therapy      Patient Name:  Yonathan Good Jr.   MRN:  [id number]    Patient not seen today secondary to intubated and sedated with femoral IABP  . Will follow-up at a later date to initiate Theraband HEP while on bedrest    MELISSA Dhaliwal  7/16/2018

## 2018-07-16 NOTE — PROGRESS NOTES
MIRIAM Gibbons notified HR 85-90 while being 100% paced at 80 bpm. MD ordered STAT EKG and 2D color flow echo. Will carry out orders and continue to monitor patient closely.

## 2018-07-16 NOTE — ASSESSMENT & PLAN NOTE
-Emergently intubated 7/10/18 for impending respiratory failure/need for IABP and inability to lay fat  -History of pulmonary sarcoidosis  -Methylpred given 7/10/18, hydrocortisone 100 mg q8 started 7/11/18. Transition to PO prednisone today.   -Pulmonary consult for assistance with sarcoid & vent management.  -CXR daily  -Spoke with pulm at bedside today, Concern for PNA & pulm edema, less concern for ARDS given how well is he oxygenating with minimal vent support. Considering bronch if CXR does not improve soon

## 2018-07-16 NOTE — PT/OT/SLP PROGRESS
Physical Therapy      Patient Name:  Yonathan Good Jr.   MRN:  [id number]    Patient not seen today secondary to Unavailable (Comment). Pt intubated/sedated with femoral IABP.  Will follow-up as appropriate.    Sabina Zavaleta, PT   7/16/2018

## 2018-07-16 NOTE — ASSESSMENT & PLAN NOTE
- INR subtherapeutic  - Coumadin on hold, will discuss anticoagulation  - Protonix increased to 40 mg BID (changed to IV)  -Had screening colonoscopy with hot snare polypectomy during last admission; most likely culprit post polypectomy bleeding  - H & H stable, normal BMs since admission

## 2018-07-17 PROBLEM — Z95.810 PRESENCE OF AUTOMATIC IMPLANTABLE CARDIOVERTER-DEFIBRILLATOR: Status: ACTIVE | Noted: 2018-01-01

## 2018-07-17 PROBLEM — Z78.9 PACED RHYTHM ON CARDIAC MONITOR: Status: ACTIVE | Noted: 2018-01-01

## 2018-07-17 PROBLEM — I47.20 VENTRICULAR TACHYCARDIA: Status: ACTIVE | Noted: 2018-01-01

## 2018-07-17 PROBLEM — K92.2 LOWER GI BLEEDING: Status: ACTIVE | Noted: 2018-01-01

## 2018-07-17 NOTE — PROGRESS NOTES
Patient had multiple episodes of VT with shock. ABG obtained ph 7.48, co2 with in normal range O2 52. Cardiologist in room notified and I was instructed to increase o2 to 50%. Will repeat ABG at 11. VBG for morning obtained as well.

## 2018-07-17 NOTE — ASSESSMENT & PLAN NOTE
-Being covered with vanc/cefepime  -Cultures NGTD  -repeated sputum cultures, blood cultures, UA and sent fungal studies today due to fever and WBCs increasing.

## 2018-07-17 NOTE — PROGRESS NOTES
Arrhythmia clinic  Order received to interrogate ICD for VT storm.  Interrogation performed at bedside.  Pt has BiV ICD.  Interrogation reveals this am starting at 0643 pt had both MMVT and PMVT cycle lengths 210-255 bpms, received ATP x5 and 4 shocks.    Device and leads wnl.  *Battery reached LICHA on July 11,2018.  Underlying rhythm SR with 1st degree AVB.    Will send message to both Dr. Champagne and Dr. Pompa regarding LICHA status.

## 2018-07-17 NOTE — PROGRESS NOTES
MAP > 60 on cuff; Mean on IABP > 60  Accu cks q 4 hr; no supplemental coverage needed  BMP q 8 hr; electrolytes WNL; no replacements needed  Vent settings: A/C 40% & 6 PEEP w/ O2 sats 97% & above   Gtts: levo @ 0.04 mcg/kg/min; propofol @ 50 mcg/kg/min; fentanyl @ 75 mcg/hr; lasix @ 10 mg/hr  IABP: mode- auto; rate 1:1; trigger- ECG  Specific orders for no sedation vacation trials; keep sedation on at all times per HTS  CVPs 7-12 mmHg; 21 mmHg when in ventricular rhythm this am  Adequate urine output; see flow sheets for specific detials  TF still on hold  Electrolytes monitored and WNL; no replacements needed   Pt 100% paced @ 80 currently  Tmax: 102.2 F (during ventricular rhythm); last temp was 98.5  Pt was pan cultured (blood x2, urine, and sputum)   PICC team placed midline and peripheral IV for better access  All questions & concerns addressed per pt's daughter   Will continue to monitor closely

## 2018-07-17 NOTE — SUBJECTIVE & OBJECTIVE
Interval History: Patient experienced electrical storm this morning receiving ATP and multiple shocks from his ICD. Following IV amiodarone load, patient has had reduction in arrhythmic burden.    Review of Systems   Unable to perform ROS: intubated     Objective:     Vital Signs (Most Recent):  Temp: 100 °F (37.8 °C) (07/17/18 0300)  Pulse: 84 (07/17/18 0754)  Resp: (!) 25 (07/17/18 0754)  BP: (!) 85/51 (07/17/18 0754)  SpO2: 98 % (07/17/18 0754) Vital Signs (24h Range):  Temp:  [98.1 °F (36.7 °C)-100 °F (37.8 °C)] 100 °F (37.8 °C)  Pulse:  [] 84  Resp:  [16-53] 25  SpO2:  [89 %-100 %] 98 %  BP: ()/(48-76) 85/51     Weight: 109.9 kg (242 lb 4.6 oz)  Body mass index is 33.79 kg/m².     SpO2: 98 %  O2 Device (Oxygen Therapy): ventilator    Physical Exam   Constitutional: He is oriented to person, place, and time. He appears well-developed and well-nourished.   Sedated, intubated   HENT:   Head: Normocephalic and atraumatic.   Eyes: Pupils are equal, round, and reactive to light.   Neck: Normal range of motion. No JVD present.   Cardiovascular: Intact distal pulses.    Audible balloon pump. Left groin balloon pump inserted.   Pulmonary/Chest: Effort normal and breath sounds normal. No respiratory distress. He has no wheezes. He has no rales.   Abdominal: Soft. Bowel sounds are normal. He exhibits no distension. There is no tenderness.   Musculoskeletal: Normal range of motion. He exhibits edema (1+ bilateral lower extremity edema to the knees).   Neurological: He is alert and oriented to person, place, and time.   Skin: Skin is dry. No rash noted.   Psychiatric: He has a normal mood and affect. His behavior is normal.       Significant Labs:     Recent Results (from the past 24 hour(s))   Basic metabolic panel    Collection Time: 07/16/18  4:00 PM   Result Value Ref Range    Sodium 145 136 - 145 mmol/L    Potassium 3.9 3.5 - 5.1 mmol/L    Chloride 105 95 - 110 mmol/L    CO2 32 (H) 23 - 29 mmol/L    Glucose  127 (H) 70 - 110 mg/dL    BUN, Bld 60 (H) 6 - 20 mg/dL    Creatinine 1.1 0.5 - 1.4 mg/dL    Calcium 8.6 (L) 8.7 - 10.5 mg/dL    Anion Gap 8 8 - 16 mmol/L    eGFR if African American >60.0 >60 mL/min/1.73 m^2    eGFR if non African American >60.0 >60 mL/min/1.73 m^2   POCT glucose    Collection Time: 07/16/18  4:43 PM   Result Value Ref Range    POCT Glucose 134 (H) 70 - 110 mg/dL   ISTAT PROCEDURE    Collection Time: 07/16/18  7:55 PM   Result Value Ref Range    POC PH 7.395 7.35 - 7.45    POC PCO2 58.9 (H) 35 - 45 mmHg    POC PO2 37 (LL) 40 - 60 mmHg    POC HCO3 36.0 (H) 24 - 28 mmol/L    POC BE 11 -2 to 2 mmol/L    POC SATURATED O2 68 (L) 95 - 100 %    POC TCO2 38 (H) 24 - 29 mmol/L    Rate 18     Sample VENOUS     Site Other     Allens Test N/A     DelSys Adult Vent     Mode AC/PRVC     Vt 450     PEEP 6     FiO2 35    POCT glucose    Collection Time: 07/16/18  9:16 PM   Result Value Ref Range    POCT Glucose 143 (H) 70 - 110 mg/dL   Type & Screen    Collection Time: 07/16/18 10:42 PM   Result Value Ref Range    Group & Rh B NEG     Indirect Amanda NEG    Basic metabolic panel    Collection Time: 07/17/18 12:23 AM   Result Value Ref Range    Sodium 145 136 - 145 mmol/L    Potassium 3.9 3.5 - 5.1 mmol/L    Chloride 104 95 - 110 mmol/L    CO2 33 (H) 23 - 29 mmol/L    Glucose 176 (H) 70 - 110 mg/dL    BUN, Bld 60 (H) 6 - 20 mg/dL    Creatinine 1.2 0.5 - 1.4 mg/dL    Calcium 8.8 8.7 - 10.5 mg/dL    Anion Gap 8 8 - 16 mmol/L    eGFR if African American >60.0 >60 mL/min/1.73 m^2    eGFR if non African American >60.0 >60 mL/min/1.73 m^2   POCT glucose    Collection Time: 07/17/18 12:30 AM   Result Value Ref Range    POCT Glucose 183 (H) 70 - 110 mg/dL   Protime-INR    Collection Time: 07/17/18  3:13 AM   Result Value Ref Range    Prothrombin Time 10.6 9.0 - 12.5 sec    INR 1.0 0.8 - 1.2   CBC auto differential    Collection Time: 07/17/18  3:13 AM   Result Value Ref Range    WBC 14.81 (H) 3.90 - 12.70 K/uL    RBC 2.70  (L) 4.60 - 6.20 M/uL    Hemoglobin 7.9 (L) 14.0 - 18.0 g/dL    Hematocrit 26.0 (L) 40.0 - 54.0 %    MCV 96 82 - 98 fL    MCH 29.3 27.0 - 31.0 pg    MCHC 30.4 (L) 32.0 - 36.0 g/dL    RDW 15.3 (H) 11.5 - 14.5 %    Platelets 159 150 - 350 K/uL    MPV 11.2 9.2 - 12.9 fL    Immature Granulocytes CANCELED 0.0 - 0.5 %    Immature Grans (Abs) CANCELED 0.00 - 0.04 K/uL    Lymph # CANCELED 1.0 - 4.8 K/uL    Mono # CANCELED 0.3 - 1.0 K/uL    Eos # CANCELED 0.0 - 0.5 K/uL    Baso # CANCELED 0.00 - 0.20 K/uL    nRBC 1 (A) 0 /100 WBC    Gran% 82.0 (H) 38.0 - 73.0 %    Lymph% 6.0 (L) 18.0 - 48.0 %    Mono% 6.0 4.0 - 15.0 %    Eosinophil% 2.0 0.0 - 8.0 %    Basophil% 0.0 0.0 - 1.9 %    Myelocytes 4.0 %    Platelet Estimate Appears normal     Aniso Slight     Poly Occasional     Differential Method Manual    Comprehensive metabolic panel - if not done in ED    Collection Time: 07/17/18  3:14 AM   Result Value Ref Range    Sodium 145 136 - 145 mmol/L    Potassium 4.1 3.5 - 5.1 mmol/L    Chloride 105 95 - 110 mmol/L    CO2 30 (H) 23 - 29 mmol/L    Glucose 141 (H) 70 - 110 mg/dL    BUN, Bld 62 (H) 6 - 20 mg/dL    Creatinine 1.1 0.5 - 1.4 mg/dL    Calcium 8.8 8.7 - 10.5 mg/dL    Total Protein 6.2 6.0 - 8.4 g/dL    Albumin 2.2 (L) 3.5 - 5.2 g/dL    Total Bilirubin 0.5 0.1 - 1.0 mg/dL    Alkaline Phosphatase 53 (L) 55 - 135 U/L    AST 15 10 - 40 U/L    ALT 13 10 - 44 U/L    Anion Gap 10 8 - 16 mmol/L    eGFR if African American >60.0 >60 mL/min/1.73 m^2    eGFR if non African American >60.0 >60 mL/min/1.73 m^2   Magnesium - if not done in ED    Collection Time: 07/17/18  3:14 AM   Result Value Ref Range    Magnesium 2.4 1.6 - 2.6 mg/dL   Phosphorus    Collection Time: 07/17/18  3:14 AM   Result Value Ref Range    Phosphorus 2.8 2.7 - 4.5 mg/dL   POCT glucose    Collection Time: 07/17/18  3:21 AM   Result Value Ref Range    POCT Glucose 162 (H) 70 - 110 mg/dL   POCT glucose    Collection Time: 07/17/18  6:55 AM   Result Value Ref Range     POCT Glucose 154 (H) 70 - 110 mg/dL   ISTAT PROCEDURE    Collection Time: 07/17/18  7:06 AM   Result Value Ref Range    POC PH 7.424 7.35 - 7.45    POC PCO2 51.4 (H) 35 - 45 mmHg    POC PO2 52 (LL) 80 - 100 mmHg    POC HCO3 33.7 (H) 24 - 28 mmol/L    POC BE 9 -2 to 2 mmol/L    POC SATURATED O2 86 (L) 95 - 100 %    POC TCO2 35 (H) 23 - 27 mmol/L    Rate 18     Sample ARTERIAL     Site RB     Allens Test N/A     DelSys Adult Vent     Mode AC/PRVC     Vt 450     PEEP 6     FiO2 35     Sp02 95    ISTAT PROCEDURE    Collection Time: 07/17/18  7:12 AM   Result Value Ref Range    POC PH 7.390 7.35 - 7.45    POC PCO2 57.6 (H) 35 - 45 mmHg    POC PO2 29 (LL) 40 - 60 mmHg    POC HCO3 34.8 (H) 24 - 28 mmol/L    POC BE 10 -2 to 2 mmol/L    POC SATURATED O2 53 (L) 95 - 100 %    POC TCO2 37 (H) 24 - 29 mmol/L    Rate 18     Sample VENOUS     Site Other     Allens Test N/A     DelSys Adult Vent     Mode AC/PRVC     Vt 450     PEEP 6     FiO2 50     Sp02 98    Comprehensive metabolic panel    Collection Time: 07/17/18  7:14 AM   Result Value Ref Range    Sodium 148 (H) 136 - 145 mmol/L    Potassium 4.4 3.5 - 5.1 mmol/L    Chloride 105 95 - 110 mmol/L    CO2 33 (H) 23 - 29 mmol/L    Glucose 143 (H) 70 - 110 mg/dL    BUN, Bld 63 (H) 6 - 20 mg/dL    Creatinine 1.2 0.5 - 1.4 mg/dL    Calcium 9.4 8.7 - 10.5 mg/dL    Total Protein 6.7 6.0 - 8.4 g/dL    Albumin 2.4 (L) 3.5 - 5.2 g/dL    Total Bilirubin 0.7 0.1 - 1.0 mg/dL    Alkaline Phosphatase 62 55 - 135 U/L    AST 19 10 - 40 U/L    ALT 17 10 - 44 U/L    Anion Gap 10 8 - 16 mmol/L    eGFR if African American >60.0 >60 mL/min/1.73 m^2    eGFR if non African American >60.0 >60 mL/min/1.73 m^2   Magnesium    Collection Time: 07/17/18  7:14 AM   Result Value Ref Range    Magnesium 2.2 1.6 - 2.6 mg/dL   Phosphorus    Collection Time: 07/17/18  7:14 AM   Result Value Ref Range    Phosphorus 2.4 (L) 2.7 - 4.5 mg/dL   Lactic acid, plasma    Collection Time: 07/17/18  7:14 AM   Result Value Ref  Range    Lactate (Lactic Acid) 1.3 0.5 - 2.2 mmol/L   Protime-INR    Collection Time: 07/17/18  7:14 AM   Result Value Ref Range    Prothrombin Time 10.5 9.0 - 12.5 sec    INR 1.0 0.8 - 1.2   APTT    Collection Time: 07/17/18  7:14 AM   Result Value Ref Range    aPTT <21.0 21.0 - 32.0 sec   CBC auto differential    Collection Time: 07/17/18  7:14 AM   Result Value Ref Range    WBC 20.95 (H) 3.90 - 12.70 K/uL    RBC 2.95 (L) 4.60 - 6.20 M/uL    Hemoglobin 8.7 (L) 14.0 - 18.0 g/dL    Hematocrit 28.5 (L) 40.0 - 54.0 %    MCV 97 82 - 98 fL    MCH 29.5 27.0 - 31.0 pg    MCHC 30.5 (L) 32.0 - 36.0 g/dL    RDW 15.5 (H) 11.5 - 14.5 %    Platelets 186 150 - 350 K/uL    MPV 11.9 9.2 - 12.9 fL    Immature Granulocytes CANCELED 0.0 - 0.5 %    Immature Grans (Abs) CANCELED 0.00 - 0.04 K/uL    Lymph # CANCELED 1.0 - 4.8 K/uL    Mono # CANCELED 0.3 - 1.0 K/uL    Eos # CANCELED 0.0 - 0.5 K/uL    Baso # CANCELED 0.00 - 0.20 K/uL    nRBC 3 (A) 0 /100 WBC    Gran% 79.0 (H) 38.0 - 73.0 %    Lymph% 14.0 (L) 18.0 - 48.0 %    Mono% 3.0 (L) 4.0 - 15.0 %    Eosinophil% 1.0 0.0 - 8.0 %    Basophil% 0.0 0.0 - 1.9 %    Myelocytes 3.0 %    Platelet Estimate Appears normal     Aniso Slight     Poik Slight     Poly Occasional     Ovalocytes Occasional     Differential Method Manual    Urinalysis    Collection Time: 07/17/18  9:50 AM   Result Value Ref Range    Specimen UA Urine, Catheterized     Color, UA Yellow Yellow, Straw, Kiana    Appearance, UA Clear Clear    pH, UA 5.0 5.0 - 8.0    Specific Gravity, UA 1.010 1.005 - 1.030    Protein, UA Negative Negative    Glucose, UA Negative Negative    Ketones, UA Negative Negative    Bilirubin (UA) Negative Negative    Occult Blood UA Negative Negative    Nitrite, UA Negative Negative    Urobilinogen, UA Negative <2.0 EU/dL    Leukocytes, UA Negative Negative   ISTAT PROCEDURE    Collection Time: 07/17/18 11:42 AM   Result Value Ref Range    POC PH 7.553 (H) 7.35 - 7.45    POC PCO2 41.5 35 - 45 mmHg     POC PO2 92 80 - 100 mmHg    POC HCO3 36.5 (H) 24 - 28 mmol/L    POC BE 14 -2 to 2 mmol/L    POC SATURATED O2 98 95 - 100 %    POC TCO2 38 (H) 23 - 27 mmol/L    Rate 18     Sample ARTERIAL     Site LR     Allens Test Pass     DelSys Adult Vent     Mode AC/PRVC     Vt 450     PEEP 6     FiO2 50     Sp02 100    POCT glucose    Collection Time: 07/17/18 11:53 AM   Result Value Ref Range    POCT Glucose 178 (H) 70 - 110 mg/dL

## 2018-07-17 NOTE — ASSESSMENT & PLAN NOTE
· FiO2 increased to 50% from 35% overnight due to worsening PO2 on ABG. Repeat ABG due at 1100, will f/u and adjust vent settings as needed  · Infection unlikely   · Respiratory cultures NGTD, blood cultures NGTD  · Afebrile  · Continue diuresis per primary team - Net fluid balance -1280 mL in last 24 hours.   · No plans to extubate while IABP remains   · Per primary team, no plans for bronchoscopy   · No SBT today in light of cardiac events in the morning

## 2018-07-17 NOTE — ASSESSMENT & PLAN NOTE
-Emergently intubated 7/10/18 for impending respiratory failure/need for IABP and inability to lay fat  -History of pulmonary sarcoidosis  -Methylpred given 7/10/18, hydrocortisone 100 mg q8 started 7/11/18. Transitioned to PO prednisone 7/16.   -Pulmonary consult for assistance with sarcoid & vent management.  -CXR daily  -Spoke with pulm at bedside today, Concern for PNA & pulm edema, less concern for ARDS given how well is he oxygenating with minimal vent support. Considering bronch if CXR does not improve soon (will have to hold off in setting of VT/VF this AM)

## 2018-07-17 NOTE — PROGRESS NOTES
Dr. Rubi called to bedside after pt had episode of VT & was shocked. Per Dr. Rubi: pt had 2 bouts of VF which responded to ATP & a third for which he was shocked. Currently having repeated episodes responsive to ATP. Pt was bolused with 300 mg amio IV x 1 then amio gtt initiated at 1 mg/min per MD order. Labs sent. Levo off. IABP mean dropped to 30s. Pt temp 102.2 F. CVP 21 mmHg. Daughter updated on plan of care. All questions & concerns addressed. Will continue to monitor closely

## 2018-07-17 NOTE — ASSESSMENT & PLAN NOTE
- INR subtherapeutic  - Coumadin on hold, will discuss anticoagulation  - Protonix increased to 40 mg BID (changed to IV)  - Had screening colonoscopy with hot snare polypectomy during last admission; most likely culprit post polypectomy bleeding  - H & H stable, normal BMs since admission.

## 2018-07-17 NOTE — PLAN OF CARE
MAP > 60   Accu cks q 4 hr; no supplemental coverage needed  BMP q 8 hr  Vent settings: A/C 35% % 6 PEEP w/ O2 sats 97% & above   Gtts: levo @ 0.02 mcg/kg/min; propofol @ 25 mcg/kg/min; fentanyl @ 75 mcg/hr; lasix @ 10 mg/hr  IABP: mode- auto; rate 1:1; trigger- ECG  Pt follows commands; moves all extremities   CVPs 6-8 mmHg  Adequate urine output; > 150 cc/hr  TF @ 55 = goal; residuals 40-50 cc q 4 hr  K replaced   Pt 100% paced @ 80  Tmax: 100 F  Consult to PICC team for additional IV or midline   All questions & concerns addressed per pt's daughter   Will continue to monitor closely

## 2018-07-17 NOTE — SUBJECTIVE & OBJECTIVE
Interval History: Had episode of v tach this morning requiring shock. Primary team managing, started on amio drip.     Objective:     Vital Signs (Most Recent):  Temp: 100 °F (37.8 °C) (07/17/18 0300)  Pulse: 84 (07/17/18 0754)  Resp: (!) 25 (07/17/18 0754)  BP: (!) 85/51 (07/17/18 0754)  SpO2: 98 % (07/17/18 0754) Vital Signs (24h Range):  Temp:  [98.1 °F (36.7 °C)-100 °F (37.8 °C)] 100 °F (37.8 °C)  Pulse:  [] 84  Resp:  [16-53] 25  SpO2:  [89 %-100 %] 98 %  BP: ()/(48-76) 85/51     Weight: 109.9 kg (242 lb 4.6 oz)  Body mass index is 33.79 kg/m².      Intake/Output Summary (Last 24 hours) at 07/17/18 0853  Last data filed at 07/17/18 0800   Gross per 24 hour   Intake          2814.92 ml   Output             4005 ml   Net         -1190.08 ml       Physical Exam   Constitutional: He appears well-developed.   HENT:   Head: Normocephalic and atraumatic.   Mouth/Throat: Oropharynx is clear and moist.   Eyes: Pupils are equal, round, and reactive to light.   Neck: Neck supple.   Cardiovascular: Normal rate and regular rhythm.    IABP present    Pulmonary/Chest:   Intubated and on ventilator. Bilateral rhonchi present.    Abdominal: Soft. He exhibits no distension. There is no tenderness.   Hypoactive bowel sounds   Musculoskeletal: He exhibits edema (2+ ).   Neurological:   Sedated with propofol, analgesia with fentanyl.    Skin: Skin is warm. Capillary refill takes less than 2 seconds.       Vents:  Vent Mode: A/C (07/17/18 0754)  Set Rate: 18 bmp (07/17/18 0754)  Vt Set: 450 mL (07/17/18 0754)  PEEP/CPAP: 6 cmH20 (07/17/18 0754)  Oxygen Concentration (%): 51 (07/17/18 0754)  Peak Airway Pressure: 25 cmH2O (07/17/18 0754)  Plateau Pressure: 25 cmH20 (07/17/18 0754)  Total Ve: 9.7 mL (07/17/18 0754)  F/VT Ratio<105 (RSBI): (!) 65.45 (07/17/18 0754)    Lines/Drains/Airways     Central Venous Catheter Line                 Percutaneous Central Line Insertion/Assessment - triple lumen  07/09/18 1500 right  internal jugular 7 days          Drain                 NG/OG Tube 07/10/18 1557 [place] sump 16 Fr. Left nostril 6 days         Urethral Catheter 07/10/18 1646 Double-lumen;Latex 16 Fr. 6 days          Airway                 Airway - Non-Surgical 07/10/18 1523 Endotracheal Tube 6 days          Line                 IABP 07/10/18 1550 7.5 Fr. 40 mL 6 days          Peripheral Intravenous Line                 Peripheral IV - Single Lumen 07/12/18 1100 Right Forearm 4 days                Significant Labs:    CBC/Anemia Profile:    Recent Labs  Lab 07/16/18 0418 07/17/18 0313 07/17/18 0714   WBC 14.20* 14.81* 20.95*   HGB 7.6* 7.9* 8.7*   HCT 24.8* 26.0* 28.5*    159 186   MCV 95 96 97   RDW 15.1* 15.3* 15.5*        Chemistries:    Recent Labs  Lab 07/16/18  0418  07/16/18  1227  07/17/18  0023 07/17/18 0314 07/17/18 0714     < >  --   < > 145 145 148*   K 4.6  < >  --   < > 3.9 4.1 4.4     < >  --   < > 104 105 105   CO2 30*  < >  --   < > 33* 30* 33*   BUN 57*  < >  --   < > 60* 62* 63*   CREATININE 1.1  < >  --   < > 1.2 1.1 1.2   CALCIUM 8.7  < >  --   < > 8.8 8.8 9.4   ALBUMIN 2.2*  --   --   --   --  2.2* 2.4*   PROT 5.9*  --   --   --   --  6.2 6.7   BILITOT 0.4  --   --   --   --  0.5 0.7   ALKPHOS 55  --   --   --   --  53* 62   ALT 15  --   --   --   --  13 17   AST 11  --   --   --   --  15 19   MG 2.6  --  2.6  --   --  2.4 2.2   PHOS  --   --   --   --   --  2.8 2.4*   < > = values in this interval not displayed.    Recent Lab Results       07/17/18 0714 07/17/18 0712 07/17/18  0655 07/17/18  0321 07/17/18  0314      Immature Granulocytes CANCELED  Comment:  Result canceled by the ancillary         Immature Grans (Abs) CANCELED  Comment:  Mild elevation in immature granulocytes is non specific and   can be seen in a variety of conditions including stress response,   acute inflammation, trauma and pregnancy. Correlation with other   laboratory and clinical findings is  essential.    Result canceled by the ancillary           Albumin 2.4(L)    2.2(L)     Alkaline Phosphatase 62    53(L)     Allens Test  N/A        ALT 17    13     Anion Gap 10    10     Aniso Slight         aPTT <21.0  Comment:  aPTT therapeutic range = 39-69 seconds         AST 19    15     Baso # CANCELED  Comment:  Result canceled by the ancillary         Basophil% 0.0         Total Bilirubin 0.7  Comment:  For infants and newborns, interpretation of results should be based  on gestational age, weight and in agreement with clinical  observations.  Premature Infant recommended reference ranges:  Up to 24 hours.............<8.0 mg/dL  Up to 48 hours............<12.0 mg/dL  3-5 days..................<15.0 mg/dL  6-29 days.................<15.0 mg/dL      0.5  Comment:  For infants and newborns, interpretation of results should be based  on gestational age, weight and in agreement with clinical  observations.  Premature Infant recommended reference ranges:  Up to 24 hours.............<8.0 mg/dL  Up to 48 hours............<12.0 mg/dL  3-5 days..................<15.0 mg/dL  6-29 days.................<15.0 mg/dL       Site  Other        BUN, Bld 63(H)    62(H)     Calcium 9.4    8.8     Chloride 105    105     CO2 33(H)    30(H)     Creatinine 1.2    1.1     Diastolic Dysfunction          DelSys  Adult Vent        Differential Method Manual         EF          eGFR if  >60.0    >60.0     eGFR if non  >60.0  Comment:  Calculation used to obtain the estimated glomerular filtration  rate (eGFR) is the CKD-EPI equation.       >60.0  Comment:  Calculation used to obtain the estimated glomerular filtration  rate (eGFR) is the CKD-EPI equation.        Eos # CANCELED  Comment:  Result canceled by the ancillary         Eosinophil% 1.0         FiO2  50        Glucose 143(H)    141(H)     Gran% 79.0(H)         Group & Rh          Hematocrit 28.5(L)         Hemoglobin 8.7(L)         INDIRECT PUNEET           Coumadin Monitoring INR 1.0  Comment:  Coumadin Therapy:  2.0 - 3.0 for INR for all indicators except mechanical heart valves  and antiphospholipid syndromes which should use 2.5 - 3.5.           Lactate, Rudy 1.3  Comment:  Falsely low lactic acid results can be found in samples   containing >=13.0 mg/dL total bilirubin and/or >=3.5 mg/dL   direct bilirubin.           Lymph # CANCELED  Comment:  Result canceled by the ancillary         Lymph% 14.0(L)         Magnesium 2.2    2.4     MCH 29.5         MCHC 30.5(L)         MCV 97         Mode  AC/PRVC        Mono # CANCELED  Comment:  Result canceled by the ancillary         Mono% 3.0(L)         MPV 11.9         Mitral Valve Regurgitation          Myelocytes 3.0         nRBC 3(A)         Ovalocytes Occasional         PEEP  6        Phosphorus 2.4(L)    2.8     Platelet Estimate Appears normal         Platelets 186         POC BE  10        POC HCO3  34.8(H)        POC PCO2  57.6(H)        POC PH  7.390        POC PO2  29(LL)        POC SATURATED O2  53(L)        POC TCO2  37(H)        POCT Glucose   154(H) 162(H)      Poik Slight         Poly Occasional         Potassium 4.4    4.1     Total Protein 6.7    6.2     Protime 10.5         Rate  18        RBC 2.95(L)         RDW 15.5(H)         Sample  VENOUS        Sodium 148(H)    145     Sp02  98        Vancomycin-Trough          Vt  450        WBC 20.95(H)                     07/17/18  0313 07/17/18  0030 07/17/18  0023 07/16/18  2242 07/16/18  2116      Immature Granulocytes CANCELED  Comment:  Result canceled by the ancillary         Immature Grans (Abs) CANCELED  Comment:  Mild elevation in immature granulocytes is non specific and   can be seen in a variety of conditions including stress response,   acute inflammation, trauma and pregnancy. Correlation with other   laboratory and clinical findings is essential.    Result canceled by the ancillary           Albumin          Alkaline Phosphatase           Allens Test          ALT          Anion Gap   8       Aniso Slight         aPTT          AST          Baso # CANCELED  Comment:  Result canceled by the ancillary         Basophil% 0.0         Total Bilirubin          Site          BUN, Bld   60(H)       Calcium   8.8       Chloride   104       CO2   33(H)       Creatinine   1.2       Diastolic Dysfunction          DelSys          Differential Method Manual         EF          eGFR if    >60.0       eGFR if non    >60.0  Comment:  Calculation used to obtain the estimated glomerular filtration  rate (eGFR) is the CKD-EPI equation.          Eos # CANCELED  Comment:  Result canceled by the ancillary         Eosinophil% 2.0         FiO2          Glucose   176(H)       Gran% 82.0(H)         Group & Rh    B NEG      Hematocrit 26.0(L)         Hemoglobin 7.9(L)         INDIRECT PUNEET    NEG      Coumadin Monitoring INR 1.0  Comment:  Coumadin Therapy:  2.0 - 3.0 for INR for all indicators except mechanical heart valves  and antiphospholipid syndromes which should use 2.5 - 3.5.           Lactate, Rudy          Lymph # CANCELED  Comment:  Result canceled by the ancillary         Lymph% 6.0(L)         Magnesium          MCH 29.3         MCHC 30.4(L)         MCV 96         Mode          Mono # CANCELED  Comment:  Result canceled by the ancillary         Mono% 6.0         MPV 11.2         Mitral Valve Regurgitation          Myelocytes 4.0         nRBC 1(A)         Ovalocytes          PEEP          Phosphorus          Platelet Estimate Appears normal         Platelets 159         POC BE          POC HCO3          POC PCO2          POC PH          POC PO2          POC SATURATED O2          POC TCO2          POCT Glucose  183(H)   143(H)     Poik          Poly Occasional         Potassium   3.9       Total Protein          Protime 10.6         Rate          RBC 2.70(L)         RDW 15.3(H)         Sample          Sodium   145       Sp02           Vancomycin-Trough          Vt          WBC 14.81(H)                     07/16/18  1955 07/16/18  1643 07/16/18  1600 07/16/18  1413 07/16/18  1227      Immature Granulocytes          Immature Grans (Abs)          Albumin          Alkaline Phosphatase          Allens Test N/A         ALT          Anion Gap   8       Aniso          aPTT          AST          Baso #          Basophil%          Total Bilirubin          Site Other         BUN, Bld   60(H)       Calcium   8.6(L)       Chloride   105       CO2   32(H)       Creatinine   1.1       Diastolic Dysfunction    Yes(A)      DelSys Adult Vent         Differential Method          EF    10(A)      eGFR if    >60.0       eGFR if non    >60.0  Comment:  Calculation used to obtain the estimated glomerular filtration  rate (eGFR) is the CKD-EPI equation.          Eos #          Eosinophil%          FiO2 35         Glucose   127(H)       Gran%          Group & Rh          Hematocrit          Hemoglobin          INDIRECT PUNETE          Coumadin Monitoring INR          Lactate, Rudy          Lymph #          Lymph%          Magnesium     2.6     MCH          MCHC          MCV          Mode AC/PRVC         Mono #          Mono%          MPV          Mitral Valve Regurgitation    MILD      Myelocytes          nRBC          Ovalocytes          PEEP 6         Phosphorus          Platelet Estimate          Platelets          POC BE 11         POC HCO3 36.0(H)         POC PCO2 58.9(H)         POC PH 7.395         POC PO2 37(LL)         POC SATURATED O2 68(L)         POC TCO2 38(H)         POCT Glucose  134(H)        Poik          Poly          Potassium   3.9       Total Protein          Protime          Rate 18         RBC          RDW          Sample VENOUS         Sodium   145       Sp02          Vancomycin-Trough          Vt 450         WBC                      07/16/18  1214 07/16/18  1042      Immature Granulocytes       Immature Grans (Abs)        Albumin       Alkaline Phosphatase       Allens Test       ALT       Anion Gap       Aniso       aPTT       AST       Baso #       Basophil%       Total Bilirubin       Site       BUN, Bld       Calcium       Chloride       CO2       Creatinine       Diastolic Dysfunction       DelSys       Differential Method       EF       eGFR if        eGFR if non        Eos #       Eosinophil%       FiO2       Glucose       Gran%       Group & Rh       Hematocrit       Hemoglobin       INDIRECT PUNEET       Coumadin Monitoring INR       Lactate, Rudy       Lymph #       Lymph%       Magnesium       MCH       MCHC       MCV       Mode       Mono #       Mono%       MPV       Mitral Valve Regurgitation       Myelocytes       nRBC       Ovalocytes       PEEP       Phosphorus       Platelet Estimate       Platelets       POC BE       POC HCO3       POC PCO2       POC PH       POC PO2       POC SATURATED O2       POC TCO2       POCT Glucose 204(H)      Poik       Poly       Potassium       Total Protein       Protime       Rate       RBC       RDW       Sample       Sodium       Sp02       Vancomycin-Trough  20.5     Vt       WBC           All pertinent labs within the past 24 hours have been reviewed.    Significant Imaging:  I have reviewed all pertinent imaging results/findings within the past 24 hours.  I have reviewed and interpreted all pertinent imaging results/findings within the past 24 hours.

## 2018-07-17 NOTE — PROGRESS NOTES
Update:    SW to pt's room to provide support to pt's family. Pt intubated and sedated. Pt's dtr aaox4 and communicative. Pt's dtr reports difficulty coping. SW provided emotional support, and assisted pt's dtr with processing her feelings. Pt's dtr reports she understands pt's poor prognosis, however she is still hoping for pt to recover. SW providing ongoing psychosocial and counseling support, education, assistance, resources, and discharge planning as indicated. SW continuing to follow and remains available.

## 2018-07-17 NOTE — ASSESSMENT & PLAN NOTE
VT storm with multiple shocks from ICD again this morning  The patient had previously been receiving oral amiodarone, possibly was not being absorbed through the enteric tract and the patient has responded to IV amiodarone  Continue amiodarone, mexilitine 150 mg Q8h and metoprolol 2.5 mg IV Q4h  Patient currently stable, with resolution of VT storm  Interrogate BI-V ICD again today  At a later time, it may be considered to deactivate the LV lead of the patient's CRT-D, considering the patient's LVAD status and native RBBB (see EKG from 7/2012)

## 2018-07-17 NOTE — SIGNIFICANT EVENT
Called by nursing around 650 that patient had had an episode of vt and was shocked  On assessment of tele looked like, starting at 644am, he had 2 bouts of VF which responded to ATP and a third for which he was shocked.  He is currently having repeated episodes responsive to ATP.  After speakign with staff, will bolus 300mg amio iv x 1 then start drip at 1mg/min    Paulino Rubi MD  PGY-V Cardiology Fellow  [phone]

## 2018-07-17 NOTE — ASSESSMENT & PLAN NOTE
- Continue keppra  - possible seizure activity noted on 7/12/18 (twitching right face and UEs lasting 5 min)   - neuro consulted and increased keppra to 1000 IV 12   - continuous EEG read and negative for seizure activity   - will start de-escalation of keppra and change to PO per neuro recs

## 2018-07-17 NOTE — ASSESSMENT & PLAN NOTE
- IABP placed emergently 7/10/18  - Daily CXR   - Augmenting well at 1:1. SVO2 53%. CO ~ 5.63 L/min, CI 2.4 (SvO2 drawn aftr ICD discharges, will repeat venous blood gas this afternoon)   - CVP 8-9 overnight. Continue lasix @ 10 today

## 2018-07-17 NOTE — SUBJECTIVE & OBJECTIVE
Interval History: Events from overnight reviewed. No more VT/VF since this AM.     Continuous Infusions:   amiodarone in dextrose 5% 1 mg/min (07/17/18 1300)    amiodarone in dextrose 5%      fentanyl 7.5 mL/hr at 07/17/18 1200    furosemide (LASIX) 2 mg/mL infusion (non-titrating) 10 mg/hr (07/17/18 1200)    norepinephrine bitartrate-D5W 0.02 mcg/kg/min (07/17/18 1200)    propofol 50 mcg/kg/min (07/17/18 1300)     Scheduled Meds:   albuterol sulfate  2.5 mg Nebulization Q4H    amiodarone  200 mg Oral TID    aspirin  81 mg Oral Daily    atorvastatin  40 mg Oral Daily    budesonide  0.5 mg Nebulization Q12H    ceFEPime (MAXIPIME) IVPB  2 g Intravenous Q8H    chlorhexidine  15 mL Mouth/Throat BID    chlorhexidine  15 mL Mouth/Throat BID    docusate  100 mg Oral Daily    fluticasone-vilanterol  1 puff Inhalation Daily    gabapentin  600 mg Oral BID    heparin (porcine)  5,000 Units Subcutaneous Q8H    levETIRAcetam  500 mg Oral BID    metoprolol  2.5 mg Intravenous Q4H    mexiletine  150 mg Oral Q8H    pantoprozole (PROTONIX) 40 mg/100 mL D5W IVPB  40 mg Intravenous BID    predniSONE  40 mg Oral Daily    sodium chloride 0.9%  3 mL Intravenous Q8H    vancomycin (VANCOCIN) IVPB  1,000 mg Intravenous Q12H     PRN Meds:sodium chloride, ALPRAZolam, benzonatate, calcium gluconate IVPB, calcium gluconate IVPB, calcium gluconate IVPB, carisoprodol, dextrose 50%, glucagon (human recombinant), HYDROcodone-acetaminophen, insulin aspart U-100, magnesium sulfate IVPB, magnesium sulfate IVPB, nitroGLYCERIN, potassium chloride in water **AND** potassium chloride in water **AND** potassium chloride in water, sodium phosphate IVPB, sodium phosphate IVPB, sodium phosphate IVPB    Review of patient's allergies indicates:  No Known Allergies  Objective:     Vital Signs (Most Recent):  Temp: (!) 101.3 °F (38.5 °C) (07/17/18 1100)  Pulse: 80 (07/17/18 1200)  Resp: 18 (07/17/18 1200)  BP: (!) 80/51 (07/17/18  1200)  SpO2: 98 % (07/17/18 1200) Vital Signs (24h Range):  Temp:  [98.1 °F (36.7 °C)-101.3 °F (38.5 °C)] 101.3 °F (38.5 °C)  Pulse:  [] 80  Resp:  [18-53] 18  SpO2:  [89 %-100 %] 98 %  BP: ()/(48-76) 80/51     Patient Vitals for the past 72 hrs (Last 3 readings):   Weight   07/16/18 2308 109.9 kg (242 lb 4.6 oz)   07/16/18 0400 111.5 kg (245 lb 13 oz)   07/15/18 0400 109.4 kg (241 lb 2.9 oz)     Body mass index is 33.79 kg/m².      Intake/Output Summary (Last 24 hours) at 07/17/18 1230  Last data filed at 07/17/18 1200   Gross per 24 hour   Intake          2879.92 ml   Output             3480 ml   Net          -600.08 ml     Hemodynamic Parameters:    Telemetry: paced at 80 bpm    Physical Exam   Constitutional: He appears well-developed and well-nourished. He is intubated.   Daughter at bedside   HENT:   Head: Normocephalic and atraumatic.   Neck: Normal range of motion. Neck supple. JVD: difficult to assess on ventialtor.   Right TLC   Cardiovascular:   Warm and well perfused in upper and lower extremities    Pulmonary/Chest: He is intubated.   Coarse breath sounds, ventilated.    Abdominal: Soft. Bowel sounds are normal. He exhibits no distension. There is no tenderness.   Musculoskeletal: He exhibits no edema.   Neurological:   intubated and sedated this AM s/p multiple ICD discharges   Skin: Skin is warm and dry.   Nursing note and vitals reviewed.    Significant Labs:  CBC:    Recent Labs  Lab 07/16/18  0418 07/17/18  0313 07/17/18  0714   WBC 14.20* 14.81* 20.95*   RBC 2.62* 2.70* 2.95*   HGB 7.6* 7.9* 8.7*   HCT 24.8* 26.0* 28.5*    159 186   MCV 95 96 97   MCH 29.0 29.3 29.5   MCHC 30.6* 30.4* 30.5*     BNP:  No results for input(s): BNP in the last 168 hours.    Invalid input(s): BNPTRIAGELBLO  CMP:    Recent Labs  Lab 07/16/18  0418  07/17/18  0023 07/17/18  0314 07/17/18  0714   *  < > 176* 141* 143*   CALCIUM 8.7  < > 8.8 8.8 9.4   ALBUMIN 2.2*  --   --  2.2* 2.4*   PROT 5.9*   --   --  6.2 6.7     < > 145 145 148*   K 4.6  < > 3.9 4.1 4.4   CO2 30*  < > 33* 30* 33*     < > 104 105 105   BUN 57*  < > 60* 62* 63*   CREATININE 1.1  < > 1.2 1.1 1.2   ALKPHOS 55  --   --  53* 62   ALT 15  --   --  13 17   AST 11  --   --  15 19   BILITOT 0.4  --   --  0.5 0.7   < > = values in this interval not displayed.   Coagulation:     Recent Labs  Lab 07/16/18  0418 07/17/18  0313 07/17/18  0714   INR 1.0 1.0 1.0   APTT  --   --  <21.0     LDH:  No results for input(s): LDH in the last 72 hours.  Microbiology:  Microbiology Results (last 7 days)     Procedure Component Value Units Date/Time    Fungus culture [[id number]] Collected:  07/17/18 1154    Order Status:  Sent Specimen:  Respiratory from Sputum Updated:  07/17/18 1155    Blood culture [[id number]] Collected:  07/17/18 0950    Order Status:  Sent Specimen:  Blood from Peripheral, Antecubital, Right Updated:  07/17/18 1130    Blood culture [[id number]] Collected:  07/17/18 0930    Order Status:  Sent Specimen:  Blood from Peripheral, Antecubital, Right Updated:  07/17/18 1128    Culture, Respiratory with Gram Stain [[id number]]  (Susceptibility) Collected:  07/13/18 1129    Order Status:  Completed Specimen:  Respiratory from Endotracheal Aspirate Updated:  07/17/18 1057     Respiratory Culture --     STAPHYLOCOCCUS AUREUS  Moderate  Normal respiratory peng also present       Gram Stain (Respiratory) Few WBC's     Gram Stain (Respiratory) No organisms seen    Blood culture [[id number]] Collected:  07/13/18 1309    Order Status:  Completed Specimen:  Blood from Peripheral, Hand, Left Updated:  07/16/18 1812     Blood Culture, Routine No Growth to date     Blood Culture, Routine No Growth to date     Blood Culture, Routine No Growth to date     Blood Culture, Routine No Growth to date    Blood culture [[id number]] Collected:  07/13/18 1350    Order Status:  Completed Specimen:  Blood from Peripheral, Hand, Right Updated:  07/16/18 1812      Blood Culture, Routine No Growth to date     Blood Culture, Routine No Growth to date     Blood Culture, Routine No Growth to date     Blood Culture, Routine No Growth to date          I have reviewed all pertinent labs within the past 24 hours.    Estimated Creatinine Clearance: 87.7 mL/min (based on SCr of 1.2 mg/dL).    Diagnostic Results:  I have reviewed and interpreted all pertinent imaging results/findings within the past 24 hours.

## 2018-07-17 NOTE — PROGRESS NOTES
[facility]  Pulmonology  Progress Note    Patient Name: Yonathan Good Jr.  MRN: [id number]  Admission Date: 7/7/2018  Hospital Length of Stay: 10 days  Code Status: Full Code  Attending Provider: Kimberly Lazo MD  Primary Care Provider: Edgar Gates MD   Principal Problem: Ventricular tachycardia, incessant    Subjective:     Interval History: Had episode of v tach this morning requiring shock. Primary team managing, started on amio drip.     Objective:     Vital Signs (Most Recent):  Temp: 100 °F (37.8 °C) (07/17/18 0300)  Pulse: 84 (07/17/18 0754)  Resp: (!) 25 (07/17/18 0754)  BP: (!) 85/51 (07/17/18 0754)  SpO2: 98 % (07/17/18 0754) Vital Signs (24h Range):  Temp:  [98.1 °F (36.7 °C)-100 °F (37.8 °C)] 100 °F (37.8 °C)  Pulse:  [] 84  Resp:  [16-53] 25  SpO2:  [89 %-100 %] 98 %  BP: ()/(48-76) 85/51     Weight: 109.9 kg (242 lb 4.6 oz)  Body mass index is 33.79 kg/m².      Intake/Output Summary (Last 24 hours) at 07/17/18 0853  Last data filed at 07/17/18 0800   Gross per 24 hour   Intake          2814.92 ml   Output             4005 ml   Net         -1190.08 ml       Physical Exam   Constitutional: He appears well-developed.   HENT:   Head: Normocephalic and atraumatic.   Mouth/Throat: Oropharynx is clear and moist.   Eyes: Pupils are equal, round, and reactive to light.   Neck: Neck supple.   Cardiovascular: Normal rate and regular rhythm.    IABP present    Pulmonary/Chest:   Intubated and on ventilator. Bilateral rhonchi present.    Abdominal: Soft. He exhibits no distension. There is no tenderness.   Hypoactive bowel sounds   Musculoskeletal: He exhibits edema (2+ ).   Neurological:   Sedated with propofol, analgesia with fentanyl.    Skin: Skin is warm. Capillary refill takes less than 2 seconds.       Vents:  Vent Mode: A/C (07/17/18 0754)  Set Rate: 18 bmp (07/17/18 0754)  Vt Set: 450 mL (07/17/18 0754)  PEEP/CPAP: 6 cmH20 (07/17/18 0754)  Oxygen Concentration (%): 51  (07/17/18 0754)  Peak Airway Pressure: 25 cmH2O (07/17/18 0754)  Plateau Pressure: 25 cmH20 (07/17/18 0754)  Total Ve: 9.7 mL (07/17/18 0754)  F/VT Ratio<105 (RSBI): (!) 65.45 (07/17/18 0754)    Lines/Drains/Airways     Central Venous Catheter Line                 Percutaneous Central Line Insertion/Assessment - triple lumen  07/09/18 1500 right internal jugular 7 days          Drain                 NG/OG Tube 07/10/18 1557 Henderson sump 16 Fr. Left nostril 6 days         Urethral Catheter 07/10/18 1646 Double-lumen;Latex 16 Fr. 6 days          Airway                 Airway - Non-Surgical 07/10/18 1523 Endotracheal Tube 6 days          Line                 IABP 07/10/18 1550 7.5 Fr. 40 mL 6 days          Peripheral Intravenous Line                 Peripheral IV - Single Lumen 07/12/18 1100 Right Forearm 4 days                Significant Labs:    CBC/Anemia Profile:    Recent Labs  Lab 07/16/18  0418 07/17/18  0313 07/17/18  0714   WBC 14.20* 14.81* 20.95*   HGB 7.6* 7.9* 8.7*   HCT 24.8* 26.0* 28.5*    159 186   MCV 95 96 97   RDW 15.1* 15.3* 15.5*        Chemistries:    Recent Labs  Lab 07/16/18  0418  07/16/18  1227  07/17/18  0023 07/17/18  0314 07/17/18  0714     < >  --   < > 145 145 148*   K 4.6  < >  --   < > 3.9 4.1 4.4     < >  --   < > 104 105 105   CO2 30*  < >  --   < > 33* 30* 33*   BUN 57*  < >  --   < > 60* 62* 63*   CREATININE 1.1  < >  --   < > 1.2 1.1 1.2   CALCIUM 8.7  < >  --   < > 8.8 8.8 9.4   ALBUMIN 2.2*  --   --   --   --  2.2* 2.4*   PROT 5.9*  --   --   --   --  6.2 6.7   BILITOT 0.4  --   --   --   --  0.5 0.7   ALKPHOS 55  --   --   --   --  53* 62   ALT 15  --   --   --   --  13 17   AST 11  --   --   --   --  15 19   MG 2.6  --  2.6  --   --  2.4 2.2   PHOS  --   --   --   --   --  2.8 2.4*   < > = values in this interval not displayed.    Recent Lab Results       07/17/18  0714 07/17/18  0712 07/17/18  0655 07/17/18  0321 07/17/18  0314      Immature Granulocytes  CANCELED  Comment:  Result canceled by the ancillary         Immature Grans (Abs) CANCELED  Comment:  Mild elevation in immature granulocytes is non specific and   can be seen in a variety of conditions including stress response,   acute inflammation, trauma and pregnancy. Correlation with other   laboratory and clinical findings is essential.    Result canceled by the ancillary           Albumin 2.4(L)    2.2(L)     Alkaline Phosphatase 62    53(L)     Allens Test  N/A        ALT 17    13     Anion Gap 10    10     Aniso Slight         aPTT <21.0  Comment:  aPTT therapeutic range = 39-69 seconds         AST 19    15     Baso # CANCELED  Comment:  Result canceled by the ancillary         Basophil% 0.0         Total Bilirubin 0.7  Comment:  For infants and newborns, interpretation of results should be based  on gestational age, weight and in agreement with clinical  observations.  Premature Infant recommended reference ranges:  Up to 24 hours.............<8.0 mg/dL  Up to 48 hours............<12.0 mg/dL  3-5 days..................<15.0 mg/dL  6-29 days.................<15.0 mg/dL      0.5  Comment:  For infants and newborns, interpretation of results should be based  on gestational age, weight and in agreement with clinical  observations.  Premature Infant recommended reference ranges:  Up to 24 hours.............<8.0 mg/dL  Up to 48 hours............<12.0 mg/dL  3-5 days..................<15.0 mg/dL  6-29 days.................<15.0 mg/dL       Site  Other        BUN, Bld 63(H)    62(H)     Calcium 9.4    8.8     Chloride 105    105     CO2 33(H)    30(H)     Creatinine 1.2    1.1     Diastolic Dysfunction          DelSys  Adult Vent        Differential Method Manual         EF          eGFR if  >60.0    >60.0     eGFR if non  >60.0  Comment:  Calculation used to obtain the estimated glomerular filtration  rate (eGFR) is the CKD-EPI equation.       >60.0  Comment:  Calculation used to  obtain the estimated glomerular filtration  rate (eGFR) is the CKD-EPI equation.        Eos # CANCELED  Comment:  Result canceled by the ancillary         Eosinophil% 1.0         FiO2  50        Glucose 143(H)    141(H)     Gran% 79.0(H)         Group & Rh          Hematocrit 28.5(L)         Hemoglobin 8.7(L)         INDIRECT PUNEET          Coumadin Monitoring INR 1.0  Comment:  Coumadin Therapy:  2.0 - 3.0 for INR for all indicators except mechanical heart valves  and antiphospholipid syndromes which should use 2.5 - 3.5.           Lactate, Rudy 1.3  Comment:  Falsely low lactic acid results can be found in samples   containing >=13.0 mg/dL total bilirubin and/or >=3.5 mg/dL   direct bilirubin.           Lymph # CANCELED  Comment:  Result canceled by the ancillary         Lymph% 14.0(L)         Magnesium 2.2    2.4     MCH 29.5         MCHC 30.5(L)         MCV 97         Mode  AC/PRVC        Mono # CANCELED  Comment:  Result canceled by the ancillary         Mono% 3.0(L)         MPV 11.9         Mitral Valve Regurgitation          Myelocytes 3.0         nRBC 3(A)         Ovalocytes Occasional         PEEP  6        Phosphorus 2.4(L)    2.8     Platelet Estimate Appears normal         Platelets 186         POC BE  10        POC HCO3  34.8(H)        POC PCO2  57.6(H)        POC PH  7.390        POC PO2  29(LL)        POC SATURATED O2  53(L)        POC TCO2  37(H)        POCT Glucose   154(H) 162(H)      Poik Slight         Poly Occasional         Potassium 4.4    4.1     Total Protein 6.7    6.2     Protime 10.5         Rate  18        RBC 2.95(L)         RDW 15.5(H)         Sample  VENOUS        Sodium 148(H)    145     Sp02  98        Vancomycin-Trough          Vt  450        WBC 20.95(H)                     07/17/18  0313 07/17/18  0030 07/17/18  0023 07/16/18  2242 07/16/18  2116      Immature Granulocytes CANCELED  Comment:  Result canceled by the ancillary         Immature Grans (Abs) CANCELED  Comment:  Mild  elevation in immature granulocytes is non specific and   can be seen in a variety of conditions including stress response,   acute inflammation, trauma and pregnancy. Correlation with other   laboratory and clinical findings is essential.    Result canceled by the ancillary           Albumin          Alkaline Phosphatase          Allens Test          ALT          Anion Gap   8       Aniso Slight         aPTT          AST          Baso # CANCELED  Comment:  Result canceled by the ancillary         Basophil% 0.0         Total Bilirubin          Site          BUN, Bld   60(H)       Calcium   8.8       Chloride   104       CO2   33(H)       Creatinine   1.2       Diastolic Dysfunction          DelSys          Differential Method Manual         EF          eGFR if    >60.0       eGFR if non    >60.0  Comment:  Calculation used to obtain the estimated glomerular filtration  rate (eGFR) is the CKD-EPI equation.          Eos # CANCELED  Comment:  Result canceled by the ancillary         Eosinophil% 2.0         FiO2          Glucose   176(H)       Gran% 82.0(H)         Group & Rh    B NEG      Hematocrit 26.0(L)         Hemoglobin 7.9(L)         INDIRECT PUNEET    NEG      Coumadin Monitoring INR 1.0  Comment:  Coumadin Therapy:  2.0 - 3.0 for INR for all indicators except mechanical heart valves  and antiphospholipid syndromes which should use 2.5 - 3.5.           Lactate, Rudy          Lymph # CANCELED  Comment:  Result canceled by the ancillary         Lymph% 6.0(L)         Magnesium          MCH 29.3         MCHC 30.4(L)         MCV 96         Mode          Mono # CANCELED  Comment:  Result canceled by the ancillary         Mono% 6.0         MPV 11.2         Mitral Valve Regurgitation          Myelocytes 4.0         nRBC 1(A)         Ovalocytes          PEEP          Phosphorus          Platelet Estimate Appears normal         Platelets 159         POC BE          POC HCO3          POC PCO2           POC PH          POC PO2          POC SATURATED O2          POC TCO2          POCT Glucose  183(H)   143(H)     Poik          Poly Occasional         Potassium   3.9       Total Protein          Protime 10.6         Rate          RBC 2.70(L)         RDW 15.3(H)         Sample          Sodium   145       Sp02          Vancomycin-Trough          Vt          WBC 14.81(H)                     07/16/18  1955 07/16/18  1643 07/16/18  1600 07/16/18  1413 07/16/18  1227      Immature Granulocytes          Immature Grans (Abs)          Albumin          Alkaline Phosphatase          Allens Test N/A         ALT          Anion Gap   8       Aniso          aPTT          AST          Baso #          Basophil%          Total Bilirubin          Site Other         BUN, Bld   60(H)       Calcium   8.6(L)       Chloride   105       CO2   32(H)       Creatinine   1.1       Diastolic Dysfunction    Yes(A)      DelSys Adult Vent         Differential Method          EF    10(A)      eGFR if    >60.0       eGFR if non    >60.0  Comment:  Calculation used to obtain the estimated glomerular filtration  rate (eGFR) is the CKD-EPI equation.          Eos #          Eosinophil%          FiO2 35         Glucose   127(H)       Gran%          Group & Rh          Hematocrit          Hemoglobin          INDIRECT PUNEET          Coumadin Monitoring INR          Lactate, Rudy          Lymph #          Lymph%          Magnesium     2.6     MCH          MCHC          MCV          Mode AC/PRVC         Mono #          Mono%          MPV          Mitral Valve Regurgitation    MILD      Myelocytes          nRBC          Ovalocytes          PEEP 6         Phosphorus          Platelet Estimate          Platelets          POC BE 11         POC HCO3 36.0(H)         POC PCO2 58.9(H)         POC PH 7.395         POC PO2 37(LL)         POC SATURATED O2 68(L)         POC TCO2 38(H)         POCT Glucose  134(H)        Poik           Poly          Potassium   3.9       Total Protein          Protime          Rate 18         RBC          RDW          Sample VENOUS         Sodium   145       Sp02          Vancomycin-Trough          Vt 450         WBC                      07/16/18  1214 07/16/18  1042      Immature Granulocytes       Immature Grans (Abs)       Albumin       Alkaline Phosphatase       Allens Test       ALT       Anion Gap       Aniso       aPTT       AST       Baso #       Basophil%       Total Bilirubin       Site       BUN, Bld       Calcium       Chloride       CO2       Creatinine       Diastolic Dysfunction       DelSys       Differential Method       EF       eGFR if        eGFR if non        Eos #       Eosinophil%       FiO2       Glucose       Gran%       Group & Rh       Hematocrit       Hemoglobin       INDIRECT PUNEET       Coumadin Monitoring INR       Lactate, Rudy       Lymph #       Lymph%       Magnesium       MCH       MCHC       MCV       Mode       Mono #       Mono%       MPV       Mitral Valve Regurgitation       Myelocytes       nRBC       Ovalocytes       PEEP       Phosphorus       Platelet Estimate       Platelets       POC BE       POC HCO3       POC PCO2       POC PH       POC PO2       POC SATURATED O2       POC TCO2       POCT Glucose 204(H)      Poik       Poly       Potassium       Total Protein       Protime       Rate       RBC       RDW       Sample       Sodium       Sp02       Vancomycin-Trough  20.5     Vt       WBC           All pertinent labs within the past 24 hours have been reviewed.    Significant Imaging:  I have reviewed all pertinent imaging results/findings within the past 24 hours.  I have reviewed and interpreted all pertinent imaging results/findings within the past 24 hours.    Assessment/Plan:     Respiratory failure requiring intubation    · FiO2 increased to 50% from 35% overnight due to worsening PO2 on ABG. Repeat ABG due at 1100, will f/u and  adjust vent settings as needed  · Infection unlikely   · Respiratory cultures NGTD, blood cultures NGTD  · Afebrile  · Continue diuresis per primary team - Net fluid balance -1280 mL in last 24 hours.   · No plans to extubate while IABP remains   · Per primary team, no plans for bronchoscopy   · No SBT today in light of cardiac events in the morning           Sarcoidosis of lung    · Sarcoid appears to have minimal contribution to current condition.   · Continue steroids as prescribed and taper once condition improves.            Plan discussed with attending Dr. Rothman, further recommendations as per attending addendum. Please feel free to call with any questions or concerns.    Ruben Bermudez MD  Resident Physician, PGY1     Ruben Bermudez MD  Pulmonology  [facility]

## 2018-07-17 NOTE — CONSULTS
Midline placed in right cephalic, size [id number] Lot# [id number] Removal date on or before 8/15/18. Needle advanced into vessel with real time ultrasound guidance. Image recorded and saved.Also placed 18 gauge 1 3/4 PIV in RFA using ultrasound guidance

## 2018-07-17 NOTE — ASSESSMENT & PLAN NOTE
-VT storm 7/10/18. Polymorphic and monomorphic. Degenerated into VF. 12 ICD shocks  -Intubated, IABP placed emergently at bedside.   -Amio loaded x 2, gtt started per protocol.  Now on amio 200mg PO TID. Lidocaine gtt started at 1, increased to 2 when patient had more VT-->VF and AICD shocks. Off lidocaine, continue mexilitene TID  -EP consulted, appreciate their assistance. Pacing rate increased to 80, metoprolol IV added.  -Continue support with IABP 1:1   -K goal >4.5, Mg >2.5  - regimen per EP recs one patient is able to take PO   - Toprol 150 mg, mexiletine 200 BID, Amio 200 TID for 1 weeks, 200 BID for 4 weeks, 300 QD thereafter  - As of 7/17 now with repeat VF-->VT shocked 5 times. EP re-consulted. Amio 300 loading dose followed by 1mg/min and will continue mexil and metoprolol

## 2018-07-17 NOTE — PROGRESS NOTES
[facility]  Heart Transplant  Progress Note    Patient Name: Yonathan Good Jr.  MRN: [id number]  Admission Date: 7/7/2018  Hospital Length of Stay: 10 days  Attending Physician: Kimberly Lazo MD  Primary Care Provider: Edgar Gates MD  Principal Problem:Ventricular tachycardia, incessant    Subjective:     Interval History: Events from overnight reviewed. No more VT/VF since this AM. Discussion with daughter about realistic expectations and prognosis this AM. States her sister is on the way today. Remains hopeful but realistic about poor prognosis.     Continuous Infusions:   amiodarone in dextrose 5% 1 mg/min (07/17/18 1300)    amiodarone in dextrose 5%      fentanyl 7.5 mL/hr at 07/17/18 1200    furosemide (LASIX) 2 mg/mL infusion (non-titrating) 10 mg/hr (07/17/18 1200)    norepinephrine bitartrate-D5W 0.02 mcg/kg/min (07/17/18 1200)    propofol 50 mcg/kg/min (07/17/18 1300)     Scheduled Meds:   albuterol sulfate  2.5 mg Nebulization Q4H    amiodarone  200 mg Oral TID    aspirin  81 mg Oral Daily    atorvastatin  40 mg Oral Daily    budesonide  0.5 mg Nebulization Q12H    ceFEPime (MAXIPIME) IVPB  2 g Intravenous Q8H    chlorhexidine  15 mL Mouth/Throat BID    chlorhexidine  15 mL Mouth/Throat BID    docusate  100 mg Oral Daily    fluticasone-vilanterol  1 puff Inhalation Daily    gabapentin  600 mg Oral BID    heparin (porcine)  5,000 Units Subcutaneous Q8H    levETIRAcetam  500 mg Oral BID    metoprolol  2.5 mg Intravenous Q4H    mexiletine  150 mg Oral Q8H    pantoprozole (PROTONIX) 40 mg/100 mL D5W IVPB  40 mg Intravenous BID    predniSONE  40 mg Oral Daily    sodium chloride 0.9%  3 mL Intravenous Q8H    vancomycin (VANCOCIN) IVPB  1,000 mg Intravenous Q12H     PRN Meds:sodium chloride, ALPRAZolam, benzonatate, calcium gluconate IVPB, calcium gluconate IVPB, calcium gluconate IVPB, carisoprodol, dextrose 50%, glucagon (human recombinant),  HYDROcodone-acetaminophen, insulin aspart U-100, magnesium sulfate IVPB, magnesium sulfate IVPB, nitroGLYCERIN, potassium chloride in water **AND** potassium chloride in water **AND** potassium chloride in water, sodium phosphate IVPB, sodium phosphate IVPB, sodium phosphate IVPB    Review of patient's allergies indicates:  No Known Allergies  Objective:     Vital Signs (Most Recent):  Temp: (!) 101.3 °F (38.5 °C) (07/17/18 1100)  Pulse: 80 (07/17/18 1200)  Resp: 18 (07/17/18 1200)  BP: (!) 80/51 (07/17/18 1200)  SpO2: 98 % (07/17/18 1200) Vital Signs (24h Range):  Temp:  [98.1 °F (36.7 °C)-101.3 °F (38.5 °C)] 101.3 °F (38.5 °C)  Pulse:  [] 80  Resp:  [18-53] 18  SpO2:  [89 %-100 %] 98 %  BP: ()/(48-76) 80/51     Patient Vitals for the past 72 hrs (Last 3 readings):   Weight   07/16/18 2308 109.9 kg (242 lb 4.6 oz)   07/16/18 0400 111.5 kg (245 lb 13 oz)   07/15/18 0400 109.4 kg (241 lb 2.9 oz)     Body mass index is 33.79 kg/m².    Intake/Output Summary (Last 24 hours) at 07/17/18 1230  Last data filed at 07/17/18 1200   Gross per 24 hour   Intake          2879.92 ml   Output             3480 ml   Net          -600.08 ml     Hemodynamic Parameters:    Telemetry: paced at 80 bpm    Physical Exam   Constitutional: He appears well-developed and well-nourished. He is intubated.   Daughter at bedside   Head: Normocephalic and atraumatic.   Neck: Normal range of motion. Neck supple. JVD: difficult to assess on ventialtor. Right TLC   Cardiovascular: Warm and well perfused in upper and lower extremities    Pulmonary/Chest: He is intubated. Coarse breath sounds, ventilated.    Abdominal: Soft. Bowel sounds are normal. He exhibits no distension. There is no tenderness.   Musculoskeletal: He exhibits no edema.   Neurological: intubated and sedated this AM s/p multiple ICD discharges   Skin: Skin is warm and dry.   Nursing note and vitals reviewed.    Significant Labs:  CBC:    Recent Labs  Lab 07/16/18  0418  07/17/18  0313 07/17/18  0714   WBC 14.20* 14.81* 20.95*   RBC 2.62* 2.70* 2.95*   HGB 7.6* 7.9* 8.7*   HCT 24.8* 26.0* 28.5*    159 186   MCV 95 96 97   MCH 29.0 29.3 29.5   MCHC 30.6* 30.4* 30.5*     BNP:  No results for input(s): BNP in the last 168 hours.    Invalid input(s): BNPTRIAGELBLO  CMP:    Recent Labs  Lab 07/16/18 0418  07/17/18  0023 07/17/18 0314 07/17/18 0714   *  < > 176* 141* 143*   CALCIUM 8.7  < > 8.8 8.8 9.4   ALBUMIN 2.2*  --   --  2.2* 2.4*   PROT 5.9*  --   --  6.2 6.7     < > 145 145 148*   K 4.6  < > 3.9 4.1 4.4   CO2 30*  < > 33* 30* 33*     < > 104 105 105   BUN 57*  < > 60* 62* 63*   CREATININE 1.1  < > 1.2 1.1 1.2   ALKPHOS 55  --   --  53* 62   ALT 15  --   --  13 17   AST 11  --   --  15 19   BILITOT 0.4  --   --  0.5 0.7   < > = values in this interval not displayed.   Coagulation:     Recent Labs  Lab 07/16/18 0418 07/17/18 0313 07/17/18 0714   INR 1.0 1.0 1.0   APTT  --   --  <21.0     LDH:  No results for input(s): LDH in the last 72 hours.  Microbiology:  Microbiology Results (last 7 days)     Procedure Component Value Units Date/Time    Fungus culture [[id number]] Collected:  07/17/18 1154    Order Status:  Sent Specimen:  Respiratory from Sputum Updated:  07/17/18 1155    Blood culture [[id number]] Collected:  07/17/18 0950    Order Status:  Sent Specimen:  Blood from Peripheral, Antecubital, Right Updated:  07/17/18 1130    Blood culture [[id number]] Collected:  07/17/18 0930    Order Status:  Sent Specimen:  Blood from Peripheral, Antecubital, Right Updated:  07/17/18 1128    Culture, Respiratory with Gram Stain [[id number]]  (Susceptibility) Collected:  07/13/18 1129    Order Status:  Completed Specimen:  Respiratory from Endotracheal Aspirate Updated:  07/17/18 1057     Respiratory Culture --     STAPHYLOCOCCUS AUREUS  Moderate  Normal respiratory peng also present       Gram Stain (Respiratory) Few WBC's     Gram Stain (Respiratory) No  "organisms seen    Blood culture [[id number]] Collected:  07/13/18 1309    Order Status:  Completed Specimen:  Blood from Peripheral, Hand, Left Updated:  07/16/18 1812     Blood Culture, Routine No Growth to date     Blood Culture, Routine No Growth to date     Blood Culture, Routine No Growth to date     Blood Culture, Routine No Growth to date    Blood culture [[id number]] Collected:  07/13/18 1350    Order Status:  Completed Specimen:  Blood from Peripheral, Hand, Right Updated:  07/16/18 1812     Blood Culture, Routine No Growth to date     Blood Culture, Routine No Growth to date     Blood Culture, Routine No Growth to date     Blood Culture, Routine No Growth to date          I have reviewed all pertinent labs within the past 24 hours.    Estimated Creatinine Clearance: 87.7 mL/min (based on SCr of 1.2 mg/dL).    Diagnostic Results:  I have reviewed and interpreted all pertinent imaging results/findings within the past 24 hours.    Assessment and Plan:     57 year old male with PMHx significant for CAD s/p PCIs, HFrEF secondary to ischemic cardiomyopathy (EF 5-10%) s/p ICD, Afib (on warfarin), pulmonary sarcoid (on chronic prednisone), hx of L parietal CVA recently TN'ed on 7/4.      He was initially admitted to "stroke/neuro" service on 6/22 with dysarthria and stroke-like symptoms. Extensive work up was negative for recurrent CVA and so no tPA was administered. Patient developed atypical chest pain two days into his hospital course and was noted to be in monomorphic VT (6/24) which was treated with ATP then with shock due to recurrent VT in VF zone. Given his active cardiac issues this prompted transfer to heart failure service where the patient was initiated on IV amiodarone and beta blocker with subsequent resolution of his VT. He was eventually transitioned to PO amiodarone 400 mg BID x 2 weeks (from 6/25-7/9) followed by amiodarone 400 mg daily thereafter per EP recommendations. Of note patient underwent " [facility] on 6/25 given his extensive ischemic history which did not reveal a culprit lesion, therefore no interventions were done. He was noted to have an elevated LVEDP to 45 however and was administered IV diuresis before being transitioned back to his PO diuretic regimen. Patient also completed heart failure pathway during his hospital course (eg completed colonoscopy on 7/2) as part of his work up for advanced options. Follows with Dr. Berman of heart failure/transplant as an outpatient.      The patient was discharged home in stable condition on 7/4/2018. He was dc'ed on warf/lovenox bridge given CHADS2-VaSc of 5.  Of note, he is no longer a candidate for AO.  He presented yesterday to [facility] with BRBPR and was found to be in acute renal failure as well.  He notes that, on the night of the 5th and morning of 6th, he had 6 bright red bloody bm's.  He went to his PMD who noted he might have internal hemmorhoids.  He was told to stop his lovenox.  He then returned home and flet very lightheaded and dizzy.  He wisely took his blood pressure and noted it was 70-80/50s whereas it is normally 117/70s.  He went in to ED immediately.  There he was noted to have the following pertinent lab values:  Hg 9.2 (11.7 prior)  INR 2.9  Na 131 (139 prior)  K 6.18  BUN 43 (18 prior)  Cr 2.88 (1.1-1.4 at baseline)  proBNP 2755  He was transferred to [facility] for further evaluation and care.  Of note, he underwent screening c-scope on 7/2 during which the following was noted and done: Diverticulosis in the sigmoid colon and in the descending colon, Two 8 to 10 mm polyps in the sigmoid colon and in the descending colon, removed with a hot snare, resected and retrieved, ligated.        * VT Storm    -VT storm 7/10/18. Polymorphic and monomorphic. Degenerated into VF. 12 ICD shocks  -Intubated, IABP placed emergently at bedside.   -Amio loaded x 2, gtt started per protocol.  Now on amio 200mg PO TID. Lidocaine gtt started at 1,  increased to 2 when patient had more VT-->VF and AICD shocks. Off lidocaine, continue mexilitene TID  -EP consulted, appreciate their assistance. Pacing rate increased to 80, metoprolol IV added.  -Continue support with IABP 1:1   -K goal >4.5, Mg >2.5  - regimen per EP recs one patient is able to take PO   - Toprol 150 mg, mexiletine 200 BID, Amio 200 TID for 1 weeks, 200 BID for 4 weeks, 300 QD thereafter  - As of 7/17 now with repeat VF-->VT shocked 5 times. EP re-consulted. Amio 300 loading dose followed by 1mg/min and will continue mexil and metoprolol        VAP (ventilator-associated pneumonia)    -Being covered with vanc/cefepime  -Cultures NGTD  -repeated sputum cultures, blood cultures, UA and sent fungal studies today due to fever and WBCs increasing.          History of stroke    -On coumadin prior to admit  -Will discuss need for systemic anticoagulation        Encounter for management of intra-aortic balloon pump    -IABP placed 7/10/2018  -Daily CXR        Respiratory failure requiring intubation    -Emergently intubated 7/10/18 for impending respiratory failure/need for IABP and inability to lay fat  -History of pulmonary sarcoidosis  -Methylpred given 7/10/18, hydrocortisone 100 mg q8 started 7/11/18. Transitioned to PO prednisone 7/16.   -Pulmonary consult for assistance with sarcoid & vent management.  -CXR daily  -Spoke with pulm at bedside today, Concern for PNA & pulm edema, less concern for ARDS given how well is he oxygenating with minimal vent support. Considering bronch if CXR does not improve soon (will have to hold off in setting of VT/VF this AM)        Cardiogenic shock    - IABP placed emergently 7/10/18  - Daily CXR   - Augmenting well at 1:1. SVO2 53%. CO ~ 5.63 L/min, CI 2.4 (SvO2 drawn aftr ICD discharges, will repeat venous blood gas this afternoon)   - CVP 8-9 overnight. Continue lasix @ 10 today        Ventricular fibrillation    -See VT        Hypotension    - Levophed started  during code 7/10/18  - Wean levo for MAP <60 on IABP        Chronic systolic CHF (congestive heart failure)    - See cardiogenic shock        GI bleed    - INR subtherapeutic  - Coumadin on hold, will discuss anticoagulation  - Protonix increased to 40 mg BID (changed to IV)  - Had screening colonoscopy with hot snare polypectomy during last admission; most likely culprit post polypectomy bleeding  - H & H stable, normal BMs since admission.        History of atrial fibrillation    -  continue amio         Abnormal involuntary movements    - Continue keppra  - possible seizure activity noted on 7/12/18 (twitching right face and UEs lasting 5 min)   - neuro consulted and increased keppra to 1000 IV 12   - continuous EEG read and negative for seizure activity   - will start de-escalation of keppra and change to PO per neuro recs        CAD (coronary artery disease)    - Continue atorvastatin, nitro prn  - ASA restarted        Sarcoidosis of lung    - Continue prednisone, breo-ellipta, and albuterol prn            Anna Landrum PA-C  Heart Transplant  [facility]    Uninterrupted Critical Care/Counseling Time (not including procedures): 60 minutes

## 2018-07-18 PROBLEM — K59.00 CONSTIPATION: Status: ACTIVE | Noted: 2018-01-01

## 2018-07-18 NOTE — SUBJECTIVE & OBJECTIVE
Interval History: Examined this morning with daughter at bedside. No acute events overnight. Remains intubated and on ventilator.     Objective:     Vital Signs (Most Recent):  Temp: 97.6 °F (36.4 °C) (07/18/18 0700)  Pulse: 80 (07/18/18 1000)  Resp: (!) 26 (07/18/18 1000)  BP: (!) 87/51 (07/18/18 1000)  SpO2: 96 % (07/18/18 1000) Vital Signs (24h Range):  Temp:  [97.6 °F (36.4 °C)-101.3 °F (38.5 °C)] 97.6 °F (36.4 °C)  Pulse:  [79-85] 80  Resp:  [16-55] 26  SpO2:  [92 %-100 %] 96 %  BP: ()/(43-62) 87/51     Weight: 109 kg (240 lb 4.8 oz)  Body mass index is 33.52 kg/m².      Intake/Output Summary (Last 24 hours) at 07/18/18 1053  Last data filed at 07/18/18 1000   Gross per 24 hour   Intake             2822 ml   Output             4125 ml   Net            -1303 ml       Physical Exam   Constitutional: He appears well-developed.   HENT:   Head: Normocephalic and atraumatic.   Mouth/Throat: Oropharynx is clear and moist.   Eyes: Pupils are equal, round, and reactive to light.   Cardiovascular:   IABP present.    Pulmonary/Chest:   Intubated and on ventilator. Bilateral rhonchi persists R>L    Abdominal: Soft. Bowel sounds are normal.   Neurological:   Sedated on propofol        Vents:  Vent Mode: A/C (07/18/18 0930)  Set Rate: 16 bmp (07/18/18 0930)  Vt Set: 450 mL (07/18/18 0930)  PEEP/CPAP: 6 cmH20 (07/18/18 0930)  Oxygen Concentration (%): 41 (07/18/18 1000)  Peak Airway Pressure: 30 cmH2O (07/18/18 0930)  Plateau Pressure: 22 cmH20 (07/18/18 0930)  Total Ve: 7.4 mL (07/18/18 0930)  F/VT Ratio<105 (RSBI): (!) 34.56 (07/18/18 0930)    Lines/Drains/Airways     Central Venous Catheter Line                 Percutaneous Central Line Insertion/Assessment - triple lumen  07/09/18 1500 right internal jugular 8 days          Drain                 NG/OG Tube 07/10/18 1557 Carmen sump 16 Fr. Left nostril 7 days         Urethral Catheter 07/10/18 1646 Double-lumen;Latex 16 Fr. 7 days          Airway                  Airway - Non-Surgical 07/10/18 1523 Endotracheal Tube 7 days          Line                 IABP 07/10/18 1550 7.5 Fr. 40 mL 7 days          Peripheral Intravenous Line                 Midline Catheter Insertion/Assessment  - Single Lumen 07/17/18 1105 Right cephalic vein (lateral side of arm) 18g x 8cm less than 1 day         Peripheral IV - Single Lumen 07/17/18 1110 Right Forearm less than 1 day                Significant Labs:    CBC/Anemia Profile:    Recent Labs  Lab 07/17/18  0313 07/17/18  0714 07/18/18  0310   WBC 14.81* 20.95* 14.76*   HGB 7.9* 8.7* 7.2*   HCT 26.0* 28.5* 23.2*    186 151   MCV 96 97 94   RDW 15.3* 15.5* 15.3*        Chemistries:    Recent Labs  Lab 07/17/18  0314 07/17/18  0714 07/17/18  1600 07/18/18  0023 07/18/18  0310    148* 144 145 148*   K 4.1 4.4 4.0 3.7 3.7    105 103 102 103   CO2 30* 33* 34* 33* 33*   BUN 62* 63* 59* 52* 53*   CREATININE 1.1 1.2 1.3 1.1 1.0   CALCIUM 8.8 9.4 8.8 9.1 9.2   ALBUMIN 2.2* 2.4*  --   --  2.0*   PROT 6.2 6.7  --   --  6.1   BILITOT 0.5 0.7  --   --  0.7   ALKPHOS 53* 62  --   --  65   ALT 13 17  --   --  14   AST 15 19  --   --  17   MG 2.4 2.2 2.5  --  2.5   PHOS 2.8 2.4* 3.6  --  3.8       Recent Lab Results       07/18/18  0812 07/18/18  0756 07/18/18  0329 07/18/18  0310 07/18/18  0026      Immature Granulocytes    CANCELED  Comment:  Result canceled by the ancillary      Immature Grans (Abs)    CANCELED  Comment:  Mild elevation in immature granulocytes is non specific and   can be seen in a variety of conditions including stress response,   acute inflammation, trauma and pregnancy. Correlation with other   laboratory and clinical findings is essential.    Result canceled by the ancillary        Provider Notified: SPANSEL KEEN        Albumin    2.0(L)      Alkaline Phosphatase    65      Allens Test Pass N/A        ALT    14      Anion Gap    12      Aniso    Slight      AST    17      BANDS    8.0      Baso #     CANCELED  Comment:  Result canceled by the ancillary      Basophilic Stippling    Occasional      Basophil%    0.0      Total Bilirubin    0.7  Comment:  For infants and newborns, interpretation of results should be based  on gestational age, weight and in agreement with clinical  observations.  Premature Infant recommended reference ranges:  Up to 24 hours.............<8.0 mg/dL  Up to 48 hours............<12.0 mg/dL  3-5 days..................<15.0 mg/dL  6-29 days.................<15.0 mg/dL        Site LR Other        BUN, Bld    53(H)      Calcium    9.2      Chloride    103      CO2    33(H)      Creatinine    1.0      DelSys Adult Vent Adult Vent        Differential Method    Manual      eGFR if     >60.0      eGFR if non     >60.0  Comment:  Calculation used to obtain the estimated glomerular filtration  rate (eGFR) is the CKD-EPI equation.         Eos #    CANCELED  Comment:  Result canceled by the ancillary      Eosinophil%    0.0      FiO2 40 40        Glucose    132(H)      Gran%    83.0(H)      Hematocrit    23.2(L)      Hemoglobin    7.2(L)      Coumadin Monitoring INR    1.1  Comment:  Coumadin Therapy:  2.0 - 3.0 for INR for all indicators except mechanical heart valves  and antiphospholipid syndromes which should use 2.5 - 3.5.        Lymph #    CANCELED  Comment:  Result canceled by the ancillary      Lymph%    3.0(L)      Magnesium    2.5      MCH    29.1      MCHC    31.0(L)      MCV    94      Metamyelocytes    0.5      Mode AC/PRVC AC/PRVC        Mono #    CANCELED  Comment:  Result canceled by the ancillary      Mono%    4.5      MPV    11.1      Myelocytes    1.0      nRBC    1(A)      PEEP 6 6        Phosphorus    3.8      Platelet Estimate    Appears normal      Platelets    151      POC BE 17 14        POC HCO3 41.0(H) 38.4(H)        POC PCO2 59.5(HH) 58.1(H)        POC PH 7.446 7.428        POC PO2 68(L) 35(LL)        POC SATURATED O2 93(L) 67(L)        POC  TCO2 43(H) 40(H)        POCT Glucose   156(H)  202(H)     Poly    Occasional      Potassium    3.7      Total Protein    6.1      Protime    11.2      Provider Credentials: MIRIAM RICHARDS        Rate 16 16        RBC    2.47(L)      RDW    15.3(H)      Sample ARTERIAL VENOUS        Sodium    148(H)      Sp02          Vancomycin-Trough          Vt 450 450        WBC    14.76(H)                  07/18/18  0023 07/17/18  2239 07/17/18  1940 07/17/18  1614 07/17/18  1608      Immature Granulocytes          Immature Grans (Abs)          Provider Notified:          Albumin          Alkaline Phosphatase          Allens Test     N/A     ALT          Anion Gap 10         Aniso          AST          BANDS          Baso #          Basophilic Stippling          Basophil%          Total Bilirubin          Site     RB     BUN, Bld 52(H)         Calcium 9.1         Chloride 102         CO2 33(H)         Creatinine 1.1         DelSys     Adult Vent     Differential Method          eGFR if  >60.0         eGFR if non  >60.0  Comment:  Calculation used to obtain the estimated glomerular filtration  rate (eGFR) is the CKD-EPI equation.            Eos #          Eosinophil%          FiO2     40     Glucose 136(H)         Gran%          Hematocrit          Hemoglobin          Coumadin Monitoring INR          Lymph #          Lymph%          Magnesium          MCH          MCHC          MCV          Metamyelocytes          Mode     AC/PRVC     Mono #          Mono%          MPV          Myelocytes          nRBC          PEEP     6     Phosphorus          Platelet Estimate          Platelets          POC BE     12     POC HCO3     36.5(H)     POC PCO2     54.7(H)     POC PH     7.432     POC PO2     80     POC SATURATED O2     96     POC TCO2     38(H)     POCT Glucose   160(H) 184(H)      Poly          Potassium 3.7         Total Protein          Protime          Provider Credentials:          Rate     16     RBC           RDW          Sample     ARTERIAL     Sodium 145         Sp02     100     Vancomycin-Trough  27.7(H)        Vt     450     WBC                      07/17/18  1600 07/17/18  1558 07/17/18  1153 07/17/18  1142      Immature Granulocytes         Immature Grans (Abs)         Provider Notified:         Albumin         Alkaline Phosphatase         Allens Test  N/A  Pass     ALT         Anion Gap 7(L)        Aniso         AST         BANDS         Baso #         Basophilic Stippling         Basophil%         Total Bilirubin         Site  Other  LR     BUN, Bld 59(H)        Calcium 8.8        Chloride 103        CO2 34(H)        Creatinine 1.3        DelSys  Adult Vent  Adult Vent     Differential Method         eGFR if  >60.0        eGFR if non  >60.0  Comment:  Calculation used to obtain the estimated glomerular filtration  rate (eGFR) is the CKD-EPI equation.           Eos #         Eosinophil%         FiO2  40  50     Glucose 194(H)        Gran%         Hematocrit         Hemoglobin         Coumadin Monitoring INR         Lymph #         Lymph%         Magnesium 2.5        MCH         MCHC         MCV         Metamyelocytes         Mode  AC/PRVC  AC/PRVC     Mono #         Mono%         MPV         Myelocytes         nRBC         PEEP  6  6     Phosphorus 3.6        Platelet Estimate         Platelets         POC BE  11  14     POC HCO3  35.8(H)  36.5(H)     POC PCO2  57.0(H)  41.5     POC PH  7.406  7.553(H)     POC PO2  54  92     POC SATURATED O2  87(L)  98     POC TCO2  38(H)  38(H)     POCT Glucose   178(H)      Poly         Potassium 4.0        Total Protein         Protime         Provider Credentials:         Rate  16  18     RBC         RDW         Sample  VENOUS  ARTERIAL     Sodium 144        Sp02  100  100     Vancomycin-Trough         Vt  450  450     WBC             All pertinent labs within the past 24 hours have been reviewed.    Significant Imaging:  I have reviewed  all pertinent imaging results/findings within the past 24 hours.  I have reviewed and interpreted all pertinent imaging results/findings within the past 24 hours.

## 2018-07-18 NOTE — PROGRESS NOTES
Patient seen and examined this morning. There were no acute events overnight, the patient's VT storm ceased yesterday morning with IV amiodarone therapy (in addition to mexitil 150 mg Q8 and metoprolol 2.5 mg IV Q4). There are no events on telemetry, the patient is V-paced at a set rate of 80 (which was implemented following his initial VT storm). Recommend continuing IV amiodarone 0.5 mg/min until the patient achieves a euvolemic state, when a transition can be made to his oral home dose. EP will sign off for now, please call with any questions or concerns.    Rafa Petit MD  [id number]-IV

## 2018-07-18 NOTE — PLAN OF CARE
No acute events throughout shift  Accu cks q 4 hr; PRN supplemental coverage administered  BMP q 8 hr; K replaced  Vent settings: A/C 40% & 6 PEEP w/ O2 sats 97% & above  Gtts: levo @ 0.04 mcg/kg/min; propofol @ 50 mcg/kg/min; fentanyl @ 75 mcg/hr; lasix @ 10 mg/hr; amio @ 0.5 mg/min  IABP: mode- auto; rate 1:1; trigger- ECG   CVPs: 10, 9, & 8 mmHg  Adequate urine output; >  100 cc/hr  TF remain off  Pt 100% paced @ 80  Remained afebrile throughout shift  Plan of care reviewed w/ pt's daughter  All questions & concerns addressed per pt's daughter  All VS currently stable   Will continue to monitor closely

## 2018-07-18 NOTE — ASSESSMENT & PLAN NOTE
- INR subtherapeutic  - Coumadin on hold, will hold off on anticoagulation in light of recent GI bleed.  - Protonix increased to 40 mg BID (changed to IV)  - Had screening colonoscopy with hot snare polypectomy during last admission; most likely culprit post polypectomy bleeding  - H & H stable, normal BMs since admission.

## 2018-07-18 NOTE — CONSULTS
"[facility]  Infectious Disease  Consult Note    Patient Name: Yonathan Good Jr.  MRN: [id number]  Admission Date: 7/7/2018  Hospital Length of Stay: 11 days  Attending Physician: Kimberly Lazo MD  Primary Care Provider: Edgar Gates MD     Isolation Status: No active isolations    Patient information was obtained from relative(s), past medical records and ER records.      Inpatient consult to Infectious Diseases  Consult performed by: RAKESH QUINN  Consult ordered by: DALE OWEN  Reason for consult: fever and leukocytosis         Assessment/Plan:     History of stroke    57 year old male, ID consulted for possible VAP in setting of leukocytosis and fever.     - Currently on vancomycin and cefepime   - Leukocytosis trending down   - febrile overnight to 101.3  - Prednisone 40   - ?cardiogenic pulmonary edema vs PNA  - 7/13 sputum culture grew S. Aureus - covered by current abx regimen  - Aspergillus pending, will order more fungal testing (Urine Histo and Fungitel)                  Thank you for your consult. I will follow-up with patient. Please contact us if you have any additional questions.    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       He was initially admitted to "stroke/neuro" service on 6/22 with dysarthria and stroke-like symptoms. Extensive work up was negative for recurrent CVA and so no tPA was administered. Patient developed atypical chest pain two days into his hospital course and was noted to be in monomorphic VT (6/24) which was treated with ATP then with shock due to " recurrent VT in VF zone. Given his active cardiac issues this prompted transfer to heart failure service where the patient was initiated on IV amiodarone and beta blocker with subsequent resolution of his VT. He was eventually transitioned to PO amiodarone 400 mg BID x 2 weeks (from 6/25-7/9) followed by amiodarone 400 mg daily thereafter per EP recommendations. Of note patient underwent LHC on 6/25 given his extensive ischemic history which did not reveal a culprit lesion, therefore no interventions were done. He was noted to have an elevated LVEDP to 45 however and was administered IV diuresis before being transitioned back to his PO diuretic regimen. Patient also completed heart failure pathway during his hospital course (eg completed colonoscopy on 7/2) as part of his work up for advanced options. Follows with Dr. Berman of heart failure/transplant as an outpatient.      The patient was discharged home in stable condition on 7/4/2018. He was dc'ed on warf/lovenox bridge given CHADS2-VaSc of 5.  Of note, he is no longer a candidate for AO.  He presented yesterday to [facility] with BRBPR and was found to be in acute renal failure as well.  He notes that, on the night of the 5th and morning of 6th, he had 6 bright red bloody bm's.  He went to his PMD who noted he might have internal hemmorhoids.  He was told to stop his lovenox.  He then returned home and flet very lightheaded and dizzy.  He wisely took his blood pressure and noted it was 70-80/50s whereas it is normally 117/70s.  He went in to ED immediately.       He is now intubated in the ICU. He is on stress dose steroids. ID is consulted for possible VAP.    Past Medical History:   Diagnosis Date    AICD (automatic cardioverter/defibrillator) present     Arthritis     Atrial fibrillation     CHF (congestive heart failure)     Coronary artery disease     History of stroke 7/13/2018    2005, 2006, no deficits per wife.    Hypertension     MI  (myocardial infarction)     Sarcoid     Seizures     Stroke        Past Surgical History:   Procedure Laterality Date    CARDIAC CATHETERIZATION      CARDIAC DEFIBRILLATOR PLACEMENT  7-12    CARDIAC DEFIBRILLATOR PLACEMENT      CARDIAC DEFIBRILLATOR PLACEMENT      COLONOSCOPY N/A 7/2/2018    Procedure: COLONOSCOPY;  Surgeon: THELMA Kay MD;  Location: [facility] ENDO ([address] [place] [address]);  Service: Endoscopy;  Laterality: N/A;    CORONARY STENT PLACEMENT  07/2011    ESOPHAGOGASTRODUODENOSCOPY      FRACTURE SURGERY      LEFT HEART CATHETERIZATION N/A 6/25/2018    Procedure: Left heart cath;  Surgeon: Jordi Lui MD;  Location: [facility] CATH LAB;  Service: Cardiology;  Laterality: N/A;       Review of patient's allergies indicates:  No Known Allergies    Medications:  Prescriptions Prior to Admission   Medication Sig    albuterol (PROVENTIL) 2.5 mg /3 mL (0.083 %) nebulizer solution Take 2.5 mg by nebulization every 6 (six) hours as needed.    albuterol (VENTOLIN HFA) 90 mcg/actuation inhaler Inhale 2 puffs into the lungs every 4 (four) hours as needed for Wheezing.    alprazolam (XANAX) 2 MG tablet Take 2 mg by mouth 2 (two) times daily as needed.     amiodarone (PACERONE) 400 MG tablet Take 1 tablet (400 mg total) by mouth 2 (two) times daily until 7/9/2018. Then take 1 tablet (400 mg total) by mouth 1 (one) time daily thereafter.    aspirin (ECOTRIN) 81 MG EC tablet Take 81 mg by mouth. 1 Tablet, Delayed Release (E.C.) Oral Every day    atorvastatin (LIPITOR) 40 MG tablet Take 1 tablet (40 mg total) by mouth once daily.    bumetanide (BUMEX) 1 MG tablet Take 1 mg by mouth daily as needed.    carisoprodol (SOMA) 350 MG tablet Take 350 mg by mouth 4 (four) times daily as needed for Muscle spasms.    colchicine 0.6 mg tablet 0.6 mg once daily.     enoxaparin (LOVENOX) 100 mg/mL Syrg Inject 1 mL (100 mg total) into the skin every 12 (twelve) hours.    fluticasone-vilanterol (BREO ELLIPTA) 200-25 mcg/dose  DsDv diskus inhaler Inhale 1 puff into the lungs once daily. Controller    gabapentin (NEURONTIN) 600 MG tablet Take 600 mg by mouth 2 (two) times daily. 1 Tablet Oral At bedtime    hydrocodone-acetaminophen 10-325mg (NORCO)  mg Tab Take 1 tablet by mouth 2 (two) times daily as needed.     isosorbide mononitrate (IMDUR) 30 MG 24 hr tablet Take 3 tablets (90 mg total) by mouth once daily. (Patient taking differently: Take 60 mg by mouth once daily. )    levETIRAcetam (KEPPRA) 500 MG Tab Take 1 tablet (500 mg total) by mouth 2 (two) times daily.    losartan (COZAAR) 50 MG tablet Take 1 tablet (50 mg total) by mouth once daily.    metoprolol tartrate (LOPRESSOR) 25 MG tablet Take 1 tablet (25 mg total) by mouth 2 (two) times daily.    nitroGLYCERIN (NITROSTAT) 0.4 MG SL tablet ONE TABLET UNDER TONGUE AS NEEDED FOR CHEST PAIN    pantoprazole (PROTONIX) 40 MG tablet Take 40 mg by mouth. 1 Tablet, Delayed Release (E.C.) Oral Every day    potassium chloride SA (K-DUR,KLOR-CON) 20 MEQ tablet TAKE 2 TABLETS BY MOUTH EVERY MORNING AND 1 TABLET BY MOUTH EVERY EVENING    predniSONE (DELTASONE) 10 MG tablet Take 1 tablet (10 mg total) by mouth once daily.    promethazine-codeine 6.25-10 mg/5 ml (PHENERGAN WITH CODEINE) 6.25-10 mg/5 mL syrup TK 5 ML PO  Q 6 H PRN    spironolactone (ALDACTONE) 25 MG tablet TAKE 1 TABLET BY MOUTH EVERY DAY    warfarin (COUMADIN) 7.5 MG tablet 1 tablet Monday  0.5 tablet Tuesday-Sunday    [DISCONTINUED] furosemide (LASIX) 40 MG tablet TAKE 2 TABLETS BY MOUTH TWICE DAILY     Antibiotics     Start     Stop Route Frequency Ordered    07/13/18 1145  ceFEPIme injection 2 g      -- IV Every 8 hours (non-standard times) 07/13/18 1031    07/13/18 1145  vancomycin in dextrose 5 % 1 gram/250 mL IVPB 1,000 mg  (Vancomycin IVPB with levels panel)      -- IV Every 12 hours (non-standard times) 07/13/18 1031        Antifungals     None        Antivirals     None           Immunization History    Administered Date(s) Administered    Influenza - Quadrivalent - PF 10/23/2014       Family History     Problem Relation (Age of Onset)    Cancer Maternal Grandmother    Coronary artery disease Father, Sister, Sister    Heart disease Mother, Father, Sister    Hypertension Mother, Father, Sister    Kidney disease Sister    Stroke Sister    Vision loss Sister        Social History     Social History    Marital status: Other     Spouse name: N/A    Number of children: N/A    Years of education: N/A     Social History Main Topics    Smoking status: Never Smoker    Smokeless tobacco: Never Used    Alcohol use No    Drug use: No    Sexual activity: Not Asked     Other Topics Concern    None     Social History Narrative    None     Review of Systems   Unable to perform ROS: Intubated     Objective:     Vital Signs (Most Recent):  Temp: 98 °F (36.7 °C) (07/18/18 1100)  Pulse: 80 (07/18/18 1400)  Resp: 16 (07/18/18 1400)  BP: (!) 80/52 (07/18/18 1400)  SpO2: 96 % (07/18/18 1400) Vital Signs (24h Range):  Temp:  [97.6 °F (36.4 °C)-98.5 °F (36.9 °C)] 98 °F (36.7 °C)  Pulse:  [79-85] 80  Resp:  [] 16  SpO2:  [87 %-100 %] 96 %  BP: ()/(43-62) 80/52     Weight: 109 kg (240 lb 4.8 oz)  Body mass index is 33.52 kg/m².    Estimated Creatinine Clearance: 104.8 mL/min (based on SCr of 1 mg/dL).    Physical Exam   Constitutional: He is oriented to person, place, and time. He appears well-developed and well-nourished. He is sedated and intubated.   HENT:   Head: Normocephalic and atraumatic.   Eyes: EOM are normal. Pupils are equal, round, and reactive to light.   Neck: Normal range of motion. Neck supple. No JVD (difficult to assess (patient on ventilator & flat in bed)) present.   Right CVC in place   Cardiovascular: Normal rate and regular rhythm.    IABP 1:1. IABP site clean/dry/intact. Feet slightly cool to touch, otherwise warm and well perfused.    Pulmonary/Chest: Effort normal. He is intubated. No  respiratory distress.   Coarse breath sounds. Ventilated   Abdominal: Soft. He exhibits no distension. Bowel sounds are decreased. There is no tenderness.   Musculoskeletal: Normal range of motion. He exhibits no edema.   Neurological: He is alert and oriented to person, place, and time.   Skin: Skin is warm and dry.   Nursing note and vitals reviewed.      Significant Labs: All pertinent labs within the past 24 hours have been reviewed.    Significant Imaging: I have reviewed all pertinent imaging results/findings within the past 24 hours.

## 2018-07-18 NOTE — ASSESSMENT & PLAN NOTE
- IABP placed emergently 7/10/18  - Daily CXR   - Augmenting well at 1:1. SVO2 67%. CO ~ 10.56 L/min, CI 4.5 (SvO2 67)  - CVP 5 overnight. Decrease lasix from ggt to IVP and monitor UOP.

## 2018-07-18 NOTE — ASSESSMENT & PLAN NOTE
-VT storm 7/10/18. Polymorphic and monomorphic. Degenerated into VF. 12 ICD shocks  -Intubated, IABP placed emergently at bedside.   -Amio loaded x 2, gtt started per protocol.  Now on amio 200mg PO TID. Lidocaine gtt started at 1, increased to 2 when patient had more VT-->VF and AICD shocks. Off lidocaine, continue mexilitene TID  -EP consulted, appreciate their assistance. Pacing rate increased to 80, metoprolol IV added.  -Continue support with IABP 1:1   -K goal >4.5, Mg >2.5  - regimen per EP recs one patient is able to take PO   - Toprol 150 mg, mexiletine 200 BID, Amio 200 TID for 1 weeks, 200 BID for 4 weeks, 300 QD thereafter  - As of 7/17 now with repeat VF-->VT shocked 4 times. EP re-consulted. Amio 300 loading dose followed by 1mg/min and will continue mexil and metoprolol per their recs. No more VT overnight.

## 2018-07-18 NOTE — SUBJECTIVE & OBJECTIVE
Past Medical History:   Diagnosis Date    AICD (automatic cardioverter/defibrillator) present     Arthritis     Atrial fibrillation     CHF (congestive heart failure)     Coronary artery disease     History of stroke 7/13/2018    2005, 2006, no deficits per wife.    Hypertension     MI (myocardial infarction)     Sarcoid     Seizures     Stroke        Past Surgical History:   Procedure Laterality Date    CARDIAC CATHETERIZATION      CARDIAC DEFIBRILLATOR PLACEMENT  7-12    CARDIAC DEFIBRILLATOR PLACEMENT      CARDIAC DEFIBRILLATOR PLACEMENT      COLONOSCOPY N/A 7/2/2018    Procedure: COLONOSCOPY;  Surgeon: THELMA Kay MD;  Location: [facility] ENDO ([address] [place] [address]);  Service: Endoscopy;  Laterality: N/A;    CORONARY STENT PLACEMENT  07/2011    ESOPHAGOGASTRODUODENOSCOPY      FRACTURE SURGERY      LEFT HEART CATHETERIZATION N/A 6/25/2018    Procedure: Left heart cath;  Surgeon: Jordi Lui MD;  Location: [facility] CATH LAB;  Service: Cardiology;  Laterality: N/A;       Review of patient's allergies indicates:  No Known Allergies    Medications:  Prescriptions Prior to Admission   Medication Sig    albuterol (PROVENTIL) 2.5 mg /3 mL (0.083 %) nebulizer solution Take 2.5 mg by nebulization every 6 (six) hours as needed.    albuterol (VENTOLIN HFA) 90 mcg/actuation inhaler Inhale 2 puffs into the lungs every 4 (four) hours as needed for Wheezing.    alprazolam (XANAX) 2 MG tablet Take 2 mg by mouth 2 (two) times daily as needed.     amiodarone (PACERONE) 400 MG tablet Take 1 tablet (400 mg total) by mouth 2 (two) times daily until 7/9/2018. Then take 1 tablet (400 mg total) by mouth 1 (one) time daily thereafter.    aspirin (ECOTRIN) 81 MG EC tablet Take 81 mg by mouth. 1 Tablet, Delayed Release (E.C.) Oral Every day    atorvastatin (LIPITOR) 40 MG tablet Take 1 tablet (40 mg total) by mouth once daily.    bumetanide (BUMEX) 1 MG tablet Take 1 mg by mouth daily as needed.    carisoprodol (SOMA) 350  MG tablet Take 350 mg by mouth 4 (four) times daily as needed for Muscle spasms.    colchicine 0.6 mg tablet 0.6 mg once daily.     enoxaparin (LOVENOX) 100 mg/mL Syrg Inject 1 mL (100 mg total) into the skin every 12 (twelve) hours.    fluticasone-vilanterol (BREO ELLIPTA) 200-25 mcg/dose DsDv diskus inhaler Inhale 1 puff into the lungs once daily. Controller    gabapentin (NEURONTIN) 600 MG tablet Take 600 mg by mouth 2 (two) times daily. 1 Tablet Oral At bedtime    hydrocodone-acetaminophen 10-325mg (NORCO)  mg Tab Take 1 tablet by mouth 2 (two) times daily as needed.     isosorbide mononitrate (IMDUR) 30 MG 24 hr tablet Take 3 tablets (90 mg total) by mouth once daily. (Patient taking differently: Take 60 mg by mouth once daily. )    levETIRAcetam (KEPPRA) 500 MG Tab Take 1 tablet (500 mg total) by mouth 2 (two) times daily.    losartan (COZAAR) 50 MG tablet Take 1 tablet (50 mg total) by mouth once daily.    metoprolol tartrate (LOPRESSOR) 25 MG tablet Take 1 tablet (25 mg total) by mouth 2 (two) times daily.    nitroGLYCERIN (NITROSTAT) 0.4 MG SL tablet ONE TABLET UNDER TONGUE AS NEEDED FOR CHEST PAIN    pantoprazole (PROTONIX) 40 MG tablet Take 40 mg by mouth. 1 Tablet, Delayed Release (E.C.) Oral Every day    potassium chloride SA (K-DUR,KLOR-CON) 20 MEQ tablet TAKE 2 TABLETS BY MOUTH EVERY MORNING AND 1 TABLET BY MOUTH EVERY EVENING    predniSONE (DELTASONE) 10 MG tablet Take 1 tablet (10 mg total) by mouth once daily.    promethazine-codeine 6.25-10 mg/5 ml (PHENERGAN WITH CODEINE) 6.25-10 mg/5 mL syrup TK 5 ML PO  Q 6 H PRN    spironolactone (ALDACTONE) 25 MG tablet TAKE 1 TABLET BY MOUTH EVERY DAY    warfarin (COUMADIN) 7.5 MG tablet 1 tablet Monday  0.5 tablet Tuesday-Sunday    [DISCONTINUED] furosemide (LASIX) 40 MG tablet TAKE 2 TABLETS BY MOUTH TWICE DAILY     Antibiotics     Start     Stop Route Frequency Ordered    07/13/18 1145  ceFEPIme injection 2 g      -- IV Every 8  hours (non-standard times) 07/13/18 1031    07/13/18 1145  vancomycin in dextrose 5 % 1 gram/250 mL IVPB 1,000 mg  (Vancomycin IVPB with levels panel)      -- IV Every 12 hours (non-standard times) 07/13/18 1031        Antifungals     None        Antivirals     None           Immunization History   Administered Date(s) Administered    Influenza - Quadrivalent - PF 10/23/2014       Family History     Problem Relation (Age of Onset)    Cancer Maternal Grandmother    Coronary artery disease Father, Sister, Sister    Heart disease Mother, Father, Sister    Hypertension Mother, Father, Sister    Kidney disease Sister    Stroke Sister    Vision loss Sister        Social History     Social History    Marital status: Other     Spouse name: N/A    Number of children: N/A    Years of education: N/A     Social History Main Topics    Smoking status: Never Smoker    Smokeless tobacco: Never Used    Alcohol use No    Drug use: No    Sexual activity: Not Asked     Other Topics Concern    None     Social History Narrative    None     Review of Systems   Unable to perform ROS: Intubated     Objective:     Vital Signs (Most Recent):  Temp: 98 °F (36.7 °C) (07/18/18 1100)  Pulse: 80 (07/18/18 1400)  Resp: 16 (07/18/18 1400)  BP: (!) 80/52 (07/18/18 1400)  SpO2: 96 % (07/18/18 1400) Vital Signs (24h Range):  Temp:  [97.6 °F (36.4 °C)-98.5 °F (36.9 °C)] 98 °F (36.7 °C)  Pulse:  [79-85] 80  Resp:  [] 16  SpO2:  [87 %-100 %] 96 %  BP: ()/(43-62) 80/52     Weight: 109 kg (240 lb 4.8 oz)  Body mass index is 33.52 kg/m².    Estimated Creatinine Clearance: 104.8 mL/min (based on SCr of 1 mg/dL).    Physical Exam   Constitutional: He is oriented to person, place, and time. He appears well-developed and well-nourished. He is sedated and intubated.   HENT:   Head: Normocephalic and atraumatic.   Eyes: EOM are normal. Pupils are equal, round, and reactive to light.   Neck: Normal range of motion. Neck supple. No JVD  (difficult to assess (patient on ventilator & flat in bed)) present.   Right CVC in place   Cardiovascular: Normal rate and regular rhythm.    IABP 1:1. IABP site clean/dry/intact. Feet slightly cool to touch, otherwise warm and well perfused.    Pulmonary/Chest: Effort normal. He is intubated. No respiratory distress.   Coarse breath sounds. Ventilated   Abdominal: Soft. He exhibits no distension. Bowel sounds are decreased. There is no tenderness.   Musculoskeletal: Normal range of motion. He exhibits no edema.   Neurological: He is alert and oriented to person, place, and time.   Skin: Skin is warm and dry.   Nursing note and vitals reviewed.      Significant Labs: All pertinent labs within the past 24 hours have been reviewed.    Significant Imaging: I have reviewed all pertinent imaging results/findings within the past 24 hours.

## 2018-07-18 NOTE — SUBJECTIVE & OBJECTIVE
Interval History: Events from overnight reviewed. Family at bedside. All questions answered. Patients family remains hopeful but realistic. Hopefully sedation holiday tomorrow with possible extubation.     Continuous Infusions:   amiodarone in dextrose 5% 0.5 mg/min (07/18/18 1100)    fentanyl 15 mL/hr at 07/18/18 1100    norepinephrine bitartrate-D5W 0.02 mcg/kg/min (07/18/18 1100)    propofol 50 mcg/kg/min (07/18/18 1100)     Scheduled Meds:   albuterol sulfate  2.5 mg Nebulization Q4H    aspirin  81 mg Oral Daily    atorvastatin  40 mg Oral Daily    budesonide  0.5 mg Nebulization Q12H    ceFEPime (MAXIPIME) IVPB  2 g Intravenous Q8H    chlorhexidine  15 mL Mouth/Throat BID    chlorhexidine  15 mL Mouth/Throat BID    docusate  100 mg Oral Daily    fluticasone-vilanterol  1 puff Inhalation Daily    furosemide  80 mg Intravenous BID    gabapentin  600 mg Oral BID    heparin (porcine)  5,000 Units Subcutaneous Q8H    levETIRAcetam  500 mg Oral BID    metoprolol  2.5 mg Intravenous Q4H    mexiletine  150 mg Oral Q8H    pantoprozole (PROTONIX) 40 mg/100 mL D5W IVPB  40 mg Intravenous BID    predniSONE  40 mg Oral Daily    sennosides 8.8 mg/5 ml  5 mL Oral QHS    sodium chloride 0.9%  3 mL Intravenous Q8H    vancomycin (VANCOCIN) IVPB  1,000 mg Intravenous Q12H     PRN Meds:sodium chloride, ALPRAZolam, benzonatate, calcium gluconate IVPB, calcium gluconate IVPB, calcium gluconate IVPB, carisoprodol, dextrose 50%, glucagon (human recombinant), HYDROcodone-acetaminophen, insulin aspart U-100, magnesium sulfate IVPB, magnesium sulfate IVPB, nitroGLYCERIN, potassium chloride in water **AND** potassium chloride in water **AND** potassium chloride in water, sodium phosphate IVPB, sodium phosphate IVPB, sodium phosphate IVPB    Review of patient's allergies indicates:  No Known Allergies  Objective:     Vital Signs (Most Recent):  Temp: 98 °F (36.7 °C) (07/18/18 1100)  Pulse: 80 (07/18/18 1125)  Resp:  16 (07/18/18 1125)  BP: (!) 83/51 (07/18/18 1100)  SpO2: (!) 94 % (07/18/18 1125) Vital Signs (24h Range):  Temp:  [97.6 °F (36.4 °C)-99.3 °F (37.4 °C)] 98 °F (36.7 °C)  Pulse:  [79-85] 80  Resp:  [16-55] 16  SpO2:  [92 %-100 %] 94 %  BP: ()/(43-62) 83/51     Patient Vitals for the past 72 hrs (Last 3 readings):   Weight   07/18/18 0455 109 kg (240 lb 4.8 oz)   07/16/18 2308 109.9 kg (242 lb 4.6 oz)   07/16/18 0400 111.5 kg (245 lb 13 oz)     Body mass index is 33.52 kg/m².      Intake/Output Summary (Last 24 hours) at 07/18/18 1135  Last data filed at 07/18/18 1100   Gross per 24 hour   Intake             2822 ml   Output             4125 ml   Net            -1303 ml     Hemodynamic Parameters:    Telemetry: paced at 80 bpm    Physical Exam   Constitutional: He appears well-developed and well-nourished. He is intubated.   Daughter at bedside   HENT:   Head: Normocephalic and atraumatic.   Neck: Normal range of motion. Neck supple. JVD: difficult to assess on ventialtor.   Right TLC   Cardiovascular:   Warm and well perfused in upper and lower extremities    Pulmonary/Chest: He is intubated.   Coarse breath sounds, ventilated.    Abdominal: Soft. Bowel sounds are normal. He exhibits no distension. There is no tenderness.   Musculoskeletal: He exhibits no edema.   Neurological:   intubated and sedated this AM s/p multiple ICD discharges   Skin: Skin is warm and dry.   Nursing note and vitals reviewed.    Significant Labs:  CBC:    Recent Labs  Lab 07/17/18  0313 07/17/18  0714 07/18/18  0310   WBC 14.81* 20.95* 14.76*   RBC 2.70* 2.95* 2.47*   HGB 7.9* 8.7* 7.2*   HCT 26.0* 28.5* 23.2*    186 151   MCV 96 97 94   MCH 29.3 29.5 29.1   MCHC 30.4* 30.5* 31.0*     BNP:  No results for input(s): BNP in the last 168 hours.    Invalid input(s): BNPTRIAGELBLO  CMP:    Recent Labs  Lab 07/17/18  0314 07/17/18  0714 07/17/18  1600 07/18/18  0023 07/18/18  0310   * 143* 194* 136* 132*   CALCIUM 8.8 9.4 8.8  9.1 9.2   ALBUMIN 2.2* 2.4*  --   --  2.0*   PROT 6.2 6.7  --   --  6.1    148* 144 145 148*   K 4.1 4.4 4.0 3.7 3.7   CO2 30* 33* 34* 33* 33*    105 103 102 103   BUN 62* 63* 59* 52* 53*   CREATININE 1.1 1.2 1.3 1.1 1.0   ALKPHOS 53* 62  --   --  65   ALT 13 17  --   --  14   AST 15 19  --   --  17   BILITOT 0.5 0.7  --   --  0.7      Coagulation:     Recent Labs  Lab 07/17/18  0313 07/17/18  0714 07/18/18  0310   INR 1.0 1.0 1.1   APTT  --  <21.0  --      LDH:  No results for input(s): LDH in the last 72 hours.  Microbiology:  Microbiology Results (last 7 days)     Procedure Component Value Units Date/Time    Blood culture [[id number]] Collected:  07/17/18 0930    Order Status:  Completed Specimen:  Blood from Peripheral, Antecubital, Right Updated:  07/17/18 1915     Blood Culture, Routine No Growth to date    Blood culture [[id number]] Collected:  07/17/18 0950    Order Status:  Completed Specimen:  Blood from Peripheral, Antecubital, Right Updated:  07/17/18 1915     Blood Culture, Routine No Growth to date    Blood culture [[id number]] Collected:  07/13/18 1309    Order Status:  Completed Specimen:  Blood from Peripheral, Hand, Left Updated:  07/17/18 1812     Blood Culture, Routine No Growth to date     Blood Culture, Routine No Growth to date     Blood Culture, Routine No Growth to date     Blood Culture, Routine No Growth to date     Blood Culture, Routine No Growth to date    Blood culture [[id number]] Collected:  07/13/18 1350    Order Status:  Completed Specimen:  Blood from Peripheral, Hand, Right Updated:  07/17/18 1812     Blood Culture, Routine No Growth to date     Blood Culture, Routine No Growth to date     Blood Culture, Routine No Growth to date     Blood Culture, Routine No Growth to date     Blood Culture, Routine No Growth to date    Fungus culture [[id number]] Collected:  07/17/18 1154    Order Status:  Sent Specimen:  Respiratory from Sputum Updated:  07/17/18 1531    Culture,  Respiratory with Gram Stain [[id number]]  (Susceptibility) Collected:  07/13/18 1129    Order Status:  Completed Specimen:  Respiratory from Endotracheal Aspirate Updated:  07/17/18 1059     Respiratory Culture --     STAPHYLOCOCCUS AUREUS  Moderate  Normal respiratory peng also present       Gram Stain (Respiratory) Few WBC's     Gram Stain (Respiratory) No organisms seen          I have reviewed all pertinent labs within the past 24 hours.    Estimated Creatinine Clearance: 104.8 mL/min (based on SCr of 1 mg/dL).    Diagnostic Results:  I have reviewed and interpreted all pertinent imaging results/findings within the past 24 hours.

## 2018-07-18 NOTE — ASSESSMENT & PLAN NOTE
57 year old male, ID consulted for possible VAP in setting of leukocytosis and fever.     - Currently on vancomycin and cefepime   - Leukocytosis trending down   - febrile overnight to 101.3  - Prednisone 40   - ?cardiogenic pulmonary edema vs PNA  - 7/13 sputum culture grew S. Aureus - covered by current abx regimen  - Aspergillus pending, will order more fungal testing (Urine Histo and Fungitel)

## 2018-07-18 NOTE — PROGRESS NOTES
Dr. Tran called to bedside; pt twitching for approximately 10 minutes. Fentanyl gtt was increased to 150; pt stopped twitching. No new orders received at this time. Will continue to monitor pt closely.

## 2018-07-18 NOTE — ASSESSMENT & PLAN NOTE
Infection unlikely   · Respiratory cultures NGTD, blood cultures NGTD  · Afebrile  · Continue diuresis per primary team - Net fluid balance -1300 mL in last 24 hours.   · Slight improvement on morning CXR compared to prior  · Discussed with primary team at bedside, agree to hold SBT for today, plan to try tomorrow.     · Continue abx regimen - ET aspirate culture + for MSSA staph   · Recommend adding CPT to respiratory therapies    Recent Labs  Lab 07/18/18  0812   PH 7.446   PCO2 59.5*   PO2 68*   HCO3 41.0*   POCSATURATED 93*   BE 17   ·

## 2018-07-18 NOTE — PROGRESS NOTES
[facility]  Heart Transplant  Progress Note    Patient Name: Yonathan Good Jr.  MRN: [id number]  Admission Date: 7/7/2018  Hospital Length of Stay: 11 days  Attending Physician: Kimberly Lazo MD  Primary Care Provider: Edgar Gates MD  Principal Problem:Ventricular tachycardia, incessant    Subjective:     Interval History: Events from overnight reviewed. Family at bedside. All questions answered. Patients family remains hopeful but realistic. Hopefully sedation holiday tomorrow with possible extubation.     Continuous Infusions:   amiodarone in dextrose 5% 0.5 mg/min (07/18/18 1100)    fentanyl 15 mL/hr at 07/18/18 1100    norepinephrine bitartrate-D5W 0.02 mcg/kg/min (07/18/18 1100)    propofol 50 mcg/kg/min (07/18/18 1100)     Scheduled Meds:   albuterol sulfate  2.5 mg Nebulization Q4H    aspirin  81 mg Oral Daily    atorvastatin  40 mg Oral Daily    budesonide  0.5 mg Nebulization Q12H    ceFEPime (MAXIPIME) IVPB  2 g Intravenous Q8H    chlorhexidine  15 mL Mouth/Throat BID    chlorhexidine  15 mL Mouth/Throat BID    docusate  100 mg Oral Daily    fluticasone-vilanterol  1 puff Inhalation Daily    furosemide  80 mg Intravenous BID    gabapentin  600 mg Oral BID    heparin (porcine)  5,000 Units Subcutaneous Q8H    levETIRAcetam  500 mg Oral BID    metoprolol  2.5 mg Intravenous Q4H    mexiletine  150 mg Oral Q8H    pantoprozole (PROTONIX) 40 mg/100 mL D5W IVPB  40 mg Intravenous BID    predniSONE  40 mg Oral Daily    sennosides 8.8 mg/5 ml  5 mL Oral QHS    sodium chloride 0.9%  3 mL Intravenous Q8H    vancomycin (VANCOCIN) IVPB  1,000 mg Intravenous Q12H     PRN Meds:sodium chloride, ALPRAZolam, benzonatate, calcium gluconate IVPB, calcium gluconate IVPB, calcium gluconate IVPB, carisoprodol, dextrose 50%, glucagon (human recombinant), HYDROcodone-acetaminophen, insulin aspart U-100, magnesium sulfate IVPB, magnesium sulfate IVPB, nitroGLYCERIN, potassium chloride  in water **AND** potassium chloride in water **AND** potassium chloride in water, sodium phosphate IVPB, sodium phosphate IVPB, sodium phosphate IVPB    Review of patient's allergies indicates:  No Known Allergies  Objective:     Vital Signs (Most Recent):  Temp: 98 °F (36.7 °C) (07/18/18 1100)  Pulse: 80 (07/18/18 1125)  Resp: 16 (07/18/18 1125)  BP: (!) 83/51 (07/18/18 1100)  SpO2: (!) 94 % (07/18/18 1125) Vital Signs (24h Range):  Temp:  [97.6 °F (36.4 °C)-99.3 °F (37.4 °C)] 98 °F (36.7 °C)  Pulse:  [79-85] 80  Resp:  [16-55] 16  SpO2:  [92 %-100 %] 94 %  BP: ()/(43-62) 83/51     Patient Vitals for the past 72 hrs (Last 3 readings):   Weight   07/18/18 0455 109 kg (240 lb 4.8 oz)   07/16/18 2308 109.9 kg (242 lb 4.6 oz)   07/16/18 0400 111.5 kg (245 lb 13 oz)     Body mass index is 33.52 kg/m².      Intake/Output Summary (Last 24 hours) at 07/18/18 1135  Last data filed at 07/18/18 1100   Gross per 24 hour   Intake             2822 ml   Output             4125 ml   Net            -1303 ml     Hemodynamic Parameters:    Telemetry: paced at 80 bpm    Physical Exam   Constitutional: He appears well-developed and well-nourished. He is intubated.   Daughter at bedside   HENT:   Head: Normocephalic and atraumatic.   Neck: Normal range of motion. Neck supple. JVD: difficult to assess on ventialtor.   Right TLC   Cardiovascular:   Warm and well perfused in upper and lower extremities    Pulmonary/Chest: He is intubated.   Coarse breath sounds, ventilated.    Abdominal: Soft. Bowel sounds are normal. He exhibits no distension. There is no tenderness.   Musculoskeletal: He exhibits no edema.   Neurological:   intubated and sedated this AM s/p multiple ICD discharges   Skin: Skin is warm and dry.   Nursing note and vitals reviewed.    Significant Labs:  CBC:    Recent Labs  Lab 07/17/18  0313 07/17/18  0714 07/18/18  0310   WBC 14.81* 20.95* 14.76*   RBC 2.70* 2.95* 2.47*   HGB 7.9* 8.7* 7.2*   HCT 26.0* 28.5* 23.2*     186 151   MCV 96 97 94   MCH 29.3 29.5 29.1   MCHC 30.4* 30.5* 31.0*     BNP:  No results for input(s): BNP in the last 168 hours.    Invalid input(s): BNPTRIAGELBLO  CMP:    Recent Labs  Lab 07/17/18  0314 07/17/18  0714 07/17/18  1600 07/18/18  0023 07/18/18  0310   * 143* 194* 136* 132*   CALCIUM 8.8 9.4 8.8 9.1 9.2   ALBUMIN 2.2* 2.4*  --   --  2.0*   PROT 6.2 6.7  --   --  6.1    148* 144 145 148*   K 4.1 4.4 4.0 3.7 3.7   CO2 30* 33* 34* 33* 33*    105 103 102 103   BUN 62* 63* 59* 52* 53*   CREATININE 1.1 1.2 1.3 1.1 1.0   ALKPHOS 53* 62  --   --  65   ALT 13 17  --   --  14   AST 15 19  --   --  17   BILITOT 0.5 0.7  --   --  0.7      Coagulation:     Recent Labs  Lab 07/17/18  0313 07/17/18  0714 07/18/18  0310   INR 1.0 1.0 1.1   APTT  --  <21.0  --      LDH:  No results for input(s): LDH in the last 72 hours.  Microbiology:  Microbiology Results (last 7 days)     Procedure Component Value Units Date/Time    Blood culture [[id number]] Collected:  07/17/18 0930    Order Status:  Completed Specimen:  Blood from Peripheral, Antecubital, Right Updated:  07/17/18 1915     Blood Culture, Routine No Growth to date    Blood culture [[id number]] Collected:  07/17/18 0950    Order Status:  Completed Specimen:  Blood from Peripheral, Antecubital, Right Updated:  07/17/18 1915     Blood Culture, Routine No Growth to date    Blood culture [[id number]] Collected:  07/13/18 1309    Order Status:  Completed Specimen:  Blood from Peripheral, Hand, Left Updated:  07/17/18 1812     Blood Culture, Routine No Growth to date     Blood Culture, Routine No Growth to date     Blood Culture, Routine No Growth to date     Blood Culture, Routine No Growth to date     Blood Culture, Routine No Growth to date    Blood culture [[id number]] Collected:  07/13/18 1350    Order Status:  Completed Specimen:  Blood from Peripheral, Hand, Right Updated:  07/17/18 1812     Blood Culture, Routine No Growth to date      "Blood Culture, Routine No Growth to date     Blood Culture, Routine No Growth to date     Blood Culture, Routine No Growth to date     Blood Culture, Routine No Growth to date    Fungus culture [[id number]] Collected:  07/17/18 1154    Order Status:  Sent Specimen:  Respiratory from Sputum Updated:  07/17/18 1531    Culture, Respiratory with Gram Stain [[id number]]  (Susceptibility) Collected:  07/13/18 1129    Order Status:  Completed Specimen:  Respiratory from Endotracheal Aspirate Updated:  07/17/18 1057     Respiratory Culture --     STAPHYLOCOCCUS AUREUS  Moderate  Normal respiratory peng also present       Gram Stain (Respiratory) Few WBC's     Gram Stain (Respiratory) No organisms seen          I have reviewed all pertinent labs within the past 24 hours.    Estimated Creatinine Clearance: 104.8 mL/min (based on SCr of 1 mg/dL).    Diagnostic Results:  I have reviewed and interpreted all pertinent imaging results/findings within the past 24 hours.    Assessment and Plan:     57 year old male with PMHx significant for CAD s/p PCIs, HFrEF secondary to ischemic cardiomyopathy (EF 5-10%) s/p ICD, Afib (on warfarin), pulmonary sarcoid (on chronic prednisone), hx of L parietal CVA recently RI'ed on 7/4.      He was initially admitted to "stroke/neuro" service on 6/22 with dysarthria and stroke-like symptoms. Extensive work up was negative for recurrent CVA and so no tPA was administered. Patient developed atypical chest pain two days into his hospital course and was noted to be in monomorphic VT (6/24) which was treated with ATP then with shock due to recurrent VT in VF zone. Given his active cardiac issues this prompted transfer to heart failure service where the patient was initiated on IV amiodarone and beta blocker with subsequent resolution of his VT. He was eventually transitioned to PO amiodarone 400 mg BID x 2 weeks (from 6/25-7/9) followed by amiodarone 400 mg daily thereafter per EP recommendations. Of " note patient underwent LHC on 6/25 given his extensive ischemic history which did not reveal a culprit lesion, therefore no interventions were done. He was noted to have an elevated LVEDP to 45 however and was administered IV diuresis before being transitioned back to his PO diuretic regimen. Patient also completed heart failure pathway during his hospital course (eg completed colonoscopy on 7/2) as part of his work up for advanced options. Follows with Dr. Berman of heart failure/transplant as an outpatient.      The patient was discharged home in stable condition on 7/4/2018. He was dc'ed on warf/lovenox bridge given CHADS2-VaSc of 5.  Of note, he is no longer a candidate for AO.  He presented yesterday to [facility] with BRBPR and was found to be in acute renal failure as well.  He notes that, on the night of the 5th and morning of 6th, he had 6 bright red bloody bm's.  He went to his PMD who noted he might have internal hemmorhoids.  He was told to stop his lovenox.  He then returned home and flet very lightheaded and dizzy.  He wisely took his blood pressure and noted it was 70-80/50s whereas it is normally 117/70s.  He went in to ED immediately.  There he was noted to have the following pertinent lab values:  Hg 9.2 (11.7 prior)  INR 2.9  Na 131 (139 prior)  K 6.18  BUN 43 (18 prior)  Cr 2.88 (1.1-1.4 at baseline)  proBNP 2755  He was transferred to [facility] for further evaluation and care.  Of note, he underwent screening c-scope on 7/2 during which the following was noted and done: Diverticulosis in the sigmoid colon and in the descending colon, Two 8 to 10 mm polyps in the sigmoid colon and in the descending colon, removed with a hot snare, resected and retrieved, ligated.        * VT Storm    -VT storm 7/10/18. Polymorphic and monomorphic. Degenerated into VF. 12 ICD shocks  -Intubated, IABP placed emergently at bedside.   -Amio loaded x 2, gtt started per protocol.  Now on amio 200mg PO TID.  Lidocaine gtt started at 1, increased to 2 when patient had more VT-->VF and AICD shocks. Off lidocaine, continue mexilitene TID  -EP consulted, appreciate their assistance. Pacing rate increased to 80, metoprolol IV added.  -Continue support with IABP 1:1   -K goal >4.5, Mg >2.5  - regimen per EP recs one patient is able to take PO   - Toprol 150 mg, mexiletine 200 BID, Amio 200 TID for 1 weeks, 200 BID for 4 weeks, 300 QD thereafter  - As of 7/17 now with repeat VF-->VT shocked 4 times. EP re-consulted. Amio 300 loading dose followed by 1mg/min and will continue mexil and metoprolol per their recs. No more VT overnight.        Constipation    - last BM Monday 7/9.  - Currently on Docusate, add senna today and repeat KUB        VAP (ventilator-associated pneumonia)    -Being covered with vanc/cefepime  -Cultures now with S. Aureus in sputum.  -repeated sputum cultures, blood cultures, UA and sent fungal studies 7/17. WBCs down and afebrile overnight. Appreciate ID assistance (reconsulted yesterday).          History of stroke    -On coumadin prior to admit  -Will discuss need for systemic anticoagulation        Encounter for management of intra-aortic balloon pump    -IABP placed 7/10/2018  -Daily CXR        Respiratory failure requiring intubation    -Emergently intubated 7/10/18 for impending respiratory failure/need for IABP and inability to lay fat  -History of pulmonary sarcoidosis  -Methylpred given 7/10/18, hydrocortisone 100 mg q8 started 7/11/18. Transitioned to PO prednisone 7/16.   -Pulmonary consult for assistance with sarcoid & vent management.  -CXR daily  -Spoke with pulm at bedside today, CXR improved slightly today. S. Aureus in sputum, appreciate ID recs.    - consider SBT and extubation tomorrow.        Cardiogenic shock    - IABP placed emergently 7/10/18  - Daily CXR   - Augmenting well at 1:1. SVO2 67%. CO ~ 10.56 L/min, CI 4.5 (SvO2 67)  - CVP 5 overnight. Decrease lasix from ggt to IVP and  monitor UOP.        Ventricular fibrillation    -See VT        Hypotension    - Levophed started during code 7/10/18  - Wean levo for MAP <60 on IABP        Chronic systolic CHF (congestive heart failure)    - See cardiogenic shock        GI bleed    - INR subtherapeutic  - Coumadin on hold, will hold off on anticoagulation in light of recent GI bleed.  - Protonix increased to 40 mg BID (changed to IV)  - Had screening colonoscopy with hot snare polypectomy during last admission; most likely culprit post polypectomy bleeding  - H & H stable, normal BMs since admission.        History of atrial fibrillation    -  continue amio         Abnormal involuntary movements    - Continue keppra  - possible seizure activity noted on 7/12/18 (twitching right face and UEs lasting 5 min). Some Bilateral UE twitching which did not appear to be seizure like occurred on 7/18, resolved with increased sedation. If noted again will reach out to neuro.   - neuro consulted and increased keppra to 1000 IV 12   - continuous EEG read and negative for seizure activity   - will start de-escalation of keppra and change to PO per neuro recs        CAD (coronary artery disease)    - Continue atorvastatin, nitro prn  - ASA restarted        Sarcoidosis of lung    - Continue prednisone, breo-ellipta, and albuterol prn. Start chest PT today.            Anna Landrum PA-C  Heart Transplant  [facility]    Uninterrupted Critical Care/Counseling Time (not including procedures): 45 minutes

## 2018-07-18 NOTE — ASSESSMENT & PLAN NOTE
-Emergently intubated 7/10/18 for impending respiratory failure/need for IABP and inability to lay fat  -History of pulmonary sarcoidosis  -Methylpred given 7/10/18, hydrocortisone 100 mg q8 started 7/11/18. Transitioned to PO prednisone 7/16.   -Pulmonary consult for assistance with sarcoid & vent management.  -CXR daily  -Spoke with pulm at bedside today, CXR improved slightly today. S. Aureus in sputum, appreciate ID recs.    - consider SBT and extubation tomorrow.

## 2018-07-18 NOTE — PROGRESS NOTES
No acute events this shift  MAP > 60 on cuff; Mean on IABP > 60  Accu cks q 4 hr; no supplemental coverage needed  BMP q 8 hr; electrolytes WNL; no replacements needed  Vent settings: A/C 60% & 6 PEEP w/ O2 sats 92% & above; pt required increased FiO2 this shift  Gtts: levo @ 0.04 mcg/kg/min; propofol @ 50 mcg/kg/min; fentanyl @ 150 mcg/hr  Lasix gtt discontinued and Lasix 80 mg IVP ordered  IABP: mode- auto; rate 1:1; trigger- ECG  Specific orders for no sedation vacation trials; Plan to attempt to wean sedation tomorrow  CVPs 5-6 mmHg; Adequate urine output; see flow sheets for specific detials  TF restarted @ 10 ml/hr; currently @ 20 ml/hr; goal rate of 55 ml/hr; max residual of 125 this shift  Electrolytes monitored and WNL; no replacements needed   Pt 100% paced @ 80 currently  Pt remained afebrile and all VSS at this time  Sputum culture positive for staph  All questions & concerns addressed per pt's daughter   Will continue to monitor closely

## 2018-07-18 NOTE — ASSESSMENT & PLAN NOTE
- Continue keppra  - possible seizure activity noted on 7/12/18 (twitching right face and UEs lasting 5 min). Some Bilateral UE twitching which did not appear to be seizure like occurred on 7/18, resolved with increased sedation. If noted again will reach out to neuro.   - neuro consulted and increased keppra to 1000 IV 12   - continuous EEG read and negative for seizure activity   - will start de-escalation of keppra and change to PO per neuro recs

## 2018-07-18 NOTE — ASSESSMENT & PLAN NOTE
-Being covered with vanc/cefepime  -Cultures now with S. Aureus in sputum.  -repeated sputum cultures, blood cultures, UA and sent fungal studies 7/17. WBCs down and afebrile overnight. Appreciate ID assistance (reconsulted yesterday).

## 2018-07-19 PROBLEM — J96.01 ACUTE RESPIRATORY FAILURE WITH HYPOXIA: Status: ACTIVE | Noted: 2018-01-01

## 2018-07-19 NOTE — ASSESSMENT & PLAN NOTE
Patient has had multiple occurrences of electrical storm with ICD shocks  The patient had previously been receiving oral amiodarone, possibly was not being absorbed through the enteric tract and the patient has responded to IV amiodarone  Continue amiodarone, mexilitine 150 mg Q8h and metoprolol 2.5 mg IV Q4h  Patient currently stable, with resolution of VT storm  At a later time, it may be considered to deactivate the LV lead of the patient's CRT-D, considering the patient's LVAD status and native RBBB (see EKG from 7/2012)  ICD interrogation reveals LICHA - will continue to follow along

## 2018-07-19 NOTE — PROGRESS NOTES
[facility]  Heart Transplant  Progress Note    Patient Name: Yonathan Good Jr.  MRN: [id number]  Admission Date: 7/7/2018  Hospital Length of Stay: 12 days  Attending Physician: Kimberly Lazo MD  Primary Care Provider: Edgar Gates MD  Principal Problem:Ventricular tachycardia, incessant    Subjective:     Interval History: Stable from overnight. Now with positive blood cultures. ID following. Will hold off on extubation in light of rising CVP and congestion on CXR. Central line not drawing back and line one week old, will dc RIJ and place new L IJ.     Continuous Infusions:   amiodarone in dextrose 5% 0.5 mg/min (07/19/18 1100)    fentanyl 15 mL/hr at 07/19/18 1100    furosemide (LASIX) 2 mg/mL infusion (non-titrating) 10 mg/hr (07/19/18 1100)    norepinephrine bitartrate-D5W 0.02 mcg/kg/min (07/19/18 1100)    propofol 50.05 mcg/kg/min (07/19/18 1100)     Scheduled Meds:   albuterol sulfate  2.5 mg Nebulization Q4H    aspirin  81 mg Oral Daily    atorvastatin  40 mg Oral Daily    budesonide  0.5 mg Nebulization Q12H    ceFEPime (MAXIPIME) IVPB  2 g Intravenous Q8H    chlorhexidine  15 mL Mouth/Throat BID    chlorhexidine  15 mL Mouth/Throat BID    docusate  100 mg Oral Daily    fluticasone-vilanterol  1 puff Inhalation Daily    gabapentin  600 mg Oral BID    heparin (porcine)  5,000 Units Subcutaneous Q8H    levETIRAcetam  500 mg Oral BID    metoprolol  2.5 mg Intravenous Q4H    mexiletine  150 mg Oral Q8H    pantoprozole (PROTONIX) 40 mg/100 mL D5W IVPB  40 mg Intravenous BID    predniSONE  40 mg Oral Daily    sennosides 8.8 mg/5 ml  5 mL Oral QHS    sodium chloride 0.9%  3 mL Intravenous Q8H    vancomycin (VANCOCIN) IVPB  1,000 mg Intravenous Q12H     PRN Meds:sodium chloride, ALPRAZolam, benzonatate, calcium gluconate IVPB, calcium gluconate IVPB, calcium gluconate IVPB, carisoprodol, dextrose 50%, glucagon (human recombinant), HYDROcodone-acetaminophen, insulin  aspart U-100, magnesium sulfate IVPB, magnesium sulfate IVPB, nitroGLYCERIN, potassium chloride in water **AND** potassium chloride in water **AND** potassium chloride in water, sodium phosphate IVPB, sodium phosphate IVPB, sodium phosphate IVPB    Review of patient's allergies indicates:  No Known Allergies  Objective:     Vital Signs (Most Recent):  Temp: 98.3 °F (36.8 °C) (07/19/18 0300)  Pulse: 80 (07/19/18 1104)  Resp: 16 (07/19/18 1104)  BP: (!) 94/51 (07/19/18 1100)  SpO2: 98 % (07/19/18 1104) Vital Signs (24h Range):  Temp:  [98.3 °F (36.8 °C)-98.8 °F (37.1 °C)] 98.3 °F (36.8 °C)  Pulse:  [80] 80  Resp:  [] 16  SpO2:  [87 %-100 %] 98 %  BP: ()/(47-67) 94/51     Patient Vitals for the past 72 hrs (Last 3 readings):   Weight   07/18/18 0455 109 kg (240 lb 4.8 oz)   07/16/18 2308 109.9 kg (242 lb 4.6 oz)     Body mass index is 33.52 kg/m².      Intake/Output Summary (Last 24 hours) at 07/19/18 1109  Last data filed at 07/19/18 1100   Gross per 24 hour   Intake             3704 ml   Output             2345 ml   Net             1359 ml     Hemodynamic Parameters:    Telemetry: paced at 80 bpm    Physical Exam   Constitutional: He appears well-developed and well-nourished. He is intubated.   Daughter at bedside   HENT:   Head: Normocephalic and atraumatic.   Neck: Normal range of motion. Neck supple. JVD: difficult to assess on ventialtor.   Right TLC   Cardiovascular:   Warm and well perfused in upper and lower extremities    Pulmonary/Chest: He is intubated.   Coarse breath sounds, ventilated.    Abdominal: Soft. Bowel sounds are normal. He exhibits no distension. There is no tenderness.   Musculoskeletal: He exhibits no edema.   Neurological:   intubated and sedated this AM s/p multiple ICD discharges   Skin: Skin is warm and dry.   Nursing note and vitals reviewed.    Significant Labs:  CBC:    Recent Labs  Lab 07/17/18  0714 07/18/18  0310 07/19/18  0330   WBC 20.95* 14.76* 12.18   RBC 2.95* 2.47*  2.50*   HGB 8.7* 7.2* 7.1*   HCT 28.5* 23.2* 23.6*    151 148*   MCV 97 94 94   MCH 29.5 29.1 28.4   MCHC 30.5* 31.0* 30.1*     BNP:  No results for input(s): BNP in the last 168 hours.    Invalid input(s): BNPTRIAGELBLO  CMP:    Recent Labs  Lab 07/17/18  0714  07/18/18  0310  07/19/18  0100 07/19/18  0330 07/19/18  0858   *  < > 132*  < > 165* 140* 134*   CALCIUM 9.4  < > 9.2  < > 9.3 9.2 9.8   ALBUMIN 2.4*  --  2.0*  --   --  1.9*  --    PROT 6.7  --  6.1  --   --  6.2  --    *  < > 148*  < > 145 144 146*   K 4.4  < > 3.7  < > 4.0 3.9 4.5   CO2 33*  < > 33*  < > 35* 35* 32*     < > 103  < > 102 101 103   BUN 63*  < > 53*  < > 53* 54* 56*   CREATININE 1.2  < > 1.0  < > 1.2 1.1 1.2   ALKPHOS 62  --  65  --   --  56  --    ALT 17  --  14  --   --  11  --    AST 19  --  17  --   --  15  --    BILITOT 0.7  --  0.7  --   --  0.5  --    < > = values in this interval not displayed.   Coagulation:     Recent Labs  Lab 07/17/18  0714 07/18/18 0310 07/19/18  0330   INR 1.0 1.1 1.0   APTT <21.0  --   --      LDH:  No results for input(s): LDH in the last 72 hours.  Microbiology:  Microbiology Results (last 7 days)     Procedure Component Value Units Date/Time    Fungus culture [[id number]] Collected:  07/17/18 1154    Order Status:  Completed Specimen:  Respiratory from Sputum Updated:  07/19/18 1054     Fungus (Mycology) Culture Culture in progress    Blood culture [[id number]] Collected:  07/17/18 0950    Order Status:  Completed Specimen:  Blood from Peripheral, Antecubital, Right Updated:  07/19/18 0841     Blood Culture, Routine Gram stain aer bottle: Gram positive cocci in clusters resembling Staph     Blood Culture, Routine Results called to and read back by:Javon Hatfield RN 07/18/2018  17:50    Blood culture [[id number]] Collected:  07/18/18 1803    Order Status:  Completed Specimen:  Blood from Peripheral, Forearm, Right Updated:  07/19/18 0345     Blood Culture, Routine No Growth to date     "Blood culture [[id number]] Collected:  07/18/18 1803    Order Status:  Completed Specimen:  Blood from Peripheral, Antecubital, Left Updated:  07/19/18 0345     Blood Culture, Routine No Growth to date    Blood culture [[id number]] Collected:  07/13/18 1309    Order Status:  Completed Specimen:  Blood from Peripheral, Hand, Left Updated:  07/18/18 1812     Blood Culture, Routine No growth after 5 days.    Blood culture [[id number]] Collected:  07/13/18 1350    Order Status:  Completed Specimen:  Blood from Peripheral, Hand, Right Updated:  07/18/18 1812     Blood Culture, Routine No growth after 5 days.    Blood culture [[id number]] Collected:  07/17/18 0930    Order Status:  Completed Specimen:  Blood from Peripheral, Antecubital, Right Updated:  07/18/18 1212     Blood Culture, Routine No Growth to date     Blood Culture, Routine No Growth to date    Culture, Respiratory with Gram Stain [[id number]]  (Susceptibility) Collected:  07/13/18 1129    Order Status:  Completed Specimen:  Respiratory from Endotracheal Aspirate Updated:  07/17/18 1057     Respiratory Culture --     STAPHYLOCOCCUS AUREUS  Moderate  Normal respiratory peng also present       Gram Stain (Respiratory) Few WBC's     Gram Stain (Respiratory) No organisms seen          I have reviewed all pertinent labs within the past 24 hours.    Estimated Creatinine Clearance: 87.4 mL/min (based on SCr of 1.2 mg/dL).    Diagnostic Results:  I have reviewed and interpreted all pertinent imaging results/findings within the past 24 hours.    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      He was initially admitted to "stroke/neuro" service on 6/22 with dysarthria and stroke-like symptoms. Extensive work up was negative for recurrent CVA and so no tPA was " administered. Patient developed atypical chest pain two days into his hospital course and was noted to be in monomorphic VT (6/24) which was treated with ATP then with shock due to recurrent VT in VF zone. Given his active cardiac issues this prompted transfer to heart failure service where the patient was initiated on IV amiodarone and beta blocker with subsequent resolution of his VT. He was eventually transitioned to PO amiodarone 400 mg BID x 2 weeks (from 6/25-7/9) followed by amiodarone 400 mg daily thereafter per EP recommendations. Of note patient underwent LHC on 6/25 given his extensive ischemic history which did not reveal a culprit lesion, therefore no interventions were done. He was noted to have an elevated LVEDP to 45 however and was administered IV diuresis before being transitioned back to his PO diuretic regimen. Patient also completed heart failure pathway during his hospital course (eg completed colonoscopy on 7/2) as part of his work up for advanced options. Follows with Dr. Berman of heart failure/transplant as an outpatient.      The patient was discharged home in stable condition on 7/4/2018. He was dc'ed on warf/lovenox bridge given CHADS2-VaSc of 5.  Of note, he is no longer a candidate for AO.  He presented yesterday to [facility] with BRBPR and was found to be in acute renal failure as well.  He notes that, on the night of the 5th and morning of 6th, he had 6 bright red bloody bm's.  He went to his PMD who noted he might have internal hemmorhoids.  He was told to stop his lovenox.  He then returned home and flet very lightheaded and dizzy.  He wisely took his blood pressure and noted it was 70-80/50s whereas it is normally 117/70s.  He went in to ED immediately.  There he was noted to have the following pertinent lab values:  Hg 9.2 (11.7 prior)  INR 2.9  Na 131 (139 prior)  K 6.18  BUN 43 (18 prior)  Cr 2.88 (1.1-1.4 at baseline)  proBNP 2755  He was transferred to [facility] [place] [facility] [place]  further evaluation and care.  Of note, he underwent screening c-scope on 7/2 during which the following was noted and done: Diverticulosis in the sigmoid colon and in the descending colon, Two 8 to 10 mm polyps in the sigmoid colon and in the descending colon, removed with a hot snare, resected and retrieved, ligated.        * VT Storm    -VT storm 7/10/18. Polymorphic and monomorphic. Degenerated into VF. 12 ICD shocks  -Intubated, IABP placed emergently at bedside.   -Amio loaded x 2, gtt started per protocol.  Now on amio 200mg PO TID. Lidocaine gtt started at 1, increased to 2 when patient had more VT-->VF and AICD shocks. Off lidocaine, continue mexilitene TID  -EP consulted, appreciate their assistance. Pacing rate increased to 80, metoprolol IV added.  -Continue support with IABP 1:1   -K goal >4.5, Mg >2.5  - regimen per EP recs one patient is able to take PO   - Toprol 150 mg, mexiletine 200 BID, Amio 200 TID for 1 weeks, 200 BID for 4 weeks, 300 QD thereafter  - As of 7/17 now with repeat VF-->VT shocked 4 times. EP re-consulted. Amio 300 loading dose followed by 1mg/min and will continue mexil and metoprolol per their recs. No more VT overnight.  - At a later time, it may be considered to deactivate the LV lead of the patient's CRT-D, considering the patient's LVAD status and native RBBB. ICD interrogation reveals LICHA - will continue to follow along        Constipation    - last BM Monday 7/9.  - KUB repeated, no bowel distension or excessive stool noted. +jesenia bowel sounds today.   - Currently on Docusate senna         VAP (ventilator-associated pneumonia)    -Being covered with vanc/cefepime. Will continue for now.   -RIJ 9 days old. IABP, Irvin, and ET tube 8 days old.   -Cultures now with S. Aureus in sputum, now with +jesenia blood cultures on 7/17 (GPC's resembling staph)   - remove RIJ for infection control and replace in L IJ. Will wean IABP if able to remove.  - UA clear  -repeated sputum cultures,  blood cultures, UA and sent fungal studies 7/17. WBCs down and afebrile overnight. Appreciate ID assistance.          History of stroke    -On coumadin prior to admit  -Hold off on systemic anticoagulation for now.        Encounter for management of intra-aortic balloon pump    -IABP placed 7/10/2018  -Daily CXR  -will exchange lines today, check svo2 this afternoon and consider decreasing to 1:2 tomorrow. Need to discuss with family plan when IABP removed/patient extubated about not replacing support.         Respiratory failure requiring intubation    -Emergently intubated 7/10/18 for impending respiratory failure/need for IABP and inability to lay fat  -History of pulmonary sarcoidosis  -Methylpred given 7/10/18, hydrocortisone 100 mg q8 started 7/11/18. Transitioned to PO prednisone 7/16.   -Pulmonary consult for assistance with sarcoid & vent management.  -CXR daily  -Spoke with pulm at bedside today, CXR improved in lung parenchyma but more pulmonary congestion. S. Aureus in sputum and now in blood, appreciate ID recs.    - concern for extubating with patient unable to sit up (due to IABP). Coughs and was unable to lie flat prior to intubation, concerned he will not be able to protect his airway. Will attempt to wean IABP prior to extubating while working on diuresing patient since unable to extubate today.         Cardiogenic shock    - IABP placed emergently 7/10/18  - Daily CXR   - Augmenting well at 1:1. SVO2 not obtained this AM due to line not drawing back. Will place LIJ and remove RIJ today and obtain SvO2 at that time. Consider weaning IABP starting tomorrow.   - CVP 16 this AM, big increase from overnight (also net +jesenia after decreasing lasix and has much more edema on CXR)   - will increase lasix back to ggt and watch UOP. Obtain new CVP and SvO2 after line replaced this afternoon.        Ventricular fibrillation    -See VT        Hypotension    - Levophed started during code 7/10/18  - Wean levo  for MAP <60 on IABP        Chronic systolic CHF (congestive heart failure)    - See cardiogenic shock        GI bleed    - INR subtherapeutic  - Coumadin on hold, will hold off on anticoagulation in setting of recent GI bleed.  - Protonix increased to 40 mg BID (changed to IV)  - Had screening colonoscopy with hot snare polypectomy during last admission; most likely culprit post polypectomy bleeding  - H & H stable, normal BMs since admission.        History of atrial fibrillation    -  continue amio         Abnormal involuntary movements    - Continue keppra  - possible seizure activity noted on 7/12/18 (twitching right face and UEs lasting 5 min). Some Bilateral UE twitching which did not appear to be seizure like occurred on 7/18, resolved with increased sedation. If noted again will reach out to neuro.   - neuro consulted and increased keppra to 1000 IV 12   - continuous EEG read and negative for seizure activity   - will start de-escalation of keppra and change to PO per neuro recs        CAD (coronary artery disease)    - Continue atorvastatin, nitro prn  - ASA restarted        Sarcoidosis of lung    - Continue prednisone, breo-ellipta, and albuterol prn. Start chest PT today.            Anna Landrum PA-C  Heart Transplant  [facility]    Uninterrupted Critical Care/Counseling Time (not including procedures): 45 minutes

## 2018-07-19 NOTE — PROGRESS NOTES
"[facility]  Infectious Disease  Progress Note    Patient Name: Yonathan Good Jr.  MRN: [id number]  Admission Date: 7/7/2018  Length of Stay: 12 days  Attending Physician: Kimberly Lazo MD  Primary Care Provider: Edgar Gates MD    Isolation Status: No active isolations  Assessment/Plan:      VAP (ventilator-associated pneumonia)    57 year old male, ID consulted for possible VAP in setting of leukocytosis and fever.     - Currently on vancomycin and cefepime  - Leukocytosis continues trending down   - afebrile  - Prednisone 40   - ?cardiogenic pulmonary edema vs PNA  - 7/13 sputum culture grew S. Aureus - covered by current abx regimen  - 7/17 blood culture grew coag negative staph in 1 bottle, likely contaminant - covered by current abx regime   - Aspergillus negative  - Urine Histo and Fungitel pending               History of stroke                    Thank you for your consult. I will follow-up with patient. Please contact us if you have any additional questions.    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       He was initially admitted to "stroke/neuro" service on 6/22 with dysarthria and stroke-like symptoms. Extensive work up was negative for recurrent CVA and so no tPA was administered. Patient developed atypical chest pain two days into his hospital course and was noted to be in monomorphic VT (6/24) which was treated with ATP then with shock due to recurrent VT in VF zone. Given his active cardiac issues this prompted transfer to heart failure service where the patient was initiated on IV amiodarone " and beta blocker with subsequent resolution of his VT. He was eventually transitioned to PO amiodarone 400 mg BID x 2 weeks (from 6/25-7/9) followed by amiodarone 400 mg daily thereafter per EP recommendations. Of note patient underwent LHC on 6/25 given his extensive ischemic history which did not reveal a culprit lesion, therefore no interventions were done. He was noted to have an elevated LVEDP to 45 however and was administered IV diuresis before being transitioned back to his PO diuretic regimen. Patient also completed heart failure pathway during his hospital course (eg completed colonoscopy on 7/2) as part of his work up for advanced options. Follows with Dr. Berman of heart failure/transplant as an outpatient.      The patient was discharged home in stable condition on 7/4/2018. He was dc'ed on warf/lovenox bridge given CHADS2-VaSc of 5.  Of note, he is no longer a candidate for AO.  He presented yesterday to [facility] with BRBPR and was found to be in acute renal failure as well.  He notes that, on the night of the 5th and morning of 6th, he had 6 bright red bloody bm's.  He went to his PMD who noted he might have internal hemmorhoids.  He was told to stop his lovenox.  He then returned home and flet very lightheaded and dizzy.  He wisely took his blood pressure and noted it was 70-80/50s whereas it is normally 117/70s.  He went in to ED immediately.       He is now intubated in the ICU. He is on stress dose steroids. ID is consulted for possible VAP.  Past Medical History:   Diagnosis Date    AICD (automatic cardioverter/defibrillator) present     Arthritis     Atrial fibrillation     CHF (congestive heart failure)     Coronary artery disease     History of stroke 7/13/2018    2005, 2006, no deficits per wife.    Hypertension     MI (myocardial infarction)     Sarcoid     Seizures     Stroke        Past Surgical History:   Procedure Laterality Date    CARDIAC CATHETERIZATION       CARDIAC DEFIBRILLATOR PLACEMENT  7-12    CARDIAC DEFIBRILLATOR PLACEMENT      CARDIAC DEFIBRILLATOR PLACEMENT      COLONOSCOPY N/A 7/2/2018    Procedure: COLONOSCOPY;  Surgeon: THELMA Kay MD;  Location: [facility] ENDO ([address] [place] [address]);  Service: Endoscopy;  Laterality: N/A;    CORONARY STENT PLACEMENT  07/2011    ESOPHAGOGASTRODUODENOSCOPY      FRACTURE SURGERY      LEFT HEART CATHETERIZATION N/A 6/25/2018    Procedure: Left heart cath;  Surgeon: Jordi Lui MD;  Location: [facility] CATH LAB;  Service: Cardiology;  Laterality: N/A;       Review of patient's allergies indicates:  No Known Allergies    Medications:  Prescriptions Prior to Admission   Medication Sig    albuterol (PROVENTIL) 2.5 mg /3 mL (0.083 %) nebulizer solution Take 2.5 mg by nebulization every 6 (six) hours as needed.    albuterol (VENTOLIN HFA) 90 mcg/actuation inhaler Inhale 2 puffs into the lungs every 4 (four) hours as needed for Wheezing.    alprazolam (XANAX) 2 MG tablet Take 2 mg by mouth 2 (two) times daily as needed.     amiodarone (PACERONE) 400 MG tablet Take 1 tablet (400 mg total) by mouth 2 (two) times daily until 7/9/2018. Then take 1 tablet (400 mg total) by mouth 1 (one) time daily thereafter.    aspirin (ECOTRIN) 81 MG EC tablet Take 81 mg by mouth. 1 Tablet, Delayed Release (E.C.) Oral Every day    atorvastatin (LIPITOR) 40 MG tablet Take 1 tablet (40 mg total) by mouth once daily.    bumetanide (BUMEX) 1 MG tablet Take 1 mg by mouth daily as needed.    carisoprodol (SOMA) 350 MG tablet Take 350 mg by mouth 4 (four) times daily as needed for Muscle spasms.    colchicine 0.6 mg tablet 0.6 mg once daily.     enoxaparin (LOVENOX) 100 mg/mL Syrg Inject 1 mL (100 mg total) into the skin every 12 (twelve) hours.    fluticasone-vilanterol (BREO ELLIPTA) 200-25 mcg/dose DsDv diskus inhaler Inhale 1 puff into the lungs once daily. Controller    gabapentin (NEURONTIN) 600 MG tablet Take 600 mg by mouth 2 (two) times daily. 1  Tablet Oral At bedtime    hydrocodone-acetaminophen 10-325mg (NORCO)  mg Tab Take 1 tablet by mouth 2 (two) times daily as needed.     isosorbide mononitrate (IMDUR) 30 MG 24 hr tablet Take 3 tablets (90 mg total) by mouth once daily. (Patient taking differently: Take 60 mg by mouth once daily. )    levETIRAcetam (KEPPRA) 500 MG Tab Take 1 tablet (500 mg total) by mouth 2 (two) times daily.    losartan (COZAAR) 50 MG tablet Take 1 tablet (50 mg total) by mouth once daily.    metoprolol tartrate (LOPRESSOR) 25 MG tablet Take 1 tablet (25 mg total) by mouth 2 (two) times daily.    nitroGLYCERIN (NITROSTAT) 0.4 MG SL tablet ONE TABLET UNDER TONGUE AS NEEDED FOR CHEST PAIN    pantoprazole (PROTONIX) 40 MG tablet Take 40 mg by mouth. 1 Tablet, Delayed Release (E.C.) Oral Every day    potassium chloride SA (K-DUR,KLOR-CON) 20 MEQ tablet TAKE 2 TABLETS BY MOUTH EVERY MORNING AND 1 TABLET BY MOUTH EVERY EVENING    predniSONE (DELTASONE) 10 MG tablet Take 1 tablet (10 mg total) by mouth once daily.    promethazine-codeine 6.25-10 mg/5 ml (PHENERGAN WITH CODEINE) 6.25-10 mg/5 mL syrup TK 5 ML PO  Q 6 H PRN    spironolactone (ALDACTONE) 25 MG tablet TAKE 1 TABLET BY MOUTH EVERY DAY    warfarin (COUMADIN) 7.5 MG tablet 1 tablet Monday  0.5 tablet Tuesday-Sunday    [DISCONTINUED] furosemide (LASIX) 40 MG tablet TAKE 2 TABLETS BY MOUTH TWICE DAILY     Antibiotics     Start     Stop Route Frequency Ordered    07/13/18 1145  ceFEPIme injection 2 g      -- IV Every 8 hours (non-standard times) 07/13/18 1031    07/13/18 1145  vancomycin in dextrose 5 % 1 gram/250 mL IVPB 1,000 mg  (Vancomycin IVPB with levels panel)      -- IV Every 12 hours (non-standard times) 07/13/18 1031        Antifungals     None        Antivirals     None           Immunization History   Administered Date(s) Administered    Influenza - Quadrivalent - PF 10/23/2014       Family History     Problem Relation (Age of Onset)    Cancer Maternal  Grandmother    Coronary artery disease Father, Sister, Sister    Heart disease Mother, Father, Sister    Hypertension Mother, Father, Sister    Kidney disease Sister    Stroke Sister    Vision loss Sister        Social History     Social History    Marital status: Other     Spouse name: N/A    Number of children: N/A    Years of education: N/A     Social History Main Topics    Smoking status: Never Smoker    Smokeless tobacco: Never Used    Alcohol use No    Drug use: No    Sexual activity: Not Asked     Other Topics Concern    None     Social History Narrative    None     Review of Systems   Unable to perform ROS: Intubated     Objective:     Vital Signs (Most Recent):  Temp: 98.4 °F (36.9 °C) (07/19/18 1500)  Pulse: 80 (07/19/18 1500)  Resp: 16 (07/19/18 1500)  BP: (!) 89/51 (07/19/18 1500)  SpO2: 97 % (07/19/18 1500) Vital Signs (24h Range):  Temp:  [98.3 °F (36.8 °C)-98.8 °F (37.1 °C)] 98.4 °F (36.9 °C)  Pulse:  [80-88] 80  Resp:  [16-30] 16  SpO2:  [87 %-100 %] 97 %  BP: ()/(50-67) 89/51     Weight: 109 kg (240 lb 4.8 oz)  Body mass index is 33.52 kg/m².    Estimated Creatinine Clearance: 87.4 mL/min (based on SCr of 1.2 mg/dL).    Physical Exam   Constitutional: He is oriented to person, place, and time. He appears well-developed and well-nourished. He is sedated and intubated.   HENT:   Head: Normocephalic and atraumatic.   Eyes: EOM are normal. Pupils are equal, round, and reactive to light.   Neck: Normal range of motion. Neck supple. No JVD (difficult to assess (patient on ventilator & flat in bed)) present.   Right CVC in place   Cardiovascular: Normal rate and regular rhythm.    IABP 1:1. IABP site clean/dry/intact. Feet slightly cool to touch, otherwise warm and well perfused.    Pulmonary/Chest: Effort normal. He is intubated. No respiratory distress.   Coarse breath sounds. Ventilated   Abdominal: Soft. He exhibits no distension. Bowel sounds are decreased. There is no tenderness.    Musculoskeletal: Normal range of motion. He exhibits no edema.   Neurological: He is alert and oriented to person, place, and time.   Skin: Skin is warm and dry.   Nursing note and vitals reviewed.      Significant Labs: All pertinent labs within the past 24 hours have been reviewed.    Significant Imaging: I have reviewed all pertinent imaging results/findings within the past 24 hours.

## 2018-07-19 NOTE — ASSESSMENT & PLAN NOTE
-VT storm 7/10/18. Polymorphic and monomorphic. Degenerated into VF. 12 ICD shocks  -Intubated, IABP placed emergently at bedside.   -Amio loaded x 2, gtt started per protocol.  Now on amio 200mg PO TID. Lidocaine gtt started at 1, increased to 2 when patient had more VT-->VF and AICD shocks. Off lidocaine, continue mexilitene TID  -EP consulted, appreciate their assistance. Pacing rate increased to 80, metoprolol IV added.  -Continue support with IABP 1:1   -K goal >4.5, Mg >2.5  - regimen per EP recs one patient is able to take PO   - Toprol 150 mg, mexiletine 200 BID, Amio 200 TID for 1 weeks, 200 BID for 4 weeks, 300 QD thereafter  - As of 7/17 now with repeat VF-->VT shocked 4 times. EP re-consulted. Amio 300 loading dose followed by 1mg/min and will continue mexil and metoprolol per their recs. No more VT overnight.  - At a later time, it may be considered to deactivate the LV lead of the patient's CRT-D, considering the patient's LVAD status and native RBBB. ICD interrogation reveals LICHA - will continue to follow along

## 2018-07-19 NOTE — ASSESSMENT & PLAN NOTE
· Continue diuresis per primary team - Net fluid balance +800 mL in last 24 hours.   · Slight worsening on morning CXR compared to prior day  · Continue abx regimen - ET aspirate culture + for MSSA staph   · Continue current respiratory therapies   · Vent settings: RR 16,  mL (6cc/kg), PEEP 6, FiO2 55%.   · Per nursing, pulse ox reading very labile, however oxygenation likely adequate on current settings based on ABG.   · SBT/sedation holiday timing to be determined by primary team. Cardiac insufficiencies likely to be limiting factor in patient's potential recovery.          Recent Labs  Lab 07/19/18  0759   PH 7.443   PCO2 55.7*   PO2 124*   HCO3 38.2*   POCSATURATED 99   BE 14

## 2018-07-19 NOTE — ASSESSMENT & PLAN NOTE
-Being covered with vanc/cefepime. Will continue for now.   -RIJ 9 days old. IABP, Irvin, and ET tube 8 days old.   -Cultures now with S. Aureus in sputum, now with +jesenia blood cultures on 7/17 (GPC's resembling staph)   - remove RIJ for infection control and replace in L IJ. Will wean IABP if able to remove.  - UA clear  -repeated sputum cultures, blood cultures, UA and sent fungal studies 7/17. WBCs down and afebrile overnight. Appreciate ID assistance.

## 2018-07-19 NOTE — PT/OT/SLP PROGRESS
Physical Therapy      Patient Name:  Yonathan Good Jr.   MRN:  [id number]    Patient not seen today secondary to intubated and sedated; per RN, pt not medically stable for ROM this date 2* VT with stimulation. Will follow-up as appropriate    Sabina Zavaleta, PT  7/19/2018

## 2018-07-19 NOTE — PROGRESS NOTES
[facility]  Pulmonology  Progress Note    Patient Name: Yonathan Good Jr.  MRN: [id number]  Admission Date: 7/7/2018  Hospital Length of Stay: 12 days  Code Status: Full Code  Attending Provider: Kimberly Lazo MD  Primary Care Provider: Edgar Gates MD   Principal Problem: Ventricular tachycardia, incessant    Subjective:     Interval History: No acute events overnight. Patient examined this morning with family at bedside. Remains intubated and on ventilator. On a propofol and fentanyl drips.     Objective:     Vital Signs (Most Recent):  Temp: 98.3 °F (36.8 °C) (07/19/18 0300)  Pulse: 80 (07/19/18 1300)  Resp: (!) 21 (07/19/18 1300)  BP: 98/61 (07/19/18 1300)  SpO2: 99 % (07/19/18 1300) Vital Signs (24h Range):  Temp:  [98.3 °F (36.8 °C)-98.8 °F (37.1 °C)] 98.3 °F (36.8 °C)  Pulse:  [80-88] 80  Resp:  [16-30] 21  SpO2:  [87 %-100 %] 99 %  BP: ()/(47-67) 98/61     Weight: 109 kg (240 lb 4.8 oz)  Body mass index is 33.52 kg/m².      Intake/Output Summary (Last 24 hours) at 07/19/18 1342  Last data filed at 07/19/18 1300   Gross per 24 hour   Intake           3814.3 ml   Output             2475 ml   Net           1339.3 ml       Physical Exam   Constitutional: He appears well-developed.   HENT:   Head: Normocephalic and atraumatic.   Mouth/Throat: Oropharynx is clear and moist.   Eyes: Pupils are equal, round, and reactive to light.   Cardiovascular: Normal rate.    IABP in place. Audible    Pulmonary/Chest:   Intubated and on ventilator.   Bilateral rhonchi present, slightly improved from prior day    Abdominal: Soft. Bowel sounds are normal.   IABP audible in abdomen    Musculoskeletal: He exhibits edema.   Neurological:   Sedation/analgesia on propofol and fentanyl drips    Skin: Skin is warm and dry.       Vents:  Vent Mode: A/C (07/19/18 1253)  Set Rate: 16 bmp (07/19/18 1253)  Vt Set: 450 mL (07/19/18 1253)  PEEP/CPAP: 6 cmH20 (07/19/18 1253)  Oxygen Concentration (%): 51 (07/19/18  1300)  Peak Airway Pressure: 37 cmH2O (07/19/18 1253)  Plateau Pressure: 24 cmH20 (07/19/18 1253)  Total Ve: 7.44 mL (07/19/18 1253)  F/VT Ratio<105 (RSBI): (!) 34.33 (07/19/18 1253)    Lines/Drains/Airways     Central Venous Catheter Line                 Percutaneous Central Line Insertion/Assessment - triple lumen  07/09/18 1500 right internal jugular 9 days          Drain                 NG/OG Tube 07/10/18 1557 Ada sump 16 Fr. Left nostril 8 days         Urethral Catheter 07/10/18 1646 Double-lumen;Latex 16 Fr. 8 days          Airway                 Airway - Non-Surgical 07/10/18 1523 Endotracheal Tube 8 days          Line                 IABP 07/10/18 1550 7.5 Fr. 40 mL 8 days          Peripheral Intravenous Line                 Midline Catheter Insertion/Assessment  - Single Lumen 07/17/18 1105 Right cephalic vein (lateral side of arm) 18g x 8cm 2 days         Peripheral IV - Single Lumen 07/17/18 1110 Right Forearm 2 days                Significant Labs:    CBC/Anemia Profile:    Recent Labs  Lab 07/18/18  0310 07/19/18  0330   WBC 14.76* 12.18   HGB 7.2* 7.1*   HCT 23.2* 23.6*    148*   MCV 94 94   RDW 15.3* 15.4*        Chemistries:    Recent Labs  Lab 07/17/18  1600  07/18/18  0310  07/19/18  0100 07/19/18  0330 07/19/18  0858     < > 148*  < > 145 144 146*   K 4.0  < > 3.7  < > 4.0 3.9 4.5     < > 103  < > 102 101 103   CO2 34*  < > 33*  < > 35* 35* 32*   BUN 59*  < > 53*  < > 53* 54* 56*   CREATININE 1.3  < > 1.0  < > 1.2 1.1 1.2   CALCIUM 8.8  < > 9.2  < > 9.3 9.2 9.8   ALBUMIN  --   --  2.0*  --   --  1.9*  --    PROT  --   --  6.1  --   --  6.2  --    BILITOT  --   --  0.7  --   --  0.5  --    ALKPHOS  --   --  65  --   --  56  --    ALT  --   --  14  --   --  11  --    AST  --   --  17  --   --  15  --    MG 2.5  --  2.5  --   --  2.6  --    PHOS 3.6  --  3.8  --   --   --   --    < > = values in this interval not displayed.    Recent Lab Results       07/19/18  0702  07/19/18  0858 07/19/18  0857 07/19/18  0759 07/19/18  0338      Immature Granulocytes          Immature Grans (Abs)          Albumin          Alkaline Phosphatase          Allens Test    Pass      ALT          Anion Gap  11        AST          Baso #          Basophil%          Total Bilirubin          Blood Culture, Routine          Site    LR      BUN, Bld  56(H)        Calcium  9.8        Chloride  103        CO2  32(H)        Creatinine  1.2        DelSys    Adult Vent      Differential Method          eGFR if   >60.0        eGFR if non   >60.0  Comment:  Calculation used to obtain the estimated glomerular filtration  rate (eGFR) is the CKD-EPI equation.           Eos #          Eosinophil%          FiO2    60      Glucose  134(H)        Gran # (ANC)          Gran%          Hematocrit          Hemoglobin          Coumadin Monitoring INR          Lymph #          Lymph%          Magnesium          MCH          MCHC          MCV          Mode    AC/PRVC      Mono #          Mono%          MPV          nRBC          PEEP    6      Platelets          POC BE    14      POC HCO3    38.2(H)      POC PCO2    55.7(H)      POC PH    7.443      POC PO2    124(H)      POC SATURATED O2    99      POC TCO2    40(H)      POCT Glucose 226(H)  144(H)  145(H)     Potassium  4.5        Total Protein          Protime          Rate    16      RBC          RDW          Sample    ARTERIAL      Sodium  146(H)        Vt    450      WBC                      07/19/18  0330 07/19/18  0118 07/19/18  0100 07/18/18  2017 07/18/18  1803      Immature Granulocytes 3.3(H)         Immature Grans (Abs) 0.40  Comment:  Mild elevation in immature granulocytes is non specific and   can be seen in a variety of conditions including stress response,   acute inflammation, trauma and pregnancy. Correlation with other   laboratory and clinical findings is essential.  (H)         Albumin 1.9(L)         Alkaline Phosphatase  56         Allens Test          ALT 11         Anion Gap 8  8       AST 15         Baso # 0.01         Basophil% 0.1         Total Bilirubin 0.5  Comment:  For infants and newborns, interpretation of results should be based  on gestational age, weight and in agreement with clinical  observations.  Premature Infant recommended reference ranges:  Up to 24 hours.............<8.0 mg/dL  Up to 48 hours............<12.0 mg/dL  3-5 days..................<15.0 mg/dL  6-29 days.................<15.0 mg/dL           Blood Culture, Routine     No Growth to date[P]          No Growth to date[P]     Site          BUN, Bld 54(H)  53(H)       Calcium 9.2  9.3       Chloride 101  102       CO2 35(H)  35(H)       Creatinine 1.1  1.2       Woodall Nicholson Groups          Differential Method Automated         eGFR if  >60.0  >60.0       eGFR if non  >60.0  Comment:  Calculation used to obtain the estimated glomerular filtration  rate (eGFR) is the CKD-EPI equation.     >60.0  Comment:  Calculation used to obtain the estimated glomerular filtration  rate (eGFR) is the CKD-EPI equation.          Eos # 0.1         Eosinophil% 1.0         FiO2          Glucose 140(H)  165(H)       Gran # (ANC) 10.3(H)         Gran% 84.8(H)         Hematocrit 23.6(L)         Hemoglobin 7.1(L)         Coumadin Monitoring INR 1.0  Comment:  Coumadin Therapy:  2.0 - 3.0 for INR for all indicators except mechanical heart valves  and antiphospholipid syndromes which should use 2.5 - 3.5.           Lymph # 0.6(L)         Lymph% 4.6(L)         Magnesium 2.6         MCH 28.4         MCHC 30.1(L)         MCV 94         Mode          Mono # 0.8         Mono% 6.2         MPV 11.6         nRBC 0         PEEP          Platelets 148(L)         POC BE          POC HCO3          POC PCO2          POC PH          POC PO2          POC SATURATED O2          POC TCO2          POCT Glucose  160(H)  173(H)      Potassium 3.9  4.0       Total Protein 6.2          Protime 10.8         Rate          RBC 2.50(L)         RDW 15.4(H)         Sample          Sodium 144  145       Vt          WBC 12.18                     07/18/18  1545 07/18/18  1545      Immature Granulocytes       Immature Grans (Abs)       Albumin       Alkaline Phosphatase       Allens Test       ALT       Anion Gap  9     AST       Baso #       Basophil%       Total Bilirubin       Blood Culture, Routine       Site       BUN, Bld  51(H)     Calcium  9.3     Chloride  103     CO2  34(H)     Creatinine  1.1     DelSys       Differential Method       eGFR if African American  >60.0     eGFR if non   >60.0  Comment:  Calculation used to obtain the estimated glomerular filtration  rate (eGFR) is the CKD-EPI equation.        Eos #       Eosinophil%       FiO2       Glucose  184(H)     Gran # (ANC)       Gran%       Hematocrit       Hemoglobin       Coumadin Monitoring INR       Lymph #       Lymph%       Magnesium       MCH       MCHC       MCV       Mode       Mono #       Mono%       MPV       nRBC       PEEP       Platelets       POC BE       POC HCO3       POC PCO2       POC PH       POC PO2       POC SATURATED O2       POC TCO2       POCT Glucose 204(H)      Potassium  4.1     Total Protein       Protime       Rate       RBC       RDW       Sample       Sodium  146(H)     Vt       WBC           All pertinent labs within the past 24 hours have been reviewed.    Significant Imaging:  I have reviewed all pertinent imaging results/findings within the past 24 hours.  I have reviewed and interpreted all pertinent imaging results/findings within the past 24 hours.    Assessment/Plan:     Respiratory failure requiring intubation    · Continue diuresis per primary team - Net fluid balance +800 mL in last 24 hours.   · Slight worsening on morning CXR compared to prior day  · Continue abx regimen - ET aspirate culture + for MSSA staph   · Continue current respiratory therapies   · Vent settings: RR 16, TV  450 mL (6cc/kg), PEEP 6, FiO2 55%.   · Per nursing, pulse ox reading very labile, however oxygenation likely adequate on current settings based on ABG.   · SBT/sedation holiday timing to be determined by primary team. Cardiac insufficiencies likely to be limiting factor in patient's potential recovery.          Recent Labs  Lab 07/19/18  0759   PH 7.443   PCO2 55.7*   PO2 124*   HCO3 38.2*   POCSATURATED 99   BE 14           Sarcoidosis of lung    · Sarcoid appears to have minimal contribution to current condition.   · Continue steroids as prescribed and taper once condition improves.          Plan discussed with attending Dr. Foss, further recommendations as per attending addendum. Please feel free to call with any questions or concerns.    Ruben Bermudez MD  Resident Physician, PGY1       Ruben Bermudez MD  Pulmonology  [facility]

## 2018-07-19 NOTE — ASSESSMENT & PLAN NOTE
-Emergently intubated 7/10/18 for impending respiratory failure/need for IABP and inability to lay fat  -History of pulmonary sarcoidosis  -Methylpred given 7/10/18, hydrocortisone 100 mg q8 started 7/11/18. Transitioned to PO prednisone 7/16.   -Pulmonary consult for assistance with sarcoid & vent management.  -CXR daily  -Spoke with pulm at bedside today, CXR improved in lung parenchyma but more pulmonary congestion. S. Aureus in sputum and now in blood, appreciate ID recs.    - concern for extubating with patient unable to sit up (due to IABP). Coughs and was unable to lie flat prior to intubation, concerned he will not be able to protect his airway. Will attempt to wean IABP prior to extubating while working on diuresing patient since unable to extubate today.

## 2018-07-19 NOTE — PT/OT/SLP PROGRESS
Occupational Therapy      Patient Name:  Yonathan Good Jr.   MRN:  [id number]    Patient not seen today secondary to intubated and sedated; per RN, pt not medically stable for ROM this date d/t VT with stimulation  . Will follow-up 7/21/18.    MELISSA Dhaliwal  7/19/2018

## 2018-07-19 NOTE — PLAN OF CARE
Problem: Patient Care Overview  Goal: Plan of Care Review  Outcome: Ongoing (interventions implemented as appropriate)  Pt remains on ventilator settings: A/C 16, FiO2 60%, PEEP 6; sats %. VSS. Unable to follow commands. Free from fall/injury overnight. Repositioned q2h to prevent skin breakdown. Gtts include: Propofol, Fentanyl, Levo, and Amio. IABP 1:1 ; MAP >60 maintained. Tube feeds running at 40cc/hr; no issues overnight. Irvin in place; UOP adequate. Accuchecks monitored; no insulin coverage required. Labs monitored. Daughter remains at bedside. Plan is to wean ventilator and gtt support as appropriate. Plan of care reviewed with pt and daughter. All questions and concerns addressed. See flowsheet for full assessment details.

## 2018-07-19 NOTE — SUBJECTIVE & OBJECTIVE
Past Medical History:   Diagnosis Date    AICD (automatic cardioverter/defibrillator) present     Arthritis     Atrial fibrillation     CHF (congestive heart failure)     Coronary artery disease     History of stroke 7/13/2018    2005, 2006, no deficits per wife.    Hypertension     MI (myocardial infarction)     Sarcoid     Seizures     Stroke        Past Surgical History:   Procedure Laterality Date    CARDIAC CATHETERIZATION      CARDIAC DEFIBRILLATOR PLACEMENT  7-12    CARDIAC DEFIBRILLATOR PLACEMENT      CARDIAC DEFIBRILLATOR PLACEMENT      COLONOSCOPY N/A 7/2/2018    Procedure: COLONOSCOPY;  Surgeon: THELMA Kay MD;  Location: [facility] ENDO ([address] [place] [address]);  Service: Endoscopy;  Laterality: N/A;    CORONARY STENT PLACEMENT  07/2011    ESOPHAGOGASTRODUODENOSCOPY      FRACTURE SURGERY      LEFT HEART CATHETERIZATION N/A 6/25/2018    Procedure: Left heart cath;  Surgeon: Jordi Liu MD;  Location: [facility] CATH LAB;  Service: Cardiology;  Laterality: N/A;       Review of patient's allergies indicates:  No Known Allergies    Medications:  Prescriptions Prior to Admission   Medication Sig    albuterol (PROVENTIL) 2.5 mg /3 mL (0.083 %) nebulizer solution Take 2.5 mg by nebulization every 6 (six) hours as needed.    albuterol (VENTOLIN HFA) 90 mcg/actuation inhaler Inhale 2 puffs into the lungs every 4 (four) hours as needed for Wheezing.    alprazolam (XANAX) 2 MG tablet Take 2 mg by mouth 2 (two) times daily as needed.     amiodarone (PACERONE) 400 MG tablet Take 1 tablet (400 mg total) by mouth 2 (two) times daily until 7/9/2018. Then take 1 tablet (400 mg total) by mouth 1 (one) time daily thereafter.    aspirin (ECOTRIN) 81 MG EC tablet Take 81 mg by mouth. 1 Tablet, Delayed Release (E.C.) Oral Every day    atorvastatin (LIPITOR) 40 MG tablet Take 1 tablet (40 mg total) by mouth once daily.    bumetanide (BUMEX) 1 MG tablet Take 1 mg by mouth daily as needed.    carisoprodol (SOMA) 350  MG tablet Take 350 mg by mouth 4 (four) times daily as needed for Muscle spasms.    colchicine 0.6 mg tablet 0.6 mg once daily.     enoxaparin (LOVENOX) 100 mg/mL Syrg Inject 1 mL (100 mg total) into the skin every 12 (twelve) hours.    fluticasone-vilanterol (BREO ELLIPTA) 200-25 mcg/dose DsDv diskus inhaler Inhale 1 puff into the lungs once daily. Controller    gabapentin (NEURONTIN) 600 MG tablet Take 600 mg by mouth 2 (two) times daily. 1 Tablet Oral At bedtime    hydrocodone-acetaminophen 10-325mg (NORCO)  mg Tab Take 1 tablet by mouth 2 (two) times daily as needed.     isosorbide mononitrate (IMDUR) 30 MG 24 hr tablet Take 3 tablets (90 mg total) by mouth once daily. (Patient taking differently: Take 60 mg by mouth once daily. )    levETIRAcetam (KEPPRA) 500 MG Tab Take 1 tablet (500 mg total) by mouth 2 (two) times daily.    losartan (COZAAR) 50 MG tablet Take 1 tablet (50 mg total) by mouth once daily.    metoprolol tartrate (LOPRESSOR) 25 MG tablet Take 1 tablet (25 mg total) by mouth 2 (two) times daily.    nitroGLYCERIN (NITROSTAT) 0.4 MG SL tablet ONE TABLET UNDER TONGUE AS NEEDED FOR CHEST PAIN    pantoprazole (PROTONIX) 40 MG tablet Take 40 mg by mouth. 1 Tablet, Delayed Release (E.C.) Oral Every day    potassium chloride SA (K-DUR,KLOR-CON) 20 MEQ tablet TAKE 2 TABLETS BY MOUTH EVERY MORNING AND 1 TABLET BY MOUTH EVERY EVENING    predniSONE (DELTASONE) 10 MG tablet Take 1 tablet (10 mg total) by mouth once daily.    promethazine-codeine 6.25-10 mg/5 ml (PHENERGAN WITH CODEINE) 6.25-10 mg/5 mL syrup TK 5 ML PO  Q 6 H PRN    spironolactone (ALDACTONE) 25 MG tablet TAKE 1 TABLET BY MOUTH EVERY DAY    warfarin (COUMADIN) 7.5 MG tablet 1 tablet Monday  0.5 tablet Tuesday-Sunday    [DISCONTINUED] furosemide (LASIX) 40 MG tablet TAKE 2 TABLETS BY MOUTH TWICE DAILY     Antibiotics     Start     Stop Route Frequency Ordered    07/13/18 1145  ceFEPIme injection 2 g      -- IV Every 8  hours (non-standard times) 07/13/18 1031    07/13/18 1145  vancomycin in dextrose 5 % 1 gram/250 mL IVPB 1,000 mg  (Vancomycin IVPB with levels panel)      -- IV Every 12 hours (non-standard times) 07/13/18 1031        Antifungals     None        Antivirals     None           Immunization History   Administered Date(s) Administered    Influenza - Quadrivalent - PF 10/23/2014       Family History     Problem Relation (Age of Onset)    Cancer Maternal Grandmother    Coronary artery disease Father, Sister, Sister    Heart disease Mother, Father, Sister    Hypertension Mother, Father, Sister    Kidney disease Sister    Stroke Sister    Vision loss Sister        Social History     Social History    Marital status: Other     Spouse name: N/A    Number of children: N/A    Years of education: N/A     Social History Main Topics    Smoking status: Never Smoker    Smokeless tobacco: Never Used    Alcohol use No    Drug use: No    Sexual activity: Not Asked     Other Topics Concern    None     Social History Narrative    None     Review of Systems   Unable to perform ROS: Intubated     Objective:     Vital Signs (Most Recent):  Temp: 98.4 °F (36.9 °C) (07/19/18 1500)  Pulse: 80 (07/19/18 1500)  Resp: 16 (07/19/18 1500)  BP: (!) 89/51 (07/19/18 1500)  SpO2: 97 % (07/19/18 1500) Vital Signs (24h Range):  Temp:  [98.3 °F (36.8 °C)-98.8 °F (37.1 °C)] 98.4 °F (36.9 °C)  Pulse:  [80-88] 80  Resp:  [16-30] 16  SpO2:  [87 %-100 %] 97 %  BP: ()/(50-67) 89/51     Weight: 109 kg (240 lb 4.8 oz)  Body mass index is 33.52 kg/m².    Estimated Creatinine Clearance: 87.4 mL/min (based on SCr of 1.2 mg/dL).    Physical Exam   Constitutional: He is oriented to person, place, and time. He appears well-developed and well-nourished. He is sedated and intubated.   HENT:   Head: Normocephalic and atraumatic.   Eyes: EOM are normal. Pupils are equal, round, and reactive to light.   Neck: Normal range of motion. Neck supple. No JVD  (difficult to assess (patient on ventilator & flat in bed)) present.   Right CVC in place   Cardiovascular: Normal rate and regular rhythm.    IABP 1:1. IABP site clean/dry/intact. Feet slightly cool to touch, otherwise warm and well perfused.    Pulmonary/Chest: Effort normal. He is intubated. No respiratory distress.   Coarse breath sounds. Ventilated   Abdominal: Soft. He exhibits no distension. Bowel sounds are decreased. There is no tenderness.   Musculoskeletal: Normal range of motion. He exhibits no edema.   Neurological: He is alert and oriented to person, place, and time.   Skin: Skin is warm and dry.   Nursing note and vitals reviewed.      Significant Labs: All pertinent labs within the past 24 hours have been reviewed.    Significant Imaging: I have reviewed all pertinent imaging results/findings within the past 24 hours.

## 2018-07-19 NOTE — ASSESSMENT & PLAN NOTE
- INR subtherapeutic  - Coumadin on hold, will hold off on anticoagulation in setting of recent GI bleed.  - Protonix increased to 40 mg BID (changed to IV)  - Had screening colonoscopy with hot snare polypectomy during last admission; most likely culprit post polypectomy bleeding  - H & H stable, normal BMs since admission.

## 2018-07-19 NOTE — SUBJECTIVE & OBJECTIVE
Interval History: No acute events overnight. Patient examined this morning with family at bedside. Remains intubated and on ventilator. On a propofol and fentanyl drips.     Objective:     Vital Signs (Most Recent):  Temp: 98.3 °F (36.8 °C) (07/19/18 0300)  Pulse: 80 (07/19/18 1300)  Resp: (!) 21 (07/19/18 1300)  BP: 98/61 (07/19/18 1300)  SpO2: 99 % (07/19/18 1300) Vital Signs (24h Range):  Temp:  [98.3 °F (36.8 °C)-98.8 °F (37.1 °C)] 98.3 °F (36.8 °C)  Pulse:  [80-88] 80  Resp:  [16-30] 21  SpO2:  [87 %-100 %] 99 %  BP: ()/(47-67) 98/61     Weight: 109 kg (240 lb 4.8 oz)  Body mass index is 33.52 kg/m².      Intake/Output Summary (Last 24 hours) at 07/19/18 1342  Last data filed at 07/19/18 1300   Gross per 24 hour   Intake           3814.3 ml   Output             2475 ml   Net           1339.3 ml       Physical Exam   Constitutional: He appears well-developed.   HENT:   Head: Normocephalic and atraumatic.   Mouth/Throat: Oropharynx is clear and moist.   Eyes: Pupils are equal, round, and reactive to light.   Cardiovascular: Normal rate.    IABP in place. Audible    Pulmonary/Chest:   Intubated and on ventilator.   Bilateral rhonchi present, slightly improved from prior day    Abdominal: Soft. Bowel sounds are normal.   IABP audible in abdomen    Musculoskeletal: He exhibits edema.   Neurological:   Sedation/analgesia on propofol and fentanyl drips    Skin: Skin is warm and dry.       Vents:  Vent Mode: A/C (07/19/18 1253)  Set Rate: 16 bmp (07/19/18 1253)  Vt Set: 450 mL (07/19/18 1253)  PEEP/CPAP: 6 cmH20 (07/19/18 1253)  Oxygen Concentration (%): 51 (07/19/18 1300)  Peak Airway Pressure: 37 cmH2O (07/19/18 1253)  Plateau Pressure: 24 cmH20 (07/19/18 1253)  Total Ve: 7.44 mL (07/19/18 1253)  F/VT Ratio<105 (RSBI): (!) 34.33 (07/19/18 1253)    Lines/Drains/Airways     Central Venous Catheter Line                 Percutaneous Central Line Insertion/Assessment - triple lumen  07/09/18 1500 right internal  jugular 9 days          Drain                 NG/OG Tube 07/10/18 1557 [place] sump 16 Fr. Left nostril 8 days         Urethral Catheter 07/10/18 1646 Double-lumen;Latex 16 Fr. 8 days          Airway                 Airway - Non-Surgical 07/10/18 1523 Endotracheal Tube 8 days          Line                 IABP 07/10/18 1550 7.5 Fr. 40 mL 8 days          Peripheral Intravenous Line                 Midline Catheter Insertion/Assessment  - Single Lumen 07/17/18 1105 Right cephalic vein (lateral side of arm) 18g x 8cm 2 days         Peripheral IV - Single Lumen 07/17/18 1110 Right Forearm 2 days                Significant Labs:    CBC/Anemia Profile:    Recent Labs  Lab 07/18/18  0310 07/19/18  0330   WBC 14.76* 12.18   HGB 7.2* 7.1*   HCT 23.2* 23.6*    148*   MCV 94 94   RDW 15.3* 15.4*        Chemistries:    Recent Labs  Lab 07/17/18  1600  07/18/18  0310  07/19/18  0100 07/19/18  0330 07/19/18  0858     < > 148*  < > 145 144 146*   K 4.0  < > 3.7  < > 4.0 3.9 4.5     < > 103  < > 102 101 103   CO2 34*  < > 33*  < > 35* 35* 32*   BUN 59*  < > 53*  < > 53* 54* 56*   CREATININE 1.3  < > 1.0  < > 1.2 1.1 1.2   CALCIUM 8.8  < > 9.2  < > 9.3 9.2 9.8   ALBUMIN  --   --  2.0*  --   --  1.9*  --    PROT  --   --  6.1  --   --  6.2  --    BILITOT  --   --  0.7  --   --  0.5  --    ALKPHOS  --   --  65  --   --  56  --    ALT  --   --  14  --   --  11  --    AST  --   --  17  --   --  15  --    MG 2.5  --  2.5  --   --  2.6  --    PHOS 3.6  --  3.8  --   --   --   --    < > = values in this interval not displayed.    Recent Lab Results       07/19/18  1223 07/19/18  0858 07/19/18  0857 07/19/18  0759 07/19/18  0338      Immature Granulocytes          Immature Grans (Abs)          Albumin          Alkaline Phosphatase          Allens Test    Pass      ALT          Anion Gap  11        AST          Baso #          Basophil%          Total Bilirubin          Blood Culture, Routine          Site    LR      BUN,  Bld  56(H)        Calcium  9.8        Chloride  103        CO2  32(H)        Creatinine  1.2        DelSys    Adult Vent      Differential Method          eGFR if   >60.0        eGFR if non   >60.0  Comment:  Calculation used to obtain the estimated glomerular filtration  rate (eGFR) is the CKD-EPI equation.           Eos #          Eosinophil%          FiO2    60      Glucose  134(H)        Gran # (ANC)          Gran%          Hematocrit          Hemoglobin          Coumadin Monitoring INR          Lymph #          Lymph%          Magnesium          MCH          MCHC          MCV          Mode    AC/PRVC      Mono #          Mono%          MPV          nRBC          PEEP    6      Platelets          POC BE    14      POC HCO3    38.2(H)      POC PCO2    55.7(H)      POC PH    7.443      POC PO2    124(H)      POC SATURATED O2    99      POC TCO2    40(H)      POCT Glucose 226(H)  144(H)  145(H)     Potassium  4.5        Total Protein          Protime          Rate    16      RBC          RDW          Sample    ARTERIAL      Sodium  146(H)        Vt    450      WBC                      07/19/18  0330 07/19/18  0118 07/19/18  0100 07/18/18  2017 07/18/18  1803      Immature Granulocytes 3.3(H)         Immature Grans (Abs) 0.40  Comment:  Mild elevation in immature granulocytes is non specific and   can be seen in a variety of conditions including stress response,   acute inflammation, trauma and pregnancy. Correlation with other   laboratory and clinical findings is essential.  (H)         Albumin 1.9(L)         Alkaline Phosphatase 56         Allens Test          ALT 11         Anion Gap 8  8       AST 15         Baso # 0.01         Basophil% 0.1         Total Bilirubin 0.5  Comment:  For infants and newborns, interpretation of results should be based  on gestational age, weight and in agreement with clinical  observations.  Premature Infant recommended reference ranges:  Up to 24  hours.............<8.0 mg/dL  Up to 48 hours............<12.0 mg/dL  3-5 days..................<15.0 mg/dL  6-29 days.................<15.0 mg/dL           Blood Culture, Routine     No Growth to date[P]          No Growth to date[P]     Site          BUN, Bld 54(H)  53(H)       Calcium 9.2  9.3       Chloride 101  102       CO2 35(H)  35(H)       Creatinine 1.1  1.2       Deldooyoos          Differential Method Automated         eGFR if  >60.0  >60.0       eGFR if non  >60.0  Comment:  Calculation used to obtain the estimated glomerular filtration  rate (eGFR) is the CKD-EPI equation.     >60.0  Comment:  Calculation used to obtain the estimated glomerular filtration  rate (eGFR) is the CKD-EPI equation.          Eos # 0.1         Eosinophil% 1.0         FiO2          Glucose 140(H)  165(H)       Gran # (ANC) 10.3(H)         Gran% 84.8(H)         Hematocrit 23.6(L)         Hemoglobin 7.1(L)         Coumadin Monitoring INR 1.0  Comment:  Coumadin Therapy:  2.0 - 3.0 for INR for all indicators except mechanical heart valves  and antiphospholipid syndromes which should use 2.5 - 3.5.           Lymph # 0.6(L)         Lymph% 4.6(L)         Magnesium 2.6         MCH 28.4         MCHC 30.1(L)         MCV 94         Mode          Mono # 0.8         Mono% 6.2         MPV 11.6         nRBC 0         PEEP          Platelets 148(L)         POC BE          POC HCO3          POC PCO2          POC PH          POC PO2          POC SATURATED O2          POC TCO2          POCT Glucose  160(H)  173(H)      Potassium 3.9  4.0       Total Protein 6.2         Protime 10.8         Rate          RBC 2.50(L)         RDW 15.4(H)         Sample          Sodium 144  145       Vt          WBC 12.18                     07/18/18  1545 07/18/18  1545      Immature Granulocytes       Immature Grans (Abs)       Albumin       Alkaline Phosphatase       Allens Test       ALT       Anion Gap  9     AST       Baso #        Basophil%       Total Bilirubin       Blood Culture, Routine       Site       BUN, Bld  51(H)     Calcium  9.3     Chloride  103     CO2  34(H)     Creatinine  1.1     DelSys       Differential Method       eGFR if African American  >60.0     eGFR if non   >60.0  Comment:  Calculation used to obtain the estimated glomerular filtration  rate (eGFR) is the CKD-EPI equation.        Eos #       Eosinophil%       FiO2       Glucose  184(H)     Gran # (ANC)       Gran%       Hematocrit       Hemoglobin       Coumadin Monitoring INR       Lymph #       Lymph%       Magnesium       MCH       MCHC       MCV       Mode       Mono #       Mono%       MPV       nRBC       PEEP       Platelets       POC BE       POC HCO3       POC PCO2       POC PH       POC PO2       POC SATURATED O2       POC TCO2       POCT Glucose 204(H)      Potassium  4.1     Total Protein       Protime       Rate       RBC       RDW       Sample       Sodium  146(H)     Vt       WBC           All pertinent labs within the past 24 hours have been reviewed.    Significant Imaging:  I have reviewed all pertinent imaging results/findings within the past 24 hours.  I have reviewed and interpreted all pertinent imaging results/findings within the past 24 hours.

## 2018-07-19 NOTE — ASSESSMENT & PLAN NOTE
57 year old male, ID consulted for possible VAP in setting of leukocytosis and fever.     - Currently on vancomycin and cefepime  - Leukocytosis continues trending down   - afebrile  - Prednisone 40   - ?cardiogenic pulmonary edema vs PNA  - 7/13 sputum culture grew S. Aureus - covered by current abx regimen  - 7/17 blood culture grew coag negative staph in 1 bottle, likely contaminant - covered by current abx regime   - Aspergillus negative  - Urine Histo and Fungitel pending

## 2018-07-19 NOTE — ASSESSMENT & PLAN NOTE
- last BM Monday 7/9.  - KUB repeated, no bowel distension or excessive stool noted. +jesenia bowel sounds today.   - Currently on Docusate senna

## 2018-07-19 NOTE — PROGRESS NOTES
Dr. Lazo and HF team at bedside, updated on VS, assessment, labs, UO, CVP. Daughter at bedside, updated on plan of care by treatment team. New orders received and implemented.

## 2018-07-19 NOTE — ASSESSMENT & PLAN NOTE
-IABP placed 7/10/2018  -Daily CXR  -will exchange lines today, check svo2 this afternoon and consider decreasing to 1:2 tomorrow. Need to discuss with family plan when IABP removed/patient extubated about not replacing support.

## 2018-07-19 NOTE — SUBJECTIVE & OBJECTIVE
Interval History: Stable from overnight. Now with positive blood cultures. ID following. Will hold off on extubation in light of rising CVP and congestion on CXR. Central line not drawing back and line one week old, will dc RIJ and place new L IJ.     Continuous Infusions:   amiodarone in dextrose 5% 0.5 mg/min (07/19/18 1100)    fentanyl 15 mL/hr at 07/19/18 1100    furosemide (LASIX) 2 mg/mL infusion (non-titrating) 10 mg/hr (07/19/18 1100)    norepinephrine bitartrate-D5W 0.02 mcg/kg/min (07/19/18 1100)    propofol 50.05 mcg/kg/min (07/19/18 1100)     Scheduled Meds:   albuterol sulfate  2.5 mg Nebulization Q4H    aspirin  81 mg Oral Daily    atorvastatin  40 mg Oral Daily    budesonide  0.5 mg Nebulization Q12H    ceFEPime (MAXIPIME) IVPB  2 g Intravenous Q8H    chlorhexidine  15 mL Mouth/Throat BID    chlorhexidine  15 mL Mouth/Throat BID    docusate  100 mg Oral Daily    fluticasone-vilanterol  1 puff Inhalation Daily    gabapentin  600 mg Oral BID    heparin (porcine)  5,000 Units Subcutaneous Q8H    levETIRAcetam  500 mg Oral BID    metoprolol  2.5 mg Intravenous Q4H    mexiletine  150 mg Oral Q8H    pantoprozole (PROTONIX) 40 mg/100 mL D5W IVPB  40 mg Intravenous BID    predniSONE  40 mg Oral Daily    sennosides 8.8 mg/5 ml  5 mL Oral QHS    sodium chloride 0.9%  3 mL Intravenous Q8H    vancomycin (VANCOCIN) IVPB  1,000 mg Intravenous Q12H     PRN Meds:sodium chloride, ALPRAZolam, benzonatate, calcium gluconate IVPB, calcium gluconate IVPB, calcium gluconate IVPB, carisoprodol, dextrose 50%, glucagon (human recombinant), HYDROcodone-acetaminophen, insulin aspart U-100, magnesium sulfate IVPB, magnesium sulfate IVPB, nitroGLYCERIN, potassium chloride in water **AND** potassium chloride in water **AND** potassium chloride in water, sodium phosphate IVPB, sodium phosphate IVPB, sodium phosphate IVPB    Review of patient's allergies indicates:  No Known Allergies  Objective:     Vital  Signs (Most Recent):  Temp: 98.3 °F (36.8 °C) (07/19/18 0300)  Pulse: 80 (07/19/18 1104)  Resp: 16 (07/19/18 1104)  BP: (!) 94/51 (07/19/18 1100)  SpO2: 98 % (07/19/18 1104) Vital Signs (24h Range):  Temp:  [98.3 °F (36.8 °C)-98.8 °F (37.1 °C)] 98.3 °F (36.8 °C)  Pulse:  [80] 80  Resp:  [] 16  SpO2:  [87 %-100 %] 98 %  BP: ()/(47-67) 94/51     Patient Vitals for the past 72 hrs (Last 3 readings):   Weight   07/18/18 0455 109 kg (240 lb 4.8 oz)   07/16/18 2308 109.9 kg (242 lb 4.6 oz)     Body mass index is 33.52 kg/m².      Intake/Output Summary (Last 24 hours) at 07/19/18 1109  Last data filed at 07/19/18 1100   Gross per 24 hour   Intake             3704 ml   Output             2345 ml   Net             1359 ml     Hemodynamic Parameters:    Telemetry: paced at 80 bpm    Physical Exam   Constitutional: He appears well-developed and well-nourished. He is intubated.   Daughter at bedside   HENT:   Head: Normocephalic and atraumatic.   Neck: Normal range of motion. Neck supple. JVD: difficult to assess on ventialtor.   Right TLC   Cardiovascular:   Warm and well perfused in upper and lower extremities    Pulmonary/Chest: He is intubated.   Coarse breath sounds, ventilated.    Abdominal: Soft. Bowel sounds are normal. He exhibits no distension. There is no tenderness.   Musculoskeletal: He exhibits no edema.   Neurological:   intubated and sedated this AM s/p multiple ICD discharges   Skin: Skin is warm and dry.   Nursing note and vitals reviewed.    Significant Labs:  CBC:    Recent Labs  Lab 07/17/18  0714 07/18/18  0310 07/19/18  0330   WBC 20.95* 14.76* 12.18   RBC 2.95* 2.47* 2.50*   HGB 8.7* 7.2* 7.1*   HCT 28.5* 23.2* 23.6*    151 148*   MCV 97 94 94   MCH 29.5 29.1 28.4   MCHC 30.5* 31.0* 30.1*     BNP:  No results for input(s): BNP in the last 168 hours.    Invalid input(s): BNPTRIAGELBLO  CMP:    Recent Labs  Lab 07/17/18  0714  07/18/18  0310  07/19/18  0100 07/19/18  0330 07/19/18  0858    *  < > 132*  < > 165* 140* 134*   CALCIUM 9.4  < > 9.2  < > 9.3 9.2 9.8   ALBUMIN 2.4*  --  2.0*  --   --  1.9*  --    PROT 6.7  --  6.1  --   --  6.2  --    *  < > 148*  < > 145 144 146*   K 4.4  < > 3.7  < > 4.0 3.9 4.5   CO2 33*  < > 33*  < > 35* 35* 32*     < > 103  < > 102 101 103   BUN 63*  < > 53*  < > 53* 54* 56*   CREATININE 1.2  < > 1.0  < > 1.2 1.1 1.2   ALKPHOS 62  --  65  --   --  56  --    ALT 17  --  14  --   --  11  --    AST 19  --  17  --   --  15  --    BILITOT 0.7  --  0.7  --   --  0.5  --    < > = values in this interval not displayed.   Coagulation:     Recent Labs  Lab 07/17/18  0714 07/18/18  0310 07/19/18  0330   INR 1.0 1.1 1.0   APTT <21.0  --   --      LDH:  No results for input(s): LDH in the last 72 hours.  Microbiology:  Microbiology Results (last 7 days)     Procedure Component Value Units Date/Time    Fungus culture [[id number]] Collected:  07/17/18 1154    Order Status:  Completed Specimen:  Respiratory from Sputum Updated:  07/19/18 1054     Fungus (Mycology) Culture Culture in progress    Blood culture [[id number]] Collected:  07/17/18 0950    Order Status:  Completed Specimen:  Blood from Peripheral, Antecubital, Right Updated:  07/19/18 0841     Blood Culture, Routine Gram stain aer bottle: Gram positive cocci in clusters resembling Staph     Blood Culture, Routine Results called to and read back by:Javon Hatfield RN 07/18/2018  17:50    Blood culture [[id number]] Collected:  07/18/18 1803    Order Status:  Completed Specimen:  Blood from Peripheral, Forearm, Right Updated:  07/19/18 0345     Blood Culture, Routine No Growth to date    Blood culture [[id number]] Collected:  07/18/18 1803    Order Status:  Completed Specimen:  Blood from Peripheral, Antecubital, Left Updated:  07/19/18 0345     Blood Culture, Routine No Growth to date    Blood culture [[id number]] Collected:  07/13/18 1309    Order Status:  Completed Specimen:  Blood from Peripheral, Hand, Left  Updated:  07/18/18 1812     Blood Culture, Routine No growth after 5 days.    Blood culture [[id number]] Collected:  07/13/18 1350    Order Status:  Completed Specimen:  Blood from Peripheral, Hand, Right Updated:  07/18/18 1812     Blood Culture, Routine No growth after 5 days.    Blood culture [[id number]] Collected:  07/17/18 0930    Order Status:  Completed Specimen:  Blood from Peripheral, Antecubital, Right Updated:  07/18/18 1212     Blood Culture, Routine No Growth to date     Blood Culture, Routine No Growth to date    Culture, Respiratory with Gram Stain [[id number]]  (Susceptibility) Collected:  07/13/18 1129    Order Status:  Completed Specimen:  Respiratory from Endotracheal Aspirate Updated:  07/17/18 1057     Respiratory Culture --     STAPHYLOCOCCUS AUREUS  Moderate  Normal respiratory peng also present       Gram Stain (Respiratory) Few WBC's     Gram Stain (Respiratory) No organisms seen          I have reviewed all pertinent labs within the past 24 hours.    Estimated Creatinine Clearance: 87.4 mL/min (based on SCr of 1.2 mg/dL).    Diagnostic Results:  I have reviewed and interpreted all pertinent imaging results/findings within the past 24 hours.

## 2018-07-19 NOTE — PROGRESS NOTES
Update:    SW to pt's room to provide support to family. Pt intubated and sedated. Pt's family not in pt's room or waiting room. SW providing ongoing psychosocial and counseling support, education, assistance, resources, and discharge planning as indicated. SW continuing to follow and remains available.

## 2018-07-19 NOTE — ASSESSMENT & PLAN NOTE
- IABP placed emergently 7/10/18  - Daily CXR   - Augmenting well at 1:1. SVO2 not obtained this AM due to line not drawing back. Will place LIJ and remove RIJ today and obtain SvO2 at that time. Consider weaning IABP starting tomorrow.   - CVP 16 this AM, big increase from overnight (also net +jesenia after decreasing lasix and has much more edema on CXR)   - will increase lasix back to ggt and watch UOP. Obtain new CVP and SvO2 after line replaced this afternoon.

## 2018-07-19 NOTE — PROGRESS NOTES
[facility]  Cardiac Electrophysiology  Progress Note    Admission Date: 7/7/2018  Code Status: Full Code   Attending Physician: Kimberly Lazo MD   Expected Discharge Date: 7/23/2018  Principal Problem:Ventricular tachycardia, incessant    Subjective:     Interval History: No acute events overnight. Patient is sedated and intubated. V paced at 80, no events on telemetry.    Review of Systems   Unable to perform ROS: intubated     Objective:     Vital Signs (Most Recent):  Temp: 98.3 °F (36.8 °C) (07/19/18 0300)  Pulse: 80 (07/19/18 0854)  Resp: (!) 22 (07/19/18 0854)  BP: (!) 94/53 (07/19/18 0800)  SpO2: 100 % (07/19/18 0854) Vital Signs (24h Range):  Temp:  [98 °F (36.7 °C)-98.8 °F (37.1 °C)] 98.3 °F (36.8 °C)  Pulse:  [80] 80  Resp:  [] 22  SpO2:  [87 %-100 %] 100 %  BP: ()/(47-59) 94/53     Weight: 109 kg (240 lb 4.8 oz)  Body mass index is 33.52 kg/m².     SpO2: 100 %  O2 Device (Oxygen Therapy): ventilator    Physical Exam   Constitutional: He is oriented to person, place, and time. He appears well-developed and well-nourished.   Sedated, intubated   HENT:   Head: Normocephalic and atraumatic.   Eyes: Pupils are equal, round, and reactive to light.   Neck: Normal range of motion. No JVD present.   Cardiovascular: Intact distal pulses.    Audible balloon pump. Left groin balloon pump inserted.   Pulmonary/Chest: Effort normal and breath sounds normal. No respiratory distress. He has no wheezes. He has no rales.   Abdominal: Soft. Bowel sounds are normal. He exhibits no distension. There is no tenderness.   Musculoskeletal: Normal range of motion. He exhibits edema (trace).   Neurological: He is alert and oriented to person, place, and time.   Skin: Skin is dry. No rash noted.   Psychiatric: He has a normal mood and affect. His behavior is normal.       Significant Labs:     Recent Results (from the past 24 hour(s))   Vancomycin, random    Collection Time: 07/18/18 10:45 AM    Result Value Ref Range    Vancomycin, Random 18.7 Not established ug/mL   Basic metabolic panel    Collection Time: 07/18/18  3:45 PM   Result Value Ref Range    Sodium 146 (H) 136 - 145 mmol/L    Potassium 4.1 3.5 - 5.1 mmol/L    Chloride 103 95 - 110 mmol/L    CO2 34 (H) 23 - 29 mmol/L    Glucose 184 (H) 70 - 110 mg/dL    BUN, Bld 51 (H) 6 - 20 mg/dL    Creatinine 1.1 0.5 - 1.4 mg/dL    Calcium 9.3 8.7 - 10.5 mg/dL    Anion Gap 9 8 - 16 mmol/L    eGFR if African American >60.0 >60 mL/min/1.73 m^2    eGFR if non African American >60.0 >60 mL/min/1.73 m^2   POCT glucose    Collection Time: 07/18/18  3:45 PM   Result Value Ref Range    POCT Glucose 204 (H) 70 - 110 mg/dL   Blood culture    Collection Time: 07/18/18  6:03 PM   Result Value Ref Range    Blood Culture, Routine No Growth to date    Blood culture    Collection Time: 07/18/18  6:03 PM   Result Value Ref Range    Blood Culture, Routine No Growth to date    POCT glucose    Collection Time: 07/18/18  8:17 PM   Result Value Ref Range    POCT Glucose 173 (H) 70 - 110 mg/dL   Basic metabolic panel    Collection Time: 07/19/18  1:00 AM   Result Value Ref Range    Sodium 145 136 - 145 mmol/L    Potassium 4.0 3.5 - 5.1 mmol/L    Chloride 102 95 - 110 mmol/L    CO2 35 (H) 23 - 29 mmol/L    Glucose 165 (H) 70 - 110 mg/dL    BUN, Bld 53 (H) 6 - 20 mg/dL    Creatinine 1.2 0.5 - 1.4 mg/dL    Calcium 9.3 8.7 - 10.5 mg/dL    Anion Gap 8 8 - 16 mmol/L    eGFR if African American >60.0 >60 mL/min/1.73 m^2    eGFR if non African American >60.0 >60 mL/min/1.73 m^2   POCT glucose    Collection Time: 07/19/18  1:18 AM   Result Value Ref Range    POCT Glucose 160 (H) 70 - 110 mg/dL   Comprehensive metabolic panel - if not done in ED    Collection Time: 07/19/18  3:30 AM   Result Value Ref Range    Sodium 144 136 - 145 mmol/L    Potassium 3.9 3.5 - 5.1 mmol/L    Chloride 101 95 - 110 mmol/L    CO2 35 (H) 23 - 29 mmol/L    Glucose 140 (H) 70 - 110 mg/dL    BUN, Bld 54 (H) 6 - 20  mg/dL    Creatinine 1.1 0.5 - 1.4 mg/dL    Calcium 9.2 8.7 - 10.5 mg/dL    Total Protein 6.2 6.0 - 8.4 g/dL    Albumin 1.9 (L) 3.5 - 5.2 g/dL    Total Bilirubin 0.5 0.1 - 1.0 mg/dL    Alkaline Phosphatase 56 55 - 135 U/L    AST 15 10 - 40 U/L    ALT 11 10 - 44 U/L    Anion Gap 8 8 - 16 mmol/L    eGFR if African American >60.0 >60 mL/min/1.73 m^2    eGFR if non African American >60.0 >60 mL/min/1.73 m^2   CBC auto differential    Collection Time: 07/19/18  3:30 AM   Result Value Ref Range    WBC 12.18 3.90 - 12.70 K/uL    RBC 2.50 (L) 4.60 - 6.20 M/uL    Hemoglobin 7.1 (L) 14.0 - 18.0 g/dL    Hematocrit 23.6 (L) 40.0 - 54.0 %    MCV 94 82 - 98 fL    MCH 28.4 27.0 - 31.0 pg    MCHC 30.1 (L) 32.0 - 36.0 g/dL    RDW 15.4 (H) 11.5 - 14.5 %    Platelets 148 (L) 150 - 350 K/uL    MPV 11.6 9.2 - 12.9 fL    Immature Granulocytes 3.3 (H) 0.0 - 0.5 %    Gran # (ANC) 10.3 (H) 1.8 - 7.7 K/uL    Immature Grans (Abs) 0.40 (H) 0.00 - 0.04 K/uL    Lymph # 0.6 (L) 1.0 - 4.8 K/uL    Mono # 0.8 0.3 - 1.0 K/uL    Eos # 0.1 0.0 - 0.5 K/uL    Baso # 0.01 0.00 - 0.20 K/uL    nRBC 0 0 /100 WBC    Gran% 84.8 (H) 38.0 - 73.0 %    Lymph% 4.6 (L) 18.0 - 48.0 %    Mono% 6.2 4.0 - 15.0 %    Eosinophil% 1.0 0.0 - 8.0 %    Basophil% 0.1 0.0 - 1.9 %    Differential Method Automated    Protime-INR    Collection Time: 07/19/18  3:30 AM   Result Value Ref Range    Prothrombin Time 10.8 9.0 - 12.5 sec    INR 1.0 0.8 - 1.2   Magnesium - if not done in ED    Collection Time: 07/19/18  3:30 AM   Result Value Ref Range    Magnesium 2.6 1.6 - 2.6 mg/dL   POCT glucose    Collection Time: 07/19/18  3:38 AM   Result Value Ref Range    POCT Glucose 145 (H) 70 - 110 mg/dL   ISTAT PROCEDURE    Collection Time: 07/19/18  7:59 AM   Result Value Ref Range    POC PH 7.443 7.35 - 7.45    POC PCO2 55.7 (H) 35 - 45 mmHg    POC PO2 124 (H) 80 - 100 mmHg    POC HCO3 38.2 (H) 24 - 28 mmol/L    POC BE 14 -2 to 2 mmol/L    POC SATURATED O2 99 95 - 100 %    POC TCO2 40 (H) 23  - 27 mmol/L    Rate 16     Sample ARTERIAL     Site LR     Allens Test Pass     DelSys Adult Vent     Mode AC/PRVC     Vt 450     PEEP 6     FiO2 60    POCT glucose    Collection Time: 07/19/18  8:57 AM   Result Value Ref Range    POCT Glucose 144 (H) 70 - 110 mg/dL         Assessment and Plan:     * VT Storm    Patient has had multiple occurrences of electrical storm with ICD shocks  The patient had previously been receiving oral amiodarone, possibly was not being absorbed through the enteric tract and the patient has responded to IV amiodarone  Continue amiodarone, mexilitine 150 mg Q8h and metoprolol 2.5 mg IV Q4h  Patient currently stable, with resolution of VT storm  At a later time, it may be considered to deactivate the LV lead of the patient's CRT-D, considering the patient's LVAD status and native RBBB (see EKG from 7/2012)  ICD interrogation reveals LICHA - will continue to follow along            Rafa Petit MD  Cardiac Electrophysiology  [facility]

## 2018-07-20 NOTE — SUBJECTIVE & OBJECTIVE
Interval History: CVP 14, SVO2 71%. No BM documented since 7/9. Hypoactive BS this am, discussed obtaining KUB with family then possibly SBT later this afternoon.     Continuous Infusions:   amiodarone in dextrose 5% 0.5 mg/min (07/20/18 1400)    fentanyl 200 mcg/hr (07/20/18 1400)    furosemide (LASIX) 2 mg/mL infusion (non-titrating) 10 mg/hr (07/20/18 1400)    norepinephrine bitartrate-D5W 0.02 mcg/kg/min (07/20/18 1400)    propofol 50 mcg/kg/min (07/20/18 1400)     Scheduled Meds:   albuterol sulfate  2.5 mg Nebulization Q4H    aspirin  81 mg Oral Daily    atorvastatin  40 mg Oral Daily    budesonide  0.5 mg Nebulization Q12H    ceFAZolin  1 g Intravenous Q8H    chlorhexidine  15 mL Mouth/Throat BID    chlorhexidine  15 mL Mouth/Throat BID    docusate  100 mg Oral Daily    fluticasone-vilanterol  1 puff Inhalation Daily    gabapentin  600 mg Oral BID    heparin (porcine)  5,000 Units Subcutaneous Q8H    levETIRAcetam  500 mg Oral BID    metoprolol  2.5 mg Intravenous Q4H    mexiletine  150 mg Oral Q8H    pantoprozole (PROTONIX) 40 mg/100 mL D5W IVPB  40 mg Intravenous BID    [START ON 7/21/2018] predniSONE  30 mg Oral Daily    sennosides 8.8 mg/5 ml  5 mL Oral QHS    sodium chloride 0.9%  3 mL Intravenous Q8H     PRN Meds:sodium chloride, ALPRAZolam, benzonatate, calcium gluconate IVPB, calcium gluconate IVPB, calcium gluconate IVPB, carisoprodol, dextrose 50%, glucagon (human recombinant), HYDROcodone-acetaminophen, insulin aspart U-100, magnesium sulfate IVPB, magnesium sulfate IVPB, nitroGLYCERIN, potassium chloride in water **AND** potassium chloride in water **AND** potassium chloride in water, sodium phosphate IVPB, sodium phosphate IVPB, sodium phosphate IVPB    Review of patient's allergies indicates:  No Known Allergies  Objective:     Vital Signs (Most Recent):  Temp: 97.8 °F (36.6 °C) (07/20/18 1100)  Pulse: 80 (07/20/18 1517)  Resp: 16 (07/20/18 1517)  BP: (!) 97/57 (07/20/18  1415)  SpO2: 100 % (07/20/18 1517) Vital Signs (24h Range):  Temp:  [97.8 °F (36.6 °C)-98.4 °F (36.9 °C)] 97.8 °F (36.6 °C)  Pulse:  [80-89] 80  Resp:  [16-42] 16  SpO2:  [89 %-100 %] 100 %  BP: ()/(47-83) 97/57     Patient Vitals for the past 72 hrs (Last 3 readings):   Weight   07/20/18 0400 106.5 kg (234 lb 12.6 oz)   07/18/18 0455 109 kg (240 lb 4.8 oz)     Body mass index is 32.75 kg/m².      Intake/Output Summary (Last 24 hours) at 07/20/18 1526  Last data filed at 07/20/18 1500   Gross per 24 hour   Intake          3889.05 ml   Output             4370 ml   Net          -480.95 ml     Hemodynamic Parameters:    Telemetry: paced at 80 bpm    Physical Exam   Constitutional: He appears well-developed and well-nourished. He is intubated.   Daughter and brother at bedside   HENT:   Head: Normocephalic and atraumatic.   Neck: Normal range of motion. Neck supple. JVD: difficult to assess on ventialtor.   Right TLC   Cardiovascular:   Warm and well perfused in upper and lower extremities    Pulmonary/Chest: He is intubated.   Coarse breath sounds, ventilated.    Abdominal: Soft. Bowel sounds are normal. He exhibits no distension. There is no tenderness.   Musculoskeletal: He exhibits no edema.   Neurological:   intubated and sedateD   Skin: Skin is warm and dry.   Nursing note and vitals reviewed.    Significant Labs:  CBC:    Recent Labs  Lab 07/18/18  0310 07/19/18  0330 07/20/18  0400   WBC 14.76* 12.18 10.65   RBC 2.47* 2.50* 2.42*   HGB 7.2* 7.1* 6.9*   HCT 23.2* 23.6* 22.9*    148* 148*   MCV 94 94 95   MCH 29.1 28.4 28.5   MCHC 31.0* 30.1* 30.1*     BNP:  No results for input(s): BNP in the last 168 hours.    Invalid input(s): BNPTRIAGELBLO  CMP:    Recent Labs  Lab 07/18/18  0310  07/19/18  0330  07/19/18  2240 07/20/18  0400 07/20/18  0743   *  < > 140*  < > 177* 131* 139*   CALCIUM 9.2  < > 9.2  < > 9.3 9.2 9.3   ALBUMIN 2.0*  --  1.9*  --   --  1.9*  --    PROT 6.1  --  6.2  --   --  6.3   --    *  < > 144  < > 145 146* 145   K 3.7  < > 3.9  < > 4.2 3.9 4.5   CO2 33*  < > 35*  < > 35* 34* 32*     < > 101  < > 102 102 103   BUN 53*  < > 54*  < > 60* 60* 65*   CREATININE 1.0  < > 1.1  < > 1.3 1.2 1.2   ALKPHOS 65  --  56  --   --  58  --    ALT 14  --  11  --   --  12  --    AST 17  --  15  --   --  14  --    BILITOT 0.7  --  0.5  --   --  0.4  --    < > = values in this interval not displayed.   Coagulation:     Recent Labs  Lab 07/17/18  0714 07/18/18  0310 07/19/18  0330 07/20/18  0400   INR 1.0 1.1 1.0 1.0   APTT <21.0  --   --   --      LDH:  No results for input(s): LDH in the last 72 hours.  Microbiology:  Microbiology Results (last 7 days)     Procedure Component Value Units Date/Time    Blood culture [[id number]] Collected:  07/17/18 0930    Order Status:  Completed Specimen:  Blood from Peripheral, Antecubital, Right Updated:  07/20/18 1212     Blood Culture, Routine No Growth to date     Blood Culture, Routine No Growth to date     Blood Culture, Routine No Growth to date     Blood Culture, Routine No Growth to date    Blood culture [[id number]] Collected:  07/17/18 0950    Order Status:  Completed Specimen:  Blood from Peripheral, Antecubital, Right Updated:  07/20/18 1048     Blood Culture, Routine Gram stain aer bottle: Gram positive cocci in clusters resembling Staph     Blood Culture, Routine Results called to and read back by:Javon Hatfield RN 07/18/2018  17:50     Blood Culture, Routine --     COAGULASE-NEGATIVE STAPHYLOCOCCUS SPECIES  Organism is a probable contaminant      IV catheter culture [[id number]] Collected:  07/19/18 1851    Order Status:  Completed Specimen:  Catheter Tip from Catheter Tip, Intrajugular Updated:  07/20/18 0723     Aerobic Culture - Cath tip No growth    Narrative:       Culture [facility]    Blood culture [[id number]] Collected:  07/18/18 1803    Order Status:  Completed Specimen:  Blood from Peripheral, Forearm, Right Updated:  07/19/18 1543     Blood  Culture, Routine No Growth to date     Blood Culture, Routine No Growth to date    Blood culture [[id number]] Collected:  07/18/18 1803    Order Status:  Completed Specimen:  Blood from Peripheral, Antecubital, Left Updated:  07/19/18 2212     Blood Culture, Routine No Growth to date     Blood Culture, Routine No Growth to date    Fungus culture [[id number]] Collected:  07/17/18 1154    Order Status:  Completed Specimen:  Respiratory from Sputum Updated:  07/19/18 1054     Fungus (Mycology) Culture Culture in progress    Blood culture [[id number]] Collected:  07/13/18 1309    Order Status:  Completed Specimen:  Blood from Peripheral, Hand, Left Updated:  07/18/18 1812     Blood Culture, Routine No growth after 5 days.    Blood culture [[id number]] Collected:  07/13/18 1350    Order Status:  Completed Specimen:  Blood from Peripheral, Hand, Right Updated:  07/18/18 1812     Blood Culture, Routine No growth after 5 days.    Culture, Respiratory with Gram Stain [[id number]]  (Susceptibility) Collected:  07/13/18 1129    Order Status:  Completed Specimen:  Respiratory from Endotracheal Aspirate Updated:  07/17/18 1057     Respiratory Culture --     STAPHYLOCOCCUS AUREUS  Moderate  Normal respiratory peng also present       Gram Stain (Respiratory) Few WBC's     Gram Stain (Respiratory) No organisms seen          I have reviewed all pertinent labs within the past 24 hours.    Estimated Creatinine Clearance: 86.4 mL/min (based on SCr of 1.2 mg/dL).    Diagnostic Results:  I have reviewed and interpreted all pertinent imaging results/findings within the past 24 hours.

## 2018-07-20 NOTE — SUBJECTIVE & OBJECTIVE
Past Medical History:   Diagnosis Date    AICD (automatic cardioverter/defibrillator) present     Arthritis     Atrial fibrillation     CHF (congestive heart failure)     Coronary artery disease     History of stroke 7/13/2018    2005, 2006, no deficits per wife.    Hypertension     MI (myocardial infarction)     Sarcoid     Seizures     Stroke        Past Surgical History:   Procedure Laterality Date    CARDIAC CATHETERIZATION      CARDIAC DEFIBRILLATOR PLACEMENT  7-12    CARDIAC DEFIBRILLATOR PLACEMENT      CARDIAC DEFIBRILLATOR PLACEMENT      COLONOSCOPY N/A 7/2/2018    Procedure: COLONOSCOPY;  Surgeon: THELMA Kay MD;  Location: [facility] ENDO ([address] [place] [address]);  Service: Endoscopy;  Laterality: N/A;    CORONARY STENT PLACEMENT  07/2011    ESOPHAGOGASTRODUODENOSCOPY      FRACTURE SURGERY      LEFT HEART CATHETERIZATION N/A 6/25/2018    Procedure: Left heart cath;  Surgeon: Jordi Lui MD;  Location: [facility] CATH LAB;  Service: Cardiology;  Laterality: N/A;       Review of patient's allergies indicates:  No Known Allergies    Medications:  Prescriptions Prior to Admission   Medication Sig    albuterol (PROVENTIL) 2.5 mg /3 mL (0.083 %) nebulizer solution Take 2.5 mg by nebulization every 6 (six) hours as needed.    albuterol (VENTOLIN HFA) 90 mcg/actuation inhaler Inhale 2 puffs into the lungs every 4 (four) hours as needed for Wheezing.    alprazolam (XANAX) 2 MG tablet Take 2 mg by mouth 2 (two) times daily as needed.     amiodarone (PACERONE) 400 MG tablet Take 1 tablet (400 mg total) by mouth 2 (two) times daily until 7/9/2018. Then take 1 tablet (400 mg total) by mouth 1 (one) time daily thereafter.    aspirin (ECOTRIN) 81 MG EC tablet Take 81 mg by mouth. 1 Tablet, Delayed Release (E.C.) Oral Every day    atorvastatin (LIPITOR) 40 MG tablet Take 1 tablet (40 mg total) by mouth once daily.    bumetanide (BUMEX) 1 MG tablet Take 1 mg by mouth daily as needed.    carisoprodol (SOMA) 350  MG tablet Take 350 mg by mouth 4 (four) times daily as needed for Muscle spasms.    colchicine 0.6 mg tablet 0.6 mg once daily.     enoxaparin (LOVENOX) 100 mg/mL Syrg Inject 1 mL (100 mg total) into the skin every 12 (twelve) hours.    fluticasone-vilanterol (BREO ELLIPTA) 200-25 mcg/dose DsDv diskus inhaler Inhale 1 puff into the lungs once daily. Controller    gabapentin (NEURONTIN) 600 MG tablet Take 600 mg by mouth 2 (two) times daily. 1 Tablet Oral At bedtime    hydrocodone-acetaminophen 10-325mg (NORCO)  mg Tab Take 1 tablet by mouth 2 (two) times daily as needed.     isosorbide mononitrate (IMDUR) 30 MG 24 hr tablet Take 3 tablets (90 mg total) by mouth once daily. (Patient taking differently: Take 60 mg by mouth once daily. )    levETIRAcetam (KEPPRA) 500 MG Tab Take 1 tablet (500 mg total) by mouth 2 (two) times daily.    losartan (COZAAR) 50 MG tablet Take 1 tablet (50 mg total) by mouth once daily.    metoprolol tartrate (LOPRESSOR) 25 MG tablet Take 1 tablet (25 mg total) by mouth 2 (two) times daily.    nitroGLYCERIN (NITROSTAT) 0.4 MG SL tablet ONE TABLET UNDER TONGUE AS NEEDED FOR CHEST PAIN    pantoprazole (PROTONIX) 40 MG tablet Take 40 mg by mouth. 1 Tablet, Delayed Release (E.C.) Oral Every day    potassium chloride SA (K-DUR,KLOR-CON) 20 MEQ tablet TAKE 2 TABLETS BY MOUTH EVERY MORNING AND 1 TABLET BY MOUTH EVERY EVENING    predniSONE (DELTASONE) 10 MG tablet Take 1 tablet (10 mg total) by mouth once daily.    promethazine-codeine 6.25-10 mg/5 ml (PHENERGAN WITH CODEINE) 6.25-10 mg/5 mL syrup TK 5 ML PO  Q 6 H PRN    spironolactone (ALDACTONE) 25 MG tablet TAKE 1 TABLET BY MOUTH EVERY DAY    warfarin (COUMADIN) 7.5 MG tablet 1 tablet Monday  0.5 tablet Tuesday-Sunday    [DISCONTINUED] furosemide (LASIX) 40 MG tablet TAKE 2 TABLETS BY MOUTH TWICE DAILY     Antibiotics     Start     Stop Route Frequency Ordered    07/13/18 1145  ceFEPIme injection 2 g      -- IV Every 8  hours (non-standard times) 07/13/18 1031    07/13/18 1145  vancomycin in dextrose 5 % 1 gram/250 mL IVPB 1,000 mg  (Vancomycin IVPB with levels panel)      -- IV Every 12 hours (non-standard times) 07/13/18 1031        Antifungals     None        Antivirals     None           Immunization History   Administered Date(s) Administered    Influenza - Quadrivalent - PF 10/23/2014       Family History     Problem Relation (Age of Onset)    Cancer Maternal Grandmother    Coronary artery disease Father, Sister, Sister    Heart disease Mother, Father, Sister    Hypertension Mother, Father, Sister    Kidney disease Sister    Stroke Sister    Vision loss Sister        Social History     Social History    Marital status: Other     Spouse name: N/A    Number of children: N/A    Years of education: N/A     Social History Main Topics    Smoking status: Never Smoker    Smokeless tobacco: Never Used    Alcohol use No    Drug use: No    Sexual activity: Not Asked     Other Topics Concern    None     Social History Narrative    None     Review of Systems   Unable to perform ROS: Intubated     Objective:     Vital Signs (Most Recent):  Temp: 97.8 °F (36.6 °C) (07/20/18 1100)  Pulse: 80 (07/20/18 1300)  Resp: 16 (07/20/18 1300)  BP: 96/61 (07/20/18 1300)  SpO2: (!) 92 % (07/20/18 1300) Vital Signs (24h Range):  Temp:  [97.8 °F (36.6 °C)-98.4 °F (36.9 °C)] 97.8 °F (36.6 °C)  Pulse:  [80-89] 80  Resp:  [16-42] 16  SpO2:  [89 %-100 %] 92 %  BP: ()/(47-83) 96/61     Weight: 106.5 kg (234 lb 12.6 oz)  Body mass index is 32.75 kg/m².    Estimated Creatinine Clearance: 86.4 mL/min (based on SCr of 1.2 mg/dL).    Physical Exam   Constitutional: He is oriented to person, place, and time. He appears well-developed and well-nourished. He is sedated and intubated.   HENT:   Head: Normocephalic and atraumatic.   Eyes: EOM are normal. Pupils are equal, round, and reactive to light.   Neck: Normal range of motion. Neck supple. No JVD  (difficult to assess (patient on ventilator & flat in bed)) present.   Right CVC in place   Cardiovascular: Normal rate and regular rhythm.    IABP 1:1. IABP site clean/dry/intact. Feet slightly cool to touch, otherwise warm and well perfused.    Pulmonary/Chest: Effort normal. He is intubated. No respiratory distress.   Coarse breath sounds. Ventilated   Abdominal: Soft. He exhibits no distension. Bowel sounds are decreased. There is no tenderness.   Musculoskeletal: Normal range of motion. He exhibits no edema.   Neurological: He is alert and oriented to person, place, and time.   Skin: Skin is warm and dry.   Nursing note and vitals reviewed.      Significant Labs: All pertinent labs within the past 24 hours have been reviewed.    Significant Imaging: I have reviewed all pertinent imaging results/findings within the past 24 hours.

## 2018-07-20 NOTE — SUBJECTIVE & OBJECTIVE
Interval History: No acute events overnight, patient remains intubated and on ventilator. Sedated with propofol. Examined this morning with family at bedside.     Objective:     Vital Signs (Most Recent):  Temp: 98.2 °F (36.8 °C) (07/20/18 0300)  Pulse: 80 (07/20/18 0710)  Resp: 18 (07/20/18 0710)  BP: (!) 102/59 (07/20/18 0700)  SpO2: 100 % (07/20/18 0710) Vital Signs (24h Range):  Temp:  [98.2 °F (36.8 °C)-98.4 °F (36.9 °C)] 98.2 °F (36.8 °C)  Pulse:  [80-88] 80  Resp:  [16-42] 18  SpO2:  [87 %-100 %] 100 %  BP: ()/(47-83) 102/59     Weight: 106.5 kg (234 lb 12.6 oz)  Body mass index is 32.75 kg/m².      Intake/Output Summary (Last 24 hours) at 07/20/18 0828  Last data filed at 07/20/18 0700   Gross per 24 hour   Intake          4014.05 ml   Output             3820 ml   Net           194.05 ml       Physical Exam   Constitutional: He appears well-developed.   HENT:   Head: Normocephalic and atraumatic.   Mouth/Throat: Oropharynx is clear and moist.   Eyes: Pupils are equal, round, and reactive to light.   Cardiovascular: Normal rate and intact distal pulses.    IABP present.    Pulmonary/Chest:   Intubated and on ventilator. Rhonchi bilaterally    Abdominal: Soft.   Bowel sounds absent   Musculoskeletal: He exhibits edema (1+).   Neurological:   Sedated, on propofol and fentanyl drips, raas -5    Skin: Skin is warm. Capillary refill takes less than 2 seconds.       Vents:  Vent Mode: A/C (07/20/18 0710)  Set Rate: 16 bmp (07/20/18 0710)  Vt Set: 450 mL (07/20/18 0710)  PEEP/CPAP: 6 cmH20 (07/20/18 0710)  Oxygen Concentration (%): 51 (07/20/18 0710)  Peak Airway Pressure: 32 cmH2O (07/20/18 0710)  Plateau Pressure: 24 cmH20 (07/20/18 0710)  Total Ve: 18 mL (07/20/18 0710)  F/VT Ratio<105 (RSBI): (!) 15.99 (07/20/18 0710)    Lines/Drains/Airways     Central Venous Catheter Line                 Percutaneous Central Line Insertion/Assessment - triple lumen  07/19/18 1916 right internal jugular less than 1 day           Drain                 NG/OG Tube 07/10/18 1557 [place] sump 16 Fr. Left nostril 9 days         Urethral Catheter 07/10/18 1646 Double-lumen;Latex 16 Fr. 9 days          Airway                 Airway - Non-Surgical 07/10/18 1523 Endotracheal Tube 9 days          Line                 IABP 07/10/18 1550 7.5 Fr. 40 mL 9 days          Peripheral Intravenous Line                 Midline Catheter Insertion/Assessment  - Single Lumen 07/17/18 1105 Right cephalic vein (lateral side of arm) 18g x 8cm 2 days         Peripheral IV - Single Lumen 07/17/18 1110 Right Forearm 2 days                Significant Labs:    CBC/Anemia Profile:    Recent Labs  Lab 07/19/18  0330 07/20/18  0400   WBC 12.18 10.65   HGB 7.1* 6.9*   HCT 23.6* 22.9*   * 148*   MCV 94 95   RDW 15.4* 15.4*        Chemistries:    Recent Labs  Lab 07/19/18  0330  07/19/18  1612 07/19/18  2240 07/20/18  0400     < > 142 145 146*   K 3.9  < > 4.4 4.2 3.9     < > 101 102 102   CO2 35*  < > 33* 35* 34*   BUN 54*  < > 58* 60* 60*   CREATININE 1.1  < > 1.3 1.3 1.2   CALCIUM 9.2  < > 9.1 9.3 9.2   ALBUMIN 1.9*  --   --   --  1.9*   PROT 6.2  --   --   --  6.3   BILITOT 0.5  --   --   --  0.4   ALKPHOS 56  --   --   --  58   ALT 11  --   --   --  12   AST 15  --   --   --  14   MG 2.6  --   --   --  2.5   < > = values in this interval not displayed.    Recent Lab Results       07/20/18  0824 07/20/18  0823 07/20/18  0404 07/20/18  0400 07/20/18  0006      Immature Granulocytes    2.4(H)      Immature Grans (Abs)    0.26  Comment:  Mild elevation in immature granulocytes is non specific and   can be seen in a variety of conditions including stress response,   acute inflammation, trauma and pregnancy. Correlation with other   laboratory and clinical findings is essential.  (H)      Aerobic Culture - Cath tip          Albumin    1.9(L)      Alkaline Phosphatase    58      Allens Test N/A         ALT    12      Anion Gap    10      AST    14      Baso  #    0.01      Basophil%    0.1      Total Bilirubin    0.4  Comment:  For infants and newborns, interpretation of results should be based  on gestational age, weight and in agreement with clinical  observations.  Premature Infant recommended reference ranges:  Up to 24 hours.............<8.0 mg/dL  Up to 48 hours............<12.0 mg/dL  3-5 days..................<15.0 mg/dL  6-29 days.................<15.0 mg/dL        Site Other         BUN, Bld    60(H)      Calcium    9.2      Chloride    102      CO2    34(H)      Creatinine    1.2      [employer] Adult Vent         Differential Method    Automated      eGFR if     >60.0      eGFR if non     >60.0  Comment:  Calculation used to obtain the estimated glomerular filtration  rate (eGFR) is the CKD-EPI equation.         Eos #    0.1      Eosinophil%    1.1      FiO2 50         Glucose    131(H)      Gran # (ANC)    9.0(H)      Gran%    84.8(H)      Hematocrit    22.9(L)      Hemoglobin    6.9(L)      Coumadin Monitoring INR    1.0  Comment:  Coumadin Therapy:  2.0 - 3.0 for INR for all indicators except mechanical heart valves  and antiphospholipid syndromes which should use 2.5 - 3.5.        Lymph #    0.6(L)      Lymph%    5.3(L)      Magnesium    2.5      MCH    28.5      MCHC    30.1(L)      MCV    95      Mode AC/PRVC         Mono #    0.7      Mono%    6.3      MPV    11.3      nRBC    1(A)      PEEP 6         Platelets    148(L)      POC BE 12         POC HCO3 37.1(H)         POC PCO2 66.2(H)         POC PH 7.357         POC PO2 40         POC SATURATED O2 71(L)         POC TCO2 39(H)         POCT Glucose  165(H) 172(H)  156(H)     Potassium    3.9      Total Protein    6.3      Protime    10.6      Rate 16         RBC    2.42(L)      RDW    15.4(H)      Sample VENOUS         Sodium    146(H)      Sp02 100         Vancomycin-Trough          Vt 450         WBC    10.65                  07/19/18  2240 07/19/18 2052 07/19/18 1921  07/19/18  1851 07/19/18  1618      Immature Granulocytes          Immature Grans (Abs)          Aerobic Culture - Cath tip    No growth[P]      Albumin          Alkaline Phosphatase          Allens Test   N/A  N/A     ALT          Anion Gap 8         AST          Baso #          Basophil%          Total Bilirubin          Site   Other  Other     BUN, Bld 60(H)         Calcium 9.3         Chloride 102         CO2 35(H)         Creatinine 1.3         DelSys   Adult Vent  Adult Vent     Differential Method          eGFR if  >60.0         eGFR if non  >60.0  Comment:  Calculation used to obtain the estimated glomerular filtration  rate (eGFR) is the CKD-EPI equation.            Eos #          Eosinophil%          FiO2   50  50     Glucose 177(H)         Gran # (ANC)          Gran%          Hematocrit          Hemoglobin          Coumadin Monitoring INR          Lymph #          Lymph%          Magnesium          MCH          MCHC          MCV          Mode   AC/PRVC  AC/PRVC     Mono #          Mono%          MPV          nRBC          PEEP   6  6     Platelets          POC BE   4  13     POC HCO3   29.2(H)  38.3(H)     POC PCO2   52.5(H)  64.2(H)     POC PH   7.354  7.384     POC PO2   39(LL)  36(LL)     POC SATURATED O2   70(L)  65(L)     POC TCO2   31(H)  40(H)     POCT Glucose  140(H)        Potassium 4.2         Total Protein          Protime          Rate   16  16     RBC          RDW          Sample   VENOUS  VENOUS     Sodium 145         Sp02   99       Vancomycin-Trough 30.0(H)         Vt   450  450     WBC                      07/19/18  1612 07/19/18  1611 07/19/18  1223 07/19/18  0858 07/19/18  0857      Immature Granulocytes          Immature Grans (Abs)          Aerobic Culture - Cath tip          Albumin          Alkaline Phosphatase          Allens Test          ALT          Anion Gap 8   11      AST          Baso #          Basophil%          Total Bilirubin          Site           BUN, Bld 58(H)   56(H)      Calcium 9.1   9.8      Chloride 101   103      CO2 33(H)   32(H)      Creatinine 1.3   1.2      DelSys          Differential Method          eGFR if  >60.0   >60.0      eGFR if non  >60.0  Comment:  Calculation used to obtain the estimated glomerular filtration  rate (eGFR) is the CKD-EPI equation.      >60.0  Comment:  Calculation used to obtain the estimated glomerular filtration  rate (eGFR) is the CKD-EPI equation.         Eos #          Eosinophil%          FiO2          Glucose 203(H)   134(H)      Gran # (ANC)          Gran%          Hematocrit          Hemoglobin          Coumadin Monitoring INR          Lymph #          Lymph%          Magnesium          MCH          MCHC          MCV          Mode          Mono #          Mono%          MPV          nRBC          PEEP          Platelets          POC BE          POC HCO3          POC PCO2          POC PH          POC PO2          POC SATURATED O2          POC TCO2          POCT Glucose  234(H) 226(H)  144(H)     Potassium 4.4   4.5      Total Protein          Protime          Rate          RBC          RDW          Sample          Sodium 142   146(H)      Sp02          Vancomycin-Trough          Vt          WBC                        All pertinent labs within the past 24 hours have been reviewed.    Significant Imaging:  I have reviewed all pertinent imaging results/findings within the past 24 hours.  I have reviewed and interpreted all pertinent imaging results/findings within the past 24 hours.

## 2018-07-20 NOTE — PROGRESS NOTES
Pt transferred to and from CT. ICU RN and RT at bedside. VS monitored throughout procedure and remained stable.

## 2018-07-20 NOTE — PROGRESS NOTES
Notified MIRIAM Tapia about no response to enema. Abdominal/ Chest CT ordered. Will continue to monitor.

## 2018-07-20 NOTE — PLAN OF CARE
Problem: Patient Care Overview  Goal: Plan of Care Review  Outcome: Ongoing (interventions implemented as appropriate)  Reviewed plan of care with pt and family. Pt remains intubated AC 50% fio2 5 PEEP. Pt sedated with propofol and fentanyl, titrated for RASS and comfort. Levo infusing to keep MAP >65. Amio, lasix infusing per MD order. IABP 1:1, EKG triggered, no issues. Pt repositioned Q2 or more frequently to decrease pressure point, no new skin breakdown noted. MD at bedside for central line placements. Central line tip sent for culture. UO excellent throughout shift, CVP 13-16, no ectopy noted throughout shift. VS stable at this time. Will continue to monitor.

## 2018-07-20 NOTE — PROGRESS NOTES
[facility]  Pulmonology  Progress Note    Patient Name: Yonathan Good Jr.  MRN: [id number]  Admission Date: 7/7/2018  Hospital Length of Stay: 13 days  Code Status: Full Code  Attending Provider: Kimberly Lazo MD  Primary Care Provider: Edgar Gtaes MD   Principal Problem: Ventricular tachycardia, incessant    Subjective:     Interval History: No acute events overnight, patient remains intubated and on ventilator. Sedated with propofol. Examined this morning with family at bedside.     Objective:     Vital Signs (Most Recent):  Temp: 98.2 °F (36.8 °C) (07/20/18 0300)  Pulse: 80 (07/20/18 0710)  Resp: 18 (07/20/18 0710)  BP: (!) 102/59 (07/20/18 0700)  SpO2: 100 % (07/20/18 0710) Vital Signs (24h Range):  Temp:  [98.2 °F (36.8 °C)-98.4 °F (36.9 °C)] 98.2 °F (36.8 °C)  Pulse:  [80-88] 80  Resp:  [16-42] 18  SpO2:  [87 %-100 %] 100 %  BP: ()/(47-83) 102/59     Weight: 106.5 kg (234 lb 12.6 oz)  Body mass index is 32.75 kg/m².      Intake/Output Summary (Last 24 hours) at 07/20/18 0828  Last data filed at 07/20/18 0700   Gross per 24 hour   Intake          4014.05 ml   Output             3820 ml   Net           194.05 ml       Physical Exam   Constitutional: He appears well-developed.   HENT:   Head: Normocephalic and atraumatic.   Mouth/Throat: Oropharynx is clear and moist.   Eyes: Pupils are equal, round, and reactive to light.   Cardiovascular: Normal rate and intact distal pulses.    IABP present.    Pulmonary/Chest:   Intubated and on ventilator. Rhonchi bilaterally    Abdominal: Soft.   Bowel sounds absent   Musculoskeletal: He exhibits edema (1+).   Neurological:   Sedated, on propofol and fentanyl drips, raas -5    Skin: Skin is warm. Capillary refill takes less than 2 seconds.       Vents:  Vent Mode: A/C (07/20/18 0710)  Set Rate: 16 bmp (07/20/18 0710)  Vt Set: 450 mL (07/20/18 0710)  PEEP/CPAP: 6 cmH20 (07/20/18 0710)  Oxygen Concentration (%): 51 (07/20/18 0710)  Peak Airway  Pressure: 32 cmH2O (07/20/18 0710)  Plateau Pressure: 24 cmH20 (07/20/18 0710)  Total Ve: 18 mL (07/20/18 0710)  F/VT Ratio<105 (RSBI): (!) 15.99 (07/20/18 0710)    Lines/Drains/Airways     Central Venous Catheter Line                 Percutaneous Central Line Insertion/Assessment - triple lumen  07/19/18 1916 right internal jugular less than 1 day          Drain                 NG/OG Tube 07/10/18 1557 Oden sump 16 Fr. Left nostril 9 days         Urethral Catheter 07/10/18 1646 Double-lumen;Latex 16 Fr. 9 days          Airway                 Airway - Non-Surgical 07/10/18 1523 Endotracheal Tube 9 days          Line                 IABP 07/10/18 1550 7.5 Fr. 40 mL 9 days          Peripheral Intravenous Line                 Midline Catheter Insertion/Assessment  - Single Lumen 07/17/18 1105 Right cephalic vein (lateral side of arm) 18g x 8cm 2 days         Peripheral IV - Single Lumen 07/17/18 1110 Right Forearm 2 days                Significant Labs:    CBC/Anemia Profile:    Recent Labs  Lab 07/19/18  0330 07/20/18  0400   WBC 12.18 10.65   HGB 7.1* 6.9*   HCT 23.6* 22.9*   * 148*   MCV 94 95   RDW 15.4* 15.4*        Chemistries:    Recent Labs  Lab 07/19/18  0330  07/19/18  1612 07/19/18  2240 07/20/18  0400     < > 142 145 146*   K 3.9  < > 4.4 4.2 3.9     < > 101 102 102   CO2 35*  < > 33* 35* 34*   BUN 54*  < > 58* 60* 60*   CREATININE 1.1  < > 1.3 1.3 1.2   CALCIUM 9.2  < > 9.1 9.3 9.2   ALBUMIN 1.9*  --   --   --  1.9*   PROT 6.2  --   --   --  6.3   BILITOT 0.5  --   --   --  0.4   ALKPHOS 56  --   --   --  58   ALT 11  --   --   --  12   AST 15  --   --   --  14   MG 2.6  --   --   --  2.5   < > = values in this interval not displayed.    Recent Lab Results       07/20/18  0824 07/20/18  0823 07/20/18  0404 07/20/18  0400 07/20/18  0006      Immature Granulocytes    2.4(H)      Immature Grans (Abs)    0.26  Comment:  Mild elevation in immature granulocytes is non specific and   can be  seen in a variety of conditions including stress response,   acute inflammation, trauma and pregnancy. Correlation with other   laboratory and clinical findings is essential.  (H)      Aerobic Culture - Cath tip          Albumin    1.9(L)      Alkaline Phosphatase    58      Allens Test N/A         ALT    12      Anion Gap    10      AST    14      Baso #    0.01      Basophil%    0.1      Total Bilirubin    0.4  Comment:  For infants and newborns, interpretation of results should be based  on gestational age, weight and in agreement with clinical  observations.  Premature Infant recommended reference ranges:  Up to 24 hours.............<8.0 mg/dL  Up to 48 hours............<12.0 mg/dL  3-5 days..................<15.0 mg/dL  6-29 days.................<15.0 mg/dL        Site Other         BUN, Bld    60(H)      Calcium    9.2      Chloride    102      CO2    34(H)      Creatinine    1.2      DelZendyPlaces Adult Vent         Differential Method    Automated      eGFR if     >60.0      eGFR if non     >60.0  Comment:  Calculation used to obtain the estimated glomerular filtration  rate (eGFR) is the CKD-EPI equation.         Eos #    0.1      Eosinophil%    1.1      FiO2 50         Glucose    131(H)      Gran # (ANC)    9.0(H)      Gran%    84.8(H)      Hematocrit    22.9(L)      Hemoglobin    6.9(L)      Coumadin Monitoring INR    1.0  Comment:  Coumadin Therapy:  2.0 - 3.0 for INR for all indicators except mechanical heart valves  and antiphospholipid syndromes which should use 2.5 - 3.5.        Lymph #    0.6(L)      Lymph%    5.3(L)      Magnesium    2.5      MCH    28.5      MCHC    30.1(L)      MCV    95      Mode AC/PRVC         Mono #    0.7      Mono%    6.3      MPV    11.3      nRBC    1(A)      PEEP 6         Platelets    148(L)      POC BE 12         POC HCO3 37.1(H)         POC PCO2 66.2(H)         POC PH 7.357         POC PO2 40         POC SATURATED O2 71(L)         POC TCO2  39(H)         POCT Glucose  165(H) 172(H)  156(H)     Potassium    3.9      Total Protein    6.3      Protime    10.6      Rate 16         RBC    2.42(L)      RDW    15.4(H)      Sample VENOUS         Sodium    146(H)      Sp02 100         Vancomycin-Trough          Vt 450         WBC    10.65                  07/19/18  2240 07/19/18  2052 07/19/18  1921 07/19/18  1851 07/19/18  1618      Immature Granulocytes          Immature Grans (Abs)          Aerobic Culture - Cath tip    No growth[P]      Albumin          Alkaline Phosphatase          Allens Test   N/A  N/A     ALT          Anion Gap 8         AST          Baso #          Basophil%          Total Bilirubin          Site   Other  Other     BUN, Bld 60(H)         Calcium 9.3         Chloride 102         CO2 35(H)         Creatinine 1.3         DelSys   Adult Vent  Adult Vent     Differential Method          eGFR if  >60.0         eGFR if non  >60.0  Comment:  Calculation used to obtain the estimated glomerular filtration  rate (eGFR) is the CKD-EPI equation.            Eos #          Eosinophil%          FiO2   50  50     Glucose 177(H)         Gran # (ANC)          Gran%          Hematocrit          Hemoglobin          Coumadin Monitoring INR          Lymph #          Lymph%          Magnesium          MCH          MCHC          MCV          Mode   AC/PRVC  AC/PRVC     Mono #          Mono%          MPV          nRBC          PEEP   6  6     Platelets          POC BE   4  13     POC HCO3   29.2(H)  38.3(H)     POC PCO2   52.5(H)  64.2(H)     POC PH   7.354  7.384     POC PO2   39(LL)  36(LL)     POC SATURATED O2   70(L)  65(L)     POC TCO2   31(H)  40(H)     POCT Glucose  140(H)        Potassium 4.2         Total Protein          Protime          Rate   16  16     RBC          RDW          Sample   VENOUS  VENOUS     Sodium 145         Sp02   99       Vancomycin-Trough 30.0(H)         Vt   450  450     WBC                       07/19/18  1612 07/19/18  1611 07/19/18  1223 07/19/18  0858 07/19/18  0857      Immature Granulocytes          Immature Grans (Abs)          Aerobic Culture - Cath tip          Albumin          Alkaline Phosphatase          Allens Test          ALT          Anion Gap 8   11      AST          Baso #          Basophil%          Total Bilirubin          Site          BUN, Bld 58(H)   56(H)      Calcium 9.1   9.8      Chloride 101   103      CO2 33(H)   32(H)      Creatinine 1.3   1.2      DelSys          Differential Method          eGFR if  >60.0   >60.0      eGFR if non  >60.0  Comment:  Calculation used to obtain the estimated glomerular filtration  rate (eGFR) is the CKD-EPI equation.      >60.0  Comment:  Calculation used to obtain the estimated glomerular filtration  rate (eGFR) is the CKD-EPI equation.         Eos #          Eosinophil%          FiO2          Glucose 203(H)   134(H)      Gran # (ANC)          Gran%          Hematocrit          Hemoglobin          Coumadin Monitoring INR          Lymph #          Lymph%          Magnesium          MCH          MCHC          MCV          Mode          Mono #          Mono%          MPV          nRBC          PEEP          Platelets          POC BE          POC HCO3          POC PCO2          POC PH          POC PO2          POC SATURATED O2          POC TCO2          POCT Glucose  234(H) 226(H)  144(H)     Potassium 4.4   4.5      Total Protein          Protime          Rate          RBC          RDW          Sample          Sodium 142   146(H)      Sp02          Vancomycin-Trough          Vt          WBC                        All pertinent labs within the past 24 hours have been reviewed.    Significant Imaging:  I have reviewed all pertinent imaging results/findings within the past 24 hours.  I have reviewed and interpreted all pertinent imaging results/findings within the past 24 hours.    Assessment/Plan:     Acute respiratory  failure with hypoxia    · Continue diuresis per primary team - Net fluid balance +270 mL in last 24 hours.   · No substantial improvement on CXR comapred to prior day   · Continue abx regimen - ET aspirate culture + for MSSA staph   · Continue current respiratory therapies   · Vent settings: RR 16,  mL (6cc/kg), PEEP 6, FiO2 50%. Ppeak 28, Pplat 23  · Per nursing, pulse ox reading very labile, however oxygenation likely adequate on current settings based on ABG.   · SBT/sedation holiday planned for today per primary team. Nursing to notify pulmonology fellow prior to SBT being performed. SBT should be performed with a T-piece   ·  Cardiac insufficiencies likely to be limiting factor in patient's potential recovery.          Recent Labs  Lab 07/20/18  0824   PH 7.357   PCO2 66.2*   PO2 40   HCO3 37.1*   POCSATURATED 71*   BE 12   VBG        Sarcoidosis of lung    · Sarcoid appears to have minimal contribution to current condition.   · Continue steroids as prescribed and taper once condition improves.          Plan discussed with attending Dr. Melgoza, further recommendations as per attending addendum. Please feel free to call with any questions or concerns.    Ruben Bermudez MD  Resident Physician, PGY1       Ruben Bermudez MD  Pulmonology  [facility]
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
## 2018-07-20 NOTE — PROGRESS NOTES
[facility]  Heart Transplant  Progress Note    Patient Name: Yonathan Good Jr.  MRN: [id number]  Admission Date: 7/7/2018  Hospital Length of Stay: 13 days  Attending Physician: Kimberly Lazo MD  Primary Care Provider: Edgar Gates MD  Principal Problem:Ventricular tachycardia, incessant    Subjective:     Interval History: CVP 14, SVO2 71%. No BM documented since 7/9. Hypoactive BS this am, discussed obtaining KUB with family then possibly SBT later this afternoon.     Continuous Infusions:   amiodarone in dextrose 5% 0.5 mg/min (07/20/18 1400)    fentanyl 200 mcg/hr (07/20/18 1400)    furosemide (LASIX) 2 mg/mL infusion (non-titrating) 10 mg/hr (07/20/18 1400)    norepinephrine bitartrate-D5W 0.02 mcg/kg/min (07/20/18 1400)    propofol 50 mcg/kg/min (07/20/18 1400)     Scheduled Meds:   albuterol sulfate  2.5 mg Nebulization Q4H    aspirin  81 mg Oral Daily    atorvastatin  40 mg Oral Daily    budesonide  0.5 mg Nebulization Q12H    ceFAZolin  1 g Intravenous Q8H    chlorhexidine  15 mL Mouth/Throat BID    chlorhexidine  15 mL Mouth/Throat BID    docusate  100 mg Oral Daily    fluticasone-vilanterol  1 puff Inhalation Daily    gabapentin  600 mg Oral BID    heparin (porcine)  5,000 Units Subcutaneous Q8H    levETIRAcetam  500 mg Oral BID    metoprolol  2.5 mg Intravenous Q4H    mexiletine  150 mg Oral Q8H    pantoprozole (PROTONIX) 40 mg/100 mL D5W IVPB  40 mg Intravenous BID    [START ON 7/21/2018] predniSONE  30 mg Oral Daily    sennosides 8.8 mg/5 ml  5 mL Oral QHS    sodium chloride 0.9%  3 mL Intravenous Q8H     PRN Meds:sodium chloride, ALPRAZolam, benzonatate, calcium gluconate IVPB, calcium gluconate IVPB, calcium gluconate IVPB, carisoprodol, dextrose 50%, glucagon (human recombinant), HYDROcodone-acetaminophen, insulin aspart U-100, magnesium sulfate IVPB, magnesium sulfate IVPB, nitroGLYCERIN, potassium chloride in water **AND** potassium chloride in water  **AND** potassium chloride in water, sodium phosphate IVPB, sodium phosphate IVPB, sodium phosphate IVPB    Review of patient's allergies indicates:  No Known Allergies  Objective:     Vital Signs (Most Recent):  Temp: 97.8 °F (36.6 °C) (07/20/18 1100)  Pulse: 80 (07/20/18 1517)  Resp: 16 (07/20/18 1517)  BP: (!) 97/57 (07/20/18 1415)  SpO2: 100 % (07/20/18 1517) Vital Signs (24h Range):  Temp:  [97.8 °F (36.6 °C)-98.4 °F (36.9 °C)] 97.8 °F (36.6 °C)  Pulse:  [80-89] 80  Resp:  [16-42] 16  SpO2:  [89 %-100 %] 100 %  BP: ()/(47-83) 97/57     Patient Vitals for the past 72 hrs (Last 3 readings):   Weight   07/20/18 0400 106.5 kg (234 lb 12.6 oz)   07/18/18 0455 109 kg (240 lb 4.8 oz)     Body mass index is 32.75 kg/m².      Intake/Output Summary (Last 24 hours) at 07/20/18 1526  Last data filed at 07/20/18 1500   Gross per 24 hour   Intake          3889.05 ml   Output             4370 ml   Net          -480.95 ml     Hemodynamic Parameters:    Telemetry: paced at 80 bpm    Physical Exam   Constitutional: He appears well-developed and well-nourished. He is intubated.   Daughter and brother at bedside   HENT:   Head: Normocephalic and atraumatic.   Neck: Normal range of motion. Neck supple. JVD: difficult to assess on ventialtor.   Right TLC   Cardiovascular:   Warm and well perfused in upper and lower extremities    Pulmonary/Chest: He is intubated.   Coarse breath sounds, ventilated.    Abdominal: Soft. Bowel sounds are normal. He exhibits no distension. There is no tenderness.   Musculoskeletal: He exhibits no edema.   Neurological:   intubated and sedateD   Skin: Skin is warm and dry.   Nursing note and vitals reviewed.    Significant Labs:  CBC:    Recent Labs  Lab 07/18/18  0310 07/19/18  0330 07/20/18  0400   WBC 14.76* 12.18 10.65   RBC 2.47* 2.50* 2.42*   HGB 7.2* 7.1* 6.9*   HCT 23.2* 23.6* 22.9*    148* 148*   MCV 94 94 95   MCH 29.1 28.4 28.5   MCHC 31.0* 30.1* 30.1*     BNP:  No results for  input(s): BNP in the last 168 hours.    Invalid input(s): BNPTRIAGELBLO  CMP:    Recent Labs  Lab 07/18/18  0310  07/19/18  0330  07/19/18  2240 07/20/18  0400 07/20/18  0743   *  < > 140*  < > 177* 131* 139*   CALCIUM 9.2  < > 9.2  < > 9.3 9.2 9.3   ALBUMIN 2.0*  --  1.9*  --   --  1.9*  --    PROT 6.1  --  6.2  --   --  6.3  --    *  < > 144  < > 145 146* 145   K 3.7  < > 3.9  < > 4.2 3.9 4.5   CO2 33*  < > 35*  < > 35* 34* 32*     < > 101  < > 102 102 103   BUN 53*  < > 54*  < > 60* 60* 65*   CREATININE 1.0  < > 1.1  < > 1.3 1.2 1.2   ALKPHOS 65  --  56  --   --  58  --    ALT 14  --  11  --   --  12  --    AST 17  --  15  --   --  14  --    BILITOT 0.7  --  0.5  --   --  0.4  --    < > = values in this interval not displayed.   Coagulation:     Recent Labs  Lab 07/17/18  0714 07/18/18  0310 07/19/18  0330 07/20/18  0400   INR 1.0 1.1 1.0 1.0   APTT <21.0  --   --   --      LDH:  No results for input(s): LDH in the last 72 hours.  Microbiology:  Microbiology Results (last 7 days)     Procedure Component Value Units Date/Time    Blood culture [[id number]] Collected:  07/17/18 0930    Order Status:  Completed Specimen:  Blood from Peripheral, Antecubital, Right Updated:  07/20/18 1212     Blood Culture, Routine No Growth to date     Blood Culture, Routine No Growth to date     Blood Culture, Routine No Growth to date     Blood Culture, Routine No Growth to date    Blood culture [[id number]] Collected:  07/17/18 0950    Order Status:  Completed Specimen:  Blood from Peripheral, Antecubital, Right Updated:  07/20/18 1048     Blood Culture, Routine Gram stain aer bottle: Gram positive cocci in clusters resembling Staph     Blood Culture, Routine Results called to and read back by:Javon Hatfield RN 07/18/2018  17:50     Blood Culture, Routine --     COAGULASE-NEGATIVE STAPHYLOCOCCUS SPECIES  Organism is a probable contaminant      IV catheter culture [[id number]] Collected:  07/19/18 7338    Order Status:   Completed Specimen:  Catheter Tip from Catheter Tip, Intrajugular Updated:  07/20/18 0723     Aerobic Culture - Cath tip No growth    Narrative:       Culture [facility]    Blood culture [[id number]] Collected:  07/18/18 1803    Order Status:  Completed Specimen:  Blood from Peripheral, Forearm, Right Updated:  07/19/18 2212     Blood Culture, Routine No Growth to date     Blood Culture, Routine No Growth to date    Blood culture [[id number]] Collected:  07/18/18 1803    Order Status:  Completed Specimen:  Blood from Peripheral, Antecubital, Left Updated:  07/19/18 2212     Blood Culture, Routine No Growth to date     Blood Culture, Routine No Growth to date    Fungus culture [[id number]] Collected:  07/17/18 1154    Order Status:  Completed Specimen:  Respiratory from Sputum Updated:  07/19/18 1054     Fungus (Mycology) Culture Culture in progress    Blood culture [[id number]] Collected:  07/13/18 1309    Order Status:  Completed Specimen:  Blood from Peripheral, Hand, Left Updated:  07/18/18 1812     Blood Culture, Routine No growth after 5 days.    Blood culture [[id number]] Collected:  07/13/18 1350    Order Status:  Completed Specimen:  Blood from Peripheral, Hand, Right Updated:  07/18/18 1812     Blood Culture, Routine No growth after 5 days.    Culture, Respiratory with Gram Stain [[id number]]  (Susceptibility) Collected:  07/13/18 1129    Order Status:  Completed Specimen:  Respiratory from Endotracheal Aspirate Updated:  07/17/18 1057     Respiratory Culture --     STAPHYLOCOCCUS AUREUS  Moderate  Normal respiratory peng also present       Gram Stain (Respiratory) Few WBC's     Gram Stain (Respiratory) No organisms seen          I have reviewed all pertinent labs within the past 24 hours.    Estimated Creatinine Clearance: 86.4 mL/min (based on SCr of 1.2 mg/dL).    Diagnostic Results:  I have reviewed and interpreted all pertinent imaging results/findings within the past 24 hours.    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      He was initially admitted to "stroke/neuro" service on 6/22 with dysarthria and stroke-like symptoms. Extensive work up was negative for recurrent CVA and so no tPA was administered. Patient developed atypical chest pain two days into his hospital course and was noted to be in monomorphic VT (6/24) which was treated with ATP then with shock due to recurrent VT in VF zone. Given his active cardiac issues this prompted transfer to heart failure service where the patient was initiated on IV amiodarone and beta blocker with subsequent resolution of his VT. He was eventually transitioned to PO amiodarone 400 mg BID x 2 weeks (from 6/25-7/9) followed by amiodarone 400 mg daily thereafter per EP recommendations. Of note patient underwent LHC on 6/25 given his extensive ischemic history which did not reveal a culprit lesion, therefore no interventions were done. He was noted to have an elevated LVEDP to 45 however and was administered IV diuresis before being transitioned back to his PO diuretic regimen. Patient also completed heart failure pathway during his hospital course (eg completed colonoscopy on 7/2) as part of his work up for advanced options. Follows with Dr. Berman of heart failure/transplant as an outpatient.      The patient was discharged home in stable condition on 7/4/2018. He was dc'ed on warf/lovenox bridge given CHADS2-VaSc of 5.  Of note, he is no longer a candidate for AO.  He presented yesterday to [facility] with BRBPR and was found to be in acute renal failure as well.  He notes that, on the night of the 5th and morning of 6th, he had 6 bright red bloody bm's.  He went to his PMD who noted he might have internal hemmorhoids.  He was told to stop his lovenox.  He then " returned home and flet very lightheaded and dizzy.  He wisely took his blood pressure and noted it was 70-80/50s whereas it is normally 117/70s.  He went in to ED immediately.  There he was noted to have the following pertinent lab values:  Hg 9.2 (11.7 prior)  INR 2.9  Na 131 (139 prior)  K 6.18  BUN 43 (18 prior)  Cr 2.88 (1.1-1.4 at baseline)  proBNP 2755  He was transferred to [facility] for further evaluation and care.  Of note, he underwent screening c-scope on 7/2 during which the following was noted and done: Diverticulosis in the sigmoid colon and in the descending colon, Two 8 to 10 mm polyps in the sigmoid colon and in the descending colon, removed with a hot snare, resected and retrieved, ligated.        * VT Storm    -VT storm 7/10/18. Polymorphic and monomorphic. Degenerated into VF. 12 ICD shocks  -Intubated, IABP placed emergently at bedside.   -Amio loaded x 2, gtt started per protocol.  Now on amio 200mg PO TID. Lidocaine gtt started at 1, increased to 2 when patient had more VT-->VF and AICD shocks. Off lidocaine, continue mexilitene TID  -EP consulted, appreciate their assistance. Pacing rate increased to 80, metoprolol IV added.  -Continue support with IABP 1:1   -K goal >4.5, Mg >2.5  - regimen per EP recs one patient is able to take PO   - Toprol 150 mg, mexiletine 200 BID, Amio 200 TID for 1 weeks, 200 BID for 4 weeks, 300 QD thereafter  - As of 7/17 now with repeat VF-->VT shocked 4 times. EP re-consulted. Amio 300 loading dose followed by 1mg/min and will continue mexil and metoprolol per their recs. No more VT over last 24 hours  - Per EP-At a later time, it may be considered to deactivate the LV lead of the patient's CRT-D, considering the patient's LVAD status and native RBBB. ICD interrogation reveals LICHA        Hypotension    - Levophed started during code 7/10/18  - Wean levo for MAP <60 on IABP        Cardiogenic shock    - IABP placed emergently 7/10/18  - Daily CXR   - Augmenting well at  1:1. SVO2 71%, CVP 14.   - Continue lasix gtt @ 10 mg/hr        Encounter for management of intra-aortic balloon pump    -IABP placed 7/10/2018  -Daily CXR        Respiratory failure requiring intubation    -Emergently intubated 7/10/18 for impending respiratory failure/need for IABP and inability to lay fat  -History of pulmonary sarcoidosis  -Methylpred given 7/10/18, hydrocortisone 100 mg q8 started 7/11/18. Transitioned to PO prednisone 7/16.   -Pulmonary consult for assistance with sarcoid & vent management.  -CXR daily  -Spoke with pulm at bedside today, CXR improved in lung parenchyma but more pulmonary congestion. S. Aureus in sputum and now in blood, appreciate ID recs.    - concern for extubating with patient unable to sit up (due to IABP). Coughs and was unable to lie flat prior to intubation, concerned he will not be able to protect his airway.        Constipation    - last BM Monday 7/9.  - Hypoactive BS this am, KUB obtained. Enema administered with no BM. Will CT scan to ensure not missing any bowel pathology in this patient with risk factors   -TFs on hold since this am for possible SBT          VAP (ventilator-associated pneumonia)    -Being covered with vanc/cefepime. De-escalate to ancef.   -Cultures now with S. Aureus in sputum, now with +jesenia blood cultures on 7/17 (GPC's resembling staph)   - remove RIJ for infection control and replace in L IJ. Will wean IABP if able to remove.  - UA clear  -repeated sputum cultures, blood cultures, UA and sent fungal studies 7/17. WBCs down and afebrile overnight. Appreciate ID assistance.          History of stroke    -On coumadin prior to admit  -Hold off on systemic anticoagulation for now.        Ventricular fibrillation    -See VT        Chronic systolic CHF (congestive heart failure)    - See cardiogenic shock        GI bleed    - INR subtherapeutic  - Coumadin on hold anticoagulation has been on hold in setting of recent GI bleed. Has been receiving DVT  PPx  - Protonix increased to 40 mg BID (changed to IV)  - Had screening colonoscopy with hot snare polypectomy during last admission; most likely culprit post polypectomy bleeding  - H & H slowly trending down. Continue to monitor closely.        History of atrial fibrillation    -  continue amio         Abnormal involuntary movements    - Continue keppra  - possible seizure activity noted on 7/12/18 (twitching right face and UEs lasting 5 min). Some Bilateral UE twitching which did not appear to be seizure like occurred on 7/18, resolved with increased sedation. If noted again will reach out to neuro.   - neuro consulted and increased keppra to 1000 IV 12   - continuous EEG read and negative for seizure activity   - will start de-escalation of keppra and change to PO per neuro recs        CAD (coronary artery disease)    - Continue atorvastatin, nitro prn  - ASA restarted        Sarcoidosis of lung    - Continue prednisone, breo-ellipta, and albuterol prn.   - Wean pred          Uninterrupted Critical Care/Counseling Time (not including procedures): 45 minutes    Catalina Paredes PA-C  Heart Transplant  [facility]

## 2018-07-20 NOTE — PROCEDURES
"Yonathan Good Jr. is a 57 y.o. male patient.    Temp: 98.4 °F (36.9 °C) (07/19/18 1500)  Pulse: 80 (07/19/18 1915)  Resp: 16 (07/19/18 1915)  BP: 123/75 (07/19/18 1915)  SpO2: 99 % (07/19/18 1915)  Weight: 109 kg (240 lb 4.8 oz) (07/18/18 0455)  Height: 5' 11" (180.3 cm) (07/07/18 0201)    PICC  Date/Time: 7/19/2018 7:29 PM  Performed by: MAX TAVERAS.  Pre-operative Diagnosis: cardiogenic shock  Post-operative diagnosis: cardiogenic shock  Consent Done: Yes  Time out: Immediately prior to procedure a time out was called to verify the correct patient, procedure, equipment, support staff and site/side marked as required  Indications: med administration, vascular access and hemodynamic monitoring  Anesthesia: local infiltration  Local anesthetic: lidocaine 1% without epinephrine  Anesthetic Total (mL): 5  Preparation: skin prepped with ChloraPrep  Skin prep agent dried: skin prep agent completely dried prior to procedure  Sterile barriers: all five maximum sterile barriers used - cap, mask, sterile gown, sterile gloves, and large sterile sheet  Hand hygiene: hand hygiene performed prior to central venous catheter insertion  Location details: right internal jugular  Catheter size: 7 Fr  Catheter Length: 15cm    Ultrasound guidance: yes  Vessel Caliber: medium and patent, compressibility normal  Vascular Doppler: not done  Needle advanced into vessel with real time Ultrasound guidance.  Guidewire confirmed in vessel.  Sterile sheath used.  Manometry: esophageal manometry  Number of attempts: 1  Post-procedure: blood return through all ports, chlorhexidine patch, line sutured and sterile dressing applied  Estimated blood loss (mL): 1    Assessment: placement verified by x-ray and successful placement  Complications: none          Max Taveras  7/19/2018    "

## 2018-07-20 NOTE — ASSESSMENT & PLAN NOTE
- INR subtherapeutic  - Coumadin on hold anticoagulation has been on hold in setting of recent GI bleed. Has been receiving DVT PPx  - Protonix increased to 40 mg BID (changed to IV)  - Had screening colonoscopy with hot snare polypectomy during last admission; most likely culprit post polypectomy bleeding  - H & H slowly trending down. Continue to monitor closely.

## 2018-07-20 NOTE — ASSESSMENT & PLAN NOTE
· Continue diuresis per primary team - Net fluid balance +270 mL in last 24 hours.   · No substantial improvement on CXR comapred to prior day   · Continue abx regimen - ET aspirate culture + for MSSA staph   · Continue current respiratory therapies   · Vent settings: RR 16,  mL (6cc/kg), PEEP 6, FiO2 50%. Ppeak 28, Pplat 23  · Per nursing, pulse ox reading very labile, however oxygenation likely adequate on current settings based on ABG.   · SBT/sedation holiday planned for today per primary team.  ·  Cardiac insufficiencies likely to be limiting factor in patient's potential recovery.          Recent Labs  Lab 07/20/18  0824   PH 7.357   PCO2 66.2*   PO2 40   HCO3 37.1*   POCSATURATED 71*   BE 12   VBG

## 2018-07-20 NOTE — PT/OT/SLP PROGRESS
Physical Therapy      Patient Name:  Yonathan Good Jr.   MRN:  [id number]    Patient not seen today secondary to Unavailable (Comment). Patient intubated/sedated and. femoral IABP. Will follow up as appropriate.    Marian Ni, PT

## 2018-07-20 NOTE — ASSESSMENT & PLAN NOTE
- IABP placed emergently 7/10/18  - Daily CXR   - Augmenting well at 1:1. SVO2 71%, CVP 14.   - Continue lasix gtt @ 10 mg/hr

## 2018-07-20 NOTE — ASSESSMENT & PLAN NOTE
57 year old male, ID consulted for possible VAP in setting of leukocytosis and fever.     - Currently on vancomycin and cefepime  - Leukocytosis continues trending down   - afebrile  - Prednisone 40   - ?cardiogenic pulmonary edema vs PNA  - 7/13 sputum culture grew S. Aureus - covered by current abx regimen  - 7/17 blood culture grew coag negative staph in 1/4 bottles from a peripheral stick, likely contaminant - covered by current abx regime   - Aspergillus negative  - Urine Histo and Fungitel pending     Recommend DC Cefepime and Vancomycin, start Cefazolin for MSSA in sputum to finish 10 day antibiotic course

## 2018-07-20 NOTE — ASSESSMENT & PLAN NOTE
-VT storm 7/10/18. Polymorphic and monomorphic. Degenerated into VF. 12 ICD shocks  -Intubated, IABP placed emergently at bedside.   -Amio loaded x 2, gtt started per protocol.  Now on amio 200mg PO TID. Lidocaine gtt started at 1, increased to 2 when patient had more VT-->VF and AICD shocks. Off lidocaine, continue mexilitene TID  -EP consulted, appreciate their assistance. Pacing rate increased to 80, metoprolol IV added.  -Continue support with IABP 1:1   -K goal >4.5, Mg >2.5  - regimen per EP recs one patient is able to take PO   - Toprol 150 mg, mexiletine 200 BID, Amio 200 TID for 1 weeks, 200 BID for 4 weeks, 300 QD thereafter  - As of 7/17 now with repeat VF-->VT shocked 4 times. EP re-consulted. Amio 300 loading dose followed by 1mg/min and will continue mexil and metoprolol per their recs. No more VT over last 24 hours  - Per EP-At a later time, it may be considered to deactivate the LV lead of the patient's CRT-D, considering the patient's LVAD status and native RBBB. ICD interrogation reveals LICHA

## 2018-07-20 NOTE — SUBJECTIVE & OBJECTIVE
Interval History: Patient seen and examined, no events on tele overnight.    Review of Systems   Unable to perform ROS: intubated     Objective:     Vital Signs (Most Recent):  Temp: 97.9 °F (36.6 °C) (07/20/18 0700)  Pulse: 80 (07/20/18 1100)  Resp: 16 (07/20/18 1100)  BP: 95/61 (07/20/18 1100)  SpO2: 96 % (07/20/18 1100) Vital Signs (24h Range):  Temp:  [97.9 °F (36.6 °C)-98.4 °F (36.9 °C)] 97.9 °F (36.6 °C)  Pulse:  [80-88] 80  Resp:  [16-42] 16  SpO2:  [87 %-100 %] 96 %  BP: ()/(47-83) 95/61     Weight: 106.5 kg (234 lb 12.6 oz)  Body mass index is 32.75 kg/m².     SpO2: 96 %  O2 Device (Oxygen Therapy): ventilator    Physical Exam   Constitutional: He is oriented to person, place, and time. He appears well-developed and well-nourished.   Sedated, intubated   HENT:   Head: Normocephalic and atraumatic.   Eyes: Pupils are equal, round, and reactive to light.   Neck: Normal range of motion. No JVD present.   Cardiovascular: Intact distal pulses.    Audible balloon pump. Left groin balloon pump inserted.   Pulmonary/Chest: Effort normal and breath sounds normal. No respiratory distress. He has no wheezes. He has no rales.   Abdominal: Soft. Bowel sounds are normal. He exhibits no distension. There is no tenderness.   Musculoskeletal: Normal range of motion. He exhibits edema (trace).   Neurological: He is alert and oriented to person, place, and time.   Skin: Skin is dry. No rash noted.   Psychiatric: He has a normal mood and affect. His behavior is normal.       Significant Labs:     Recent Results (from the past 24 hour(s))   POCT glucose    Collection Time: 07/19/18 12:23 PM   Result Value Ref Range    POCT Glucose 226 (H) 70 - 110 mg/dL   POCT glucose    Collection Time: 07/19/18  4:11 PM   Result Value Ref Range    POCT Glucose 234 (H) 70 - 110 mg/dL   Basic metabolic panel    Collection Time: 07/19/18  4:12 PM   Result Value Ref Range    Sodium 142 136 - 145 mmol/L    Potassium 4.4 3.5 - 5.1 mmol/L     Chloride 101 95 - 110 mmol/L    CO2 33 (H) 23 - 29 mmol/L    Glucose 203 (H) 70 - 110 mg/dL    BUN, Bld 58 (H) 6 - 20 mg/dL    Creatinine 1.3 0.5 - 1.4 mg/dL    Calcium 9.1 8.7 - 10.5 mg/dL    Anion Gap 8 8 - 16 mmol/L    eGFR if African American >60.0 >60 mL/min/1.73 m^2    eGFR if non African American >60.0 >60 mL/min/1.73 m^2   ISTAT PROCEDURE    Collection Time: 07/19/18  4:18 PM   Result Value Ref Range    POC PH 7.384 7.35 - 7.45    POC PCO2 64.2 (H) 35 - 45 mmHg    POC PO2 36 (LL) 40 - 60 mmHg    POC HCO3 38.3 (H) 24 - 28 mmol/L    POC BE 13 -2 to 2 mmol/L    POC SATURATED O2 65 (L) 95 - 100 %    POC TCO2 40 (H) 24 - 29 mmol/L    Rate 16     Sample VENOUS     Site Other     Allens Test N/A     DelSys Adult Vent     Mode AC/PRVC     Vt 450     PEEP 6     FiO2 50    IV catheter culture    Collection Time: 07/19/18  6:51 PM   Result Value Ref Range    Aerobic Culture - Cath tip No growth    ISTAT PROCEDURE    Collection Time: 07/19/18  7:21 PM   Result Value Ref Range    POC PH 7.354 7.35 - 7.45    POC PCO2 52.5 (H) 35 - 45 mmHg    POC PO2 39 (LL) 40 - 60 mmHg    POC HCO3 29.2 (H) 24 - 28 mmol/L    POC BE 4 -2 to 2 mmol/L    POC SATURATED O2 70 (L) 95 - 100 %    POC TCO2 31 (H) 24 - 29 mmol/L    Rate 16     Sample VENOUS     Site Other     Allens Test N/A     DelSys Adult Vent     Mode AC/PRVC     Vt 450     PEEP 6     FiO2 50     Sp02 99    POCT glucose    Collection Time: 07/19/18  8:52 PM   Result Value Ref Range    POCT Glucose 140 (H) 70 - 110 mg/dL   Basic metabolic panel    Collection Time: 07/19/18 10:40 PM   Result Value Ref Range    Sodium 145 136 - 145 mmol/L    Potassium 4.2 3.5 - 5.1 mmol/L    Chloride 102 95 - 110 mmol/L    CO2 35 (H) 23 - 29 mmol/L    Glucose 177 (H) 70 - 110 mg/dL    BUN, Bld 60 (H) 6 - 20 mg/dL    Creatinine 1.3 0.5 - 1.4 mg/dL    Calcium 9.3 8.7 - 10.5 mg/dL    Anion Gap 8 8 - 16 mmol/L    eGFR if African American >60.0 >60 mL/min/1.73 m^2    eGFR if non African American >60.0  >60 mL/min/1.73 m^2   VANCOMYCIN, TROUGH before 4th dose    Collection Time: 07/19/18 10:40 PM   Result Value Ref Range    Vancomycin-Trough 30.0 (H) 10.0 - 22.0 ug/mL   POCT glucose    Collection Time: 07/20/18 12:06 AM   Result Value Ref Range    POCT Glucose 156 (H) 70 - 110 mg/dL   Comprehensive metabolic panel - if not done in ED    Collection Time: 07/20/18  4:00 AM   Result Value Ref Range    Sodium 146 (H) 136 - 145 mmol/L    Potassium 3.9 3.5 - 5.1 mmol/L    Chloride 102 95 - 110 mmol/L    CO2 34 (H) 23 - 29 mmol/L    Glucose 131 (H) 70 - 110 mg/dL    BUN, Bld 60 (H) 6 - 20 mg/dL    Creatinine 1.2 0.5 - 1.4 mg/dL    Calcium 9.2 8.7 - 10.5 mg/dL    Total Protein 6.3 6.0 - 8.4 g/dL    Albumin 1.9 (L) 3.5 - 5.2 g/dL    Total Bilirubin 0.4 0.1 - 1.0 mg/dL    Alkaline Phosphatase 58 55 - 135 U/L    AST 14 10 - 40 U/L    ALT 12 10 - 44 U/L    Anion Gap 10 8 - 16 mmol/L    eGFR if African American >60.0 >60 mL/min/1.73 m^2    eGFR if non African American >60.0 >60 mL/min/1.73 m^2   CBC auto differential    Collection Time: 07/20/18  4:00 AM   Result Value Ref Range    WBC 10.65 3.90 - 12.70 K/uL    RBC 2.42 (L) 4.60 - 6.20 M/uL    Hemoglobin 6.9 (L) 14.0 - 18.0 g/dL    Hematocrit 22.9 (L) 40.0 - 54.0 %    MCV 95 82 - 98 fL    MCH 28.5 27.0 - 31.0 pg    MCHC 30.1 (L) 32.0 - 36.0 g/dL    RDW 15.4 (H) 11.5 - 14.5 %    Platelets 148 (L) 150 - 350 K/uL    MPV 11.3 9.2 - 12.9 fL    Immature Granulocytes 2.4 (H) 0.0 - 0.5 %    Gran # (ANC) 9.0 (H) 1.8 - 7.7 K/uL    Immature Grans (Abs) 0.26 (H) 0.00 - 0.04 K/uL    Lymph # 0.6 (L) 1.0 - 4.8 K/uL    Mono # 0.7 0.3 - 1.0 K/uL    Eos # 0.1 0.0 - 0.5 K/uL    Baso # 0.01 0.00 - 0.20 K/uL    nRBC 1 (A) 0 /100 WBC    Gran% 84.8 (H) 38.0 - 73.0 %    Lymph% 5.3 (L) 18.0 - 48.0 %    Mono% 6.3 4.0 - 15.0 %    Eosinophil% 1.1 0.0 - 8.0 %    Basophil% 0.1 0.0 - 1.9 %    Differential Method Automated    Protime-INR    Collection Time: 07/20/18  4:00 AM   Result Value Ref Range     Prothrombin Time 10.6 9.0 - 12.5 sec    INR 1.0 0.8 - 1.2   Magnesium - if not done in ED    Collection Time: 07/20/18  4:00 AM   Result Value Ref Range    Magnesium 2.5 1.6 - 2.6 mg/dL   POCT glucose    Collection Time: 07/20/18  4:04 AM   Result Value Ref Range    POCT Glucose 172 (H) 70 - 110 mg/dL   Basic metabolic panel    Collection Time: 07/20/18  7:43 AM   Result Value Ref Range    Sodium 145 136 - 145 mmol/L    Potassium 4.5 3.5 - 5.1 mmol/L    Chloride 103 95 - 110 mmol/L    CO2 32 (H) 23 - 29 mmol/L    Glucose 139 (H) 70 - 110 mg/dL    BUN, Bld 65 (H) 6 - 20 mg/dL    Creatinine 1.2 0.5 - 1.4 mg/dL    Calcium 9.3 8.7 - 10.5 mg/dL    Anion Gap 10 8 - 16 mmol/L    eGFR if African American >60.0 >60 mL/min/1.73 m^2    eGFR if non African American >60.0 >60 mL/min/1.73 m^2   Vancomycin, random    Collection Time: 07/20/18  7:43 AM   Result Value Ref Range    Vancomycin, Random 22.3 Not established ug/mL   POCT glucose    Collection Time: 07/20/18  8:23 AM   Result Value Ref Range    POCT Glucose 165 (H) 70 - 110 mg/dL   ISTAT PROCEDURE    Collection Time: 07/20/18  8:24 AM   Result Value Ref Range    POC PH 7.357 7.35 - 7.45    POC PCO2 66.2 (H) 35 - 45 mmHg    POC PO2 40 40 - 60 mmHg    POC HCO3 37.1 (H) 24 - 28 mmol/L    POC BE 12 -2 to 2 mmol/L    POC SATURATED O2 71 (L) 95 - 100 %    POC TCO2 39 (H) 24 - 29 mmol/L    Rate 16     Sample VENOUS     Site Other     Allens Test N/A     DelSys Adult Vent     Mode AC/PRVC     Vt 450     PEEP 6     FiO2 50     Sp02 100    ISTAT PROCEDURE    Collection Time: 07/20/18  8:32 AM   Result Value Ref Range    POC PH 7.402 7.35 - 7.45    POC PCO2 59.5 (HH) 35 - 45 mmHg    POC PO2 86 80 - 100 mmHg    POC HCO3 37.0 (H) 24 - 28 mmol/L    POC BE 12 -2 to 2 mmol/L    POC SATURATED O2 96 95 - 100 %    POC TCO2 39 (H) 23 - 27 mmol/L    Rate 16     Sample ARTERIAL     Site RR     Allens Test Pass     DelSys Adult Vent     Mode AC/PRVC     Vt 450     PEEP 6     FiO2 50     Sp02  100

## 2018-07-20 NOTE — PROGRESS NOTES
"[facility]  Infectious Disease  Progress Note    Patient Name: Yonathan Good Jr.  MRN: [id number]  Admission Date: 7/7/2018  Length of Stay: 13 days  Attending Physician: Kimberly Lazo MD  Primary Care Provider: Edgar Gates MD    Isolation Status: No active isolations  Assessment/Plan:      VAP (ventilator-associated pneumonia)    57 year old male, ID consulted for possible VAP in setting of leukocytosis and fever.     - Currently on vancomycin and cefepime  - Leukocytosis continues trending down   - afebrile  - Prednisone 40   - ?cardiogenic pulmonary edema vs PNA  - 7/13 sputum culture grew S. Aureus - covered by current abx regimen  - 7/17 blood culture grew coag negative staph in 1/4 bottles from a peripheral stick, likely contaminant - covered by current abx regime   - Aspergillus negative  - Urine Histo and Fungitel pending     Recommend DC Cefepime and Vancomycin, start Cefazolin for MSSA in sputum to finish 10 day antibiotic course              Thank you for your consult. I will sign off. Please contact us if you have any additional questions.    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       He was initially admitted to "stroke/neuro" service on 6/22 with dysarthria and stroke-like symptoms. Extensive work up was negative for recurrent CVA and so no tPA was administered. Patient developed atypical chest pain two days into his hospital course and was noted to be in monomorphic VT (6/24) which was treated with ATP then with shock due to recurrent VT in VF zone. Given his active cardiac issues this " prompted transfer to heart failure service where the patient was initiated on IV amiodarone and beta blocker with subsequent resolution of his VT. He was eventually transitioned to PO amiodarone 400 mg BID x 2 weeks (from 6/25-7/9) followed by amiodarone 400 mg daily thereafter per EP recommendations. Of note patient underwent LHC on 6/25 given his extensive ischemic history which did not reveal a culprit lesion, therefore no interventions were done. He was noted to have an elevated LVEDP to 45 however and was administered IV diuresis before being transitioned back to his PO diuretic regimen. Patient also completed heart failure pathway during his hospital course (eg completed colonoscopy on 7/2) as part of his work up for advanced options. Follows with Dr. Berman of heart failure/transplant as an outpatient.      The patient was discharged home in stable condition on 7/4/2018. He was dc'ed on warf/lovenox bridge given CHADS2-VaSc of 5.  Of note, he is no longer a candidate for AO.  He presented yesterday to [facility] with BRBPR and was found to be in acute renal failure as well.  He notes that, on the night of the 5th and morning of 6th, he had 6 bright red bloody bm's.  He went to his PMD who noted he might have internal hemmorhoids.  He was told to stop his lovenox.  He then returned home and flet very lightheaded and dizzy.  He wisely took his blood pressure and noted it was 70-80/50s whereas it is normally 117/70s.  He went in to ED immediately.       He is now intubated in the ICU. He is on stress dose steroids. ID is consulted for possible VAP.  Past Medical History:   Diagnosis Date    AICD (automatic cardioverter/defibrillator) present     Arthritis     Atrial fibrillation     CHF (congestive heart failure)     Coronary artery disease     History of stroke 7/13/2018    2005, 2006, no deficits per wife.    Hypertension     MI (myocardial infarction)     Sarcoid     Seizures     Stroke         Past Surgical History:   Procedure Laterality Date    CARDIAC CATHETERIZATION      CARDIAC DEFIBRILLATOR PLACEMENT  7-12    CARDIAC DEFIBRILLATOR PLACEMENT      CARDIAC DEFIBRILLATOR PLACEMENT      COLONOSCOPY N/A 7/2/2018    Procedure: COLONOSCOPY;  Surgeon: THELMA Kay MD;  Location: [facility] ENDO ([address] [place] [address]);  Service: Endoscopy;  Laterality: N/A;    CORONARY STENT PLACEMENT  07/2011    ESOPHAGOGASTRODUODENOSCOPY      FRACTURE SURGERY      LEFT HEART CATHETERIZATION N/A 6/25/2018    Procedure: Left heart cath;  Surgeon: Jordi Lui MD;  Location: [facility] CATH LAB;  Service: Cardiology;  Laterality: N/A;       Review of patient's allergies indicates:  No Known Allergies    Medications:  Prescriptions Prior to Admission   Medication Sig    albuterol (PROVENTIL) 2.5 mg /3 mL (0.083 %) nebulizer solution Take 2.5 mg by nebulization every 6 (six) hours as needed.    albuterol (VENTOLIN HFA) 90 mcg/actuation inhaler Inhale 2 puffs into the lungs every 4 (four) hours as needed for Wheezing.    alprazolam (XANAX) 2 MG tablet Take 2 mg by mouth 2 (two) times daily as needed.     amiodarone (PACERONE) 400 MG tablet Take 1 tablet (400 mg total) by mouth 2 (two) times daily until 7/9/2018. Then take 1 tablet (400 mg total) by mouth 1 (one) time daily thereafter.    aspirin (ECOTRIN) 81 MG EC tablet Take 81 mg by mouth. 1 Tablet, Delayed Release (E.C.) Oral Every day    atorvastatin (LIPITOR) 40 MG tablet Take 1 tablet (40 mg total) by mouth once daily.    bumetanide (BUMEX) 1 MG tablet Take 1 mg by mouth daily as needed.    carisoprodol (SOMA) 350 MG tablet Take 350 mg by mouth 4 (four) times daily as needed for Muscle spasms.    colchicine 0.6 mg tablet 0.6 mg once daily.     enoxaparin (LOVENOX) 100 mg/mL Syrg Inject 1 mL (100 mg total) into the skin every 12 (twelve) hours.    fluticasone-vilanterol (BREO ELLIPTA) 200-25 mcg/dose DsDv diskus inhaler Inhale 1 puff into the lungs once daily.  Controller    gabapentin (NEURONTIN) 600 MG tablet Take 600 mg by mouth 2 (two) times daily. 1 Tablet Oral At bedtime    hydrocodone-acetaminophen 10-325mg (NORCO)  mg Tab Take 1 tablet by mouth 2 (two) times daily as needed.     isosorbide mononitrate (IMDUR) 30 MG 24 hr tablet Take 3 tablets (90 mg total) by mouth once daily. (Patient taking differently: Take 60 mg by mouth once daily. )    levETIRAcetam (KEPPRA) 500 MG Tab Take 1 tablet (500 mg total) by mouth 2 (two) times daily.    losartan (COZAAR) 50 MG tablet Take 1 tablet (50 mg total) by mouth once daily.    metoprolol tartrate (LOPRESSOR) 25 MG tablet Take 1 tablet (25 mg total) by mouth 2 (two) times daily.    nitroGLYCERIN (NITROSTAT) 0.4 MG SL tablet ONE TABLET UNDER TONGUE AS NEEDED FOR CHEST PAIN    pantoprazole (PROTONIX) 40 MG tablet Take 40 mg by mouth. 1 Tablet, Delayed Release (E.C.) Oral Every day    potassium chloride SA (K-DUR,KLOR-CON) 20 MEQ tablet TAKE 2 TABLETS BY MOUTH EVERY MORNING AND 1 TABLET BY MOUTH EVERY EVENING    predniSONE (DELTASONE) 10 MG tablet Take 1 tablet (10 mg total) by mouth once daily.    promethazine-codeine 6.25-10 mg/5 ml (PHENERGAN WITH CODEINE) 6.25-10 mg/5 mL syrup TK 5 ML PO  Q 6 H PRN    spironolactone (ALDACTONE) 25 MG tablet TAKE 1 TABLET BY MOUTH EVERY DAY    warfarin (COUMADIN) 7.5 MG tablet 1 tablet Monday  0.5 tablet Tuesday-Sunday    [DISCONTINUED] furosemide (LASIX) 40 MG tablet TAKE 2 TABLETS BY MOUTH TWICE DAILY     Antibiotics     Start     Stop Route Frequency Ordered    07/13/18 1145  ceFEPIme injection 2 g      -- IV Every 8 hours (non-standard times) 07/13/18 1031    07/13/18 1145  vancomycin in dextrose 5 % 1 gram/250 mL IVPB 1,000 mg  (Vancomycin IVPB with levels panel)      -- IV Every 12 hours (non-standard times) 07/13/18 1031        Antifungals     None        Antivirals     None           Immunization History   Administered Date(s) Administered    Influenza -  Quadrivalent - PF 10/23/2014       Family History     Problem Relation (Age of Onset)    Cancer Maternal Grandmother    Coronary artery disease Father, Sister, Sister    Heart disease Mother, Father, Sister    Hypertension Mother, Father, Sister    Kidney disease Sister    Stroke Sister    Vision loss Sister        Social History     Social History    Marital status: Other     Spouse name: N/A    Number of children: N/A    Years of education: N/A     Social History Main Topics    Smoking status: Never Smoker    Smokeless tobacco: Never Used    Alcohol use No    Drug use: No    Sexual activity: Not Asked     Other Topics Concern    None     Social History Narrative    None     Review of Systems   Unable to perform ROS: Intubated     Objective:     Vital Signs (Most Recent):  Temp: 97.8 °F (36.6 °C) (07/20/18 1100)  Pulse: 80 (07/20/18 1300)  Resp: 16 (07/20/18 1300)  BP: 96/61 (07/20/18 1300)  SpO2: (!) 92 % (07/20/18 1300) Vital Signs (24h Range):  Temp:  [97.8 °F (36.6 °C)-98.4 °F (36.9 °C)] 97.8 °F (36.6 °C)  Pulse:  [80-89] 80  Resp:  [16-42] 16  SpO2:  [89 %-100 %] 92 %  BP: ()/(47-83) 96/61     Weight: 106.5 kg (234 lb 12.6 oz)  Body mass index is 32.75 kg/m².    Estimated Creatinine Clearance: 86.4 mL/min (based on SCr of 1.2 mg/dL).    Physical Exam   Constitutional: He is oriented to person, place, and time. He appears well-developed and well-nourished. He is sedated and intubated.   HENT:   Head: Normocephalic and atraumatic.   Eyes: EOM are normal. Pupils are equal, round, and reactive to light.   Neck: Normal range of motion. Neck supple. No JVD (difficult to assess (patient on ventilator & flat in bed)) present.   Right CVC in place   Cardiovascular: Normal rate and regular rhythm.    IABP 1:1. IABP site clean/dry/intact. Feet slightly cool to touch, otherwise warm and well perfused.    Pulmonary/Chest: Effort normal. He is intubated. No respiratory distress.   Coarse breath sounds.  Ventilated   Abdominal: Soft. He exhibits no distension. Bowel sounds are decreased. There is no tenderness.   Musculoskeletal: Normal range of motion. He exhibits no edema.   Neurological: He is alert and oriented to person, place, and time.   Skin: Skin is warm and dry.   Nursing note and vitals reviewed.      Significant Labs: All pertinent labs within the past 24 hours have been reviewed.    Significant Imaging: I have reviewed all pertinent imaging results/findings within the past 24 hours.

## 2018-07-20 NOTE — PROGRESS NOTES
Update:    SW met with pt's dtr and sister in waiting room. Both aaox4 and communicative. Pt's dtr reports coping better today and still hoping pt can recover and get a heart transplant. SW providing active listening and emotional support. Pt's family reports no other need right now. SW providing ongoing psychosocial and counseling support, education, assistance, resources, and discharge planning as indicated. SW continuing to follow and remains available.

## 2018-07-20 NOTE — PROGRESS NOTES
" [facility]  Adult Nutrition  Progress Note    SUMMARY       Recommendations  Recommendation/Intervention:   1. Continue current TF - meets EEN (w/propofol) and EPN.    -If patient no longer on propofol, recommend Impact Peptide 1.5 at a goal rate of 55 mL/hr - to provide 1980 kcal/day, 124g protein/day, and 1016mL free fluid/day.   2. When able to extubate, ADAT to Cardiac with texture per SLP.   RD to monitor.    Goals: Patient to receive nutrition by RD follow-up  Nutrition Goal Status: goal met  Communication of RD Recs:  (POC)    Reason for Assessment  Reason for Assessment: RD follow-up  Diagnosis: cardiac disease (VT storm)  Relevant Medical History: CAD, CHF, NICM, Afib, CVA, HTN  General Information Comments: Patient intubated, sedated. IABP in place. Tolerating TF at goal rate. (Noted patient appears well nourished and no weight loss per chart review. RD does not feel patient meets crtieria for malnutrition at this time.)  Nutrition Discharge Planning: Unable to determine at this time.    Nutrition Risk Screen  Nutrition Risk Screen: no indicators present    Nutrition/Diet History  Do you have any cultural, spiritual, Religion conflicts, given your current situation?: no  Factors Affecting Nutritional Intake: NPO, on mechanical ventilation    Anthropometrics  Temp: 97.9 °F (36.6 °C)  Height: 5' 11" (180.3 cm)  Height (inches): 71 in  Weight Method: Bed Scale  Weight: 106.5 kg (234 lb 12.6 oz)  Weight (lb): 234.79 lb  Ideal Body Weight (IBW), Male: 172 lb  % Ideal Body Weight, Male (lb): 128.17 lb  BMI (Calculated): 30.8  BMI Grade: 30 - 34.9- obesity - grade I  Usual Body Weight (UBW), k.4 kg (3/2018 per chart review)  % Usual Body Weight: 97.79  % Weight Change From Usual Weight: -2.42 %    Lab/Procedures/Meds  Pertinent Labs Reviewed: reviewed  Pertinent Labs Comments: Na 146, BUN 60, Glu 131, Alb 1.9  Pertinent Medications Reviewed: reviewed  Pertinent Medications Comments: " docusate, pantoprazole, amiodarone, fentnayl, lasix, leviophed, propofol    Physical Findings/Assessment  Overall Physical Appearance: on ventilator support, nourished  Tubes: nasogastric tube  Oral/Mouth Cavity: tooth/teeth missing  Skin: edema    Estimated/Assessed Needs  Weight Used For Calorie Calculations: 106.5 kg (234 lb 12.6 oz)  Energy Calorie Requirements (kcal): 2001 kcal/day  Energy Need Method: [facility]  Protein Requirements: 118-157 g/day (1.5-2.0 g/kg)  Weight Used For Protein Calculations: 78.2 kg (172 lb 6.4 oz) (IBW)  Fluid Requirements (mL): per MD  RDA Method (mL): 2001    Nutrition Prescription Ordered  Current Diet Order: NPO  Current Nutrition Support Formula Ordered: Peptamen Intense VHP  Current Nutrition Support Rate Ordered: 55 (ml)  Current Nutrition Support Frequency Ordered: mL/hr    Evaluation of Received Nutrient/Fluid Intake  Enteral Calories (kcal): 1320  Enteral Protein (gm): 121  Enteral (Free Water) Fluid (mL): 1109  Other Calories (kcal): 800 (propofol)  Total Calories (kcal): 2120  % Kcal Needs: 106  % Protein Needs: 100  I/O: -2.1L since admit  Energy Calories Required: meeting needs  Protein Required: meeting needs  Fluid Required:  (per MD)  Comments: LBM 7/10  Tolerance: tolerating  % Intake of Estimated Energy Needs: 75 - 100 %  % Meal Intake: NPO    Nutrition Risk  Level of Risk/Frequency of Follow-up: high (2x/week)     Assessment and Plan  Nutrition Problem  Inadequate energy intake     Related to (etiology):   Decreased ability to consume sufficient energy     Signs and Symptoms (as evidenced by):   NPO, intubated/sedated with no alternative means of nutrition at this time      Nutrition Diagnosis Status:   Resolved    Monitor and Evaluation  Food and Nutrient Intake: energy intake, enteral nutrition intake  Food and Nutrient Adminstration: enteral and parenteral nutrition administration  Anthropometric Measurements: weight, weight change, body mass  index  Biochemical Data, Medical Tests and Procedures: electrolyte and renal panel, gastrointestinal profile, inflammatory profile  Nutrition-Focused Physical Findings: overall appearance     Nutrition Follow-Up  RD Follow-up?: Yes

## 2018-07-20 NOTE — ASSESSMENT & PLAN NOTE
- last BM Monday 7/9.  - Hypoactive BS this am, KUB obtained. Enema administered with no BM. Will CT scan to ensure not missing any bowel pathology in this patient with risk factors   -TFs on hold since this am for possible SBT

## 2018-07-20 NOTE — ASSESSMENT & PLAN NOTE
-Emergently intubated 7/10/18 for impending respiratory failure/need for IABP and inability to lay fat  -History of pulmonary sarcoidosis  -Methylpred given 7/10/18, hydrocortisone 100 mg q8 started 7/11/18. Transitioned to PO prednisone 7/16.   -Pulmonary consult for assistance with sarcoid & vent management.  -CXR daily  -Spoke with pulm at bedside today, CXR improved in lung parenchyma but more pulmonary congestion. S. Aureus in sputum and now in blood, appreciate ID recs.    - concern for extubating with patient unable to sit up (due to IABP). Coughs and was unable to lie flat prior to intubation, concerned he will not be able to protect his airway.

## 2018-07-20 NOTE — PLAN OF CARE
Problem: Patient Care Overview  Goal: Plan of Care Review  Outcome: Ongoing (interventions implemented as appropriate)  POC reviewed with pt, sibling and daughter at bedside, remains intubated on vent support A/C 50% and PEEP 6, sats 100%, sedated on Propofol and Fentanyl gtt, no sedation vacation/neuro assessments per HTS order, pt withdraws to pain, and coughs with ETT suctioning, pupils 2 mm and reactive. TF @ goal 55, 50-60 mL residual with assessments, no BM this shift, no bowel sounds noted. RIJ TLC catheter removed and tip sent for cultures, new R IJ TLC placed by MD and verified with xray. Restraints in place, no injury noted, L groin IABP in place, c/d/i, 1:1, no alarms this shift, aug 80s-100. Pt remains on Lasix gtt, UOP >100 mL/hr, trending down this am, flat CVP 13, 10, 9 respectively. Pt remains on Amio gtt @ 0.5 mg/min. Paced @ 80. All am lab results reviewed, q4h accu checks, no coverage needed, MD notified of H&H 6.9 and 22.9 this am, no new orders, no signs of bleeding noted. Pt maintained flat due to femoral IABP, in slight trendelenburg, log rolled q2h to prevent skin breakdown, heel protectors also in place. All questions answered and addressed.

## 2018-07-20 NOTE — PROGRESS NOTES
[facility]  Cardiac Electrophysiology  Progress Note    Admission Date: 7/7/2018  Code Status: Full Code   Attending Physician: Kimberly Lazo MD   Expected Discharge Date: 7/27/2018  Principal Problem:Ventricular tachycardia, incessant    Subjective:     Interval History: Patient seen and examined, no events on tele overnight.    Review of Systems   Unable to perform ROS: intubated     Objective:     Vital Signs (Most Recent):  Temp: 97.9 °F (36.6 °C) (07/20/18 0700)  Pulse: 80 (07/20/18 1100)  Resp: 16 (07/20/18 1100)  BP: 95/61 (07/20/18 1100)  SpO2: 96 % (07/20/18 1100) Vital Signs (24h Range):  Temp:  [97.9 °F (36.6 °C)-98.4 °F (36.9 °C)] 97.9 °F (36.6 °C)  Pulse:  [80-88] 80  Resp:  [16-42] 16  SpO2:  [87 %-100 %] 96 %  BP: ()/(47-83) 95/61     Weight: 106.5 kg (234 lb 12.6 oz)  Body mass index is 32.75 kg/m².     SpO2: 96 %  O2 Device (Oxygen Therapy): ventilator    Physical Exam   Constitutional: He is oriented to person, place, and time. He appears well-developed and well-nourished.   Sedated, intubated   HENT:   Head: Normocephalic and atraumatic.   Eyes: Pupils are equal, round, and reactive to light.   Neck: Normal range of motion. No JVD present.   Cardiovascular: Intact distal pulses.    Audible balloon pump. Left groin balloon pump inserted.   Pulmonary/Chest: Effort normal and breath sounds normal. No respiratory distress. He has no wheezes. He has no rales.   Abdominal: Soft. Bowel sounds are normal. He exhibits no distension. There is no tenderness.   Musculoskeletal: Normal range of motion. He exhibits edema (trace).   Neurological: He is alert and oriented to person, place, and time.   Skin: Skin is dry. No rash noted.   Psychiatric: He has a normal mood and affect. His behavior is normal.       Significant Labs:     Recent Results (from the past 24 hour(s))   POCT glucose    Collection Time: 07/19/18 12:23 PM   Result Value Ref Range    POCT Glucose 226 (H) 70 - 110  mg/dL   POCT glucose    Collection Time: 07/19/18  4:11 PM   Result Value Ref Range    POCT Glucose 234 (H) 70 - 110 mg/dL   Basic metabolic panel    Collection Time: 07/19/18  4:12 PM   Result Value Ref Range    Sodium 142 136 - 145 mmol/L    Potassium 4.4 3.5 - 5.1 mmol/L    Chloride 101 95 - 110 mmol/L    CO2 33 (H) 23 - 29 mmol/L    Glucose 203 (H) 70 - 110 mg/dL    BUN, Bld 58 (H) 6 - 20 mg/dL    Creatinine 1.3 0.5 - 1.4 mg/dL    Calcium 9.1 8.7 - 10.5 mg/dL    Anion Gap 8 8 - 16 mmol/L    eGFR if African American >60.0 >60 mL/min/1.73 m^2    eGFR if non African American >60.0 >60 mL/min/1.73 m^2   ISTAT PROCEDURE    Collection Time: 07/19/18  4:18 PM   Result Value Ref Range    POC PH 7.384 7.35 - 7.45    POC PCO2 64.2 (H) 35 - 45 mmHg    POC PO2 36 (LL) 40 - 60 mmHg    POC HCO3 38.3 (H) 24 - 28 mmol/L    POC BE 13 -2 to 2 mmol/L    POC SATURATED O2 65 (L) 95 - 100 %    POC TCO2 40 (H) 24 - 29 mmol/L    Rate 16     Sample VENOUS     Site Other     Allens Test N/A     DelSys Adult Vent     Mode AC/PRVC     Vt 450     PEEP 6     FiO2 50    IV catheter culture    Collection Time: 07/19/18  6:51 PM   Result Value Ref Range    Aerobic Culture - Cath tip No growth    ISTAT PROCEDURE    Collection Time: 07/19/18  7:21 PM   Result Value Ref Range    POC PH 7.354 7.35 - 7.45    POC PCO2 52.5 (H) 35 - 45 mmHg    POC PO2 39 (LL) 40 - 60 mmHg    POC HCO3 29.2 (H) 24 - 28 mmol/L    POC BE 4 -2 to 2 mmol/L    POC SATURATED O2 70 (L) 95 - 100 %    POC TCO2 31 (H) 24 - 29 mmol/L    Rate 16     Sample VENOUS     Site Other     Allens Test N/A     DelSys Adult Vent     Mode AC/PRVC     Vt 450     PEEP 6     FiO2 50     Sp02 99    POCT glucose    Collection Time: 07/19/18  8:52 PM   Result Value Ref Range    POCT Glucose 140 (H) 70 - 110 mg/dL   Basic metabolic panel    Collection Time: 07/19/18 10:40 PM   Result Value Ref Range    Sodium 145 136 - 145 mmol/L    Potassium 4.2 3.5 - 5.1 mmol/L    Chloride 102 95 - 110 mmol/L     CO2 35 (H) 23 - 29 mmol/L    Glucose 177 (H) 70 - 110 mg/dL    BUN, Bld 60 (H) 6 - 20 mg/dL    Creatinine 1.3 0.5 - 1.4 mg/dL    Calcium 9.3 8.7 - 10.5 mg/dL    Anion Gap 8 8 - 16 mmol/L    eGFR if African American >60.0 >60 mL/min/1.73 m^2    eGFR if non African American >60.0 >60 mL/min/1.73 m^2   VANCOMYCIN, TROUGH before 4th dose    Collection Time: 07/19/18 10:40 PM   Result Value Ref Range    Vancomycin-Trough 30.0 (H) 10.0 - 22.0 ug/mL   POCT glucose    Collection Time: 07/20/18 12:06 AM   Result Value Ref Range    POCT Glucose 156 (H) 70 - 110 mg/dL   Comprehensive metabolic panel - if not done in ED    Collection Time: 07/20/18  4:00 AM   Result Value Ref Range    Sodium 146 (H) 136 - 145 mmol/L    Potassium 3.9 3.5 - 5.1 mmol/L    Chloride 102 95 - 110 mmol/L    CO2 34 (H) 23 - 29 mmol/L    Glucose 131 (H) 70 - 110 mg/dL    BUN, Bld 60 (H) 6 - 20 mg/dL    Creatinine 1.2 0.5 - 1.4 mg/dL    Calcium 9.2 8.7 - 10.5 mg/dL    Total Protein 6.3 6.0 - 8.4 g/dL    Albumin 1.9 (L) 3.5 - 5.2 g/dL    Total Bilirubin 0.4 0.1 - 1.0 mg/dL    Alkaline Phosphatase 58 55 - 135 U/L    AST 14 10 - 40 U/L    ALT 12 10 - 44 U/L    Anion Gap 10 8 - 16 mmol/L    eGFR if African American >60.0 >60 mL/min/1.73 m^2    eGFR if non African American >60.0 >60 mL/min/1.73 m^2   CBC auto differential    Collection Time: 07/20/18  4:00 AM   Result Value Ref Range    WBC 10.65 3.90 - 12.70 K/uL    RBC 2.42 (L) 4.60 - 6.20 M/uL    Hemoglobin 6.9 (L) 14.0 - 18.0 g/dL    Hematocrit 22.9 (L) 40.0 - 54.0 %    MCV 95 82 - 98 fL    MCH 28.5 27.0 - 31.0 pg    MCHC 30.1 (L) 32.0 - 36.0 g/dL    RDW 15.4 (H) 11.5 - 14.5 %    Platelets 148 (L) 150 - 350 K/uL    MPV 11.3 9.2 - 12.9 fL    Immature Granulocytes 2.4 (H) 0.0 - 0.5 %    Gran # (ANC) 9.0 (H) 1.8 - 7.7 K/uL    Immature Grans (Abs) 0.26 (H) 0.00 - 0.04 K/uL    Lymph # 0.6 (L) 1.0 - 4.8 K/uL    Mono # 0.7 0.3 - 1.0 K/uL    Eos # 0.1 0.0 - 0.5 K/uL    Baso # 0.01 0.00 - 0.20 K/uL    nRBC 1 (A) 0  /100 WBC    Gran% 84.8 (H) 38.0 - 73.0 %    Lymph% 5.3 (L) 18.0 - 48.0 %    Mono% 6.3 4.0 - 15.0 %    Eosinophil% 1.1 0.0 - 8.0 %    Basophil% 0.1 0.0 - 1.9 %    Differential Method Automated    Protime-INR    Collection Time: 07/20/18  4:00 AM   Result Value Ref Range    Prothrombin Time 10.6 9.0 - 12.5 sec    INR 1.0 0.8 - 1.2   Magnesium - if not done in ED    Collection Time: 07/20/18  4:00 AM   Result Value Ref Range    Magnesium 2.5 1.6 - 2.6 mg/dL   POCT glucose    Collection Time: 07/20/18  4:04 AM   Result Value Ref Range    POCT Glucose 172 (H) 70 - 110 mg/dL   Basic metabolic panel    Collection Time: 07/20/18  7:43 AM   Result Value Ref Range    Sodium 145 136 - 145 mmol/L    Potassium 4.5 3.5 - 5.1 mmol/L    Chloride 103 95 - 110 mmol/L    CO2 32 (H) 23 - 29 mmol/L    Glucose 139 (H) 70 - 110 mg/dL    BUN, Bld 65 (H) 6 - 20 mg/dL    Creatinine 1.2 0.5 - 1.4 mg/dL    Calcium 9.3 8.7 - 10.5 mg/dL    Anion Gap 10 8 - 16 mmol/L    eGFR if African American >60.0 >60 mL/min/1.73 m^2    eGFR if non African American >60.0 >60 mL/min/1.73 m^2   Vancomycin, random    Collection Time: 07/20/18  7:43 AM   Result Value Ref Range    Vancomycin, Random 22.3 Not established ug/mL   POCT glucose    Collection Time: 07/20/18  8:23 AM   Result Value Ref Range    POCT Glucose 165 (H) 70 - 110 mg/dL   ISTAT PROCEDURE    Collection Time: 07/20/18  8:24 AM   Result Value Ref Range    POC PH 7.357 7.35 - 7.45    POC PCO2 66.2 (H) 35 - 45 mmHg    POC PO2 40 40 - 60 mmHg    POC HCO3 37.1 (H) 24 - 28 mmol/L    POC BE 12 -2 to 2 mmol/L    POC SATURATED O2 71 (L) 95 - 100 %    POC TCO2 39 (H) 24 - 29 mmol/L    Rate 16     Sample VENOUS     Site Other     Allens Test N/A     DelSys Adult Vent     Mode AC/PRVC     Vt 450     PEEP 6     FiO2 50     Sp02 100    ISTAT PROCEDURE    Collection Time: 07/20/18  8:32 AM   Result Value Ref Range    POC PH 7.402 7.35 - 7.45    POC PCO2 59.5 (HH) 35 - 45 mmHg    POC PO2 86 80 - 100 mmHg    POC  HCO3 37.0 (H) 24 - 28 mmol/L    POC BE 12 -2 to 2 mmol/L    POC SATURATED O2 96 95 - 100 %    POC TCO2 39 (H) 23 - 27 mmol/L    Rate 16     Sample ARTERIAL     Site RR     Allens Test Pass     DelSys Adult Vent     Mode AC/PRVC     Vt 450     PEEP 6     FiO2 50     Sp02 100          Assessment and Plan:     * VT Storm    Patient has had multiple occurrences of electrical storm with ICD shocks  The patient had previously been receiving oral amiodarone, possibly was not being absorbed through the enteric tract and the patient has responded to IV amiodarone  Continue amiodarone, mexilitine 150 mg Q8h and metoprolol 2.5 mg IV Q4h  At a later time, it may be considered to deactivate the LV lead of the patient's CRT-D, considering the patient's LVAD status and native RBBB (see EKG from 7/2012)  ICD interrogation reveals LICHA - will continue to follow along for generator change prior to discharge               Rafa Petit MD  Cardiac Electrophysiology  [facility]

## 2018-07-21 NOTE — ASSESSMENT & PLAN NOTE
VT storm with multiple events of both sustained and non-sustained VT, as well as MMVT and PMVT  BI-V ICD interrogation revealed 15 episodes of VT treated with ATP x 15 and shocks x 12  Patient's native rate following stabilization was bradycardic in the 50's above his base rate set by his ICD; rate increased to 80, no changes made to therapy zones  Patient was discharged on amiodarone 400 mg BID from a recent admission, currently receiving amiodarone infusion      Can transition to Amiodarone 400mg PO daily   Prior to discharge please re-consult for generator change  C/w mexilitine 150mg TID       EP will sign off, please call with any questions or concerns

## 2018-07-21 NOTE — ASSESSMENT & PLAN NOTE
ICD interrogation reveals LICHA - will continue to follow along for generator change prior to discharge

## 2018-07-21 NOTE — PLAN OF CARE
Problem: Patient Care Overview  Goal: Plan of Care Review  Outcome: Ongoing (interventions implemented as appropriate)  Pt remains ventilated and sedated with continuous Propofol and Fentanyl gtt. Vent set on AC at 40% FiO2 and 5 of PEEP with 16 bpm. IABP remains intact at 1:1 ECG triggered with goal MAP >60 achieved.  Levophed continuous gtt titrated to BP MAP >60 at 0.02-0.03 mcg/kg/min. Lasix gtt remains at 10 mg/hr with adequate UO at 200 ml/hr. Amio maintained at 0.5 mg/min. No episodes of V tach noted during shift. Tube feeds held this shift. Enema given x1 for no bowel movement. No bowel movement noted after enema and pt had CT of abdomen and KUB. Daughter remained at bedside throughout shift and frequently updated on pt condition and POC.

## 2018-07-21 NOTE — ASSESSMENT & PLAN NOTE
- IABP placed emergently 7/10/18  - Daily CXR   - Augmenting well at 1:1. SVO2 66%, CVP 8   - Continue lasix gtt @ 10 mg/hr

## 2018-07-21 NOTE — PROGRESS NOTES
[facility]  Cardiac Electrophysiology  Progress Note    Admission Date: 7/7/2018  Code Status: Full Code   Attending Physician: Kimberly Lazo MD   Expected Discharge Date: 7/27/2018  Principal Problem:Ventricular tachycardia, incessant    Subjective:     Interval History: Patient seen and examined, no VT/VF on telemetry overnigth, otherwise V-paced rhythm     Review of Systems   Unable to perform ROS: intubated     Objective:     Vital Signs (Most Recent):  Temp: 100.1 °F (37.8 °C) (07/21/18 0700)  Pulse: 80 (07/21/18 0830)  Resp: 20 (07/21/18 0830)  BP: (!) 95/51 (07/21/18 0830)  SpO2: (!) 92 % (07/21/18 0830) Vital Signs (24h Range):  Temp:  [97.8 °F (36.6 °C)-100.1 °F (37.8 °C)] 100.1 °F (37.8 °C)  Pulse:  [] 80  Resp:  [16-35] 20  SpO2:  [84 %-100 %] 92 %  BP: ()/(50-73) 95/51     Weight: 106.8 kg (235 lb 7.2 oz)  Body mass index is 32.84 kg/m².     SpO2: (!) 92 %  O2 Device (Oxygen Therapy): ventilator    Physical Exam   Constitutional: He is oriented to person, place, and time. He appears well-developed and well-nourished.   Sedated, intubated   HENT:   Head: Normocephalic and atraumatic.   Eyes: Pupils are equal, round, and reactive to light.   Neck: Normal range of motion. No JVD present.   Cardiovascular: Intact distal pulses.    Audible balloon pump. Left groin balloon pump inserted.   Pulmonary/Chest: Effort normal and breath sounds normal. No respiratory distress. He has no wheezes. He has no rales.   Abdominal: Soft. Bowel sounds are normal. He exhibits no distension. There is no tenderness.   Musculoskeletal: Normal range of motion. He exhibits edema (trace).   Neurological: He is alert and oriented to person, place, and time.   Skin: Skin is dry. No rash noted.   Psychiatric: He has a normal mood and affect. His behavior is normal.       Significant Labs:     Recent Results (from the past 24 hour(s))   POCT glucose    Collection Time: 07/20/18 12:07 PM   Result  Value Ref Range    POCT Glucose 197 (H) 70 - 110 mg/dL   Basic metabolic panel    Collection Time: 07/20/18  4:54 PM   Result Value Ref Range    Sodium 149 (H) 136 - 145 mmol/L    Potassium 4.0 3.5 - 5.1 mmol/L    Chloride 104 95 - 110 mmol/L    CO2 32 (H) 23 - 29 mmol/L    Glucose 174 (H) 70 - 110 mg/dL    BUN, Bld 63 (H) 6 - 20 mg/dL    Creatinine 1.3 0.5 - 1.4 mg/dL    Calcium 9.4 8.7 - 10.5 mg/dL    Anion Gap 13 8 - 16 mmol/L    eGFR if African American >60.0 >60 mL/min/1.73 m^2    eGFR if non African American >60.0 >60 mL/min/1.73 m^2   POCT glucose    Collection Time: 07/20/18  5:09 PM   Result Value Ref Range    POCT Glucose 184 (H) 70 - 110 mg/dL   POCT glucose    Collection Time: 07/20/18  7:56 PM   Result Value Ref Range    POCT Glucose 156 (H) 70 - 110 mg/dL   POCT glucose    Collection Time: 07/20/18 11:29 PM   Result Value Ref Range    POCT Glucose 121 (H) 70 - 110 mg/dL   Basic metabolic panel    Collection Time: 07/20/18 11:30 PM   Result Value Ref Range    Sodium 150 (H) 136 - 145 mmol/L    Potassium 3.6 3.5 - 5.1 mmol/L    Chloride 104 95 - 110 mmol/L    CO2 35 (H) 23 - 29 mmol/L    Glucose 112 (H) 70 - 110 mg/dL    BUN, Bld 61 (H) 6 - 20 mg/dL    Creatinine 1.2 0.5 - 1.4 mg/dL    Calcium 9.6 8.7 - 10.5 mg/dL    Anion Gap 11 8 - 16 mmol/L    eGFR if African American >60.0 >60 mL/min/1.73 m^2    eGFR if non African American >60.0 >60 mL/min/1.73 m^2   Magnesium - if not done in ED    Collection Time: 07/21/18  4:15 AM   Result Value Ref Range    Magnesium 2.4 1.6 - 2.6 mg/dL   Protime-INR    Collection Time: 07/21/18  4:15 AM   Result Value Ref Range    Prothrombin Time 11.0 9.0 - 12.5 sec    INR 1.1 0.8 - 1.2   CBC auto differential    Collection Time: 07/21/18  4:15 AM   Result Value Ref Range    WBC 12.54 3.90 - 12.70 K/uL    RBC 2.58 (L) 4.60 - 6.20 M/uL    Hemoglobin 7.3 (L) 14.0 - 18.0 g/dL    Hematocrit 24.0 (L) 40.0 - 54.0 %    MCV 93 82 - 98 fL    MCH 28.3 27.0 - 31.0 pg    MCHC 30.4 (L)  32.0 - 36.0 g/dL    RDW 15.8 (H) 11.5 - 14.5 %    Platelets 157 150 - 350 K/uL    MPV 10.7 9.2 - 12.9 fL    Immature Granulocytes 2.4 (H) 0.0 - 0.5 %    Gran # (ANC) 10.4 (H) 1.8 - 7.7 K/uL    Immature Grans (Abs) 0.30 (H) 0.00 - 0.04 K/uL    Lymph # 0.8 (L) 1.0 - 4.8 K/uL    Mono # 0.8 0.3 - 1.0 K/uL    Eos # 0.3 0.0 - 0.5 K/uL    Baso # 0.01 0.00 - 0.20 K/uL    nRBC 1 (A) 0 /100 WBC    Gran% 83.1 (H) 38.0 - 73.0 %    Lymph% 6.1 (L) 18.0 - 48.0 %    Mono% 6.1 4.0 - 15.0 %    Eosinophil% 2.2 0.0 - 8.0 %    Basophil% 0.1 0.0 - 1.9 %    Differential Method Automated    Comprehensive metabolic panel - if not done in ED    Collection Time: 07/21/18  4:15 AM   Result Value Ref Range    Sodium 151 (H) 136 - 145 mmol/L    Potassium 4.0 3.5 - 5.1 mmol/L    Chloride 104 95 - 110 mmol/L    CO2 34 (H) 23 - 29 mmol/L    Glucose 107 70 - 110 mg/dL    BUN, Bld 59 (H) 6 - 20 mg/dL    Creatinine 1.3 0.5 - 1.4 mg/dL    Calcium 9.5 8.7 - 10.5 mg/dL    Total Protein 6.4 6.0 - 8.4 g/dL    Albumin 2.0 (L) 3.5 - 5.2 g/dL    Total Bilirubin 0.3 0.1 - 1.0 mg/dL    Alkaline Phosphatase 61 55 - 135 U/L    AST 16 10 - 40 U/L    ALT 10 10 - 44 U/L    Anion Gap 13 8 - 16 mmol/L    eGFR if African American >60.0 >60 mL/min/1.73 m^2    eGFR if non African American >60.0 >60 mL/min/1.73 m^2   POCT glucose    Collection Time: 07/21/18  4:26 AM   Result Value Ref Range    POCT Glucose 123 (H) 70 - 110 mg/dL   ISTAT PROCEDURE    Collection Time: 07/21/18  8:25 AM   Result Value Ref Range    POC PH 7.469 (H) 7.35 - 7.45    POC PCO2 58.5 (H) 35 - 45 mmHg    POC PO2 34 (LL) 40 - 60 mmHg    POC HCO3 42.4 (H) 24 - 28 mmol/L    POC BE 19 -2 to 2 mmol/L    POC SATURATED O2 66 (L) 95 - 100 %    POC TCO2 44 (H) 24 - 29 mmol/L    Sample VENOUS     Site Other     Allens Test N/A    POCT glucose    Collection Time: 07/21/18  8:31 AM   Result Value Ref Range    POCT Glucose 142 (H) 70 - 110 mg/dL   POCT glucose    Collection Time: 07/21/18 10:35 AM   Result Value  Ref Range    POCT Glucose 182 (H) 70 - 110 mg/dL         Assessment and Plan:     * VT Storm    ICD interrogation reveals LICHA - will continue to follow along for generator change prior to discharge        Ventricular fibrillation    VT storm with multiple events of both sustained and non-sustained VT, as well as MMVT and PMVT  BI-V ICD interrogation revealed 15 episodes of VT treated with ATP x 15 and shocks x 12  Patient's native rate following stabilization was bradycardic in the 50's above his base rate set by his ICD; rate increased to 80, no changes made to therapy zones  Patient was discharged on amiodarone 400 mg BID from a recent admission, currently receiving amiodarone infusion      Can transition to Amiodarone 400mg PO daily   Prior to discharge please re-consult for generator change  C/w mexilitine 150mg TID       EP will sign off, please call with any questions or concerns            Meryl Rendon MD  Cardiac Electrophysiology  [facility]

## 2018-07-21 NOTE — ASSESSMENT & PLAN NOTE
-VT storm 7/10/18. Polymorphic and monomorphic. Degenerated into VF. 12 ICD shocks  -Intubated, IABP placed emergently at bedside.   -VT storm again on 7/17/18 and reloaded with amio. Continue metoprolol, amio gtt for now, mexitil.   -Amio loaded x 2, gtt started per protocol.  Lidocaine gtt started at 1, increased to 2 when patient had more VT-->VF and A-EP consulted, appreciate their assistance.   -Continue support with IABP 1:1   -K goal >4.5, Mg >2.5  - Per EP-At a later time, it may be considered to deactivate the LV lead of the patient's CRT-D, considering the patient's LVAD status and native RBBB. ICD interrogation reveals LICHA

## 2018-07-21 NOTE — PLAN OF CARE
Problem: Patient Care Overview  Goal: Plan of Care Review  Pt remains Intubated. 50% FiO2, 5 PEEP. O2 sats>92%. -200cc/hr. Gtts: Amio, Fentanyl, Lasix, Levo, Propofol. Accuchecks. No coverage needed. No sedation vacations per MD order. Pt remains free of breakdown. Plan of care reviewed with daughter.

## 2018-07-21 NOTE — SUBJECTIVE & OBJECTIVE
Interval History: Patient seen and examined, no VT/VF on telemetry overnigth, otherwise V-paced rhythm     Review of Systems   Unable to perform ROS: intubated     Objective:     Vital Signs (Most Recent):  Temp: 100.1 °F (37.8 °C) (07/21/18 0700)  Pulse: 80 (07/21/18 0830)  Resp: 20 (07/21/18 0830)  BP: (!) 95/51 (07/21/18 0830)  SpO2: (!) 92 % (07/21/18 0830) Vital Signs (24h Range):  Temp:  [97.8 °F (36.6 °C)-100.1 °F (37.8 °C)] 100.1 °F (37.8 °C)  Pulse:  [] 80  Resp:  [16-35] 20  SpO2:  [84 %-100 %] 92 %  BP: ()/(50-73) 95/51     Weight: 106.8 kg (235 lb 7.2 oz)  Body mass index is 32.84 kg/m².     SpO2: (!) 92 %  O2 Device (Oxygen Therapy): ventilator    Physical Exam   Constitutional: He is oriented to person, place, and time. He appears well-developed and well-nourished.   Sedated, intubated   HENT:   Head: Normocephalic and atraumatic.   Eyes: Pupils are equal, round, and reactive to light.   Neck: Normal range of motion. No JVD present.   Cardiovascular: Intact distal pulses.    Audible balloon pump. Left groin balloon pump inserted.   Pulmonary/Chest: Effort normal and breath sounds normal. No respiratory distress. He has no wheezes. He has no rales.   Abdominal: Soft. Bowel sounds are normal. He exhibits no distension. There is no tenderness.   Musculoskeletal: Normal range of motion. He exhibits edema (trace).   Neurological: He is alert and oriented to person, place, and time.   Skin: Skin is dry. No rash noted.   Psychiatric: He has a normal mood and affect. His behavior is normal.       Significant Labs:     Recent Results (from the past 24 hour(s))   POCT glucose    Collection Time: 07/20/18 12:07 PM   Result Value Ref Range    POCT Glucose 197 (H) 70 - 110 mg/dL   Basic metabolic panel    Collection Time: 07/20/18  4:54 PM   Result Value Ref Range    Sodium 149 (H) 136 - 145 mmol/L    Potassium 4.0 3.5 - 5.1 mmol/L    Chloride 104 95 - 110 mmol/L    CO2 32 (H) 23 - 29 mmol/L    Glucose  174 (H) 70 - 110 mg/dL    BUN, Bld 63 (H) 6 - 20 mg/dL    Creatinine 1.3 0.5 - 1.4 mg/dL    Calcium 9.4 8.7 - 10.5 mg/dL    Anion Gap 13 8 - 16 mmol/L    eGFR if African American >60.0 >60 mL/min/1.73 m^2    eGFR if non African American >60.0 >60 mL/min/1.73 m^2   POCT glucose    Collection Time: 07/20/18  5:09 PM   Result Value Ref Range    POCT Glucose 184 (H) 70 - 110 mg/dL   POCT glucose    Collection Time: 07/20/18  7:56 PM   Result Value Ref Range    POCT Glucose 156 (H) 70 - 110 mg/dL   POCT glucose    Collection Time: 07/20/18 11:29 PM   Result Value Ref Range    POCT Glucose 121 (H) 70 - 110 mg/dL   Basic metabolic panel    Collection Time: 07/20/18 11:30 PM   Result Value Ref Range    Sodium 150 (H) 136 - 145 mmol/L    Potassium 3.6 3.5 - 5.1 mmol/L    Chloride 104 95 - 110 mmol/L    CO2 35 (H) 23 - 29 mmol/L    Glucose 112 (H) 70 - 110 mg/dL    BUN, Bld 61 (H) 6 - 20 mg/dL    Creatinine 1.2 0.5 - 1.4 mg/dL    Calcium 9.6 8.7 - 10.5 mg/dL    Anion Gap 11 8 - 16 mmol/L    eGFR if African American >60.0 >60 mL/min/1.73 m^2    eGFR if non African American >60.0 >60 mL/min/1.73 m^2   Magnesium - if not done in ED    Collection Time: 07/21/18  4:15 AM   Result Value Ref Range    Magnesium 2.4 1.6 - 2.6 mg/dL   Protime-INR    Collection Time: 07/21/18  4:15 AM   Result Value Ref Range    Prothrombin Time 11.0 9.0 - 12.5 sec    INR 1.1 0.8 - 1.2   CBC auto differential    Collection Time: 07/21/18  4:15 AM   Result Value Ref Range    WBC 12.54 3.90 - 12.70 K/uL    RBC 2.58 (L) 4.60 - 6.20 M/uL    Hemoglobin 7.3 (L) 14.0 - 18.0 g/dL    Hematocrit 24.0 (L) 40.0 - 54.0 %    MCV 93 82 - 98 fL    MCH 28.3 27.0 - 31.0 pg    MCHC 30.4 (L) 32.0 - 36.0 g/dL    RDW 15.8 (H) 11.5 - 14.5 %    Platelets 157 150 - 350 K/uL    MPV 10.7 9.2 - 12.9 fL    Immature Granulocytes 2.4 (H) 0.0 - 0.5 %    Gran # (ANC) 10.4 (H) 1.8 - 7.7 K/uL    Immature Grans (Abs) 0.30 (H) 0.00 - 0.04 K/uL    Lymph # 0.8 (L) 1.0 - 4.8 K/uL    Mono #  0.8 0.3 - 1.0 K/uL    Eos # 0.3 0.0 - 0.5 K/uL    Baso # 0.01 0.00 - 0.20 K/uL    nRBC 1 (A) 0 /100 WBC    Gran% 83.1 (H) 38.0 - 73.0 %    Lymph% 6.1 (L) 18.0 - 48.0 %    Mono% 6.1 4.0 - 15.0 %    Eosinophil% 2.2 0.0 - 8.0 %    Basophil% 0.1 0.0 - 1.9 %    Differential Method Automated    Comprehensive metabolic panel - if not done in ED    Collection Time: 07/21/18  4:15 AM   Result Value Ref Range    Sodium 151 (H) 136 - 145 mmol/L    Potassium 4.0 3.5 - 5.1 mmol/L    Chloride 104 95 - 110 mmol/L    CO2 34 (H) 23 - 29 mmol/L    Glucose 107 70 - 110 mg/dL    BUN, Bld 59 (H) 6 - 20 mg/dL    Creatinine 1.3 0.5 - 1.4 mg/dL    Calcium 9.5 8.7 - 10.5 mg/dL    Total Protein 6.4 6.0 - 8.4 g/dL    Albumin 2.0 (L) 3.5 - 5.2 g/dL    Total Bilirubin 0.3 0.1 - 1.0 mg/dL    Alkaline Phosphatase 61 55 - 135 U/L    AST 16 10 - 40 U/L    ALT 10 10 - 44 U/L    Anion Gap 13 8 - 16 mmol/L    eGFR if African American >60.0 >60 mL/min/1.73 m^2    eGFR if non African American >60.0 >60 mL/min/1.73 m^2   POCT glucose    Collection Time: 07/21/18  4:26 AM   Result Value Ref Range    POCT Glucose 123 (H) 70 - 110 mg/dL   ISTAT PROCEDURE    Collection Time: 07/21/18  8:25 AM   Result Value Ref Range    POC PH 7.469 (H) 7.35 - 7.45    POC PCO2 58.5 (H) 35 - 45 mmHg    POC PO2 34 (LL) 40 - 60 mmHg    POC HCO3 42.4 (H) 24 - 28 mmol/L    POC BE 19 -2 to 2 mmol/L    POC SATURATED O2 66 (L) 95 - 100 %    POC TCO2 44 (H) 24 - 29 mmol/L    Sample VENOUS     Site Other     Allens Test N/A    POCT glucose    Collection Time: 07/21/18  8:31 AM   Result Value Ref Range    POCT Glucose 142 (H) 70 - 110 mg/dL   POCT glucose    Collection Time: 07/21/18 10:35 AM   Result Value Ref Range    POCT Glucose 182 (H) 70 - 110 mg/dL

## 2018-07-21 NOTE — SUBJECTIVE & OBJECTIVE
Interval History: Had a small BM overnight and again this am. Daughter at bedside. Discussed with her and will hold off on SBT until Monday. No acute events overnight.     Continuous Infusions:   amiodarone in dextrose 5% 0.5 mg/min (07/21/18 0900)    fentanyl 10 mL/hr at 07/21/18 0900    furosemide (LASIX) 2 mg/mL infusion (non-titrating) 10 mg/hr (07/21/18 0600)    norepinephrine bitartrate-D5W 0.02 mcg/kg/min (07/21/18 0900)    propofol 50.05 mcg/kg/min (07/21/18 1000)     Scheduled Meds:   albuterol sulfate  2.5 mg Nebulization Q4H    aspirin  81 mg Oral Daily    atorvastatin  40 mg Oral Daily    budesonide  0.5 mg Nebulization Q12H    ceFAZolin  1 g Intravenous Q8H    chlorhexidine  15 mL Mouth/Throat BID    chlorhexidine  15 mL Mouth/Throat BID    docusate  100 mg Oral Daily    fluticasone-vilanterol  1 puff Inhalation Daily    gabapentin  600 mg Oral BID    heparin (porcine)  5,000 Units Subcutaneous Q8H    levETIRAcetam  500 mg Oral BID    metoprolol  2.5 mg Intravenous Q4H    mexiletine  150 mg Oral Q8H    pantoprozole (PROTONIX) 40 mg/100 mL D5W IVPB  40 mg Intravenous BID    predniSONE  30 mg Oral Daily    sennosides 8.8 mg/5 ml  5 mL Oral QHS    sodium chloride 0.9%  3 mL Intravenous Q8H     PRN Meds:sodium chloride, ALPRAZolam, benzonatate, calcium gluconate IVPB, calcium gluconate IVPB, calcium gluconate IVPB, carisoprodol, dextrose 50%, glucagon (human recombinant), HYDROcodone-acetaminophen, insulin aspart U-100, magnesium sulfate IVPB, magnesium sulfate IVPB, nitroGLYCERIN, potassium chloride in water **AND** potassium chloride in water **AND** potassium chloride in water, sodium phosphate IVPB, sodium phosphate IVPB, sodium phosphate IVPB    Review of patient's allergies indicates:  No Known Allergies  Objective:     Vital Signs (Most Recent):  Temp: 99.4 °F (37.4 °C) (07/21/18 1100)  Pulse: 80 (07/21/18 1116)  Resp: 16 (07/21/18 1116)  BP: (!) 91/53 (07/21/18 1045)  SpO2:  (!) 92 % (07/21/18 1116) Vital Signs (24h Range):  Temp:  [98.1 °F (36.7 °C)-100.1 °F (37.8 °C)] 99.4 °F (37.4 °C)  Pulse:  [] 80  Resp:  [16-35] 16  SpO2:  [84 %-100 %] 92 %  BP: ()/(50-73) 91/53     Patient Vitals for the past 72 hrs (Last 3 readings):   Weight   07/21/18 0400 106.8 kg (235 lb 7.2 oz)   07/20/18 0400 106.5 kg (234 lb 12.6 oz)     Body mass index is 32.84 kg/m².      Intake/Output Summary (Last 24 hours) at 07/21/18 1233  Last data filed at 07/21/18 1100   Gross per 24 hour   Intake             2541 ml   Output             4799 ml   Net            -2258 ml     Hemodynamic Parameters:    Telemetry: paced at 80 bpm    Physical Exam   Constitutional: He appears well-developed and well-nourished. He is intubated.   Daughter and brother at bedside   HENT:   Head: Normocephalic and atraumatic.   Mild periorbital edema   Neck: Normal range of motion. Neck supple. JVD: difficult to assess on ventialtor.   Right TLC   Cardiovascular: Normal rate and regular rhythm.    Right hand slightly cooler than left, otherwise warm and well perfused in upper and lower extremities. IABP site C/D/I   Pulmonary/Chest: He is intubated.   Coarse breath sounds, ventilated.    Abdominal: Soft. Bowel sounds are normal. He exhibits no distension. There is no tenderness.   Musculoskeletal: He exhibits no edema.   Neurological:   intubated and sedateD   Skin: Skin is warm and dry.   Nursing note and vitals reviewed.    Significant Labs:  CBC:    Recent Labs  Lab 07/19/18  0330 07/20/18  0400 07/21/18  0415   WBC 12.18 10.65 12.54   RBC 2.50* 2.42* 2.58*   HGB 7.1* 6.9* 7.3*   HCT 23.6* 22.9* 24.0*   * 148* 157   MCV 94 95 93   MCH 28.4 28.5 28.3   MCHC 30.1* 30.1* 30.4*     BNP:  No results for input(s): BNP in the last 168 hours.    Invalid input(s): BNPTRIAGELBLO  CMP:    Recent Labs  Lab 07/19/18  0330  07/20/18  0400  07/20/18  2330 07/21/18  0415 07/21/18  1033   *  < > 131*  < > 112* 107 156*    CALCIUM 9.2  < > 9.2  < > 9.6 9.5 9.2   ALBUMIN 1.9*  --  1.9*  --   --  2.0*  --    PROT 6.2  --  6.3  --   --  6.4  --      < > 146*  < > 150* 151* 150*   K 3.9  < > 3.9  < > 3.6 4.0 3.3*   CO2 35*  < > 34*  < > 35* 34* 32*     < > 102  < > 104 104 104   BUN 54*  < > 60*  < > 61* 59* 55*   CREATININE 1.1  < > 1.2  < > 1.2 1.3 1.3   ALKPHOS 56  --  58  --   --  61  --    ALT 11  --  12  --   --  10  --    AST 15  --  14  --   --  16  --    BILITOT 0.5  --  0.4  --   --  0.3  --    < > = values in this interval not displayed.   Coagulation:     Recent Labs  Lab 07/17/18  0714  07/19/18  0330 07/20/18  0400 07/21/18  0415   INR 1.0  < > 1.0 1.0 1.1   APTT <21.0  --   --   --   --    < > = values in this interval not displayed.  LDH:  No results for input(s): LDH in the last 72 hours.  Microbiology:  Microbiology Results (last 7 days)     Procedure Component Value Units Date/Time    Blood culture [[id number]] Collected:  07/17/18 0930    Order Status:  Completed Specimen:  Blood from Peripheral, Antecubital, Right Updated:  07/21/18 1212     Blood Culture, Routine No Growth to date     Blood Culture, Routine No Growth to date     Blood Culture, Routine No Growth to date     Blood Culture, Routine No Growth to date     Blood Culture, Routine No Growth to date    Blood culture [[id number]] Collected:  07/18/18 1803    Order Status:  Completed Specimen:  Blood from Peripheral, Forearm, Right Updated:  07/20/18 2212     Blood Culture, Routine No Growth to date     Blood Culture, Routine No Growth to date     Blood Culture, Routine No Growth to date    Blood culture [[id number]] Collected:  07/18/18 1803    Order Status:  Completed Specimen:  Blood from Peripheral, Antecubital, Left Updated:  07/20/18 2212     Blood Culture, Routine No Growth to date     Blood Culture, Routine No Growth to date     Blood Culture, Routine No Growth to date    Blood culture [[id number]] Collected:  07/17/18 0950    Order Status:   Completed Specimen:  Blood from Peripheral, Antecubital, Right Updated:  07/20/18 1048     Blood Culture, Routine Gram stain aer bottle: Gram positive cocci in clusters resembling Staph     Blood Culture, Routine Results called to and read back by:Javon Hatfield RN 07/18/2018  17:50     Blood Culture, Routine --     COAGULASE-NEGATIVE STAPHYLOCOCCUS SPECIES  Organism is a probable contaminant      IV catheter culture [[id number]] Collected:  07/19/18 1851    Order Status:  Completed Specimen:  Catheter Tip from Catheter Tip, Intrajugular Updated:  07/20/18 0723     Aerobic Culture - Cath tip No growth    Narrative:       Culture [facility]    Fungus culture [[id number]] Collected:  07/17/18 1154    Order Status:  Completed Specimen:  Respiratory from Sputum Updated:  07/19/18 1054     Fungus (Mycology) Culture Culture in progress    Blood culture [[id number]] Collected:  07/13/18 1309    Order Status:  Completed Specimen:  Blood from Peripheral, Hand, Left Updated:  07/18/18 1812     Blood Culture, Routine No growth after 5 days.    Blood culture [[id number]] Collected:  07/13/18 1350    Order Status:  Completed Specimen:  Blood from Peripheral, Hand, Right Updated:  07/18/18 1812     Blood Culture, Routine No growth after 5 days.    Culture, Respiratory with Gram Stain [[id number]]  (Susceptibility) Collected:  07/13/18 1129    Order Status:  Completed Specimen:  Respiratory from Endotracheal Aspirate Updated:  07/17/18 1057     Respiratory Culture --     STAPHYLOCOCCUS AUREUS  Moderate  Normal respiratory peng also present       Gram Stain (Respiratory) Few WBC's     Gram Stain (Respiratory) No organisms seen          I have reviewed all pertinent labs within the past 24 hours.    Estimated Creatinine Clearance: 79.8 mL/min (based on SCr of 1.3 mg/dL).    Diagnostic Results:  I have reviewed and interpreted all pertinent imaging results/findings within the past 24 hours.

## 2018-07-21 NOTE — ASSESSMENT & PLAN NOTE
-Emergently intubated 7/10/18 for impending respiratory failure/need for IABP and inability to lay fat  -History of pulmonary sarcoidosis  -Methylpred given 7/10/18, hydrocortisone 100 mg q8 started 7/11/18. Transitioned to PO prednisone 7/16.   -Pulmonary consult for assistance with sarcoid & vent management.  -CXR daily  -Will plan on SBT Monday per discussion with daughter

## 2018-07-21 NOTE — PROGRESS NOTES
[facility]  Heart Transplant  Progress Note    Patient Name: Yonathan Good Jr.  MRN: [id number]  Admission Date: 7/7/2018  Hospital Length of Stay: 14 days  Attending Physician: Kimberly Lazo MD  Primary Care Provider: Edgar Gates MD  Principal Problem:Ventricular tachycardia, incessant    Subjective:     Interval History: Had a small BM overnight and again this am. Daughter at bedside. Discussed with her and will hold off on SBT until Monday. No acute events overnight.     Continuous Infusions:   amiodarone in dextrose 5% 0.5 mg/min (07/21/18 0900)    fentanyl 10 mL/hr at 07/21/18 0900    furosemide (LASIX) 2 mg/mL infusion (non-titrating) 10 mg/hr (07/21/18 0600)    norepinephrine bitartrate-D5W 0.02 mcg/kg/min (07/21/18 0900)    propofol 50.05 mcg/kg/min (07/21/18 1000)     Scheduled Meds:   albuterol sulfate  2.5 mg Nebulization Q4H    aspirin  81 mg Oral Daily    atorvastatin  40 mg Oral Daily    budesonide  0.5 mg Nebulization Q12H    ceFAZolin  1 g Intravenous Q8H    chlorhexidine  15 mL Mouth/Throat BID    chlorhexidine  15 mL Mouth/Throat BID    docusate  100 mg Oral Daily    fluticasone-vilanterol  1 puff Inhalation Daily    gabapentin  600 mg Oral BID    heparin (porcine)  5,000 Units Subcutaneous Q8H    levETIRAcetam  500 mg Oral BID    metoprolol  2.5 mg Intravenous Q4H    mexiletine  150 mg Oral Q8H    pantoprozole (PROTONIX) 40 mg/100 mL D5W IVPB  40 mg Intravenous BID    predniSONE  30 mg Oral Daily    sennosides 8.8 mg/5 ml  5 mL Oral QHS    sodium chloride 0.9%  3 mL Intravenous Q8H     PRN Meds:sodium chloride, ALPRAZolam, benzonatate, calcium gluconate IVPB, calcium gluconate IVPB, calcium gluconate IVPB, carisoprodol, dextrose 50%, glucagon (human recombinant), HYDROcodone-acetaminophen, insulin aspart U-100, magnesium sulfate IVPB, magnesium sulfate IVPB, nitroGLYCERIN, potassium chloride in water **AND** potassium chloride in water **AND** potassium  chloride in water, sodium phosphate IVPB, sodium phosphate IVPB, sodium phosphate IVPB    Review of patient's allergies indicates:  No Known Allergies  Objective:     Vital Signs (Most Recent):  Temp: 99.4 °F (37.4 °C) (07/21/18 1100)  Pulse: 80 (07/21/18 1116)  Resp: 16 (07/21/18 1116)  BP: (!) 91/53 (07/21/18 1045)  SpO2: (!) 92 % (07/21/18 1116) Vital Signs (24h Range):  Temp:  [98.1 °F (36.7 °C)-100.1 °F (37.8 °C)] 99.4 °F (37.4 °C)  Pulse:  [] 80  Resp:  [16-35] 16  SpO2:  [84 %-100 %] 92 %  BP: ()/(50-73) 91/53     Patient Vitals for the past 72 hrs (Last 3 readings):   Weight   07/21/18 0400 106.8 kg (235 lb 7.2 oz)   07/20/18 0400 106.5 kg (234 lb 12.6 oz)     Body mass index is 32.84 kg/m².      Intake/Output Summary (Last 24 hours) at 07/21/18 1233  Last data filed at 07/21/18 1100   Gross per 24 hour   Intake             2541 ml   Output             4799 ml   Net            -2258 ml     Hemodynamic Parameters:    Telemetry: paced at 80 bpm    Physical Exam   Constitutional: He appears well-developed and well-nourished. He is intubated.   Daughter and brother at bedside   HENT:   Head: Normocephalic and atraumatic.   Mild periorbital edema   Neck: Normal range of motion. Neck supple. JVD: difficult to assess on ventialtor.   Right TLC   Cardiovascular: Normal rate and regular rhythm.    Right hand slightly cooler than left, otherwise warm and well perfused in upper and lower extremities. IABP site C/D/I   Pulmonary/Chest: He is intubated.   Coarse breath sounds, ventilated.    Abdominal: Soft. Bowel sounds are normal. He exhibits no distension. There is no tenderness.   Musculoskeletal: He exhibits no edema.   Neurological:   intubated and sedateD   Skin: Skin is warm and dry.   Nursing note and vitals reviewed.    Significant Labs:  CBC:    Recent Labs  Lab 07/19/18  0330 07/20/18  0400 07/21/18  0415   WBC 12.18 10.65 12.54   RBC 2.50* 2.42* 2.58*   HGB 7.1* 6.9* 7.3*   HCT 23.6* 22.9* 24.0*    * 148* 157   MCV 94 95 93   MCH 28.4 28.5 28.3   MCHC 30.1* 30.1* 30.4*     BNP:  No results for input(s): BNP in the last 168 hours.    Invalid input(s): BNPTRIAGELBLO  CMP:    Recent Labs  Lab 07/19/18  0330  07/20/18  0400  07/20/18  2330 07/21/18  0415 07/21/18  1033   *  < > 131*  < > 112* 107 156*   CALCIUM 9.2  < > 9.2  < > 9.6 9.5 9.2   ALBUMIN 1.9*  --  1.9*  --   --  2.0*  --    PROT 6.2  --  6.3  --   --  6.4  --      < > 146*  < > 150* 151* 150*   K 3.9  < > 3.9  < > 3.6 4.0 3.3*   CO2 35*  < > 34*  < > 35* 34* 32*     < > 102  < > 104 104 104   BUN 54*  < > 60*  < > 61* 59* 55*   CREATININE 1.1  < > 1.2  < > 1.2 1.3 1.3   ALKPHOS 56  --  58  --   --  61  --    ALT 11  --  12  --   --  10  --    AST 15  --  14  --   --  16  --    BILITOT 0.5  --  0.4  --   --  0.3  --    < > = values in this interval not displayed.   Coagulation:     Recent Labs  Lab 07/17/18  0714  07/19/18 0330 07/20/18  0400 07/21/18  0415   INR 1.0  < > 1.0 1.0 1.1   APTT <21.0  --   --   --   --    < > = values in this interval not displayed.  LDH:  No results for input(s): LDH in the last 72 hours.  Microbiology:  Microbiology Results (last 7 days)     Procedure Component Value Units Date/Time    Blood culture [[id number]] Collected:  07/17/18 0930    Order Status:  Completed Specimen:  Blood from Peripheral, Antecubital, Right Updated:  07/21/18 1212     Blood Culture, Routine No Growth to date     Blood Culture, Routine No Growth to date     Blood Culture, Routine No Growth to date     Blood Culture, Routine No Growth to date     Blood Culture, Routine No Growth to date    Blood culture [[id number]] Collected:  07/18/18 1803    Order Status:  Completed Specimen:  Blood from Peripheral, Forearm, Right Updated:  07/20/18 2212     Blood Culture, Routine No Growth to date     Blood Culture, Routine No Growth to date     Blood Culture, Routine No Growth to date    Blood culture [[id number]] Collected:   07/18/18 1803    Order Status:  Completed Specimen:  Blood from Peripheral, Antecubital, Left Updated:  07/20/18 2212     Blood Culture, Routine No Growth to date     Blood Culture, Routine No Growth to date     Blood Culture, Routine No Growth to date    Blood culture [[id number]] Collected:  07/17/18 0950    Order Status:  Completed Specimen:  Blood from Peripheral, Antecubital, Right Updated:  07/20/18 1048     Blood Culture, Routine Gram stain aer bottle: Gram positive cocci in clusters resembling Staph     Blood Culture, Routine Results called to and read back by:Javon Hatfield RN 07/18/2018  17:50     Blood Culture, Routine --     COAGULASE-NEGATIVE STAPHYLOCOCCUS SPECIES  Organism is a probable contaminant      IV catheter culture [[id number]] Collected:  07/19/18 1851    Order Status:  Completed Specimen:  Catheter Tip from Catheter Tip, Intrajugular Updated:  07/20/18 0723     Aerobic Culture - Cath tip No growth    Narrative:       Culture [facility] [place]    Fungus culture [[id number]] Collected:  07/17/18 1154    Order Status:  Completed Specimen:  Respiratory from Sputum Updated:  07/19/18 1054     Fungus (Mycology) Culture Culture in progress    Blood culture [[id number]] Collected:  07/13/18 1309    Order Status:  Completed Specimen:  Blood from Peripheral, Hand, Left Updated:  07/18/18 1812     Blood Culture, Routine No growth after 5 days.    Blood culture [[id number]] Collected:  07/13/18 1350    Order Status:  Completed Specimen:  Blood from Peripheral, Hand, Right Updated:  07/18/18 1812     Blood Culture, Routine No growth after 5 days.    Culture, Respiratory with Gram Stain [[id number]]  (Susceptibility) Collected:  07/13/18 1129    Order Status:  Completed Specimen:  Respiratory from Endotracheal Aspirate Updated:  07/17/18 1057     Respiratory Culture --     STAPHYLOCOCCUS AUREUS  Moderate  Normal respiratory peng also present       Gram Stain (Respiratory) Few WBC's     Gram Stain (Respiratory) No organisms  "seen          I have reviewed all pertinent labs within the past 24 hours.    Estimated Creatinine Clearance: 79.8 mL/min (based on SCr of 1.3 mg/dL).    Diagnostic Results:  I have reviewed and interpreted all pertinent imaging results/findings within the past 24 hours.    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      He was initially admitted to "stroke/neuro" service on 6/22 with dysarthria and stroke-like symptoms. Extensive work up was negative for recurrent CVA and so no tPA was administered. Patient developed atypical chest pain two days into his hospital course and was noted to be in monomorphic VT (6/24) which was treated with ATP then with shock due to recurrent VT in VF zone. Given his active cardiac issues this prompted transfer to heart failure service where the patient was initiated on IV amiodarone and beta blocker with subsequent resolution of his VT. He was eventually transitioned to PO amiodarone 400 mg BID x 2 weeks (from 6/25-7/9) followed by amiodarone 400 mg daily thereafter per EP recommendations. Of note patient underwent LHC on 6/25 given his extensive ischemic history which did not reveal a culprit lesion, therefore no interventions were done. He was noted to have an elevated LVEDP to 45 however and was administered IV diuresis before being transitioned back to his PO diuretic regimen. Patient also completed heart failure pathway during his hospital course (eg completed colonoscopy on 7/2) as part of his work up for advanced options. Follows with Dr. Berman of heart failure/transplant as an outpatient.      The patient was discharged home in stable condition on 7/4/2018. He was dc'ed on warf/lovenox bridge given CHADS2-VaSc of 5.  Of note, he is no longer a candidate for AO.  He " presented yesterday to [facility] with BRBPR and was found to be in acute renal failure as well.  He notes that, on the night of the 5th and morning of 6th, he had 6 bright red bloody bm's.  He went to his PMD who noted he might have internal hemmorhoids.  He was told to stop his lovenox.  He then returned home and flet very lightheaded and dizzy.  He wisely took his blood pressure and noted it was 70-80/50s whereas it is normally 117/70s.  He went in to ED immediately.  There he was noted to have the following pertinent lab values:  Hg 9.2 (11.7 prior)  INR 2.9  Na 131 (139 prior)  K 6.18  BUN 43 (18 prior)  Cr 2.88 (1.1-1.4 at baseline)  proBNP 2755  He was transferred to [facility] for further evaluation and care.  Of note, he underwent screening c-scope on 7/2 during which the following was noted and done: Diverticulosis in the sigmoid colon and in the descending colon, Two 8 to 10 mm polyps in the sigmoid colon and in the descending colon, removed with a hot snare, resected and retrieved, ligated.        * VT Storm    -VT storm 7/10/18. Polymorphic and monomorphic. Degenerated into VF. 12 ICD shocks  -Intubated, IABP placed emergently at bedside.   -VT storm again on 7/17/18 and reloaded with amio. Continue metoprolol, amio gtt for now, mexitil.   -Amio loaded x 2, gtt started per protocol.  Lidocaine gtt started at 1, increased to 2 when patient had more VT-->VF and A-EP consulted, appreciate their assistance.   -Continue support with IABP 1:1   -K goal >4.5, Mg >2.5  - Per EP-At a later time, it may be considered to deactivate the LV lead of the patient's CRT-D, considering the patient's LVAD status and native RBBB. ICD interrogation reveals LICHA        Hypotension    - Levophed started during code 7/10/18  - Wean levo for MAP <60 (using IABP pressure)        Cardiogenic shock    - IABP placed emergently 7/10/18  - Daily CXR   - Augmenting well at 1:1. SVO2 66%, CVP 8   - Continue lasix gtt @ 10 mg/hr         Encounter for management of intra-aortic balloon pump    -IABP placed 7/10/2018  -Daily CXR        Respiratory failure requiring intubation    -Emergently intubated 7/10/18 for impending respiratory failure/need for IABP and inability to lay fat  -History of pulmonary sarcoidosis  -Methylpred given 7/10/18, hydrocortisone 100 mg q8 started 7/11/18. Transitioned to PO prednisone 7/16.   -Pulmonary consult for assistance with sarcoid & vent management.  -CXR daily  -Will plan on SBT Monday per discussion with daughter        Constipation    - resolved.  - BM today   - Will resume tube feeds today          VAP (ventilator-associated pneumonia)    -Continue ancef to complete 10 day course per ID for MSSA in sputum.         History of stroke    -On coumadin prior to admit  -Hold off on systemic anticoagulation for now.        Ventricular fibrillation    -See VT        Chronic systolic CHF (congestive heart failure)    - See cardiogenic shock        GI bleed    - INR subtherapeutic  - Coumadin on hold anticoagulation has been on hold in setting of recent GI bleed. Has been receiving DVT PPx  - Protonix increased to 40 mg BID (changed to IV)  - Had screening colonoscopy with hot snare polypectomy during last admission; most likely culprit post polypectomy bleeding  - H & H stable today        History of atrial fibrillation    -  continue amio         Abnormal involuntary movements    - Continue keppra  - possible seizure activity noted on 7/12/18 (twitching right face and UEs lasting 5 min). Some Bilateral UE twitching which did not appear to be seizure like occurred on 7/18, resolved with increased sedation. If noted again will reach out to neuro.   - neuro consulted and increased keppra to 1000 IV 12   - continuous EEG read and negative for seizure activity   - will start de-escalation of keppra and change to PO per neuro recs        CAD (coronary artery disease)    - Continue atorvastatin, nitro prn  - ASA restarted         Sarcoidosis of lung    - Continue prednisone, breo-ellipta, and albuterol prn.   - Wean pred          Uninterrupted Critical Care/Counseling Time (not including procedures): 45 minutes    Catalina Paredes PA-C  Heart Transplant  [facility]   c/w synthroid, f/u TSH

## 2018-07-21 NOTE — ASSESSMENT & PLAN NOTE
-Continue ancef.   -Cultures now with S. Aureus in sputum, now with +jesenia blood cultures on 7/17 (probable contaminent)  -Repeat BCx negative.

## 2018-07-21 NOTE — PLAN OF CARE
Problem: Patient Care Overview  Goal: Plan of Care Review  Outcome: Ongoing (interventions implemented as appropriate)  Pt. Remains intubated, 50%Fio2, 5 PEEP, O2 sats > 92%. -200cc/hr. Gtts: Fentanyl, Propofol, Lasix, Levo, Amio. IABP remains in place, LEVAR triggered, 1:1, Max augmentation. Accuchecks q4hr, no PRN insulin coverage required. No sedation vacations performed per MD order. Pt. Remains free of skin breakdown at this time. Pt. And daughter updated with plan of care, all questions answers.

## 2018-07-22 PROBLEM — E87.0 HYPERNATREMIA: Status: ACTIVE | Noted: 2018-01-01

## 2018-07-22 NOTE — PROGRESS NOTES
[facility]  Heart Transplant  Progress Note    Patient Name: Yonathan Good Jr.  MRN: [id number]  Admission Date: 7/7/2018  Hospital Length of Stay: 15 days  Attending Physician: Kimberly Lazo MD  Primary Care Provider: Edgar Gates MD  Principal Problem:Ventricular tachycardia, incessant    Subjective:     Interval History: No arrhthymias overnight. IABP remains at 1:1.    Continuous Infusions:   amiodarone in dextrose 5% 0.5 mg/min (07/22/18 1224)    fentanyl 150 mcg/hr (07/22/18 1045)    furosemide (LASIX) 2 mg/mL infusion (non-titrating) 10 mg/hr (07/22/18 0600)    norepinephrine bitartrate-D5W 0.06 mcg/kg/min (07/22/18 1400)    propofol 50.05 mcg/kg/min (07/22/18 0934)     Scheduled Meds:   albuterol sulfate  2.5 mg Nebulization Q4H    aspirin  81 mg Oral Daily    atorvastatin  40 mg Oral Daily    budesonide  0.5 mg Nebulization Q12H    ceFAZolin  1 g Intravenous Q8H    chlorhexidine  15 mL Mouth/Throat BID    chlorhexidine  15 mL Mouth/Throat BID    docusate  100 mg Oral Daily    fluticasone-vilanterol  1 puff Inhalation Daily    gabapentin  600 mg Oral BID    heparin (porcine)  5,000 Units Subcutaneous Q8H    levETIRAcetam  500 mg Oral BID    metoprolol  2.5 mg Intravenous Q4H    mexiletine  150 mg Oral Q8H    pantoprozole (PROTONIX) 40 mg/100 mL D5W IVPB  40 mg Intravenous BID    predniSONE  30 mg Oral Daily    sennosides 8.8 mg/5 ml  5 mL Oral QHS    sodium chloride 0.9%  3 mL Intravenous Q8H     PRN Meds:sodium chloride, ALPRAZolam, benzonatate, calcium gluconate IVPB, calcium gluconate IVPB, calcium gluconate IVPB, carisoprodol, dextrose 50%, glucagon (human recombinant), HYDROcodone-acetaminophen, insulin aspart U-100, magnesium sulfate IVPB, magnesium sulfate IVPB, nitroGLYCERIN, potassium chloride in water **AND** potassium chloride in water **AND** potassium chloride in water, sodium phosphate IVPB, sodium phosphate IVPB, sodium phosphate IVPB    Review of  patient's allergies indicates:  No Known Allergies  Objective:     Vital Signs (Most Recent):  Temp: 98.9 °F (37.2 °C) (07/22/18 1100)  Pulse: 80 (07/22/18 1400)  Resp: 13 (07/22/18 1400)  BP: (!) 92/51 (07/22/18 1400)  SpO2: 95 % (07/22/18 1400) Vital Signs (24h Range):  Temp:  [98.6 °F (37 °C)-99 °F (37.2 °C)] 98.9 °F (37.2 °C)  Pulse:  [] 80  Resp:  [9-49] 13  SpO2:  [84 %-99 %] 95 %  BP: ()/(47-61) 92/51     Patient Vitals for the past 72 hrs (Last 3 readings):   Weight   07/22/18 0400 103.8 kg (228 lb 13.4 oz)   07/21/18 0400 106.8 kg (235 lb 7.2 oz)   07/20/18 0400 106.5 kg (234 lb 12.6 oz)     Body mass index is 31.92 kg/m².      Intake/Output Summary (Last 24 hours) at 07/22/18 1433  Last data filed at 07/22/18 1400   Gross per 24 hour   Intake             2923 ml   Output             3445 ml   Net             -522 ml     Hemodynamic Parameters:    Telemetry: paced at 80 bpm    Physical Exam   Constitutional: He appears well-developed and well-nourished. He is intubated.   Daughter and brother at bedside   HENT:   Head: Normocephalic and atraumatic.   Mild periorbital edema   Neck: Normal range of motion. Neck supple. JVD: difficult to assess on ventialtor.   Right TLC   Cardiovascular: Normal rate and regular rhythm.    Right hand cooler than left but with strong pulse palpable; otherwise warm and well perfused in upper and lower extremities. IABP site C/D/I   Pulmonary/Chest: He is intubated. No respiratory distress.   Coarse breath sounds, ventilated.    Abdominal: Soft. Bowel sounds are normal. He exhibits no distension. There is no tenderness.   Musculoskeletal: He exhibits no edema.   Neurological:   intubated and sedateD   Skin: Skin is warm and dry.   Nursing note and vitals reviewed.    Significant Labs:  CBC:    Recent Labs  Lab 07/20/18  0400 07/21/18  0415 07/22/18  0400   WBC 10.65 12.54 14.67*   RBC 2.42* 2.58* 2.62*   HGB 6.9* 7.3* 7.4*   HCT 22.9* 24.0* 24.9*   * 157 170    MCV 95 93 95   MCH 28.5 28.3 28.2   MCHC 30.1* 30.4* 29.7*     BNP:  No results for input(s): BNP in the last 168 hours.    Invalid input(s): BNPTRIAGELBLO  CMP:    Recent Labs  Lab 07/20/18  0400  07/21/18  0415  07/22/18  0000 07/22/18  0400 07/22/18  1211   *  < > 107  < > 124* 128*  128* 231*   CALCIUM 9.2  < > 9.5  < > 9.6 9.9  9.9 9.2   ALBUMIN 1.9*  --  2.0*  --   --  2.0*  --    PROT 6.3  --  6.4  --   --  6.7  --    *  < > 151*  < > 150* 150*  150* 147*   K 3.9  < > 4.0  < > 3.5 4.5  4.5 4.1   CO2 34*  < > 34*  < > 35* 34*  34* 31*     < > 104  < > 103 104  104 104   BUN 60*  < > 59*  < > 55* 55*  55* 57*   CREATININE 1.2  < > 1.3  < > 1.4 1.3  1.3 1.5*   ALKPHOS 58  --  61  --   --  65  --    ALT 12  --  10  --   --  10  --    AST 14  --  16  --   --  21  --    BILITOT 0.4  --  0.3  --   --  0.4  --    < > = values in this interval not displayed.   Coagulation:     Recent Labs  Lab 07/17/18  0714  07/20/18  0400 07/21/18  0415 07/22/18  0400   INR 1.0  < > 1.0 1.1 1.1   APTT <21.0  --   --   --   --    < > = values in this interval not displayed.  LDH:  No results for input(s): LDH in the last 72 hours.  Microbiology:  Microbiology Results (last 7 days)     Procedure Component Value Units Date/Time    Blood culture [[id number]] Collected:  07/17/18 0930    Order Status:  Completed Specimen:  Blood from Peripheral, Antecubital, Right Updated:  07/22/18 1212     Blood Culture, Routine No growth after 5 days.    Blood culture [[id number]] Collected:  07/18/18 1803    Order Status:  Completed Specimen:  Blood from Peripheral, Forearm, Right Updated:  07/21/18 2212     Blood Culture, Routine No Growth to date     Blood Culture, Routine No Growth to date     Blood Culture, Routine No Growth to date     Blood Culture, Routine No Growth to date    Blood culture [[id number]] Collected:  07/18/18 1803    Order Status:  Completed Specimen:  Blood from Peripheral, Antecubital, Left Updated:   07/21/18 2212     Blood Culture, Routine No Growth to date     Blood Culture, Routine No Growth to date     Blood Culture, Routine No Growth to date     Blood Culture, Routine No Growth to date    IV catheter culture [[id number]] Collected:  07/19/18 1851    Order Status:  Completed Specimen:  Catheter Tip from Catheter Tip, Intrajugular Updated:  07/21/18 1344     Aerobic Culture - Cath tip --     STAPHYLOCOCCUS EPIDERMIDIS  < 15 colonies      Narrative:       Culture [facility]    Blood culture [[id number]] Collected:  07/17/18 0950    Order Status:  Completed Specimen:  Blood from Peripheral, Antecubital, Right Updated:  07/20/18 1048     Blood Culture, Routine Gram stain aer bottle: Gram positive cocci in clusters resembling Staph     Blood Culture, Routine Results called to and read back by:Javon Hatfield RN 07/18/2018  17:50     Blood Culture, Routine --     COAGULASE-NEGATIVE STAPHYLOCOCCUS SPECIES  Organism is a probable contaminant      Fungus culture [[id number]] Collected:  07/17/18 1154    Order Status:  Completed Specimen:  Respiratory from Sputum Updated:  07/19/18 1054     Fungus (Mycology) Culture Culture in progress    Blood culture [[id number]] Collected:  07/13/18 1309    Order Status:  Completed Specimen:  Blood from Peripheral, Hand, Left Updated:  07/18/18 1812     Blood Culture, Routine No growth after 5 days.    Blood culture [[id number]] Collected:  07/13/18 1350    Order Status:  Completed Specimen:  Blood from Peripheral, Hand, Right Updated:  07/18/18 1812     Blood Culture, Routine No growth after 5 days.    Culture, Respiratory with Gram Stain [[id number]]  (Susceptibility) Collected:  07/13/18 1129    Order Status:  Completed Specimen:  Respiratory from Endotracheal Aspirate Updated:  07/17/18 1057     Respiratory Culture --     STAPHYLOCOCCUS AUREUS  Moderate  Normal respiratory peng also present       Gram Stain (Respiratory) Few WBC's     Gram Stain (Respiratory) No organisms seen          I have  "reviewed all pertinent labs within the past 24 hours.    Estimated Creatinine Clearance: 68.2 mL/min (A) (based on SCr of 1.5 mg/dL (H)).    Diagnostic Results:  I have reviewed and interpreted all pertinent imaging results/findings within the past 24 hours.    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      He was initially admitted to "stroke/neuro" service on 6/22 with dysarthria and stroke-like symptoms. Extensive work up was negative for recurrent CVA and so no tPA was administered. Patient developed atypical chest pain two days into his hospital course and was noted to be in monomorphic VT (6/24) which was treated with ATP then with shock due to recurrent VT in VF zone. Given his active cardiac issues this prompted transfer to heart failure service where the patient was initiated on IV amiodarone and beta blocker with subsequent resolution of his VT. He was eventually transitioned to PO amiodarone 400 mg BID x 2 weeks (from 6/25-7/9) followed by amiodarone 400 mg daily thereafter per EP recommendations. Of note patient underwent LHC on 6/25 given his extensive ischemic history which did not reveal a culprit lesion, therefore no interventions were done. He was noted to have an elevated LVEDP to 45 however and was administered IV diuresis before being transitioned back to his PO diuretic regimen. Patient also completed heart failure pathway during his hospital course (eg completed colonoscopy on 7/2) as part of his work up for advanced options. Follows with Dr. Berman of heart failure/transplant as an outpatient.      The patient was discharged home in stable condition on 7/4/2018. He was dc'ed on warf/lovenox bridge given CHADS2-VaSc of 5.  Of note, he is no longer a candidate for AO.  He presented " yesterday to [facility] with BRBPR and was found to be in acute renal failure as well.  He notes that, on the night of the 5th and morning of 6th, he had 6 bright red bloody bm's.  He went to his PMD who noted he might have internal hemmorhoids.  He was told to stop his lovenox.  He then returned home and flet very lightheaded and dizzy.  He wisely took his blood pressure and noted it was 70-80/50s whereas it is normally 117/70s.  He went in to ED immediately.  There he was noted to have the following pertinent lab values:  Hg 9.2 (11.7 prior)  INR 2.9  Na 131 (139 prior)  K 6.18  BUN 43 (18 prior)  Cr 2.88 (1.1-1.4 at baseline)  proBNP 2755  He was transferred to [facility] [place] [facility] for further evaluation and care.  Of note, he underwent screening c-scope on 7/2 during which the following was noted and done: Diverticulosis in the sigmoid colon and in the descending colon, Two 8 to 10 mm polyps in the sigmoid colon and in the descending colon, removed with a hot snare, resected and retrieved, ligated.        * VT Storm    -VT storm 7/10/18. Polymorphic and monomorphic. Degenerated into VF. 12 ICD shocks  -Intubated, IABP placed emergently at bedside.   -VT storm again on 7/17/18 and reloaded with amio. Continue metoprolol,mexitil, amio gtt for now.   -Continue support with IABP 1:1   -K goal >4.5, Mg >2.5  - Per EP-At a later time, it may be considered to deactivate the LV lead of the patient's CRT-D, considering the patient's LVAD status and native RBBB. ICD interrogation reveals LICHA. EP signed off, requests that we let them know if/when gen change needed        Hypotension    - Levophed started during code 7/10/18  - Wean levo for MAP <60 (using IABP mean)        Cardiogenic shock    - IABP placed emergently 7/10/18  - Daily CXR   - Augmenting well at 1:1. SVO2 74%, CVP 8   - Continue lasix gtt @ 10 mg/hr        Encounter for management of intra-aortic balloon pump    -IABP placed 7/10/2018  -Daily CXR         Respiratory failure requiring intubation    -Emergently intubated 7/10/18 for impending respiratory failure/need for IABP and inability to lay fat  -History of pulmonary sarcoidosis  -Methylpred given 7/10/18, hydrocortisone 100 mg q8 started 7/11/18. Transitioned to PO prednisone 7/16.   -Pulmonary consult for assistance with sarcoid & vent management.  -CXR guy  -Will plan on SBT Monday per discussion with daughter  -FiO2 decreased this am to 40% with saturations dropping to 90%. Increase back to 50%.         Hypernatremia    -Will add free water flushes today, 250 q 6 hours        Constipation    - resolved.  - continue bowel regimen          VAP (ventilator-associated pneumonia)    -Continue ancef.   -Cultures now with S. Aureus in sputum, now with +jesenia blood cultures on 7/17 (probable contaminent)  -Repeat BCx negative.         History of stroke    -On coumadin prior to admit  -Hold off on systemic anticoagulation for now.        Ventricular fibrillation    -See VT        Chronic systolic CHF (congestive heart failure)    - See cardiogenic shock        GI bleed    - INR subtherapeutic  - Coumadin on hold anticoagulation has been on hold in setting of recent GI bleed. Has been receiving DVT PPx  - Protonix increased to 40 mg BID (changed to IV)  - Had screening colonoscopy with hot snare polypectomy during last admission; most likely culprit post polypectomy bleeding  - H & H slowly trending down. Continue to monitor closely.        History of atrial fibrillation    -  continue amio         Abnormal involuntary movements    - Continue keppra  - possible seizure activity noted on 7/12/18 (twitching right face and UEs lasting 5 min). Some Bilateral UE twitching which did not appear to be seizure like occurred on 7/18, resolved with increased sedation. If noted again will reach out to neuro.   - neuro consulted and increased keppra to 1000 IV 12   - continuous EEG read and negative for seizure activity   - will  start de-escalation of keppra and change to PO per neuro recs        CAD (coronary artery disease)    - Continue atorvastatin, nitro prn  - ASA restarted        Sarcoidosis of lung    - Continue prednisone, breo-ellipta, and albuterol prn.   - Wean pred          Uninterrupted Critical Care/Counseling Time (not including procedures): 40 minutes    Catalina Paredes PA-C  Heart Transplant  [facility]

## 2018-07-22 NOTE — ASSESSMENT & PLAN NOTE
-VT storm 7/10/18. Polymorphic and monomorphic. Degenerated into VF. 12 ICD shocks  -Intubated, IABP placed emergently at bedside.   -VT storm again on 7/17/18 and reloaded with amio. Continue metoprolol,mexitil, amio gtt for now.   -Continue support with IABP 1:1   -K goal >4.5, Mg >2.5  - Per EP-At a later time, it may be considered to deactivate the LV lead of the patient's CRT-D, considering the patient's LVAD status and native RBBB. ICD interrogation reveals LICHA. EP signed off, requests that we let them know if/when gen change needed

## 2018-07-22 NOTE — PLAN OF CARE
Problem: Patient Care Overview  Goal: Plan of Care Review  Outcome: Ongoing (interventions implemented as appropriate)  Pt remains intubated. 50% FiO2, 5 PEEP. Gtts: Amio, Lasix, Levo, Fentanyl, Propofol. Tube feeds 55cc/hr. IABP in place. Accuchecks Q4. No sedation vacations per MD order. MAP > 55, per MD order. Plan to discuss weaning tomorrow.

## 2018-07-22 NOTE — SUBJECTIVE & OBJECTIVE
Interval History: No arrhthymias overnight. IABP remains at 1:1.    Continuous Infusions:   amiodarone in dextrose 5% 0.5 mg/min (07/22/18 1224)    fentanyl 150 mcg/hr (07/22/18 1045)    furosemide (LASIX) 2 mg/mL infusion (non-titrating) 10 mg/hr (07/22/18 0600)    norepinephrine bitartrate-D5W 0.06 mcg/kg/min (07/22/18 1400)    propofol 50.05 mcg/kg/min (07/22/18 0934)     Scheduled Meds:   albuterol sulfate  2.5 mg Nebulization Q4H    aspirin  81 mg Oral Daily    atorvastatin  40 mg Oral Daily    budesonide  0.5 mg Nebulization Q12H    ceFAZolin  1 g Intravenous Q8H    chlorhexidine  15 mL Mouth/Throat BID    chlorhexidine  15 mL Mouth/Throat BID    docusate  100 mg Oral Daily    fluticasone-vilanterol  1 puff Inhalation Daily    gabapentin  600 mg Oral BID    heparin (porcine)  5,000 Units Subcutaneous Q8H    levETIRAcetam  500 mg Oral BID    metoprolol  2.5 mg Intravenous Q4H    mexiletine  150 mg Oral Q8H    pantoprozole (PROTONIX) 40 mg/100 mL D5W IVPB  40 mg Intravenous BID    predniSONE  30 mg Oral Daily    sennosides 8.8 mg/5 ml  5 mL Oral QHS    sodium chloride 0.9%  3 mL Intravenous Q8H     PRN Meds:sodium chloride, ALPRAZolam, benzonatate, calcium gluconate IVPB, calcium gluconate IVPB, calcium gluconate IVPB, carisoprodol, dextrose 50%, glucagon (human recombinant), HYDROcodone-acetaminophen, insulin aspart U-100, magnesium sulfate IVPB, magnesium sulfate IVPB, nitroGLYCERIN, potassium chloride in water **AND** potassium chloride in water **AND** potassium chloride in water, sodium phosphate IVPB, sodium phosphate IVPB, sodium phosphate IVPB    Review of patient's allergies indicates:  No Known Allergies  Objective:     Vital Signs (Most Recent):  Temp: 98.9 °F (37.2 °C) (07/22/18 1100)  Pulse: 80 (07/22/18 1400)  Resp: 13 (07/22/18 1400)  BP: (!) 92/51 (07/22/18 1400)  SpO2: 95 % (07/22/18 1400) Vital Signs (24h Range):  Temp:  [98.6 °F (37 °C)-99 °F (37.2 °C)] 98.9 °F (37.2  °C)  Pulse:  [] 80  Resp:  [9-49] 13  SpO2:  [84 %-99 %] 95 %  BP: ()/(47-61) 92/51     Patient Vitals for the past 72 hrs (Last 3 readings):   Weight   07/22/18 0400 103.8 kg (228 lb 13.4 oz)   07/21/18 0400 106.8 kg (235 lb 7.2 oz)   07/20/18 0400 106.5 kg (234 lb 12.6 oz)     Body mass index is 31.92 kg/m².      Intake/Output Summary (Last 24 hours) at 07/22/18 1433  Last data filed at 07/22/18 1400   Gross per 24 hour   Intake             2923 ml   Output             3445 ml   Net             -522 ml     Hemodynamic Parameters:    Telemetry: paced at 80 bpm    Physical Exam   Constitutional: He appears well-developed and well-nourished. He is intubated.   Daughter and brother at bedside   HENT:   Head: Normocephalic and atraumatic.   Mild periorbital edema   Neck: Normal range of motion. Neck supple. JVD: difficult to assess on ventialtor.   Right TLC   Cardiovascular: Normal rate and regular rhythm.    Right hand cooler than left but with strong pulse palpable; otherwise warm and well perfused in upper and lower extremities. IABP site C/D/I   Pulmonary/Chest: He is intubated. No respiratory distress.   Coarse breath sounds, ventilated.    Abdominal: Soft. Bowel sounds are normal. He exhibits no distension. There is no tenderness.   Musculoskeletal: He exhibits no edema.   Neurological:   intubated and sedateD   Skin: Skin is warm and dry.   Nursing note and vitals reviewed.    Significant Labs:  CBC:    Recent Labs  Lab 07/20/18  0400 07/21/18  0415 07/22/18  0400   WBC 10.65 12.54 14.67*   RBC 2.42* 2.58* 2.62*   HGB 6.9* 7.3* 7.4*   HCT 22.9* 24.0* 24.9*   * 157 170   MCV 95 93 95   MCH 28.5 28.3 28.2   MCHC 30.1* 30.4* 29.7*     BNP:  No results for input(s): BNP in the last 168 hours.    Invalid input(s): BNPTRIAGELBLO  CMP:    Recent Labs  Lab 07/20/18  0400  07/21/18  0415  07/22/18  0000 07/22/18  0400 07/22/18  1211   *  < > 107  < > 124* 128*  128* 231*   CALCIUM 9.2  < > 9.5   < > 9.6 9.9  9.9 9.2   ALBUMIN 1.9*  --  2.0*  --   --  2.0*  --    PROT 6.3  --  6.4  --   --  6.7  --    *  < > 151*  < > 150* 150*  150* 147*   K 3.9  < > 4.0  < > 3.5 4.5  4.5 4.1   CO2 34*  < > 34*  < > 35* 34*  34* 31*     < > 104  < > 103 104  104 104   BUN 60*  < > 59*  < > 55* 55*  55* 57*   CREATININE 1.2  < > 1.3  < > 1.4 1.3  1.3 1.5*   ALKPHOS 58  --  61  --   --  65  --    ALT 12  --  10  --   --  10  --    AST 14  --  16  --   --  21  --    BILITOT 0.4  --  0.3  --   --  0.4  --    < > = values in this interval not displayed.   Coagulation:     Recent Labs  Lab 07/17/18  0714  07/20/18  0400 07/21/18  0415 07/22/18  0400   INR 1.0  < > 1.0 1.1 1.1   APTT <21.0  --   --   --   --    < > = values in this interval not displayed.  LDH:  No results for input(s): LDH in the last 72 hours.  Microbiology:  Microbiology Results (last 7 days)     Procedure Component Value Units Date/Time    Blood culture [[id number]] Collected:  07/17/18 0930    Order Status:  Completed Specimen:  Blood from Peripheral, Antecubital, Right Updated:  07/22/18 1212     Blood Culture, Routine No growth after 5 days.    Blood culture [[id number]] Collected:  07/18/18 1803    Order Status:  Completed Specimen:  Blood from Peripheral, Forearm, Right Updated:  07/21/18 2212     Blood Culture, Routine No Growth to date     Blood Culture, Routine No Growth to date     Blood Culture, Routine No Growth to date     Blood Culture, Routine No Growth to date    Blood culture [[id number]] Collected:  07/18/18 1803    Order Status:  Completed Specimen:  Blood from Peripheral, Antecubital, Left Updated:  07/21/18 2212     Blood Culture, Routine No Growth to date     Blood Culture, Routine No Growth to date     Blood Culture, Routine No Growth to date     Blood Culture, Routine No Growth to date    IV catheter culture [[id number]] Collected:  07/19/18 3202    Order Status:  Completed Specimen:  Catheter Tip from Catheter Tip,  Intrajugular Updated:  07/21/18 1344     Aerobic Culture - Cath tip --     STAPHYLOCOCCUS EPIDERMIDIS  < 15 colonies      Narrative:       Culture [facility]    Blood culture [[id number]] Collected:  07/17/18 0950    Order Status:  Completed Specimen:  Blood from Peripheral, Antecubital, Right Updated:  07/20/18 1048     Blood Culture, Routine Gram stain aer bottle: Gram positive cocci in clusters resembling Staph     Blood Culture, Routine Results called to and read back by:Javon Hatfield RN 07/18/2018  17:50     Blood Culture, Routine --     COAGULASE-NEGATIVE STAPHYLOCOCCUS SPECIES  Organism is a probable contaminant      Fungus culture [[id number]] Collected:  07/17/18 1154    Order Status:  Completed Specimen:  Respiratory from Sputum Updated:  07/19/18 1054     Fungus (Mycology) Culture Culture in progress    Blood culture [[id number]] Collected:  07/13/18 1309    Order Status:  Completed Specimen:  Blood from Peripheral, Hand, Left Updated:  07/18/18 1812     Blood Culture, Routine No growth after 5 days.    Blood culture [[id number]] Collected:  07/13/18 1350    Order Status:  Completed Specimen:  Blood from Peripheral, Hand, Right Updated:  07/18/18 1812     Blood Culture, Routine No growth after 5 days.    Culture, Respiratory with Gram Stain [[id number]]  (Susceptibility) Collected:  07/13/18 1129    Order Status:  Completed Specimen:  Respiratory from Endotracheal Aspirate Updated:  07/17/18 1057     Respiratory Culture --     STAPHYLOCOCCUS AUREUS  Moderate  Normal respiratory peng also present       Gram Stain (Respiratory) Few WBC's     Gram Stain (Respiratory) No organisms seen          I have reviewed all pertinent labs within the past 24 hours.    Estimated Creatinine Clearance: 68.2 mL/min (A) (based on SCr of 1.5 mg/dL (H)).    Diagnostic Results:  I have reviewed and interpreted all pertinent imaging results/findings within the past 24 hours.

## 2018-07-22 NOTE — ASSESSMENT & PLAN NOTE
- IABP placed emergently 7/10/18  - Daily CXR   - Augmenting well at 1:1. SVO2 74%, CVP 8   - Continue lasix gtt @ 10 mg/hr

## 2018-07-22 NOTE — PROGRESS NOTES
No sedation vacations per MD order. Pt medically sedated, intubated. Pt has facial grimacing during mouth care. Not withdrawing to any other stimuli.

## 2018-07-22 NOTE — PLAN OF CARE
Problem: Patient Care Overview  Goal: Plan of Care Review  Outcome: Ongoing (interventions implemented as appropriate)  Pt. Remains intubated, 40%Fio2, 5 PEEP, O2 sats > 92%. -200cc/hr. Gtts: Fentanyl, Propofol, Lasix, Levo, Amio. Tube feeds at 50cc/hr, minimal residuals. IABP remains in place, ECG triggered, 1:1, Max augmentation. Accuchecks q4hr, no PRN insulin coverage required. No sedation vacations performed per MD order. Pt. Remains free of skin breakdown at this time. Pt. And daughter updated with plan of care, all questions answers.

## 2018-07-22 NOTE — PROGRESS NOTES
Levo increased to 0.08 mcg/kg/min for MAP >60. Dr. Parr notified.Dr at bedside. Received new parameters for a MAP > 55.

## 2018-07-22 NOTE — ASSESSMENT & PLAN NOTE
-Emergently intubated 7/10/18 for impending respiratory failure/need for IABP and inability to lay fat  -History of pulmonary sarcoidosis  -Methylpred given 7/10/18, hydrocortisone 100 mg q8 started 7/11/18. Transitioned to PO prednisone 7/16.   -Pulmonary consult for assistance with sarcoid & vent management.  -CXR guy  -Will plan on SBT Monday per discussion with daughter  -FiO2 decreased this am to 40% with saturations dropping to 90%. Increase back to 50%.

## 2018-07-23 PROBLEM — E87.0 HYPERNATREMIA: Status: ACTIVE | Noted: 2018-01-01

## 2018-07-23 PROBLEM — J96.01 ACUTE RESPIRATORY FAILURE WITH HYPOXIA: Status: ACTIVE | Noted: 2018-01-01

## 2018-07-23 NOTE — ASSESSMENT & PLAN NOTE
-Continue ancef.   -Cultures now with S. Aureus in sputum, +jesenia blood cultures on 7/17 (probable contaminent)  -Repeat BCx negative.

## 2018-07-23 NOTE — PT/OT/SLP PROGRESS
Physical Therapy      Patient Name:  Yonathan Good Jr.   MRN:  [id number]    Patient not seen today secondary to continues to be intubated, sedated on balloon pump and medically labile Unavailable (Comment). Will sign off as pt not medically appropriate. Please reconsult if status changes.    Sabina Zavaleta, PT  7/23/2018

## 2018-07-23 NOTE — CONSULTS
[facility]  Palliative Medicine  Consult Note    Patient Name: Yonathan Good Jr.  MRN: [id number]  Admission Date: 7/7/2018  Hospital Length of Stay: 16 days  Code Status: Full Code   Attending Provider: Mohan Cross MD  Consulting Provider: Jen Madrid MD  Primary Care Physician: Edgar Gates MD  Principal Problem:Ventricular tachycardia, incessant    Patient information was obtained from relative(s), past medical records and ER records.      Inpatient consult to Palliative Care  Consult performed by: JEN MADRDI  Consult ordered by: DWAYNE DALLAS        Assessment/Plan:     58 yo m, cardiogenic shock, vent and IABP dependent, on pressors, minimal chance for recovery, currently full code, no limitations on medical interventions    1. Encounter for palliative care  -no family present at bedside, attempted to call both daughter and sister multiple times, no answer, left message  -bedside RN has my contact info and will call me when pt's family at bedside so can address [facility]    Active Diagnoses:    Diagnosis Date Noted POA    PRINCIPAL PROBLEM:  VT Storm [I47.2] 07/11/2018 No    Hypernatremia [E87.0] 07/22/2018 Unknown    Constipation [K59.00] 07/18/2018 No    Lower GI bleeding [K92.2] 07/17/2018 Yes    Paced rhythm on cardiac monitor [Z78.9] 07/17/2018 Yes    Presence of automatic implantable cardioverter-defibrillator [Z95.810] 07/17/2018 Yes    Ventricular tachycardia [I47.2] 07/17/2018 No    ICD (implantable cardioverter-defibrillator) discharge [Z45.02]  Not Applicable    Seizure disorder [G40.909] 07/16/2018 Yes    History of stroke [Z86.73] 07/13/2018 Not Applicable    VAP (ventilator-associated pneumonia) [J95.851]  Unknown    Respiratory failure requiring intubation [J96.90] 07/11/2018 No    Encounter for management of intra-aortic balloon pump [Z45.09] 07/11/2018 Not Applicable    Ventricular fibrillation [I49.01] 07/10/2018 No    Cardiogenic shock [R57.0]  07/10/2018 No    Hypotension [I95.9] 07/09/2018 Yes    Chronic systolic CHF (congestive heart failure) [I50.22] 07/08/2018 Yes    GI bleed [K92.2] 07/06/2018 Yes    History of atrial fibrillation [Z86.79] 07/01/2018 Not Applicable    Sarcoidosis of lung [D86.0] 08/13/2012 Yes    CAD (coronary artery disease) [I25.10] 08/13/2012 Yes    Abnormal involuntary movements [R25.9] 08/13/2012 Yes      Problems Resolved During this Admission:    Diagnosis Date Noted Date Resolved POA    Leukocytosis [D72.829] 07/16/2018 07/16/2018 No    CHF (congestive heart failure) [I50.9] 07/09/2018 07/16/2018 Yes    Acute renal failure [N17.9] 07/08/2018 07/15/2018 Yes       Thank you for your consult. I will follow-up with patient. Please contact us if you have any additional questions.    Subjective:     HPI: 58 yo m, h/o CAD, CHF (EF 5-10%), ICD, a fib on coumadin, pulmonary sarcoid (chronic prednisone), L parietal CVA, just admitted 6/22 - 7/4 for new neurologic sx, stroke work-up negative, then with recurrent VT.  Readmitted on 7/7 with GI bleed, hypotension, renal failure, transferred from [facility]    Hospital Course: VT and VF (7/10), s/p 12 ICD shocks, intubated, IABP placed    Interval History: currently on amio/lasix/levophed/propofol/fentanyl drips    Past Medical History:   Diagnosis Date    AICD (automatic cardioverter/defibrillator) present     Arthritis     Atrial fibrillation     CHF (congestive heart failure)     Coronary artery disease     History of stroke 7/13/2018    2005, 2006, no deficits per wife.    Hypertension     MI (myocardial infarction)     Sarcoid     Seizures     Stroke        Past Surgical History:   Procedure Laterality Date    CARDIAC CATHETERIZATION      CARDIAC DEFIBRILLATOR PLACEMENT  7-12    CARDIAC DEFIBRILLATOR PLACEMENT      CARDIAC DEFIBRILLATOR PLACEMENT      COLONOSCOPY N/A 7/2/2018    Procedure: COLONOSCOPY;  Surgeon: THELMA Kay MD;  Location: [facility] ([address] [place] [address]);   Service: Endoscopy;  Laterality: N/A;    CORONARY STENT PLACEMENT  07/2011    ESOPHAGOGASTRODUODENOSCOPY      FRACTURE SURGERY      LEFT HEART CATHETERIZATION N/A 6/25/2018    Procedure: Left heart cath;  Surgeon: Jordi Lui MD;  Location: [facility] CATH LAB;  Service: Cardiology;  Laterality: N/A;       Review of patient's allergies indicates:  No Known Allergies    Medications:  Continuous Infusions:   amiodarone in dextrose 5% 0.5 mg/min (07/23/18 1400)    fentanyl 125 mcg/hr (07/23/18 1400)    furosemide (LASIX) 2 mg/mL infusion (non-titrating) 10 mg/hr (07/23/18 1400)    norepinephrine bitartrate-D5W 0.05 mcg/kg/min (07/23/18 1400)    propofol 50 mcg/kg/min (07/23/18 1400)     Scheduled Meds:   albuterol sulfate  2.5 mg Nebulization Q4H    aspirin  81 mg Oral Daily    atorvastatin  40 mg Oral Daily    budesonide  0.5 mg Nebulization Q12H    ceFAZolin  1 g Intravenous Q8H    chlorhexidine  15 mL Mouth/Throat BID    chlorhexidine  15 mL Mouth/Throat BID    docusate  100 mg Oral Daily    fluticasone-vilanterol  1 puff Inhalation Daily    gabapentin  600 mg Oral BID    heparin (porcine)  5,000 Units Subcutaneous Q8H    levETIRAcetam  500 mg Oral BID    metoprolol  2.5 mg Intravenous Q4H    mexiletine  150 mg Oral Q8H    pantoprozole (PROTONIX) 40 mg/100 mL D5W IVPB  40 mg Intravenous BID    predniSONE  30 mg Oral Daily    sennosides 8.8 mg/5 ml  5 mL Oral QHS    sodium chloride 0.9%  3 mL Intravenous Q8H     PRN Meds:sodium chloride, ALPRAZolam, benzonatate, calcium gluconate IVPB, calcium gluconate IVPB, calcium gluconate IVPB, carisoprodol, dextrose 50%, glucagon (human recombinant), HYDROcodone-acetaminophen, insulin aspart U-100, magnesium sulfate IVPB, magnesium sulfate IVPB, nitroGLYCERIN, potassium chloride in water **AND** potassium chloride in water **AND** potassium chloride in water, sodium phosphate IVPB, sodium phosphate IVPB, sodium phosphate IVPB    Family History      Problem Relation (Age of Onset)    Cancer Maternal Grandmother    Coronary artery disease Father, Sister, Sister    Heart disease Mother, Father, Sister    Hypertension Mother, Father, Sister    Kidney disease Sister    Stroke Sister    Vision loss Sister        Social History Main Topics    Smoking status: Never Smoker    Smokeless tobacco: Never Used    Alcohol use No    Drug use: No    Sexual activity: Not on file       Review of Systems  Objective:     Vital Signs (Most Recent):  Temp: 99.1 °F (37.3 °C) (07/23/18 1100)  Pulse: 80 (07/23/18 1415)  Resp: 16 (07/23/18 1415)  BP: (!) 89/50 (07/23/18 1415)  SpO2: 96 % (07/23/18 1415) Vital Signs (24h Range):  Temp:  [98.2 °F (36.8 °C)-99.1 °F (37.3 °C)] 99.1 °F (37.3 °C)  Pulse:  [80-92] 80  Resp:  [5-27] 16  SpO2:  [82 %-100 %] 96 %  BP: ()/(46-60) 89/50     Weight: 104.4 kg (230 lb 2.6 oz)  Body mass index is 32.1 kg/m².     Performance Status: 10    ECOG Performance Status Grade: 4 - Completely disabled    Physical Exam   Constitutional: He appears ill. No distress. He is sedated and intubated.   HENT:   Head: Normocephalic and atraumatic.   Pulmonary/Chest: He is intubated.   On ventilator   Abdominal: He exhibits no distension.   Musculoskeletal: He exhibits edema.   Neurological: He is unresponsive.   Skin: Skin is warm and dry. He is not diaphoretic.   Nursing note and vitals reviewed.          CBC:     Recent Labs  Lab 07/23/18  0414   WBC 12.64   HGB 7.3*   HCT 24.7*   MCV 95        BMP:    Recent Labs  Lab 07/23/18  0414 07/23/18  0803   *  165* 160*   *  147* 148*   K 4.2  4.2 3.8     104 104   CO2 32*  32* 33*   BUN 59*  59* 61*   CREATININE 1.4  1.4 1.4   CALCIUM 9.6  9.6 9.8   MG 2.2  --      LFT:  Lab Results   Component Value Date    AST 16 07/23/2018    ALKPHOS 67 07/23/2018    BILITOT 0.4 07/23/2018     Albumin:   Albumin   Date Value Ref Range Status   07/23/2018 1.9 (L) 3.5 - 5.2 g/dL Final     Protein:    Total Protein   Date Value Ref Range Status   07/23/2018 6.7 6.0 - 8.4 g/dL Final     Lactic acid:   Lab Results   Component Value Date    LACTATE 1.3 07/17/2018    LACTATE 1.8 07/12/2018       Advanced Directives::  Living Will: No  LaPOST: No  Do Not Resuscitate Status: No  Medical Power of : No    Decision-Making Capacity: Family answered questions, Patient unable to communicate due to disease severity/cognitive impairment    Living Arrangements: Lives with family  Per recent SW note on 7/3: Pt lives with his cousin Arturo Chester and Arturo's wife Tasha. Both drive and work during the day.       > 50% of 45 min visit spent in chart review, face to face discussion of goals of care,  symptom assessment, coordination of care and emotional support.    Jen Madrid MD  Palliative Medicine  [facility]  [phone]

## 2018-07-23 NOTE — PLAN OF CARE
Problem: Patient Care Overview  Goal: Plan of Care Review  Outcome: Ongoing (interventions implemented as appropriate)  Pt. Remains intubated. Vent settings: 40% Fio2, 5-peep, O2 sats >92%. Gtts: Levo, Lasix, Propofol, Amio, Fentanyl.  MAP maintained > 55 per MD order. Balloon pump: 1:1, Max augmentation, ECG triggered. Tube feeds running at goal of 55ml/hr, minimal residuals. Accuchecks q4hr, no insulin coverage required. -200ml/hr. No sedation vacations performed per MD order. Pt. Remains free of skin breakdown at this time. Pt. And daughter updated on plan of care, all questions answered.

## 2018-07-23 NOTE — PT/OT/SLP PROGRESS
Occupational Therapy      Patient Name:  Yonathan Good Jr.   MRN:  [id number]    Patient not seen today secondary to continues to be intubated, sedated on balloon pump and medically labile  . Will sign off as pt not medically appropriate. Please reconsult if status changes.    MELISSA Dhaliwal  7/23/2018

## 2018-07-23 NOTE — PLAN OF CARE
Problem: Patient Care Overview  Goal: Plan of Care Review  Outcome: Ongoing (interventions implemented as appropriate)  Reviewed plan of care with patient and family. Pt intubated on vent settings AC 50%, 5 of PEEP. Pt unresponsive. Sedated on Propofol @50mcg/kg/min and fentanyl @125mcg/hr. Amio @0.5mg/min, Lasix @10mg/hr. TF @ goal of %% mL/hr, tolerating well. UO adequate, no BM. Balloon pump 1:1, ECG trigger. HOB remained flat. Pt free from skin breakdown. Turned q2 hr for comfort and to decrease pressure points. See flow sheet for further assessment. VSS at this time, will continue to monitor.

## 2018-07-23 NOTE — PROGRESS NOTES
[facility]  Heart Transplant  Progress Note    Patient Name: Yonathan Good Jr.  MRN: [id number]  Admission Date: 7/7/2018  Hospital Length of Stay: 16 days  Attending Physician: Kimberly Lazo MD  Primary Care Provider: Edgra Gates MD  Principal Problem:Ventricular tachycardia, incessant    Subjective:     Interval History: No arrhthymias overnight, just occasional PVC. IABP remains at 1:1. Remains intubated/sedated. CXR appears worse this am, discussed with family.    Continuous Infusions:   amiodarone in dextrose 5% 0.5 mg/min (07/23/18 1100)    fentanyl 12.5 mL/hr at 07/23/18 1100    furosemide (LASIX) 2 mg/mL infusion (non-titrating) 10 mg/hr (07/23/18 1100)    norepinephrine bitartrate-D5W 0.05 mcg/kg/min (07/23/18 1058)    propofol 50 mcg/kg/min (07/23/18 1100)     Scheduled Meds:   albuterol sulfate  2.5 mg Nebulization Q4H    aspirin  81 mg Oral Daily    atorvastatin  40 mg Oral Daily    budesonide  0.5 mg Nebulization Q12H    ceFAZolin  1 g Intravenous Q8H    chlorhexidine  15 mL Mouth/Throat BID    chlorhexidine  15 mL Mouth/Throat BID    docusate  100 mg Oral Daily    fluticasone-vilanterol  1 puff Inhalation Daily    gabapentin  600 mg Oral BID    heparin (porcine)  5,000 Units Subcutaneous Q8H    levETIRAcetam  500 mg Oral BID    metoprolol  2.5 mg Intravenous Q4H    mexiletine  150 mg Oral Q8H    pantoprozole (PROTONIX) 40 mg/100 mL D5W IVPB  40 mg Intravenous BID    predniSONE  30 mg Oral Daily    sennosides 8.8 mg/5 ml  5 mL Oral QHS    sodium chloride 0.9%  3 mL Intravenous Q8H     PRN Meds:sodium chloride, ALPRAZolam, benzonatate, calcium gluconate IVPB, calcium gluconate IVPB, calcium gluconate IVPB, carisoprodol, dextrose 50%, glucagon (human recombinant), HYDROcodone-acetaminophen, insulin aspart U-100, magnesium sulfate IVPB, magnesium sulfate IVPB, nitroGLYCERIN, potassium chloride in water **AND** potassium chloride in water **AND** potassium  chloride in water, sodium phosphate IVPB, sodium phosphate IVPB, sodium phosphate IVPB    Review of patient's allergies indicates:  No Known Allergies  Objective:     Vital Signs (Most Recent):  Temp: 99.1 °F (37.3 °C) (07/23/18 1100)  Pulse: 80 (07/23/18 1100)  Resp: 16 (07/23/18 1100)  BP: (!) 83/54 (07/23/18 1100)  SpO2: (!) 93 % (07/23/18 1100) Vital Signs (24h Range):  Temp:  [98.2 °F (36.8 °C)-99.1 °F (37.3 °C)] 99.1 °F (37.3 °C)  Pulse:  [80-92] 80  Resp:  [5-27] 16  SpO2:  [88 %-100 %] 93 %  BP: ()/(46-60) 83/54     Patient Vitals for the past 72 hrs (Last 3 readings):   Weight   07/23/18 0400 104.4 kg (230 lb 2.6 oz)   07/22/18 0400 103.8 kg (228 lb 13.4 oz)   07/21/18 0400 106.8 kg (235 lb 7.2 oz)     Body mass index is 32.1 kg/m².      Intake/Output Summary (Last 24 hours) at 07/23/18 1109  Last data filed at 07/23/18 1100   Gross per 24 hour   Intake             3624 ml   Output             3205 ml   Net              419 ml     Hemodynamic Parameters:    Telemetry: paced at 80 bpm, occasional PVC    Physical Exam   Constitutional: He appears well-developed and well-nourished. He is intubated.   Daughter and brother at bedside   HENT:   Head: Normocephalic and atraumatic.   Mild periorbital edema   Neck: Normal range of motion. Neck supple. JVD: difficult to assess on ventialtor.        Cardiovascular: Normal rate and regular rhythm.    All distal extremities cool to touch today, warmer proximal. IABP 1:1. Left leg cooler than right leg. Dopplerable pulses. +S3  Pulmonary/Chest: He is intubated. No respiratory distress.   Ventilated, mildly coarse anteriorly   Abdominal: Soft. Bowel sounds are normal. He exhibits no distension. There is no tenderness.   Musculoskeletal: He exhibits no edema.   Neurological:   intubated and sedateD   Skin: Skin is dry. Capillary refill takes 2 to 3 seconds.   Slight mottling noted to left knee   Nursing note and vitals reviewed.    Significant Labs:  CBC:    Recent  Labs  Lab 07/21/18 0415 07/22/18 0400 07/23/18 0414   WBC 12.54 14.67* 12.64   RBC 2.58* 2.62* 2.61*   HGB 7.3* 7.4* 7.3*   HCT 24.0* 24.9* 24.7*    170 160   MCV 93 95 95   MCH 28.3 28.2 28.0   MCHC 30.4* 29.7* 29.6*     BNP:  No results for input(s): BNP in the last 168 hours.    Invalid input(s): BNPTRIAGELBLO  CMP:    Recent Labs  Lab 07/21/18 0415 07/22/18 0400 07/23/18  0000 07/23/18  0414 07/23/18  0803     < > 128*  128*  < > 162* 165*  165* 160*   CALCIUM 9.5  < > 9.9  9.9  < > 9.7 9.6  9.6 9.8   ALBUMIN 2.0*  --  2.0*  --   --  1.9*  --    PROT 6.4  --  6.7  --   --  6.7  --    *  < > 150*  150*  < > 147* 147*  147* 148*   K 4.0  < > 4.5  4.5  < > 3.6 4.2  4.2 3.8   CO2 34*  < > 34*  34*  < > 32* 32*  32* 33*     < > 104  104  < > 102 104  104 104   BUN 59*  < > 55*  55*  < > 57* 59*  59* 61*   CREATININE 1.3  < > 1.3  1.3  < > 1.4 1.4  1.4 1.4   ALKPHOS 61  --  65  --   --  67  --    ALT 10  --  10  --   --  9*  --    AST 16  --  21  --   --  16  --    BILITOT 0.3  --  0.4  --   --  0.4  --    < > = values in this interval not displayed.   Coagulation:     Recent Labs  Lab 07/17/18  0714 07/21/18 0415 07/22/18 0400 07/23/18  0414   INR 1.0  < > 1.1 1.1 1.1   APTT <21.0  --   --   --   --    < > = values in this interval not displayed.  LDH:  No results for input(s): LDH in the last 72 hours.  Microbiology:  Microbiology Results (last 7 days)     Procedure Component Value Units Date/Time    Blood culture [[id number]] Collected:  07/18/18 1803    Order Status:  Completed Specimen:  Blood from Peripheral, Forearm, Right Updated:  07/22/18 2212     Blood Culture, Routine No Growth to date     Blood Culture, Routine No Growth to date     Blood Culture, Routine No Growth to date     Blood Culture, Routine No Growth to date     Blood Culture, Routine No Growth to date    Blood culture [[id number]] Collected:  07/18/18 1803    Order Status:  Completed Specimen:   Blood from Peripheral, Antecubital, Left Updated:  07/22/18 2212     Blood Culture, Routine No Growth to date     Blood Culture, Routine No Growth to date     Blood Culture, Routine No Growth to date     Blood Culture, Routine No Growth to date     Blood Culture, Routine No Growth to date    Blood culture [[id number]] Collected:  07/17/18 0930    Order Status:  Completed Specimen:  Blood from Peripheral, Antecubital, Right Updated:  07/22/18 1212     Blood Culture, Routine No growth after 5 days.    IV catheter culture [[id number]] Collected:  07/19/18 1851    Order Status:  Completed Specimen:  Catheter Tip from Catheter Tip, Intrajugular Updated:  07/21/18 1344     Aerobic Culture - Cath tip --     STAPHYLOCOCCUS EPIDERMIDIS  < 15 colonies      Narrative:       Culture [facility]    Blood culture [[id number]] Collected:  07/17/18 0950    Order Status:  Completed Specimen:  Blood from Peripheral, Antecubital, Right Updated:  07/20/18 1048     Blood Culture, Routine Gram stain aer bottle: Gram positive cocci in clusters resembling Staph     Blood Culture, Routine Results called to and read back by:Javon Hatfield RN 07/18/2018  17:50     Blood Culture, Routine --     COAGULASE-NEGATIVE STAPHYLOCOCCUS SPECIES  Organism is a probable contaminant      Fungus culture [[id number]] Collected:  07/17/18 1154    Order Status:  Completed Specimen:  Respiratory from Sputum Updated:  07/19/18 1054     Fungus (Mycology) Culture Culture in progress    Blood culture [[id number]] Collected:  07/13/18 1309    Order Status:  Completed Specimen:  Blood from Peripheral, Hand, Left Updated:  07/18/18 1812     Blood Culture, Routine No growth after 5 days.    Blood culture [[id number]] Collected:  07/13/18 1350    Order Status:  Completed Specimen:  Blood from Peripheral, Hand, Right Updated:  07/18/18 1812     Blood Culture, Routine No growth after 5 days.    Culture, Respiratory with Gram Stain [[id number]]  (Susceptibility) Collected:  07/13/18 1129  "   Order Status:  Completed Specimen:  Respiratory from Endotracheal Aspirate Updated:  07/17/18 1057     Respiratory Culture --     STAPHYLOCOCCUS AUREUS  Moderate  Normal respiratory peng also present       Gram Stain (Respiratory) Few WBC's     Gram Stain (Respiratory) No organisms seen          I have reviewed all pertinent labs within the past 24 hours.    Estimated Creatinine Clearance: 73.3 mL/min (based on SCr of 1.4 mg/dL).    Diagnostic Results:  I have reviewed and interpreted all pertinent imaging results/findings within the past 24 hours.    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      He was initially admitted to "stroke/neuro" service on 6/22 with dysarthria and stroke-like symptoms. Extensive work up was negative for recurrent CVA and so no tPA was administered. Patient developed atypical chest pain two days into his hospital course and was noted to be in monomorphic VT (6/24) which was treated with ATP then with shock due to recurrent VT in VF zone. Given his active cardiac issues this prompted transfer to heart failure service where the patient was initiated on IV amiodarone and beta blocker with subsequent resolution of his VT. He was eventually transitioned to PO amiodarone 400 mg BID x 2 weeks (from 6/25-7/9) followed by amiodarone 400 mg daily thereafter per EP recommendations. Of note patient underwent LHC on 6/25 given his extensive ischemic history which did not reveal a culprit lesion, therefore no interventions were done. He was noted to have an elevated LVEDP to 45 however and was administered IV diuresis before being transitioned back to his PO diuretic regimen. Patient also completed heart failure pathway during his hospital course (eg completed colonoscopy on 7/2) as part of " his work up for advanced options. Follows with Dr. Berman of heart failure/transplant as an outpatient.      The patient was discharged home in stable condition on 7/4/2018. He was dc'ed on warf/lovenox bridge given CHADS2-VaSc of 5.  Of note, he is no longer a candidate for AO.  He presented yesterday to [facility] with BRBPR and was found to be in acute renal failure as well.  He notes that, on the night of the 5th and morning of 6th, he had 6 bright red bloody bm's.  He went to his PMD who noted he might have internal hemmorhoids.  He was told to stop his lovenox.  He then returned home and flet very lightheaded and dizzy.  He wisely took his blood pressure and noted it was 70-80/50s whereas it is normally 117/70s.  He went in to ED immediately.  There he was noted to have the following pertinent lab values:  Hg 9.2 (11.7 prior)  INR 2.9  Na 131 (139 prior)  K 6.18  BUN 43 (18 prior)  Cr 2.88 (1.1-1.4 at baseline)  proBNP 2755  He was transferred to [facility] for further evaluation and care.  Of note, he underwent screening c-scope on 7/2 during which the following was noted and done: Diverticulosis in the sigmoid colon and in the descending colon, Two 8 to 10 mm polyps in the sigmoid colon and in the descending colon, removed with a hot snare, resected and retrieved, ligated.        * VT Storm    -VT storm 7/10/18. Polymorphic and monomorphic. Degenerated into VF. 12 ICD shocks  -Intubated, IABP placed emergently at bedside.   -VT storm again on 7/17/18 and reloaded with amio. Continue metoprolol, mexitil, amio gtt for now.   -Continue support with IABP 1:1   -K goal >4.5, Mg >2.5  - Per EP-At a later time, it may be considered to deactivate the LV lead of the patient's CRT-D, considering the patient's LVAD status and native RBBB. ICD interrogation reveals LICHA. EP signed off, requests that we let them know if/when gen change needed        Respiratory failure requiring intubation    -Emergently intubated  7/10/18 for impending respiratory failure/need for IABP and inability to lay fat  -History of pulmonary sarcoidosis  -Methylpred given 7/10/18, hydrocortisone 100 mg q8 started 7/11/18. Transitioned to PO prednisone 7/16.   -Pulmonary consult for assistance with sarcoid & vent management.  -CXR daily  -CXR today appears worse (especially left lung field). Will discuss plan going forward with pulmonary and family today.        Cardiogenic shock    - IABP placed emergently 7/10/18  - Daily CXR   - Augmenting well at 1:1. SVO2 pending this am, CVP 8   - Continue lasix gtt @ 10 mg/hr.        Encounter for management of intra-aortic balloon pump    -IABP placed 7/10/2018  -Daily CXR        Hypotension    - Levophed started during code 7/10/18  - Wean levo for MAP <60 (using IABP mean)        Hypernatremia    -Improved some with addition of free water flushes, continue 250 q 6 hours        Constipation    - resolved.  - continue bowel regimen          VAP (ventilator-associated pneumonia)    -Continue ancef.   -Cultures now with S. Aureus in sputum, +jesenia blood cultures on 7/17 (probable contaminent)  -Repeat BCx negative.         History of stroke    -On coumadin prior to admit  -Hold off on systemic anticoagulation for now.        Ventricular fibrillation    -See VT        Chronic systolic CHF (congestive heart failure)    - See cardiogenic shock        GI bleed    - INR subtherapeutic  - Coumadin on hold anticoagulation has been on hold in setting of recent GI bleed. Has been receiving DVT PPx  - Protonix increased to 40 mg BID (changed to IV)  - Had screening colonoscopy with hot snare polypectomy during last admission; most likely culprit post polypectomy bleeding  - H & H stable        History of atrial fibrillation    -  continue amio         Abnormal involuntary movements    - Continue keppra  - possible seizure activity noted on 7/12/18 (twitching right face and UEs lasting 5 min). Some Bilateral UE twitching  which did not appear to be seizure like occurred on 7/18, resolved with increased sedation. If noted again will reach out to neuro.   - neuro consulted and increased keppra to 1000 IV 12   - continuous EEG read and negative for seizure activity   - Keppra changed to PO        CAD (coronary artery disease)    - Continue atorvastatin, nitro prn  - ASA restarted        Sarcoidosis of lung    - Continue prednisone, breo-ellipta, and albuterol prn.   - Wean pred            JUANPABLO RodriguezC  Heart Transplant  [facility]

## 2018-07-23 NOTE — PROGRESS NOTES
[facility]  Pulmonology  Progress Note    Patient Name: Yonathan Good Jr.  MRN: [id number]  Admission Date: 7/7/2018  Hospital Length of Stay: 16 days  Code Status: Full Code  Attending Provider: Kimberly Lazo MD  Primary Care Provider: Edgar Gates MD   Principal Problem: Ventricular tachycardia, incessant    Subjective:     Interval History: No acute events overnight, possibly had brief episode of v-tach per nursing. Patient remains intubated and on ventilator. Primary team plans to try to remove balloon pump today. Palliative care to meet with family today.     Objective:     Vital Signs (Most Recent):  Temp: 99.1 °F (37.3 °C) (07/23/18 1100)  Pulse: 80 (07/23/18 1116)  Resp: 16 (07/23/18 1116)  BP: (!) 85/53 (07/23/18 1115)  SpO2: (!) 92 % (07/23/18 1116) Vital Signs (24h Range):  Temp:  [98.2 °F (36.8 °C)-99.1 °F (37.3 °C)] 99.1 °F (37.3 °C)  Pulse:  [80-92] 80  Resp:  [5-27] 16  SpO2:  [88 %-100 %] 92 %  BP: ()/(46-60) 85/53     Weight: 104.4 kg (230 lb 2.6 oz)  Body mass index is 32.1 kg/m².      Intake/Output Summary (Last 24 hours) at 07/23/18 1200  Last data filed at 07/23/18 1100   Gross per 24 hour   Intake             3624 ml   Output             3105 ml   Net              519 ml       Physical Exam   HENT:   Head: Normocephalic and atraumatic.   Mouth/Throat: Oropharynx is clear and moist.   Eyes: Pupils are equal, round, and reactive to light.   Cardiovascular: Normal rate and intact distal pulses.    Murmur heard.  IABP present   Pulmonary/Chest:   Intubated and on ventilator. Bilateral rhonchi persists   Abdominal: Soft.   Bowel sounds absent   Musculoskeletal: He exhibits edema (1+ ).   Neurological:   Sedated on propofol drip   Skin: Skin is warm and dry.       Vents:  Vent Mode: A/C (07/23/18 1114)  Set Rate: 16 bmp (07/23/18 1114)  Vt Set: 450 mL (07/23/18 1114)  PEEP/CPAP: 5 cmH20 (07/23/18 1114)  Oxygen Concentration (%): 41 (07/23/18 1115)  Peak Airway Pressure: 36  cmH2O (07/23/18 1114)  Plateau Pressure: 24 cmH20 (07/23/18 1114)  Total Ve: 7.35 mL (07/23/18 1114)  F/VT Ratio<105 (RSBI): (!) 34.93 (07/23/18 1114)    Lines/Drains/Airways     Central Venous Catheter Line                 Percutaneous Central Line Insertion/Assessment - triple lumen  07/19/18 1916 right internal jugular 3 days          Drain                 NG/OG Tube 07/10/18 1557 [place] sump 16 Fr. Left nostril 12 days         Urethral Catheter 07/20/18 1030 Latex 16 Fr. 3 days          Airway                 Airway - Non-Surgical 07/10/18 1523 Endotracheal Tube 12 days          Line                 IABP 07/10/18 1550 7.5 Fr. 40 mL 12 days          Peripheral Intravenous Line                 Midline Catheter Insertion/Assessment  - Single Lumen 07/17/18 1105 Right cephalic vein (lateral side of arm) 18g x 8cm 6 days         Peripheral IV - Single Lumen 07/22/18 0830 Left Wrist 1 day                Significant Labs:    CBC/Anemia Profile:    Recent Labs  Lab 07/22/18  0400 07/23/18  0414   WBC 14.67* 12.64   HGB 7.4* 7.3*   HCT 24.9* 24.7*    160   MCV 95 95   RDW 16.2* 16.2*        Chemistries:    Recent Labs  Lab 07/22/18  0400  07/23/18  0000 07/23/18  0414 07/23/18  0803   *  150*  < > 147* 147*  147* 148*   K 4.5  4.5  < > 3.6 4.2  4.2 3.8     104  < > 102 104  104 104   CO2 34*  34*  < > 32* 32*  32* 33*   BUN 55*  55*  < > 57* 59*  59* 61*   CREATININE 1.3  1.3  < > 1.4 1.4  1.4 1.4   CALCIUM 9.9  9.9  < > 9.7 9.6  9.6 9.8   ALBUMIN 2.0*  --   --  1.9*  --    PROT 6.7  --   --  6.7  --    BILITOT 0.4  --   --  0.4  --    ALKPHOS 65  --   --  67  --    ALT 10  --   --  9*  --    AST 21  --   --  16  --    MG 2.5  --   --  2.2  --    < > = values in this interval not displayed.    Recent Lab Results       07/23/18  1129 07/23/18  1126 07/23/18  0812 07/23/18  0803 07/23/18  0414      Immature Granulocytes          Immature Grans (Abs)          Albumin          Alkaline  Phosphatase          Allens Test N/A         ALT          Anion Gap    11      AST          Baso #          Basophil%          Total Bilirubin          Site Other         BUN, Bld    61(H)      Calcium    9.8      Chloride    104      CO2    33(H)      Creatinine    1.4      DelSys Adult Vent         Differential Method          eGFR if     >60.0      eGFR if non     56.2  Comment:  Calculation used to obtain the estimated glomerular filtration  rate (eGFR) is the CKD-EPI equation.   (A)      Eos #          Eosinophil%          FiO2 40         Glucose    160(H)      Gran # (ANC)          Gran%          Hematocrit          Hemoglobin          Coumadin Monitoring INR          Lymph #          Lymph%          Magnesium          MCH          MCHC          MCV          Mode AC/PRVC         Mono #          Mono%          MPV          nRBC          PEEP 5         Platelets          POC BE 13         POC HCO3 38.4(H)         POC PCO2 66.7(H)         POC PH 7.369         POC PO2 31(LL)         POC SATURATED O2 54(L)         POC TCO2 40(H)         POCT Glucose  236(H) 168(H)  195(H)     Potassium    3.8      Total Protein          Protime          Rate 16         RBC          RDW          Sample VENOUS         Sodium    148(H)      Vt 450         WBC                      07/23/18  0414 07/23/18  0005 07/23/18  0000 07/22/18  2001 07/22/18  1613      Immature Granulocytes 2.3(H)         Immature Grans (Abs) 0.29  Comment:  Mild elevation in immature granulocytes is non specific and   can be seen in a variety of conditions including stress response,   acute inflammation, trauma and pregnancy. Correlation with other   laboratory and clinical findings is essential.  (H)         Albumin 1.9(L)         Alkaline Phosphatase 67         Allens Test          ALT 9(L)         Anion Gap 11  13        11         AST 16         Baso # 0.01         Basophil% 0.1         Total Bilirubin 0.4  Comment:  For  infants and newborns, interpretation of results should be based  on gestational age, weight and in agreement with clinical  observations.  Premature Infant recommended reference ranges:  Up to 24 hours.............<8.0 mg/dL  Up to 48 hours............<12.0 mg/dL  3-5 days..................<15.0 mg/dL  6-29 days.................<15.0 mg/dL           Site          BUN, Bld 59(H)  57(H)        59(H)         Calcium 9.6  9.7        9.6         Chloride 104  102        104         CO2 32(H)  32(H)        32(H)         Creatinine 1.4  1.4        1.4         DelSys          Differential Method Automated         eGFR if  >60.0  >60.0        >60.0         eGFR if non  56.2  Comment:  Calculation used to obtain the estimated glomerular filtration  rate (eGFR) is the CKD-EPI equation.   (A)  56.2  Comment:  Calculation used to obtain the estimated glomerular filtration  rate (eGFR) is the CKD-EPI equation.   (A)        56.2  Comment:  Calculation used to obtain the estimated glomerular filtration  rate (eGFR) is the CKD-EPI equation.   (A)         Eos # 0.3         Eosinophil% 2.1         FiO2          Glucose 165(H)  162(H)        165(H)         Gran # (ANC) 10.6(H)         Gran% 83.8(H)         Hematocrit 24.7(L)         Hemoglobin 7.3(L)         Coumadin Monitoring INR 1.1  Comment:  Coumadin Therapy:  2.0 - 3.0 for INR for all indicators except mechanical heart valves  and antiphospholipid syndromes which should use 2.5 - 3.5.           Lymph # 0.8(L)         Lymph% 6.5(L)         Magnesium 2.2         MCH 28.0         MCHC 29.6(L)         MCV 95         Mode          Mono # 0.7         Mono% 5.2         MPV 12.1         nRBC 0         PEEP          Platelets 160         POC BE          POC HCO3          POC PCO2          POC PH          POC PO2          POC SATURATED O2          POC TCO2          POCT Glucose  180(H)  170(H) 211(H)     Potassium 4.2  3.6        4.2         Total Protein  6.7         Protime 11.1         Rate          RBC 2.61(L)         RDW 16.2(H)         Sample          Sodium 147(H)  147(H)        147(H)         Vt          WBC 12.64                     07/22/18  1234 07/22/18  1219 07/22/18  1211      Immature Granulocytes        Immature Grans (Abs)        Albumin        Alkaline Phosphatase        Allens Test        ALT        Anion Gap   12     AST        Baso #        Basophil%        Total Bilirubin        Site        BUN, Bld   57(H)     Calcium   9.2     Chloride   104     CO2   31(H)     Creatinine   1.5(H)     DelSys        Differential Method        eGFR if    59.7(A)     eGFR if non    51.7  Comment:  Calculation used to obtain the estimated glomerular filtration  rate (eGFR) is the CKD-EPI equation.   (A)     Eos #        Eosinophil%        FiO2        Glucose   231(H)     Gran # (ANC)        Gran%        Hematocrit        Hemoglobin        Coumadin Monitoring INR        Lymph #        Lymph%        Magnesium        MCH        MCHC        MCV        Mode        Mono #        Mono%        MPV        nRBC        PEEP        Platelets        POC BE        POC HCO3        POC PCO2        POC PH        POC PO2        POC SATURATED O2        POC TCO2        POCT Glucose 239(H) 248(H)      Potassium   4.1     Total Protein        Protime        Rate        RBC        RDW        Sample        Sodium   147(H)     Vt        WBC            All pertinent labs within the past 24 hours have been reviewed.    Significant Imaging:  I have reviewed all pertinent imaging results/findings within the past 24 hours.  I have reviewed and interpreted all pertinent imaging results/findings within the past 24 hours.    Assessment/Plan:     Respiratory failure requiring intubation    · Continue diuresis per primary team  · No substantial improvement on CXR comapred to prior day   · Received abx therapy for  ET aspirate culture positive for MSSA staph    · Continue current respiratory therapies   · Vent settings: RR 16,  mL (6cc/kg), PEEP 6, FiO2 40%  ·  Cardiac insufficiencies likely to be limiting factor in patient's potential recovery.        · May SBT tomorrow  · ETT day 13     Recent Labs  Lab 07/23/18  1129   PH 7.369   PCO2 66.7*   PO2 31*   HCO3 38.4*   POCSATURATED 54*   BE 13   VBG        Sarcoidosis of lung    · Sarcoid appears to have minimal contribution to current condition.   · Continue steroids as prescribed and taper once condition improves.          Plan discussed with attending Dr. Mckenzie, further recommendations as per attending addendum. Please feel free to call with any questions or concerns.    Ruben Bermudez MD  Resident Physician, PGY1       Ruben Bermudez MD  Pulmonology  [facility]

## 2018-07-23 NOTE — ASSESSMENT & PLAN NOTE
- IABP placed emergently 7/10/18  - Daily CXR   - Augmenting well at 1:1. SVO2 pending this am, CVP 8   - Continue lasix gtt @ 10 mg/hr.

## 2018-07-23 NOTE — ASSESSMENT & PLAN NOTE
-Emergently intubated 7/10/18 for impending respiratory failure/need for IABP and inability to lay fat  -History of pulmonary sarcoidosis  -Methylpred given 7/10/18, hydrocortisone 100 mg q8 started 7/11/18. Transitioned to PO prednisone 7/16.   -Pulmonary consult for assistance with sarcoid & vent management.  -CXR daily  -CXR today appears worse (especially left lung field). Will discuss plan going forward with pulmonary and family today.

## 2018-07-23 NOTE — SUBJECTIVE & OBJECTIVE
Interval History: No acute events overnight, possibly had brief episode of v-tach per nursing. Patient remains intubated and on ventilator. Primary team plans to try to remove balloon pump today. Palliative care to meet with family today.     Objective:     Vital Signs (Most Recent):  Temp: 99.1 °F (37.3 °C) (07/23/18 1100)  Pulse: 80 (07/23/18 1116)  Resp: 16 (07/23/18 1116)  BP: (!) 85/53 (07/23/18 1115)  SpO2: (!) 92 % (07/23/18 1116) Vital Signs (24h Range):  Temp:  [98.2 °F (36.8 °C)-99.1 °F (37.3 °C)] 99.1 °F (37.3 °C)  Pulse:  [80-92] 80  Resp:  [5-27] 16  SpO2:  [88 %-100 %] 92 %  BP: ()/(46-60) 85/53     Weight: 104.4 kg (230 lb 2.6 oz)  Body mass index is 32.1 kg/m².      Intake/Output Summary (Last 24 hours) at 07/23/18 1200  Last data filed at 07/23/18 1100   Gross per 24 hour   Intake             3624 ml   Output             3105 ml   Net              519 ml       Physical Exam   HENT:   Head: Normocephalic and atraumatic.   Mouth/Throat: Oropharynx is clear and moist.   Eyes: Pupils are equal, round, and reactive to light.   Cardiovascular: Normal rate and intact distal pulses.    Murmur heard.  IABP present   Pulmonary/Chest:   Intubated and on ventilator. Bilateral rhonchi persists   Abdominal: Soft.   Bowel sounds absent   Musculoskeletal: He exhibits edema (1+ ).   Neurological:   Sedated on propofol drip   Skin: Skin is warm and dry.       Vents:  Vent Mode: A/C (07/23/18 1114)  Set Rate: 16 bmp (07/23/18 1114)  Vt Set: 450 mL (07/23/18 1114)  PEEP/CPAP: 5 cmH20 (07/23/18 1114)  Oxygen Concentration (%): 41 (07/23/18 1115)  Peak Airway Pressure: 36 cmH2O (07/23/18 1114)  Plateau Pressure: 24 cmH20 (07/23/18 1114)  Total Ve: 7.35 mL (07/23/18 1114)  F/VT Ratio<105 (RSBI): (!) 34.93 (07/23/18 1114)    Lines/Drains/Airways     Central Venous Catheter Line                 Percutaneous Central Line Insertion/Assessment - triple lumen  07/19/18 1916 right internal jugular 3 days          Drain                  NG/OG Tube 07/10/18 1557 [place] sump 16 Fr. Left nostril 12 days         Urethral Catheter 07/20/18 1030 Latex 16 Fr. 3 days          Airway                 Airway - Non-Surgical 07/10/18 1523 Endotracheal Tube 12 days          Line                 IABP 07/10/18 1550 7.5 Fr. 40 mL 12 days          Peripheral Intravenous Line                 Midline Catheter Insertion/Assessment  - Single Lumen 07/17/18 1105 Right cephalic vein (lateral side of arm) 18g x 8cm 6 days         Peripheral IV - Single Lumen 07/22/18 0830 Left Wrist 1 day                Significant Labs:    CBC/Anemia Profile:    Recent Labs  Lab 07/22/18  0400 07/23/18  0414   WBC 14.67* 12.64   HGB 7.4* 7.3*   HCT 24.9* 24.7*    160   MCV 95 95   RDW 16.2* 16.2*        Chemistries:    Recent Labs  Lab 07/22/18  0400  07/23/18  0000 07/23/18  0414 07/23/18  0803   *  150*  < > 147* 147*  147* 148*   K 4.5  4.5  < > 3.6 4.2  4.2 3.8     104  < > 102 104  104 104   CO2 34*  34*  < > 32* 32*  32* 33*   BUN 55*  55*  < > 57* 59*  59* 61*   CREATININE 1.3  1.3  < > 1.4 1.4  1.4 1.4   CALCIUM 9.9  9.9  < > 9.7 9.6  9.6 9.8   ALBUMIN 2.0*  --   --  1.9*  --    PROT 6.7  --   --  6.7  --    BILITOT 0.4  --   --  0.4  --    ALKPHOS 65  --   --  67  --    ALT 10  --   --  9*  --    AST 21  --   --  16  --    MG 2.5  --   --  2.2  --    < > = values in this interval not displayed.    Recent Lab Results       07/23/18  1129 07/23/18  1126 07/23/18  0812 07/23/18  0803 07/23/18  0414      Immature Granulocytes          Immature Grans (Abs)          Albumin          Alkaline Phosphatase          Allens Test N/A         ALT          Anion Gap    11      AST          Baso #          Basophil%          Total Bilirubin          Site Other         BUN, Bld    61(H)      Calcium    9.8      Chloride    104      CO2    33(H)      Creatinine    1.4      DelSys Adult Vent         Differential Method          eGFR if      >60.0      eGFR if non     56.2  Comment:  Calculation used to obtain the estimated glomerular filtration  rate (eGFR) is the CKD-EPI equation.   (A)      Eos #          Eosinophil%          FiO2 40         Glucose    160(H)      Gran # (ANC)          Gran%          Hematocrit          Hemoglobin          Coumadin Monitoring INR          Lymph #          Lymph%          Magnesium          MCH          MCHC          MCV          Mode AC/PRVC         Mono #          Mono%          MPV          nRBC          PEEP 5         Platelets          POC BE 13         POC HCO3 38.4(H)         POC PCO2 66.7(H)         POC PH 7.369         POC PO2 31(LL)         POC SATURATED O2 54(L)         POC TCO2 40(H)         POCT Glucose  236(H) 168(H)  195(H)     Potassium    3.8      Total Protein          Protime          Rate 16         RBC          RDW          Sample VENOUS         Sodium    148(H)      Vt 450         WBC                      07/23/18  0414 07/23/18  0005 07/23/18  0000 07/22/18 2001 07/22/18  1613      Immature Granulocytes 2.3(H)         Immature Grans (Abs) 0.29  Comment:  Mild elevation in immature granulocytes is non specific and   can be seen in a variety of conditions including stress response,   acute inflammation, trauma and pregnancy. Correlation with other   laboratory and clinical findings is essential.  (H)         Albumin 1.9(L)         Alkaline Phosphatase 67         Allens Test          ALT 9(L)         Anion Gap 11  13        11         AST 16         Baso # 0.01         Basophil% 0.1         Total Bilirubin 0.4  Comment:  For infants and newborns, interpretation of results should be based  on gestational age, weight and in agreement with clinical  observations.  Premature Infant recommended reference ranges:  Up to 24 hours.............<8.0 mg/dL  Up to 48 hours............<12.0 mg/dL  3-5 days..................<15.0 mg/dL  6-29 days.................<15.0 mg/dL           Site           BUN, Bld 59(H)  57(H)        59(H)         Calcium 9.6  9.7        9.6         Chloride 104  102        104         CO2 32(H)  32(H)        32(H)         Creatinine 1.4  1.4        1.4         DelSys          Differential Method Automated         eGFR if  >60.0  >60.0        >60.0         eGFR if non  56.2  Comment:  Calculation used to obtain the estimated glomerular filtration  rate (eGFR) is the CKD-EPI equation.   (A)  56.2  Comment:  Calculation used to obtain the estimated glomerular filtration  rate (eGFR) is the CKD-EPI equation.   (A)        56.2  Comment:  Calculation used to obtain the estimated glomerular filtration  rate (eGFR) is the CKD-EPI equation.   (A)         Eos # 0.3         Eosinophil% 2.1         FiO2          Glucose 165(H)  162(H)        165(H)         Gran # (ANC) 10.6(H)         Gran% 83.8(H)         Hematocrit 24.7(L)         Hemoglobin 7.3(L)         Coumadin Monitoring INR 1.1  Comment:  Coumadin Therapy:  2.0 - 3.0 for INR for all indicators except mechanical heart valves  and antiphospholipid syndromes which should use 2.5 - 3.5.           Lymph # 0.8(L)         Lymph% 6.5(L)         Magnesium 2.2         MCH 28.0         MCHC 29.6(L)         MCV 95         Mode          Mono # 0.7         Mono% 5.2         MPV 12.1         nRBC 0         PEEP          Platelets 160         POC BE          POC HCO3          POC PCO2          POC PH          POC PO2          POC SATURATED O2          POC TCO2          POCT Glucose  180(H)  170(H) 211(H)     Potassium 4.2  3.6        4.2         Total Protein 6.7         Protime 11.1         Rate          RBC 2.61(L)         RDW 16.2(H)         Sample          Sodium 147(H)  147(H)        147(H)         Vt          WBC 12.64                     07/22/18  1234 07/22/18  1219 07/22/18  1211      Immature Granulocytes        Immature Grans (Abs)        Albumin        Alkaline Phosphatase        Allens Test        ALT         Anion Gap   12     AST        Baso #        Basophil%        Total Bilirubin        Site        BUN, Bld   57(H)     Calcium   9.2     Chloride   104     CO2   31(H)     Creatinine   1.5(H)     DelSys        Differential Method        eGFR if    59.7(A)     eGFR if non    51.7  Comment:  Calculation used to obtain the estimated glomerular filtration  rate (eGFR) is the CKD-EPI equation.   (A)     Eos #        Eosinophil%        FiO2        Glucose   231(H)     Gran # (ANC)        Gran%        Hematocrit        Hemoglobin        Coumadin Monitoring INR        Lymph #        Lymph%        Magnesium        MCH        MCHC        MCV        Mode        Mono #        Mono%        MPV        nRBC        PEEP        Platelets        POC BE        POC HCO3        POC PCO2        POC PH        POC PO2        POC SATURATED O2        POC TCO2        POCT Glucose 239(H) 248(H)      Potassium   4.1     Total Protein        Protime        Rate        RBC        RDW        Sample        Sodium   147(H)     Vt        WBC            All pertinent labs within the past 24 hours have been reviewed.    Significant Imaging:  I have reviewed all pertinent imaging results/findings within the past 24 hours.  I have reviewed and interpreted all pertinent imaging results/findings within the past 24 hours.

## 2018-07-23 NOTE — SUBJECTIVE & OBJECTIVE
Interval History: No arrhthymias overnight, just occasional PVC. IABP remains at 1:1. Remains intubated/sedated. CXR appears worse this am, discussed with family.    Continuous Infusions:   amiodarone in dextrose 5% 0.5 mg/min (07/23/18 1100)    fentanyl 12.5 mL/hr at 07/23/18 1100    furosemide (LASIX) 2 mg/mL infusion (non-titrating) 10 mg/hr (07/23/18 1100)    norepinephrine bitartrate-D5W 0.05 mcg/kg/min (07/23/18 1058)    propofol 50 mcg/kg/min (07/23/18 1100)     Scheduled Meds:   albuterol sulfate  2.5 mg Nebulization Q4H    aspirin  81 mg Oral Daily    atorvastatin  40 mg Oral Daily    budesonide  0.5 mg Nebulization Q12H    ceFAZolin  1 g Intravenous Q8H    chlorhexidine  15 mL Mouth/Throat BID    chlorhexidine  15 mL Mouth/Throat BID    docusate  100 mg Oral Daily    fluticasone-vilanterol  1 puff Inhalation Daily    gabapentin  600 mg Oral BID    heparin (porcine)  5,000 Units Subcutaneous Q8H    levETIRAcetam  500 mg Oral BID    metoprolol  2.5 mg Intravenous Q4H    mexiletine  150 mg Oral Q8H    pantoprozole (PROTONIX) 40 mg/100 mL D5W IVPB  40 mg Intravenous BID    predniSONE  30 mg Oral Daily    sennosides 8.8 mg/5 ml  5 mL Oral QHS    sodium chloride 0.9%  3 mL Intravenous Q8H     PRN Meds:sodium chloride, ALPRAZolam, benzonatate, calcium gluconate IVPB, calcium gluconate IVPB, calcium gluconate IVPB, carisoprodol, dextrose 50%, glucagon (human recombinant), HYDROcodone-acetaminophen, insulin aspart U-100, magnesium sulfate IVPB, magnesium sulfate IVPB, nitroGLYCERIN, potassium chloride in water **AND** potassium chloride in water **AND** potassium chloride in water, sodium phosphate IVPB, sodium phosphate IVPB, sodium phosphate IVPB    Review of patient's allergies indicates:  No Known Allergies  Objective:     Vital Signs (Most Recent):  Temp: 99.1 °F (37.3 °C) (07/23/18 1100)  Pulse: 80 (07/23/18 1100)  Resp: 16 (07/23/18 1100)  BP: (!) 83/54 (07/23/18 1100)  SpO2: (!) 93 %  (07/23/18 1100) Vital Signs (24h Range):  Temp:  [98.2 °F (36.8 °C)-99.1 °F (37.3 °C)] 99.1 °F (37.3 °C)  Pulse:  [80-92] 80  Resp:  [5-27] 16  SpO2:  [88 %-100 %] 93 %  BP: ()/(46-60) 83/54     Patient Vitals for the past 72 hrs (Last 3 readings):   Weight   07/23/18 0400 104.4 kg (230 lb 2.6 oz)   07/22/18 0400 103.8 kg (228 lb 13.4 oz)   07/21/18 0400 106.8 kg (235 lb 7.2 oz)     Body mass index is 32.1 kg/m².      Intake/Output Summary (Last 24 hours) at 07/23/18 1109  Last data filed at 07/23/18 1100   Gross per 24 hour   Intake             3624 ml   Output             3205 ml   Net              419 ml     Hemodynamic Parameters:    Telemetry: paced at 80 bpm, occasional PVC    Physical Exam   Constitutional: He appears well-developed and well-nourished. He is intubated.   Daughter and brother at bedside   HENT:   Head: Normocephalic and atraumatic.   Mild periorbital edema   Neck: Normal range of motion. Neck supple. JVD: difficult to assess on ventialtor.        Cardiovascular: Normal rate and regular rhythm.    All distal extremities cool to touch today, warmer proximal. IABP 1:1. Left leg cooler than right leg. Dopplerable pulses.   Pulmonary/Chest: He is intubated. No respiratory distress.   Ventilated, mildly coarse anteriorly   Abdominal: Soft. Bowel sounds are normal. He exhibits no distension. There is no tenderness.   Musculoskeletal: He exhibits no edema.   Neurological:   intubated and sedateD   Skin: Skin is dry. Capillary refill takes 2 to 3 seconds.   Slight mottling noted to left knee   Nursing note and vitals reviewed.    Significant Labs:  CBC:    Recent Labs  Lab 07/21/18  0415 07/22/18  0400 07/23/18  0414   WBC 12.54 14.67* 12.64   RBC 2.58* 2.62* 2.61*   HGB 7.3* 7.4* 7.3*   HCT 24.0* 24.9* 24.7*    170 160   MCV 93 95 95   MCH 28.3 28.2 28.0   MCHC 30.4* 29.7* 29.6*     BNP:  No results for input(s): BNP in the last 168 hours.    Invalid input(s):  BNPTRIAGELBLO  CMP:    Recent Labs  Lab 07/21/18  0415  07/22/18  0400  07/23/18  0000 07/23/18  0414 07/23/18  0803     < > 128*  128*  < > 162* 165*  165* 160*   CALCIUM 9.5  < > 9.9  9.9  < > 9.7 9.6  9.6 9.8   ALBUMIN 2.0*  --  2.0*  --   --  1.9*  --    PROT 6.4  --  6.7  --   --  6.7  --    *  < > 150*  150*  < > 147* 147*  147* 148*   K 4.0  < > 4.5  4.5  < > 3.6 4.2  4.2 3.8   CO2 34*  < > 34*  34*  < > 32* 32*  32* 33*     < > 104  104  < > 102 104  104 104   BUN 59*  < > 55*  55*  < > 57* 59*  59* 61*   CREATININE 1.3  < > 1.3  1.3  < > 1.4 1.4  1.4 1.4   ALKPHOS 61  --  65  --   --  67  --    ALT 10  --  10  --   --  9*  --    AST 16  --  21  --   --  16  --    BILITOT 0.3  --  0.4  --   --  0.4  --    < > = values in this interval not displayed.   Coagulation:     Recent Labs  Lab 07/17/18  0714  07/21/18  0415 07/22/18  0400 07/23/18  0414   INR 1.0  < > 1.1 1.1 1.1   APTT <21.0  --   --   --   --    < > = values in this interval not displayed.  LDH:  No results for input(s): LDH in the last 72 hours.  Microbiology:  Microbiology Results (last 7 days)     Procedure Component Value Units Date/Time    Blood culture [[id number]] Collected:  07/18/18 1803    Order Status:  Completed Specimen:  Blood from Peripheral, Forearm, Right Updated:  07/22/18 2212     Blood Culture, Routine No Growth to date     Blood Culture, Routine No Growth to date     Blood Culture, Routine No Growth to date     Blood Culture, Routine No Growth to date     Blood Culture, Routine No Growth to date    Blood culture [[id number]] Collected:  07/18/18 1803    Order Status:  Completed Specimen:  Blood from Peripheral, Antecubital, Left Updated:  07/22/18 2212     Blood Culture, Routine No Growth to date     Blood Culture, Routine No Growth to date     Blood Culture, Routine No Growth to date     Blood Culture, Routine No Growth to date     Blood Culture, Routine No Growth to date    Blood culture  [[id number]] Collected:  07/17/18 0930    Order Status:  Completed Specimen:  Blood from Peripheral, Antecubital, Right Updated:  07/22/18 1212     Blood Culture, Routine No growth after 5 days.    IV catheter culture [[id number]] Collected:  07/19/18 1851    Order Status:  Completed Specimen:  Catheter Tip from Catheter Tip, Intrajugular Updated:  07/21/18 1344     Aerobic Culture - Cath tip --     STAPHYLOCOCCUS EPIDERMIDIS  < 15 colonies      Narrative:       Culture [facility]    Blood culture [[id number]] Collected:  07/17/18 0950    Order Status:  Completed Specimen:  Blood from Peripheral, Antecubital, Right Updated:  07/20/18 1048     Blood Culture, Routine Gram stain aer bottle: Gram positive cocci in clusters resembling Staph     Blood Culture, Routine Results called to and read back by:Javon Hatfield RN 07/18/2018  17:50     Blood Culture, Routine --     COAGULASE-NEGATIVE STAPHYLOCOCCUS SPECIES  Organism is a probable contaminant      Fungus culture [[id number]] Collected:  07/17/18 1154    Order Status:  Completed Specimen:  Respiratory from Sputum Updated:  07/19/18 1054     Fungus (Mycology) Culture Culture in progress    Blood culture [[id number]] Collected:  07/13/18 1309    Order Status:  Completed Specimen:  Blood from Peripheral, Hand, Left Updated:  07/18/18 1812     Blood Culture, Routine No growth after 5 days.    Blood culture [[id number]] Collected:  07/13/18 1350    Order Status:  Completed Specimen:  Blood from Peripheral, Hand, Right Updated:  07/18/18 1812     Blood Culture, Routine No growth after 5 days.    Culture, Respiratory with Gram Stain [[id number]]  (Susceptibility) Collected:  07/13/18 1129    Order Status:  Completed Specimen:  Respiratory from Endotracheal Aspirate Updated:  07/17/18 1057     Respiratory Culture --     STAPHYLOCOCCUS AUREUS  Moderate  Normal respiratory peng also present       Gram Stain (Respiratory) Few WBC's     Gram Stain (Respiratory) No organisms seen          I have  reviewed all pertinent labs within the past 24 hours.    Estimated Creatinine Clearance: 73.3 mL/min (based on SCr of 1.4 mg/dL).    Diagnostic Results:  I have reviewed and interpreted all pertinent imaging results/findings within the past 24 hours.

## 2018-07-23 NOTE — ASSESSMENT & PLAN NOTE
-VT storm 7/10/18. Polymorphic and monomorphic. Degenerated into VF. 12 ICD shocks  -Intubated, IABP placed emergently at bedside.   -VT storm again on 7/17/18 and reloaded with amio. Continue metoprolol, mexitil, amio gtt for now.   -Continue support with IABP 1:1   -K goal >4.5, Mg >2.5  - Per EP-At a later time, it may be considered to deactivate the LV lead of the patient's CRT-D, considering the patient's LVAD status and native RBBB. ICD interrogation reveals LICHA. EP signed off, requests that we let them know if/when gen change needed

## 2018-07-23 NOTE — ASSESSMENT & PLAN NOTE
- Continue keppra  - possible seizure activity noted on 7/12/18 (twitching right face and UEs lasting 5 min). Some Bilateral UE twitching which did not appear to be seizure like occurred on 7/18, resolved with increased sedation. If noted again will reach out to neuro.   - neuro consulted and increased keppra to 1000 IV 12   - continuous EEG read and negative for seizure activity   - Keppra changed to PO

## 2018-07-23 NOTE — PROGRESS NOTES
" [facility]  Adult Nutrition  Progress Note    SUMMARY       Recommendations  Recommendation/Intervention:   1. Continue current TF - meets EEN (w/propofol) and EPN.    -If patient no longer on propofol, recommend Impact Peptide 1.5 at a goal rate of 55 mL/hr - to provide 1980 kcal/day, 124g protein/day, and 1016mL free fluid/day.   2. When able to extubate, ADAT to Cardiac with texture per SLP.   RD to monitor.    Goals: Patient to receive nutrition by RD follow-up  Nutrition Goal Status: goal met  Communication of RD Recs:  (POC)    Reason for Assessment  Reason for Assessment: RD follow-up  Diagnosis: cardiac disease (VT storm)  Relevant Medical History: CAD, CHF, NICM, Afib, CVA, HTN  General Information Comments: Patient still intubated, sedated. IABP in place. Tolerating TF at goal rate. (Noted patient appears well nourished and no weight loss per chart review. RD does not feel patient meets crtieria for malnutrition at this time.)  Nutrition Discharge Planning: Unable to determine at this time.    Nutrition Risk Screen  Nutrition Risk Screen: no indicators present    Nutrition/Diet History  Do you have any cultural, spiritual, Hinduism conflicts, given your current situation?: no  Factors Affecting Nutritional Intake: NPO, on mechanical ventilation    Anthropometrics  Temp: 99.1 °F (37.3 °C)  Height: 5' 11" (180.3 cm)  Height (inches): 71 in  Weight Method: Bed Scale  Weight: 104.4 kg (230 lb 2.6 oz)  Weight (lb): 230.16 lb  Ideal Body Weight (IBW), Male: 172 lb  % Ideal Body Weight, Male (lb): 128.17 lb  BMI (Calculated): 30.8  BMI Grade: 30 - 34.9- obesity - grade I  Usual Body Weight (UBW), k.4 kg (3/2018 per chart review)  % Usual Body Weight: 97.79  % Weight Change From Usual Weight: -2.42 %    Lab/Procedures/Meds  Pertinent Labs Reviewed: reviewed  Pertinent Labs Comments: Na 147, BUN 59, Glu 165, POCT GLu 168-211, HgbA1c 6.5  Pertinent Medications Reviewed: " reviewed  Pertinent Medications Comments: docusate, pantoprazole, prednisone, amiodarone, fentnayl, lasix, levophed, propofol    Physical Findings/Assessment  Overall Physical Appearance: on ventilator support, nourished  Tubes: nasogastric tube  Oral/Mouth Cavity: tooth/teeth missing  Skin: edema    Estimated/Assessed Needs  Weight Used For Calorie Calculations: 104.4 kg (230 lb 2.6 oz)  Energy Calorie Requirements (kcal): 2068 kcal/day  Energy Need Method: [facility]  Protein Requirements: 118-157 g/day (1.5-2.0 g/kg)  Weight Used For Protein Calculations: 78.2 kg (172 lb 6.4 oz) (IBW)  Fluid Requirements (mL): per MD  RDA Method (mL): 2068    Nutrition Prescription Ordered  Current Diet Order: NPO  Current Nutrition Support Formula Ordered: Peptamen Intense VHP  Current Nutrition Support Rate Ordered: 55 (ml)  Current Nutrition Support Frequency Ordered: mL/hr    Evaluation of Received Nutrient/Fluid Intake  Enteral Calories (kcal): 1320  Enteral Protein (gm): 121  Enteral (Free Water) Fluid (mL): 1109  Other Calories (kcal): 800 (propofol)  Total Calories (kcal): 2120  % Kcal Needs: 103  % Protein Needs: 100  I/O: -5L since admit  Energy Calories Required: meeting needs  Protein Required: meeting needs  Fluid Required:  (per MD)  Comments: LBM 7/21  Tolerance: tolerating  % Intake of Estimated Energy Needs: 75 - 100 %  % Meal Intake: NPO    Nutrition Risk  Level of Risk/Frequency of Follow-up: low (1x/week)     Assessment and Plan  Nutrition Problem  Inadequate energy intake     Related to (etiology):   Decreased ability to consume sufficient energy     Signs and Symptoms (as evidenced by):   NPO, intubated/sedated with no alternative means of nutrition at this time      Nutrition Diagnosis Status:   Resolved    Monitor and Evaluation  Food and Nutrient Intake: energy intake, enteral nutrition intake  Food and Nutrient Adminstration: enteral and parenteral nutrition administration  Anthropometric Measurements:  weight, weight change, body mass index  Biochemical Data, Medical Tests and Procedures: electrolyte and renal panel, gastrointestinal profile, inflammatory profile  Nutrition-Focused Physical Findings: overall appearance     Nutrition Follow-Up  RD Follow-up?: Yes

## 2018-07-23 NOTE — ASSESSMENT & PLAN NOTE
- INR subtherapeutic  - Coumadin on hold anticoagulation has been on hold in setting of recent GI bleed. Has been receiving DVT PPx  - Protonix increased to 40 mg BID (changed to IV)  - Had screening colonoscopy with hot snare polypectomy during last admission; most likely culprit post polypectomy bleeding  - H & H stable

## 2018-07-23 NOTE — PROGRESS NOTES
Update:    SW to pt's room to provide support to family.Pt intubated and sedated. No family present. SW to return as appropriate. SW providing ongoing psychosocial and counseling support, education, assistance, resources, and discharge planning as indicated. SW continuing to follow and remains available.

## 2018-07-23 NOTE — ASSESSMENT & PLAN NOTE
· Continue diuresis per primary team  · No substantial improvement on CXR comapred to prior day   · Received abx therapy for  ET aspirate culture positive for MSSA staph   · Continue current respiratory therapies   · Vent settings: RR 16,  mL (6cc/kg), PEEP 6, FiO2 40%  ·  Cardiac insufficiencies likely to be limiting factor in patient's potential recovery.        · May SBT tomorrow  · ETT day 13     Recent Labs  Lab 07/23/18  1129   PH 7.369   PCO2 66.7*   PO2 31*   HCO3 38.4*   POCSATURATED 54*   BE 13   VBG

## 2018-07-24 PROBLEM — I50.43 ACUTE ON CHRONIC COMBINED SYSTOLIC AND DIASTOLIC CHF, NYHA CLASS 4: Status: ACTIVE | Noted: 2018-01-01

## 2018-07-24 NOTE — ASSESSMENT & PLAN NOTE
-VT storm 7/10/18. Polymorphic and monomorphic. Degenerated into VF. 12 ICD shocks  -Intubated, IABP placed emergently at bedside.   -VT storm again on 7/17/18 and reloaded with amio. Continue metoprolol, mexitil, amio gtt for now.   -Will try weaning IABP to 1:2 and give 1 unit PC's (see below). Repeat sVO2 in 2-3 hours  -K goal >4.5, Mg >2.5  - Per EP-At a later time, it may be considered to deactivate the LV lead of the patient's CRT-D, considering the patient's LVAD status and native RBBB. ICD interrogation reveals LICHA. EP signed off, requests that we let them know if/when gen change needed

## 2018-07-24 NOTE — SUBJECTIVE & OBJECTIVE
Interval History: Some PVC's and 3 beat NSVT overnight. Intubated and sedated. CVP 7 and sVO2 75%. Has not had a bowel movement in 4 days. Tolerated tube feeds at 55 cc/hr with no residual    Continuous Infusions:   amiodarone in dextrose 5% 0.5 mg/min (07/24/18 0900)    fentanyl 125 mcg/hr (07/24/18 0900)    furosemide (LASIX) 2 mg/mL infusion (non-titrating) 10 mg/hr (07/24/18 0800)    norepinephrine bitartrate-D5W 0.02 mcg/kg/min (07/24/18 0900)    propofol 50 mcg/kg/min (07/24/18 0900)     Scheduled Meds:   albuterol sulfate  2.5 mg Nebulization Q4H    aspirin  81 mg Oral Daily    atorvastatin  40 mg Oral Daily    budesonide  0.5 mg Nebulization Q12H    ceFAZolin  1 g Intravenous Q8H    chlorhexidine  15 mL Mouth/Throat BID    chlorhexidine  15 mL Mouth/Throat BID    docusate  100 mg Oral Daily    fluticasone-vilanterol  1 puff Inhalation Daily    gabapentin  600 mg Oral BID    heparin (porcine)  5,000 Units Subcutaneous Q8H    levETIRAcetam  500 mg Oral BID    metoprolol  2.5 mg Intravenous Q4H    mexiletine  150 mg Oral Q8H    pantoprozole (PROTONIX) 40 mg/100 mL D5W IVPB  40 mg Intravenous BID    predniSONE  30 mg Oral Daily    sennosides 8.8 mg/5 ml  5 mL Oral QHS    sodium chloride 0.9%  3 mL Intravenous Q8H     PRN Meds:sodium chloride, sodium chloride, ALPRAZolam, benzonatate, calcium gluconate IVPB, calcium gluconate IVPB, calcium gluconate IVPB, carisoprodol, dextrose 50%, glucagon (human recombinant), HYDROcodone-acetaminophen, insulin aspart U-100, magnesium sulfate IVPB, magnesium sulfate IVPB, nitroGLYCERIN, potassium chloride in water **AND** potassium chloride in water **AND** potassium chloride in water, sodium phosphate IVPB, sodium phosphate IVPB, sodium phosphate IVPB    Review of patient's allergies indicates:  No Known Allergies  Objective:     Vital Signs (Most Recent):  Temp: 97.5 °F (36.4 °C) (07/24/18 0700)  Pulse: 80 (07/24/18 0907)  Resp: 16 (07/24/18 0907)  BP:  (!) 90/54 (07/24/18 0900)  SpO2: 96 % (07/24/18 0907) Vital Signs (24h Range):  Temp:  [97.5 °F (36.4 °C)-99.4 °F (37.4 °C)] 97.5 °F (36.4 °C)  Pulse:  [80-92] 80  Resp:  [14-28] 16  SpO2:  [82 %-99 %] 96 %  BP: (79-99)/(50-61) 90/54     Patient Vitals for the past 72 hrs (Last 3 readings):   Weight   07/24/18 0300 105.8 kg (233 lb 4 oz)   07/23/18 0400 104.4 kg (230 lb 2.6 oz)   07/22/18 0400 103.8 kg (228 lb 13.4 oz)     Body mass index is 32.53 kg/m².      Intake/Output Summary (Last 24 hours) at 07/24/18 0919  Last data filed at 07/24/18 0900   Gross per 24 hour   Intake             4765 ml   Output             3295 ml   Net             1470 ml       Hemodynamic Parameters:       Telemetry: SR with PVC's, 3 beat runs of NSVT    Physical Exam   Constitutional: He appears well-developed and well-nourished.   HENT:   Head: Normocephalic and atraumatic.   Eyes: Conjunctivae and EOM are normal. Pupils are equal, round, and reactive to light.   Neck: Neck supple. No thyromegaly present.   RIJ line   Cardiovascular: Normal rate and regular rhythm.    Doppler left pedal pulse, palpable right pedal pulse   Pulmonary/Chest:   Intubated and ventilated. Breath sounds slightly decreased on right, essentially CTA bilaterally   Abdominal: He exhibits distension.   No bowel sounds appreciated   Musculoskeletal: He exhibits no edema.   Neurological:   Intubated and sedated   Skin: Skin is warm and dry. Capillary refill takes 2 to 3 seconds.       Significant Labs:  CBC:    Recent Labs  Lab 07/22/18  0400 07/23/18  0414 07/24/18  0345   WBC 14.67* 12.64 10.91   RBC 2.62* 2.61* 2.45*   HGB 7.4* 7.3* 6.8*   HCT 24.9* 24.7* 23.3*    160 172   MCV 95 95 95   MCH 28.2 28.0 27.8   MCHC 29.7* 29.6* 29.2*     BNP:  No results for input(s): BNP in the last 168 hours.    Invalid input(s): BNPTRIAGELBLO  CMP:    Recent Labs  Lab 07/22/18  0400  07/23/18  0414  07/23/18  1550 07/24/18  0000 07/24/18  0345   *  128*  < > 165*   165*  < > 225* 173* 155*   CALCIUM 9.9  9.9  < > 9.6  9.6  < > 9.5 9.6 9.3   ALBUMIN 2.0*  --  1.9*  --   --   --  1.9*   PROT 6.7  --  6.7  --   --   --  6.6   *  150*  < > 147*  147*  < > 146* 145 142   K 4.5  4.5  < > 4.2  4.2  < > 4.8 4.1 3.9   CO2 34*  34*  < > 32*  32*  < > 31* 32* 34*     104  < > 104  104  < > 104 102 99   BUN 55*  55*  < > 59*  59*  < > 64* 67* 65*   CREATININE 1.3  1.3  < > 1.4  1.4  < > 1.4 1.3 1.3   ALKPHOS 65  --  67  --   --   --  60   ALT 10  --  9*  --   --   --  7*   AST 21  --  16  --   --   --  13   BILITOT 0.4  --  0.4  --   --   --  0.3   < > = values in this interval not displayed.   Coagulation:     Recent Labs  Lab 07/22/18  0400 07/23/18  0414 07/24/18  0345   INR 1.1 1.1 1.0     LDH:  No results for input(s): LDH in the last 72 hours.  Microbiology:  Microbiology Results (last 7 days)     Procedure Component Value Units Date/Time    Blood culture [[id number]] Collected:  07/18/18 1803    Order Status:  Completed Specimen:  Blood from Peripheral, Forearm, Right Updated:  07/23/18 2212     Blood Culture, Routine No growth after 5 days.    Blood culture [[id number]] Collected:  07/18/18 1803    Order Status:  Completed Specimen:  Blood from Peripheral, Antecubital, Left Updated:  07/23/18 2212     Blood Culture, Routine No growth after 5 days.    Blood culture [[id number]] Collected:  07/17/18 0930    Order Status:  Completed Specimen:  Blood from Peripheral, Antecubital, Right Updated:  07/22/18 1212     Blood Culture, Routine No growth after 5 days.    IV catheter culture [[id number]] Collected:  07/19/18 1851    Order Status:  Completed Specimen:  Catheter Tip from Catheter Tip, Intrajugular Updated:  07/21/18 1344     Aerobic Culture - Cath tip --     STAPHYLOCOCCUS EPIDERMIDIS  < 15 colonies      Narrative:       Culture [facility]    Blood culture [[id number]] Collected:  07/17/18 0950    Order Status:  Completed Specimen:  Blood from Peripheral,  Antecubital, Right Updated:  07/20/18 1048     Blood Culture, Routine Gram stain aer bottle: Gram positive cocci in clusters resembling Staph     Blood Culture, Routine Results called to and read back by:Javon Hatfield RN 07/18/2018  17:50     Blood Culture, Routine --     COAGULASE-NEGATIVE STAPHYLOCOCCUS SPECIES  Organism is a probable contaminant      Fungus culture [[id number]] Collected:  07/17/18 1154    Order Status:  Completed Specimen:  Respiratory from Sputum Updated:  07/19/18 1054     Fungus (Mycology) Culture Culture in progress    Blood culture [[id number]] Collected:  07/13/18 1309    Order Status:  Completed Specimen:  Blood from Peripheral, Hand, Left Updated:  07/18/18 1812     Blood Culture, Routine No growth after 5 days.    Blood culture [[id number]] Collected:  07/13/18 1350    Order Status:  Completed Specimen:  Blood from Peripheral, Hand, Right Updated:  07/18/18 1812     Blood Culture, Routine No growth after 5 days.    Culture, Respiratory with Gram Stain [[id number]]  (Susceptibility) Collected:  07/13/18 1129    Order Status:  Completed Specimen:  Respiratory from Endotracheal Aspirate Updated:  07/17/18 1057     Respiratory Culture --     STAPHYLOCOCCUS AUREUS  Moderate  Normal respiratory peng also present       Gram Stain (Respiratory) Few WBC's     Gram Stain (Respiratory) No organisms seen          I have reviewed all pertinent labs within the past 24 hours.    Estimated Creatinine Clearance: 79.5 mL/min (based on SCr of 1.3 mg/dL).    Diagnostic Results:  I have reviewed all pertinent imaging results/findings within the past 24 hours.

## 2018-07-24 NOTE — ASSESSMENT & PLAN NOTE
-Emergently intubated 7/10/18 for impending respiratory failure/need for IABP and inability to lay fat  -History of pulmonary sarcoidosis  -Methylpred given 7/10/18, hydrocortisone 100 mg q8 started 7/11/18. Transitioned to PO prednisone 7/16.   -Pulmonary consult for assistance with sarcoid & vent management. We are now at the point of considering trach if he tolerates balloon pump wean. Per daughter, he would not want a trach. Family to meet with Palliative Care today  -CXR daily  -CXR today appears slightly better

## 2018-07-24 NOTE — PROGRESS NOTES
[facility]  Heart Transplant  Progress Note    Patient Name: Yonathan Good Jr.  MRN: [id number]  Admission Date: 7/7/2018  Hospital Length of Stay: 17 days  Attending Physician: Mohan Cross MD  Primary Care Provider: Edgar Gates MD  Principal Problem:Ventricular tachycardia, incessant    Subjective:     Interval History: Some PVC's and 3 beat NSVT overnight. Intubated and sedated. CVP 7 and sVO2 75%. Has not had a bowel movement in 4 days. Tolerated tube feeds at 55 cc/hr with no residual    Continuous Infusions:   amiodarone in dextrose 5% 0.5 mg/min (07/24/18 0900)    fentanyl 125 mcg/hr (07/24/18 0900)    furosemide (LASIX) 2 mg/mL infusion (non-titrating) 10 mg/hr (07/24/18 0800)    norepinephrine bitartrate-D5W 0.02 mcg/kg/min (07/24/18 0900)    propofol 50 mcg/kg/min (07/24/18 0900)     Scheduled Meds:   albuterol sulfate  2.5 mg Nebulization Q4H    aspirin  81 mg Oral Daily    atorvastatin  40 mg Oral Daily    budesonide  0.5 mg Nebulization Q12H    ceFAZolin  1 g Intravenous Q8H    chlorhexidine  15 mL Mouth/Throat BID    chlorhexidine  15 mL Mouth/Throat BID    docusate  100 mg Oral Daily    fluticasone-vilanterol  1 puff Inhalation Daily    gabapentin  600 mg Oral BID    heparin (porcine)  5,000 Units Subcutaneous Q8H    levETIRAcetam  500 mg Oral BID    metoprolol  2.5 mg Intravenous Q4H    mexiletine  150 mg Oral Q8H    pantoprozole (PROTONIX) 40 mg/100 mL D5W IVPB  40 mg Intravenous BID    predniSONE  30 mg Oral Daily    sennosides 8.8 mg/5 ml  5 mL Oral QHS    sodium chloride 0.9%  3 mL Intravenous Q8H     PRN Meds:sodium chloride, sodium chloride, ALPRAZolam, benzonatate, calcium gluconate IVPB, calcium gluconate IVPB, calcium gluconate IVPB, carisoprodol, dextrose 50%, glucagon (human recombinant), HYDROcodone-acetaminophen, insulin aspart U-100, magnesium sulfate IVPB, magnesium sulfate IVPB, nitroGLYCERIN, potassium chloride in water **AND** potassium  chloride in water **AND** potassium chloride in water, sodium phosphate IVPB, sodium phosphate IVPB, sodium phosphate IVPB    Review of patient's allergies indicates:  No Known Allergies  Objective:     Vital Signs (Most Recent):  Temp: 97.5 °F (36.4 °C) (07/24/18 0700)  Pulse: 80 (07/24/18 0907)  Resp: 16 (07/24/18 0907)  BP: (!) 90/54 (07/24/18 0900)  SpO2: 96 % (07/24/18 0907) Vital Signs (24h Range):  Temp:  [97.5 °F (36.4 °C)-99.4 °F (37.4 °C)] 97.5 °F (36.4 °C)  Pulse:  [80-92] 80  Resp:  [14-28] 16  SpO2:  [82 %-99 %] 96 %  BP: (79-99)/(50-61) 90/54     Patient Vitals for the past 72 hrs (Last 3 readings):   Weight   07/24/18 0300 105.8 kg (233 lb 4 oz)   07/23/18 0400 104.4 kg (230 lb 2.6 oz)   07/22/18 0400 103.8 kg (228 lb 13.4 oz)     Body mass index is 32.53 kg/m².      Intake/Output Summary (Last 24 hours) at 07/24/18 0919  Last data filed at 07/24/18 0900   Gross per 24 hour   Intake             4765 ml   Output             3295 ml   Net             1470 ml       Hemodynamic Parameters:       Telemetry: SR with PVC's, 3 beat runs of NSVT    Physical Exam   Constitutional: He appears well-developed and well-nourished.   HENT:   Head: Normocephalic and atraumatic.   Eyes: Conjunctivae and EOM are normal. Pupils are equal, round, and reactive to light.   Neck: Neck supple. No thyromegaly present.   RIJ line   Cardiovascular: Normal rate and regular rhythm.    Doppler left pedal pulse, palpable right pedal pulse   Pulmonary/Chest:   Intubated and ventilated. Breath sounds slightly decreased on right, essentially CTA bilaterally   Abdominal: He exhibits distension.   No bowel sounds appreciated   Musculoskeletal: He exhibits no edema.   Neurological:   Intubated and sedated   Skin: Skin is warm and dry. Capillary refill takes 2 to 3 seconds.       Significant Labs:  CBC:    Recent Labs  Lab 07/22/18  0400 07/23/18  0414 07/24/18  0345   WBC 14.67* 12.64 10.91   RBC 2.62* 2.61* 2.45*   HGB 7.4* 7.3* 6.8*    HCT 24.9* 24.7* 23.3*    160 172   MCV 95 95 95   MCH 28.2 28.0 27.8   MCHC 29.7* 29.6* 29.2*     BNP:  No results for input(s): BNP in the last 168 hours.    Invalid input(s): BNPTRIAGELBLO  CMP:    Recent Labs  Lab 07/22/18  0400  07/23/18  0414  07/23/18  1550 07/24/18  0000 07/24/18  0345   *  128*  < > 165*  165*  < > 225* 173* 155*   CALCIUM 9.9  9.9  < > 9.6  9.6  < > 9.5 9.6 9.3   ALBUMIN 2.0*  --  1.9*  --   --   --  1.9*   PROT 6.7  --  6.7  --   --   --  6.6   *  150*  < > 147*  147*  < > 146* 145 142   K 4.5  4.5  < > 4.2  4.2  < > 4.8 4.1 3.9   CO2 34*  34*  < > 32*  32*  < > 31* 32* 34*     104  < > 104  104  < > 104 102 99   BUN 55*  55*  < > 59*  59*  < > 64* 67* 65*   CREATININE 1.3  1.3  < > 1.4  1.4  < > 1.4 1.3 1.3   ALKPHOS 65  --  67  --   --   --  60   ALT 10  --  9*  --   --   --  7*   AST 21  --  16  --   --   --  13   BILITOT 0.4  --  0.4  --   --   --  0.3   < > = values in this interval not displayed.   Coagulation:     Recent Labs  Lab 07/22/18  0400 07/23/18  0414 07/24/18  0345   INR 1.1 1.1 1.0     LDH:  No results for input(s): LDH in the last 72 hours.  Microbiology:  Microbiology Results (last 7 days)     Procedure Component Value Units Date/Time    Blood culture [[id number]] Collected:  07/18/18 1803    Order Status:  Completed Specimen:  Blood from Peripheral, Forearm, Right Updated:  07/23/18 2212     Blood Culture, Routine No growth after 5 days.    Blood culture [[id number]] Collected:  07/18/18 1803    Order Status:  Completed Specimen:  Blood from Peripheral, Antecubital, Left Updated:  07/23/18 2212     Blood Culture, Routine No growth after 5 days.    Blood culture [[id number]] Collected:  07/17/18 0930    Order Status:  Completed Specimen:  Blood from Peripheral, Antecubital, Right Updated:  07/22/18 1212     Blood Culture, Routine No growth after 5 days.    IV catheter culture [[id number]] Collected:  07/19/18 1851    Order Status:   Completed Specimen:  Catheter Tip from Catheter Tip, Intrajugular Updated:  07/21/18 1344     Aerobic Culture - Cath tip --     STAPHYLOCOCCUS EPIDERMIDIS  < 15 colonies      Narrative:       Culture [facility]    Blood culture [[id number]] Collected:  07/17/18 0950    Order Status:  Completed Specimen:  Blood from Peripheral, Antecubital, Right Updated:  07/20/18 1048     Blood Culture, Routine Gram stain aer bottle: Gram positive cocci in clusters resembling Staph     Blood Culture, Routine Results called to and read back by:Javon Hatfield RN 07/18/2018  17:50     Blood Culture, Routine --     COAGULASE-NEGATIVE STAPHYLOCOCCUS SPECIES  Organism is a probable contaminant      Fungus culture [[id number]] Collected:  07/17/18 1154    Order Status:  Completed Specimen:  Respiratory from Sputum Updated:  07/19/18 1054     Fungus (Mycology) Culture Culture in progress    Blood culture [[id number]] Collected:  07/13/18 1309    Order Status:  Completed Specimen:  Blood from Peripheral, Hand, Left Updated:  07/18/18 1812     Blood Culture, Routine No growth after 5 days.    Blood culture [[id number]] Collected:  07/13/18 1350    Order Status:  Completed Specimen:  Blood from Peripheral, Hand, Right Updated:  07/18/18 1812     Blood Culture, Routine No growth after 5 days.    Culture, Respiratory with Gram Stain [[id number]]  (Susceptibility) Collected:  07/13/18 1129    Order Status:  Completed Specimen:  Respiratory from Endotracheal Aspirate Updated:  07/17/18 1057     Respiratory Culture --     STAPHYLOCOCCUS AUREUS  Moderate  Normal respiratory peng also present       Gram Stain (Respiratory) Few WBC's     Gram Stain (Respiratory) No organisms seen          I have reviewed all pertinent labs within the past 24 hours.    Estimated Creatinine Clearance: 79.5 mL/min (based on SCr of 1.3 mg/dL).    Diagnostic Results:  I have reviewed all pertinent imaging results/findings within the past 24 hours.    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      He was initially admitted to "stroke/neuro" service on 6/22 with dysarthria and stroke-like symptoms. Extensive work up was negative for recurrent CVA and so no tPA was administered. Patient developed atypical chest pain two days into his hospital course and was noted to be in monomorphic VT (6/24) which was treated with ATP then with shock due to recurrent VT in VF zone. Given his active cardiac issues this prompted transfer to heart failure service where the patient was initiated on IV amiodarone and beta blocker with subsequent resolution of his VT. He was eventually transitioned to PO amiodarone 400 mg BID x 2 weeks (from 6/25-7/9) followed by amiodarone 400 mg daily thereafter per EP recommendations. Of note patient underwent LHC on 6/25 given his extensive ischemic history which did not reveal a culprit lesion, therefore no interventions were done. He was noted to have an elevated LVEDP to 45 however and was administered IV diuresis before being transitioned back to his PO diuretic regimen. Patient also completed heart failure pathway during his hospital course (eg completed colonoscopy on 7/2) as part of his work up for advanced options. Follows with Dr. Berman of heart failure/transplant as an outpatient.      The patient was discharged home in stable condition on 7/4/2018. He was dc'ed on warf/lovenox bridge given CHADS2-VaSc of 5.  Of note, he is no longer a candidate for AO.  He presented yesterday to [facility] with BRBPR and was found to be in acute renal failure as well.  He notes that, on the night of the 5th and morning of 6th, he had 6 bright red bloody bm's.  He went to his PMD who noted he might have internal hemmorhoids.  He was told to stop his lovenox.  He then " returned home and flet very lightheaded and dizzy.  He wisely took his blood pressure and noted it was 70-80/50s whereas it is normally 117/70s.  He went in to ED immediately.  There he was noted to have the following pertinent lab values:  Hg 9.2 (11.7 prior)  INR 2.9  Na 131 (139 prior)  K 6.18  BUN 43 (18 prior)  Cr 2.88 (1.1-1.4 at baseline)  proBNP 2755  He was transferred to [facility] for further evaluation and care.  Of note, he underwent screening c-scope on 7/2 during which the following was noted and done: Diverticulosis in the sigmoid colon and in the descending colon, Two 8 to 10 mm polyps in the sigmoid colon and in the descending colon, removed with a hot snare, resected and retrieved, ligated.        * VT Storm    -VT storm 7/10/18. Polymorphic and monomorphic. Degenerated into VF. 12 ICD shocks  -Intubated, IABP placed emergently at bedside.   -VT storm again on 7/17/18 and reloaded with amio. Continue metoprolol, mexitil, amio gtt for now.   -Will try weaning IABP to 1:2 and give 1 unit PC's (see below). Repeat sVO2 in 2-3 hours  -K goal >4.5, Mg >2.5  - Per EP-At a later time, it may be considered to deactivate the LV lead of the patient's CRT-D, considering the patient's LVAD status and native RBBB. ICD interrogation reveals LICHA. EP signed off, requests that we let them know if/when gen change needed        Hypernatremia    -Improved some with addition of free water flushes, continue 250 q 6 hours        Constipation    - resolved.  - continue bowel regimen          VAP (ventilator-associated pneumonia)    -Continue ancef.   -Cultures now with S. Aureus in sputum, +jesenia blood cultures on 7/17 (probable contaminent). Blood cultures 7/18 are -ve.          History of stroke    -On coumadin prior to admit  -Hold off on systemic anticoagulation for now.        Encounter for management of intra-aortic balloon pump    -IABP placed 7/10/2018  -Daily CXR        Respiratory failure requiring intubation     -Emergently intubated 7/10/18 for impending respiratory failure/need for IABP and inability to lay fat  -History of pulmonary sarcoidosis  -Methylpred given 7/10/18, hydrocortisone 100 mg q8 started 7/11/18. Transitioned to PO prednisone 7/16.   -Pulmonary consult for assistance with sarcoid & vent management. We are now at the point of considering trach if he tolerates balloon pump wean. Per daughter, he would not want a trach. Family to meet with Palliative Care today  -CXR daily  -CXR today appears slightly better        Cardiogenic shock    - IABP placed emergently 7/10/18  - Daily CXR   - Augmenting well at 1:1. sVO2 75% and CVP 7. WIll try decreasing IABP to 1:2  - Continue lasix gtt @ 10 mg/hr.        Ventricular fibrillation    -See VT        Hypotension    - Levophed started during code 7/10/18  - Wean levo for MAP <60 (using IABP mean)        Acute on chronic combined systolic and diastolic CHF, NYHA class 4    - See cardiogenic shock        GI bleed    - INR subtherapeutic  - Coumadin on hold anticoagulation has been on hold in setting of recent GI bleed. Has been receiving DVT PPx  - Protonix increased to 40 mg BID (changed to IV)  - Had screening colonoscopy with hot snare polypectomy during last admission; most likely culprit post polypectomy bleeding  - Hgb has trended down to 6.8 - will give one unit PC's        History of atrial fibrillation    -  continue amio         Abnormal involuntary movements    - Continue keppra  - possible seizure activity noted on 7/12/18 (twitching right face and UEs lasting 5 min). Some Bilateral UE twitching which did not appear to be seizure like occurred on 7/18, resolved with increased sedation. If noted again will reach out to neuro.   - neuro consulted and increased keppra to 1000 IV 12   - continuous EEG read and negative for seizure activity   - Keppra changed to PO        CAD (coronary artery disease)    - Continue atorvastatin, nitro prn  - ASA restarted         Sarcoidosis of lung    - Continue prednisone, breo-ellipta, and albuterol prn.   - Wean pred once condition improves          Uninterrupted Critical Care/Counseling Time (not including procedures): 60 minutes      Lazara Mcleod NP [id number]  Heart Transplant  [facility]

## 2018-07-24 NOTE — PROGRESS NOTES
[facility]  Palliative Medicine  Progress Note    Patient Name: Yonathan Good Jr.  MRN: [id number]  Admission Date: 7/7/2018  Hospital Length of Stay: 17 days  Code Status: Full Code   Attending Provider: Mohan Cross MD  Consulting Provider: Jen Madrid MD  Primary Care Physician: Edgar Gates MD  Principal Problem:Ventricular tachycardia, incessant    Patient information was obtained from relative(s).      Assessment/Plan:     No new Assessment & Plan notes have been filed under this hospital service since the last note was generated.  Service: Palliative Medicine    56 yo m, cardiogenic shock, vent and IABP dependent, on pressors, minimal chance for recovery, currently full code with no limitations on medical interventions     1. Encounter for palliative care  -family to hopeful for full recovery  -some degree of prognostic uncertainty?    -current plans to wean pt from IABP tomorrow, may have better sense of trajectory once off IABP  -family accepting of trach and all LST as they believe it would be a bridge to a long term recovery  -remains full code  -daughter Kasandra is HCPOA - please f/u with her regarding paperwork  -if she is unable to produce paperwork, need to call pt's wife to ensure that she would like to give decision making authority to daughter    [facility] discussion with pt's daughter, Kasandra, sister  -Kasandra identifies as HCPOA - states she has paperwork and sister bringing in today  -states that regardless, pt's wife has delegated decision-making power to Kasandra and no longer wants to be involved with pt, filing for divorce  -Kasandra demonstrates good understanding of medical details, struggling with big picture  -feels very hopeful that pt will make a full recovery, will get heart transplant  -she articulates what pt would consider acceptable QOL: would need to be independent, breathing on his own  -at the same time, she feels certain that the pt will be able to get to  "that point and feels that he would be willing to undergo anything to get there (shocks, long term mechanical ventilation, etc.)  -she believes that God will take her father when he's ready - and God will let us know bc her father will be shocked and it won't work    I will follow-up with patient. Please contact us if you have any additional questions.    Subjective:     Chief Complaint: No chief complaint on file.      HPI:   No notes on file   58 yo m, h/o CAD, CHF (EF 5-10%), ICD, a fib on coumadin, pulmonary sarcoid (chronic prednisone), L parietal CVA, just admitted 6/22 - 7/4 for new neurologic sx, stroke work-up negative, then with recurrent VT.  Readmitted on 7/7 with GI bleed, hypotension, renal failure, transferred from OSH    Hospital Course:  No notes on file    No new subjective & objective note has been filed under this hospital service since the last note was generated.    BP (!) 93/59   Pulse 80   Temp 99.1 °F (37.3 °C) (Oral)   Resp 16   Ht 5' 11" (1.803 m)   Wt 105.8 kg (233 lb 4 oz)   SpO2 (!) 93%   BMI 32.53 kg/m²   Pt unresponsive  Intubated/sedated on IABP    > 50% of 60 min visit spent in chart review, face to face discussion of goals of care,  symptom assessment, coordination of care and emotional support.    Jen Madrid MD  Palliative Medicine  [facility]  [phone]            "

## 2018-07-24 NOTE — ASSESSMENT & PLAN NOTE
· Continue diuresis per primary team  · No substantial improvement on CXR comapred to prior day   · Received abx therapy for  ET aspirate culture positive for MSSA staph   · Continue current respiratory therapies   · Vent settings: RR 16,  mL (6cc/kg), PEEP 5, FiO2 50%   ·  Cardiac insufficiencies likely to be limiting factor in patient's potential recovery.        · ETT day 14 - will need to start considering tracheostomy if balloon pump cannot be ween off and patient is anticipated to remain on ventilator     Recent Labs  Lab 07/24/18  0829   PH 7.395   PCO2 58.3*   PO2 96   HCO3 35.7*   POCSATURATED 97   BE 11

## 2018-07-24 NOTE — ASSESSMENT & PLAN NOTE
- INR subtherapeutic  - Coumadin on hold anticoagulation has been on hold in setting of recent GI bleed. Has been receiving DVT PPx  - Protonix increased to 40 mg BID (changed to IV)  - Had screening colonoscopy with hot snare polypectomy during last admission; most likely culprit post polypectomy bleeding  - Hgb has trended down to 6.8 - will give one unit PC's

## 2018-07-24 NOTE — PROGRESS NOTES
Update:    SW to pt's room for update. Pt intubated and sedated. Pt's dtr, and 2 sisters present. Pt's dtr reports she is feeling encouraged because pt appears to be improving medically today. Pt's dtr reports she is planning a vacation to [place] with pt. SW providing emotional support to pt's family. SW providing ongoing psychosocial and counseling support, education, assistance, resources, and discharge planning as indicated. SW continuing to follow and remains available.

## 2018-07-24 NOTE — PROGRESS NOTES
[facility]  Pulmonology  Progress Note    Patient Name: Yonathan Good Jr.  MRN: [id number]  Admission Date: 7/7/2018  Hospital Length of Stay: 17 days  Code Status: Full Code  Attending Provider: Mohan Cross MD  Primary Care Provider: Edgar Gates MD   Principal Problem: Ventricular tachycardia, incessant    Subjective:     Interval History: No acute events overnight. Remains intubated and on ventilator. Palliative medicine to meet with family today. IABP possibly ween off today.     Objective:     Vital Signs (Most Recent):  Temp: 98.7 °F (37.1 °C) (07/24/18 0930)  Pulse: 80 (07/24/18 1000)  Resp: 16 (07/24/18 1000)  BP: (!) 83/51 (07/24/18 1000)  SpO2: 97 % (07/24/18 1000) Vital Signs (24h Range):  Temp:  [97.5 °F (36.4 °C)-99.4 °F (37.4 °C)] 98.7 °F (37.1 °C)  Pulse:  [80-92] 80  Resp:  [14-28] 16  SpO2:  [82 %-99 %] 97 %  BP: (79-99)/(50-61) 83/51     Weight: 105.8 kg (233 lb 4 oz)  Body mass index is 32.53 kg/m².      Intake/Output Summary (Last 24 hours) at 07/24/18 1013  Last data filed at 07/24/18 1000   Gross per 24 hour   Intake             4765 ml   Output             3215 ml   Net             1550 ml       Physical Exam   HENT:   Head: Normocephalic and atraumatic.   Mouth/Throat: Oropharynx is clear and moist.   Eyes: Pupils are equal, round, and reactive to light.   Cardiovascular: Normal rate.    Murmur heard.  IABP remains in place    Pulmonary/Chest:   Remains intubated and on ventilator. Breath sounds coarse bilaterally, largely unchanged from prior day    Abdominal: Soft.   Bowel sounds absent    Musculoskeletal: He exhibits edema.   Skin: Skin is warm and dry.       Vents:  Vent Mode: A/C (07/24/18 0907)  Set Rate: 16 bmp (07/24/18 0907)  Vt Set: 450 mL (07/24/18 0907)  PEEP/CPAP: 5 cmH20 (07/24/18 0907)  Oxygen Concentration (%): 51 (07/24/18 1000)  Peak Airway Pressure: 28 cmH2O (07/24/18 0907)  Plateau Pressure: 23 cmH20 (07/24/18 0907)  Total Ve: 7.64 mL (07/24/18  0907)  F/VT Ratio<105 (RSBI): (!) 33.47 (07/24/18 0907)    Lines/Drains/Airways     Central Venous Catheter Line                 Percutaneous Central Line Insertion/Assessment - triple lumen  07/19/18 1916 right internal jugular 4 days          Drain                 NG/OG Tube 07/10/18 1557 Lorena sump 16 Fr. Left nostril 13 days         Urethral Catheter 07/20/18 1030 Latex 16 Fr. 3 days          Airway                 Airway - Non-Surgical 07/10/18 1523 Endotracheal Tube 13 days          Line                 IABP 07/10/18 1550 7.5 Fr. 40 mL 13 days          Peripheral Intravenous Line                 Midline Catheter Insertion/Assessment  - Single Lumen 07/17/18 1105 Right cephalic vein (lateral side of arm) 18g x 8cm 6 days         Peripheral IV - Single Lumen 07/22/18 0830 Left Wrist 2 days                Significant Labs:    CBC/Anemia Profile:    Recent Labs  Lab 07/23/18  0414 07/24/18  0345   WBC 12.64 10.91   HGB 7.3* 6.8*   HCT 24.7* 23.3*    172   MCV 95 95   RDW 16.2* 16.1*        Chemistries:    Recent Labs  Lab 07/23/18  0414  07/24/18  0000 07/24/18  0345 07/24/18  0815   *  147*  < > 145 142 144   K 4.2  4.2  < > 4.1 3.9 4.8     104  < > 102 99 102   CO2 32*  32*  < > 32* 34* 29   BUN 59*  59*  < > 67* 65* 68*   CREATININE 1.4  1.4  < > 1.3 1.3 1.3   CALCIUM 9.6  9.6  < > 9.6 9.3 9.8   ALBUMIN 1.9*  --   --  1.9*  --    PROT 6.7  --   --  6.6  --    BILITOT 0.4  --   --  0.3  --    ALKPHOS 67  --   --  60  --    ALT 9*  --   --  7*  --    AST 16  --   --  13  --    MG 2.2  --   --  2.4  --    < > = values in this interval not displayed.    Recent Lab Results       07/24/18  0829 07/24/18  0820 07/24/18  0815 07/24/18  0811 07/24/18  0720      Immature Granulocytes          Immature Grans (Abs)          Unit Blood Type Code    9500[P]      Unit Expiration    [id number][P]      Unit Blood Type    O NEG[P]      Albumin          Alkaline Phosphatase          Allens Test N/A     N/A     ALT          Anion Gap   13       AST          Baso #          Basophil%          Total Bilirubin          Site Natasha/UAC    Other     BUN, Bld   68(H)       Calcium   9.8       Chloride   102       CO2   29       CODING SYSTEM    CFXP139[P]      Creatinine   1.3       DelSys Adult Vent    Adult Vent     Differential Method          DISPENSE STATUS    ISSUED[P]      eGFR if    >60.0       eGFR if non    >60.0  Comment:  Calculation used to obtain the estimated glomerular filtration  rate (eGFR) is the CKD-EPI equation.          Eos #          Eosinophil%          FiO2 50    50     Glucose   156(H)       Gran # (ANC)          Gran%          Group & Rh   B NEG       Hematocrit          Hemoglobin          INDIRECT PUNEET   NEG       Coumadin Monitoring INR          Lymph #          Lymph%          Magnesium          MCH          MCHC          MCV          Min Vol 8.49    7.80     Mode AC/PRVC    AC/PRVC     Mono #          Mono%          MPV          nRBC          PEEP 5    5     PiP 26    28     Platelets          POC BE 11    13     POC HCO3 35.7(H)    38.2(H)     POC PCO2 58.3(HH)    61.1(H)     POC PH 7.395    7.403     POC PO2 96    34(LL)     POC SATURATED O2 97    63(L)     POC TCO2 37(H)    40(H)     POCT Glucose  179(H)        Potassium   4.8       PRODUCT CODE    [id number][P]      Total Protein          Protime          Rate 16    16     RBC          RDW          Sample ARTERIAL    VENOUS     Sodium   144       Sp02 97    98     UNIT NUMBER    [id number][P]      Vt 450    450     WBC                      07/24/18  0347 07/24/18  0347 07/24/18  0345 07/24/18  0007 07/24/18  0000      Immature Granulocytes   2.3(H)       Immature Grans (Abs)   0.25  Comment:  Mild elevation in immature granulocytes is non specific and   can be seen in a variety of conditions including stress response,   acute inflammation, trauma and pregnancy. Correlation with other   laboratory and  clinical findings is essential.  (H)       Unit Blood Type Code          Unit Expiration          Unit Blood Type          Albumin   1.9(L)       Alkaline Phosphatase   60       Allens Test N/A         ALT   7(L)       Anion Gap   9  11     AST   13       Baso #   0.03       Basophil%   0.3       Total Bilirubin   0.3  Comment:  For infants and newborns, interpretation of results should be based  on gestational age, weight and in agreement with clinical  observations.  Premature Infant recommended reference ranges:  Up to 24 hours.............<8.0 mg/dL  Up to 48 hours............<12.0 mg/dL  3-5 days..................<15.0 mg/dL  6-29 days.................<15.0 mg/dL         Site Other         BUN, Bld   65(H)  67(H)     Calcium   9.3  9.6     Chloride   99  102     CO2   34(H)  32(H)     CODING SYSTEM          Creatinine   1.3  1.3     [facility] Adult Vent         Differential Method   Automated       DISPENSE STATUS          eGFR if    >60.0  >60.0     eGFR if non    >60.0  Comment:  Calculation used to obtain the estimated glomerular filtration  rate (eGFR) is the CKD-EPI equation.     >60.0  Comment:  Calculation used to obtain the estimated glomerular filtration  rate (eGFR) is the CKD-EPI equation.        Eos #   0.3       Eosinophil%   2.4       FiO2 50         Glucose   155(H)  173(H)     Gran # (ANC)   8.7(H)       Gran%   79.6(H)       Group & Rh          Hematocrit   23.3(L)       Hemoglobin   6.8(L)       INDIRECT PUNEET          Coumadin Monitoring INR   1.0  Comment:  Coumadin Therapy:  2.0 - 3.0 for INR for all indicators except mechanical heart valves  and antiphospholipid syndromes which should use 2.5 - 3.5.         Lymph #   1.0       Lymph%   8.7(L)       Magnesium   2.4       MCH   27.8       MCHC   29.2(L)       MCV   95       Min Vol          Mode AC/PRVC         Mono #   0.7       Mono%   6.7       MPV   11.7       nRBC   1(A)       PEEP 5         PiP           Platelets   172       POC BE 12         POC HCO3 37.6(H)         POC PCO2 63.3(H)         POC PH 7.382         POC PO2 41         POC SATURATED O2 74(L)         POC TCO2 39(H)         POCT Glucose  197(H)  231(H)      Potassium   3.9  4.1     PRODUCT CODE          Total Protein   6.6       Protime   10.8       Rate 16         RBC   2.45(L)       RDW   16.1(H)       Sample VENOUS         Sodium   142  145     Sp02          UNIT NUMBER          Vt 450         WBC   10.91                   07/23/18 2008 07/23/18  1553 07/23/18  1550 07/23/18  1219 07/23/18  1129      Immature Granulocytes          Immature Grans (Abs)          Unit Blood Type Code          Unit Expiration          Unit Blood Type          Albumin          Alkaline Phosphatase          Allens Test     N/A     ALT          Anion Gap   11       AST          Baso #          Basophil%          Total Bilirubin          Site     Other     BUN, Bld   64(H)       Calcium   9.5       Chloride   104       CO2   31(H)       CODING SYSTEM          Creatinine   1.4       DelSys     Adult Vent     Differential Method          DISPENSE STATUS          eGFR if    >60.0       eGFR if non    56.2  Comment:  Calculation used to obtain the estimated glomerular filtration  rate (eGFR) is the CKD-EPI equation.   (A)       Eos #          Eosinophil%          FiO2     40     Glucose   225(H)       Gran # (ANC)          Gran%          Group & Rh          Hematocrit          Hemoglobin          INDIRECT PUNEET          Coumadin Monitoring INR          Lymph #          Lymph%          Magnesium          MCH          MCHC          MCV          Min Vol          Mode     AC/PRVC     Mono #          Mono%          MPV          nRBC          PEEP     5     PiP          Platelets          POC BE     13     POC HCO3     38.4(H)     POC PCO2     66.7(H)     POC PH     7.369     POC PO2     31(LL)     POC SATURATED O2     54(L)     POC TCO2     40(H)      POCT Glucose 168(H) 237(H)  246(H)      Potassium   4.8       PRODUCT CODE          Total Protein          Protime          Rate     16     RBC          RDW          Sample     VENOUS     Sodium   146(H)       Sp02          UNIT NUMBER          Vt     450     WBC                      07/23/18  1126      Immature Granulocytes      Immature Grans (Abs)      Unit Blood Type Code      Unit Expiration      Unit Blood Type      Albumin      Alkaline Phosphatase      Allens Test      ALT      Anion Gap      AST      Baso #      Basophil%      Total Bilirubin      Site      BUN, Bld      Calcium      Chloride      CO2      CODING SYSTEM      Creatinine      DelSys      Differential Method      DISPENSE STATUS      eGFR if       eGFR if non       Eos #      Eosinophil%      FiO2      Glucose      Gran # (ANC)      Gran%      Group & Rh      Hematocrit      Hemoglobin      INDIRECT PUNEET      Coumadin Monitoring INR      Lymph #      Lymph%      Magnesium      MCH      MCHC      MCV      Min Vol      Mode      Mono #      Mono%      MPV      nRBC      PEEP      PiP      Platelets      POC BE      POC HCO3      POC PCO2      POC PH      POC PO2      POC SATURATED O2      POC TCO2      POCT Glucose 236(H)     Potassium      PRODUCT CODE      Total Protein      Protime      Rate      RBC      RDW      Sample      Sodium      Sp02      UNIT NUMBER      Vt      WBC          All pertinent labs within the past 24 hours have been reviewed.    Significant Imaging:  I have reviewed all pertinent imaging results/findings within the past 24 hours.  I have reviewed and interpreted all pertinent imaging results/findings within the past 24 hours.    Assessment/Plan:     Respiratory failure requiring intubation    · Continue diuresis per primary team  · No substantial improvement on CXR comapred to prior day   · Received abx therapy for  ET aspirate culture positive for MSSA staph   · Continue current  respiratory therapies   · Vent settings: RR 16,  mL (6cc/kg), PEEP 5, FiO2 50%   ·  Cardiac insufficiencies likely to be limiting factor in patient's potential recovery.        · ETT day 14 - will need to start considering tracheostomy if balloon pump cannot be ween off and patient is anticipated to remain on ventilator     Recent Labs  Lab 07/24/18  0829   PH 7.395   PCO2 58.3*   PO2 96   HCO3 35.7*   POCSATURATED 97   BE 11             Plan discussed with attending Dr. Mckenzie, further recommendations as per attending addendum. Please feel free to call with any questions or concerns.    Ruben Bermudez MD  Resident Physician, PGY1       Ruben Bermudez MD  Pulmonology  [facility]

## 2018-07-24 NOTE — ASSESSMENT & PLAN NOTE
-Continue ancef.   -Cultures now with S. Aureus in sputum, +jesenia blood cultures on 7/17 (probable contaminent). Blood cultures 7/18 are -ve.

## 2018-07-24 NOTE — NURSING
Patient rested quietly overnight, no acute events. Vital signs remain stable. 100% v paced. Balloon pump MAP >60. Norepinephrine weaned to 0.02mcg/kg/min. No balloon pump alarms noted. Sedation maintained for RASS -3. Clear yellow urine output >100cc/hour throughout shift. Family at bedside updated of all. Verbalize understanding.

## 2018-07-24 NOTE — ASSESSMENT & PLAN NOTE
- IABP placed emergently 7/10/18  - Daily CXR   - Augmenting well at 1:1. sVO2 75% and CVP 7. WIll try decreasing IABP to 1:2  - Continue lasix gtt @ 10 mg/hr.

## 2018-07-24 NOTE — SUBJECTIVE & OBJECTIVE
Interval History: No acute events overnight. Remains intubated and on ventilator. Palliative medicine to meet with family today. IABP possibly ween off today.     Objective:     Vital Signs (Most Recent):  Temp: 98.7 °F (37.1 °C) (07/24/18 0930)  Pulse: 80 (07/24/18 1000)  Resp: 16 (07/24/18 1000)  BP: (!) 83/51 (07/24/18 1000)  SpO2: 97 % (07/24/18 1000) Vital Signs (24h Range):  Temp:  [97.5 °F (36.4 °C)-99.4 °F (37.4 °C)] 98.7 °F (37.1 °C)  Pulse:  [80-92] 80  Resp:  [14-28] 16  SpO2:  [82 %-99 %] 97 %  BP: (79-99)/(50-61) 83/51     Weight: 105.8 kg (233 lb 4 oz)  Body mass index is 32.53 kg/m².      Intake/Output Summary (Last 24 hours) at 07/24/18 1013  Last data filed at 07/24/18 1000   Gross per 24 hour   Intake             4765 ml   Output             3215 ml   Net             1550 ml       Physical Exam   HENT:   Head: Normocephalic and atraumatic.   Mouth/Throat: Oropharynx is clear and moist.   Eyes: Pupils are equal, round, and reactive to light.   Cardiovascular: Normal rate.    Murmur heard.  IABP remains in place    Pulmonary/Chest:   Remains intubated and on ventilator. Breath sounds coarse bilaterally, largely unchanged from prior day    Abdominal: Soft.   Bowel sounds absent    Musculoskeletal: He exhibits edema.   Skin: Skin is warm and dry.       Vents:  Vent Mode: A/C (07/24/18 0907)  Set Rate: 16 bmp (07/24/18 0907)  Vt Set: 450 mL (07/24/18 0907)  PEEP/CPAP: 5 cmH20 (07/24/18 0907)  Oxygen Concentration (%): 51 (07/24/18 1000)  Peak Airway Pressure: 28 cmH2O (07/24/18 0907)  Plateau Pressure: 23 cmH20 (07/24/18 0907)  Total Ve: 7.64 mL (07/24/18 0907)  F/VT Ratio<105 (RSBI): (!) 33.47 (07/24/18 0907)    Lines/Drains/Airways     Central Venous Catheter Line                 Percutaneous Central Line Insertion/Assessment - triple lumen  07/19/18 1916 right internal jugular 4 days          Drain                 NG/OG Tube 07/10/18 1557 Cochran sump 16 Fr. Left nostril 13 days         Urethral  Catheter 07/20/18 1030 Latex 16 Fr. 3 days          Airway                 Airway - Non-Surgical 07/10/18 1523 Endotracheal Tube 13 days          Line                 IABP 07/10/18 1550 7.5 Fr. 40 mL 13 days          Peripheral Intravenous Line                 Midline Catheter Insertion/Assessment  - Single Lumen 07/17/18 1105 Right cephalic vein (lateral side of arm) 18g x 8cm 6 days         Peripheral IV - Single Lumen 07/22/18 0830 Left Wrist 2 days                Significant Labs:    CBC/Anemia Profile:    Recent Labs  Lab 07/23/18  0414 07/24/18  0345   WBC 12.64 10.91   HGB 7.3* 6.8*   HCT 24.7* 23.3*    172   MCV 95 95   RDW 16.2* 16.1*        Chemistries:    Recent Labs  Lab 07/23/18  0414  07/24/18  0000 07/24/18  0345 07/24/18  0815   *  147*  < > 145 142 144   K 4.2  4.2  < > 4.1 3.9 4.8     104  < > 102 99 102   CO2 32*  32*  < > 32* 34* 29   BUN 59*  59*  < > 67* 65* 68*   CREATININE 1.4  1.4  < > 1.3 1.3 1.3   CALCIUM 9.6  9.6  < > 9.6 9.3 9.8   ALBUMIN 1.9*  --   --  1.9*  --    PROT 6.7  --   --  6.6  --    BILITOT 0.4  --   --  0.3  --    ALKPHOS 67  --   --  60  --    ALT 9*  --   --  7*  --    AST 16  --   --  13  --    MG 2.2  --   --  2.4  --    < > = values in this interval not displayed.    Recent Lab Results       07/24/18  0829 07/24/18  0820 07/24/18  0815 07/24/18  0811 07/24/18  0720      Immature Granulocytes          Immature Grans (Abs)          Unit Blood Type Code    9500[P]      Unit Expiration    [id number][P]      Unit Blood Type    O NEG[P]      Albumin          Alkaline Phosphatase          Allens Test N/A    N/A     ALT          Anion Gap   13       AST          Baso #          Basophil%          Total Bilirubin          Site Natasha/UAC    Other     BUN, Bld   68(H)       Calcium   9.8       Chloride   102       CO2   29       CODING SYSTEM    [id number][P]      Creatinine   1.3       [facility] Adult Vent    Adult Vent     Differential Method           DISPENSE STATUS    ISSUED[P]      eGFR if    >60.0       eGFR if non    >60.0  Comment:  Calculation used to obtain the estimated glomerular filtration  rate (eGFR) is the CKD-EPI equation.          Eos #          Eosinophil%          FiO2 50    50     Glucose   156(H)       Gran # (ANC)          Gran%          Group & Rh   B NEG       Hematocrit          Hemoglobin          INDIRECT PUNEET   NEG       Coumadin Monitoring INR          Lymph #          Lymph%          Magnesium          MCH          MCHC          MCV          Min Vol 8.49    7.80     Mode AC/PRVC    AC/PRVC     Mono #          Mono%          MPV          nRBC          PEEP 5    5     PiP 26    28     Platelets          POC BE 11    13     POC HCO3 35.7(H)    38.2(H)     POC PCO2 58.3(HH)    61.1(H)     POC PH 7.395    7.403     POC PO2 96    34(LL)     POC SATURATED O2 97    63(L)     POC TCO2 37(H)    40(H)     POCT Glucose  179(H)        Potassium   4.8       PRODUCT CODE    [id number][P]      Total Protein          Protime          Rate 16    16     RBC          RDW          Sample ARTERIAL    VENOUS     Sodium   144       Sp02 97    98     UNIT NUMBER    [id number][P]      Vt 450    450     WBC                      07/24/18  0347 07/24/18  0347 07/24/18  0345 07/24/18  0007 07/24/18  0000      Immature Granulocytes   2.3(H)       Immature Grans (Abs)   0.25  Comment:  Mild elevation in immature granulocytes is non specific and   can be seen in a variety of conditions including stress response,   acute inflammation, trauma and pregnancy. Correlation with other   laboratory and clinical findings is essential.  (H)       Unit Blood Type Code          Unit Expiration          Unit Blood Type          Albumin   1.9(L)       Alkaline Phosphatase   60       Allens Test N/A         ALT   7(L)       Anion Gap   9  11     AST   13       Baso #   0.03       Basophil%   0.3       Total Bilirubin   0.3  Comment:  For infants  and newborns, interpretation of results should be based  on gestational age, weight and in agreement with clinical  observations.  Premature Infant recommended reference ranges:  Up to 24 hours.............<8.0 mg/dL  Up to 48 hours............<12.0 mg/dL  3-5 days..................<15.0 mg/dL  6-29 days.................<15.0 mg/dL         Site Other         BUN, Bld   65(H)  67(H)     Calcium   9.3  9.6     Chloride   99  102     CO2   34(H)  32(H)     CODING SYSTEM          Creatinine   1.3  1.3     [facility] Labss Adult Vent         Differential Method   Automated       DISPENSE STATUS          eGFR if    >60.0  >60.0     eGFR if non    >60.0  Comment:  Calculation used to obtain the estimated glomerular filtration  rate (eGFR) is the CKD-EPI equation.     >60.0  Comment:  Calculation used to obtain the estimated glomerular filtration  rate (eGFR) is the CKD-EPI equation.        Eos #   0.3       Eosinophil%   2.4       FiO2 50         Glucose   155(H)  173(H)     Gran # (ANC)   8.7(H)       Gran%   79.6(H)       Group & Rh          Hematocrit   23.3(L)       Hemoglobin   6.8(L)       INDIRECT PUNEET          Coumadin Monitoring INR   1.0  Comment:  Coumadin Therapy:  2.0 - 3.0 for INR for all indicators except mechanical heart valves  and antiphospholipid syndromes which should use 2.5 - 3.5.         Lymph #   1.0       Lymph%   8.7(L)       Magnesium   2.4       MCH   27.8       MCHC   29.2(L)       MCV   95       Min Vol          Mode AC/PRVC         Mono #   0.7       Mono%   6.7       MPV   11.7       nRBC   1(A)       PEEP 5         PiP          Platelets   172       POC BE 12         POC HCO3 37.6(H)         POC PCO2 63.3(H)         POC PH 7.382         POC PO2 41         POC SATURATED O2 74(L)         POC TCO2 39(H)         POCT Glucose  197(H)  231(H)      Potassium   3.9  4.1     PRODUCT CODE          Total Protein   6.6       Protime   10.8       Rate 16         RBC   2.45(L)        RDW   16.1(H)       Sample VENOUS         Sodium   142  145     Sp02          UNIT NUMBER          Vt 450         WBC   10.91                   07/23/18  2008 07/23/18  1553 07/23/18  1550 07/23/18  1219 07/23/18  1129      Immature Granulocytes          Immature Grans (Abs)          Unit Blood Type Code          Unit Expiration          Unit Blood Type          Albumin          Alkaline Phosphatase          Allens Test     N/A     ALT          Anion Gap   11       AST          Baso #          Basophil%          Total Bilirubin          Site     Other     BUN, Bld   64(H)       Calcium   9.5       Chloride   104       CO2   31(H)       CODING SYSTEM          Creatinine   1.4       [facility]     Adult Vent     Differential Method          DISPENSE STATUS          eGFR if    >60.0       eGFR if non    56.2  Comment:  Calculation used to obtain the estimated glomerular filtration  rate (eGFR) is the CKD-EPI equation.   (A)       Eos #          Eosinophil%          FiO2     40     Glucose   225(H)       Gran # (ANC)          Gran%          Group & Rh          Hematocrit          Hemoglobin          INDIRECT PUNEET          Coumadin Monitoring INR          Lymph #          Lymph%          Magnesium          MCH          MCHC          MCV          Min Vol          Mode     AC/PRVC     Mono #          Mono%          MPV          nRBC          PEEP     5     PiP          Platelets          POC BE     13     POC HCO3     38.4(H)     POC PCO2     66.7(H)     POC PH     7.369     POC PO2     31(LL)     POC SATURATED O2     54(L)     POC TCO2     40(H)     POCT Glucose 168(H) 237(H)  246(H)      Potassium   4.8       PRODUCT CODE          Total Protein          Protime          Rate     16     RBC          RDW          Sample     VENOUS     Sodium   146(H)       Sp02          UNIT NUMBER          Vt     450     WBC                      07/23/18  1126      Immature Granulocytes      Immature Grans  (Abs)      Unit Blood Type Code      Unit Expiration      Unit Blood Type      Albumin      Alkaline Phosphatase      Allens Test      ALT      Anion Gap      AST      Baso #      Basophil%      Total Bilirubin      Site      BUN, Bld      Calcium      Chloride      CO2      CODING SYSTEM      Creatinine      DelSys      Differential Method      DISPENSE STATUS      eGFR if       eGFR if non       Eos #      Eosinophil%      FiO2      Glucose      Gran # (ANC)      Gran%      Group & Rh      Hematocrit      Hemoglobin      INDIRECT PUNEET      Coumadin Monitoring INR      Lymph #      Lymph%      Magnesium      MCH      MCHC      MCV      Min Vol      Mode      Mono #      Mono%      MPV      nRBC      PEEP      PiP      Platelets      POC BE      POC HCO3      POC PCO2      POC PH      POC PO2      POC SATURATED O2      POC TCO2      POCT Glucose 236(H)     Potassium      PRODUCT CODE      Total Protein      Protime      Rate      RBC      RDW      Sample      Sodium      Sp02      UNIT NUMBER      Vt      WBC          All pertinent labs within the past 24 hours have been reviewed.    Significant Imaging:  I have reviewed all pertinent imaging results/findings within the past 24 hours.  I have reviewed and interpreted all pertinent imaging results/findings within the past 24 hours.

## 2018-07-25 NOTE — PLAN OF CARE
Problem: Patient Care Overview  Goal: Individualization & Mutuality  Admit 7/7 GIB    Hx:  CAD s/p PCI LAD 2007, NICM EF 5-10%, pulmonary sarcoidosis, Afib home coumadin, ICD placement, L parietal CVA no residual deficits    7/9  Transfer from TSU to SICU hypotension  TLC placement   7/10:  Intubated.  VTach/VFib arrest (6 min arrest).  MMVT and PMVT.  Amio bolus and gtt, lido gtt.  IABP placed  7/12:  Head CT (neg); EEG monitoring started  7/13: blood cultures, sputum culture  7/16: 2D Echo, EKG, levo restarted  7/17: 2 bouts of VF in AM which responded to ATP & a third for which he was shocked; amio bolus & gtt initiated; pan cultured; Tube feeds turned off  7/18: Lasix gtt discontinued; Sputum culture positive for staph; Tube feeds restarted  7/19: Lasix gtt restarted, RIJ TLC removed and cultured, new RIJ TLC placed  7/24: 1 unit PRBCs given   Nursing:  Acc cks Q4H  IABP mean >60  MAP >60  BMP Q8H                  Outcome: Ongoing (interventions implemented as appropriate)  Pt remains intubated A/C 50% PEEP 5. O2 sat >92%. Temp max 100.2*F. Paced at 80 bpm. Map >60 mmHg maintained. IABP @ 1:1. No alarms or adverse events noted. UO >70 ml/hr.  All pulses noted via palpation or doppler. Cap refill <3 sec x all extremitiesRemains on propofol, lasix, fentanyl, amio, and levo. Tolerating tube feeds at 55 ml/hr. Scant residuals. Pt's daughter updated on POC. Bathed. Repositioned in bed q 2 hours. See chart and doc flowsheet for details.

## 2018-07-25 NOTE — ASSESSMENT & PLAN NOTE
-VT storm 7/10/18. Polymorphic and monomorphic. Degenerated into VF. 12 ICD shocks  -Intubated, IABP placed emergently at bedside 7/10. Tolerated wean in augmentation to 1:3 with no ventricular arrhythmias, sVO2 62%. Will pull today   -VT storm again on 7/17/18 and reloaded with amio. Continue metoprolol, mexitil, amio gtt for now.   -K goal >4.5, Mg >2.5  - Per EP-At a later time, it may be considered to deactivate the LV lead of the patient's CRT-D, considering the patient's LVAD status and native RBBB. ICD interrogation reveals LICHA. EP signed off, requests that we let them know if/when gen change needed

## 2018-07-25 NOTE — ASSESSMENT & PLAN NOTE
-Emergently intubated 7/10/18 for impending respiratory failure/need for IABP and inability to lay fat  -History of pulmonary sarcoidosis  -Methylpred given 7/10/18, hydrocortisone 100 mg q8 started 7/11/18. Transitioned to PO prednisone 7/16.   -Pulmonary consult for assistance with sarcoid & vent management. We are now at the point of considering trach if he tolerates pulling balloon pump. Family to meet with Palliative Care today. Daughter Makesha claims to be HCPOA, and is to bring paperwork today. If she is unable to produce paperwork, need to call pt's wife to ensure that she would like to give decision making authority to daughter  -CXR daily  -CXR today continues to show pulmonary edema

## 2018-07-25 NOTE — PROGRESS NOTES
[facility]  Pulmonology  Progress Note    Patient Name: Yonathan Good Jr.  MRN: [id number]  Admission Date: 7/7/2018  Hospital Length of Stay: 18 days  Code Status: Full Code  Attending Provider: Mohan Cross MD  Primary Care Provider: Edgar Gates MD   Principal Problem: Ventricular tachycardia, incessant    Subjective:     Interval History: No acute events overnight. Remains intubated and sedated. Palliative care met with family yesterday to discuss goals of care.     Objective:     Vital Signs (Most Recent):  Temp: 99.1 °F (37.3 °C) (07/25/18 0700)  Pulse: 80 (07/25/18 0713)  Resp: 16 (07/25/18 0713)  BP: (!) 88/54 (07/25/18 0700)  SpO2: 98 % (07/25/18 0713) Vital Signs (24h Range):  Temp:  [98.7 °F (37.1 °C)-100.2 °F (37.9 °C)] 99.1 °F (37.3 °C)  Pulse:  [80-86] 80  Resp:  [11-23] 16  SpO2:  [92 %-98 %] 98 %  BP: ()/(47-61) 88/54     Weight: 107 kg (235 lb 14.3 oz)  Body mass index is 32.9 kg/m².      Intake/Output Summary (Last 24 hours) at 07/25/18 0823  Last data filed at 07/25/18 0700   Gross per 24 hour   Intake          4976.17 ml   Output             3130 ml   Net          1846.17 ml       Physical Exam   Constitutional: He appears well-developed.   HENT:   Head: Normocephalic and atraumatic.   Mouth/Throat: Oropharynx is clear and moist.   Eyes: Pupils are equal, round, and reactive to light.   Neck: Neck supple.   Cardiovascular: Normal rate and intact distal pulses.    IABP remains in place, audible on auscultation   Pulmonary/Chest:   Intubated and on ventilator. Lung sounds coarse bilaterally with rhonchi   Abdominal: Soft.   Bowel sounds absent     Neurological:   Sedated on propofol       Vents:  Vent Mode: A/C (07/25/18 0713)  Set Rate: 16 bmp (07/25/18 0713)  Vt Set: 450 mL (07/25/18 0713)  PEEP/CPAP: 5 cmH20 (07/25/18 0713)  Oxygen Concentration (%): 50 (07/25/18 0713)  Peak Airway Pressure: 39 cmH2O (07/25/18 0713)  Plateau Pressure: 23 cmH20 (07/25/18 0713)  Total  Ve: 7.44 mL (07/25/18 0713)  F/VT Ratio<105 (RSBI): (!) 34.48 (07/25/18 0713)    Lines/Drains/Airways     Central Venous Catheter Line                 Percutaneous Central Line Insertion/Assessment - triple lumen  07/19/18 1916 right internal jugular 5 days          Drain                 NG/OG Tube 07/10/18 1557 [place] sump 16 Fr. Left nostril 14 days         Urethral Catheter 07/20/18 1030 Latex 16 Fr. 4 days          Airway                 Airway - Non-Surgical 07/10/18 1523 Endotracheal Tube 14 days          Line                 IABP 07/10/18 1550 7.5 Fr. 40 mL 14 days          Peripheral Intravenous Line                 Midline Catheter Insertion/Assessment  - Single Lumen 07/17/18 1105 Right cephalic vein (lateral side of arm) 18g x 8cm 7 days         Peripheral IV - Single Lumen 07/22/18 0830 Left Wrist 2 days                Significant Labs:    CBC/Anemia Profile:    Recent Labs  Lab 07/24/18  0345 07/25/18  0309   WBC 10.91 12.03   HGB 6.8* 7.2*   HCT 23.3* 24.0*    197   MCV 95 95   RDW 16.1* 16.5*        Chemistries:    Recent Labs  Lab 07/24/18  0345  07/24/18  1611 07/24/18  2356 07/25/18  0309     < > 143 141 144  144   K 3.9  < > 5.0 4.5 4.2  4.2   CL 99  < > 100 100 101  101   CO2 34*  < > 31* 30* 31*  31*   BUN 65*  < > 78* 84* 83*  83*   CREATININE 1.3  < > 1.6* 1.6* 1.5*  1.5*   CALCIUM 9.3  < > 9.5 9.3 9.6  9.6   ALBUMIN 1.9*  --   --   --  1.9*   PROT 6.6  --   --   --  6.5   BILITOT 0.3  --   --   --  0.6   ALKPHOS 60  --   --   --  63   ALT 7*  --   --   --  5*   AST 13  --   --   --  16   MG 2.4  --   --   --  2.2   < > = values in this interval not displayed.    Recent Lab Results       07/25/18  0803 07/25/18  0313 07/25/18  0309 07/24/18  2356 07/24/18  2355      Immature Granulocytes   CANCELED  Comment:  Result canceled by the ancillary       Immature Grans (Abs)   CANCELED  Comment:  Mild elevation in immature granulocytes is non specific and   can be seen in a variety  of conditions including stress response,   acute inflammation, trauma and pregnancy. Correlation with other   laboratory and clinical findings is essential.    Result canceled by the ancillary         Albumin   1.9(L)       Alkaline Phosphatase   63       Allens Test  N/A        ALT   5(L)       Anion Gap   12 11         12       Aniso   Slight       AST   16       BANDS   3.0       Basophil%   1.0       Total Bilirubin   0.6  Comment:  For infants and newborns, interpretation of results should be based  on gestational age, weight and in agreement with clinical  observations.  Premature Infant recommended reference ranges:  Up to 24 hours.............<8.0 mg/dL  Up to 48 hours............<12.0 mg/dL  3-5 days..................<15.0 mg/dL  6-29 days.................<15.0 mg/dL         Site  Other        BUN, Bld   83(H) 84(H)         83(H)       Calcium   9.6 9.3         9.6       Chloride   101 100         101       CO2   31(H) 30(H)         31(H)       Creatinine   1.5(H) 1.6(H)         1.5(H)       DelSys          Differential Method   Manual       eGFR if    59.7(A) 55.2(A)         59.7(A)       eGFR if non    51.7  Comment:  Calculation used to obtain the estimated glomerular filtration  rate (eGFR) is the CKD-EPI equation.   (A) 47.8  Comment:  Calculation used to obtain the estimated glomerular filtration  rate (eGFR) is the CKD-EPI equation.   (A)         51.7  Comment:  Calculation used to obtain the estimated glomerular filtration  rate (eGFR) is the CKD-EPI equation.   (A)       Eosinophil%   2.0       FiO2          Large/Giant Platelets   Present       Glucose   149(H) 153(H)         149(H)       Gran%   81.0(H)       Hematocrit   24.0(L)       Hemoglobin   7.2(L)       Hypo   Occasional       Coumadin Monitoring INR   1.0  Comment:  Coumadin Therapy:  2.0 - 3.0 for INR for all indicators except mechanical heart valves  and antiphospholipid syndromes which should use 2.5 -  3.5.         Lymph%   9.0(L)       Magnesium   2.2       MCH   28.3       MCHC   30.0(L)       MCV   95       Min Vol          Mode          Mono%   3.0(L)       MPV   11.9       Myelocytes   1.0       nRBC   1(A)       Ovalocytes   Occasional       PEEP          PiP          Platelet Estimate   Clumped(A)       Platelets   197  Comment:  Platelets are clumped on smear.Platelet count may be affected.[C]       POC BE  10        POC HCO3  36.0(H)        POC PCO2  66.0(H)        POC PH  7.344(L)        POC PO2  41        POC SATURATED O2  72(L)        POC TCO2  38(H)        POCT Glucose 169(H)  170(H)  158(H)     Poik   Slight       Poly   Occasional       Potassium   4.2 4.5         4.2       Total Protein   6.5       Protime   10.9       Rate          RBC   2.54(L)       RDW   16.5(H)       Sample  VENOUS        Sodium   144 141         144       Sp02          Vt          WBC   12.03                   07/24/18 2005 07/24/18  1631 07/24/18  1629 07/24/18  1611 07/24/18  1525      Immature Granulocytes          Immature Grans (Abs)          Albumin          Alkaline Phosphatase          Allens Test     N/A     ALT          Anion Gap    12      Aniso          AST          BANDS          Basophil%          Total Bilirubin          Site     Other     BUN, Bld    78(H)      Calcium    9.5      Chloride    100      CO2    31(H)      Creatinine    1.6(H)      DelSys     Adult Vent     Differential Method          eGFR if     55.2(A)      eGFR if non     47.8  Comment:  Calculation used to obtain the estimated glomerular filtration  rate (eGFR) is the CKD-EPI equation.   (A)      Eosinophil%          FiO2     50     Large/Giant Platelets          Glucose    209(H)      Gran%          Hematocrit          Hemoglobin          Hypo          Coumadin Monitoring INR          Lymph%          Magnesium          MCH          MCHC          MCV          Min Vol     8.92     Mode     AC/PRVC     Mono%           MPV          Myelocytes          nRBC          Ovalocytes          PEEP     5     PiP     31     Platelet Estimate          Platelets          POC BE     12     POC HCO3     38.0(H)     POC PCO2     72.4(H)     POC PH     7.328(L)     POC PO2     41     POC SATURATED O2     69(L)     POC TCO2     40(H)     POCT Glucose 178(H) 223(H) 217(H)       Poik          Poly          Potassium    5.0      Total Protein          Protime          Rate     16     RBC          RDW          Sample     VENOUS     Sodium    143      Sp02     95     Vt     450     WBC                      07/24/18  1220 07/24/18  1133 07/24/18  1132 07/24/18  1131 07/24/18  1116      Immature Granulocytes          Immature Grans (Abs)          Albumin          Alkaline Phosphatase          Allens Test  N/A   N/A     ALT          Anion Gap          Aniso          AST          BANDS          Basophil%          Total Bilirubin          Site  Other   Other     BUN, Bld          Calcium          Chloride          CO2          Creatinine          DelSys     Adult Vent     Differential Method          eGFR if           eGFR if non           Eosinophil%          FiO2     50     Large/Giant Platelets          Glucose          Gran%          Hematocrit          Hemoglobin          Hypo          Coumadin Monitoring INR          Lymph%          Magnesium          MCH          MCHC          MCV          Min Vol     7.80     Mode     AC/PRVC     Mono%          MPV          Myelocytes          nRBC          Ovalocytes          PEEP     5     PiP     29     Platelet Estimate          Platelets          POC BE  9   13     POC HCO3  34.3(H)   38.1(H)     POC PCO2  57.6(H)   64.3(H)     POC PH  7.383   7.381     POC PO2  35(LL)   36(LL)     POC SATURATED O2  64(L)   65(L)     POC TCO2  36(H)   40(H)     POCT Glucose 197(H)  188(H) 229(H)      Poik          Poly          Potassium          Total Protein          Protime          Rate      16     RBC          RDW          Sample  VENOUS   VENOUS     Sodium          Sp02     94     Vt     450     WBC                      07/24/18  0829      Immature Granulocytes      Immature Grans (Abs)      Albumin      Alkaline Phosphatase      Allens Test N/A     ALT      Anion Gap      Aniso      AST      BANDS      Basophil%      Total Bilirubin      Site Natasha/UAC     BUN, Bld      Calcium      Chloride      CO2      Creatinine      DelSys Adult Vent     Differential Method      eGFR if       eGFR if non       Eosinophil%      FiO2 50     Large/Giant Platelets      Glucose      Gran%      Hematocrit      Hemoglobin      Hypo      Coumadin Monitoring INR      Lymph%      Magnesium      MCH      MCHC      MCV      Min Vol 8.49     Mode AC/PRVC     Mono%      MPV      Myelocytes      nRBC      Ovalocytes      PEEP 5     PiP 26     Platelet Estimate      Platelets      POC BE 11     POC HCO3 35.7(H)     POC PCO2 58.3(HH)     POC PH 7.395     POC PO2 96     POC SATURATED O2 97     POC TCO2 37(H)     POCT Glucose      Poik      Poly      Potassium      Total Protein      Protime      Rate 16     RBC      RDW      Sample ARTERIAL     Sodium      Sp02 97     Vt 450     WBC          All pertinent labs within the past 24 hours have been reviewed.    Significant Imaging:  I have reviewed all pertinent imaging results/findings within the past 24 hours.  I have reviewed and interpreted all pertinent imaging results/findings within the past 24 hours.    Assessment/Plan:     Respiratory failure requiring intubation    · Continue diuresis per primary team  · No substantial improvement on CXR comapred to prior day   · Received abx therapy for  ET aspirate culture positive for MSSA staph   · Continue current respiratory therapies   · Vent settings: RR 16,  mL (6cc/kg), PEEP 5, FiO2 50%   ·  Cardiac insufficiencies likely to be limiting factor in patient's potential recovery.        · ETT  day 15 - consider tracheostomy   · ABG ordered this morning, will f/u results.   · Per primary team, balloon pump will be removed today   · Awaiting clarification of which family member is POA - per Palliative Care, daughter claims she has documents but has not yet provided any.          Sarcoidosis of lung    · Sarcoid appears to have minimal contribution to current condition.   · Continue steroids as prescribed and taper once condition improves.          Plan discussed with attending Dr. Mckenzie, further recommendations as per attending addendum. Please feel free to call with any questions or concerns.    Ruben Bermudez MD  Resident Physician, PGY1       Ruben Bermudez MD  Pulmonology  [facility]

## 2018-07-25 NOTE — PROGRESS NOTES
"POA:    SW met with pt's dtr, Kasandra, in pt's room. Pt still intubated and sedated. Pt's dtr aaox4 and communicative. Pt's dtr reports pt made her HCPA after he and his wife . Kasandra reports her sister Kyree has a copy of the HCPA paperwork, and she will bring it to the hospital today. Pt's dtr showed SW text messages from pt's wife's phone number (Adama Good [phone]) sent to pt's phone and dtr's phone stating pt's wife does not want to be involved in medical decision making due to needing to focus on her own medical problems.     When this SW met with pt in the past, pt reported he did not have an assigned HCPA. Pt stated his wife was unable to speak on the phone, and told SW his best emergency contact is his sister Jenny Good ([phone]). When SW requested contact information for pt's wife, pt declined providing it.  Pt also stated he and his wife are  and living in different homes, although not legally  yet. Pt did give SW permission to speak with both daughters, Kasandra [phone] and Kyree [phone]. Both dtrs agreed to be caregivers post LVAD or [place].       SW called pt's wife, Adama Good [phone], to discuss HCPA with her. SW left a voicemail, and pt's 17 year old step dtr, Iyuana Gordon called back. Abelardo stated she was with pt's wife, but pt's wife was unable to speak on the phone due to a "speech impediment." Abelardo stated pt's wife does want to be involved in medical decision making for pt. LINNEA explained medical team would need to be able to speak to the wife in order for her to assist in decision making. SW requested Abelardo hand phone to pt's wife, however Abelardo stated pt's wife is unable to speak at all, and pt's wife would like all communication to go through Abelardo. SW explained MDs cannot use pt's 17 year old step dtr for all communication.  SW requesting pt's wife come to hospital to participate in pt's care. Pt's step dtr stated pt's wife " is unable to come to [facility] at this time. LINNEA explained SW will discuss this matter with the legal department at [facility], but MD's will need to make medical decisions with pt's dtrs if pt's wife is unable to communicate with medical team.     LINNEA discussed all the above information with Rakesh Levy ([facility] , Legal Affairs, [phone]). Stoney reprots, if pt's wife is unable to communicate with medical staff, pt's daughters are next of kin and are the decision makers. Stoney reports both dtrs should be consulted for decisions, unless one of the daughters is agreeable to her sister making the decisions. LINNEA explained this information to RUTH Mcledo NP and Dr. THELMA Cross.    LINNEA received call from Daly Telles ([phone]) stating she is a discharge planner at [facility] in [place], and pt's wife is currently at the hospital with her and requesting SW discuss pt's care with Daly. LINNEA explained LINNEA is unable to discuss any information about any pt with Daly without the pt's permission. LINNEA called Rakesh Levy with Legal Affairs again, and Rakesh confirmed SW is unable to discuss pt's care with Daly. Rakesh confirmed pt's dtrs are the decision makers and next of kin if pt's wife is unable to communicate with the medical team.      LINNEA called pt's dtr Kyree Chisholm ([phone]) to discuss decision making and HCPA. Pt's dtr reports she does have a copy of HCPA paperwork for pt naming pt's dtr Kyree Good as HCPA, however she is unable to locate the paperwork at this time. Pt's dtr, Kyree reports she wants to be informed of decisions at they are being made, and is agreeable to her sister, Makesha making decisions if they doctors need to make a quick decision or are unable to reach her. Kyree reports she will look for HCPA paperwork and bring it to the hospital tomorrow.     LINNEA returned to pt's room to provide support to pt's family, and to update pt's dtr  Grisel of the conversations with her step mother and sister.    SW reported the above information to Dr. THELMA Cross and RUTH Mcleod NP. SW also informed [place] SW Supervisor PARTHA Crawley of the above noted information.     SW providing ongoing psychosocial and counseling support, education, assistance, resources, and discharge planning as indicated. SW continuing to follow and remains available.

## 2018-07-25 NOTE — PLAN OF CARE
Problem: Patient Care Overview  Goal: Plan of Care Review  Outcome: Ongoing (interventions implemented as appropriate)  Patient on 50% FiO2, 5 of PEEP, rate 16, 450 TV, A/C ventilation. SATs %. Blood pressure maintained above 65 with levo drip. Heart rate paced at 80. Latest CVP 7. Urine output via monahan remains 200-350cc/hr after diuril dose. Propofol at 50 mcg/kg/min, Lasix at 20mg/hr, levo at .04mcg/kg/min, amio at 0.5 mg/min, fentanyl at 125 mcg/hr. Balloon pump D/Cd after trialing 1:3 for 1 hour with minimal change in patient status. Several teams met with patient family to discuss plan of care. Will continue to monitor through shift, all family questions answered by RN.

## 2018-07-25 NOTE — TELEPHONE ENCOUNTER
Discussed with inpatient team on rounds.  LINNEA Nelson advised that she already spoke with caller.

## 2018-07-25 NOTE — ASSESSMENT & PLAN NOTE
- IABP placed emergently 7/10/18. Plan to pull today  - Daily CXR - today shows persistent pulmonary edema  - CVP 8, but net +ve > 1.7L past 24 hours. Continue Lasix at 20 mg/hr and give one dose IV Diuril 250 mg  .

## 2018-07-25 NOTE — ASSESSMENT & PLAN NOTE
· Continue diuresis per primary team  · No substantial improvement on CXR comapred to prior day   · Received abx therapy for  ET aspirate culture positive for MSSA staph   · Continue current respiratory therapies   · Vent settings: RR 16,  mL (6cc/kg), PEEP 5, FiO2 50%   ·  Cardiac insufficiencies likely to be limiting factor in patient's potential recovery.        · ETT day 15 - consider tracheostomy .   · ABG ordered this morning, will f/u results.

## 2018-07-25 NOTE — SUBJECTIVE & OBJECTIVE
Interval History: No ventricular arrhythmias past 24 hours. Remains intubated and sedated. Tolerated wean of IABP to 1:3 with sVO2 62%.     Continuous Infusions:   amiodarone in dextrose 5% 0.5 mg/min (07/25/18 1000)    fentanyl 12.5 mL/hr at 07/25/18 1000    furosemide (LASIX) 2 mg/mL infusion (non-titrating) 20 mg/hr (07/25/18 1000)    norepinephrine bitartrate-D5W 0.06 mcg/kg/min (07/25/18 1000)    propofol 50 mcg/kg/min (07/25/18 1000)     Scheduled Meds:   albuterol sulfate  2.5 mg Nebulization Q4H    aspirin  81 mg Oral Daily    atorvastatin  40 mg Oral Daily    budesonide  0.5 mg Nebulization Q12H    chlorhexidine  15 mL Mouth/Throat BID    chlorhexidine  15 mL Mouth/Throat BID    chlorothiazide (DIURIL) IVPB  250 mg Intravenous Once    docusate  100 mg Oral Daily    fluticasone-vilanterol  1 puff Inhalation Daily    gabapentin  600 mg Oral BID    heparin (porcine)  5,000 Units Subcutaneous Q8H    levETIRAcetam  500 mg Oral BID    metoprolol  2.5 mg Intravenous Q4H    mexiletine  150 mg Oral Q8H    pantoprozole (PROTONIX) 40 mg/100 mL D5W IVPB  40 mg Intravenous BID    predniSONE  30 mg Oral Daily    sennosides 8.8 mg/5 ml  5 mL Oral QHS    sodium chloride 0.9%  3 mL Intravenous Q8H     PRN Meds:sodium chloride, sodium chloride, ALPRAZolam, benzonatate, calcium gluconate IVPB, calcium gluconate IVPB, calcium gluconate IVPB, carisoprodol, dextrose 50%, glucagon (human recombinant), HYDROcodone-acetaminophen, insulin aspart U-100, magnesium sulfate IVPB, magnesium sulfate IVPB, nitroGLYCERIN, potassium chloride in water **AND** potassium chloride in water **AND** potassium chloride in water, sodium phosphate IVPB, sodium phosphate IVPB, sodium phosphate IVPB    Review of patient's allergies indicates:  No Known Allergies  Objective:     Vital Signs (Most Recent):  Temp: 99.1 °F (37.3 °C) (07/25/18 0700)  Pulse: 80 (07/25/18 1002)  Resp: 13 (07/25/18 1002)  BP: (!) 92/58 (07/25/18  1002)  SpO2: 98 % (07/25/18 1002) Vital Signs (24h Range):  Temp:  [99.1 °F (37.3 °C)-100.2 °F (37.9 °C)] 99.1 °F (37.3 °C)  Pulse:  [80-86] 80  Resp:  [10-23] 13  SpO2:  [92 %-98 %] 98 %  BP: ()/(47-61) 92/58     Patient Vitals for the past 72 hrs (Last 3 readings):   Weight   07/25/18 0314 107 kg (235 lb 14.3 oz)   07/24/18 0300 105.8 kg (233 lb 4 oz)   07/23/18 0400 104.4 kg (230 lb 2.6 oz)     Body mass index is 32.9 kg/m².      Intake/Output Summary (Last 24 hours) at 07/25/18 1026  Last data filed at 07/25/18 1000   Gross per 24 hour   Intake          4771.17 ml   Output             3430 ml   Net          1341.17 ml       Hemodynamic Parameters:       Telemetry: BiV paced    Physical Exam   Constitutional: He appears well-developed and well-nourished.   HENT:   Head: Normocephalic and atraumatic.   Eyes: Conjunctivae and EOM are normal. Pupils are equal, round, and reactive to light.   Neck: Neck supple. No thyromegaly present.   RIJ line   Cardiovascular: Normal rate and regular rhythm.    Doppler left pedal pulse, palpable right pedal pulse   Pulmonary/Chest:   Intubated and ventilated. Bibasilar crackles   Abdominal: He exhibits distension.   No bowel sounds appreciated   Musculoskeletal:   2-3+ KAUSHIK   Neurological:   Intubated and sedated   Skin: Skin is warm and dry. Capillary refill takes 2 to 3 seconds.       Significant Labs:  CBC:    Recent Labs  Lab 07/23/18  0414 07/24/18  0345 07/25/18  0309   WBC 12.64 10.91 12.03   RBC 2.61* 2.45* 2.54*   HGB 7.3* 6.8* 7.2*   HCT 24.7* 23.3* 24.0*    172 197   MCV 95 95 95   MCH 28.0 27.8 28.3   MCHC 29.6* 29.2* 30.0*     BNP:  No results for input(s): BNP in the last 168 hours.    Invalid input(s): BNPTRIAGELBLO  CMP:    Recent Labs  Lab 07/23/18  0414  07/24/18  0345  07/24/18  1611 07/24/18  2356 07/25/18  0309   *  165*  < > 155*  < > 209* 153* 149*  149*   CALCIUM 9.6  9.6  < > 9.3  < > 9.5 9.3 9.6  9.6   ALBUMIN 1.9*  --  1.9*  --    --   --  1.9*   PROT 6.7  --  6.6  --   --   --  6.5   *  147*  < > 142  < > 143 141 144  144   K 4.2  4.2  < > 3.9  < > 5.0 4.5 4.2  4.2   CO2 32*  32*  < > 34*  < > 31* 30* 31*  31*     104  < > 99  < > 100 100 101  101   BUN 59*  59*  < > 65*  < > 78* 84* 83*  83*   CREATININE 1.4  1.4  < > 1.3  < > 1.6* 1.6* 1.5*  1.5*   ALKPHOS 67  --  60  --   --   --  63   ALT 9*  --  7*  --   --   --  5*   AST 16  --  13  --   --   --  16   BILITOT 0.4  --  0.3  --   --   --  0.6   < > = values in this interval not displayed.   Coagulation:     Recent Labs  Lab 07/23/18  0414 07/24/18  0345 07/25/18  0309   INR 1.1 1.0 1.0     LDH:  No results for input(s): LDH in the last 72 hours.  Microbiology:  Microbiology Results (last 7 days)     Procedure Component Value Units Date/Time    Blood culture [[id number]] Collected:  07/18/18 1803    Order Status:  Completed Specimen:  Blood from Peripheral, Forearm, Right Updated:  07/23/18 2212     Blood Culture, Routine No growth after 5 days.    Blood culture [[id number]] Collected:  07/18/18 1803    Order Status:  Completed Specimen:  Blood from Peripheral, Antecubital, Left Updated:  07/23/18 2212     Blood Culture, Routine No growth after 5 days.    Blood culture [[id number]] Collected:  07/17/18 0930    Order Status:  Completed Specimen:  Blood from Peripheral, Antecubital, Right Updated:  07/22/18 1212     Blood Culture, Routine No growth after 5 days.    IV catheter culture [[id number]] Collected:  07/19/18 1851    Order Status:  Completed Specimen:  Catheter Tip from Catheter Tip, Intrajugular Updated:  07/21/18 1344     Aerobic Culture - Cath tip --     STAPHYLOCOCCUS EPIDERMIDIS  < 15 colonies      Narrative:       Culture [facility]    Blood culture [[id number]] Collected:  07/17/18 0950    Order Status:  Completed Specimen:  Blood from Peripheral, Antecubital, Right Updated:  07/20/18 1048     Blood Culture, Routine Gram stain aer bottle: Gram positive cocci in  clusters resembling Staph     Blood Culture, Routine Results called to and read back by:Javon Hatfield RN 07/18/2018  17:50     Blood Culture, Routine --     COAGULASE-NEGATIVE STAPHYLOCOCCUS SPECIES  Organism is a probable contaminant      Fungus culture [[id number]] Collected:  07/17/18 1154    Order Status:  Completed Specimen:  Respiratory from Sputum Updated:  07/19/18 1054     Fungus (Mycology) Culture Culture in progress    Blood culture [[id number]] Collected:  07/13/18 1309    Order Status:  Completed Specimen:  Blood from Peripheral, Hand, Left Updated:  07/18/18 1812     Blood Culture, Routine No growth after 5 days.    Blood culture [[id number]] Collected:  07/13/18 1350    Order Status:  Completed Specimen:  Blood from Peripheral, Hand, Right Updated:  07/18/18 1812     Blood Culture, Routine No growth after 5 days.          I have reviewed all pertinent labs within the past 24 hours.    Estimated Creatinine Clearance: 69.3 mL/min (A) (based on SCr of 1.5 mg/dL (H)).    Diagnostic Results:  I have reviewed all pertinent imaging results/findings within the past 24 hours.

## 2018-07-25 NOTE — ASSESSMENT & PLAN NOTE
-Completed total 10 day course of antibiotics 7/24.   -Cultures 7/13 with S. Aureus in sputum, +jesenia blood cultures on 7/17 (probable contaminent). Blood cultures 7/18 are -ve.

## 2018-07-25 NOTE — ASSESSMENT & PLAN NOTE
- INR subtherapeutic  - Coumadin on hold anticoagulation has been on hold in setting of recent GI bleed. Has been receiving DVT PPx  - Protonix increased to 40 mg BID (changed to IV)  - Had screening colonoscopy with hot snare polypectomy during last admission; most likely culprit post polypectomy bleeding  - Hgb 7.2 after getting one unit of PC's yesterday

## 2018-07-25 NOTE — TELEPHONE ENCOUNTER
----- Message from Jessica London MA sent at 7/25/2018 11:03 AM CDT -----  Contact: daughter called  The patient daughter Abelardo would like to know if someone called her. Please call [phone]. Thank you.

## 2018-07-25 NOTE — SUBJECTIVE & OBJECTIVE
Interval History: No acute events overnight. Remains intubated and sedated. Palliative care met with family yesterday to discuss goals of care.     Objective:     Vital Signs (Most Recent):  Temp: 99.1 °F (37.3 °C) (07/25/18 0700)  Pulse: 80 (07/25/18 0713)  Resp: 16 (07/25/18 0713)  BP: (!) 88/54 (07/25/18 0700)  SpO2: 98 % (07/25/18 0713) Vital Signs (24h Range):  Temp:  [98.7 °F (37.1 °C)-100.2 °F (37.9 °C)] 99.1 °F (37.3 °C)  Pulse:  [80-86] 80  Resp:  [11-23] 16  SpO2:  [92 %-98 %] 98 %  BP: ()/(47-61) 88/54     Weight: 107 kg (235 lb 14.3 oz)  Body mass index is 32.9 kg/m².      Intake/Output Summary (Last 24 hours) at 07/25/18 0823  Last data filed at 07/25/18 0700   Gross per 24 hour   Intake          4976.17 ml   Output             3130 ml   Net          1846.17 ml       Physical Exam   Constitutional: He appears well-developed.   HENT:   Head: Normocephalic and atraumatic.   Mouth/Throat: Oropharynx is clear and moist.   Eyes: Pupils are equal, round, and reactive to light.   Neck: Neck supple.   Cardiovascular: Normal rate and intact distal pulses.    IABP remains in place, audible on auscultation   Pulmonary/Chest:   Intubated and on ventilator. Lung sounds coarse bilaterally with rhonchi   Abdominal: Soft.   Bowel sounds absent     Neurological:   Sedated on propofol       Vents:  Vent Mode: A/C (07/25/18 0713)  Set Rate: 16 bmp (07/25/18 0713)  Vt Set: 450 mL (07/25/18 0713)  PEEP/CPAP: 5 cmH20 (07/25/18 0713)  Oxygen Concentration (%): 50 (07/25/18 0713)  Peak Airway Pressure: 39 cmH2O (07/25/18 0713)  Plateau Pressure: 23 cmH20 (07/25/18 0713)  Total Ve: 7.44 mL (07/25/18 0713)  F/VT Ratio<105 (RSBI): (!) 34.48 (07/25/18 0713)    Lines/Drains/Airways     Central Venous Catheter Line                 Percutaneous Central Line Insertion/Assessment - triple lumen  07/19/18 1916 right internal jugular 5 days          Drain                 NG/OG Tube 07/10/18 1557 [place] sump 16 Fr. Left nostril 14  days         Urethral Catheter 07/20/18 1030 [place] 16 Fr. 4 days          Airway                 Airway - Non-Surgical 07/10/18 1523 Endotracheal Tube 14 days          Line                 IABP 07/10/18 1550 7.5 Fr. 40 mL 14 days          Peripheral Intravenous Line                 Midline Catheter Insertion/Assessment  - Single Lumen 07/17/18 1105 Right cephalic vein (lateral side of arm) 18g x 8cm 7 days         Peripheral IV - Single Lumen 07/22/18 0830 Left Wrist 2 days                Significant Labs:    CBC/Anemia Profile:    Recent Labs  Lab 07/24/18  0345 07/25/18  0309   WBC 10.91 12.03   HGB 6.8* 7.2*   HCT 23.3* 24.0*    197   MCV 95 95   RDW 16.1* 16.5*        Chemistries:    Recent Labs  Lab 07/24/18  0345  07/24/18  1611 07/24/18  2356 07/25/18  0309     < > 143 141 144  144   K 3.9  < > 5.0 4.5 4.2  4.2   CL 99  < > 100 100 101  101   CO2 34*  < > 31* 30* 31*  31*   BUN 65*  < > 78* 84* 83*  83*   CREATININE 1.3  < > 1.6* 1.6* 1.5*  1.5*   CALCIUM 9.3  < > 9.5 9.3 9.6  9.6   ALBUMIN 1.9*  --   --   --  1.9*   PROT 6.6  --   --   --  6.5   BILITOT 0.3  --   --   --  0.6   ALKPHOS 60  --   --   --  63   ALT 7*  --   --   --  5*   AST 13  --   --   --  16   MG 2.4  --   --   --  2.2   < > = values in this interval not displayed.    Recent Lab Results       07/25/18  0803 07/25/18  0313 07/25/18  0309 07/24/18  2356 07/24/18  2355      Immature Granulocytes   CANCELED  Comment:  Result canceled by the ancillary       Immature Grans (Abs)   CANCELED  Comment:  Mild elevation in immature granulocytes is non specific and   can be seen in a variety of conditions including stress response,   acute inflammation, trauma and pregnancy. Correlation with other   laboratory and clinical findings is essential.    Result canceled by the ancillary         Albumin   1.9(L)       Alkaline Phosphatase   63       Allens Test  N/A        ALT   5(L)       Anion Gap   12 11         12       Aniso   Slight        AST   16       BANDS   3.0       Basophil%   1.0       Total Bilirubin   0.6  Comment:  For infants and newborns, interpretation of results should be based  on gestational age, weight and in agreement with clinical  observations.  Premature Infant recommended reference ranges:  Up to 24 hours.............<8.0 mg/dL  Up to 48 hours............<12.0 mg/dL  3-5 days..................<15.0 mg/dL  6-29 days.................<15.0 mg/dL         Site  Other        BUN, Bld   83(H) 84(H)         83(H)       Calcium   9.6 9.3         9.6       Chloride   101 100         101       CO2   31(H) 30(H)         31(H)       Creatinine   1.5(H) 1.6(H)         1.5(H)       DelSys          Differential Method   Manual       eGFR if    59.7(A) 55.2(A)         59.7(A)       eGFR if non    51.7  Comment:  Calculation used to obtain the estimated glomerular filtration  rate (eGFR) is the CKD-EPI equation.   (A) 47.8  Comment:  Calculation used to obtain the estimated glomerular filtration  rate (eGFR) is the CKD-EPI equation.   (A)         51.7  Comment:  Calculation used to obtain the estimated glomerular filtration  rate (eGFR) is the CKD-EPI equation.   (A)       Eosinophil%   2.0       FiO2          Large/Giant Platelets   Present       Glucose   149(H) 153(H)         149(H)       Gran%   81.0(H)       Hematocrit   24.0(L)       Hemoglobin   7.2(L)       Hypo   Occasional       Coumadin Monitoring INR   1.0  Comment:  Coumadin Therapy:  2.0 - 3.0 for INR for all indicators except mechanical heart valves  and antiphospholipid syndromes which should use 2.5 - 3.5.         Lymph%   9.0(L)       Magnesium   2.2       MCH   28.3       MCHC   30.0(L)       MCV   95       Min Vol          Mode          Mono%   3.0(L)       MPV   11.9       Myelocytes   1.0       nRBC   1(A)       Ovalocytes   Occasional       PEEP          PiP          Platelet Estimate   Clumped(A)       Platelets    197  Comment:  Platelets are clumped on smear.Platelet count may be affected.[C]       POC BE  10        POC HCO3  36.0(H)        POC PCO2  66.0(H)        POC PH  7.344(L)        POC PO2  41        POC SATURATED O2  72(L)        POC TCO2  38(H)        POCT Glucose 169(H)  170(H)  158(H)     Poik   Slight       Poly   Occasional       Potassium   4.2 4.5         4.2       Total Protein   6.5       Protime   10.9       Rate          RBC   2.54(L)       RDW   16.5(H)       Sample  VENOUS        Sodium   144 141         144       Sp02          Vt          WBC   12.03                   07/24/18 2005 07/24/18  1631 07/24/18  1629 07/24/18  1611 07/24/18  1525      Immature Granulocytes          Immature Grans (Abs)          Albumin          Alkaline Phosphatase          Allens Test     N/A     ALT          Anion Gap    12      Aniso          AST          BANDS          Basophil%          Total Bilirubin          Site     Other     BUN, Bld    78(H)      Calcium    9.5      Chloride    100      CO2    31(H)      Creatinine    1.6(H)      DelSys     Adult Vent     Differential Method          eGFR if     55.2(A)      eGFR if non     47.8  Comment:  Calculation used to obtain the estimated glomerular filtration  rate (eGFR) is the CKD-EPI equation.   (A)      Eosinophil%          FiO2     50     Large/Giant Platelets          Glucose    209(H)      Gran%          Hematocrit          Hemoglobin          Hypo          Coumadin Monitoring INR          Lymph%          Magnesium          MCH          MCHC          MCV          Min Vol     8.92     Mode     AC/PRVC     Mono%          MPV          Myelocytes          nRBC          Ovalocytes          PEEP     5     PiP     31     Platelet Estimate          Platelets          POC BE     12     POC HCO3     38.0(H)     POC PCO2     72.4(H)     POC PH     7.328(L)     POC PO2     41     POC SATURATED O2     69(L)     POC TCO2     40(H)     POCT Glucose  178(H) 223(H) 217(H)       Poik          Poly          Potassium    5.0      Total Protein          Protime          Rate     16     RBC          RDW          Sample     VENOUS     Sodium    143      Sp02     95     Vt     450     WBC                      07/24/18  1220 07/24/18  1133 07/24/18  1132 07/24/18  1131 07/24/18  1116      Immature Granulocytes          Immature Grans (Abs)          Albumin          Alkaline Phosphatase          Allens Test  N/A   N/A     ALT          Anion Gap          Aniso          AST          BANDS          Basophil%          Total Bilirubin          Site  Other   Other     BUN, Bld          Calcium          Chloride          CO2          Creatinine          DelSys     Adult Vent     Differential Method          eGFR if           eGFR if non           Eosinophil%          FiO2     50     Large/Giant Platelets          Glucose          Gran%          Hematocrit          Hemoglobin          Hypo          Coumadin Monitoring INR          Lymph%          Magnesium          MCH          MCHC          MCV          Min Vol     7.80     Mode     AC/PRVC     Mono%          MPV          Myelocytes          nRBC          Ovalocytes          PEEP     5     PiP     29     Platelet Estimate          Platelets          POC BE  9   13     POC HCO3  34.3(H)   38.1(H)     POC PCO2  57.6(H)   64.3(H)     POC PH  7.383   7.381     POC PO2  35(LL)   36(LL)     POC SATURATED O2  64(L)   65(L)     POC TCO2  36(H)   40(H)     POCT Glucose 197(H)  188(H) 229(H)      Poik          Poly          Potassium          Total Protein          Protime          Rate     16     RBC          RDW          Sample  VENOUS   VENOUS     Sodium          Sp02     94     Vt     450     WBC                      07/24/18  0829      Immature Granulocytes      Immature Grans (Abs)      Albumin      Alkaline Phosphatase      Allens Test N/A     ALT      Anion Gap      Aniso      AST      BANDS       Basophil%      Total Bilirubin      Site Natasha/UAC     BUN, Bld      Calcium      Chloride      CO2      Creatinine      DelSys Adult Vent     Differential Method      eGFR if       eGFR if non       Eosinophil%      FiO2 50     Large/Giant Platelets      Glucose      Gran%      Hematocrit      Hemoglobin      Hypo      Coumadin Monitoring INR      Lymph%      Magnesium      MCH      MCHC      MCV      Min Vol 8.49     Mode AC/PRVC     Mono%      MPV      Myelocytes      nRBC      Ovalocytes      PEEP 5     PiP 26     Platelet Estimate      Platelets      POC BE 11     POC HCO3 35.7(H)     POC PCO2 58.3(HH)     POC PH 7.395     POC PO2 96     POC SATURATED O2 97     POC TCO2 37(H)     POCT Glucose      Poik      Poly      Potassium      Total Protein      Protime      Rate 16     RBC      RDW      Sample ARTERIAL     Sodium      Sp02 97     Vt 450     WBC          All pertinent labs within the past 24 hours have been reviewed.    Significant Imaging:  I have reviewed all pertinent imaging results/findings within the past 24 hours.  I have reviewed and interpreted all pertinent imaging results/findings within the past 24 hours.

## 2018-07-25 NOTE — PROGRESS NOTES
[facility]  Heart Transplant  Progress Note    Patient Name: Yonathan Good Jr.  MRN: [id number]  Admission Date: 7/7/2018  Hospital Length of Stay: 18 days  Attending Physician: Mohan Cross MD  Primary Care Provider: Edgar Gates MD  Principal Problem:Ventricular tachycardia, incessant    Subjective:     Interval History: No ventricular arrhythmias past 24 hours. Remains intubated and sedated. Tolerated wean of IABP to 1:3 with sVO2 62%.     Continuous Infusions:   amiodarone in dextrose 5% 0.5 mg/min (07/25/18 1000)    fentanyl 12.5 mL/hr at 07/25/18 1000    furosemide (LASIX) 2 mg/mL infusion (non-titrating) 20 mg/hr (07/25/18 1000)    norepinephrine bitartrate-D5W 0.06 mcg/kg/min (07/25/18 1000)    propofol 50 mcg/kg/min (07/25/18 1000)     Scheduled Meds:   albuterol sulfate  2.5 mg Nebulization Q4H    aspirin  81 mg Oral Daily    atorvastatin  40 mg Oral Daily    budesonide  0.5 mg Nebulization Q12H    chlorhexidine  15 mL Mouth/Throat BID    chlorhexidine  15 mL Mouth/Throat BID    chlorothiazide (DIURIL) IVPB  250 mg Intravenous Once    docusate  100 mg Oral Daily    fluticasone-vilanterol  1 puff Inhalation Daily    gabapentin  600 mg Oral BID    heparin (porcine)  5,000 Units Subcutaneous Q8H    levETIRAcetam  500 mg Oral BID    metoprolol  2.5 mg Intravenous Q4H    mexiletine  150 mg Oral Q8H    pantoprozole (PROTONIX) 40 mg/100 mL D5W IVPB  40 mg Intravenous BID    predniSONE  30 mg Oral Daily    sennosides 8.8 mg/5 ml  5 mL Oral QHS    sodium chloride 0.9%  3 mL Intravenous Q8H     PRN Meds:sodium chloride, sodium chloride, ALPRAZolam, benzonatate, calcium gluconate IVPB, calcium gluconate IVPB, calcium gluconate IVPB, carisoprodol, dextrose 50%, glucagon (human recombinant), HYDROcodone-acetaminophen, insulin aspart U-100, magnesium sulfate IVPB, magnesium sulfate IVPB, nitroGLYCERIN, potassium chloride in water **AND** potassium chloride in water **AND**  potassium chloride in water, sodium phosphate IVPB, sodium phosphate IVPB, sodium phosphate IVPB    Review of patient's allergies indicates:  No Known Allergies  Objective:     Vital Signs (Most Recent):  Temp: 99.1 °F (37.3 °C) (07/25/18 0700)  Pulse: 80 (07/25/18 1002)  Resp: 13 (07/25/18 1002)  BP: (!) 92/58 (07/25/18 1002)  SpO2: 98 % (07/25/18 1002) Vital Signs (24h Range):  Temp:  [99.1 °F (37.3 °C)-100.2 °F (37.9 °C)] 99.1 °F (37.3 °C)  Pulse:  [80-86] 80  Resp:  [10-23] 13  SpO2:  [92 %-98 %] 98 %  BP: ()/(47-61) 92/58     Patient Vitals for the past 72 hrs (Last 3 readings):   Weight   07/25/18 0314 107 kg (235 lb 14.3 oz)   07/24/18 0300 105.8 kg (233 lb 4 oz)   07/23/18 0400 104.4 kg (230 lb 2.6 oz)     Body mass index is 32.9 kg/m².      Intake/Output Summary (Last 24 hours) at 07/25/18 1026  Last data filed at 07/25/18 1000   Gross per 24 hour   Intake          4771.17 ml   Output             3430 ml   Net          1341.17 ml       Hemodynamic Parameters:       Telemetry: BiV paced    Physical Exam   Constitutional: He appears well-developed and well-nourished.   HENT:   Head: Normocephalic and atraumatic.   Eyes: Conjunctivae and EOM are normal. Pupils are equal, round, and reactive to light.   Neck: Neck supple. No thyromegaly present.   RIJ line   Cardiovascular: Normal rate and regular rhythm.    Doppler left pedal pulse, palpable right pedal pulse   Pulmonary/Chest:   Intubated and ventilated. Bibasilar crackles   Abdominal: He exhibits distension.   No bowel sounds appreciated   Musculoskeletal:   2-3+ KAUSHIK   Neurological:   Intubated and sedated   Skin: Skin is warm and dry. Capillary refill takes 2 to 3 seconds.       Significant Labs:  CBC:    Recent Labs  Lab 07/23/18  0414 07/24/18  0345 07/25/18  0309   WBC 12.64 10.91 12.03   RBC 2.61* 2.45* 2.54*   HGB 7.3* 6.8* 7.2*   HCT 24.7* 23.3* 24.0*    172 197   MCV 95 95 95   MCH 28.0 27.8 28.3   MCHC 29.6* 29.2* 30.0*     BNP:  No  results for input(s): BNP in the last 168 hours.    Invalid input(s): BNPTRIAGELBLO  CMP:    Recent Labs  Lab 07/23/18  0414  07/24/18  0345  07/24/18  1611 07/24/18  2356 07/25/18  0309   *  165*  < > 155*  < > 209* 153* 149*  149*   CALCIUM 9.6  9.6  < > 9.3  < > 9.5 9.3 9.6  9.6   ALBUMIN 1.9*  --  1.9*  --   --   --  1.9*   PROT 6.7  --  6.6  --   --   --  6.5   *  147*  < > 142  < > 143 141 144  144   K 4.2  4.2  < > 3.9  < > 5.0 4.5 4.2  4.2   CO2 32*  32*  < > 34*  < > 31* 30* 31*  31*     104  < > 99  < > 100 100 101  101   BUN 59*  59*  < > 65*  < > 78* 84* 83*  83*   CREATININE 1.4  1.4  < > 1.3  < > 1.6* 1.6* 1.5*  1.5*   ALKPHOS 67  --  60  --   --   --  63   ALT 9*  --  7*  --   --   --  5*   AST 16  --  13  --   --   --  16   BILITOT 0.4  --  0.3  --   --   --  0.6   < > = values in this interval not displayed.   Coagulation:     Recent Labs  Lab 07/23/18 0414 07/24/18  0345 07/25/18  0309   INR 1.1 1.0 1.0     LDH:  No results for input(s): LDH in the last 72 hours.  Microbiology:  Microbiology Results (last 7 days)     Procedure Component Value Units Date/Time    Blood culture [[id number]] Collected:  07/18/18 1803    Order Status:  Completed Specimen:  Blood from Peripheral, Forearm, Right Updated:  07/23/18 2212     Blood Culture, Routine No growth after 5 days.    Blood culture [[id number]] Collected:  07/18/18 1803    Order Status:  Completed Specimen:  Blood from Peripheral, Antecubital, Left Updated:  07/23/18 2212     Blood Culture, Routine No growth after 5 days.    Blood culture [[id number]] Collected:  07/17/18 0930    Order Status:  Completed Specimen:  Blood from Peripheral, Antecubital, Right Updated:  07/22/18 1212     Blood Culture, Routine No growth after 5 days.    IV catheter culture [[id number]] Collected:  07/19/18 1851    Order Status:  Completed Specimen:  Catheter Tip from Catheter Tip, Intrajugular Updated:  07/21/18 1344     Aerobic Culture -  "Cath tip --     STAPHYLOCOCCUS EPIDERMIDIS  < 15 colonies      Narrative:       Culture [facility]    Blood culture [[id number]] Collected:  07/17/18 0950    Order Status:  Completed Specimen:  Blood from Peripheral, Antecubital, Right Updated:  07/20/18 1048     Blood Culture, Routine Gram stain aer bottle: Gram positive cocci in clusters resembling Staph     Blood Culture, Routine Results called to and read back by:Javon Hatfield RN 07/18/2018  17:50     Blood Culture, Routine --     COAGULASE-NEGATIVE STAPHYLOCOCCUS SPECIES  Organism is a probable contaminant      Fungus culture [[id number]] Collected:  07/17/18 1154    Order Status:  Completed Specimen:  Respiratory from Sputum Updated:  07/19/18 1054     Fungus (Mycology) Culture Culture in progress    Blood culture [[id number]] Collected:  07/13/18 1309    Order Status:  Completed Specimen:  Blood from Peripheral, Hand, Left Updated:  07/18/18 1812     Blood Culture, Routine No growth after 5 days.    Blood culture [[id number]] Collected:  07/13/18 1350    Order Status:  Completed Specimen:  Blood from Peripheral, Hand, Right Updated:  07/18/18 1812     Blood Culture, Routine No growth after 5 days.          I have reviewed all pertinent labs within the past 24 hours.    Estimated Creatinine Clearance: 69.3 mL/min (A) (based on SCr of 1.5 mg/dL (H)).    Diagnostic Results:  I have reviewed all pertinent imaging results/findings within the past 24 hours.    Assessment and Plan:     57 year old male with PMHx significant for CAD s/p PCIs, HFrEF secondary to ischemic cardiomyopathy (EF 5-10%) s/p ICD, Afib (on warfarin), pulmonary sarcoid (on chronic prednisone), hx of L parietal CVA recently MD'ed on 7/4.      He was initially admitted to "stroke/neuro" service on 6/22 with dysarthria and stroke-like symptoms. Extensive work up was negative for recurrent CVA and so no tPA was administered. Patient developed atypical chest pain two days into his hospital course and was noted " to be in monomorphic VT (6/24) which was treated with ATP then with shock due to recurrent VT in VF zone. Given his active cardiac issues this prompted transfer to heart failure service where the patient was initiated on IV amiodarone and beta blocker with subsequent resolution of his VT. He was eventually transitioned to PO amiodarone 400 mg BID x 2 weeks (from 6/25-7/9) followed by amiodarone 400 mg daily thereafter per EP recommendations. Of note patient underwent LHC on 6/25 given his extensive ischemic history which did not reveal a culprit lesion, therefore no interventions were done. He was noted to have an elevated LVEDP to 45 however and was administered IV diuresis before being transitioned back to his PO diuretic regimen. Patient also completed heart failure pathway during his hospital course (eg completed colonoscopy on 7/2) as part of his work up for advanced options. Follows with Dr. Berman of heart failure/transplant as an outpatient.      The patient was discharged home in stable condition on 7/4/2018. He was dc'ed on warf/lovenox bridge given CHADS2-VaSc of 5.  Of note, he is no longer a candidate for AO.  He presented yesterday to [facility] with BRBPR and was found to be in acute renal failure as well.  He notes that, on the night of the 5th and morning of 6th, he had 6 bright red bloody bm's.  He went to his PMD who noted he might have internal hemmorhoids.  He was told to stop his lovenox.  He then returned home and flet very lightheaded and dizzy.  He wisely took his blood pressure and noted it was 70-80/50s whereas it is normally 117/70s.  He went in to ED immediately.  There he was noted to have the following pertinent lab values:  Hg 9.2 (11.7 prior)  INR 2.9  Na 131 (139 prior)  K 6.18  BUN 43 (18 prior)  Cr 2.88 (1.1-1.4 at baseline)  proBNP 2755  He was transferred to [facility] for further evaluation and care.  Of note, he underwent screening c-scope on 7/2 during which the following  was noted and done: Diverticulosis in the sigmoid colon and in the descending colon, Two 8 to 10 mm polyps in the sigmoid colon and in the descending colon, removed with a hot snare, resected and retrieved, ligated.        * VT Storm    -VT storm 7/10/18. Polymorphic and monomorphic. Degenerated into VF. 12 ICD shocks  -Intubated, IABP placed emergently at bedside 7/10. Tolerated wean in augmentation to 1:3 with no ventricular arrhythmias, sVO2 62%. Will pull today   -VT storm again on 7/17/18 and reloaded with amio. Continue metoprolol, mexitil, amio gtt for now.   -K goal >4.5, Mg >2.5  - Per EP-At a later time, it may be considered to deactivate the LV lead of the patient's CRT-D, considering the patient's LVAD status and native RBBB. ICD interrogation reveals LICHA. EP signed off, requests that we let them know if/when gen change needed        Hypernatremia    -Improved some with addition of free water flushes, continue 250 q 6 hours        Constipation    - Last bowel movement 7/21. Tolerating tube feeds at goal rate with minimal residual. Will continue bowel program. Likely give Lactulose after balloon pump pulled          VAP (ventilator-associated pneumonia)    -Completed total 10 day course of antibiotics 7/24.   -Cultures 7/13 with S. Aureus in sputum, +jesenia blood cultures on 7/17 (probable contaminent). Blood cultures 7/18 are -ve.          History of stroke    -On coumadin prior to admit  -Hold off on systemic anticoagulation for now.        Encounter for management of intra-aortic balloon pump    -IABP placed 7/10/2018, pulling today  -Daily CXR        Respiratory failure requiring intubation    -Emergently intubated 7/10/18 for impending respiratory failure/need for IABP and inability to lay fat  -History of pulmonary sarcoidosis  -Methylpred given 7/10/18, hydrocortisone 100 mg q8 started 7/11/18. Transitioned to PO prednisone 7/16.   -Pulmonary consult for assistance with sarcoid & vent management. We  are now at the point of considering trach if he tolerates pulling balloon pump. Family to meet with Palliative Care today. Daughter Kasandra claims to be HCPOA, and is to bring paperwork today. If she is unable to produce paperwork, need to call pt's wife to ensure that she would like to give decision making authority to daughter  -CXR daily  -CXR today continues to show pulmonary edema        Cardiogenic shock    - IABP placed emergently 7/10/18. Plan to pull today  - Daily CXR - today shows persistent pulmonary edema  - CVP 8, but net +ve > 1.7L past 24 hours. Continue Lasix at 20 mg/hr and give one dose IV Diuril 250 mg  .        Ventricular fibrillation    -See VT        Hypotension    - Levophed started during code 7/10/18  - Wean levo for MAP <60 (using IABP mean)        Acute on chronic combined systolic and diastolic CHF, NYHA class 4    - See cardiogenic shock        GI bleed    - INR subtherapeutic  - Coumadin on hold anticoagulation has been on hold in setting of recent GI bleed. Has been receiving DVT PPx  - Protonix increased to 40 mg BID (changed to IV)  - Had screening colonoscopy with hot snare polypectomy during last admission; most likely culprit post polypectomy bleeding  - Hgb 7.2 after getting one unit of PC's yesterday        History of atrial fibrillation    -  continue amio         Abnormal involuntary movements    - Continue keppra  - possible seizure activity noted on 7/12/18 (twitching right face and UEs lasting 5 min). Some Bilateral UE twitching which did not appear to be seizure like occurred on 7/18, resolved with increased sedation. If noted again will reach out to neuro.   - neuro consulted and increased keppra to 1000 IV 12   - continuous EEG read and negative for seizure activity   - Keppra changed to PO        CAD (coronary artery disease)    - Continue atorvastatin, nitro prn  - ASA restarted        Sarcoidosis of lung    - Continue prednisone, breo-ellipta, and albuterol prn.   -  Wean pred once condition improves          Uninterrupted Critical Care/Counseling Time (not including procedures): 60 minutes      Lazara Mcleod [place] [id number]  Heart Transplant  [facility]

## 2018-07-25 NOTE — ASSESSMENT & PLAN NOTE
- Last bowel movement 7/21. Tolerating tube feeds at goal rate with minimal residual. Will continue bowel program. Likely give Lactulose after balloon pump pulled

## 2018-07-26 PROBLEM — Z45.09 ENCOUNTER FOR MANAGEMENT OF INTRA-AORTIC BALLOON PUMP: Status: RESOLVED | Noted: 2018-01-01 | Resolved: 2018-01-01

## 2018-07-26 PROBLEM — I47.29 NSVT (NONSUSTAINED VENTRICULAR TACHYCARDIA): Status: ACTIVE | Noted: 2018-01-01

## 2018-07-26 NOTE — PROGRESS NOTES
[facility]  Pulmonology  Progress Note    Patient Name: Yonathan Good Jr.  MRN: [id number]  Admission Date: 7/7/2018  Hospital Length of Stay: 19 days  Code Status: Full Code  Attending Provider: Mohan Cross MD  Primary Care Provider: Edgar Gates MD   Principal Problem: Ventricular tachycardia, incessant    Subjective:     Interval History: IABP removed yesterday. No acute events overnight. Patient seen and examined this morning with family at bedside. Remains intubated and on ventilator. Propofol and fentanyl drips running for sedation and analgesia respectively.     Objective:     Vital Signs (Most Recent):  Temp: 99.6 °F (37.6 °C) (07/26/18 1100)  Pulse: 82 (07/26/18 1307)  Resp: 15 (07/26/18 1307)  BP: (!) 92/54 (07/26/18 1300)  SpO2: (!) 93 % (07/26/18 1307) Vital Signs (24h Range):  Temp:  [98 °F (36.7 °C)-99.6 °F (37.6 °C)] 99.6 °F (37.6 °C)  Pulse:  [80-95] 82  Resp:  [13-27] 15  SpO2:  [93 %-100 %] 93 %  BP: ()/(51-68) 92/54     Weight: 107 kg (235 lb 14.3 oz)  Body mass index is 32.9 kg/m².      Intake/Output Summary (Last 24 hours) at 07/26/18 1404  Last data filed at 07/26/18 1300   Gross per 24 hour   Intake             3136 ml   Output             6500 ml   Net            -3364 ml       Physical Exam   Constitutional: He appears well-developed.   HENT:   Head: Normocephalic and atraumatic.   Mouth/Throat: Oropharynx is clear and moist.   Eyes: Pupils are equal, round, and reactive to light.   Cardiovascular: Normal rate, regular rhythm and intact distal pulses.    Heart sounds faint.    Pulmonary/Chest: No respiratory distress. He has no wheezes.   Intubated and on ventilator. Breath sounds coarse and with rhonchi bilaterally, unchanged from prior day.    Abdominal: Soft.   Bowel sounds absent     Musculoskeletal: He exhibits edema (2+ pitting).   Neurological:   Sedated on propofol     Skin: Skin is warm and dry.       Vents:  Vent Mode: A/C (07/26/18 1307)  Set Rate: 16  bmp (07/26/18 1307)  Vt Set: 450 mL (07/26/18 1307)  PEEP/CPAP: 5 cmH20 (07/26/18 1307)  Oxygen Concentration (%): 51 (07/26/18 1307)  Peak Airway Pressure: 33 cmH2O (07/26/18 1307)  Plateau Pressure: 28 cmH20 (07/26/18 1307)  Total Ve: 8.18 mL (07/26/18 1307)  F/VT Ratio<105 (RSBI): (!) 30.12 (07/26/18 1307)    Lines/Drains/Airways     Central Venous Catheter Line                 Percutaneous Central Line Insertion/Assessment - triple lumen  07/19/18 1916 right internal jugular 6 days          Drain                 NG/OG Tube 07/10/18 1557 [place] sump 16 Fr. Left nostril 15 days         Urethral Catheter 07/20/18 1030 [place] 16 Fr. 6 days          Airway                 Airway - Non-Surgical 07/10/18 1523 Endotracheal Tube 15 days          Peripheral Intravenous Line                 Midline Catheter Insertion/Assessment  - Single Lumen 07/17/18 1105 Right cephalic vein (lateral side of arm) 18g x 8cm 9 days         Peripheral IV - Single Lumen 07/22/18 0830 Left Wrist 4 days                Significant Labs:    CBC/Anemia Profile:    Recent Labs  Lab 07/25/18  0309 07/26/18  0330   WBC 12.03 13.33*   HGB 7.2* 7.8*   HCT 24.0* 25.8*    237   MCV 95 93   RDW 16.5* 16.4*        Chemistries:    Recent Labs  Lab 07/25/18  0309 07/25/18 2000 07/26/18  0330 07/26/18  0748     144  < > 141 144 142   K 4.2  4.2  < > 4.0 3.3* 3.9     101  < > 97 96 98   CO2 31*  31*  < > 33* 35* 33*   BUN 83*  83*  < > 94* 89* 90*   CREATININE 1.5*  1.5*  < > 1.6* 1.5* 1.4   CALCIUM 9.6  9.6  < > 9.4 9.8 9.9   ALBUMIN 1.9*  --   --  2.1*  --    PROT 6.5  --   --  6.8  --    BILITOT 0.6  --   --  0.5  --    ALKPHOS 63  --   --  65  --    ALT 5*  --   --  6*  --    AST 16  --   --  17  --    MG 2.2  --   --  2.4  --    < > = values in this interval not displayed.    Recent Lab Results       07/26/18  1232 07/26/18  0751 07/26/18  0748 07/26/18  0729 07/26/18  0716      Immature Granulocytes          Immature Grans  (Abs)          Albumin          Alkaline Phosphatase          Allens Test  Pass        ALT          Anion Gap   11       Aniso          Appearance, UA    Cloudy(A)      AST          Bacteria, UA    Many(A)      BANDS          Basophil%          Bilirubin (UA)    Negative      Total Bilirubin          Site  RR        BUN, Bld   90(H)       Calcium   9.9       Chloride   98       CO2   33(H)       Color, UA    Yellow      Creatinine   1.4       DelSys  Adult Vent        Differential Method          eGFR if    >60.0       eGFR if non    56.2  Comment:  Calculation used to obtain the estimated glomerular filtration  rate (eGFR) is the CKD-EPI equation.   (A)       Eosinophil%          FiO2  50        Large/Giant Platelets          Glucose   178(H)       Glucose, UA    Negative      Gran%          Hematocrit          Hemoglobin          Hypo          Coumadin Monitoring INR          Ketones, UA    Negative      Leukocytes, UA    2+(A)      Lymph%          Magnesium          MCH          MCHC          MCV          Metamyelocytes          Microscopic Comment    SEE COMMENT  Comment:  Other formed elements not mentioned in the report are not   present in the microscopic examination.         Min Vol  7.4        Mode  AC/PRVC        Mono%          MPV          Nitrite, UA    Negative      nRBC          Occult Blood UA    Trace(A)      PEEP  5        pH, UA    5.0      PiP  47        Platelet Estimate          Platelets          POC BE  17        POC HCO3  41.1(H)        POC PCO2  63.8(HH)        POC PH  7.416        POC PO2  82        POC SATURATED O2  96        POC TCO2  43(H)        POCT Glucose 211(H)    180(H)     Poly          Potassium   3.9       Total Protein          Protein, UA    Trace  Comment:  Recommend a 24 hour urine protein or a urine   protein/creatinine ratio if globulin induced proteinuria is  clinically suspected.  (A)      Protime          Rate  16        RBC          RBC,  UA    3      RDW          Sample  ARTERIAL        Sodium   142       Sp02  93        Specific [place], UA    1.010      Specimen UA    Urine, Catheterized      Squam Epithel, UA    1      Urobilinogen, UA    Negative      Vt  450        WBC, UA    38(H)      WBC          Yeast, UA    Many(A)                  07/26/18  0330 07/26/18  0325 07/26/18  0023 07/25/18 2000 07/25/18 2000      Immature Granulocytes CANCELED  Comment:  Result canceled by the ancillary         Immature Grans (Abs) CANCELED  Comment:  Mild elevation in immature granulocytes is non specific and   can be seen in a variety of conditions including stress response,   acute inflammation, trauma and pregnancy. Correlation with other   laboratory and clinical findings is essential.    Result canceled by the ancillary           Albumin 2.1(L)         Alkaline Phosphatase 65         Allens Test  N/A        ALT 6(L)         Anion Gap 13    11     Aniso Slight         Appearance, UA          AST 17         Bacteria, UA          BANDS 2.0         Basophil% 0.0         Bilirubin (UA)          Total Bilirubin 0.5  Comment:  For infants and newborns, interpretation of results should be based  on gestational age, weight and in agreement with clinical  observations.  Premature Infant recommended reference ranges:  Up to 24 hours.............<8.0 mg/dL  Up to 48 hours............<12.0 mg/dL  3-5 days..................<15.0 mg/dL  6-29 days.................<15.0 mg/dL           Site  Other        BUN, Bld 89(H)    94(H)     Calcium 9.8    9.4     Chloride 96    97     CO2 35(H)    33(H)     Color, UA          Creatinine 1.5(H)    1.6(H)     DelSys          Differential Method Manual         eGFR if  59.7(A)    55.2(A)     eGFR if non  51.7  Comment:  Calculation used to obtain the estimated glomerular filtration  rate (eGFR) is the CKD-EPI equation.   (A)    47.8  Comment:  Calculation used to obtain the estimated glomerular  filtration  rate (eGFR) is the CKD-EPI equation.   (A)     Eosinophil% 1.0         FiO2          Large/Giant Platelets Present         Glucose 175(H)    201(H)     Glucose, UA          Gran% 83.0(H)         Hematocrit 25.8(L)         Hemoglobin 7.8(L)         Hypo Occasional         Coumadin Monitoring INR 1.0  Comment:  Coumadin Therapy:  2.0 - 3.0 for INR for all indicators except mechanical heart valves  and antiphospholipid syndromes which should use 2.5 - 3.5.           Ketones, UA          Leukocytes, UA          Lymph% 7.0(L)         Magnesium 2.4         MCH 28.2         MCHC 30.2(L)         MCV 93         Metamyelocytes 2.0         Microscopic Comment          Min Vol          Mode          Mono% 5.0         MPV 12.2         Nitrite, UA          nRBC 1(A)         Occult Blood UA          PEEP          pH, UA          PiP          Platelet Estimate Appears normal         Platelets 237         POC BE  17        POC HCO3  41.2(H)        POC PCO2  60.2(H)        POC PH  7.443        POC PO2  40        POC SATURATED O2  75(L)        POC TCO2  43(H)        POCT Glucose   193(H) 216(H)      Poly Occasional         Potassium 3.3(L)    4.0     Total Protein 6.8         Protein, UA          Protime 10.8         Rate          RBC 2.77(L)         RBC, UA          RDW 16.4(H)         Sample  VENOUS        Sodium 144    141     Sp02          Specific Gravity, UA          Specimen UA          Squam Epithel, UA          Urobilinogen, UA          Vt          WBC, UA          WBC 13.33(H)         Yeast, UA                      07/25/18  1520 07/25/18  1519 07/25/18  1516      Immature Granulocytes        Immature Grans (Abs)        Albumin        Alkaline Phosphatase        Allens Test        ALT        Anion Gap 13       Aniso        Appearance, UA        AST        Bacteria, UA        BANDS        Basophil%        Bilirubin (UA)        Total Bilirubin        Site        BUN, Bld 88(H)       Calcium 9.8       Chloride 97        CO2 31(H)       Color, UA        Creatinine 1.6(H)       DelSys        Differential Method        eGFR if  55.2(A)       eGFR if non  47.8  Comment:  Calculation used to obtain the estimated glomerular filtration  rate (eGFR) is the CKD-EPI equation.   (A)       Eosinophil%        FiO2        Large/Giant Platelets        Glucose 280(H)       Glucose, UA        Gran%        Hematocrit        Hemoglobin        Hypo        Coumadin Monitoring INR        Ketones, UA        Leukocytes, UA        Lymph%        Magnesium        MCH        MCHC        MCV        Metamyelocytes        Microscopic Comment        Min Vol        Mode        Mono%        MPV        Nitrite, UA        nRBC        Occult Blood UA        PEEP        pH, UA        PiP        Platelet Estimate        Platelets        POC BE        POC HCO3        POC PCO2        POC PH        POC PO2        POC SATURATED O2        POC TCO2        POCT Glucose  251(H) 263(H)     Poly        Potassium 4.1       Total Protein        Protein, UA        Protime        Rate        RBC        RBC, UA        RDW        Sample        Sodium 141       Sp02        Specific Gravity, UA        Specimen UA        Squam Epithel, UA        Urobilinogen, UA        Vt        WBC, UA        WBC        Yeast, UA            All pertinent labs within the past 24 hours have been reviewed.    Significant Imaging:  I have reviewed all pertinent imaging results/findings within the past 24 hours.  I have reviewed and interpreted all pertinent imaging results/findings within the past 24 hours.    Assessment/Plan:     Respiratory failure requiring intubation    · Continue diuresis per primary team  · No substantial improvement on CXR comapred to prior day   · Received abx therapy for  ET aspirate culture positive for MSSA staph. Last dose of cefepime and vancomycin on 7/20 .  · Continue current respiratory therapies   · Vent settings: RR 16,  mL (6cc/kg),  PEEP 5, FiO2 50%   ·  Cardiac insufficiencies likely to be limiting factor in patient's potential recovery.         · Discussed with primary team and in agreement to attempt to ween fentanyl off today. Patient apparently went into ventricular tachycardia on previous attempts to ween sedation. Unable to extubate or perform SBT if patient cannot be given sedation holiday.   · ETT day 16 - recommend ENT consult for tracheostomy.   · Still need further clarification regarding medical POA.     Recent Labs  Lab 07/26/18  0751   PH 7.416   PCO2 63.8*   PO2 82   HCO3 41.1*   POCSATURATED 96   BE 17 ·           Sarcoidosis of lung    · Sarcoid appears to have minimal contribution to current condition.   · Continue steroids as prescribed and taper once condition improves.          Plan discussed with attending Dr. Mckenzie, further recommendations as per attending addendum. Please feel free to call with any questions or concerns.    Ruben Bermudez MD  Resident Physician, PGY1     Ruben Bermudez MD  Pulmonology  [facility]

## 2018-07-26 NOTE — SUBJECTIVE & OBJECTIVE
Interval History: patient seen and examined for episodes of very brief, nonsustained episodes of MMVT. Patient remains sedated and intubated.    Review of Systems   Unable to perform ROS: intubated     Objective:     Vital Signs (Most Recent):  Temp: 100.1 °F (37.8 °C) (07/26/18 1500)  Pulse: 83 (07/26/18 1740)  Resp: 20 (07/26/18 1740)  BP: 102/61 (07/26/18 1700)  SpO2: 96 % (07/26/18 1740) Vital Signs (24h Range):  Temp:  [98 °F (36.7 °C)-100.1 °F (37.8 °C)] 100.1 °F (37.8 °C)  Pulse:  [80-95] 83  Resp:  [8-24] 20  SpO2:  [93 %-100 %] 96 %  BP: ()/(50-68) 102/61     Weight: 107 kg (235 lb 14.3 oz)  Body mass index is 32.9 kg/m².     SpO2: 96 %  O2 Device (Oxygen Therapy): ventilator    Physical Exam   Constitutional: He is oriented to person, place, and time. He appears well-developed and well-nourished.   Sedated, intubated   HENT:   Head: Normocephalic and atraumatic.   Eyes: Pupils are equal, round, and reactive to light.   Neck: Normal range of motion. No JVD present.   Cardiovascular: Normal rate, regular rhythm and intact distal pulses.    Pulmonary/Chest: He has no rales.   Mechanical breath sounds   Abdominal: Soft. Bowel sounds are normal. He exhibits no distension. There is no tenderness.   Musculoskeletal: Normal range of motion. He exhibits edema (trace).   Neurological: He is alert and oriented to person, place, and time.   Skin: Skin is dry. No rash noted.   Psychiatric: He has a normal mood and affect. His behavior is normal.       Significant Labs:     Recent Results (from the past 24 hour(s))   Basic metabolic panel    Collection Time: 07/25/18  8:00 PM   Result Value Ref Range    Sodium 141 136 - 145 mmol/L    Potassium 4.0 3.5 - 5.1 mmol/L    Chloride 97 95 - 110 mmol/L    CO2 33 (H) 23 - 29 mmol/L    Glucose 201 (H) 70 - 110 mg/dL    BUN, Bld 94 (H) 6 - 20 mg/dL    Creatinine 1.6 (H) 0.5 - 1.4 mg/dL    Calcium 9.4 8.7 - 10.5 mg/dL    Anion Gap 11 8 - 16 mmol/L    eGFR if African American  55.2 (A) >60 mL/min/1.73 m^2    eGFR if non  47.8 (A) >60 mL/min/1.73 m^2   POCT glucose    Collection Time: 07/25/18  8:00 PM   Result Value Ref Range    POCT Glucose 216 (H) 70 - 110 mg/dL   POCT glucose    Collection Time: 07/26/18 12:23 AM   Result Value Ref Range    POCT Glucose 193 (H) 70 - 110 mg/dL   ISTAT PROCEDURE    Collection Time: 07/26/18  3:25 AM   Result Value Ref Range    POC PH 7.443 7.35 - 7.45    POC PCO2 60.2 (H) 35 - 45 mmHg    POC PO2 40 40 - 60 mmHg    POC HCO3 41.2 (H) 24 - 28 mmol/L    POC BE 17 -2 to 2 mmol/L    POC SATURATED O2 75 (L) 95 - 100 %    POC TCO2 43 (H) 24 - 29 mmol/L    Sample VENOUS     Site Other     Allens Test N/A    Magnesium - if not done in ED    Collection Time: 07/26/18  3:30 AM   Result Value Ref Range    Magnesium 2.4 1.6 - 2.6 mg/dL   Protime-INR    Collection Time: 07/26/18  3:30 AM   Result Value Ref Range    Prothrombin Time 10.8 9.0 - 12.5 sec    INR 1.0 0.8 - 1.2   CBC auto differential    Collection Time: 07/26/18  3:30 AM   Result Value Ref Range    WBC 13.33 (H) 3.90 - 12.70 K/uL    RBC 2.77 (L) 4.60 - 6.20 M/uL    Hemoglobin 7.8 (L) 14.0 - 18.0 g/dL    Hematocrit 25.8 (L) 40.0 - 54.0 %    MCV 93 82 - 98 fL    MCH 28.2 27.0 - 31.0 pg    MCHC 30.2 (L) 32.0 - 36.0 g/dL    RDW 16.4 (H) 11.5 - 14.5 %    Platelets 237 150 - 350 K/uL    MPV 12.2 9.2 - 12.9 fL    Immature Granulocytes CANCELED 0.0 - 0.5 %    Immature Grans (Abs) CANCELED 0.00 - 0.04 K/uL    nRBC 1 (A) 0 /100 WBC    Gran% 83.0 (H) 38.0 - 73.0 %    Lymph% 7.0 (L) 18.0 - 48.0 %    Mono% 5.0 4.0 - 15.0 %    Eosinophil% 1.0 0.0 - 8.0 %    Basophil% 0.0 0.0 - 1.9 %    Bands 2.0 %    Metamyelocytes 2.0 %    Platelet Estimate Appears normal     Aniso Slight     Poly Occasional     Hypo Occasional     Large/Giant Platelets Present     Differential Method Manual    Comprehensive metabolic panel - if not done in ED    Collection Time: 07/26/18  3:30 AM   Result Value Ref Range    Sodium 144  136 - 145 mmol/L    Potassium 3.3 (L) 3.5 - 5.1 mmol/L    Chloride 96 95 - 110 mmol/L    CO2 35 (H) 23 - 29 mmol/L    Glucose 175 (H) 70 - 110 mg/dL    BUN, Bld 89 (H) 6 - 20 mg/dL    Creatinine 1.5 (H) 0.5 - 1.4 mg/dL    Calcium 9.8 8.7 - 10.5 mg/dL    Total Protein 6.8 6.0 - 8.4 g/dL    Albumin 2.1 (L) 3.5 - 5.2 g/dL    Total Bilirubin 0.5 0.1 - 1.0 mg/dL    Alkaline Phosphatase 65 55 - 135 U/L    AST 17 10 - 40 U/L    ALT 6 (L) 10 - 44 U/L    Anion Gap 13 8 - 16 mmol/L    eGFR if African American 59.7 (A) >60 mL/min/1.73 m^2    eGFR if non  51.7 (A) >60 mL/min/1.73 m^2   POCT glucose    Collection Time: 07/26/18  7:16 AM   Result Value Ref Range    POCT Glucose 180 (H) 70 - 110 mg/dL   Blood culture    Collection Time: 07/26/18  7:18 AM   Result Value Ref Range    Blood Culture, Routine No Growth to date    Blood culture    Collection Time: 07/26/18  7:18 AM   Result Value Ref Range    Blood Culture, Routine No Growth to date    Urinalysis    Collection Time: 07/26/18  7:29 AM   Result Value Ref Range    Specimen UA Urine, Catheterized     Color, UA Yellow Yellow, Straw, [place]    Appearance, UA Cloudy (A) Clear    pH, UA 5.0 5.0 - 8.0    Specific Gravity, UA 1.010 1.005 - 1.030    Protein, UA Trace (A) Negative    Glucose, UA Negative Negative    Ketones, UA Negative Negative    Bilirubin (UA) Negative Negative    Occult Blood UA Trace (A) Negative    Nitrite, UA Negative Negative    Urobilinogen, UA Negative <2.0 EU/dL    Leukocytes, UA 2+ (A) Negative   Urinalysis Microscopic    Collection Time: 07/26/18  7:29 AM   Result Value Ref Range    RBC, UA 3 0 - 4 /hpf    WBC, UA 38 (H) 0 - 5 /hpf    Bacteria, UA Many (A) None-Occ /hpf    Yeast, UA Many (A) None    Squam Epithel, UA 1 /hpf    Microscopic Comment SEE COMMENT    Basic metabolic panel    Collection Time: 07/26/18  7:48 AM   Result Value Ref Range    Sodium 142 136 - 145 mmol/L    Potassium 3.9 3.5 - 5.1 mmol/L    Chloride 98 95 - 110  mmol/L    CO2 33 (H) 23 - 29 mmol/L    Glucose 178 (H) 70 - 110 mg/dL    BUN, Bld 90 (H) 6 - 20 mg/dL    Creatinine 1.4 0.5 - 1.4 mg/dL    Calcium 9.9 8.7 - 10.5 mg/dL    Anion Gap 11 8 - 16 mmol/L    eGFR if African American >60.0 >60 mL/min/1.73 m^2    eGFR if non  56.2 (A) >60 mL/min/1.73 m^2   ISTAT PROCEDURE    Collection Time: 07/26/18  7:51 AM   Result Value Ref Range    POC PH 7.416 7.35 - 7.45    POC PCO2 63.8 (HH) 35 - 45 mmHg    POC PO2 82 80 - 100 mmHg    POC HCO3 41.1 (H) 24 - 28 mmol/L    POC BE 17 -2 to 2 mmol/L    POC SATURATED O2 96 95 - 100 %    POC TCO2 43 (H) 23 - 27 mmol/L    Rate 16     Sample ARTERIAL     Site RR     Allens Test Pass     DelSys Adult Vent     Mode AC/PRVC     Vt 450     PEEP 5     PiP 47     FiO2 50     Min Vol 7.4     Sp02 93    POCT glucose    Collection Time: 07/26/18 12:32 PM   Result Value Ref Range    POCT Glucose 211 (H) 70 - 110 mg/dL   POCT glucose    Collection Time: 07/26/18  4:10 PM   Result Value Ref Range    POCT Glucose 250 (H) 70 - 110 mg/dL

## 2018-07-26 NOTE — ASSESSMENT & PLAN NOTE
Patient with very brief, infrequent runs of non-sustained MMVT in the setting of profound heart failure and critical illness  Patient is currently receiving amiodarone 0.5 mg/min, mexitil 150 mg TID and metoprolol 2.5 mg Q4h  The patient initially had his base rate increased to 80 (V paced currently at 80) following his initial VT storm  It is reasonable to continue IV amiodarone (as there was concern for non-absorption of PO amiodarone), in addition to mexitil and metoprolol

## 2018-07-26 NOTE — ASSESSMENT & PLAN NOTE
· Continue diuresis per primary team  · No substantial improvement on CXR comapred to prior day   · Received abx therapy for  ET aspirate culture positive for MSSA staph. Last dose of cefepime and vancomycin on 7/20 .  · Continue current respiratory therapies   · Vent settings: RR 16,  mL (6cc/kg), PEEP 5, FiO2 50%   ·  Cardiac insufficiencies likely to be limiting factor in patient's potential recovery.         · Discussed with primary team and in agreement to attempt to ween fentanyl off today. Patient apparently went into ventricular tachycardia on previous attempts to ween sedation. Unable to extubate or perform SBT if patient cannot be given sedation holiday.   · ETT day 16 - recommend ENT consult for tracheostomy.   · Still need further clarification regarding medical POA.     Recent Labs  Lab 07/26/18  0751   PH 7.416   PCO2 63.8*   PO2 82   HCO3 41.1*   POCSATURATED 96   BE 17   ·

## 2018-07-26 NOTE — ASSESSMENT & PLAN NOTE
-VT storm 7/10/18. Polymorphic and monomorphic. Degenerated into VF. 12 ICD shocks  -Intubated, IABP placed emergently at bedside 7/10. IABP removed 7/25 - NSVT seen on tele. Discussed further AA therapy with EP who will see him again today  -VT storm again on 7/17/18 and reloaded with amio (did not seem to absorb oral Amio). Continue metoprolol, mexitil, amio gtt for now.   -K goal >4.5, Mg >2.5  - Per EP-At a later time, it may be considered to deactivate the LV lead of the patient's CRT-D, considering the patient's LVAD status and native RBBB. ICD interrogation reveals LICHA. EP signed off, requests that we let them know if/when gen change needed

## 2018-07-26 NOTE — ASSESSMENT & PLAN NOTE
- INR subtherapeutic  - Coumadin on hold anticoagulation has been on hold in setting of recent GI bleed. Has been receiving DVT PPx  - Protonix increased to 40 mg BID (changed to IV)  - Had screening colonoscopy with hot snare polypectomy during last admission; most likely culprit post polypectomy bleeding  - Hgb trending up at 7.8 (got 1 unit PC's 7/24)

## 2018-07-26 NOTE — ASSESSMENT & PLAN NOTE
-Emergently intubated 7/10/18 for impending respiratory failure/need for IABP and inability to lay fat  -History of pulmonary sarcoidosis  -Methylpred given 7/10/18, hydrocortisone 100 mg q8 started 7/11/18. Transitioned to PO prednisone 7/16.   -Pulmonary consult for assistance with sarcoid & vent management. We are now at the point of considering trach. Family to meet with Palliative Care today. Daughter Makesha claims to be HCPOA, and is to bring paperwork today. If she is unable to produce paperwork, need to call pt's wife to ensure that she would like to give decision making authority to daughter  -CXR daily  -CXR today continues to show pulmonary edema

## 2018-07-26 NOTE — PLAN OF CARE
Problem: Patient Care Overview  Goal: Plan of Care Review  Pt VSS at this time. HR 80 bpm permanent pacer, BP sys >100, O2 96 on AC 50% FiO2 and PEEP 5, afebrile. Family member at bedside, updated on current condition and POC for pm shift. All questions answered and emotional support provided. Pt assisted with weight shift, and encouraged to cough/deep breathe. Remains free of falls and injury for am shift. MD updated on current condition, vitals, labs, and gtts. No new orders received, will continue to monitor.

## 2018-07-26 NOTE — ASSESSMENT & PLAN NOTE
- IABP placed emergently 7/10/18. Pulled 7/25  - Daily CXR - today shows persistent pulmonary edema  - CVP 9 and net -ve 2.7L past 24 hours after getting one dose IV Diuril. Continue Lasix at 20 mg/hr and start IV Diuril 250 mg bid  .

## 2018-07-26 NOTE — SUBJECTIVE & OBJECTIVE
Interval History: Remains intubated and sedated. sVO2 75% with CVP 9 since IABP pulled yesterday, but he did have some NSVT overnight. Family at bedside wants to proceed with trach, PEG tube if necessary. Still not clear who has HCPOA. No bowel movement since Saturday.    Continuous Infusions:   amiodarone in dextrose 5% 0.5 mg/min (07/26/18 0900)    fentanyl 12.5 mL/hr at 07/26/18 0900    furosemide (LASIX) 2 mg/mL infusion (non-titrating) 20 mg/hr (07/26/18 0700)    norepinephrine bitartrate-D5W 0.04 mcg/kg/min (07/26/18 0900)    propofol 50 mcg/kg/min (07/26/18 0900)     Scheduled Meds:   albuterol sulfate  2.5 mg Nebulization Q4H    aspirin  81 mg Oral Daily    atorvastatin  40 mg Oral Daily    budesonide  0.5 mg Nebulization Q12H    chlorhexidine  15 mL Mouth/Throat BID    chlorhexidine  15 mL Mouth/Throat BID    docusate  100 mg Oral Daily    fluticasone-vilanterol  1 puff Inhalation Daily    gabapentin  600 mg Oral BID    heparin (porcine)  5,000 Units Subcutaneous Q8H    levETIRAcetam  500 mg Oral BID    metoprolol  2.5 mg Intravenous Q4H    mexiletine  150 mg Oral Q8H    pantoprozole (PROTONIX) 40 mg/100 mL D5W IVPB  40 mg Intravenous BID    predniSONE  30 mg Oral Daily    sennosides 8.8 mg/5 ml  5 mL Oral QHS    sodium chloride 0.9%  3 mL Intravenous Q8H     PRN Meds:sodium chloride, sodium chloride, ALPRAZolam, benzonatate, calcium gluconate IVPB, calcium gluconate IVPB, calcium gluconate IVPB, carisoprodol, dextrose 50%, glucagon (human recombinant), HYDROcodone-acetaminophen, insulin aspart U-100, magnesium sulfate IVPB, magnesium sulfate IVPB, nitroGLYCERIN, potassium chloride in water **AND** potassium chloride in water **AND** potassium chloride in water, sodium phosphate IVPB, sodium phosphate IVPB, sodium phosphate IVPB    Review of patient's allergies indicates:  No Known Allergies  Objective:     Vital Signs (Most Recent):  Temp: 99 °F (37.2 °C) (07/26/18 0700)  Pulse: 80  (07/26/18 0903)  Resp: 19 (07/26/18 0903)  BP: (!) 94/59 (07/26/18 0903)  SpO2: 96 % (07/26/18 0903) Vital Signs (24h Range):  Temp:  [98 °F (36.7 °C)-99.1 °F (37.3 °C)] 99 °F (37.2 °C)  Pulse:  [80-95] 80  Resp:  [3-27] 19  SpO2:  [93 %-100 %] 96 %  BP: ()/(51-68) 94/59     Patient Vitals for the past 72 hrs (Last 3 readings):   Weight   07/25/18 0314 107 kg (235 lb 14.3 oz)   07/24/18 0300 105.8 kg (233 lb 4 oz)     Body mass index is 32.9 kg/m².      Intake/Output Summary (Last 24 hours) at 07/26/18 0959  Last data filed at 07/26/18 0900   Gross per 24 hour   Intake             3161 ml   Output             6570 ml   Net            -3409 ml       Hemodynamic Parameters:       Telemetry: BiV paced, NSVT    Physical Exam   Constitutional: He appears well-developed and well-nourished.   HENT:   Head: Normocephalic and atraumatic.   Eyes: Conjunctivae and EOM are normal. Pupils are equal, round, and reactive to light.   Neck: Neck supple. No thyromegaly present.   RIJ line   Cardiovascular: Normal rate and regular rhythm.    Doppler left pedal pulse, palpable right pedal pulse   Pulmonary/Chest:   Intubated and ventilated. Bibasilar crackles   Abdominal: He exhibits distension.   No bowel sounds appreciated   Musculoskeletal:   2-3+ KAUSHIK   Neurological:   Intubated and sedated   Skin: Skin is warm and dry. Capillary refill takes 2 to 3 seconds.       Significant Labs:  CBC:    Recent Labs  Lab 07/24/18  0345 07/25/18  0309 07/26/18  0330   WBC 10.91 12.03 13.33*   RBC 2.45* 2.54* 2.77*   HGB 6.8* 7.2* 7.8*   HCT 23.3* 24.0* 25.8*    197 237   MCV 95 95 93   MCH 27.8 28.3 28.2   MCHC 29.2* 30.0* 30.2*     BNP:  No results for input(s): BNP in the last 168 hours.    Invalid input(s): BNPTRIAGELBLO  CMP:    Recent Labs  Lab 07/24/18  0345  07/25/18  0309  07/25/18 2000 07/26/18  0330 07/26/18  0748   *  < > 149*  149*  < > 201* 175* 178*   CALCIUM 9.3  < > 9.6  9.6  < > 9.4 9.8 9.9   ALBUMIN 1.9*   --  1.9*  --   --  2.1*  --    PROT 6.6  --  6.5  --   --  6.8  --      < > 144  144  < > 141 144 142   K 3.9  < > 4.2  4.2  < > 4.0 3.3* 3.9   CO2 34*  < > 31*  31*  < > 33* 35* 33*   CL 99  < > 101  101  < > 97 96 98   BUN 65*  < > 83*  83*  < > 94* 89* 90*   CREATININE 1.3  < > 1.5*  1.5*  < > 1.6* 1.5* 1.4   ALKPHOS 60  --  63  --   --  65  --    ALT 7*  --  5*  --   --  6*  --    AST 13  --  16  --   --  17  --    BILITOT 0.3  --  0.6  --   --  0.5  --    < > = values in this interval not displayed.   Coagulation:     Recent Labs  Lab 07/24/18  0345 07/25/18  0309 07/26/18  0330   INR 1.0 1.0 1.0     LDH:  No results for input(s): LDH in the last 72 hours.  Microbiology:  Microbiology Results (last 7 days)     Procedure Component Value Units Date/Time    Urine culture [[id number]] Collected:  07/26/18 0729    Order Status:  Sent Specimen:  Urine from Urine, Catheterized Updated:  07/26/18 0913    Blood culture [[id number]] Collected:  07/26/18 0718    Order Status:  Sent Specimen:  Blood from Peripheral, Upper Arm, Left Updated:  07/26/18 0823    Blood culture [[id number]] Collected:  07/26/18 0718    Order Status:  Sent Specimen:  Blood from Peripheral, Antecubital, Left Updated:  07/26/18 0821    Blood culture [[id number]] Collected:  07/18/18 1803    Order Status:  Completed Specimen:  Blood from Peripheral, Forearm, Right Updated:  07/23/18 2212     Blood Culture, Routine No growth after 5 days.    Blood culture [[id number]] Collected:  07/18/18 1803    Order Status:  Completed Specimen:  Blood from Peripheral, Antecubital, Left Updated:  07/23/18 2212     Blood Culture, Routine No growth after 5 days.    Blood culture [[id number]] Collected:  07/17/18 0930    Order Status:  Completed Specimen:  Blood from Peripheral, Antecubital, Right Updated:  07/22/18 1212     Blood Culture, Routine No growth after 5 days.    IV catheter culture [[id number]] Collected:  07/19/18 1851    Order Status:  Completed  Specimen:  Catheter Tip from Catheter Tip, Intrajugular Updated:  07/21/18 1344     Aerobic Culture - Cath tip --     STAPHYLOCOCCUS EPIDERMIDIS  < 15 colonies      Narrative:       Culture [facility]    Blood culture [[id number]] Collected:  07/17/18 0950    Order Status:  Completed Specimen:  Blood from Peripheral, Antecubital, Right Updated:  07/20/18 1048     Blood Culture, Routine Gram stain aer bottle: Gram positive cocci in clusters resembling Staph     Blood Culture, Routine Results called to and read back by:Javon Hatfield RN 07/18/2018  17:50     Blood Culture, Routine --     COAGULASE-NEGATIVE STAPHYLOCOCCUS SPECIES  Organism is a probable contaminant      Fungus culture [[id number]] Collected:  07/17/18 1154    Order Status:  Completed Specimen:  Respiratory from Sputum Updated:  07/19/18 1054     Fungus (Mycology) Culture Culture in progress          I have reviewed all pertinent labs within the past 24 hours.    Estimated Creatinine Clearance: 74.2 mL/min (based on SCr of 1.4 mg/dL).    Diagnostic Results:  I have reviewed all pertinent imaging results/findings within the past 24 hours.

## 2018-07-26 NOTE — ASSESSMENT & PLAN NOTE
- Last bowel movement 7/21. Tolerating tube feeds at goal rate with minimal residual. Will continue bowel program and give Lactulose today

## 2018-07-26 NOTE — SUBJECTIVE & OBJECTIVE
Interval History: IABP removed yesterday. No acute events overnight. Patient seen and examined this morning with family at bedside. Remains intubated and on ventilator. Propofol and fentanyl drips running for sedation and analgesia respectively.     Objective:     Vital Signs (Most Recent):  Temp: 99.6 °F (37.6 °C) (07/26/18 1100)  Pulse: 82 (07/26/18 1307)  Resp: 15 (07/26/18 1307)  BP: (!) 92/54 (07/26/18 1300)  SpO2: (!) 93 % (07/26/18 1307) Vital Signs (24h Range):  Temp:  [98 °F (36.7 °C)-99.6 °F (37.6 °C)] 99.6 °F (37.6 °C)  Pulse:  [80-95] 82  Resp:  [13-27] 15  SpO2:  [93 %-100 %] 93 %  BP: ()/(51-68) 92/54     Weight: 107 kg (235 lb 14.3 oz)  Body mass index is 32.9 kg/m².      Intake/Output Summary (Last 24 hours) at 07/26/18 1404  Last data filed at 07/26/18 1300   Gross per 24 hour   Intake             3136 ml   Output             6500 ml   Net            -3364 ml       Physical Exam   Constitutional: He appears well-developed.   HENT:   Head: Normocephalic and atraumatic.   Mouth/Throat: Oropharynx is clear and moist.   Eyes: Pupils are equal, round, and reactive to light.   Cardiovascular: Normal rate, regular rhythm and intact distal pulses.    Heart sounds faint.    Pulmonary/Chest: No respiratory distress. He has no wheezes.   Intubated and on ventilator. Breath sounds coarse and with rhonchi bilaterally, unchanged from prior day.    Abdominal: Soft.   Bowel sounds absent     Musculoskeletal: He exhibits edema (2+ pitting).   Neurological:   Sedated on propofol     Skin: Skin is warm and dry.       Vents:  Vent Mode: A/C (07/26/18 1307)  Set Rate: 16 bmp (07/26/18 1307)  Vt Set: 450 mL (07/26/18 1307)  PEEP/CPAP: 5 cmH20 (07/26/18 1307)  Oxygen Concentration (%): 51 (07/26/18 1307)  Peak Airway Pressure: 33 cmH2O (07/26/18 1307)  Plateau Pressure: 28 cmH20 (07/26/18 1307)  Total Ve: 8.18 mL (07/26/18 1307)  F/VT Ratio<105 (RSBI): (!) 30.12 (07/26/18 1307)    Lines/Drains/Airways     Central  Venous Catheter Line                 Percutaneous Central Line Insertion/Assessment - triple lumen  07/19/18 1916 right internal jugular 6 days          Drain                 NG/OG Tube 07/10/18 1557 [place] sump 16 Fr. Left nostril 15 days         Urethral Catheter 07/20/18 1030 Latex 16 Fr. 6 days          Airway                 Airway - Non-Surgical 07/10/18 1523 Endotracheal Tube 15 days          Peripheral Intravenous Line                 Midline Catheter Insertion/Assessment  - Single Lumen 07/17/18 1105 Right cephalic vein (lateral side of arm) 18g x 8cm 9 days         Peripheral IV - Single Lumen 07/22/18 0830 Left Wrist 4 days                Significant Labs:    CBC/Anemia Profile:    Recent Labs  Lab 07/25/18  0309 07/26/18  0330   WBC 12.03 13.33*   HGB 7.2* 7.8*   HCT 24.0* 25.8*    237   MCV 95 93   RDW 16.5* 16.4*        Chemistries:    Recent Labs  Lab 07/25/18  0309  07/25/18 2000 07/26/18  0330 07/26/18  0748     144  < > 141 144 142   K 4.2  4.2  < > 4.0 3.3* 3.9     101  < > 97 96 98   CO2 31*  31*  < > 33* 35* 33*   BUN 83*  83*  < > 94* 89* 90*   CREATININE 1.5*  1.5*  < > 1.6* 1.5* 1.4   CALCIUM 9.6  9.6  < > 9.4 9.8 9.9   ALBUMIN 1.9*  --   --  2.1*  --    PROT 6.5  --   --  6.8  --    BILITOT 0.6  --   --  0.5  --    ALKPHOS 63  --   --  65  --    ALT 5*  --   --  6*  --    AST 16  --   --  17  --    MG 2.2  --   --  2.4  --    < > = values in this interval not displayed.    Recent Lab Results       07/26/18  1232 07/26/18  0751 07/26/18  0748 07/26/18  0729 07/26/18  0716      Immature Granulocytes          Immature Grans (Abs)          Albumin          Alkaline Phosphatase          Allens Test  Pass        ALT          Anion Gap   11       Aniso          Appearance, UA    Cloudy(A)      AST          Bacteria, UA    Many(A)      BANDS          Basophil%          Bilirubin (UA)    Negative      Total Bilirubin          Site  RR        BUN, Bld   90(H)       Calcium    9.9       Chloride   98       CO2   33(H)       Color, UA    Yellow      Creatinine   1.4       DelSys  Adult Vent        Differential Method          eGFR if    >60.0       eGFR if non    56.2  Comment:  Calculation used to obtain the estimated glomerular filtration  rate (eGFR) is the CKD-EPI equation.   (A)       Eosinophil%          FiO2  50        Large/Giant Platelets          Glucose   178(H)       Glucose, UA    Negative      Gran%          Hematocrit          Hemoglobin          Hypo          Coumadin Monitoring INR          Ketones, UA    Negative      Leukocytes, UA    2+(A)      Lymph%          Magnesium          MCH          MCHC          MCV          Metamyelocytes          Microscopic Comment    SEE COMMENT  Comment:  Other formed elements not mentioned in the report are not   present in the microscopic examination.         Min Vol  7.4        Mode  AC/PRVC        Mono%          MPV          Nitrite, UA    Negative      nRBC          Occult Blood UA    Trace(A)      PEEP  5        pH, UA    5.0      PiP  47        Platelet Estimate          Platelets          POC BE  17        POC HCO3  41.1(H)        POC PCO2  63.8(HH)        POC PH  7.416        POC PO2  82        POC SATURATED O2  96        POC TCO2  43(H)        POCT Glucose 211(H)    180(H)     Poly          Potassium   3.9       Total Protein          Protein, UA    Trace  Comment:  Recommend a 24 hour urine protein or a urine   protein/creatinine ratio if globulin induced proteinuria is  clinically suspected.  (A)      Protime          Rate  16        RBC          RBC, UA    3      RDW          Sample  ARTERIAL        Sodium   142       Sp02  93        Specific [place], UA    1.010      Specimen UA    Urine, Catheterized      Squam Epithel, UA    1      Urobilinogen, UA    Negative      Vt  450        WBC, UA    38(H)      WBC          Yeast, UA    Many(A)                  07/26/18  0330 07/26/18  0325  07/26/18  0023 07/25/18 2000 07/25/18 2000      Immature Granulocytes CANCELED  Comment:  Result canceled by the ancillary         Immature Grans (Abs) CANCELED  Comment:  Mild elevation in immature granulocytes is non specific and   can be seen in a variety of conditions including stress response,   acute inflammation, trauma and pregnancy. Correlation with other   laboratory and clinical findings is essential.    Result canceled by the ancillary           Albumin 2.1(L)         Alkaline Phosphatase 65         Allens Test  N/A        ALT 6(L)         Anion Gap 13    11     Aniso Slight         Appearance, UA          AST 17         Bacteria, UA          BANDS 2.0         Basophil% 0.0         Bilirubin (UA)          Total Bilirubin 0.5  Comment:  For infants and newborns, interpretation of results should be based  on gestational age, weight and in agreement with clinical  observations.  Premature Infant recommended reference ranges:  Up to 24 hours.............<8.0 mg/dL  Up to 48 hours............<12.0 mg/dL  3-5 days..................<15.0 mg/dL  6-29 days.................<15.0 mg/dL           Site  Other        BUN, Bld 89(H)    94(H)     Calcium 9.8    9.4     Chloride 96    97     CO2 35(H)    33(H)     Color, UA          Creatinine 1.5(H)    1.6(H)     DelSys          Differential Method Manual         eGFR if  59.7(A)    55.2(A)     eGFR if non  51.7  Comment:  Calculation used to obtain the estimated glomerular filtration  rate (eGFR) is the CKD-EPI equation.   (A)    47.8  Comment:  Calculation used to obtain the estimated glomerular filtration  rate (eGFR) is the CKD-EPI equation.   (A)     Eosinophil% 1.0         FiO2          Large/Giant Platelets Present         Glucose 175(H)    201(H)     Glucose, UA          Gran% 83.0(H)         Hematocrit 25.8(L)         Hemoglobin 7.8(L)         Hypo Occasional         Coumadin Monitoring INR 1.0  Comment:  Coumadin Therapy:  2.0  - 3.0 for INR for all indicators except mechanical heart valves  and antiphospholipid syndromes which should use 2.5 - 3.5.           Ketones, UA          Leukocytes, UA          Lymph% 7.0(L)         Magnesium 2.4         MCH 28.2         MCHC 30.2(L)         MCV 93         Metamyelocytes 2.0         Microscopic Comment          Min Vol          Mode          Mono% 5.0         MPV 12.2         Nitrite, UA          nRBC 1(A)         Occult Blood UA          PEEP          pH, UA          PiP          Platelet Estimate Appears normal         Platelets 237         POC BE  17        POC HCO3  41.2(H)        POC PCO2  60.2(H)        POC PH  7.443        POC PO2  40        POC SATURATED O2  75(L)        POC TCO2  43(H)        POCT Glucose   193(H) 216(H)      Poly Occasional         Potassium 3.3(L)    4.0     Total Protein 6.8         Protein, UA          Protime 10.8         Rate          RBC 2.77(L)         RBC, UA          RDW 16.4(H)         Sample  VENOUS        Sodium 144    141     Sp02          Specific Gravity, UA          Specimen UA          Squam Epithel, UA          Urobilinogen, UA          Vt          WBC, UA          WBC 13.33(H)         Yeast, UA                      07/25/18  1520 07/25/18  1519 07/25/18  1516      Immature Granulocytes        Immature Grans (Abs)        Albumin        Alkaline Phosphatase        Allens Test        ALT        Anion Gap 13       Aniso        Appearance, UA        AST        Bacteria, UA        BANDS        Basophil%        Bilirubin (UA)        Total Bilirubin        Site        BUN, Bld 88(H)       Calcium 9.8       Chloride 97       CO2 31(H)       Color, UA        Creatinine 1.6(H)       DelSys        Differential Method        eGFR if  55.2(A)       eGFR if non  47.8  Comment:  Calculation used to obtain the estimated glomerular filtration  rate (eGFR) is the CKD-EPI equation.   (A)       Eosinophil%        FiO2        Large/Giant  Platelets        Glucose 280(H)       Glucose, UA        Gran%        Hematocrit        Hemoglobin        Hypo        Coumadin Monitoring INR        Ketones, UA        Leukocytes, UA        Lymph%        Magnesium        MCH        MCHC        MCV        Metamyelocytes        Microscopic Comment        Min Vol        Mode        Mono%        MPV        Nitrite, UA        nRBC        Occult Blood UA        PEEP        pH, UA        PiP        Platelet Estimate        Platelets        POC BE        POC HCO3        POC PCO2        POC PH        POC PO2        POC SATURATED O2        POC TCO2        POCT Glucose  251(H) 263(H)     Poly        Potassium 4.1       Total Protein        Protein, UA        Protime        Rate        RBC        RBC, UA        RDW        Sample        Sodium 141       Sp02        Specific Gravity, UA        Specimen UA        Squam Epithel, UA        Urobilinogen, UA        Vt        WBC, UA        WBC        Yeast, UA            All pertinent labs within the past 24 hours have been reviewed.    Significant Imaging:  I have reviewed all pertinent imaging results/findings within the past 24 hours.  I have reviewed and interpreted all pertinent imaging results/findings within the past 24 hours.

## 2018-07-26 NOTE — PLAN OF CARE
Problem: Patient Care Overview  Goal: Plan of Care Review  Outcome: Ongoing (interventions implemented as appropriate)  Patient on 50% FiO2, 5 of PEEP, 450 TV, 16 rate, A/C ventilation. All other VSS. BP maintained MAP aobve 65 through levo drip. HR remains 80-85, paced at 80. Latest CVP 6. Patient on 0.02mcg/kg/min levo, currently being weaned, 25 mcg/hr fentanyl, to be weaned off by tonight if patient tolerates, 50 mcg/kg/min propofol, 20mg/hr lasix, 0.5mg/min amiodarone. Urinary output 200-300 cc/hr through shift. Sedation to be weaned gradually per MD order. Plan of care discussed with family, patient turned Q2. All questions answered by RN, will continue to monitor through shift.

## 2018-07-26 NOTE — PROGRESS NOTES
[facility]  Heart Transplant  Progress Note    Patient Name: Yonathan Good Jr.  MRN: [id number]  Admission Date: 7/7/2018  Hospital Length of Stay: 19 days  Attending Physician: Mohan Cross MD  Primary Care Provider: Edgar Gates MD  Principal Problem:Ventricular tachycardia, incessant    Subjective:     Interval History: Remains intubated and sedated. sVO2 75% with CVP 9 since IABP pulled yesterday, but he did have some NSVT overnight. Family at bedside wants to proceed with trach, PEG tube if necessary. Still not clear who has HCPOA. No bowel movement since Saturday.    Continuous Infusions:   amiodarone in dextrose 5% 0.5 mg/min (07/26/18 0900)    fentanyl 12.5 mL/hr at 07/26/18 0900    furosemide (LASIX) 2 mg/mL infusion (non-titrating) 20 mg/hr (07/26/18 0700)    norepinephrine bitartrate-D5W 0.04 mcg/kg/min (07/26/18 0900)    propofol 50 mcg/kg/min (07/26/18 0900)     Scheduled Meds:   albuterol sulfate  2.5 mg Nebulization Q4H    aspirin  81 mg Oral Daily    atorvastatin  40 mg Oral Daily    budesonide  0.5 mg Nebulization Q12H    chlorhexidine  15 mL Mouth/Throat BID    chlorhexidine  15 mL Mouth/Throat BID    docusate  100 mg Oral Daily    fluticasone-vilanterol  1 puff Inhalation Daily    gabapentin  600 mg Oral BID    heparin (porcine)  5,000 Units Subcutaneous Q8H    levETIRAcetam  500 mg Oral BID    metoprolol  2.5 mg Intravenous Q4H    mexiletine  150 mg Oral Q8H    pantoprozole (PROTONIX) 40 mg/100 mL D5W IVPB  40 mg Intravenous BID    predniSONE  30 mg Oral Daily    sennosides 8.8 mg/5 ml  5 mL Oral QHS    sodium chloride 0.9%  3 mL Intravenous Q8H     PRN Meds:sodium chloride, sodium chloride, ALPRAZolam, benzonatate, calcium gluconate IVPB, calcium gluconate IVPB, calcium gluconate IVPB, carisoprodol, dextrose 50%, glucagon (human recombinant), HYDROcodone-acetaminophen, insulin aspart U-100, magnesium sulfate IVPB, magnesium sulfate IVPB, nitroGLYCERIN,  potassium chloride in water **AND** potassium chloride in water **AND** potassium chloride in water, sodium phosphate IVPB, sodium phosphate IVPB, sodium phosphate IVPB    Review of patient's allergies indicates:  No Known Allergies  Objective:     Vital Signs (Most Recent):  Temp: 99 °F (37.2 °C) (07/26/18 0700)  Pulse: 80 (07/26/18 0903)  Resp: 19 (07/26/18 0903)  BP: (!) 94/59 (07/26/18 0903)  SpO2: 96 % (07/26/18 0903) Vital Signs (24h Range):  Temp:  [98 °F (36.7 °C)-99.1 °F (37.3 °C)] 99 °F (37.2 °C)  Pulse:  [80-95] 80  Resp:  [3-27] 19  SpO2:  [93 %-100 %] 96 %  BP: ()/(51-68) 94/59     Patient Vitals for the past 72 hrs (Last 3 readings):   Weight   07/25/18 0314 107 kg (235 lb 14.3 oz)   07/24/18 0300 105.8 kg (233 lb 4 oz)     Body mass index is 32.9 kg/m².      Intake/Output Summary (Last 24 hours) at 07/26/18 0959  Last data filed at 07/26/18 0900   Gross per 24 hour   Intake             3161 ml   Output             6570 ml   Net            -3409 ml       Hemodynamic Parameters:       Telemetry: BiV paced, NSVT    Physical Exam   Constitutional: He appears well-developed and well-nourished.   HENT:   Head: Normocephalic and atraumatic.   Eyes: Conjunctivae and EOM are normal. Pupils are equal, round, and reactive to light.   Neck: Neck supple. No thyromegaly present.   RIJ line   Cardiovascular: Normal rate and regular rhythm.    Doppler left pedal pulse, palpable right pedal pulse   Pulmonary/Chest:   Intubated and ventilated. Bibasilar crackles   Abdominal: He exhibits distension.   No bowel sounds appreciated   Musculoskeletal:   2-3+ KAUSHIK   Neurological:   Intubated and sedated   Skin: Skin is warm and dry. Capillary refill takes 2 to 3 seconds.       Significant Labs:  CBC:    Recent Labs  Lab 07/24/18  0345 07/25/18  0309 07/26/18  0330   WBC 10.91 12.03 13.33*   RBC 2.45* 2.54* 2.77*   HGB 6.8* 7.2* 7.8*   HCT 23.3* 24.0* 25.8*    197 237   MCV 95 95 93   MCH 27.8 28.3 28.2   MCHC  29.2* 30.0* 30.2*     BNP:  No results for input(s): BNP in the last 168 hours.    Invalid input(s): BNPTRIAGELBLO  CMP:    Recent Labs  Lab 07/24/18 0345 07/25/18 0309 07/25/18 2000 07/26/18 0330 07/26/18 0748   *  < > 149*  149*  < > 201* 175* 178*   CALCIUM 9.3  < > 9.6  9.6  < > 9.4 9.8 9.9   ALBUMIN 1.9*  --  1.9*  --   --  2.1*  --    PROT 6.6  --  6.5  --   --  6.8  --      < > 144  144  < > 141 144 142   K 3.9  < > 4.2  4.2  < > 4.0 3.3* 3.9   CO2 34*  < > 31*  31*  < > 33* 35* 33*   CL 99  < > 101  101  < > 97 96 98   BUN 65*  < > 83*  83*  < > 94* 89* 90*   CREATININE 1.3  < > 1.5*  1.5*  < > 1.6* 1.5* 1.4   ALKPHOS 60  --  63  --   --  65  --    ALT 7*  --  5*  --   --  6*  --    AST 13  --  16  --   --  17  --    BILITOT 0.3  --  0.6  --   --  0.5  --    < > = values in this interval not displayed.   Coagulation:     Recent Labs  Lab 07/24/18 0345 07/25/18 0309 07/26/18 0330   INR 1.0 1.0 1.0     LDH:  No results for input(s): LDH in the last 72 hours.  Microbiology:  Microbiology Results (last 7 days)     Procedure Component Value Units Date/Time    Urine culture [[id number]] Collected:  07/26/18 0729    Order Status:  Sent Specimen:  Urine from Urine, Catheterized Updated:  07/26/18 0913    Blood culture [[id number]] Collected:  07/26/18 0718    Order Status:  Sent Specimen:  Blood from Peripheral, Upper Arm, Left Updated:  07/26/18 0823    Blood culture [[id number]] Collected:  07/26/18 0718    Order Status:  Sent Specimen:  Blood from Peripheral, Antecubital, Left Updated:  07/26/18 0821    Blood culture [[id number]] Collected:  07/18/18 1803    Order Status:  Completed Specimen:  Blood from Peripheral, Forearm, Right Updated:  07/23/18 2212     Blood Culture, Routine No growth after 5 days.    Blood culture [[id number]] Collected:  07/18/18 1803    Order Status:  Completed Specimen:  Blood from Peripheral, Antecubital, Left Updated:  07/23/18 2212     Blood Culture, Routine  "No growth after 5 days.    Blood culture [[id number]] Collected:  07/17/18 0930    Order Status:  Completed Specimen:  Blood from Peripheral, Antecubital, Right Updated:  07/22/18 1212     Blood Culture, Routine No growth after 5 days.    IV catheter culture [[id number]] Collected:  07/19/18 1851    Order Status:  Completed Specimen:  Catheter Tip from Catheter Tip, Intrajugular Updated:  07/21/18 1344     Aerobic Culture - Cath tip --     STAPHYLOCOCCUS EPIDERMIDIS  < 15 colonies      Narrative:       Culture [facility]    Blood culture [[id number]] Collected:  07/17/18 0950    Order Status:  Completed Specimen:  Blood from Peripheral, Antecubital, Right Updated:  07/20/18 1048     Blood Culture, Routine Gram stain aer bottle: Gram positive cocci in clusters resembling Staph     Blood Culture, Routine Results called to and read back by:Javon Hatfield RN 07/18/2018  17:50     Blood Culture, Routine --     COAGULASE-NEGATIVE STAPHYLOCOCCUS SPECIES  Organism is a probable contaminant      Fungus culture [[id number]] Collected:  07/17/18 1154    Order Status:  Completed Specimen:  Respiratory from Sputum Updated:  07/19/18 1054     Fungus (Mycology) Culture Culture in progress          I have reviewed all pertinent labs within the past 24 hours.    Estimated Creatinine Clearance: 74.2 mL/min (based on SCr of 1.4 mg/dL).    Diagnostic Results:  I have reviewed all pertinent imaging results/findings within the past 24 hours.    Assessment and Plan:     57 year old male with PMHx significant for CAD s/p PCIs, HFrEF secondary to ischemic cardiomyopathy (EF 5-10%) s/p ICD, Afib (on warfarin), pulmonary sarcoid (on chronic prednisone), hx of L parietal CVA recently LA'ed on 7/4.      He was initially admitted to "stroke/neuro" service on 6/22 with dysarthria and stroke-like symptoms. Extensive work up was negative for recurrent CVA and so no tPA was administered. Patient developed atypical chest pain two days into his hospital course and " was noted to be in monomorphic VT (6/24) which was treated with ATP then with shock due to recurrent VT in VF zone. Given his active cardiac issues this prompted transfer to heart failure service where the patient was initiated on IV amiodarone and beta blocker with subsequent resolution of his VT. He was eventually transitioned to PO amiodarone 400 mg BID x 2 weeks (from 6/25-7/9) followed by amiodarone 400 mg daily thereafter per EP recommendations. Of note patient underwent LHC on 6/25 given his extensive ischemic history which did not reveal a culprit lesion, therefore no interventions were done. He was noted to have an elevated LVEDP to 45 however and was administered IV diuresis before being transitioned back to his PO diuretic regimen. Patient also completed heart failure pathway during his hospital course (eg completed colonoscopy on 7/2) as part of his work up for advanced options. Follows with Dr. Berman of heart failure/transplant as an outpatient.      The patient was discharged home in stable condition on 7/4/2018. He was dc'ed on warf/lovenox bridge given CHADS2-VaSc of 5.  Of note, he is no longer a candidate for AO.  He presented yesterday to [facility] with BRBPR and was found to be in acute renal failure as well.  He notes that, on the night of the 5th and morning of 6th, he had 6 bright red bloody bm's.  He went to his PMD who noted he might have internal hemmorhoids.  He was told to stop his lovenox.  He then returned home and flet very lightheaded and dizzy.  He wisely took his blood pressure and noted it was 70-80/50s whereas it is normally 117/70s.  He went in to ED immediately.  There he was noted to have the following pertinent lab values:  Hg 9.2 (11.7 prior)  INR 2.9  Na 131 (139 prior)  K 6.18  BUN 43 (18 prior)  Cr 2.88 (1.1-1.4 at baseline)  proBNP 2755  He was transferred to [facility] for further evaluation and care.  Of note, he underwent screening c-scope on 7/2 during which the  following was noted and done: Diverticulosis in the sigmoid colon and in the descending colon, Two 8 to 10 mm polyps in the sigmoid colon and in the descending colon, removed with a hot snare, resected and retrieved, ligated.        * VT Storm    -VT storm 7/10/18. Polymorphic and monomorphic. Degenerated into VF. 12 ICD shocks  -Intubated, IABP placed emergently at bedside 7/10. IABP removed 7/25 - NSVT seen on tele. Discussed further AA therapy with EP who will see him again today  -VT storm again on 7/17/18 and reloaded with amio (did not seem to absorb oral Amio). Continue metoprolol, mexitil, amio gtt for now.   -K goal >4.5, Mg >2.5  - Per EP-At a later time, it may be considered to deactivate the LV lead of the patient's CRT-D, considering the patient's LVAD status and native RBBB. ICD interrogation reveals LICHA. EP signed off, requests that we let them know if/when gen change needed        Hypernatremia    -Improved some with addition of free water flushes, continue 250 q 6 hours        Constipation    - Last bowel movement 7/21. Tolerating tube feeds at goal rate with minimal residual. Will continue bowel program and give Lactulose today          VAP (ventilator-associated pneumonia)    -Completed total 10 day course of antibiotics 7/24.   -Cultures 7/13 with S. Aureus in sputum, +jesenia blood cultures on 7/17 (probable contaminent). Blood cultures 7/18 are -ve.          History of stroke    -On coumadin prior to admit  -Hold off on systemic anticoagulation for now.        Respiratory failure requiring intubation    -Emergently intubated 7/10/18 for impending respiratory failure/need for IABP and inability to lay fat  -History of pulmonary sarcoidosis  -Methylpred given 7/10/18, hydrocortisone 100 mg q8 started 7/11/18. Transitioned to PO prednisone 7/16.   -Pulmonary consult for assistance with sarcoid & vent management. We are now at the point of considering trach. Family to meet with Palliative Care today.  Daughter Kasandra claims to be HCPOA, and is to bring paperwork today. If she is unable to produce paperwork, need to call pt's wife to ensure that she would like to give decision making authority to daughter  -CXR daily  -CXR today continues to show pulmonary edema        Cardiogenic shock    - IABP placed emergently 7/10/18. Pulled 7/25  - Daily CXR - today shows persistent pulmonary edema  - CVP 9 and net -ve 2.7L past 24 hours after getting one dose IV Diuril. Continue Lasix at 20 mg/hr and start IV Diuril 250 mg bid  .        Ventricular fibrillation    -See VT        Hypotension    - Levophed started during code 7/10/18  - Wean levo for MAP >60         Acute on chronic combined systolic and diastolic CHF, NYHA class 4    - See cardiogenic shock        GI bleed    - INR subtherapeutic  - Coumadin on hold anticoagulation has been on hold in setting of recent GI bleed. Has been receiving DVT PPx  - Protonix increased to 40 mg BID (changed to IV)  - Had screening colonoscopy with hot snare polypectomy during last admission; most likely culprit post polypectomy bleeding  - Hgb trending up at 7.8 (got 1 unit PC's 7/24)        History of atrial fibrillation    -  continue amio         Abnormal involuntary movements    - Continue keppra  - possible seizure activity noted on 7/12/18 (twitching right face and UEs lasting 5 min). Some Bilateral UE twitching which did not appear to be seizure like occurred on 7/18, resolved with increased sedation. If noted again will reach out to neuro.   - neuro consulted and increased keppra to 1000 IV 12   - continuous EEG read and negative for seizure activity   - Keppra changed to PO        CAD (coronary artery disease)    - Continue atorvastatin, nitro prn  - ASA restarted        Sarcoidosis of lung    - Continue prednisone, breo-ellipta, and albuterol prn.   - Wean pred once condition improves          Uninterrupted Critical Care/Counseling Time (not including procedures): 60  minutes      Lazara Mcleod, NP   Heart Transplant  [facility]

## 2018-07-27 PROBLEM — D72.829 LEUKOCYTOSIS: Status: ACTIVE | Noted: 2018-01-01

## 2018-07-27 PROBLEM — E11.9 TYPE 2 DIABETES MELLITUS WITHOUT COMPLICATION, WITHOUT LONG-TERM CURRENT USE OF INSULIN: Status: ACTIVE | Noted: 2018-01-01

## 2018-07-27 PROBLEM — T38.0X5A ADVERSE EFFECT OF ADRENAL CORTICAL STEROIDS, INITIAL ENCOUNTER: Status: ACTIVE | Noted: 2018-01-01

## 2018-07-27 NOTE — CONSULTS
[facility]  Endocrinology  Diabetes Consult Note    Consult Requested by: Mohan Cross MD   Reason for admit: Ventricular tachycardia, incessant    HISTORY OF PRESENT ILLNESS:  Reason for Consult: Management of hyperglycemia     Diabetes diagnosis year: A1c 6.5 in June; not sure if patient has a premorbid history of diabetes    HPI:   Patient is a 57 y.o. male with a diagnosis of cardiogenic shock initially requiring IABP, recurrent V-tach, acute respiratory failiure requiring intubation and multiple other medical issues. He is currently intubated and sedated, and is getting tube feeds for nutrition. We were consulted to assist with managing his hyperglycemia. Palliative care talks are underway with family due to poor chances for recovery.    Patient is unable to provide any history and family is not immediately available.          PMH, PSH, FH, SH updated and reviewed     Review of Systems   Unable to perform ROS: Intubated     PHYSICAL EXAMINATION:  Vitals:    07/27/18 1657   BP:    Pulse: 80   Resp: 19   Temp:      Body mass index is 32.04 kg/m².    Physical Exam   Constitutional: He appears well-developed.   Intubated and sedated   HENT:   Right Ear: External ear normal.   Left Ear: External ear normal.   Nose: Nose normal.   Hearing grossly normal  Dentition grossly normal   Cardiovascular: Normal rate.    No murmur heard.  Pulmonary/Chest: Effort normal.   On vent   Abdominal: Soft. There is no tenderness.   Musculoskeletal: He exhibits no edema.   No digital clubbing or extremity cyanosis   Neurological:   Intubated and sedated   Skin: No rash noted.   No subcutaneous nodules noted.   Psychiatric:   Unable to assess   Nursing note and vitals reviewed.        Labs Reviewed and Include     Recent Labs  Lab 07/27/18  1310   *   CALCIUM 9.8   ALBUMIN 2.1*   PROT 6.4      K 3.5   CO2 35*   CL 90*   *   CREATININE 1.9*   ALKPHOS 59   ALT 6*   AST 17   BILITOT 0.6     Lab  Results   Component Value Date    WBC 15.23 (H) 07/27/2018    HGB 6.9 (L) 07/27/2018    HCT 22.9 (L) 07/27/2018    MCV 94 07/27/2018     07/27/2018     No results for input(s): TSH, FREET4 in the last 168 hours.  Lab Results   Component Value Date    HGBA1C 6.5 (H) 06/23/2018       Nutritional status:   Body mass index is 32.04 kg/m².  Lab Results   Component Value Date    ALBUMIN 2.1 (L) 07/27/2018    ALBUMIN 2.0 (L) 07/27/2018    ALBUMIN 2.1 (L) 07/26/2018     Lab Results   Component Value Date    PREALBUMIN 24 06/27/2018       Estimated Creatinine Clearance: 54 mL/min (A) (based on SCr of 1.9 mg/dL (H)).    Accu-Checks  Recent Labs      07/26/18   0023  07/26/18   0716  07/26/18   1232  07/26/18   1610  07/26/18   1949  07/26/18   2347  07/27/18   0358  07/27/18   0805  07/27/18   1148  07/27/18   1625   POCTGLUCOSE  193*  180*  211*  250*  245*  183*  193*  209*  285*  316*        ASSESSMENT and PLAN    Type 2 diabetes mellitus without complication, without long-term current use of insulin    BG goal 140-180    Insulin naive with RONALD so will start ~0.25 units/kg TDD    Levemir 10 units nightly  Novolog 3 units q4 while on TF - hold if TF held or glucose <100  POC glucose q4  Low dose correction    Discharge plans: TBD          Adverse effect of adrenal cortical steroids, initial encounter    Will cause predominantly prandial excursions          Acute respiratory failure with hypoxia    Remains intubated and sedated.    Possible trach placement per chart.        VAP (ventilator-associated pneumonia)    Infection may affect insulin requirements.        Cardiogenic shock    Per primary team        Acute on chronic combined systolic and diastolic CHF, NYHA class 4    Per primary team    Avoid hypoglycemia              Arturo Diaz MD  Endocrinology  [facility]

## 2018-07-27 NOTE — PROGRESS NOTES
Dr. Tran notified of pt's temp 101.3*F. Reviewed blood cultures were performed, came back neg, urine culture positive. No antibiotics ordered at this time. PO tylenol ordered c blood cultures. TORB. Will continue to monitor.

## 2018-07-27 NOTE — PROGRESS NOTES
Family meeting:    This SW and Dr. THELMA Cross held a family meeting with pt's 2 daughters, brother, and sister. Pt's brother and sister aaox4, calm, and communicative. Pt's 2 dtrs aaox4, agitated, yelling, and crying throughout the meeting. Dr. Cross discussed Medical POA and decision making, and pt's dtrs both began yelling. Pt's dtrs still claiming to have documentation making them POA, however are unable to produce this document. Dr. Cross discussed pt's condition and answered all of family's questions. LINNEA and Dr. Cross to follow up with family tomorrow. SW providing ongoing psychosocial and counseling support, education, assistance, resources, and discharge planning as indicated. SW continuing to follow and remains available.
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
## 2018-07-27 NOTE — SUBJECTIVE & OBJECTIVE
Interval History: Weened off fentanyl drip this morning. Became febrile overnight. No other events. Seen and examined with family at bedside. Remains intubated and on ventilator.     Objective:     Vital Signs (Most Recent):  Temp: 99.5 °F (37.5 °C) (07/27/18 0700)  Pulse: 80 (07/27/18 0945)  Resp: 19 (07/27/18 0945)  BP: (!) 96/53 (07/27/18 0945)  SpO2: (!) 93 % (07/27/18 0945) Vital Signs (24h Range):  Temp:  [99.5 °F (37.5 °C)-101.3 °F (38.5 °C)] 99.5 °F (37.5 °C)  Pulse:  [80-98] 80  Resp:  [0-30] 19  SpO2:  [91 %-100 %] 93 %  BP: ()/(50-65) 96/53     Weight: 104.2 kg (229 lb 11.5 oz)  Body mass index is 32.04 kg/m².      Intake/Output Summary (Last 24 hours) at 07/27/18 0959  Last data filed at 07/27/18 0900   Gross per 24 hour   Intake             4227 ml   Output             5125 ml   Net             -898 ml       Physical Exam   Constitutional: He appears well-developed.   Has musty odor.    HENT:   Head: Normocephalic and atraumatic.   Mouth/Throat: Oropharynx is clear and moist.   Eyes: Pupils are equal, round, and reactive to light.   Neck: Neck supple.   Cardiovascular: Normal rate and regular rhythm.    Heart sounds faint   Pulmonary/Chest: He has wheezes.   Intubated and on ventilator. RUL expiratory wheezing.    Abdominal: Soft. He exhibits no distension.   Bowel sounds absent    Musculoskeletal: He exhibits edema (2+ pitting ).   Neurological:   Sedated on propofol    Skin: Skin is warm and dry.       Vents:  Vent Mode: A/C (07/27/18 0911)  Set Rate: 16 bmp (07/27/18 0911)  Vt Set: 450 mL (07/27/18 0911)  PEEP/CPAP: 5 cmH20 (07/27/18 0911)  Oxygen Concentration (%): 51 (07/27/18 0945)  Peak Airway Pressure: 45 cmH2O (07/27/18 0911)  Plateau Pressure: 25 cmH20 (07/27/18 0911)  Total Ve: 7.27 mL (07/27/18 0911)  F/VT Ratio<105 (RSBI): (!) 44.59 (07/27/18 0911)    Lines/Drains/Airways     Central Venous Catheter Line                 Percutaneous Central Line Insertion/Assessment - triple lumen   07/19/18 1916 right internal jugular 7 days          Drain                 NG/OG Tube 07/10/18 1557 [place] sump 16 Fr. Left nostril 16 days         Urethral Catheter 07/20/18 1030 Latex 16 Fr. 6 days          Airway                 Airway - Non-Surgical 07/10/18 1523 Endotracheal Tube 16 days          Peripheral Intravenous Line                 Midline Catheter Insertion/Assessment  - Single Lumen 07/17/18 1105 Right cephalic vein (lateral side of arm) 18g x 8cm 9 days         Peripheral IV - Single Lumen 07/26/18 1935 Right Forearm less than 1 day                Significant Labs:    CBC/Anemia Profile:    Recent Labs  Lab 07/26/18  0330 07/27/18  0341   WBC 13.33* 13.11*   HGB 7.8* 7.1*   HCT 25.8* 23.3*    236   MCV 93 93   RDW 16.4* 17.1*        Chemistries:    Recent Labs  Lab 07/26/18  0330 07/26/18  0748 07/26/18  1944 07/27/18  0341    142 142 142   K 3.3* 3.9 3.9 3.8   CL 96 98 97 95   CO2 35* 33* 33* 34*   BUN 89* 90* 99* 111*   CREATININE 1.5* 1.4 1.7* 1.8*   CALCIUM 9.8 9.9 9.7 9.7   ALBUMIN 2.1*  --   --  2.0*   PROT 6.8  --   --  6.4   BILITOT 0.5  --   --  0.5   ALKPHOS 65  --   --  62   ALT 6*  --   --  5*   AST 17  --   --  14   MG 2.4  --   --  2.6   PHOS  --   --   --  3.8       Recent Lab Results       07/27/18  0911 07/27/18  0605 07/27/18  0358 07/27/18  0341 07/26/18  2347      Immature Granulocytes    CANCELED  Comment:  Result canceled by the ancillary      Immature Grans (Abs)    CANCELED  Comment:  Mild elevation in immature granulocytes is non specific and   can be seen in a variety of conditions including stress response,   acute inflammation, trauma and pregnancy. Correlation with other   laboratory and clinical findings is essential.    Result canceled by the ancillary        Albumin    2.0(L)      Alkaline Phosphatase    62      Allens Test Pass N/A        ALT    5(L)      Anion Gap    13      Aniso    Slight      AST    14      Baso #    CANCELED  Comment:  Result canceled  by the ancillary      Basophil%    0.0      Total Bilirubin    0.5  Comment:  For infants and newborns, interpretation of results should be based  on gestational age, weight and in agreement with clinical  observations.  Premature Infant recommended reference ranges:  Up to 24 hours.............<8.0 mg/dL  Up to 48 hours............<12.0 mg/dL  3-5 days..................<15.0 mg/dL  6-29 days.................<15.0 mg/dL        Blood Culture, Routine          Site LR Other        BUN, Bld    111(H)      Calcium    9.7      Chloride    95      CO2    34(H)      Creatinine    1.8(H)      DelSys Adult Vent Adult Vent        Differential Method    Manual      eGFR if     47.9(A)      eGFR if non     41.4  Comment:  Calculation used to obtain the estimated glomerular filtration  rate (eGFR) is the CKD-EPI equation.   (A)      Eos #    CANCELED  Comment:  Result canceled by the ancillary      Eosinophil%    0.0      FiO2 50 50        Glucose    149(H)      Gram Stain (Respiratory)          Gran%    92.0(H)      Hematocrit    23.3(L)      Hemoglobin    7.1(L)      Hypo    Occasional      Coumadin Monitoring INR    1.1  Comment:  Coumadin Therapy:  2.0 - 3.0 for INR for all indicators except mechanical heart valves  and antiphospholipid syndromes which should use 2.5 - 3.5.        Lymph #    CANCELED  Comment:  Result canceled by the ancillary      Lymph%    5.0(L)      Magnesium    2.6      MCH    28.4      MCHC    30.5(L)      MCV    93      Min Vol 7.11         Mode AC/PRVC AC/PRVC        Mono #    CANCELED  Comment:  Result canceled by the ancillary      Mono%    1.0(L)      MPV    12.2      Myelocytes    2.0      nRBC    1(A)      Ovalocytes    Occasional      PEEP 5 5        Phosphorus    3.8      PiP 28         Platelet Estimate    Appears normal      Platelets    236      POC BE 17 15        POC HCO3 40.7(H) 39.4(H)        POC PCO2 55.3(H) 63.3(H)        POC PH 7.475(H) 7.401         POC PO2 65(L) 32(LL)        POC SATURATED O2 93(L) 59(L)        POC TCO2 42(H) 41(H)        POCT Glucose   193(H)  183(H)     Poik    Slight      Poly    Occasional      Potassium    3.8      Total Protein    6.4      Protime    11.4      Rate 16 16        RBC    2.50(L)      RDW    17.1(H)      Sample ARTERIAL VENOUS        Sodium    142      Sp02 97 93        Vancomycin, Random    35.5      Vt 450 450        WBC    13.11(H)                  07/26/18 2128 07/26/18  1949 07/26/18  1944 07/26/18  1936 07/26/18  1610      Immature Granulocytes          Immature Grans (Abs)          Albumin          Alkaline Phosphatase          Allens Test          ALT          Anion Gap   12       Aniso          AST          Baso #          Basophil%          Total Bilirubin          Blood Culture, Routine    No Growth to date[P]      Site          BUN, Bld   99(H)       Calcium   9.7       Chloride   97       CO2   33(H)       Creatinine   1.7(H)       DelSys          Differential Method          eGFR if    51.3(A)       eGFR if non    44.4  Comment:  Calculation used to obtain the estimated glomerular filtration  rate (eGFR) is the CKD-EPI equation.   (A)       Eos #          Eosinophil%          FiO2          Glucose   231(H)       Gram Stain (Respiratory) <10 epithelial cells per low power field.          Many WBC's          Many Gram positive cocci          Few Gram negative rods          Few Gram positive rods         Gran%          Hematocrit          Hemoglobin          Hypo          Coumadin Monitoring INR          Lymph #          Lymph%          Magnesium          MCH          MCHC          MCV          Min Vol          Mode          Mono #          Mono%          MPV          Myelocytes          nRBC          Ovalocytes          PEEP          Phosphorus          PiP          Platelet Estimate          Platelets          POC BE          POC HCO3          POC PCO2          POC PH           POC PO2          POC SATURATED O2          POC TCO2          POCT Glucose  245(H)   250(H)     Poik          Poly          Potassium   3.9       Total Protein          Protime          Rate          RBC          RDW          Sample          Sodium   142       Sp02          Vancomycin, Random          Vt          WBC                      07/26/18  1232      Immature Granulocytes      Immature Grans (Abs)      Albumin      Alkaline Phosphatase      Allens Test      ALT      Anion Gap      Aniso      AST      Baso #      Basophil%      Total Bilirubin      Blood Culture, Routine      Site      BUN, Bld      Calcium      Chloride      CO2      Creatinine      DelSys      Differential Method      eGFR if       eGFR if non       Eos #      Eosinophil%      FiO2      Glucose      Gram Stain (Respiratory)      Gran%      Hematocrit      Hemoglobin      Hypo      Coumadin Monitoring INR      Lymph #      Lymph%      Magnesium      MCH      MCHC      MCV      Min Vol      Mode      Mono #      Mono%      MPV      Myelocytes      nRBC      Ovalocytes      PEEP      Phosphorus      PiP      Platelet Estimate      Platelets      POC BE      POC HCO3      POC PCO2      POC PH      POC PO2      POC SATURATED O2      POC TCO2      POCT Glucose 211(H)     Poik      Poly      Potassium      Total Protein      Protime      Rate      RBC      RDW      Sample      Sodium      Sp02      Vancomycin, Random      Vt      WBC          All pertinent labs within the past 24 hours have been reviewed.    Significant Imaging:  I have reviewed all pertinent imaging results/findings within the past 24 hours.  I have reviewed and interpreted all pertinent imaging results/findings within the past 24 hours.

## 2018-07-27 NOTE — HPI
57 year old male with history of heart failure secondary to ischemic cardiomyopathy (EF 5 - 10%) presented with ventricular tachycardia refractory to medical management, coded, and was intubated on 7/10. ENT consulted for possible tracheostomy in setting of prolonged intubation and vent dependency. Current vent settings include FiO2 of 50% and PEEP of 5.

## 2018-07-27 NOTE — ASSESSMENT & PLAN NOTE
· Continue diuresis per primary team  · No substantial improvement on CXR comapred to prior day   · Received abx therapy for  ET aspirate culture positive for MSSA staph. Last dose of cefepime and vancomycin on 7/20 .  · Continue current respiratory therapies   · Vent settings: RR 16,  mL (6cc/kg), PEEP 5, FiO2 50%   ·  Cardiac insufficiencies likely to be limiting factor in patient's potential recovery.     · Fentanyl drip weened in past 24 hours, off this morning  · Overbreathing vent rate this morning, try to ween propofol and perform SBT if possible       · ETT day 17 - recommend ENT consult for tracheostomy if cannot be extubated soon.  · Still need further clarification regarding medical POA.     Recent Labs  Lab 07/27/18  0911   PH 7.475*   PCO2 55.3*   PO2 65*   HCO3 40.7*   POCSATURATED 93*   BE 17

## 2018-07-27 NOTE — EICU
14:20 Called into room for Time out prior to TLC insertion. Dr Rubi at bedside. Time out per protocol. BP 89/53, SP02 93%, HR80.  14:32 Line in place with positive blood return CXR ordered

## 2018-07-27 NOTE — ASSESSMENT & PLAN NOTE
57 year old male with history of heart failure secondary to ischemic cardiomyopathy. Patient presented with ventricular tachycardia refractory to medical management, coded, and intubated on 7/10.     -Plan of care must be established before tracheostomy will be considered.  -Please call with any questions.

## 2018-07-27 NOTE — SUBJECTIVE & OBJECTIVE
PMH, PSH, FH, SH updated and reviewed     Review of Systems   Unable to perform ROS: Intubated     PHYSICAL EXAMINATION:  Vitals:    07/27/18 1657   BP:    Pulse: 80   Resp: 19   Temp:      Body mass index is 32.04 kg/m².    Physical Exam   Constitutional: He appears well-developed.   Intubated and sedated   HENT:   Right Ear: External ear normal.   Left Ear: External ear normal.   Nose: Nose normal.   Hearing grossly normal  Dentition grossly normal   Cardiovascular: Normal rate.    No murmur heard.  Pulmonary/Chest: Effort normal.   On vent   Abdominal: Soft. There is no tenderness.   Musculoskeletal: He exhibits no edema.   No digital clubbing or extremity cyanosis   Neurological:   Intubated and sedated   Skin: No rash noted.   No subcutaneous nodules noted.   Psychiatric:   Unable to assess   Nursing note and vitals reviewed.

## 2018-07-27 NOTE — HPI
Reason for Consult: Management of hyperglycemia     Diabetes diagnosis year: A1c 6.5 in June; not sure if patient has a premorbid history of diabetes    HPI:   Patient is a 57 y.o. male with a diagnosis of cardiogenic shock initially requiring IABP, recurrent V-tach, acute respiratory failiure requiring intubation and multiple other medical issues. He is currently intubated and sedated, and is getting tube feeds for nutrition. We were consulted to assist with managing his hyperglycemia. Palliative care talks are underway with family due to poor chances for recovery.    Patient is unable to provide any history and family is not immediately available.

## 2018-07-27 NOTE — PLAN OF CARE
Problem: Patient Care Overview  Goal: Individualization & Mutuality  Admit 7/7 GIB    Hx:  CAD s/p PCI LAD 2007, NICM EF 5-10%, pulmonary sarcoidosis, Afib home coumadin, ICD placement, L parietal CVA no residual deficits    7/9  Transfer from TSU to SICU hypotension  TLC placement   7/10:  Intubated.  VTach/VFib arrest (6 min arrest).  MMVT and PMVT.  Amio bolus and gtt, lido gtt.  IABP placed  7/12:  Head CT (neg); EEG monitoring started  7/13: blood cultures, sputum culture  7/16: 2D Echo, EKG, levo restarted  7/17: 2 bouts of VF in AM which responded to ATP & a third for which he was shocked; amio bolus & gtt initiated; pan cultured; Tube feeds turned off  7/18: Lasix gtt discontinued; Sputum culture positive for staph; Tube feeds restarted  7/19: Lasix gtt restarted, RIJ TLC removed and cultured, new RIJ TLC placed  7/24: 1 unit PRBCs given   7/26: IABP removed. Blood cultures, urine culture, and resp culture sent    Nursing:  Acc cks Q4H  IABP mean >60  MAP >60  BMP Q8H                    Outcome: Ongoing (interventions implemented as appropriate)  Pt remains intubated a/c 50% peep 5. O2 sat >94%. Temp max 101.4*. Map>65 mmHg maintained. CVP 10 flat. Paced at 80. HR max 84 bpm noted. Coarse breath sounds c more secretions noted. Tolerated tube feeds @ 55 ml/hr. Scant residuals. Active bowel sounds noted. Blood cultures and resp culture sent. UO >100 ml/hr. Remains on propofol, fentanyl, levo, amio, and lasix. SVO2 59%. Turned q 2 hours.  Daughter updated on POC. See chart and doc flowsheet for details.

## 2018-07-27 NOTE — ASSESSMENT & PLAN NOTE
The patient experienced MMVT while being weaned from sedation, received ATP x 2, developed polymorphic VT and one ICD shock  Device interrogated, see report for details  Patient received an amiodarone bolus 150 mg IV x 1 followed by infusion, still on mexitil 150 mg TID and metoprolol 2.5 mg Q4  Recommend increase in beta blocker (metoprolol) as tolerated and consider benzodiazepines such as diazepam to reduce VT when being weaned from sedation

## 2018-07-27 NOTE — ASSESSMENT & PLAN NOTE
-Emergently intubated 7/10/18 for impending respiratory failure/need for IABP and inability to lay fat  -History of pulmonary sarcoidosis  -Methylpred given 7/10/18, hydrocortisone 100 mg q8 started 7/11/18. Transitioned to PO prednisone 7/16.   -Pulmonary consult for assistance with sarcoid & vent management. We are now at the point of considering trach. Family to meet with Palliative Care today. Daughter Makesha claims to be HCPOA, and is to bring paperwork today. If she is unable to produce paperwork, need to call pt's wife to ensure that she would like to give decision making authority to daughter  -CXR daily  -CXR today continues to show pulmonary edema  -Fentanyl weaned off. Will likely change Propofol to Precedex over the weekend and assess neurological status with sedation wean as per Pulm recs

## 2018-07-27 NOTE — SUBJECTIVE & OBJECTIVE
Interval History: Patient shocked by ICD today after developing polymorphic VT and receiving ATP x 2. Patient still sedated and intubated. This event occurred during a sedation holiday, which the patient has not tolerated in the past, according to his RN.    Review of Systems   Unable to perform ROS: intubated     Objective:     Vital Signs (Most Recent):  Temp: 99.1 °F (37.3 °C) (07/27/18 1500)  Pulse: 80 (07/27/18 1515)  Resp: 17 (07/27/18 1515)  BP: (!) 89/50 (07/27/18 1515)  SpO2: (!) 93 % (07/27/18 1515) Vital Signs (24h Range):  Temp:  [99.1 °F (37.3 °C)-101.3 °F (38.5 °C)] 99.1 °F (37.3 °C)  Pulse:  [] 80  Resp:  [0-48] 17  SpO2:  [86 %-100 %] 93 %  BP: ()/() 89/50     Weight: 104.2 kg (229 lb 11.5 oz)  Body mass index is 32.04 kg/m².     SpO2: (!) 93 %  O2 Device (Oxygen Therapy): ventilator    Physical Exam   Constitutional: He is oriented to person, place, and time. He appears well-developed and well-nourished.   Sedated, intubated   HENT:   Head: Normocephalic and atraumatic.   Eyes: Pupils are equal, round, and reactive to light.   Neck: Normal range of motion. No JVD present.   Cardiovascular: Normal rate, regular rhythm and intact distal pulses.    Distant heart sounds   Pulmonary/Chest: He has no rales.   Mechanical breath sounds   Abdominal: Soft. Bowel sounds are normal. He exhibits no distension. There is no tenderness.   Musculoskeletal: Normal range of motion. Edema: trace.   Neurological: He is alert and oriented to person, place, and time.   Skin: Skin is dry. No rash noted.   Mottled    Psychiatric: He has a normal mood and affect. His behavior is normal.       Significant Labs:     Recent Results (from the past 24 hour(s))   Blood culture    Collection Time: 07/26/18  7:36 PM   Result Value Ref Range    Blood Culture, Routine No Growth to date    Basic metabolic panel    Collection Time: 07/26/18  7:44 PM   Result Value Ref Range    Sodium 142 136 - 145 mmol/L    Potassium  3.9 3.5 - 5.1 mmol/L    Chloride 97 95 - 110 mmol/L    CO2 33 (H) 23 - 29 mmol/L    Glucose 231 (H) 70 - 110 mg/dL    BUN, Bld 99 (H) 6 - 20 mg/dL    Creatinine 1.7 (H) 0.5 - 1.4 mg/dL    Calcium 9.7 8.7 - 10.5 mg/dL    Anion Gap 12 8 - 16 mmol/L    eGFR if African American 51.3 (A) >60 mL/min/1.73 m^2    eGFR if non African American 44.4 (A) >60 mL/min/1.73 m^2   POCT glucose    Collection Time: 07/26/18  7:49 PM   Result Value Ref Range    POCT Glucose 245 (H) 70 - 110 mg/dL   Culture, Respiratory with Gram Stain    Collection Time: 07/26/18  9:28 PM   Result Value Ref Range    Gram Stain (Respiratory) <10 epithelial cells per low power field.     Gram Stain (Respiratory) Many WBC's     Gram Stain (Respiratory) Many Gram positive cocci     Gram Stain (Respiratory) Few Gram negative rods     Gram Stain (Respiratory) Few Gram positive rods    POCT glucose    Collection Time: 07/26/18 11:47 PM   Result Value Ref Range    POCT Glucose 183 (H) 70 - 110 mg/dL   Comprehensive metabolic panel - if not done in ED    Collection Time: 07/27/18  3:41 AM   Result Value Ref Range    Sodium 142 136 - 145 mmol/L    Potassium 3.8 3.5 - 5.1 mmol/L    Chloride 95 95 - 110 mmol/L    CO2 34 (H) 23 - 29 mmol/L    Glucose 149 (H) 70 - 110 mg/dL    BUN, Bld 111 (H) 6 - 20 mg/dL    Creatinine 1.8 (H) 0.5 - 1.4 mg/dL    Calcium 9.7 8.7 - 10.5 mg/dL    Total Protein 6.4 6.0 - 8.4 g/dL    Albumin 2.0 (L) 3.5 - 5.2 g/dL    Total Bilirubin 0.5 0.1 - 1.0 mg/dL    Alkaline Phosphatase 62 55 - 135 U/L    AST 14 10 - 40 U/L    ALT 5 (L) 10 - 44 U/L    Anion Gap 13 8 - 16 mmol/L    eGFR if African American 47.9 (A) >60 mL/min/1.73 m^2    eGFR if non  41.4 (A) >60 mL/min/1.73 m^2   CBC auto differential    Collection Time: 07/27/18  3:41 AM   Result Value Ref Range    WBC 13.11 (H) 3.90 - 12.70 K/uL    RBC 2.50 (L) 4.60 - 6.20 M/uL    Hemoglobin 7.1 (L) 14.0 - 18.0 g/dL    Hematocrit 23.3 (L) 40.0 - 54.0 %    MCV 93 82 - 98 fL    MCH  28.4 27.0 - 31.0 pg    MCHC 30.5 (L) 32.0 - 36.0 g/dL    RDW 17.1 (H) 11.5 - 14.5 %    Platelets 236 150 - 350 K/uL    MPV 12.2 9.2 - 12.9 fL    Immature Granulocytes CANCELED 0.0 - 0.5 %    Immature Grans (Abs) CANCELED 0.00 - 0.04 K/uL    Lymph # CANCELED 1.0 - 4.8 K/uL    Mono # CANCELED 0.3 - 1.0 K/uL    Eos # CANCELED 0.0 - 0.5 K/uL    Baso # CANCELED 0.00 - 0.20 K/uL    nRBC 1 (A) 0 /100 WBC    Gran% 92.0 (H) 38.0 - 73.0 %    Lymph% 5.0 (L) 18.0 - 48.0 %    Mono% 1.0 (L) 4.0 - 15.0 %    Eosinophil% 0.0 0.0 - 8.0 %    Basophil% 0.0 0.0 - 1.9 %    Myelocytes 2.0 %    Platelet Estimate Appears normal     Aniso Slight     Poik Slight     Poly Occasional     Hypo Occasional     Ovalocytes Occasional     Differential Method Manual    Protime-INR    Collection Time: 07/27/18  3:41 AM   Result Value Ref Range    Prothrombin Time 11.4 9.0 - 12.5 sec    INR 1.1 0.8 - 1.2   Magnesium - if not done in ED    Collection Time: 07/27/18  3:41 AM   Result Value Ref Range    Magnesium 2.6 1.6 - 2.6 mg/dL   Vancomycin, random    Collection Time: 07/27/18  3:41 AM   Result Value Ref Range    Vancomycin, Random 35.5 Not established ug/mL   Phosphorus    Collection Time: 07/27/18  3:41 AM   Result Value Ref Range    Phosphorus 3.8 2.7 - 4.5 mg/dL   POCT glucose    Collection Time: 07/27/18  3:58 AM   Result Value Ref Range    POCT Glucose 193 (H) 70 - 110 mg/dL   ISTAT PROCEDURE    Collection Time: 07/27/18  6:05 AM   Result Value Ref Range    POC PH 7.401 7.35 - 7.45    POC PCO2 63.3 (H) 35 - 45 mmHg    POC PO2 32 (LL) 40 - 60 mmHg    POC HCO3 39.4 (H) 24 - 28 mmol/L    POC BE 15 -2 to 2 mmol/L    POC SATURATED O2 59 (L) 95 - 100 %    POC TCO2 41 (H) 24 - 29 mmol/L    Rate 16     Sample VENOUS     Site Other     Allens Test N/A     DelSys Adult Vent     Mode AC/PRVC     Vt 450     PEEP 5     FiO2 50     Sp02 93    POCT glucose    Collection Time: 07/27/18  8:05 AM   Result Value Ref Range    POCT Glucose 209 (H) 70 - 110 mg/dL    ISTAT PROCEDURE    Collection Time: 07/27/18  9:11 AM   Result Value Ref Range    POC PH 7.475 (H) 7.35 - 7.45    POC PCO2 55.3 (H) 35 - 45 mmHg    POC PO2 65 (L) 80 - 100 mmHg    POC HCO3 40.7 (H) 24 - 28 mmol/L    POC BE 17 -2 to 2 mmol/L    POC SATURATED O2 93 (L) 95 - 100 %    POC TCO2 42 (H) 23 - 27 mmol/L    Rate 16     Sample ARTERIAL     Site LR     Allens Test Pass     DelSys Adult Vent     Mode AC/PRVC     Vt 450     PEEP 5     PiP 28     FiO2 50     Min Vol 7.11     Sp02 97    POCT glucose    Collection Time: 07/27/18 11:48 AM   Result Value Ref Range    POCT Glucose 285 (H) 70 - 110 mg/dL   CBC auto differential    Collection Time: 07/27/18  1:10 PM   Result Value Ref Range    WBC 15.23 (H) 3.90 - 12.70 K/uL    RBC 2.45 (L) 4.60 - 6.20 M/uL    Hemoglobin 6.9 (L) 14.0 - 18.0 g/dL    Hematocrit 22.9 (L) 40.0 - 54.0 %    MCV 94 82 - 98 fL    MCH 28.2 27.0 - 31.0 pg    MCHC 30.1 (L) 32.0 - 36.0 g/dL    RDW 17.0 (H) 11.5 - 14.5 %    Platelets 255 150 - 350 K/uL    MPV 12.0 9.2 - 12.9 fL    Immature Granulocytes CANCELED 0.0 - 0.5 %    Immature Grans (Abs) CANCELED 0.00 - 0.04 K/uL    Lymph # CANCELED 1.0 - 4.8 K/uL    Mono # CANCELED 0.3 - 1.0 K/uL    Eos # CANCELED 0.0 - 0.5 K/uL    Baso # CANCELED 0.00 - 0.20 K/uL    nRBC 1 (A) 0 /100 WBC    Gran% 94.0 (H) 38.0 - 73.0 %    Lymph% 0.0 (L) 18.0 - 48.0 %    Mono% 1.0 (L) 4.0 - 15.0 %    Eosinophil% 0.0 0.0 - 8.0 %    Basophil% 0.0 0.0 - 1.9 %    Bands 2.0 %    Metamyelocytes 2.0 %    Myelocytes 1.0 %    Platelet Estimate Appears normal     Aniso Slight     Poly Occasional     Hypo Occasional     Differential Method Manual    Comprehensive metabolic panel    Collection Time: 07/27/18  1:10 PM   Result Value Ref Range    Sodium 139 136 - 145 mmol/L    Potassium 3.5 3.5 - 5.1 mmol/L    Chloride 90 (L) 95 - 110 mmol/L    CO2 35 (H) 23 - 29 mmol/L    Glucose 298 (H) 70 - 110 mg/dL    BUN, Bld 115 (H) 6 - 20 mg/dL    Creatinine 1.9 (H) 0.5 - 1.4 mg/dL    Calcium  9.8 8.7 - 10.5 mg/dL    Total Protein 6.4 6.0 - 8.4 g/dL    Albumin 2.1 (L) 3.5 - 5.2 g/dL    Total Bilirubin 0.6 0.1 - 1.0 mg/dL    Alkaline Phosphatase 59 55 - 135 U/L    AST 17 10 - 40 U/L    ALT 6 (L) 10 - 44 U/L    Anion Gap 14 8 - 16 mmol/L    eGFR if African American 44.9 (A) >60 mL/min/1.73 m^2    eGFR if non  38.8 (A) >60 mL/min/1.73 m^2   Magnesium    Collection Time: 07/27/18  1:10 PM   Result Value Ref Range    Magnesium 2.6 1.6 - 2.6 mg/dL   ISTAT PROCEDURE    Collection Time: 07/27/18  1:19 PM   Result Value Ref Range    POC PH 7.405 7.35 - 7.45    POC PCO2 63.8 (H) 35 - 45 mmHg    POC PO2 33 (LL) 40 - 60 mmHg    POC HCO3 39.9 (H) 24 - 28 mmol/L    POC BE 15 -2 to 2 mmol/L    POC SATURATED O2 61 (L) 95 - 100 %    POC TCO2 42 (H) 24 - 29 mmol/L    Rate 16     Sample VENOUS     Site Other     Allens Test N/A     DelSys Adult Vent     Mode AC/PRVC     Vt 450     PEEP 5     PiP 25     FiO2 95     Min Vol 8.46     Sp02 50    ISTAT PROCEDURE    Collection Time: 07/27/18  2:16 PM   Result Value Ref Range    POC PH 7.461 (H) 7.35 - 7.45    POC PCO2 55.9 (H) 35 - 45 mmHg    POC PO2 72 (L) 80 - 100 mmHg    POC HCO3 39.8 (H) 24 - 28 mmol/L    POC BE 16 -2 to 2 mmol/L    POC SATURATED O2 95 95 - 100 %    POC TCO2 42 (H) 23 - 27 mmol/L    Rate 16     Sample ARTERIAL     Site LR     Allens Test Pass     DelSys Adult Vent     Mode AC/PRVC     Vt 450     PEEP 5     PiP 31     FiO2 50     Min Vol 8.88     Sp02 93    POCT glucose    Collection Time: 07/27/18  4:25 PM   Result Value Ref Range    POCT Glucose 316 (H) 70 - 110 mg/dL

## 2018-07-27 NOTE — ASSESSMENT & PLAN NOTE
- IABP placed emergently 7/10/18. Pulled 7/25  - Daily CXR - today shows persistent pulmonary edema  - CVP 10 and net -ve 885 past 24 hours. Continue Lasix at 20 mg/hr and IV Diuril 250 mg bid  .

## 2018-07-27 NOTE — SIGNIFICANT EVENT
Called to bedside by RN after patient had ICD shock. ABG's are stable, sVO2 61. STAT labs remarkable for K+ 3.5 (replaced) and hyperglycemia (Endocrine consulted). Amio rebolused and gtt increased to 1 mg/min. Lengthy discussion held with family at bedside, including one of his daughters. They want to proceed with a trach. Resumed prior dose of Fentanyl and Propofol. ENT consulted.

## 2018-07-27 NOTE — ASSESSMENT & PLAN NOTE
- Temp spike to 101.3 overnight. Recultured yesterday and Vanc/Cefepime resumed. Will monitor  - Will change RIJ central line that was placed 7/19 and culture tip

## 2018-07-27 NOTE — CONSULTS
[facility]  Otorhinolaryngology-Head & Neck Surgery  Consult Note    Patient Name: Yonathan Good Jr.  MRN: [id number]  Code Status: Full Code  Admission Date: 7/7/2018  Hospital Length of Stay: 20 days  Attending Physician: Mohan Cross MD  Primary Care Provider: Edgar Gates MD    Patient information was obtained from ER records.     Inpatient consult to ENT  Consult performed by: TONY TRUJILLO  Consult ordered by: CHIRAG BALDERAS        Subjective:     Chief Complaint/Reason for Admission: Respiratory failure    History of Present Illness: 57 year old male with history of heart failure secondary to ischemic cardiomyopathy (EF 5 - 10%) presented with ventricular tachycardia refractory to medical management, coded, and was intubated on 7/10. ENT consulted for possible tracheostomy in setting of prolonged intubation and vent dependency. Current vent settings include FiO2 of 50% and PEEP of 5.    Medications:  Continuous Infusions:   amiodarone in dextrose 5% 1 mg/min (07/27/18 1700)    amiodarone in dextrose 5%      fentanyl 2.5 mL/hr at 07/27/18 1700    furosemide (LASIX) 2 mg/mL infusion (non-titrating) 20 mg/hr (07/27/18 1700)    norepinephrine bitartrate-D5W 0.04 mcg/kg/min (07/27/18 1700)    propofol 50 mcg/kg/min (07/27/18 1700)     Scheduled Meds:   albuterol sulfate  2.5 mg Nebulization Q4H    aspirin  81 mg Oral Daily    atorvastatin  40 mg Oral Daily    bisacodyl  10 mg Rectal Once    budesonide  0.5 mg Nebulization Q12H    ceFEPime (MAXIPIME) IVPB  2 g Intravenous Q8H    chlorhexidine  15 mL Mouth/Throat BID    chlorothiazide (DIURIL) IVPB  250 mg Intravenous Q12H    docusate  100 mg Oral Daily    fluticasone-vilanterol  1 puff Inhalation Daily    gabapentin  600 mg Oral BID    heparin (porcine)  5,000 Units Subcutaneous Q8H    [START ON 7/28/2018] insulin aspart U-100  3 Units Subcutaneous Q24H    [START ON 7/28/2018] insulin aspart U-100  3 Units  Subcutaneous Q24H    [START ON 7/28/2018] insulin aspart U-100  3 Units Subcutaneous Q24H    [START ON 7/28/2018] insulin aspart U-100  3 Units Subcutaneous Q24H    [START ON 7/28/2018] insulin aspart U-100  3 Units Subcutaneous Q24H    insulin aspart U-100  3 Units Subcutaneous Q24H    insulin detemir U-100  10 Units Subcutaneous QHS    lactulose  30 g Per OG tube Once    levETIRAcetam  500 mg Oral BID    metoprolol  2.5 mg Intravenous Q4H    mexiletine  150 mg Oral Q8H    pantoprozole (PROTONIX) 40 mg/100 mL D5W IVPB  40 mg Intravenous BID    predniSONE  30 mg Oral Daily    sennosides 8.8 mg/5 ml  5 mL Oral QHS    sodium chloride 0.9%  3 mL Intravenous Q8H    vancomycin (VANCOCIN) IVPB  1,000 mg Intravenous Q12H     PRN Meds:sodium chloride, sodium chloride, acetaminophen, ALPRAZolam, benzonatate, calcium gluconate IVPB, calcium gluconate IVPB, calcium gluconate IVPB, carisoprodol, dextrose 50%, glucagon (human recombinant), HYDROcodone-acetaminophen, insulin aspart U-100, magnesium sulfate IVPB, magnesium sulfate IVPB, nitroGLYCERIN, potassium chloride in water **AND** potassium chloride in water **AND** potassium chloride in water, sodium phosphate IVPB, sodium phosphate IVPB, sodium phosphate IVPB     No current facility-administered medications on file prior to encounter.      Current Outpatient Prescriptions on File Prior to Encounter   Medication Sig    albuterol (PROVENTIL) 2.5 mg /3 mL (0.083 %) nebulizer solution Take 2.5 mg by nebulization every 6 (six) hours as needed.    albuterol (VENTOLIN HFA) 90 mcg/actuation inhaler Inhale 2 puffs into the lungs every 4 (four) hours as needed for Wheezing.    alprazolam (XANAX) 2 MG tablet Take 2 mg by mouth 2 (two) times daily as needed.     amiodarone (PACERONE) 400 MG tablet Take 1 tablet (400 mg total) by mouth 2 (two) times daily until 7/9/2018. Then take 1 tablet (400 mg total) by mouth 1 (one) time daily thereafter.    aspirin (ECOTRIN) 81  MG EC tablet Take 81 mg by mouth. 1 Tablet, Delayed Release (E.C.) Oral Every day    atorvastatin (LIPITOR) 40 MG tablet Take 1 tablet (40 mg total) by mouth once daily.    bumetanide (BUMEX) 1 MG tablet Take 1 mg by mouth daily as needed.    carisoprodol (SOMA) 350 MG tablet Take 350 mg by mouth 4 (four) times daily as needed for Muscle spasms.    colchicine 0.6 mg tablet 0.6 mg once daily.     enoxaparin (LOVENOX) 100 mg/mL Syrg Inject 1 mL (100 mg total) into the skin every 12 (twelve) hours.    fluticasone-vilanterol (BREO ELLIPTA) 200-25 mcg/dose DsDv diskus inhaler Inhale 1 puff into the lungs once daily. Controller    gabapentin (NEURONTIN) 600 MG tablet Take 600 mg by mouth 2 (two) times daily. 1 Tablet Oral At bedtime    hydrocodone-acetaminophen 10-325mg (NORCO)  mg Tab Take 1 tablet by mouth 2 (two) times daily as needed.     isosorbide mononitrate (IMDUR) 30 MG 24 hr tablet Take 3 tablets (90 mg total) by mouth once daily. (Patient taking differently: Take 60 mg by mouth once daily. )    levETIRAcetam (KEPPRA) 500 MG Tab Take 1 tablet (500 mg total) by mouth 2 (two) times daily.    losartan (COZAAR) 50 MG tablet Take 1 tablet (50 mg total) by mouth once daily.    metoprolol tartrate (LOPRESSOR) 25 MG tablet Take 1 tablet (25 mg total) by mouth 2 (two) times daily.    nitroGLYCERIN (NITROSTAT) 0.4 MG SL tablet ONE TABLET UNDER TONGUE AS NEEDED FOR CHEST PAIN    pantoprazole (PROTONIX) 40 MG tablet Take 40 mg by mouth. 1 Tablet, Delayed Release (E.C.) Oral Every day    potassium chloride SA (K-DUR,KLOR-CON) 20 MEQ tablet TAKE 2 TABLETS BY MOUTH EVERY MORNING AND 1 TABLET BY MOUTH EVERY EVENING    predniSONE (DELTASONE) 10 MG tablet Take 1 tablet (10 mg total) by mouth once daily.    promethazine-codeine 6.25-10 mg/5 ml (PHENERGAN WITH CODEINE) 6.25-10 mg/5 mL syrup TK 5 ML PO  Q 6 H PRN    spironolactone (ALDACTONE) 25 MG tablet TAKE 1 TABLET BY MOUTH EVERY DAY    warfarin  (COUMADIN) 7.5 MG tablet 1 tablet Monday  0.5 tablet Tuesday-Sunday       Review of patient's allergies indicates:  No Known Allergies    Past Medical History:   Diagnosis Date    AICD (automatic cardioverter/defibrillator) present     Arthritis     Atrial fibrillation     CHF (congestive heart failure)     Coronary artery disease     History of stroke 7/13/2018    2005, 2006, no deficits per wife.    Hypertension     MI (myocardial infarction)     Sarcoid     Seizures     Stroke      Past Surgical History:   Procedure Laterality Date    CARDIAC CATHETERIZATION      CARDIAC DEFIBRILLATOR PLACEMENT  7-12    CARDIAC DEFIBRILLATOR PLACEMENT      CARDIAC DEFIBRILLATOR PLACEMENT      COLONOSCOPY N/A 7/2/2018    Procedure: COLONOSCOPY;  Surgeon: THELMA Kay MD;  Location: [facility] ENDO ([address] [place] [address]);  Service: Endoscopy;  Laterality: N/A;    CORONARY STENT PLACEMENT  07/2011    ESOPHAGOGASTRODUODENOSCOPY      FRACTURE SURGERY      LEFT HEART CATHETERIZATION N/A 6/25/2018    Procedure: Left heart cath;  Surgeon: Jordi Lui MD;  Location: [facility] CATH LAB;  Service: Cardiology;  Laterality: N/A;     Family History     Problem Relation (Age of Onset)    Cancer Maternal Grandmother    Coronary artery disease Father, Sister, Sister    Heart disease Mother, Father, Sister    Hypertension Mother, Father, Sister    Kidney disease Sister    Stroke Sister    Vision loss Sister        Social History Main Topics    Smoking status: Never Smoker    Smokeless tobacco: Never Used    Alcohol use No    Drug use: No    Sexual activity: Not on file     Review of Systems   Unable to perform ROS: Intubated     Objective:     Vital Signs (Most Recent):  Temp: 99.1 °F (37.3 °C) (07/27/18 1500)  Pulse: 80 (07/27/18 1657)  Resp: 19 (07/27/18 1657)  BP: (!) 89/50 (07/27/18 1515)  SpO2: (!) 93 % (07/27/18 1515) Vital Signs (24h Range):  Temp:  [99.1 °F (37.3 °C)-101.3 °F (38.5 °C)] 99.1 °F (37.3 °C)  Pulse:  []  80  Resp:  [0-48] 19  SpO2:  [86 %-100 %] 93 %  BP: ()/() 89/50     Weight: 104.2 kg (229 lb 11.5 oz)  Body mass index is 32.04 kg/m².      Date 07/27/18 0700 - 07/28/18 0659   Shift 2460-3348 4291-4717 8926-0681 24 Hour Total   I  N  T  A  K  E   NG/ 250  395    Shift Total  (mL/kg) 145  (1.4) 250  (2.4)  395  (3.8)   O  U  T  P  U  T   Urine  (mL/kg/hr) 1600  (1.9) 360  1960    Shift Total  (mL/kg) 1600  (15.4) 360  (3.5)  1960  (18.8)   Weight (kg) 104.2 104.2 104.2 104.2       Physical Exam  Constitutional: Intubated  Nose: NGT in left nare  Oral Cavity: ETT in placed   Neck/Lymphatic: Central line in right IJ.  Respiratory: On Vent. FiO2 of 50% and PEEP of 5.    Significant Labs:  None    Significant Diagnostics:  None    Assessment/Plan:     Respiratory failure requiring intubation    57 year old male with history of heart failure secondary to ischemic cardiomyopathy. Patient presented with ventricular tachycardia refractory to medical management, coded, and intubated on 7/10.     -Plan of care must be established before tracheostomy will be considered.  -Please call with any questions.          VTE Risk Mitigation         Ordered     heparin (porcine) injection 5,000 Units  Every 8 hours      07/14/18 6029          Thank you for your consult.    Rafa Hsu MD  Otorhinolaryngology-Head & Neck Surgery  [facility]

## 2018-07-27 NOTE — PROGRESS NOTES
Update:    Dr. THELMA Cross and SW called pt's wife, Adama Good [phone], using face time so that pt's wife could be seen in person. Pt's wife unable to speak into the phone, however she was able to nod her head in understanding of MD. Pt's wife also able to speak softly, which could not be heard over the phone. Pt's step-daughter was able to interpret for pt's wife, and pt's wife was able to nod in agreement to her daughters interpretation. Dr. Cross provided an update on pt's condition, and explained prognosis is poor. Pt's wife explained she was unable to come to [facility] because of her own medical issues. Pt's wife gave Dr. Cross permission to make medical decisions with both of pt's daughters, Kasandra Good and Kyree Chisholm, although her preference would be [place] over [place].     SW and Dr. THELMA Cross had a family meeting with pt's dtr Kasandra, pt's brother, pt's sister in law, and pt's sister. Dr. Cross provided update on pt's condition, and explained prognosis is poor. Pt's family voiced understanding. Pt's dtr visibly agitated, raising voice, and tearful. Pt's sister and brother calm and communicative. Dr. Cross explained plan to continue to wean sedation over weekend, and keep family informed of pt's condition. Pt's family voiced understanding of information provided. SW providing emotional support to family.    SW providing ongoing psychosocial and counseling support, education, assistance, resources, and discharge planning as indicated. SW continuing to follow and remains available.

## 2018-07-27 NOTE — ASSESSMENT & PLAN NOTE
BG goal 140-180    Insulin naive with RONALD so will start ~0.25 units/kg TDD    Levemir 10 units nightly  Novolog 3 units q4 while on TF - hold if TF held or glucose <100  POC glucose q4  Low dose correction    Discharge plans: TBD

## 2018-07-27 NOTE — ASSESSMENT & PLAN NOTE
- INR subtherapeutic  - Coumadin on hold anticoagulation has been on hold in setting of recent GI bleed. Has been receiving DVT PPx  - Protonix increased to 40 mg BID (changed to IV)  - Had screening colonoscopy with hot snare polypectomy during last admission; most likely culprit post polypectomy bleeding  - Hgb trending back down a bit at 7.1 (got 1 unit PC's 7/24)

## 2018-07-27 NOTE — SUBJECTIVE & OBJECTIVE
Medications:  Continuous Infusions:   amiodarone in dextrose 5% 1 mg/min (07/27/18 1700)    amiodarone in dextrose 5%      fentanyl 2.5 mL/hr at 07/27/18 1700    furosemide (LASIX) 2 mg/mL infusion (non-titrating) 20 mg/hr (07/27/18 1700)    norepinephrine bitartrate-D5W 0.04 mcg/kg/min (07/27/18 1700)    propofol 50 mcg/kg/min (07/27/18 1700)     Scheduled Meds:   albuterol sulfate  2.5 mg Nebulization Q4H    aspirin  81 mg Oral Daily    atorvastatin  40 mg Oral Daily    bisacodyl  10 mg Rectal Once    budesonide  0.5 mg Nebulization Q12H    ceFEPime (MAXIPIME) IVPB  2 g Intravenous Q8H    chlorhexidine  15 mL Mouth/Throat BID    chlorothiazide (DIURIL) IVPB  250 mg Intravenous Q12H    docusate  100 mg Oral Daily    fluticasone-vilanterol  1 puff Inhalation Daily    gabapentin  600 mg Oral BID    heparin (porcine)  5,000 Units Subcutaneous Q8H    [START ON 7/28/2018] insulin aspart U-100  3 Units Subcutaneous Q24H    [START ON 7/28/2018] insulin aspart U-100  3 Units Subcutaneous Q24H    [START ON 7/28/2018] insulin aspart U-100  3 Units Subcutaneous Q24H    [START ON 7/28/2018] insulin aspart U-100  3 Units Subcutaneous Q24H    [START ON 7/28/2018] insulin aspart U-100  3 Units Subcutaneous Q24H    insulin aspart U-100  3 Units Subcutaneous Q24H    insulin detemir U-100  10 Units Subcutaneous QHS    lactulose  30 g Per OG tube Once    levETIRAcetam  500 mg Oral BID    metoprolol  2.5 mg Intravenous Q4H    mexiletine  150 mg Oral Q8H    pantoprozole (PROTONIX) 40 mg/100 mL D5W IVPB  40 mg Intravenous BID    predniSONE  30 mg Oral Daily    sennosides 8.8 mg/5 ml  5 mL Oral QHS    sodium chloride 0.9%  3 mL Intravenous Q8H    vancomycin (VANCOCIN) IVPB  1,000 mg Intravenous Q12H     PRN Meds:sodium chloride, sodium chloride, acetaminophen, ALPRAZolam, benzonatate, calcium gluconate IVPB, calcium gluconate IVPB, calcium gluconate IVPB, carisoprodol, dextrose 50%, glucagon (human  recombinant), HYDROcodone-acetaminophen, insulin aspart U-100, magnesium sulfate IVPB, magnesium sulfate IVPB, nitroGLYCERIN, potassium chloride in water **AND** potassium chloride in water **AND** potassium chloride in water, sodium phosphate IVPB, sodium phosphate IVPB, sodium phosphate IVPB     No current facility-administered medications on file prior to encounter.      Current Outpatient Prescriptions on File Prior to Encounter   Medication Sig    albuterol (PROVENTIL) 2.5 mg /3 mL (0.083 %) nebulizer solution Take 2.5 mg by nebulization every 6 (six) hours as needed.    albuterol (VENTOLIN HFA) 90 mcg/actuation inhaler Inhale 2 puffs into the lungs every 4 (four) hours as needed for Wheezing.    alprazolam (XANAX) 2 MG tablet Take 2 mg by mouth 2 (two) times daily as needed.     amiodarone (PACERONE) 400 MG tablet Take 1 tablet (400 mg total) by mouth 2 (two) times daily until 7/9/2018. Then take 1 tablet (400 mg total) by mouth 1 (one) time daily thereafter.    aspirin (ECOTRIN) 81 MG EC tablet Take 81 mg by mouth. 1 Tablet, Delayed Release (E.C.) Oral Every day    atorvastatin (LIPITOR) 40 MG tablet Take 1 tablet (40 mg total) by mouth once daily.    bumetanide (BUMEX) 1 MG tablet Take 1 mg by mouth daily as needed.    carisoprodol (SOMA) 350 MG tablet Take 350 mg by mouth 4 (four) times daily as needed for Muscle spasms.    colchicine 0.6 mg tablet 0.6 mg once daily.     enoxaparin (LOVENOX) 100 mg/mL Syrg Inject 1 mL (100 mg total) into the skin every 12 (twelve) hours.    fluticasone-vilanterol (BREO ELLIPTA) 200-25 mcg/dose DsDv diskus inhaler Inhale 1 puff into the lungs once daily. Controller    gabapentin (NEURONTIN) 600 MG tablet Take 600 mg by mouth 2 (two) times daily. 1 Tablet Oral At bedtime    hydrocodone-acetaminophen 10-325mg (NORCO)  mg Tab Take 1 tablet by mouth 2 (two) times daily as needed.     isosorbide mononitrate (IMDUR) 30 MG 24 hr tablet Take 3 tablets (90 mg  total) by mouth once daily. (Patient taking differently: Take 60 mg by mouth once daily. )    levETIRAcetam (KEPPRA) 500 MG Tab Take 1 tablet (500 mg total) by mouth 2 (two) times daily.    losartan (COZAAR) 50 MG tablet Take 1 tablet (50 mg total) by mouth once daily.    metoprolol tartrate (LOPRESSOR) 25 MG tablet Take 1 tablet (25 mg total) by mouth 2 (two) times daily.    nitroGLYCERIN (NITROSTAT) 0.4 MG SL tablet ONE TABLET UNDER TONGUE AS NEEDED FOR CHEST PAIN    pantoprazole (PROTONIX) 40 MG tablet Take 40 mg by mouth. 1 Tablet, Delayed Release (E.C.) Oral Every day    potassium chloride SA (K-DUR,KLOR-CON) 20 MEQ tablet TAKE 2 TABLETS BY MOUTH EVERY MORNING AND 1 TABLET BY MOUTH EVERY EVENING    predniSONE (DELTASONE) 10 MG tablet Take 1 tablet (10 mg total) by mouth once daily.    promethazine-codeine 6.25-10 mg/5 ml (PHENERGAN WITH CODEINE) 6.25-10 mg/5 mL syrup TK 5 ML PO  Q 6 H PRN    spironolactone (ALDACTONE) 25 MG tablet TAKE 1 TABLET BY MOUTH EVERY DAY    warfarin (COUMADIN) 7.5 MG tablet 1 tablet Monday  0.5 tablet Tuesday-Sunday       Review of patient's allergies indicates:  No Known Allergies    Past Medical History:   Diagnosis Date    AICD (automatic cardioverter/defibrillator) present     Arthritis     Atrial fibrillation     CHF (congestive heart failure)     Coronary artery disease     History of stroke 7/13/2018    2005, 2006, no deficits per wife.    Hypertension     MI (myocardial infarction)     Sarcoid     Seizures     Stroke      Past Surgical History:   Procedure Laterality Date    CARDIAC CATHETERIZATION      CARDIAC DEFIBRILLATOR PLACEMENT  7-12    CARDIAC DEFIBRILLATOR PLACEMENT      CARDIAC DEFIBRILLATOR PLACEMENT      COLONOSCOPY N/A 7/2/2018    Procedure: COLONOSCOPY;  Surgeon: THELMA Kay MD;  Location: [facility] ([address] [place] [address]);  Service: Endoscopy;  Laterality: N/A;    CORONARY STENT PLACEMENT  07/2011    ESOPHAGOGASTRODUODENOSCOPY      FRACTURE  SURGERY      LEFT HEART CATHETERIZATION N/A 6/25/2018    Procedure: Left heart cath;  Surgeon: Jordi Lui MD;  Location: [facility] CATH LAB;  Service: Cardiology;  Laterality: N/A;     Family History     Problem Relation (Age of Onset)    Cancer Maternal Grandmother    Coronary artery disease Father, Sister, Sister    Heart disease Mother, Father, Sister    Hypertension Mother, Father, Sister    Kidney disease Sister    Stroke Sister    Vision loss Sister        Social History Main Topics    Smoking status: Never Smoker    Smokeless tobacco: Never Used    Alcohol use No    Drug use: No    Sexual activity: Not on file     Review of Systems   Unable to perform ROS: Intubated     Objective:     Vital Signs (Most Recent):  Temp: 99.1 °F (37.3 °C) (07/27/18 1500)  Pulse: 80 (07/27/18 1657)  Resp: 19 (07/27/18 1657)  BP: (!) 89/50 (07/27/18 1515)  SpO2: (!) 93 % (07/27/18 1515) Vital Signs (24h Range):  Temp:  [99.1 °F (37.3 °C)-101.3 °F (38.5 °C)] 99.1 °F (37.3 °C)  Pulse:  [] 80  Resp:  [0-48] 19  SpO2:  [86 %-100 %] 93 %  BP: ()/() 89/50     Weight: 104.2 kg (229 lb 11.5 oz)  Body mass index is 32.04 kg/m².      Date 07/27/18 0700 - 07/28/18 0659   Shift 0310-5370 4264-5780 7224-0661 24 Hour Total   I  N  T  A  K  E   NG/ 250  395    Shift Total  (mL/kg) 145  (1.4) 250  (2.4)  395  (3.8)   O  U  T  P  U  T   Urine  (mL/kg/hr) 1600  (1.9) 360  1960    Shift Total  (mL/kg) 1600  (15.4) 360  (3.5)  1960  (18.8)   Weight (kg) 104.2 104.2 104.2 104.2       Physical Exam  Constitutional: Intubated  Nose: NGT in left nare  Oral Cavity: ETT in placed   Neck/Lymphatic: Central line in right IJ.  Respiratory: On Vent. FiO2 of 50% and PEEP of 5.    Significant Labs:  None    Significant Diagnostics:  None

## 2018-07-27 NOTE — PLAN OF CARE
Brief progress note, delayed entry.      Patient febrile to 38.5 C overnight. Remains critically ill - intubated on pressors. Recultured - blood cultures, urine culture, and resp cultures ordered, daily chest x-ray reviewed - and resumed broad spectrum antibiotics with Vanc/Cefepime. Will continue to monitor.     Terrie Tran MD   Cardiology Fellow, PGY-4

## 2018-07-27 NOTE — PROGRESS NOTES
[facility]  Heart Transplant  Progress Note    Patient Name: Yonathan Good Jr.  MRN: [id number]  Admission Date: 7/7/2018  Hospital Length of Stay: 20 days  Attending Physician: Mohan Cross MD  Primary Care Provider: Edgar Gates MD  Principal Problem:Ventricular tachycardia, incessant    Subjective:     Interval History: Spiked a temp to 101.3 overnight, and Vanc/Cefepime resumed. Remains intubated. Fentanyl weaned off, Propofol continues. Does not wake up or follow commands off Fentanyl. CVP 10, sVO2 59%. Had bowel movement this morning.    Continuous Infusions:   amiodarone in dextrose 5% 0.5 mg/min (07/27/18 1000)    fentanyl Stopped (07/27/18 0800)    furosemide (LASIX) 2 mg/mL infusion (non-titrating) 20 mg/hr (07/27/18 1000)    norepinephrine bitartrate-D5W 0.04 mcg/kg/min (07/27/18 1000)    propofol 50 mcg/kg/min (07/27/18 1000)     Scheduled Meds:   albuterol sulfate  2.5 mg Nebulization Q4H    aspirin  81 mg Oral Daily    atorvastatin  40 mg Oral Daily    bisacodyl  10 mg Rectal Once    budesonide  0.5 mg Nebulization Q12H    ceFEPime (MAXIPIME) IVPB  2 g Intravenous Q8H    chlorhexidine  15 mL Mouth/Throat BID    chlorothiazide (DIURIL) IVPB  250 mg Intravenous Q12H    docusate  100 mg Oral Daily    fluticasone-vilanterol  1 puff Inhalation Daily    gabapentin  600 mg Oral BID    heparin (porcine)  5,000 Units Subcutaneous Q8H    lactulose  30 g Per OG tube Once    levETIRAcetam  500 mg Oral BID    metoprolol  2.5 mg Intravenous Q4H    mexiletine  150 mg Oral Q8H    pantoprozole (PROTONIX) 40 mg/100 mL D5W IVPB  40 mg Intravenous BID    predniSONE  30 mg Oral Daily    sennosides 8.8 mg/5 ml  5 mL Oral QHS    sodium chloride 0.9%  3 mL Intravenous Q8H    vancomycin (VANCOCIN) IVPB  1,000 mg Intravenous Q12H     PRN Meds:sodium chloride, sodium chloride, acetaminophen, ALPRAZolam, benzonatate, calcium gluconate IVPB, calcium gluconate IVPB, calcium gluconate  IVPB, carisoprodol, dextrose 50%, glucagon (human recombinant), HYDROcodone-acetaminophen, insulin aspart U-100, magnesium sulfate IVPB, magnesium sulfate IVPB, nitroGLYCERIN, potassium chloride in water **AND** potassium chloride in water **AND** potassium chloride in water, sodium phosphate IVPB, sodium phosphate IVPB, sodium phosphate IVPB    Review of patient's allergies indicates:  No Known Allergies  Objective:     Vital Signs (Most Recent):  Temp: 99.5 °F (37.5 °C) (07/27/18 0700)  Pulse: 80 (07/27/18 0945)  Resp: 19 (07/27/18 0945)  BP: (!) 96/53 (07/27/18 0945)  SpO2: (!) 93 % (07/27/18 0945) Vital Signs (24h Range):  Temp:  [99.5 °F (37.5 °C)-101.3 °F (38.5 °C)] 99.5 °F (37.5 °C)  Pulse:  [80-98] 80  Resp:  [0-30] 19  SpO2:  [91 %-100 %] 93 %  BP: ()/(50-65) 96/53     Patient Vitals for the past 72 hrs (Last 3 readings):   Weight   07/27/18 0501 104.2 kg (229 lb 11.5 oz)   07/25/18 0314 107 kg (235 lb 14.3 oz)     Body mass index is 32.04 kg/m².      Intake/Output Summary (Last 24 hours) at 07/27/18 1020  Last data filed at 07/27/18 1000   Gross per 24 hour   Intake             4227 ml   Output             5075 ml   Net             -848 ml       Hemodynamic Parameters:       Telemetry: BiV paced, one run NSVT this morning    Physical Exam   Constitutional: He appears well-developed and well-nourished.   HENT:   Head: Normocephalic and atraumatic.   Eyes: Conjunctivae and EOM are normal. Pupils are equal, round, and reactive to light.   Neck: Neck supple. No thyromegaly present.   RIJ line   Cardiovascular: Normal rate and regular rhythm.    Doppler left pedal pulse, palpable right pedal pulse   Pulmonary/Chest:   Intubated and ventilated. Bibasilar crackles   Abdominal: He exhibits distension.   No bowel sounds appreciated   Musculoskeletal:   2-3+ KAUSHIK   Neurological:   Intubated and sedated   Skin: Skin is warm and dry. Capillary refill takes 2 to 3 seconds.       Significant Labs:  CBC:    Recent  Labs  Lab 07/25/18 0309 07/26/18 0330 07/27/18 0341   WBC 12.03 13.33* 13.11*   RBC 2.54* 2.77* 2.50*   HGB 7.2* 7.8* 7.1*   HCT 24.0* 25.8* 23.3*    237 236   MCV 95 93 93   MCH 28.3 28.2 28.4   MCHC 30.0* 30.2* 30.5*     BNP:  No results for input(s): BNP in the last 168 hours.    Invalid input(s): BNPTRIAGELBLO  CMP:    Recent Labs  Lab 07/25/18 0309 07/26/18 0330 07/26/18 0748 07/26/18 1944 07/27/18 0341   *  149*  < > 175* 178* 231* 149*   CALCIUM 9.6  9.6  < > 9.8 9.9 9.7 9.7   ALBUMIN 1.9*  --  2.1*  --   --  2.0*   PROT 6.5  --  6.8  --   --  6.4     144  < > 144 142 142 142   K 4.2  4.2  < > 3.3* 3.9 3.9 3.8   CO2 31*  31*  < > 35* 33* 33* 34*     101  < > 96 98 97 95   BUN 83*  83*  < > 89* 90* 99* 111*   CREATININE 1.5*  1.5*  < > 1.5* 1.4 1.7* 1.8*   ALKPHOS 63  --  65  --   --  62   ALT 5*  --  6*  --   --  5*   AST 16  --  17  --   --  14   BILITOT 0.6  --  0.5  --   --  0.5   < > = values in this interval not displayed.   Coagulation:     Recent Labs  Lab 07/25/18 0309 07/26/18 0330 07/27/18 0341   INR 1.0 1.0 1.1     LDH:  No results for input(s): LDH in the last 72 hours.  Microbiology:  Microbiology Results (last 7 days)     Procedure Component Value Units Date/Time    Blood culture [[id number]] Collected:  07/26/18 1936    Order Status:  Completed Specimen:  Blood from Peripheral, Lower Arm, Right Updated:  07/27/18 0315     Blood Culture, Routine No Growth to date    Culture, Respiratory with Gram Stain [[id number]] Collected:  07/26/18 2128    Order Status:  Completed Specimen:  Respiratory from Endotracheal Aspirate Updated:  07/27/18 0154     Gram Stain (Respiratory) <10 epithelial cells per low power field.     Gram Stain (Respiratory) Many WBC's     Gram Stain (Respiratory) Many Gram positive cocci     Gram Stain (Respiratory) Few Gram negative rods     Gram Stain (Respiratory) Few Gram positive rods    Blood culture [[id number]] Collected:   07/26/18 2053    Order Status:  Sent Specimen:  Blood from Peripheral, Lower Arm, Left Updated:  07/26/18 2053    Culture, Respiratory with Gram Stain [[id number]]     Order Status:  Canceled Specimen:  Respiratory from Endotracheal Aspirate     Urine culture [[id number]]     Order Status:  Completed Specimen:  Urine from Urine, Clean Catch     Blood culture [[id number]] Collected:  07/26/18 0718    Order Status:  Completed Specimen:  Blood from Peripheral, Antecubital, Left Updated:  07/26/18 1715     Blood Culture, Routine No Growth to date    Blood culture [[id number]] Collected:  07/26/18 0718    Order Status:  Completed Specimen:  Blood from Peripheral, Upper Arm, Left Updated:  07/26/18 1715     Blood Culture, Routine No Growth to date    Urine culture [[id number]] Collected:  07/26/18 0729    Order Status:  Sent Specimen:  Urine from Urine, Catheterized Updated:  07/26/18 0913    Blood culture [[id number]] Collected:  07/18/18 1803    Order Status:  Completed Specimen:  Blood from Peripheral, Forearm, Right Updated:  07/23/18 2212     Blood Culture, Routine No growth after 5 days.    Blood culture [[id number]] Collected:  07/18/18 1803    Order Status:  Completed Specimen:  Blood from Peripheral, Antecubital, Left Updated:  07/23/18 2212     Blood Culture, Routine No growth after 5 days.    Blood culture [[id number]] Collected:  07/17/18 0930    Order Status:  Completed Specimen:  Blood from Peripheral, Antecubital, Right Updated:  07/22/18 1212     Blood Culture, Routine No growth after 5 days.    IV catheter culture [[id number]] Collected:  07/19/18 1851    Order Status:  Completed Specimen:  Catheter Tip from Catheter Tip, Intrajugular Updated:  07/21/18 1344     Aerobic Culture - Cath tip --     STAPHYLOCOCCUS EPIDERMIDIS  < 15 colonies      Narrative:       Culture [facility] [place]    Blood culture [[id number]] Collected:  07/17/18 0950    Order Status:  Completed Specimen:  Blood from Peripheral, Antecubital, Right Updated:   "07/20/18 1048     Blood Culture, Routine Gram stain aer bottle: Gram positive cocci in clusters resembling Staph     Blood Culture, Routine Results called to and read back by:Javon Hatfield RN 07/18/2018  17:50     Blood Culture, Routine --     COAGULASE-NEGATIVE STAPHYLOCOCCUS SPECIES  Organism is a probable contaminant            I have reviewed all pertinent labs within the past 24 hours.    Estimated Creatinine Clearance: 57 mL/min (A) (based on SCr of 1.8 mg/dL (H)).    Diagnostic Results:  I have reviewed all pertinent imaging results/findings within the past 24 hours.    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      He was initially admitted to "stroke/neuro" service on 6/22 with dysarthria and stroke-like symptoms. Extensive work up was negative for recurrent CVA and so no tPA was administered. Patient developed atypical chest pain two days into his hospital course and was noted to be in monomorphic VT (6/24) which was treated with ATP then with shock due to recurrent VT in VF zone. Given his active cardiac issues this prompted transfer to heart failure service where the patient was initiated on IV amiodarone and beta blocker with subsequent resolution of his VT. He was eventually transitioned to PO amiodarone 400 mg BID x 2 weeks (from 6/25-7/9) followed by amiodarone 400 mg daily thereafter per EP recommendations. Of note patient underwent LHC on 6/25 given his extensive ischemic history which did not reveal a culprit lesion, therefore no interventions were done. He was noted to have an elevated LVEDP to 45 however and was administered IV diuresis before being transitioned back to his PO diuretic regimen. Patient also completed heart failure pathway during his hospital course (eg completed colonoscopy " on 7/2) as part of his work up for advanced options. Follows with Dr. Berman of heart failure/transplant as an outpatient.      The patient was discharged home in stable condition on 7/4/2018. He was dc'ed on warf/lovenox bridge given CHADS2-VaSc of 5.  Of note, he is no longer a candidate for AO.  He presented yesterday to [facility] with BRBPR and was found to be in acute renal failure as well.  He notes that, on the night of the 5th and morning of 6th, he had 6 bright red bloody bm's.  He went to his PMD who noted he might have internal hemmorhoids.  He was told to stop his lovenox.  He then returned home and flet very lightheaded and dizzy.  He wisely took his blood pressure and noted it was 70-80/50s whereas it is normally 117/70s.  He went in to ED immediately.  There he was noted to have the following pertinent lab values:  Hg 9.2 (11.7 prior)  INR 2.9  Na 131 (139 prior)  K 6.18  BUN 43 (18 prior)  Cr 2.88 (1.1-1.4 at baseline)  proBNP 2755  He was transferred to [facility] for further evaluation and care.  Of note, he underwent screening c-scope on 7/2 during which the following was noted and done: Diverticulosis in the sigmoid colon and in the descending colon, Two 8 to 10 mm polyps in the sigmoid colon and in the descending colon, removed with a hot snare, resected and retrieved, ligated.        * VT Storm    -VT storm 7/10/18. Polymorphic and monomorphic. Degenerated into VF. 12 ICD shocks  -Intubated, IABP placed emergently at bedside 7/10. IABP removed 7/25 - NSVT seen on tele. Discussed further AA therapy with EP who will see him again today  -VT storm again on 7/17/18 and reloaded with amio (did not seem to absorb oral Amio). Continue metoprolol, mexitil, amio gtt for now.   -K goal >4.5, Mg >2.5  - Per EP-At a later time, it may be considered to deactivate the LV lead of the patient's CRT-D, considering the patient's LVAD status and native RBBB. ICD interrogation reveals LICHA. EP signed off,  requests that we let them know if/when gen change needed        Hypernatremia    -Improved some with addition of free water flushes, continue 250 q 6 hours        Constipation    - Last bowel movement 7/21. Tolerating tube feeds at goal rate with minimal residual. Will continue bowel program and give Lactulose today          VAP (ventilator-associated pneumonia)    -Completed total 10 day course of antibiotics 7/24.   -Cultures 7/13 with S. Aureus in sputum, +jesenia blood cultures on 7/17 (probable contaminent). Blood cultures 7/18 are -ve.          Leukocytosis    - Temp spike to 101.3 overnight. Recultured yesterday and Vanc/Cefepime resumed. Will monitor  - Will change RIJ central line that was placed 7/19 and culture tip        History of stroke    -On coumadin prior to admit  -Hold off on systemic anticoagulation for now.        Respiratory failure requiring intubation    -Emergently intubated 7/10/18 for impending respiratory failure/need for IABP and inability to lay fat  -History of pulmonary sarcoidosis  -Methylpred given 7/10/18, hydrocortisone 100 mg q8 started 7/11/18. Transitioned to PO prednisone 7/16.   -Pulmonary consult for assistance with sarcoid & vent management. We are now at the point of considering trach. Family to meet with Palliative Care today. Daughter Kasandra claims to be HCPOA, and is to bring paperwork today. If she is unable to produce paperwork, need to call pt's wife to ensure that she would like to give decision making authority to daughter  -CXR daily  -CXR today continues to show pulmonary edema  -Fentanyl weaned off. Will likely change Propofol to Precedex over the weekend and assess neurological status with sedation wean as per Pulmimi santoyo        Cardiogenic shock    - IABP placed emergently 7/10/18. Pulled 7/25  - Daily CXR - today shows persistent pulmonary edema  - CVP 10 and net -ve 885 past 24 hours. Continue Lasix at 20 mg/hr and IV Diuril 250 mg bid  .        Ventricular  fibrillation    -See VT        Hypotension    - Levophed started during code 7/10/18  - Wean levo for MAP >60         Acute on chronic combined systolic and diastolic CHF, NYHA class 4    - See cardiogenic shock        GI bleed    - INR subtherapeutic  - Coumadin on hold anticoagulation has been on hold in setting of recent GI bleed. Has been receiving DVT PPx  - Protonix increased to 40 mg BID (changed to IV)  - Had screening colonoscopy with hot snare polypectomy during last admission; most likely culprit post polypectomy bleeding  - Hgb trending back down a bit at 7.1 (got 1 unit PC's 7/24)        History of atrial fibrillation    -  continue amio         Abnormal involuntary movements    - Continue keppra  - possible seizure activity noted on 7/12/18 (twitching right face and UEs lasting 5 min). Some Bilateral UE twitching which did not appear to be seizure like occurred on 7/18, resolved with increased sedation. If noted again will reach out to neuro.   - neuro consulted and increased keppra to 1000 IV 12   - continuous EEG read and negative for seizure activity   - Keppra changed to PO        CAD (coronary artery disease)    - Continue atorvastatin, nitro prn  - ASA restarted        Sarcoidosis of lung    - Continue prednisone, breo-ellipta, and albuterol prn.   - Wean pred once condition improves          Uninterrupted Critical Care/Counseling Time (not including procedures): 60 minutes      Lazara Mcleod NP [id number]  Heart Transplant  [facility]

## 2018-07-27 NOTE — PROCEDURES
"Yonathan Good Jr. is a 57 y.o. male patient.    Temp: 99.1 °F (37.3 °C) (07/27/18 1500)  Pulse: 80 (07/27/18 1845)  Resp: 17 (07/27/18 1845)  BP: (!) 89/54 (07/27/18 1830)  SpO2: 96 % (07/27/18 1845)  Weight: 104.2 kg (229 lb 11.5 oz) (07/27/18 0501)  Height: 5' 11" (180.3 cm) (07/07/18 0201)       ICD programming  Date/Time: 7/27/2018 6:53 PM  Performed by: TONY PETIT  Authorized by: TONY PETIT   Patient tolerance: Patient tolerated the procedure well with no immediate complications        Brand: [employer]  Model: Unify Quadra CRT-D    Device at [facility]    Mode DDD    AP 89%  BP >99%    AMS episodes 0  Mode switch 0%  AT/AF 0%    VT episodes 1  ATP delivered 2  Shocks 1  Episodes not terminated 2    Detection criteria:   bpm  bpm        16 intervals     12 intervals        ATP x 1  ATP x 1        ATP x 1  34.9J        34.9J  38.7J        38.7J  38.7J x 4            Tony Petit  7/27/2018  "

## 2018-07-28 NOTE — SUBJECTIVE & OBJECTIVE
Interval History: Weened off fentanyl drip this morning. Became febrile overnight. No other events. Seen and examined with family at bedside. Remains intubated and on ventilator.     Objective:     Vital Signs (Most Recent):  Temp: (!) 101.4 °F (38.6 °C) (07/28/18 1630)  Pulse: 80 (07/28/18 1630)  Resp: (!) 23 (07/28/18 1630)  BP: (!) 96/53 (07/28/18 1630)  SpO2: (!) 93 % (07/28/18 1630) Vital Signs (24h Range):  Temp:  [98.8 °F (37.1 °C)-101.4 °F (38.6 °C)] 101.4 °F (38.6 °C)  Pulse:  [80] 80  Resp:  [16-32] 23  SpO2:  [79 %-99 %] 93 %  BP: ()/(50-61) 96/53     Weight: 98.7 kg (217 lb 9.5 oz)  Body mass index is 30.35 kg/m².      Intake/Output Summary (Last 24 hours) at 07/28/18 1651  Last data filed at 07/28/18 1500   Gross per 24 hour   Intake           3973.9 ml   Output             2090 ml   Net           1883.9 ml       Physical Exam   Constitutional: He appears well-developed.   Has musty odor.    HENT:   Head: Normocephalic and atraumatic.   Mouth/Throat: Oropharynx is clear and moist.   Eyes: Pupils are equal, round, and reactive to light.   Neck: Neck supple.   Cardiovascular: Normal rate and regular rhythm.    Heart sounds faint   Pulmonary/Chest: He has wheezes.   Intubated and on ventilator. RUL expiratory wheezing.    Abdominal: Soft. He exhibits no distension.   Bowel sounds absent    Musculoskeletal: He exhibits edema (2+ pitting ).   Neurological:   Sedated on propofol    Skin: Skin is warm and dry.       Vents:  Vent Mode: A/C (07/28/18 1530)  Set Rate: 16 bmp (07/28/18 1530)  Vt Set: 450 mL (07/28/18 1530)  PEEP/CPAP: 5 cmH20 (07/28/18 1530)  Oxygen Concentration (%): 51 (07/28/18 1630)  Peak Airway Pressure: 23 cmH2O (07/28/18 1530)  Plateau Pressure: 25 cmH20 (07/28/18 1530)  Total Ve: 8.88 mL (07/28/18 1530)  F/VT Ratio<105 (RSBI): (!) 40.08 (07/28/18 1530)    Lines/Drains/Airways     Central Venous Catheter Line                 Percutaneous Central Line Insertion/Assessment - triple  lumen  07/27/18 1434 right internal jugular 1 day          Drain                 NG/OG Tube 07/10/18 1557 Ward sump 16 Fr. Left nostril 18 days         Urethral Catheter 07/27/18 1500 1 day          Airway                 Airway - Non-Surgical 07/10/18 1523 Endotracheal Tube 18 days          Peripheral Intravenous Line                 Midline Catheter Insertion/Assessment  - Single Lumen 07/17/18 1105 Right cephalic vein (lateral side of arm) 18g x 8cm 11 days         Peripheral IV - Single Lumen 07/26/18 1935 Right Forearm 1 day                Significant Labs:    CBC/Anemia Profile:    Recent Labs  Lab 07/27/18  0341 07/27/18  1310 07/28/18  0438   WBC 13.11* 15.23* 13.40*   HGB 7.1* 6.9* 6.4*   HCT 23.3* 22.9* 22.2*    255 268   MCV 93 94 95   RDW 17.1* 17.0* 17.4*        Chemistries:    Recent Labs  Lab 07/27/18  0341 07/27/18  1310 07/28/18  0438    139 141  141   K 3.8 3.5 3.7  3.7   CL 95 90* 93*  93*   CO2 34* 35* 35*  35*   * 115* 118*  118*   CREATININE 1.8* 1.9* 2.1*  2.1*   CALCIUM 9.7 9.8 9.4  9.4   ALBUMIN 2.0* 2.1* 2.1*   PROT 6.4 6.4 6.5   BILITOT 0.5 0.6 0.5   ALKPHOS 62 59 57   ALT 5* 6* 7*   AST 14 17 15   MG 2.6 2.6 2.7*   PHOS 3.8  --   --        Recent Lab Results       07/28/18  1632 07/28/18  1254 07/28/18  0840 07/28/18  0830 07/28/18  0507      Immature Granulocytes          Immature Grans (Abs)          Albumin          Alkaline Phosphatase          Allens Test     N/A     ALT          Anion Gap          AST          Baso #          Basophil%          Total Bilirubin          Site     Other     BUN, Bld          Calcium          Chloride          CO2          Creatinine          DelSys     Adult Vent     Differential Method          eGFR if           eGFR if non           Eos #          Eosinophil%          FiO2     50     Glucose          Gran # (ANC)          Gran%          Hematocrit          Hemoglobin          Coumadin  Monitoring INR          Lymph #          Lymph%          Magnesium          MCH          MCHC          MCV          Mode     AC/PRVC     Mono #          Mono%          MPV          nRBC          PEEP     5     Platelets          POC BE     16     POC HCO3     41.8(H)     POC PCO2     77.0(H)     POC PH     7.342(L)     POC PO2     28(LL)     POC SATURATED O2     45(L)     POC TCO2     44(H)     POCT Glucose 246(H) 278(H) 211(H)       Potassium          Total Protein          Protime          Rate     16     RBC          RDW          Sample     VENOUS     Sodium          Sp02     9195     Vancomycin-Trough    43.2  Comment:  *Critical value -   Results called to and read back by:Sabas Jang RN  ()      Vt     450     WBC                      07/28/18  0438 07/28/18  0415 07/27/18  2231      Immature Granulocytes 4.6(H)       Immature Grans (Abs) 0.62  Comment:  Mild elevation in immature granulocytes is non specific and   can be seen in a variety of conditions including stress response,   acute inflammation, trauma and pregnancy. Correlation with other   laboratory and clinical findings is essential.  (H)       Albumin 2.1(L)       Alkaline Phosphatase 57       Allens Test        ALT 7(L)       Anion Gap 13        13       AST 15       Baso # 0.04       Basophil% 0.3       Total Bilirubin 0.5  Comment:  For infants and newborns, interpretation of results should be based  on gestational age, weight and in agreement with clinical  observations.  Premature Infant recommended reference ranges:  Up to 24 hours.............<8.0 mg/dL  Up to 48 hours............<12.0 mg/dL  3-5 days..................<15.0 mg/dL  6-29 days.................<15.0 mg/dL         Site        BUN, Bld 118(H)        118(H)       Calcium 9.4        9.4       Chloride 93(L)        93(L)       CO2 35(H)        35(H)       Creatinine 2.1(H)        2.1(H)       DelSys        Differential Method Automated       eGFR if  39.8(A)         39.8(A)       eGFR if non  34.4  Comment:  Calculation used to obtain the estimated glomerular filtration  rate (eGFR) is the CKD-EPI equation.   (A)        34.4  Comment:  Calculation used to obtain the estimated glomerular filtration  rate (eGFR) is the CKD-EPI equation.   (A)       Eos # 0.2       Eosinophil% 1.2       FiO2        Glucose 153(H)        153(H)       Gran # (ANC) 10.2(H)       Gran% 76.1(H)       Hematocrit 22.2(L)       Hemoglobin 6.4(L)       Coumadin Monitoring INR 1.1  Comment:  Coumadin Therapy:  2.0 - 3.0 for INR for all indicators except mechanical heart valves  and antiphospholipid syndromes which should use 2.5 - 3.5.         Lymph # 1.5       Lymph% 10.9(L)       Magnesium 2.7(H)       MCH 27.4       MCHC 28.8(L)       MCV 95       Mode        Mono # 0.9       Mono% 6.9       MPV 12.0       nRBC 1(A)       PEEP        Platelets 268       POC BE        POC HCO3        POC PCO2        POC PH        POC PO2        POC SATURATED O2        POC TCO2        POCT Glucose  155(H) 202(H)     Potassium 3.7        3.7       Total Protein 6.5       Protime 11.6       Rate        RBC 2.34(L)       RDW 17.4(H)       Sample        Sodium 141        141       Sp02        Vancomycin-Trough        Vt        WBC 13.40(H)           All pertinent labs within the past 24 hours have been reviewed.    Significant Imaging:  I have reviewed all pertinent imaging results/findings within the past 24 hours.  I have reviewed and interpreted all pertinent imaging results/findings within the past 24 hours.

## 2018-07-28 NOTE — PLAN OF CARE
BG and insulin regimen reviewed.    BG goal 140-180.  Patient's BG above goal, but recommendations started later in the day.  BG improved.   .      Recommend:  Increase Levemir to 11u qHS  Continue Novolog 3u q4 while on TF   Hold if TF held or if BG <100  Low dose correction  POC q4h      We will continue to follow.  Please call endocrinology with any questions.      Nancy Bonilla MD  Endocrinology Fellow

## 2018-07-28 NOTE — SUBJECTIVE & OBJECTIVE
Interval History: No issues overnight. Remains intubated/sedated. Does not wake up or follow commands off Fentanyl.    Continuous Infusions:   amiodarone in dextrose 5% 0.5 mg/min (07/28/18 0900)    dexmedetomidine (PRECEDEX) infusion 0.2 mcg/kg/hr (07/28/18 0900)    fentanyl 2.5 mL/hr at 07/28/18 0900    furosemide (LASIX) 2 mg/mL infusion (non-titrating) 20 mg/hr (07/28/18 0900)    norepinephrine bitartrate-D5W 0.04 mcg/kg/min (07/28/18 0900)    propofol 50 mcg/kg/min (07/28/18 0900)     Scheduled Meds:   albuterol sulfate  2.5 mg Nebulization Q4H    aspirin  81 mg Oral Daily    atorvastatin  40 mg Oral Daily    bisacodyl  10 mg Rectal Once    budesonide  0.5 mg Nebulization Q12H    ceFEPime (MAXIPIME) IVPB  2 g Intravenous Q8H    chlorhexidine  15 mL Mouth/Throat BID    chlorothiazide (DIURIL) IVPB  250 mg Intravenous Q12H    docusate  100 mg Oral Daily    fluticasone-vilanterol  1 puff Inhalation Daily    gabapentin  600 mg Oral BID    heparin (porcine)  5,000 Units Subcutaneous Q8H    insulin aspart U-100  3 Units Subcutaneous Q24H    insulin aspart U-100  3 Units Subcutaneous Q24H    insulin aspart U-100  3 Units Subcutaneous Q24H    insulin aspart U-100  3 Units Subcutaneous Q24H    insulin aspart U-100  3 Units Subcutaneous Q24H    insulin aspart U-100  3 Units Subcutaneous Q24H    insulin detemir U-100  10 Units Subcutaneous QHS    lactulose  30 g Per OG tube Once    levETIRAcetam  500 mg Oral BID    metoprolol  2.5 mg Intravenous Q4H    mexiletine  150 mg Oral Q8H    pantoprozole (PROTONIX) 40 mg/100 mL D5W IVPB  40 mg Intravenous BID    predniSONE  30 mg Oral Daily    sennosides 8.8 mg/5 ml  5 mL Oral QHS    sodium chloride 0.9%  3 mL Intravenous Q8H    vancomycin (VANCOCIN) IVPB  1,000 mg Intravenous Q12H     PRN Meds:sodium chloride, sodium chloride, acetaminophen, ALPRAZolam, benzonatate, calcium gluconate IVPB, calcium gluconate IVPB, calcium gluconate IVPB,  carisoprodol, dextrose 50%, glucagon (human recombinant), HYDROcodone-acetaminophen, insulin aspart U-100, magnesium sulfate IVPB, magnesium sulfate IVPB, nitroGLYCERIN, potassium chloride in water **AND** potassium chloride in water **AND** potassium chloride in water, sodium phosphate IVPB, sodium phosphate IVPB, sodium phosphate IVPB    Review of patient's allergies indicates:  No Known Allergies  Objective:     Vital Signs (Most Recent):  Temp: 98.8 °F (37.1 °C) (07/28/18 0700)  Pulse: 80 (07/28/18 0915)  Resp: 18 (07/28/18 0915)  BP: (!) 86/51 (07/28/18 0900)  SpO2: (!) 91 % (07/28/18 0915) Vital Signs (24h Range):  Temp:  [98.8 °F (37.1 °C)-99.3 °F (37.4 °C)] 98.8 °F (37.1 °C)  Pulse:  [] 80  Resp:  [0-48] 18  SpO2:  [79 %-99 %] 91 %  BP: ()/() 86/51     Patient Vitals for the past 72 hrs (Last 3 readings):   Weight   07/28/18 0500 98.7 kg (217 lb 9.5 oz)   07/27/18 0501 104.2 kg (229 lb 11.5 oz)     Body mass index is 30.35 kg/m².      Intake/Output Summary (Last 24 hours) at 07/28/18 0923  Last data filed at 07/28/18 0900   Gross per 24 hour   Intake           4223.9 ml   Output             2810 ml   Net           1413.9 ml     Hemodynamic Parameters: CVP 10.       Telemetry: BiV paced, one run NSVT this morning    Physical Exam   Constitutional: He appears well-developed and well-nourished.   HENT:   Head: Normocephalic and atraumatic.   Eyes: Conjunctivae and EOM are normal. Pupils are equal, round, and reactive to light.   Neck: Neck supple. No thyromegaly present.   RIJ line   Cardiovascular: Normal rate and regular rhythm.    Doppler left pedal pulse, palpable right pedal pulse   Pulmonary/Chest:   Intubated and ventilated. Bibasilar crackles   Abdominal: He exhibits distension.   No bowel sounds appreciated   Musculoskeletal:   2-3+ KAUSHIK   Neurological:   Intubated and sedated   Skin: Skin is warm and dry. Capillary refill takes 2 to 3 seconds.     Significant Labs:  CBC:    Recent  Labs  Lab 07/27/18  0341 07/27/18  1310 07/28/18  0438   WBC 13.11* 15.23* 13.40*   RBC 2.50* 2.45* 2.34*   HGB 7.1* 6.9* 6.4*   HCT 23.3* 22.9* 22.2*    255 268   MCV 93 94 95   MCH 28.4 28.2 27.4   MCHC 30.5* 30.1* 28.8*     BNP:  No results for input(s): BNP in the last 168 hours.    Invalid input(s): BNPTRIAGELBLO  CMP:    Recent Labs  Lab 07/27/18  0341 07/27/18  1310 07/28/18  0438   * 298* 153*  153*   CALCIUM 9.7 9.8 9.4  9.4   ALBUMIN 2.0* 2.1* 2.1*   PROT 6.4 6.4 6.5    139 141  141   K 3.8 3.5 3.7  3.7   CO2 34* 35* 35*  35*   CL 95 90* 93*  93*   * 115* 118*  118*   CREATININE 1.8* 1.9* 2.1*  2.1*   ALKPHOS 62 59 57   ALT 5* 6* 7*   AST 14 17 15   BILITOT 0.5 0.6 0.5      Coagulation:     Recent Labs  Lab 07/26/18  0330 07/27/18  0341 07/28/18  0438   INR 1.0 1.1 1.1     LDH:  No results for input(s): LDH in the last 72 hours.  Microbiology:  Microbiology Results (last 7 days)     Procedure Component Value Units Date/Time    Culture, Respiratory with Gram Stain [[id number]] Collected:  07/26/18 2128    Order Status:  Completed Specimen:  Respiratory from Endotracheal Aspirate Updated:  07/28/18 0832     Respiratory Culture --     PRESUMPTIVE PSEUDOMONAS SPECIES  Moderate  Identification and susceptibility pending       Respiratory Culture --     STREPTOCOCCUS GROUP F  Many  Beta-hemolytic streptococci are routinely susceptible to   penicillins,cephalosporins and carbapenems.  Susceptibility testing not routinely performed       Gram Stain (Respiratory) <10 epithelial cells per low power field.     Gram Stain (Respiratory) Many WBC's     Gram Stain (Respiratory) Many Gram positive cocci     Gram Stain (Respiratory) Few Gram negative rods     Gram Stain (Respiratory) Few Gram positive rods    IV catheter culture [[id number]] Collected:  07/27/18 1400    Order Status:  Sent Specimen:  Catheter Tip from Catheter Tip, Intrajugular Updated:  07/28/18 0000    Blood culture  [[id number]] Collected:  07/26/18 1936    Order Status:  Completed Specimen:  Blood from Peripheral, Lower Arm, Right Updated:  07/27/18 2022     Blood Culture, Routine No Growth to date     Blood Culture, Routine No Growth to date    Urine culture [[id number]] Collected:  07/26/18 0729    Order Status:  Completed Specimen:  Urine from Urine, Catheterized Updated:  07/27/18 1508     Urine Culture, Routine --     CANDIDA ALBICANS  > 100,000 cfu/ml  Treatment of asymptomatic candiduria is not recommended (except for   specific populations). Candida isolated in the urine typically   represents colonization. If an indwelling urinary catheter is present  it should be removed or replaced.      Blood culture [[id number]] Collected:  07/26/18 0718    Order Status:  Completed Specimen:  Blood from Peripheral, Upper Arm, Left Updated:  07/27/18 1456     Blood Culture, Routine Gram stain alfred bottle: Gram positive cocci in clusters resembling Staph      Blood Culture, Routine Results called to and read back by:Cecily Rojas RN 07/27/2018  14:56    Blood culture [[id number]] Collected:  07/26/18 0718    Order Status:  Completed Specimen:  Blood from Peripheral, Antecubital, Left Updated:  07/27/18 1212     Blood Culture, Routine No Growth to date     Blood Culture, Routine No Growth to date    Blood culture [[id number]] Collected:  07/26/18 2053    Order Status:  Sent Specimen:  Blood from Peripheral, Lower Arm, Left Updated:  07/26/18 2053    Culture, Respiratory with Gram Stain [[id number]]     Order Status:  Canceled Specimen:  Respiratory from Endotracheal Aspirate     Urine culture [[id number]]     Order Status:  Completed Specimen:  Urine from Urine, Clean Catch     Blood culture [[id number]] Collected:  07/18/18 1803    Order Status:  Completed Specimen:  Blood from Peripheral, Forearm, Right Updated:  07/23/18 2212     Blood Culture, Routine No growth after 5 days.    Blood culture [[id number]] Collected:  07/18/18 1803     Order Status:  Completed Specimen:  Blood from Peripheral, Antecubital, Left Updated:  07/23/18 2212     Blood Culture, Routine No growth after 5 days.    Blood culture [[id number]] Collected:  07/17/18 0930    Order Status:  Completed Specimen:  Blood from Peripheral, Antecubital, Right Updated:  07/22/18 1212     Blood Culture, Routine No growth after 5 days.    IV catheter culture [[id number]] Collected:  07/19/18 1851    Order Status:  Completed Specimen:  Catheter Tip from Catheter Tip, Intrajugular Updated:  07/21/18 1344     Aerobic Culture - Cath tip --     STAPHYLOCOCCUS EPIDERMIDIS  < 15 colonies      Narrative:       Culture RIJ        I have reviewed all pertinent labs within the past 24 hours.    Estimated Creatinine Clearance: 47.6 mL/min (A) (based on SCr of 2.1 mg/dL (H)).    Diagnostic Results:  I have reviewed all pertinent imaging results/findings within the past 24 hours.

## 2018-07-28 NOTE — ASSESSMENT & PLAN NOTE
-VT storm. Polymorphic and monomorphic.   -Intubated, IABP placed emergently at bedside 7/10. IABP removed 7/25.  -VT storm again on 7/17/18 and reloaded with amio (did not seem to absorb oral Amio). Continue metoprolol, mexitil, amio gtt for now.   -K goal >4.5, Mg >2.5  - Per EP-At a later time, it may be considered to deactivate the LV lead of the patient's CRT-D, considering the patient's LVAD status and native RBBB. ICD interrogation reveals LICHA.

## 2018-07-28 NOTE — CONSULTS
[facility]  Pulmonology  Consult Note    Patient Name: Yonathan Good Jr.  MRN: [id number]  Admission Date: 7/7/2018  Hospital Length of Stay: 21 days  Code Status: Full Code  Attending Physician: Mohan Cross MD  Primary Care Provider: Edgar Gates MD   Principal Problem: Ventricular tachycardia, incessant    Consults  Subjective:     HPI:  57M with complex history of HFrEF (EF 10-15%) with AICD, pulmonary sarcoid, CVA, admitted for GI bleed. Now s/p polymorphic VT with cardiac arrest and synchronized cardioversion x3 on 7/10 with new IABP. Now sedated, intubated, and on ventilator. we were consulted on 7/11 for ventilator management and recommendations.    Patient initially presented 6/22 with stroke-like symptoms and discharged 7/4 on warf/lovenox. Came back to ED with bright red stools and hypotension. He began experiencing chest discomfort and shortness of breath and was found to be in polymorphic v. Tach.  His condition continued to deteriorate and he developed respiratory distress requiring intubation.  He then entered asystole and ACLS protocol ensued for 16 minutes before ROSC.  He is now intubated, sedated (propofol, fentanyl), on pressors (levophed), insulin gtt, and anti-arrhythmics (amiodarone, lidocaine).  He has an intra-aortic balloon pump inserted in the left axilla, and CVC in the right IJ.  Pulmonary medicine was consulted for management of vent.    Interval History: Weened off fentanyl drip this morning. Became febrile overnight. No other events. Seen and examined with family at bedside. Remains intubated and on ventilator.     Objective:     Vital Signs (Most Recent):  Temp: (!) 101.4 °F (38.6 °C) (07/28/18 1630)  Pulse: 80 (07/28/18 1630)  Resp: (!) 23 (07/28/18 1630)  BP: (!) 96/53 (07/28/18 1630)  SpO2: (!) 93 % (07/28/18 1630) Vital Signs (24h Range):  Temp:  [98.8 °F (37.1 °C)-101.4 °F (38.6 °C)] 101.4 °F (38.6 °C)  Pulse:  [80] 80  Resp:  [16-32] 23  SpO2:  [79 %-99 %]  93 %  BP: ()/(50-61) 96/53     Weight: 98.7 kg (217 lb 9.5 oz)  Body mass index is 30.35 kg/m².      Intake/Output Summary (Last 24 hours) at 07/28/18 1651  Last data filed at 07/28/18 1500   Gross per 24 hour   Intake           3973.9 ml   Output             2090 ml   Net           1883.9 ml       Physical Exam   Constitutional: He appears well-developed.   Has musty odor.    HENT:   Head: Normocephalic and atraumatic.   Mouth/Throat: Oropharynx is clear and moist.   Eyes: Pupils are equal, round, and reactive to light.   Neck: Neck supple.   Cardiovascular: Normal rate and regular rhythm.    Heart sounds faint   Pulmonary/Chest: He has wheezes.   Intubated and on ventilator. RUL expiratory wheezing.    Abdominal: Soft. He exhibits no distension.   Bowel sounds absent    Musculoskeletal: He exhibits edema (2+ pitting ).   Neurological:   Sedated on propofol    Skin: Skin is warm and dry.       Vents:  Vent Mode: A/C (07/28/18 1530)  Set Rate: 16 bmp (07/28/18 1530)  Vt Set: 450 mL (07/28/18 1530)  PEEP/CPAP: 5 cmH20 (07/28/18 1530)  Oxygen Concentration (%): 51 (07/28/18 1630)  Peak Airway Pressure: 23 cmH2O (07/28/18 1530)  Plateau Pressure: 25 cmH20 (07/28/18 1530)  Total Ve: 8.88 mL (07/28/18 1530)  F/VT Ratio<105 (RSBI): (!) 40.08 (07/28/18 1530)    Lines/Drains/Airways     Central Venous Catheter Line                 Percutaneous Central Line Insertion/Assessment - triple lumen  07/27/18 1434 right internal jugular 1 day          Drain                 NG/OG Tube 07/10/18 1557 [place] sump 16 Fr. Left nostril 18 days         Urethral Catheter 07/27/18 1500 1 day          Airway                 Airway - Non-Surgical 07/10/18 1523 Endotracheal Tube 18 days          Peripheral Intravenous Line                 Midline Catheter Insertion/Assessment  - Single Lumen 07/17/18 1105 Right cephalic vein (lateral side of arm) 18g x 8cm 11 days         Peripheral IV - Single Lumen 07/26/18 1935 Right Forearm 1 day                 Significant Labs:    CBC/Anemia Profile:    Recent Labs  Lab 07/27/18  0341 07/27/18  1310 07/28/18  0438   WBC 13.11* 15.23* 13.40*   HGB 7.1* 6.9* 6.4*   HCT 23.3* 22.9* 22.2*    255 268   MCV 93 94 95   RDW 17.1* 17.0* 17.4*        Chemistries:    Recent Labs  Lab 07/27/18  0341 07/27/18  1310 07/28/18  0438    139 141  141   K 3.8 3.5 3.7  3.7   CL 95 90* 93*  93*   CO2 34* 35* 35*  35*   * 115* 118*  118*   CREATININE 1.8* 1.9* 2.1*  2.1*   CALCIUM 9.7 9.8 9.4  9.4   ALBUMIN 2.0* 2.1* 2.1*   PROT 6.4 6.4 6.5   BILITOT 0.5 0.6 0.5   ALKPHOS 62 59 57   ALT 5* 6* 7*   AST 14 17 15   MG 2.6 2.6 2.7*   PHOS 3.8  --   --        Recent Lab Results       07/28/18  1632 07/28/18  1254 07/28/18  0840 07/28/18  0830 07/28/18  0507      Immature Granulocytes          Immature Grans (Abs)          Albumin          Alkaline Phosphatase          Allens Test     N/A     ALT          Anion Gap          AST          Baso #          Basophil%          Total Bilirubin          Site     Other     BUN, Bld          Calcium          Chloride          CO2          Creatinine          DelSys     Adult Vent     Differential Method          eGFR if           eGFR if non           Eos #          Eosinophil%          FiO2     50     Glucose          Gran # (ANC)          Gran%          Hematocrit          Hemoglobin          Coumadin Monitoring INR          Lymph #          Lymph%          Magnesium          MCH          MCHC          MCV          Mode     AC/PRVC     Mono #          Mono%          MPV          nRBC          PEEP     5     Platelets          POC BE     16     POC HCO3     41.8(H)     POC PCO2     77.0(H)     POC PH     7.342(L)     POC PO2     28(LL)     POC SATURATED O2     45(L)     POC TCO2     44(H)     POCT Glucose 246(H) 278(H) 211(H)       Potassium          Total Protein          Protime          Rate     16     RBC          RDW          Sample      VENOUS     Sodium          Sp02     9195     Vancomycin-Trough    43.2  Comment:  *Critical value -   Results called to and read back by:Sabas Jang RN  ()      Vt     450     WBC                      07/28/18  0438 07/28/18  0415 07/27/18  2231      Immature Granulocytes 4.6(H)       Immature Grans (Abs) 0.62  Comment:  Mild elevation in immature granulocytes is non specific and   can be seen in a variety of conditions including stress response,   acute inflammation, trauma and pregnancy. Correlation with other   laboratory and clinical findings is essential.  (H)       Albumin 2.1(L)       Alkaline Phosphatase 57       Allens Test        ALT 7(L)       Anion Gap 13        13       AST 15       Baso # 0.04       Basophil% 0.3       Total Bilirubin 0.5  Comment:  For infants and newborns, interpretation of results should be based  on gestational age, weight and in agreement with clinical  observations.  Premature Infant recommended reference ranges:  Up to 24 hours.............<8.0 mg/dL  Up to 48 hours............<12.0 mg/dL  3-5 days..................<15.0 mg/dL  6-29 days.................<15.0 mg/dL         Site        BUN, Bld 118(H)        118(H)       Calcium 9.4        9.4       Chloride 93(L)        93(L)       CO2 35(H)        35(H)       Creatinine 2.1(H)        2.1(H)       DelSys        Differential Method Automated       eGFR if  39.8(A)        39.8(A)       eGFR if non  34.4  Comment:  Calculation used to obtain the estimated glomerular filtration  rate (eGFR) is the CKD-EPI equation.   (A)        34.4  Comment:  Calculation used to obtain the estimated glomerular filtration  rate (eGFR) is the CKD-EPI equation.   (A)       Eos # 0.2       Eosinophil% 1.2       FiO2        Glucose 153(H)        153(H)       Gran # (ANC) 10.2(H)       Gran% 76.1(H)       Hematocrit 22.2(L)       Hemoglobin 6.4(L)       Coumadin Monitoring INR 1.1  Comment:  Coumadin Therapy:  2.0 -  3.0 for INR for all indicators except mechanical heart valves  and antiphospholipid syndromes which should use 2.5 - 3.5.         Lymph # 1.5       Lymph% 10.9(L)       Magnesium 2.7(H)       MCH 27.4       MCHC 28.8(L)       MCV 95       Mode        Mono # 0.9       Mono% 6.9       MPV 12.0       nRBC 1(A)       PEEP        Platelets 268       POC BE        POC HCO3        POC PCO2        POC PH        POC PO2        POC SATURATED O2        POC TCO2        POCT Glucose  155(H) 202(H)     Potassium 3.7        3.7       Total Protein 6.5       Protime 11.6       Rate        RBC 2.34(L)       RDW 17.4(H)       Sample        Sodium 141        141       Sp02        Vancomycin-Trough        Vt        WBC 13.40(H)           All pertinent labs within the past 24 hours have been reviewed.    Significant Imaging:  I have reviewed all pertinent imaging results/findings within the past 24 hours.  I have reviewed and interpreted all pertinent imaging results/findings within the past 24 hours.    Assessment/Plan:     Respiratory failure requiring intubation    · Continue diuresis per primary team  · No substantial improvement on CXR comapred to prior day   · Received abx therapy for  ET aspirate culture positive for MSSA staph. Last dose of cefepime and vancomycin on 7/20 .  · Continue current respiratory therapies   · Vent settings: RR 16,  mL (6cc/kg), PEEP 5, FiO2 50%   ·  Cardiac insufficiencies likely to be limiting factor in patient's potential recovery.     · Fentanyl drip currently turned off.   · try to perform SBT if possible when patient awakens    · ETT day 18 - recommend ENT consult for tracheostomy if cannot be extubated soon.  Planning to discuss with family extensively today.    POA confirmed by primary team as daughter following discussion with all parties.  Please refer to their documentation for further details.              Thank you for your consult. I will follow-up with patient. Please contact us if  you have any additional questions.     Dontrell Zeng MD  Pulmonology  [facility]

## 2018-07-28 NOTE — PROGRESS NOTES
[facility] [address]  Heart Transplant  Progress Note    Patient Name: Yonathan Good Jr.  MRN: [id number]  Admission Date: 7/7/2018  Hospital Length of Stay: 21 days  Attending Physician: Mohan Cross MD  Primary Care Provider: Edgar Gates MD  Principal Problem:Ventricular tachycardia, incessant    Subjective:     Interval History: No issues overnight. Remains intubated/sedated. Does not wake up or follow commands off Fentanyl.    Continuous Infusions:   amiodarone in dextrose 5% 0.5 mg/min (07/28/18 0900)    dexmedetomidine (PRECEDEX) infusion 0.2 mcg/kg/hr (07/28/18 0900)    fentanyl 2.5 mL/hr at 07/28/18 0900    furosemide (LASIX) 2 mg/mL infusion (non-titrating) 20 mg/hr (07/28/18 0900)    norepinephrine bitartrate-D5W 0.04 mcg/kg/min (07/28/18 0900)    propofol 50 mcg/kg/min (07/28/18 0900)     Scheduled Meds:   albuterol sulfate  2.5 mg Nebulization Q4H    aspirin  81 mg Oral Daily    atorvastatin  40 mg Oral Daily    bisacodyl  10 mg Rectal Once    budesonide  0.5 mg Nebulization Q12H    ceFEPime (MAXIPIME) IVPB  2 g Intravenous Q8H    chlorhexidine  15 mL Mouth/Throat BID    chlorothiazide (DIURIL) IVPB  250 mg Intravenous Q12H    docusate  100 mg Oral Daily    fluticasone-vilanterol  1 puff Inhalation Daily    gabapentin  600 mg Oral BID    heparin (porcine)  5,000 Units Subcutaneous Q8H    insulin aspart U-100  3 Units Subcutaneous Q24H    insulin aspart U-100  3 Units Subcutaneous Q24H    insulin aspart U-100  3 Units Subcutaneous Q24H    insulin aspart U-100  3 Units Subcutaneous Q24H    insulin aspart U-100  3 Units Subcutaneous Q24H    insulin aspart U-100  3 Units Subcutaneous Q24H    insulin detemir U-100  10 Units Subcutaneous QHS    lactulose  30 g Per OG tube Once    levETIRAcetam  500 mg Oral BID    metoprolol  2.5 mg Intravenous Q4H    mexiletine  150 mg Oral Q8H    pantoprozole (PROTONIX) 40 mg/100 mL D5W IVPB  40 mg Intravenous BID    predniSONE  30  mg Oral Daily    sennosides 8.8 mg/5 ml  5 mL Oral QHS    sodium chloride 0.9%  3 mL Intravenous Q8H    vancomycin (VANCOCIN) IVPB  1,000 mg Intravenous Q12H     PRN Meds:sodium chloride, sodium chloride, acetaminophen, ALPRAZolam, benzonatate, calcium gluconate IVPB, calcium gluconate IVPB, calcium gluconate IVPB, carisoprodol, dextrose 50%, glucagon (human recombinant), HYDROcodone-acetaminophen, insulin aspart U-100, magnesium sulfate IVPB, magnesium sulfate IVPB, nitroGLYCERIN, potassium chloride in water **AND** potassium chloride in water **AND** potassium chloride in water, sodium phosphate IVPB, sodium phosphate IVPB, sodium phosphate IVPB    Review of patient's allergies indicates:  No Known Allergies  Objective:     Vital Signs (Most Recent):  Temp: 98.8 °F (37.1 °C) (07/28/18 0700)  Pulse: 80 (07/28/18 0915)  Resp: 18 (07/28/18 0915)  BP: (!) 86/51 (07/28/18 0900)  SpO2: (!) 91 % (07/28/18 0915) Vital Signs (24h Range):  Temp:  [98.8 °F (37.1 °C)-99.3 °F (37.4 °C)] 98.8 °F (37.1 °C)  Pulse:  [] 80  Resp:  [0-48] 18  SpO2:  [79 %-99 %] 91 %  BP: ()/() 86/51     Patient Vitals for the past 72 hrs (Last 3 readings):   Weight   07/28/18 0500 98.7 kg (217 lb 9.5 oz)   07/27/18 0501 104.2 kg (229 lb 11.5 oz)     Body mass index is 30.35 kg/m².      Intake/Output Summary (Last 24 hours) at 07/28/18 0923  Last data filed at 07/28/18 0900   Gross per 24 hour   Intake           4223.9 ml   Output             2810 ml   Net           1413.9 ml     Hemodynamic Parameters: CVP 10.       Telemetry: BiV paced, one run NSVT this morning    Physical Exam   Constitutional: He appears well-developed and well-nourished.   HENT:   Head: Normocephalic and atraumatic.   Eyes: Conjunctivae and EOM are normal. Pupils are equal, round, and reactive to light.   Neck: Neck supple. No thyromegaly present.   RIJ line   Cardiovascular: Normal rate and regular rhythm.    Doppler left pedal pulse, palpable right pedal  pulse   Pulmonary/Chest:   Intubated and ventilated. Bibasilar crackles   Abdominal: He exhibits distension.   No bowel sounds appreciated   Musculoskeletal:   2-3+ KAUSHIK   Neurological:   Intubated and sedated   Skin: Skin is warm and dry. Capillary refill takes 2 to 3 seconds.     Significant Labs:  CBC:    Recent Labs  Lab 07/27/18  0341 07/27/18  1310 07/28/18  0438   WBC 13.11* 15.23* 13.40*   RBC 2.50* 2.45* 2.34*   HGB 7.1* 6.9* 6.4*   HCT 23.3* 22.9* 22.2*    255 268   MCV 93 94 95   MCH 28.4 28.2 27.4   MCHC 30.5* 30.1* 28.8*     BNP:  No results for input(s): BNP in the last 168 hours.    Invalid input(s): BNPTRIAGELBLO  CMP:    Recent Labs  Lab 07/27/18  0341 07/27/18  1310 07/28/18  0438   * 298* 153*  153*   CALCIUM 9.7 9.8 9.4  9.4   ALBUMIN 2.0* 2.1* 2.1*   PROT 6.4 6.4 6.5    139 141  141   K 3.8 3.5 3.7  3.7   CO2 34* 35* 35*  35*   CL 95 90* 93*  93*   * 115* 118*  118*   CREATININE 1.8* 1.9* 2.1*  2.1*   ALKPHOS 62 59 57   ALT 5* 6* 7*   AST 14 17 15   BILITOT 0.5 0.6 0.5      Coagulation:     Recent Labs  Lab 07/26/18  0330 07/27/18  0341 07/28/18  0438   INR 1.0 1.1 1.1     LDH:  No results for input(s): LDH in the last 72 hours.  Microbiology:  Microbiology Results (last 7 days)     Procedure Component Value Units Date/Time    Culture, Respiratory with Gram Stain [[id number]] Collected:  07/26/18 2128    Order Status:  Completed Specimen:  Respiratory from Endotracheal Aspirate Updated:  07/28/18 0832     Respiratory Culture --     PRESUMPTIVE PSEUDOMONAS SPECIES  Moderate  Identification and susceptibility pending       Respiratory Culture --     STREPTOCOCCUS GROUP F  Many  Beta-hemolytic streptococci are routinely susceptible to   penicillins,cephalosporins and carbapenems.  Susceptibility testing not routinely performed       Gram Stain (Respiratory) <10 epithelial cells per low power field.     Gram Stain (Respiratory) Many WBC's     Gram Stain (Respiratory)  Many Gram positive cocci     Gram Stain (Respiratory) Few Gram negative rods     Gram Stain (Respiratory) Few Gram positive rods    IV catheter culture [[id number]] Collected:  07/27/18 1400    Order Status:  Sent Specimen:  Catheter Tip from Catheter Tip, Intrajugular Updated:  07/28/18 0000    Blood culture [[id number]] Collected:  07/26/18 1936    Order Status:  Completed Specimen:  Blood from Peripheral, Lower Arm, Right Updated:  07/27/18 2022     Blood Culture, Routine No Growth to date     Blood Culture, Routine No Growth to date    Urine culture [[id number]] Collected:  07/26/18 0729    Order Status:  Completed Specimen:  Urine from Urine, Catheterized Updated:  07/27/18 1508     Urine Culture, Routine --     CANDIDA ALBICANS  > 100,000 cfu/ml  Treatment of asymptomatic candiduria is not recommended (except for   specific populations). Candida isolated in the urine typically   represents colonization. If an indwelling urinary catheter is present  it should be removed or replaced.      Blood culture [[id number]] Collected:  07/26/18 0718    Order Status:  Completed Specimen:  Blood from Peripheral, Upper Arm, Left Updated:  07/27/18 1456     Blood Culture, Routine Gram stain alfred bottle: Gram positive cocci in clusters resembling Staph      Blood Culture, Routine Results called to and read back by:Cecily Rojas RN 07/27/2018  14:56    Blood culture [[id number]] Collected:  07/26/18 0718    Order Status:  Completed Specimen:  Blood from Peripheral, Antecubital, Left Updated:  07/27/18 1212     Blood Culture, Routine No Growth to date     Blood Culture, Routine No Growth to date    Blood culture [[id number]] Collected:  07/26/18 2053    Order Status:  Sent Specimen:  Blood from Peripheral, Lower Arm, Left Updated:  07/26/18 2053    Culture, Respiratory with Gram Stain [[id number]]     Order Status:  Canceled Specimen:  Respiratory from Endotracheal Aspirate     Urine culture [[id number]]     Order Status:  Completed  "Specimen:  Urine from Urine, Clean Catch     Blood culture [[id number]] Collected:  07/18/18 1803    Order Status:  Completed Specimen:  Blood from Peripheral, Forearm, Right Updated:  07/23/18 2212     Blood Culture, Routine No growth after 5 days.    Blood culture [[id number]] Collected:  07/18/18 1803    Order Status:  Completed Specimen:  Blood from Peripheral, Antecubital, Left Updated:  07/23/18 2212     Blood Culture, Routine No growth after 5 days.    Blood culture [[id number]] Collected:  07/17/18 0930    Order Status:  Completed Specimen:  Blood from Peripheral, Antecubital, Right Updated:  07/22/18 1212     Blood Culture, Routine No growth after 5 days.    IV catheter culture [[id number]] Collected:  07/19/18 1851    Order Status:  Completed Specimen:  Catheter Tip from Catheter Tip, Intrajugular Updated:  07/21/18 1344     Aerobic Culture - Cath tip --     STAPHYLOCOCCUS EPIDERMIDIS  < 15 colonies      Narrative:       Culture RIJ        I have reviewed all pertinent labs within the past 24 hours.    Estimated Creatinine Clearance: 47.6 mL/min (A) (based on SCr of 2.1 mg/dL (H)).    Diagnostic Results:  I have reviewed all pertinent imaging results/findings within the past 24 hours.    Assessment and Plan:     57 year old male with PMHx significant for CAD s/p PCIs, HFrEF secondary to ischemic cardiomyopathy (EF 5-10%) s/p ICD, Afib (on warfarin), pulmonary sarcoid (on chronic prednisone), hx of L parietal CVA recently FL'ed on 7/4.      He was initially admitted to "stroke/neuro" service on 6/22 with dysarthria and stroke-like symptoms. Extensive work up was negative for recurrent CVA and so no tPA was administered. Patient developed atypical chest pain two days into his hospital course and was noted to be in monomorphic VT (6/24) which was treated with ATP then with shock due to recurrent VT in VF zone. Given his active cardiac issues this prompted transfer to heart failure service where the patient was " initiated on IV amiodarone and beta blocker with subsequent resolution of his VT. He was eventually transitioned to PO amiodarone 400 mg BID x 2 weeks (from 6/25-7/9) followed by amiodarone 400 mg daily thereafter per EP recommendations. Of note patient underwent LHC on 6/25 given his extensive ischemic history which did not reveal a culprit lesion, therefore no interventions were done. He was noted to have an elevated LVEDP to 45 however and was administered IV diuresis before being transitioned back to his PO diuretic regimen. Patient also completed heart failure pathway during his hospital course (eg completed colonoscopy on 7/2) as part of his work up for advanced options. Follows with Dr. Berman of heart failure/transplant as an outpatient.      The patient was discharged home in stable condition on 7/4/2018. He was dc'ed on warf/lovenox bridge given CHADS2-VaSc of 5.  Of note, he is no longer a candidate for AO.  He presented yesterday to [facility] with BRBPR and was found to be in acute renal failure as well.  He notes that, on the night of the 5th and morning of 6th, he had 6 bright red bloody bm's.  He went to his PMD who noted he might have internal hemmorhoids.  He was told to stop his lovenox.  He then returned home and flet very lightheaded and dizzy.  He wisely took his blood pressure and noted it was 70-80/50s whereas it is normally 117/70s.  He went in to ED immediately.  There he was noted to have the following pertinent lab values:  Hg 9.2 (11.7 prior)  INR 2.9  Na 131 (139 prior)  K 6.18  BUN 43 (18 prior)  Cr 2.88 (1.1-1.4 at baseline)  proBNP 2755  He was transferred to [facility] for further evaluation and care.  Of note, he underwent screening c-scope on 7/2 during which the following was noted and done: Diverticulosis in the sigmoid colon and in the descending colon, Two 8 to 10 mm polyps in the sigmoid colon and in the descending colon, removed with a hot snare, resected and retrieved,  ligated.        * VT Storm    -VT storm. Polymorphic and monomorphic.   -Intubated, IABP placed emergently at bedside 7/10. IABP removed 7/25.  -VT storm again on 7/17/18 and reloaded with amio (did not seem to absorb oral Amio). Continue metoprolol, mexitil, amio gtt for now.   -K goal >4.5, Mg >2.5  - Per EP-At a later time, it may be considered to deactivate the LV lead of the patient's CRT-D, considering the patient's LVAD status and native RBBB. ICD interrogation reveals LICHA.         Respiratory failure requiring intubation    -Emergently intubated 7/10/18 for impending respiratory failure/need for IABP and inability to lay fat.  -History of pulmonary sarcoidosis  -Methylpred given 7/10/18, hydrocortisone 100 mg q8 started 7/11/18. Transitioned to PO prednisone 7/16.   -Pulmonary consult for assistance with sarcoid & vent management. We are now at the point of considering trach. Daughter Kasandra JAMES.  - ENT consulted and feel that long term plan of care must be established before tracheostomy will be considered.  -CXR daily.  -Will add precedex today and wean propofol off today and aim to wean Fentanyl tomorrow.         Cardiogenic shock    - IABP placed emergently 7/10/18. Pulled 7/25  - Daily CXR - today shows persistent pulmonary edema  - CVP 10. Continue Lasix at 20 mg/hr and IV Diuril 250 mg bid.        GI bleed    - INR subtherapeutic  - Coumadin on hold anticoagulation has been on hold in setting of recent GI bleed. Has been receiving DVT PPx  - Protonix increased to 40 mg BID (changed to IV)  - Had screening colonoscopy with hot snare polypectomy during last admission; most likely culprit post polypectomy bleeding  - Hgb trending back down a bit at 7.1 (got 1 unit PC's 7/24)        Abnormal involuntary movements    - Continue keppra  - possible seizure activity noted on 7/12/18 (twitching right face and UEs lasting 5 min). Some Bilateral UE twitching which did not appear to be seizure like occurred on  7/18, resolved with increased sedation. If noted again will reach out to neuro.   - neuro consulted and increased keppra to 1000 IV 12   - continuous EEG read and negative for seizure activity   - Keppra changed to PO        Hypernatremia    -Improved some with addition of free water flushes, continue 250 q 6 hours        Constipation    - Last bowel movement 7/21. Tolerating tube feeds at goal rate with minimal residual. Will continue bowel program and give Lactulose today          VAP (ventilator-associated pneumonia)    -Completed total 10 day course of antibiotics 7/24.   -Cultures 7/13 with S. Aureus in sputum, +jesenia blood cultures on 7/17 (probable contaminent). Blood cultures 7/18 are -ve.          Leukocytosis    - Recultured 7/26 and Vanc/Cefepime resumed. Will monitor  - Will change RIJ central line that was placed 7/19 and culture tip        Sarcoidosis of lung    - Continue prednisone, breo-ellipta, and albuterol prn.   - Wean pred once condition improves        History of stroke    -On coumadin prior to admit  -Hold off on systemic anticoagulation for now.        Ventricular fibrillation    -See VT        Hypotension    - Levophed started during code 7/10/18  - Wean levo for MAP >60         Acute on chronic combined systolic and diastolic CHF, NYHA class 4    - See cardiogenic shock        History of atrial fibrillation    -  continue amio         CAD (coronary artery disease)    - Continue atorvastatin, nitro prn  - ASA restarted        Uninterrupted Critical Care/Counseling Time (not including procedures): 45mn    Dale Underwood NP  Heart Transplant  [facility]

## 2018-07-28 NOTE — ASSESSMENT & PLAN NOTE
-Emergently intubated 7/10/18 for impending respiratory failure/need for IABP and inability to lay fat.  -History of pulmonary sarcoidosis  -Methylpred given 7/10/18, hydrocortisone 100 mg q8 started 7/11/18. Transitioned to PO prednisone 7/16.   -Pulmonary consult for assistance with sarcoid & vent management. We are now at the point of considering trach. Daughter Kasandra JAMES.  - ENT consulted and feel that long term plan of care must be established before tracheostomy will be considered.  -CXR daily.  -Will add precedex today and wean propofol off today and aim to wean Fentanyl tomorrow.

## 2018-07-28 NOTE — NURSING
Pt's spouse presents to bedside. Pt's daughter Kasandra Good, who is present at beside, becomes markedly agitated and begins shouting at pt's spouse.  Pt's daughter exits into hallway, shouting loudly and using profanity.  Pt's daughter alerts hospital security via her own cell phone, and security presents to bedside. Milagros Medeiros, Charge RN notified of situation and immediately presents to bedside. Attempts to deescalate situation unsuccessful. Pt's daughter remains agitated. Pt's spouse and pt's daughter do not agree on swapping out visiting times when attempt to ensure peaceful visitation of pt. Situation escalated to house supervisor, and JOANNA Bruno presents to bedside.  Pt's daughter claims she has power of  documentation, but no evidence of her claim is found within file. Pt's daughter states her sister will present to bedside with signed legal documentation this evening.  Security separates pt's spouse and pt's daughter to defuse situation. At this time, pt's brother calls via telephone (and he provides verification pass code to obtain info over telephone) and states he understands difficult family dynamics and will attempt to defuse situation. Pt's brother states he is en route and will be presenting to bedside. Pt's daughter then departs bedside. Pt's spouse, who remains at bedside, fully updated on pt's status and plan of care with questions answered and concerns addressed. Pt's spouse verbalizes understanding of education. Emotional support offered to pt's wife. Importance of maintaining safe and calm environment to facilitate proper patient care discussed with pt's wife; however, pt's wife does not agree to swapping out visiting with pt's daughter.  Pt's wife states she wishes to depart facility rather than alternate visiting pt with pt's daughter.  Pt's wife then departs bedside after all questions answered and updates provided.  Pt's daughter then returns to bedside after pt's wife departs  "and remains visibly agitated and using profanity.  Pt's daughter, remaining agitated, making serious accusations against pt's wife (e.g., "she tried to poison his water", "she's the reason he had a heart attack"). Security remains at bedside. Numerous and repeated verbal attempts at calming pt's daughter eventually become successful, and she returns to pt's room and sits on sofa. Importance of maintaining safe and calm environment to facilitate proper patient care discussed with pt's daughter, and she verbalizes understanding.  Emotional support offered.  Security, after ensuring safe environment, departs bedside.  "

## 2018-07-28 NOTE — ASSESSMENT & PLAN NOTE
- Recultured 7/26 and Vanc/Cefepime resumed. Will monitor  - Will change RIJ central line that was placed 7/19 and culture tip

## 2018-07-28 NOTE — ASSESSMENT & PLAN NOTE
· Continue diuresis per primary team  · No substantial improvement on CXR comapred to prior day   · Received abx therapy for  ET aspirate culture positive for MSSA staph. Last dose of cefepime and vancomycin on 7/20 .  · Continue current respiratory therapies   · Vent settings: RR 16,  mL (6cc/kg), PEEP 5, FiO2 50%   ·  Cardiac insufficiencies likely to be limiting factor in patient's potential recovery.     · Fentanyl drip currently turned off.   · try to perform SBT if possible when patient awakens    · ETT day 18 - recommend ENT consult for tracheostomy if cannot be extubated soon.  Planning to discuss with family extensively today.    POA confirmed by primary team as daughter following discussion with all parties.  Please refer to their documentation for further details.

## 2018-07-28 NOTE — ASSESSMENT & PLAN NOTE
- IABP placed emergently 7/10/18. Pulled 7/25  - Daily CXR - today shows persistent pulmonary edema  - CVP 10. Continue Lasix at 20 mg/hr and IV Diuril 250 mg bid.

## 2018-07-28 NOTE — PLAN OF CARE
Problem: Patient Care Overview  Goal: Plan of Care Review  Outcome: Ongoing (interventions implemented as appropriate)  Pt intubated, sedated, and ventilated on A/C, 50% FiO2, 5 PEEP. Levo titrated to maintain MAP >60. Also on fentanyl, propofol, amiodarone, and lasix gtts. Tolerating tube feeds at 55 cc/hr (goal). Attempted to wean sedation today, pt shocked x1. Right IJ triple lumen catheter changed at bedside. Irvin catheter changed per order. BMx2.Pt afebrile throughout shift. CVPs 9-10. Had family meeting with Dr. Cross today. Family @ bedside throughout shift. Plan of care reviewed. Questions and concerns addressed. Will continue to monitor.   Goal: Interdisciplinary Rounds/Family Conf  Outcome: Ongoing (interventions implemented as appropriate)  .    Problem: Infection, Risk/Actual (Adult)  Goal: Identify Related Risk Factors and Signs and Symptoms  Related risk factors and signs and symptoms are identified upon initiation of Human Response Clinical Practice Guideline (CPG)   Outcome: Ongoing (interventions implemented as appropriate)  .    Problem: Pressure Ulcer Risk (Daniel Scale) (Adult,Obstetrics,Pediatric)  Goal: Identify Related Risk Factors and Signs and Symptoms  Related risk factors and signs and symptoms are identified upon initiation of Human Response Clinical Practice Guideline (CPG)   Outcome: Ongoing (interventions implemented as appropriate)  .    Problem: Cardiac: Heart Failure (Adult)  Goal: Signs and Symptoms of Listed Potential Problems Will be Absent, Minimized or Managed (Cardiac: Heart Failure)  Signs and symptoms of listed potential problems will be absent, minimized or managed by discharge/transition of care (reference Cardiac: Heart Failure (Adult) CPG).   Outcome: Ongoing (interventions implemented as appropriate)  .    Problem: Ventilation, Mechanical Invasive (Adult)  Goal: Signs and Symptoms of Listed Potential Problems Will be Absent, Minimized or Managed (Ventilation,  Mechanical Invasive)  Signs and symptoms of listed potential problems will be absent, minimized or managed by discharge/transition of care (reference Ventilation, Mechanical Invasive (Adult) CPG).   Outcome: Ongoing (interventions implemented as appropriate)  .    Problem: Airway, Artificial (Adult)  Goal: Signs and Symptoms of Listed Potential Problems Will be Absent, Minimized or Managed (Airway, Artificial)  Signs and symptoms of listed potential problems will be absent, minimized or managed by discharge/transition of care (reference Airway, Artificial (Adult) CPG).   Outcome: Ongoing (interventions implemented as appropriate)  .    Problem: Coping, Compromised Individual (Adult,Obstetrics,Pediatric)  Goal: Identify Related Risk Factors and Signs and Symptoms  Related risk factors and signs and symptoms are identified upon initiation of Human Response Clinical Practice Guideline (CPG)   Outcome: Ongoing (interventions implemented as appropriate)  .    Problem: Diabetes, Type 2 (Adult)  Goal: Signs and Symptoms of Listed Potential Problems Will be Absent, Minimized or Managed (Diabetes, Type 2)  Signs and symptoms of listed potential problems will be absent, minimized or managed by discharge/transition of care (reference Diabetes, Type 2 (Adult) CPG).   Outcome: Ongoing (interventions implemented as appropriate)  .

## 2018-07-29 NOTE — PROGRESS NOTES
[facility]  Heart Transplant  Progress Note    Patient Name: Yonathan Good Jr.  MRN: [id number]  Admission Date: 7/7/2018  Hospital Length of Stay: 22 days  Attending Physician: Mohan Cross MD  Primary Care Provider: Edgar Gates MD  Principal Problem:Ventricular tachycardia, incessant    Subjective:     Interval History: Was shocked multiple times this am. Subsequently placed back on propofol/fentanyl and precedex stopped. Frequent NSVT.     Continuous Infusions:   amiodarone in dextrose 5% 0.5 mg/min (07/29/18 0800)    dexmedetomidine (PRECEDEX) infusion Stopped (07/29/18 0400)    fentanyl 2.5 mL/hr at 07/29/18 0800    furosemide (LASIX) 2 mg/mL infusion (non-titrating) 20 mg/hr (07/29/18 0800)    norepinephrine bitartrate-D5W 0.07 mcg/kg/min (07/29/18 0800)    propofol 50 mcg/kg/min (07/29/18 0800)     Scheduled Meds:   albuterol sulfate  2.5 mg Nebulization Q4H    aspirin  81 mg Oral Daily    atorvastatin  40 mg Oral Daily    bisacodyl  10 mg Rectal Once    budesonide  0.5 mg Nebulization Q12H    ceFEPime (MAXIPIME) IVPB  2 g Intravenous Q8H    chlorhexidine  15 mL Mouth/Throat BID    chlorothiazide (DIURIL) IVPB  250 mg Intravenous Q12H    docusate  100 mg Oral Daily    fluticasone-vilanterol  1 puff Inhalation Daily    gabapentin  600 mg Oral BID    heparin (porcine)  5,000 Units Subcutaneous Q8H    [START ON 7/30/2018] insulin aspart U-100  5 Units Subcutaneous Q24H    [START ON 7/30/2018] insulin aspart U-100  5 Units Subcutaneous Q24H    insulin aspart U-100  5 Units Subcutaneous Q24H    insulin aspart U-100  5 Units Subcutaneous Q24H    insulin aspart U-100  5 Units Subcutaneous Q24H    insulin aspart U-100  5 Units Subcutaneous Q24H    insulin detemir U-100  15 Units Subcutaneous QHS    lactulose  30 g Per OG tube Once    levETIRAcetam  500 mg Oral BID    metoprolol  2.5 mg Intravenous Q4H    mexiletine  150 mg Oral Q8H    pantoprozole (PROTONIX) 40  mg/100 mL D5W IVPB  40 mg Intravenous BID    predniSONE  30 mg Oral Daily    sennosides 8.8 mg/5 ml  5 mL Oral QHS    sodium chloride 0.9%  3 mL Intravenous Q8H     PRN Meds:sodium chloride, sodium chloride, acetaminophen, ALPRAZolam, benzonatate, calcium gluconate IVPB, calcium gluconate IVPB, calcium gluconate IVPB, carisoprodol, dextrose 50%, glucagon (human recombinant), HYDROcodone-acetaminophen, insulin aspart U-100, magnesium sulfate IVPB, magnesium sulfate IVPB, nitroGLYCERIN, potassium chloride in water **AND** potassium chloride in water **AND** potassium chloride in water, sodium phosphate IVPB, sodium phosphate IVPB, sodium phosphate IVPB    Review of patient's allergies indicates:  No Known Allergies  Objective:     Vital Signs (Most Recent):  Temp: 98.6 °F (37 °C) (07/29/18 0600)  Pulse: 80 (07/29/18 0843)  Resp: 20 (07/29/18 0843)  BP: (!) 84/56 (07/29/18 0730)  SpO2: (!) 86 % (07/29/18 0843) Vital Signs (24h Range):  Temp:  [98.4 °F (36.9 °C)-101.4 °F (38.6 °C)] 98.6 °F (37 °C)  Pulse:  [79-80] 80  Resp:  [16-31] 20  SpO2:  [81 %-100 %] 86 %  BP: (82-97)/(50-57) 84/56     Patient Vitals for the past 72 hrs (Last 3 readings):   Weight   07/29/18 0634 104.7 kg (230 lb 13.2 oz)   07/28/18 0500 98.7 kg (217 lb 9.5 oz)   07/27/18 0501 104.2 kg (229 lb 11.5 oz)     Body mass index is 32.19 kg/m².      Intake/Output Summary (Last 24 hours) at 07/29/18 0858  Last data filed at 07/29/18 0800   Gross per 24 hour   Intake           4879.3 ml   Output             1265 ml   Net           3614.3 ml     Hemodynamic Parameters: CVP 10.       Telemetry: BiV paced, frequent NSVT.    Physical Exam   Constitutional: He appears well-developed and well-nourished.   HENT:   Head: Normocephalic and atraumatic.   Eyes: Conjunctivae and EOM are normal. Pupils are equal, round, and reactive to light.   Neck: Neck supple. No thyromegaly present.   RIJ line   Cardiovascular: Normal rate and regular rhythm.    Doppler left  pedal pulse, palpable right pedal pulse   Pulmonary/Chest:   Intubated and ventilated. Bibasilar crackles   Abdominal: He exhibits distension.   No bowel sounds appreciated   Musculoskeletal:   2-3+ KAUSHIK   Neurological:   Intubated and sedated   Skin: Skin is warm and dry. Capillary refill takes 2 to 3 seconds.     Significant Labs:  CBC:    Recent Labs  Lab 07/27/18  1310 07/28/18  0438 07/29/18  0300   WBC 15.23* 13.40* 13.06*   RBC 2.45* 2.34* 2.43*   HGB 6.9* 6.4* 6.7*   HCT 22.9* 22.2* 22.3*    268 330   MCV 94 95 92   MCH 28.2 27.4 27.6   MCHC 30.1* 28.8* 30.0*     BNP:  No results for input(s): BNP in the last 168 hours.    Invalid input(s): BNPTRIAGELBLO  CMP:    Recent Labs  Lab 07/27/18  1310 07/28/18  0438 07/28/18  1630 07/29/18  0300   * 153*  153* 268* 140*  140*   CALCIUM 9.8 9.4  9.4 9.6 9.6  9.6   ALBUMIN 2.1* 2.1*  --  2.0*   PROT 6.4 6.5  --  6.6    141  141 135* 136  136   K 3.5 3.7  3.7 4.2 3.5  3.5   CO2 35* 35*  35* 27 28  28   CL 90* 93*  93* 91* 90*  90*   * 118*  118* 135* 140*  140*   CREATININE 1.9* 2.1*  2.1* 2.6* 2.9*  2.9*   ALKPHOS 59 57  --  55   ALT 6* 7*  --  8*   AST 17 15  --  20   BILITOT 0.6 0.5  --  0.5      Coagulation:     Recent Labs  Lab 07/27/18  0341 07/28/18  0438 07/29/18  0300   INR 1.1 1.1 1.2     LDH:  No results for input(s): LDH in the last 72 hours.  Microbiology:  Microbiology Results (last 7 days)     Procedure Component Value Units Date/Time    Blood culture [[id number]] Collected:  07/26/18 1936    Order Status:  Completed Specimen:  Blood from Peripheral, Lower Arm, Right Updated:  07/28/18 2022     Blood Culture, Routine No Growth to date     Blood Culture, Routine No Growth to date     Blood Culture, Routine No Growth to date    Blood culture [[id number]] Collected:  07/26/18 0718    Order Status:  Completed Specimen:  Blood from Peripheral, Antecubital, Left Updated:  07/28/18 1212     Blood Culture, Routine No  Growth to date     Blood Culture, Routine No Growth to date     Blood Culture, Routine No Growth to date    Culture, Respiratory with Gram Stain [[id number]] Collected:  07/26/18 2128    Order Status:  Completed Specimen:  Respiratory from Endotracheal Aspirate Updated:  07/28/18 1145     Respiratory Culture --     PRESUMPTIVE PSEUDOMONAS SPECIES  Moderate  Identification and susceptibility pending       Respiratory Culture --     ESCHERICHIA COLI  Few  Susceptibility pending       Respiratory Culture --     STREPTOCOCCUS GROUP F  Many  Beta-hemolytic streptococci are routinely susceptible to   penicillins,cephalosporins and carbapenems.  Susceptibility testing not routinely performed       Gram Stain (Respiratory) <10 epithelial cells per low power field.     Gram Stain (Respiratory) Many WBC's     Gram Stain (Respiratory) Many Gram positive cocci     Gram Stain (Respiratory) Few Gram negative rods     Gram Stain (Respiratory) Few Gram positive rods    Blood culture [[id number]] Collected:  07/26/18 0718    Order Status:  Completed Specimen:  Blood from Peripheral, Upper Arm, Left Updated:  07/28/18 1138     Blood Culture, Routine Gram stain alfred bottle: Gram positive cocci in clusters resembling Staph      Blood Culture, Routine Results called to and read back by:Cecily Rojas RN 07/27/2018  14:56     Blood Culture, Routine --     COAGULASE-NEGATIVE STAPHYLOCOCCUS SPECIES  Organism is a probable contaminant      IV catheter culture [[id number]] Collected:  07/27/18 1400    Order Status:  Sent Specimen:  Catheter Tip from Catheter Tip, Intrajugular Updated:  07/28/18 0000    Urine culture [[id number]] Collected:  07/26/18 0729    Order Status:  Completed Specimen:  Urine from Urine, Catheterized Updated:  07/27/18 1508     Urine Culture, Routine --     CANDIDA ALBICANS  > 100,000 cfu/ml  Treatment of asymptomatic candiduria is not recommended (except for   specific populations). Candida isolated in the urine typically  "  represents colonization. If an indwelling urinary catheter is present  it should be removed or replaced.      Blood culture [[id number]] Collected:  07/26/18 2053    Order Status:  Sent Specimen:  Blood from Peripheral, Lower Arm, Left Updated:  07/26/18 2053    Culture, Respiratory with Gram Stain [[id number]]     Order Status:  Canceled Specimen:  Respiratory from Endotracheal Aspirate     Urine culture [[id number]]     Order Status:  Completed Specimen:  Urine from Urine, Clean Catch     Blood culture [[id number]] Collected:  07/18/18 1803    Order Status:  Completed Specimen:  Blood from Peripheral, Forearm, Right Updated:  07/23/18 2212     Blood Culture, Routine No growth after 5 days.    Blood culture [[id number]] Collected:  07/18/18 1803    Order Status:  Completed Specimen:  Blood from Peripheral, Antecubital, Left Updated:  07/23/18 2212     Blood Culture, Routine No growth after 5 days.    Blood culture [[id number]] Collected:  07/17/18 0930    Order Status:  Completed Specimen:  Blood from Peripheral, Antecubital, Right Updated:  07/22/18 1212     Blood Culture, Routine No growth after 5 days.        I have reviewed all pertinent labs within the past 24 hours.    Estimated Creatinine Clearance: 35.5 mL/min (A) (based on SCr of 2.9 mg/dL (H)).    Diagnostic Results:  I have reviewed all pertinent imaging results/findings within the past 24 hours.    Assessment and Plan:     57 year old male with PMHx significant for CAD s/p PCIs, HFrEF secondary to ischemic cardiomyopathy (EF 5-10%) s/p ICD, Afib (on warfarin), pulmonary sarcoid (on chronic prednisone), hx of L parietal CVA recently AL'ed on 7/4.      He was initially admitted to "stroke/neuro" service on 6/22 with dysarthria and stroke-like symptoms. Extensive work up was negative for recurrent CVA and so no tPA was administered. Patient developed atypical chest pain two days into his hospital course and was noted to be in monomorphic VT (6/24) which was " treated with ATP then with shock due to recurrent VT in VF zone. Given his active cardiac issues this prompted transfer to heart failure service where the patient was initiated on IV amiodarone and beta blocker with subsequent resolution of his VT. He was eventually transitioned to PO amiodarone 400 mg BID x 2 weeks (from 6/25-7/9) followed by amiodarone 400 mg daily thereafter per EP recommendations. Of note patient underwent LHC on 6/25 given his extensive ischemic history which did not reveal a culprit lesion, therefore no interventions were done. He was noted to have an elevated LVEDP to 45 however and was administered IV diuresis before being transitioned back to his PO diuretic regimen. Patient also completed heart failure pathway during his hospital course (eg completed colonoscopy on 7/2) as part of his work up for advanced options. Follows with Dr. Berman of heart failure/transplant as an outpatient.      The patient was discharged home in stable condition on 7/4/2018. He was dc'ed on warf/lovenox bridge given CHADS2-VaSc of 5.  Of note, he is no longer a candidate for AO.  He presented yesterday to [facility] with BRBPR and was found to be in acute renal failure as well.  He notes that, on the night of the 5th and morning of 6th, he had 6 bright red bloody bm's.  He went to his PMD who noted he might have internal hemmorhoids.  He was told to stop his lovenox.  He then returned home and flet very lightheaded and dizzy.  He wisely took his blood pressure and noted it was 70-80/50s whereas it is normally 117/70s.  He went in to ED immediately.  There he was noted to have the following pertinent lab values:  Hg 9.2 (11.7 prior)  INR 2.9  Na 131 (139 prior)  K 6.18  BUN 43 (18 prior)  Cr 2.88 (1.1-1.4 at baseline)  proBNP 2755  He was [place] [facility] [place] and care.  Of note, he underwent screening c-scope on 7/2 during which the following was noted and done: Diverticulosis in the  sigmoid colon and in the descending colon, Two 8 to 10 mm polyps in the sigmoid colon and in the descending colon, removed with a hot snare, resected and retrieved, ligated.        * VT Storm    -VT storm. Polymorphic and monomorphic.   -Intubated, IABP placed emergently at bedside 7/10. IABP removed 7/25.  -Continue metoprolol, mexitil, amio gtt for now.   -K goal >4.5, Mg >2.5  - Per EP-At a later time, it may be considered to deactivate the LV lead of the patient's CRT-D, considering the patient's LVAD status and native RBBB. ICD interrogation reveals LICHA.   - Extremely poor prognosis moving forward. Discussed with Daughter at bedside. Will potentially move to comfort care once more family here.         Respiratory failure requiring intubation    -Emergently intubated 7/10/18 for impending respiratory failure/need for IABP and inability to lay fat.  -History of pulmonary sarcoidosis  -Methylpred given 7/10/18, hydrocortisone 100 mg q8 started 7/11/18. Transitioned to PO prednisone 7/16.   -Pulmonary consult for assistance with sarcoid & vent management. We are now at the point of considering trach. Daughter Kasandra JAMES.  - ENT consulted and feel that long term plan of care must be established before tracheostomy will be considered.  -CXR daily.  -Extremely poor prognosis as every time we decrease sedation he has VT. Discussed situation with daughter and may potentially move to comfort care once more family arrive.         Cardiogenic shock    - IABP placed emergently 7/10/18. Pulled 7/25  - Daily CXR - today shows persistent pulmonary edema  - CVP 10. Continue Lasix at 20 mg/hr and IV Diuril 250 mg bid.        GI bleed    - INR subtherapeutic  - Coumadin on hold anticoagulation has been on hold in setting of recent GI bleed. Has been receiving DVT PPx  - Protonix increased to 40 mg BID (changed to IV)  - Had screening colonoscopy with hot snare polypectomy during last admission; most likely culprit post  polypectomy bleeding        Abnormal involuntary movements    - Continue keppra  - possible seizure activity noted on 7/12/18 (twitching right face and UEs lasting 5 min). Some Bilateral UE twitching which did not appear to be seizure like occurred on 7/18, resolved with increased sedation. If noted again will reach out to neuro.   - neuro consulted and increased keppra to 1000 IV 12   - continuous EEG read and negative for seizure activity   - Keppra changed to PO        Hypernatremia    -Improved some with addition of free water flushes, continue 250 q 6 hours        Constipation    - Last bowel movement 7/21. Tolerating tube feeds at goal rate with minimal residual. Will continue bowel program and give Lactulose today          VAP (ventilator-associated pneumonia)    -Completed total 10 day course of antibiotics 7/24.   -Cultures 7/13 with S. Aureus in sputum, +jesenia blood cultures on 7/17 (probable contaminent). Blood cultures 7/18 are -ve.          Leukocytosis    - Recultured 7/26 and Vanc/Cefepime resumed. Will monitor  - Will change RIJ central line that was placed 7/19 and culture tip        Sarcoidosis of lung    - Continue prednisone, breo-ellipta, and albuterol prn.   - Wean pred once condition improves        History of stroke    -On coumadin prior to admit  -Hold off on systemic anticoagulation for now.        Ventricular fibrillation    -See VT        Hypotension    - Levophed started during code 7/10/18  - Wean levo for MAP >60         Acute on chronic combined systolic and diastolic CHF, NYHA class 4    - See cardiogenic shock        History of atrial fibrillation    -  continue amio         CAD (coronary artery disease)    - Continue atorvastatin, nitro prn  - ASA restarted        Uninterrupted Critical Care/Counseling Time (not including procedures): 45mn    Dale Underwood, NP  Heart Transplant  [facility]

## 2018-07-29 NOTE — SIGNIFICANT EVENT
Called by RN for code blue for Mr. Good. After receiving CPR the patient did not regain a pulse or demonstrate any signs of spontaneous breathing. The patient is cool to touch, pupils are fixed and dilated without corneal or gag reflex. Heart sounds are not heard throughout the precordium and the patient is making no spontaneous movements. No breath sounds are auscultated. The  was called and condolences were given to the family.    Time of death, 2:14 pm.    Rafa Petit MD  PGY-IV

## 2018-07-29 NOTE — ASSESSMENT & PLAN NOTE
· Continue diuresis per primary team  · No change on CXR comapred to prior day   · Received abx therapy for  ET aspirate culture positive for MSSA staph. Last dose of cefepime and vancomycin on 7/20 .  · Continue current respiratory therapies   · Vent settings: RR 16,  mL (6cc/kg), PEEP 5, FiO2 80%   · Cardiac insufficiencies likely to be limiting factor in patient's potential recovery.     · Fentanyl drip resumed for comfort  It unfortunately appears that the patient's respiratory/cardiac/renal status continues to decline with increasing FiO2 requirement.  This is almost certainly secondary to his recurrent arrhythmias, recurring AICD fires, worsening heart failure, and worsening RONALD.  I discussed his prognosis extensively with the family and they are considering the decision of focusing on comfort care in the very near future.  They are awaiting formal discussion with more family, but appear to realize the severe extent of the patient's illness.

## 2018-07-29 NOTE — CHAPLAIN
Patient  as a result of code blue.   provided pastoral support for house supervisor, charge nurse, nurse, patients daughters, son-in-law, brother and brothers wife.  Charge nurse contacted security and house supervisor because  communicated to charge nurse that one of patients daughters communicated to  that she is going to kill patients wife.  After communicating this information, this daughter communicated that she wanted to kill herself and took several pills.  After taking these, pills security escorted her to the ED.

## 2018-07-29 NOTE — PLAN OF CARE
Problem: Patient Care Overview  Goal: Plan of Care Review  Outcome: Ongoing (interventions implemented as appropriate)  Pt remains intubated (current settings A/C rate 16, , PEEP 5, FiO2 50%).  Precedex IV initiated per orders and provides full effective anxiety relief.  Plan to wean Propofol IV off (currently infusing at 10 mcg/kg/min). Fentanyl IV providing full effective pain relief, as nonverbal indicators of pain absent today. Levophed IV titrated to maintain ordered MAP parameters and currently infusing at 0.06 mcg/kg/min.  Lasix IV infusing at 20 mg/hr, with clear yellow urine assessed hourly from [place] today.  Amiodarone IV at 0.5 mg/min, and no ectopic events noted today.  Markedly elevated Vancomycin trough level noted today (43.2), and Vancomycin IV dose held today per MD order. Plan to reassess Vancomycin level tomorrow per order. Pt febrile this afternoon, and Acetaminophen elixir administered per standing order.  See 17:00 pm nursing notation today for information concerning events involving pt's family, with hospital security and house supervisor presenting to bedside.  As noted, no advanced directives currently available. Pt's daughter states she is healthcare power of  and states her sister will bring documentation this evening. Pt currently intubated and sedated and cannot convey wishes at this time. Dr. Cross presented to bedside this morning and discussed pt's status and plan of care with pt's daughter. Plan of care reviewed with pt's family, and verbalization of understanding obtained (but reinforcement required). All questions answered. Emotional support offered to pt and to pt's family.

## 2018-07-29 NOTE — SUBJECTIVE & OBJECTIVE
Past Medical History:   Diagnosis Date    AICD (automatic cardioverter/defibrillator) present     Arthritis     Atrial fibrillation     CHF (congestive heart failure)     Coronary artery disease     History of stroke 7/13/2018    2005, 2006, no deficits per wife.    Hypertension     MI (myocardial infarction)     Sarcoid     Seizures     Stroke        Past Surgical History:   Procedure Laterality Date    CARDIAC CATHETERIZATION      CARDIAC DEFIBRILLATOR PLACEMENT  7-12    CARDIAC DEFIBRILLATOR PLACEMENT      CARDIAC DEFIBRILLATOR PLACEMENT      COLONOSCOPY N/A 7/2/2018    Procedure: COLONOSCOPY;  Surgeon: THELMA Kay MD;  Location: [facility] ENDO ([address] [place] [address]);  Service: Endoscopy;  Laterality: N/A;    CORONARY STENT PLACEMENT  07/2011    ESOPHAGOGASTRODUODENOSCOPY      FRACTURE SURGERY      LEFT HEART CATHETERIZATION N/A 6/25/2018    Procedure: Left heart cath;  Surgeon: Jordi Lui MD;  Location: [facility] CATH LAB;  Service: Cardiology;  Laterality: N/A;       Review of patient's allergies indicates:  No Known Allergies    Family History     Problem Relation (Age of Onset)    Cancer Maternal Grandmother    Coronary artery disease Father, Sister, Sister    Heart disease Mother, Father, Sister    Hypertension Mother, Father, Sister    Kidney disease Sister    Stroke Sister    Vision loss Sister        Social History Main Topics    Smoking status: Never Smoker    Smokeless tobacco: Never Used    Alcohol use No    Drug use: No    Sexual activity: Not on file         Review of Systems   Reason unable to perform ROS: intubated and sedated.  not responsive.     Objective:     Vital Signs (Most Recent):  Temp: 98.6 °F (37 °C) (07/29/18 0600)  Pulse: 80 (07/29/18 1158)  Resp: 18 (07/29/18 1158)  BP: (!) 84/58 (07/29/18 1130)  SpO2: (!) 90 % (07/29/18 1158) Vital Signs (24h Range):  Temp:  [98.4 °F (36.9 °C)-101.4 °F (38.6 °C)] 98.6 °F (37 °C)  Pulse:  [79-80] 80  Resp:  [16-31] 18  SpO2:  [81 %-100  %] 90 %  BP: (82-97)/(50-58) 84/58     Weight: 104.7 kg (230 lb 13.2 oz)  Body mass index is 32.19 kg/m².      Intake/Output Summary (Last 24 hours) at 07/29/18 1242  Last data filed at 07/29/18 0800   Gross per 24 hour   Intake           4464.3 ml   Output             1020 ml   Net           3444.3 ml       Physical Exam   Constitutional: He appears well-developed and well-nourished. No distress.   HENT:   Head: Normocephalic and atraumatic.   Nose: Nose normal.   Cardiovascular: Normal rate.  Exam reveals gallop. Exam reveals no friction rub.    Murmur heard.  Pulmonary/Chest:   Mechanically ventillated with diffuse crackles in all lung fields.   Abdominal: Soft. He exhibits no distension. There is no tenderness. There is no guarding.   Neurological:   No purposeful movements or interaction however on sedation.   Skin: Skin is warm and dry.   Psychiatric:   Sedated         Vents:  Vent Mode: A/C (07/29/18 1158)  Set Rate: 16 bmp (07/29/18 1158)  Vt Set: 450 mL (07/29/18 1158)  PEEP/CPAP: 7 cmH20 (07/29/18 1158)  Oxygen Concentration (%): 100 (07/29/18 1158)  Peak Airway Pressure: 32 cmH2O (07/29/18 1158)  Plateau Pressure: 28 cmH20 (07/29/18 1158)  Total Ve: 8.95 mL (07/29/18 1158)  F/VT Ratio<105 (RSBI): (!) 36.44 (07/29/18 1158)    Lines/Drains/Airways     Central Venous Catheter Line                 Percutaneous Central Line Insertion/Assessment - triple lumen  07/27/18 1434 right internal jugular 1 day          Drain                 NG/OG Tube 07/10/18 1557 Graham sump 16 Fr. Left nostril 18 days         Urethral Catheter 07/27/18 1500 1 day          Airway                 Airway - Non-Surgical 07/10/18 1523 Endotracheal Tube 18 days          Peripheral Intravenous Line                 Midline Catheter Insertion/Assessment  - Single Lumen 07/17/18 1105 Right cephalic vein (lateral side of arm) 18g x 8cm 12 days         Peripheral IV - Single Lumen 07/26/18 1935 Right Forearm 2 days                Significant  Labs:    CBC/Anemia Profile:    Recent Labs  Lab 07/27/18  1310 07/28/18  0438 07/29/18  0300   WBC 15.23* 13.40* 13.06*   HGB 6.9* 6.4* 6.7*   HCT 22.9* 22.2* 22.3*    268 330   MCV 94 95 92   RDW 17.0* 17.4* 17.0*        Chemistries:    Recent Labs  Lab 07/27/18  1310 07/28/18  0438 07/28/18  1630 07/29/18  0300 07/29/18  1053    141  141 135* 136  136  --    K 3.5 3.7  3.7 4.2 3.5  3.5 5.2*   CL 90* 93*  93* 91* 90*  90*  --    CO2 35* 35*  35* 27 28 28  --    * 118*  118* 135* 140*  140*  --    CREATININE 1.9* 2.1*  2.1* 2.6* 2.9*  2.9*  --    CALCIUM 9.8 9.4  9.4 9.6 9.6  9.6  --    ALBUMIN 2.1* 2.1*  --  2.0*  --    PROT 6.4 6.5  --  6.6  --    BILITOT 0.6 0.5  --  0.5  --    ALKPHOS 59 57  --  55  --    ALT 6* 7*  --  8*  --    AST 17 15  --  20  --    MG 2.6 2.7*  --  2.5 2.5       All pertinent labs within the past 24 hours have been reviewed.    Significant Imaging:   I have reviewed all pertinent imaging results/findings within the past 24 hours.

## 2018-07-29 NOTE — SUBJECTIVE & OBJECTIVE
Interval History: Was shocked multiple times this am. Subsequently placed back on propofol/fentanyl and precedex stopped. Frequent NSVT.     Continuous Infusions:   amiodarone in dextrose 5% 0.5 mg/min (07/29/18 0800)    dexmedetomidine (PRECEDEX) infusion Stopped (07/29/18 0400)    fentanyl 2.5 mL/hr at 07/29/18 0800    furosemide (LASIX) 2 mg/mL infusion (non-titrating) 20 mg/hr (07/29/18 0800)    norepinephrine bitartrate-D5W 0.07 mcg/kg/min (07/29/18 0800)    propofol 50 mcg/kg/min (07/29/18 0800)     Scheduled Meds:   albuterol sulfate  2.5 mg Nebulization Q4H    aspirin  81 mg Oral Daily    atorvastatin  40 mg Oral Daily    bisacodyl  10 mg Rectal Once    budesonide  0.5 mg Nebulization Q12H    ceFEPime (MAXIPIME) IVPB  2 g Intravenous Q8H    chlorhexidine  15 mL Mouth/Throat BID    chlorothiazide (DIURIL) IVPB  250 mg Intravenous Q12H    docusate  100 mg Oral Daily    fluticasone-vilanterol  1 puff Inhalation Daily    gabapentin  600 mg Oral BID    heparin (porcine)  5,000 Units Subcutaneous Q8H    [START ON 7/30/2018] insulin aspart U-100  5 Units Subcutaneous Q24H    [START ON 7/30/2018] insulin aspart U-100  5 Units Subcutaneous Q24H    insulin aspart U-100  5 Units Subcutaneous Q24H    insulin aspart U-100  5 Units Subcutaneous Q24H    insulin aspart U-100  5 Units Subcutaneous Q24H    insulin aspart U-100  5 Units Subcutaneous Q24H    insulin detemir U-100  15 Units Subcutaneous QHS    lactulose  30 g Per OG tube Once    levETIRAcetam  500 mg Oral BID    metoprolol  2.5 mg Intravenous Q4H    mexiletine  150 mg Oral Q8H    pantoprozole (PROTONIX) 40 mg/100 mL D5W IVPB  40 mg Intravenous BID    predniSONE  30 mg Oral Daily    sennosides 8.8 mg/5 ml  5 mL Oral QHS    sodium chloride 0.9%  3 mL Intravenous Q8H     PRN Meds:sodium chloride, sodium chloride, acetaminophen, ALPRAZolam, benzonatate, calcium gluconate IVPB, calcium gluconate IVPB, calcium gluconate IVPB,  carisoprodol, dextrose 50%, glucagon (human recombinant), HYDROcodone-acetaminophen, insulin aspart U-100, magnesium sulfate IVPB, magnesium sulfate IVPB, nitroGLYCERIN, potassium chloride in water **AND** potassium chloride in water **AND** potassium chloride in water, sodium phosphate IVPB, sodium phosphate IVPB, sodium phosphate IVPB    Review of patient's allergies indicates:  No Known Allergies  Objective:     Vital Signs (Most Recent):  Temp: 98.6 °F (37 °C) (07/29/18 0600)  Pulse: 80 (07/29/18 0843)  Resp: 20 (07/29/18 0843)  BP: (!) 84/56 (07/29/18 0730)  SpO2: (!) 86 % (07/29/18 0843) Vital Signs (24h Range):  Temp:  [98.4 °F (36.9 °C)-101.4 °F (38.6 °C)] 98.6 °F (37 °C)  Pulse:  [79-80] 80  Resp:  [16-31] 20  SpO2:  [81 %-100 %] 86 %  BP: (82-97)/(50-57) 84/56     Patient Vitals for the past 72 hrs (Last 3 readings):   Weight   07/29/18 0634 104.7 kg (230 lb 13.2 oz)   07/28/18 0500 98.7 kg (217 lb 9.5 oz)   07/27/18 0501 104.2 kg (229 lb 11.5 oz)     Body mass index is 32.19 kg/m².      Intake/Output Summary (Last 24 hours) at 07/29/18 0858  Last data filed at 07/29/18 0800   Gross per 24 hour   Intake           4879.3 ml   Output             1265 ml   Net           3614.3 ml     Hemodynamic Parameters: CVP 10.       Telemetry: BiV paced, frequent NSVT.    Physical Exam   Constitutional: He appears well-developed and well-nourished.   HENT:   Head: Normocephalic and atraumatic.   Eyes: Conjunctivae and EOM are normal. Pupils are equal, round, and reactive to light.   Neck: Neck supple. No thyromegaly present.   RIJ line   Cardiovascular: Normal rate and regular rhythm.    Doppler left pedal pulse, palpable right pedal pulse   Pulmonary/Chest:   Intubated and ventilated. Bibasilar crackles   Abdominal: He exhibits distension.   No bowel sounds appreciated   Musculoskeletal:   2-3+ KAUSHIK   Neurological:   Intubated and sedated   Skin: Skin is warm and dry. Capillary refill takes 2 to 3 seconds.      Significant Labs:  CBC:    Recent Labs  Lab 07/27/18  1310 07/28/18  0438 07/29/18  0300   WBC 15.23* 13.40* 13.06*   RBC 2.45* 2.34* 2.43*   HGB 6.9* 6.4* 6.7*   HCT 22.9* 22.2* 22.3*    268 330   MCV 94 95 92   MCH 28.2 27.4 27.6   MCHC 30.1* 28.8* 30.0*     BNP:  No results for input(s): BNP in the last 168 hours.    Invalid input(s): BNPTRIAGELBLO  CMP:    Recent Labs  Lab 07/27/18  1310 07/28/18  0438 07/28/18  1630 07/29/18  0300   * 153*  153* 268* 140*  140*   CALCIUM 9.8 9.4  9.4 9.6 9.6  9.6   ALBUMIN 2.1* 2.1*  --  2.0*   PROT 6.4 6.5  --  6.6    141  141 135* 136  136   K 3.5 3.7  3.7 4.2 3.5  3.5   CO2 35* 35*  35* 27 28  28   CL 90* 93*  93* 91* 90*  90*   * 118*  118* 135* 140*  140*   CREATININE 1.9* 2.1*  2.1* 2.6* 2.9*  2.9*   ALKPHOS 59 57  --  55   ALT 6* 7*  --  8*   AST 17 15  --  20   BILITOT 0.6 0.5  --  0.5      Coagulation:     Recent Labs  Lab 07/27/18  0341 07/28/18  0438 07/29/18  0300   INR 1.1 1.1 1.2     LDH:  No results for input(s): LDH in the last 72 hours.  Microbiology:  Microbiology Results (last 7 days)     Procedure Component Value Units Date/Time    Blood culture [[id number]] Collected:  07/26/18 1936    Order Status:  Completed Specimen:  Blood from Peripheral, Lower Arm, Right Updated:  07/28/18 2022     Blood Culture, Routine No Growth to date     Blood Culture, Routine No Growth to date     Blood Culture, Routine No Growth to date    Blood culture [[id number]] Collected:  07/26/18 0718    Order Status:  Completed Specimen:  Blood from Peripheral, Antecubital, Left Updated:  07/28/18 1212     Blood Culture, Routine No Growth to date     Blood Culture, Routine No Growth to date     Blood Culture, Routine No Growth to date    Culture, Respiratory with Gram Stain [[id number]] Collected:  07/26/18 2128    Order Status:  Completed Specimen:  Respiratory from Endotracheal Aspirate Updated:  07/28/18 1145     Respiratory Culture --      PRESUMPTIVE PSEUDOMONAS SPECIES  Moderate  Identification and susceptibility pending       Respiratory Culture --     ESCHERICHIA COLI  Few  Susceptibility pending       Respiratory Culture --     STREPTOCOCCUS GROUP F  Many  Beta-hemolytic streptococci are routinely susceptible to   penicillins,cephalosporins and carbapenems.  Susceptibility testing not routinely performed       Gram Stain (Respiratory) <10 epithelial cells per low power field.     Gram Stain (Respiratory) Many WBC's     Gram Stain (Respiratory) Many Gram positive cocci     Gram Stain (Respiratory) Few Gram negative rods     Gram Stain (Respiratory) Few Gram positive rods    Blood culture [[id number]] Collected:  07/26/18 0718    Order Status:  Completed Specimen:  Blood from Peripheral, Upper Arm, Left Updated:  07/28/18 1138     Blood Culture, Routine Gram stain alfred bottle: Gram positive cocci in clusters resembling Staph      Blood Culture, Routine Results called to and read back by:Cecily Rojas RN 07/27/2018  14:56     Blood Culture, Routine --     COAGULASE-NEGATIVE STAPHYLOCOCCUS SPECIES  Organism is a probable contaminant      IV catheter culture [[id number]] Collected:  07/27/18 1400    Order Status:  Sent Specimen:  Catheter Tip from Catheter Tip, Intrajugular Updated:  07/28/18 0000    Urine culture [[id number]] Collected:  07/26/18 0729    Order Status:  Completed Specimen:  Urine from Urine, Catheterized Updated:  07/27/18 1508     Urine Culture, Routine --     CANDIDA ALBICANS  > 100,000 cfu/ml  Treatment of asymptomatic candiduria is not recommended (except for   specific populations). Candida isolated in the urine typically   represents colonization. If an indwelling urinary catheter is present  it should be removed or replaced.      Blood culture [[id number]] Collected:  07/26/18 2053    Order Status:  Sent Specimen:  Blood from Peripheral, Lower Arm, Left Updated:  07/26/18 2053    Culture, Respiratory with Gram Stain [[id number]]      Order Status:  Canceled Specimen:  Respiratory from Endotracheal Aspirate     Urine culture [[id number]]     Order Status:  Completed Specimen:  Urine from Urine, Clean Catch     Blood culture [[id number]] Collected:  07/18/18 1803    Order Status:  Completed Specimen:  Blood from Peripheral, Forearm, Right Updated:  07/23/18 2212     Blood Culture, Routine No growth after 5 days.    Blood culture [[id number]] Collected:  07/18/18 1803    Order Status:  Completed Specimen:  Blood from Peripheral, Antecubital, Left Updated:  07/23/18 2212     Blood Culture, Routine No growth after 5 days.    Blood culture [[id number]] Collected:  07/17/18 0930    Order Status:  Completed Specimen:  Blood from Peripheral, Antecubital, Right Updated:  07/22/18 1212     Blood Culture, Routine No growth after 5 days.        I have reviewed all pertinent labs within the past 24 hours.    Estimated Creatinine Clearance: 35.5 mL/min (A) (based on SCr of 2.9 mg/dL (H)).    Diagnostic Results:  I have reviewed all pertinent imaging results/findings within the past 24 hours.
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
## 2018-07-29 NOTE — ASSESSMENT & PLAN NOTE
-VT storm. Polymorphic and monomorphic.   -Intubated, IABP placed emergently at bedside 7/10. IABP removed 7/25.  -Continue metoprolol, mexitil, amio gtt for now.   -K goal >4.5, Mg >2.5  - Per EP-At a later time, it may be considered to deactivate the LV lead of the patient's CRT-D, considering the patient's LVAD status and native RBBB. ICD interrogation reveals LICHA.   - Extremely poor prognosis moving forward. Discussed with Daughter at bedside. Will potentially move to comfort care once more family here.

## 2018-07-29 NOTE — EICU
eLERT for code blue.  ACLS in progress.  Dr Conrad and Dr Cross @ bs.  See code documentation for further information.

## 2018-07-29 NOTE — CONSULTS
[facility]  Pulmonology  Consult Note    Patient Name: Yonathan Good Jr.  MRN: [id number]  Admission Date: 7/7/2018  Hospital Length of Stay: 22 days  Code Status: Full Code  Attending Physician: Mohan Cross MD  Primary Care Provider: Edgar Gates MD   Principal Problem: Ventricular tachycardia, incessant    Consults  Subjective:     HPI:  57M with complex history of HFrEF (EF 10-15%) with AICD, pulmonary sarcoid, CVA, admitted for GI bleed. Now s/p polymorphic VT with cardiac arrest and synchronized cardioversion x3 on 7/10 with new IABP. Now sedated, intubated, and on ventilator. we were consulted on 7/11 for ventilator management and recommendations.    Patient initially presented 6/22 with stroke-like symptoms and discharged 7/4 on warf/lovenox. Came back to ED with bright red stools and hypotension. He began experiencing chest discomfort and shortness of breath and was found to be in polymorphic v. Tach.  His condition continued to deteriorate and he developed respiratory distress requiring intubation.  He then entered asystole and ACLS protocol ensued for 16 minutes before ROSC.  He is now intubated, sedated (propofol, fentanyl), on pressors (levophed), insulin gtt, and anti-arrhythmics (amiodarone, lidocaine).  He has an intra-aortic balloon pump inserted in the left axilla, and CVC in the right IJ.  Pulmonary medicine was consulted for management of vent.    Past Medical History:   Diagnosis Date    AICD (automatic cardioverter/defibrillator) present     Arthritis     Atrial fibrillation     CHF (congestive heart failure)     Coronary artery disease     History of stroke 7/13/2018    2005, 2006, no deficits per wife.    Hypertension     MI (myocardial infarction)     Sarcoid     Seizures     Stroke        Past Surgical History:   Procedure Laterality Date    CARDIAC CATHETERIZATION      CARDIAC DEFIBRILLATOR PLACEMENT  7-12    CARDIAC DEFIBRILLATOR PLACEMENT      CARDIAC  DEFIBRILLATOR PLACEMENT      COLONOSCOPY N/A 7/2/2018    Procedure: COLONOSCOPY;  Surgeon: THELMA Kay MD;  [facility]  Service: Endoscopy;  Laterality: N/A;    CORONARY STENT PLACEMENT  07/2011    ESOPHAGOGASTRODUODENOSCOPY      FRACTURE SURGERY      LEFT HEART CATHETERIZATION N/A 6/25/2018    Procedure: Left heart cath;  Surgeon: Jordi Lui MD;  Location: [facility] CATH LAB;  Service: Cardiology;  Laterality: N/A;       Review of patient's allergies indicates:  No Known Allergies    Family History     Problem Relation (Age of Onset)    Cancer Maternal Grandmother    Coronary artery disease Father, Sister, Sister    Heart disease Mother, Father, Sister    Hypertension Mother, Father, Sister    Kidney disease Sister    Stroke Sister    Vision loss Sister        Social History Main Topics    Smoking status: Never Smoker    Smokeless tobacco: Never Used    Alcohol use No    Drug use: No    Sexual activity: Not on file         Review of Systems   Reason unable to perform ROS: intubated and sedated.  not responsive.     Objective:     Vital Signs (Most Recent):  Temp: 98.6 °F (37 °C) (07/29/18 0600)  Pulse: 80 (07/29/18 1158)  Resp: 18 (07/29/18 1158)  BP: (!) 84/58 (07/29/18 1130)  SpO2: (!) 90 % (07/29/18 1158) Vital Signs (24h Range):  Temp:  [98.4 °F (36.9 °C)-101.4 °F (38.6 °C)] 98.6 °F (37 °C)  Pulse:  [79-80] 80  Resp:  [16-31] 18  SpO2:  [81 %-100 %] 90 %  BP: (82-97)/(50-58) 84/58     Weight: 104.7 kg (230 lb 13.2 oz)  Body mass index is 32.19 kg/m².      Intake/Output Summary (Last 24 hours) at 07/29/18 1242  Last data filed at 07/29/18 0800   Gross per 24 hour   Intake           4464.3 ml   Output             1020 ml   Net           3444.3 ml       Physical Exam   Constitutional: He appears well-developed and well-nourished. No distress.   HENT:   Head: Normocephalic and atraumatic.   Nose: Nose normal.   Cardiovascular: Normal rate.  Exam reveals gallop. Exam reveals no  friction rub.    Murmur heard.  Pulmonary/Chest:   Mechanically ventillated with diffuse crackles in all lung fields.   Abdominal: Soft. He exhibits no distension. There is no tenderness. There is no guarding.   Neurological:   No purposeful movements or interaction however on sedation.   Skin: Skin is warm and dry.   Psychiatric:   Sedated         Vents:  Vent Mode: A/C (07/29/18 1158)  Set Rate: 16 bmp (07/29/18 1158)  Vt Set: 450 mL (07/29/18 1158)  PEEP/CPAP: 7 cmH20 (07/29/18 1158)  Oxygen Concentration (%): 100 (07/29/18 1158)  Peak Airway Pressure: 32 cmH2O (07/29/18 1158)  Plateau Pressure: 28 cmH20 (07/29/18 1158)  Total Ve: 8.95 mL (07/29/18 1158)  F/VT Ratio<105 (RSBI): (!) 36.44 (07/29/18 1158)    Lines/Drains/Airways     Central Venous Catheter Line                 Percutaneous Central Line Insertion/Assessment - triple lumen  07/27/18 1434 right internal jugular 1 day          Drain                 NG/OG Tube 07/10/18 1557 [place] sump 16 Fr. Left nostril 18 days         Urethral Catheter 07/27/18 1500 1 day          Airway                 Airway - Non-Surgical 07/10/18 1523 Endotracheal Tube 18 days          Peripheral Intravenous Line                 Midline Catheter Insertion/Assessment  - Single Lumen 07/17/18 1105 Right cephalic vein (lateral side of arm) 18g x 8cm 12 days         Peripheral IV - Single Lumen 07/26/18 1935 Right Forearm 2 days                Significant Labs:    CBC/Anemia Profile:    Recent Labs  Lab 07/27/18  1310 07/28/18  0438 07/29/18  0300   WBC 15.23* 13.40* 13.06*   HGB 6.9* 6.4* 6.7*   HCT 22.9* 22.2* 22.3*    268 330   MCV 94 95 92   RDW 17.0* 17.4* 17.0*        Chemistries:    Recent Labs  Lab 07/27/18  1310 07/28/18  0438 07/28/18  1630 07/29/18  0300 07/29/18  1053    141  141 135* 136  136  --    K 3.5 3.7  3.7 4.2 3.5  3.5 5.2*   CL 90* 93*  93* 91* 90*  90*  --    CO2 35* 35*  35* 27 28  28  --    * 118*  118* 135* 140*  140*  --     CREATININE 1.9* 2.1*  2.1* 2.6* 2.9*  2.9*  --    CALCIUM 9.8 9.4  9.4 9.6 9.6  9.6  --    ALBUMIN 2.1* 2.1*  --  2.0*  --    PROT 6.4 6.5  --  6.6  --    BILITOT 0.6 0.5  --  0.5  --    ALKPHOS 59 57  --  55  --    ALT 6* 7*  --  8*  --    AST 17 15  --  20  --    MG 2.6 2.7*  --  2.5 2.5       All pertinent labs within the past 24 hours have been reviewed.    Significant Imaging:   I have reviewed all pertinent imaging results/findings within the past 24 hours.    Assessment/Plan:     Respiratory failure requiring intubation    · Continue diuresis per primary team  · No change on CXR comapred to prior day   · Received abx therapy for  ET aspirate culture positive for MSSA staph. Last dose of cefepime and vancomycin on 7/20 .  · Continue current respiratory therapies   · Vent settings: RR 16,  mL (6cc/kg), PEEP 5, FiO2 80%   · Cardiac insufficiencies likely to be limiting factor in patient's potential recovery.     · Fentanyl drip resumed for comfort  It unfortunately appears that the patient's respiratory/cardiac/renal status continues to decline with increasing FiO2 requirement.  This is almost certainly secondary to his recurrent arrhythmias, recurring AICD fires, worsening heart failure, and worsening RONALD.  I discussed his prognosis extensively with the family and they are considering the decision of focusing on comfort care in the very near future.  They are awaiting formal discussion with more family, but appear to realize the severe extent of the patient's illness.              Thank you for your consult. I will follow-up with patient. Please contact us if you have any additional questions.     Dontrell Zeng MD  Pulmonology  [facility]

## 2018-07-29 NOTE — ASSESSMENT & PLAN NOTE
-Emergently intubated 7/10/18 for impending respiratory failure/need for IABP and inability to lay fat.  -History of pulmonary sarcoidosis  -Methylpred given 7/10/18, hydrocortisone 100 mg q8 started 7/11/18. Transitioned to PO prednisone 7/16.   -Pulmonary consult for assistance with sarcoid & vent management. We are now at the point of considering trach. Daughter Kasandra JAMES.  - ENT consulted and feel that long term plan of care must be established before tracheostomy will be considered.  -CXR daily.  -Extremely poor prognosis as every time we decrease sedation he has VT. Discussed situation with daughter and may potentially move to comfort care once more family arrive.

## 2018-07-29 NOTE — ASSESSMENT & PLAN NOTE
- INR subtherapeutic  - Coumadin on hold anticoagulation has been on hold in setting of recent GI bleed. Has been receiving DVT PPx  - Protonix increased to 40 mg BID (changed to IV)  - Had screening colonoscopy with hot snare polypectomy during last admission; most likely culprit post polypectomy bleeding

## 2018-07-29 NOTE — PROGRESS NOTES
Pt experienced 7 episodic shock events with AICD between the time of 0358 and 0500. Propofol gtt restarted at 50mcg/kg/min and levo gtt titrated to 0.07mcg/kg/min. MAP remains 60 - 63 att. Vent settings currently 70% Fio2 a/c peep 5, sats remain 91-93%. Urine output 45-50cc/hr with Lasix gtt 20mg/hr. Amio gtt remains infusing at 0.5mg/min. Labs reveal K+ at 3.5 and replacement electrolytes given att. All questions and concerns addressed and emotional support offered to pt and family.Will continue to monitor closely and will continue to inform family as appropriate of ongoing interventions and care.

## 2018-07-29 NOTE — PLAN OF CARE
Reviewed insulin regimen and CBG. Noted events overnight per nursing documentation     Increase novolog 3>5 units q4 while on TF - hold if TF held or glucose <100  Increase levemir 11>15 units nightly  POC glucose q4  Low dose correction     Will continue to follow along, please call with any questions.

## 2018-07-30 LAB
BACTERIA CATH TIP CULT: NO GROWTH
BACTERIA SPEC AEROBE CULT: NORMAL
GRAM STN SPEC: NORMAL

## 2018-07-30 NOTE — DISCHARGE SUMMARY
[facility]  Heart Transplant  Discharge Summary      Patient Name: Yonathan Good Jr.  MRN: [id number]  Admission Date: 7/7/2018  Hospital Length of Stay: 22 days  Discharge Date and Time: 07/29/2018   Attending Physician: No att. providers found   Discharging Provider: Dale Underwood NP  Primary Care Provider: Edgar Gates MD     HPI: 57 y.o. male  with h/o ischemic cardiomyopathy(negative for sarcoid on cardiac MRI 2012), H/o stents 7 years ago,Pulmonary sarcoid, left parietal stroke in the past, atrial fibrillation, CRT-D(st [place]ior[place]) who follows Dr Berman and is being worked up as outpatient for advanced options. He was transferred to Stroke service last evening with difficulty speaking with suspected Stroke.Initial workup included CVA which was negative for acute CVA.No tPA was given. This am patient complained of chest pain and dizziness and was noted to be in Monomorphic VT -Code blue was called.He was ATP'ed out of it but he also had an external shock delivered by rapid repsonse team as he was persistently in VT but no shock was delivered.He went into Fast VT with rate>200 and his ICD shocked him out of it. He was transferred to ICU.He reports having chest pain for last 4-5 years on exertion relieved with nitro-reports stress test done in [place].Last stress test 2015 in [facility]. He was loaded with amiodarone.He had 2 more episodes of VT which were slower at 150-His device did not ATP as below VT-1 zone. He also complains of cough with pinkish sputum,SOB.He reports no more chest pain since the shock. He was recently scheduled for colonoscopy as part of transplant workup for which his coumadin was held and he was placed on eliquis temporarily.He had chest pain on day of c-scope and he had to take a nitro and his procedure was cancelled(5 days ago). He reports he started taking his coumadin after.His INR was subtherapeutic on arrival. He states that his pulmonary sarcoid was diagnosed  in 2015 when he was evaluated for a dry non-productive cough.  States that he underwent bronchoscopy with biopsies which confirmed sarcoid.  Since then, he has been on varying doses of Prednisone He has no other organ involvement from sarcoid and states that his only symptom has been cough. Denies any alcohol or illicit drug use.  Previously worked as a .      * No surgery found *     Hospital Course: He was admitted and monitored closely. Stroke team followed pt while in ICU. Imaging neg for acute CVA. LHC was performed which revealed Non OBs CAD and elevated LVEDP. He was diuresed with IV Lasix and transferred to floor. On floor, he started with a cough that seemed to provoke VT. He was moved to ICU and and was placed on IV amiodarone and Lidocaine. Unfortunately, his condition deteriorated and he was intubated and had IABP placed. He required pressors for BP support. At this point, it was felt that he was too ill to consider transplant as an option. Family was updated on current events and poor prognosis and there were some interfamily decision issues(see notes in chart). His IABP was weaned and removed and we were working on extubation but he went into VT storm every time sedation was turned down. ENT was consulted for trach but they felt that his condition was too sick for surgery. His condition continued to worsen and he had another episode of VT storm on 7/29/18 and team was unable to revive him. Time of death 2:14pm 7/29/18.     Consults         Status Ordering Provider     Inpatient consult to Electrophysiology  Once     Provider:  (Not yet assigned)    Completed DWAYNE DALLAS     Inpatient consult to Endocrinology  Once     Provider:  (Not yet assigned)    Completed CHIRAG BALDERAS     Inpatient consult to ENT  Once     Provider:  (Not yet assigned)    Completed CHIRAG BALDERAS     Inpatient consult to Gastroenterology  Once     Provider:  (Not yet assigned)    Completed MAX TAVERAS      Inpatient consult to Infectious Diseases  Once     Provider:  (Not yet assigned)    Completed DALE OWEN     Inpatient consult to Infectious Diseases  Once     Provider:  Delaney Whitaker MD    Completed DALE OWEN     Inpatient consult to Interventional Cardiology  Once     Provider:  (Not yet assigned)    Completed DWAYNE DALLAS FAurelio     Inpatient consult to Neurology  Once     Provider:  (Not yet assigned)    Completed LEONOR LOPEZ     Inpatient consult to Palliative Care  Once     Provider:  (Not yet assigned)    Completed DWAYNE DALLAS FAurelio     Inpatient consult to PICC team ([facility])  Once     Provider:  (Not yet assigned)    Completed DOM DUBOIS     Inpatient consult to Pulmonology  Once     Provider:  (Not yet assigned)    Completed DWAYNE DALLAS FAurelio     Inpatient consult to Registered Dietitian/Nutritionist  Once     Provider:  (Not yet assigned)    Completed DWAYNE DALLAS          Pending Diagnostic Studies:     Procedure Component Value Units Date/Time    Basic metabolic panel [[id number]] Collected:  07/27/18 1259    Order Status:  Sent Lab Status:  In process Updated:  07/27/18 1259    Specimen:  Blood from Blood     Basic metabolic panel [[id number]] Collected:  07/18/18 0831    Order Status:  Sent Lab Status:  In process Updated:  07/18/18 0831    Specimen:  Blood from Blood     Basic metabolic panel [[id number]] Collected:  07/17/18 0834    Order Status:  Sent Lab Status:  In process Updated:  07/17/18 0835    Specimen:  Blood from Blood     CBC auto differential [[id number]] Collected:  07/22/18 1348    Order Status:  Sent Lab Status:  In process Updated:  07/22/18 1348    Specimen:  Blood from Blood     VANCOMYCIN, TROUGH [[id number]] Collected:  07/26/18 2157    Order Status:  Sent Lab Status:  In process Updated:  07/26/18 2158    Specimen:  Blood from Blood         Final Active Diagnoses:    Diagnosis Date Noted POA    PRINCIPAL PROBLEM:  VT Storm [I47.2]  2018 No    Respiratory failure requiring intubation [J96.90] 2018 No    Cardiogenic shock [R57.0] 07/10/2018 No    GI bleed [K92.2] 2018 Yes    Abnormal involuntary movements [R25.9] 2012 Yes    Hypernatremia [E87.0] 2018 No    Constipation [K59.00] 2018 No    VAP (ventilator-associated pneumonia) [J95.851]  Unknown    Leukocytosis [D72.829] 2018 No    Sarcoidosis of lung [D86.0] 2012 Yes    Adverse effect of adrenal cortical steroids, initial encounter [T38.0X5A] 2018 Unknown    Type 2 diabetes mellitus without complication, without long-term current use of insulin [E11.9] 2018 Unknown    Acute respiratory failure with hypoxia [J96.01] 2018 No    Lower GI bleeding [K92.2] 2018 Yes    Paced rhythm on cardiac monitor [Z78.9] 2018 Yes    Presence of automatic implantable cardioverter-defibrillator [Z95.810] 2018 Yes    NSVT (nonsustained ventricular tachycardia) [I47.2] 2018 No    ICD (implantable cardioverter-defibrillator) discharge [Z45.02]  Not Applicable    Seizure disorder [G40.909] 2018 Yes    History of stroke [Z86.73] 2018 Not Applicable    Ventricular fibrillation [I49.01] 07/10/2018 No    Heart failure [I50.9] 2018 Yes    Hypotension [I95.9] 2018 Yes    Acute on chronic combined systolic and diastolic CHF, NYHA class 4 [I50.43] 2018 Yes    History of atrial fibrillation [Z86.79] 2018 Not Applicable    CAD (coronary artery disease) [I25.10] 2012 Yes      Problems Resolved During this Admission:    Diagnosis Date Noted Date Resolved POA    Encounter for management of intra-aortic balloon pump [Z45.09] 2018 Not Applicable    Acute renal failure [N17.9] 2018 07/15/2018 Yes      Discharged Condition:     Disposition:     Patient Instructions:   No discharge procedures on file.  Medications:  None (patient  at  medical facility)    Dale Underwood NP  Heart Transplant  [facility]

## 2018-07-30 NOTE — PROCEDURES
"Yonathan Good Jr. is a 57 y.o. male patient.    Temp: 98.7 °F (37.1 °C) (07/29/18 1100)  Pulse: 80 (07/29/18 1415)  Resp: (!) 21 (07/29/18 1415)  BP: (!) 64/46 (levo increased) (07/29/18 1345)  SpO2: (!) 43 % (07/29/18 1415)  Weight: 104.7 kg (230 lb 13.2 oz) (07/29/18 0634)  Height: 5' 11" (180.3 cm) (07/07/18 0201)    PICC  Date/Time: 7/27/2018 2:00 PM  Location procedure was performed: [facility] (SICU)  Performed by: MAX TAVERAS  Pre-operative Diagnosis: cardiogenic shock  Post-operative diagnosis: cardiogenic shock  Consent Done: Yes  Time out: Immediately prior to procedure a time out was called to verify the correct patient, procedure, equipment, support staff and site/side marked as required  Indications: med administration, vascular access and hemodynamic monitoring  Anesthesia: local infiltration  Local anesthetic: lidocaine 1% without epinephrine  Anesthetic Total (mL): 5  Preparation: skin prepped with ChloraPrep  Skin prep agent dried: skin prep agent completely dried prior to procedure  Sterile barriers: all five maximum sterile barriers used - cap, mask, sterile gown, sterile gloves, and large sterile sheet  Hand hygiene: hand hygiene performed prior to central venous catheter insertion  Location details: right internal jugular  Catheter type: triple lumen  Catheter size: 7 Fr  Catheter Length: 15cm    Ultrasound guidance: yes  Vessel Caliber: medium and patent, compressibility normal  Vascular Doppler: not done  Needle advanced into vessel with real time Ultrasound guidance.  Guidewire confirmed in vessel.  Sterile sheath used.  Manometry: esophageal manometry  Number of attempts: 1  Post-procedure: blood return through all ports, chlorhexidine patch, line sutured and sterile dressing applied  Estimated blood loss (mL): 2    Assessment: placement verified by x-ray and successful placement  Complications: none          Max Taveras  7/29/2018  "

## 2018-07-31 LAB
BACTERIA BLD CULT: NORMAL
BACTERIA BLD CULT: NORMAL

## 2018-07-31 NOTE — PHYSICIAN QUERY
PT Name: Yonathan Good Jr.  MR #: [id number]    Physician Query Form - Atrial Fibrillation Specificity     CDS/: Robert Field Jr, RN              Contact information:[phone]     This form is a permanent document in the medical record.     Query Date: July 31, 2018    By submitting this query, we are merely seeking further clarification of documentation. Please utilize your independent clinical judgment when addressing the question(s) below.    The medical record contains the following:   Indicators     Supporting Clinical Findings Location in Medical Record   x Atrial Fibrillation History of atrial fibrillation    -  continue amio  7/28 Heart Transplant Progress Note   x EKG results Atrial-sensed ventricular-paced rhythm    Normal sinus rhythm  Atrial-sensed ventricular-paced rhythm 7/8 EKG Report    7/16 EKG Report    Medication      Treatment      Other         Provider, please further specify the Atrial Fibrillation diagnosis.    [  ] Chronic  [x  ] Paroxysmal  [  ] Persistent  [  ] Other (please specify): ____________________________  [  ] Clinically Undetermined    Please document in your progress notes daily for the duration of treatment until resolved, and include in your discharge summary.

## 2018-08-17 LAB — FUNGUS SPEC CULT: NORMAL

## 2019-01-09 NOTE — CONSULTS
Inpatient consult to lung transplant acknowledged. Patient off unit this afternoon, will attempt to see him tomorrow. Please see full note to follow.    >300

## 2019-05-17 LAB
B CELL RESULTS - XM AUTO: NEGATIVE
B MCS AVERAGE - XM AUTO: -9.75
FXMAU TESTING DATE: NORMAL
HLA AB QL: NEGATIVE
HLA AB SERPL: NEGATIVE
HLA DRB4 1: NORMAL
HLA SSO DNA TYPING CLASS I & II INTERPRETATION: NORMAL
HLA-A 1 SERO. EQUIV: 30
HLA-A 1: NORMAL
HLA-A 2 SERO. EQUIV: 68
HLA-A 2: NORMAL
HLA-B 1 SERO. EQUIV: 60
HLA-B 1: NORMAL
HLA-B 2 SERO. EQUIV: 42
HLA-B 2: NORMAL
HLA-BW 1 SERO. EQUIV: 6
HLA-BW 2 SERO. EQUIV: NORMAL
HLA-C 1: NORMAL
HLA-C 2: NORMAL
HLA-CW 1 SERO. EQUIV: 10
HLA-CW 2 SERO. EQUIV: 17
HLA-DQ 1 SERO. EQUIV: 4
HLA-DQ 2 SERO. EQUIV: 6
HLA-DQB1 1: NORMAL
HLA-DQB1 2: NORMAL
HLA-DRB1 1 SERO. EQUIV: 18
HLA-DRB1 1: NORMAL
HLA-DRB1 2 SERO. EQUIV: 13
HLA-DRB1 2: NORMAL
HLA-DRB3 1: NORMAL
HLA-DRB3 2: NORMAL
HLA-DRB345 1 SERO. EQUIV: 52
HLA-DRB345 2 SERO. EQUIV: NORMAL
HLA-DRB4 2: NORMAL
HLA-DRB5 1: NORMAL
HLA-DRB5 2: NORMAL
HLATY INTERPRETATION: NORMAL
SERUM COLLECTION DT - XM AUTO: NORMAL
SERUM COLLECTION DT: NORMAL
SSDQB TESTING DATE: NORMAL
SSDRB TESTING DATE: NORMAL
SSOA TESTING DATE: NORMAL
SSOB TESTING DATE: NORMAL
SSOC TESTING DATE: NORMAL
SSODR TESTING DATE: NORMAL
T CELL RESULTS - XM AUTO: NEGATIVE
T MCS AVERAGE - XM AUTO: -8
TYSSO TESTING DATE: NORMAL

## 2019-05-18 NOTE — PLAN OF CARE
Problem: Patient Care Overview  Goal: Plan of Care Review  Outcome: Ongoing (interventions implemented as appropriate)  Pt aaox3, TLC placed, cvp 8, svo2 60  Voiding per urinal  No sob, or chest pain.        Home

## 2019-10-30 NOTE — SUBJECTIVE & OBJECTIVE
Interval History: No acute events overnight. Patient is sedated and intubated. V paced at 80, no events on telemetry.    Review of Systems   Unable to perform ROS: intubated     Objective:     Vital Signs (Most Recent):  Temp: 98.3 °F (36.8 °C) (07/19/18 0300)  Pulse: 80 (07/19/18 0854)  Resp: (!) 22 (07/19/18 0854)  BP: (!) 94/53 (07/19/18 0800)  SpO2: 100 % (07/19/18 0854) Vital Signs (24h Range):  Temp:  [98 °F (36.7 °C)-98.8 °F (37.1 °C)] 98.3 °F (36.8 °C)  Pulse:  [80] 80  Resp:  [] 22  SpO2:  [87 %-100 %] 100 %  BP: ()/(47-59) 94/53     Weight: 109 kg (240 lb 4.8 oz)  Body mass index is 33.52 kg/m².     SpO2: 100 %  O2 Device (Oxygen Therapy): ventilator    Physical Exam   Constitutional: He is oriented to person, place, and time. He appears well-developed and well-nourished.   Sedated, intubated   HENT:   Head: Normocephalic and atraumatic.   Eyes: Pupils are equal, round, and reactive to light.   Neck: Normal range of motion. No JVD present.   Cardiovascular: Intact distal pulses.    Audible balloon pump. Left groin balloon pump inserted.   Pulmonary/Chest: Effort normal and breath sounds normal. No respiratory distress. He has no wheezes. He has no rales.   Abdominal: Soft. Bowel sounds are normal. He exhibits no distension. There is no tenderness.   Musculoskeletal: Normal range of motion. He exhibits edema (trace).   Neurological: He is alert and oriented to person, place, and time.   Skin: Skin is dry. No rash noted.   Psychiatric: He has a normal mood and affect. His behavior is normal.       Significant Labs:     Recent Results (from the past 24 hour(s))   Vancomycin, random    Collection Time: 07/18/18 10:45 AM   Result Value Ref Range    Vancomycin, Random 18.7 Not established ug/mL   Basic metabolic panel    Collection Time: 07/18/18  3:45 PM   Result Value Ref Range    Sodium 146 (H) 136 - 145 mmol/L    Potassium 4.1 3.5 - 5.1 mmol/L    Chloride 103 95 - 110 mmol/L    CO2 34 (H) 23 - 29  mmol/L    Glucose 184 (H) 70 - 110 mg/dL    BUN, Bld 51 (H) 6 - 20 mg/dL    Creatinine 1.1 0.5 - 1.4 mg/dL    Calcium 9.3 8.7 - 10.5 mg/dL    Anion Gap 9 8 - 16 mmol/L    eGFR if African American >60.0 >60 mL/min/1.73 m^2    eGFR if non African American >60.0 >60 mL/min/1.73 m^2   POCT glucose    Collection Time: 07/18/18  3:45 PM   Result Value Ref Range    POCT Glucose 204 (H) 70 - 110 mg/dL   Blood culture    Collection Time: 07/18/18  6:03 PM   Result Value Ref Range    Blood Culture, Routine No Growth to date    Blood culture    Collection Time: 07/18/18  6:03 PM   Result Value Ref Range    Blood Culture, Routine No Growth to date    POCT glucose    Collection Time: 07/18/18  8:17 PM   Result Value Ref Range    POCT Glucose 173 (H) 70 - 110 mg/dL   Basic metabolic panel    Collection Time: 07/19/18  1:00 AM   Result Value Ref Range    Sodium 145 136 - 145 mmol/L    Potassium 4.0 3.5 - 5.1 mmol/L    Chloride 102 95 - 110 mmol/L    CO2 35 (H) 23 - 29 mmol/L    Glucose 165 (H) 70 - 110 mg/dL    BUN, Bld 53 (H) 6 - 20 mg/dL    Creatinine 1.2 0.5 - 1.4 mg/dL    Calcium 9.3 8.7 - 10.5 mg/dL    Anion Gap 8 8 - 16 mmol/L    eGFR if African American >60.0 >60 mL/min/1.73 m^2    eGFR if non African American >60.0 >60 mL/min/1.73 m^2   POCT glucose    Collection Time: 07/19/18  1:18 AM   Result Value Ref Range    POCT Glucose 160 (H) 70 - 110 mg/dL   Comprehensive metabolic panel - if not done in ED    Collection Time: 07/19/18  3:30 AM   Result Value Ref Range    Sodium 144 136 - 145 mmol/L    Potassium 3.9 3.5 - 5.1 mmol/L    Chloride 101 95 - 110 mmol/L    CO2 35 (H) 23 - 29 mmol/L    Glucose 140 (H) 70 - 110 mg/dL    BUN, Bld 54 (H) 6 - 20 mg/dL    Creatinine 1.1 0.5 - 1.4 mg/dL    Calcium 9.2 8.7 - 10.5 mg/dL    Total Protein 6.2 6.0 - 8.4 g/dL    Albumin 1.9 (L) 3.5 - 5.2 g/dL    Total Bilirubin 0.5 0.1 - 1.0 mg/dL    Alkaline Phosphatase 56 55 - 135 U/L    AST 15 10 - 40 U/L    ALT 11 10 - 44 U/L    Anion Gap 8 8 -  16 mmol/L    eGFR if African American >60.0 >60 mL/min/1.73 m^2    eGFR if non African American >60.0 >60 mL/min/1.73 m^2   CBC auto differential    Collection Time: 07/19/18  3:30 AM   Result Value Ref Range    WBC 12.18 3.90 - 12.70 K/uL    RBC 2.50 (L) 4.60 - 6.20 M/uL    Hemoglobin 7.1 (L) 14.0 - 18.0 g/dL    Hematocrit 23.6 (L) 40.0 - 54.0 %    MCV 94 82 - 98 fL    MCH 28.4 27.0 - 31.0 pg    MCHC 30.1 (L) 32.0 - 36.0 g/dL    RDW 15.4 (H) 11.5 - 14.5 %    Platelets 148 (L) 150 - 350 K/uL    MPV 11.6 9.2 - 12.9 fL    Immature Granulocytes 3.3 (H) 0.0 - 0.5 %    Gran # (ANC) 10.3 (H) 1.8 - 7.7 K/uL    Immature Grans (Abs) 0.40 (H) 0.00 - 0.04 K/uL    Lymph # 0.6 (L) 1.0 - 4.8 K/uL    Mono # 0.8 0.3 - 1.0 K/uL    Eos # 0.1 0.0 - 0.5 K/uL    Baso # 0.01 0.00 - 0.20 K/uL    nRBC 0 0 /100 WBC    Gran% 84.8 (H) 38.0 - 73.0 %    Lymph% 4.6 (L) 18.0 - 48.0 %    Mono% 6.2 4.0 - 15.0 %    Eosinophil% 1.0 0.0 - 8.0 %    Basophil% 0.1 0.0 - 1.9 %    Differential Method Automated    Protime-INR    Collection Time: 07/19/18  3:30 AM   Result Value Ref Range    Prothrombin Time 10.8 9.0 - 12.5 sec    INR 1.0 0.8 - 1.2   Magnesium - if not done in ED    Collection Time: 07/19/18  3:30 AM   Result Value Ref Range    Magnesium 2.6 1.6 - 2.6 mg/dL   POCT glucose    Collection Time: 07/19/18  3:38 AM   Result Value Ref Range    POCT Glucose 145 (H) 70 - 110 mg/dL   ISTAT PROCEDURE    Collection Time: 07/19/18  7:59 AM   Result Value Ref Range    POC PH 7.443 7.35 - 7.45    POC PCO2 55.7 (H) 35 - 45 mmHg    POC PO2 124 (H) 80 - 100 mmHg    POC HCO3 38.2 (H) 24 - 28 mmol/L    POC BE 14 -2 to 2 mmol/L    POC SATURATED O2 99 95 - 100 %    POC TCO2 40 (H) 23 - 27 mmol/L    Rate 16     Sample ARTERIAL     Site LR     Allens Test Pass     DelSys Adult Vent     Mode AC/PRVC     Vt 450     PEEP 6     FiO2 60    POCT glucose    Collection Time: 07/19/18  8:57 AM   Result Value Ref Range    POCT Glucose 144 (H) 70 - 110 mg/dL        Normal muscle tone/strength

## 2019-12-13 NOTE — PROGRESS NOTES
[facility]  Pulmonology  Progress Note    Patient Name: Yonathan Good Jr.  MRN: [id number]  Admission Date: 7/7/2018  Hospital Length of Stay: 9 days  Code Status: Full Code  Attending Provider: Mohan Cross MD  Primary Care Provider: Edgar Gates MD   Principal Problem: Ventricular tachycardia, incessant    Subjective:     Interval History: No acute events overnight. Patient seen and examined in the morning with family at bedside. Patient remains intubated. On fentanyl and propofol gtt. Obeys commands.     Objective:     Vital Signs (Most Recent):  Temp: 100 °F (37.8 °C) (07/16/18 0700)  Pulse: 80 (07/16/18 0815)  Resp: (!) 24 (07/16/18 0815)  BP: 101/63 (07/16/18 0815)  SpO2: 100 % (07/16/18 0815) Vital Signs (24h Range):  Temp:  [98.5 °F (36.9 °C)-100 °F (37.8 °C)] 100 °F (37.8 °C)  Pulse:  [80-98] 80  Resp:  [15-38] 24  SpO2:  [88 %-100 %] 100 %  BP: ()/(49-70) 101/63     Weight: 111.5 kg (245 lb 13 oz)  Body mass index is 34.28 kg/m².      Intake/Output Summary (Last 24 hours) at 07/16/18 0826  Last data filed at 07/16/18 0800   Gross per 24 hour   Intake          3390.03 ml   Output             3795 ml   Net          -404.97 ml       Physical Exam   HENT:   Head: Normocephalic and atraumatic.   Mouth/Throat: Oropharynx is clear and moist.   Eyes: Pupils are equal, round, and reactive to light.   Cardiovascular: Normal rate and intact distal pulses.    On IABP   Pulmonary/Chest:   Intubated and on ventilator   Decreased breath sounds bilaterally.   Rhonchi present bilaterally.    Abdominal: Soft.   Hypoactive bowel sounds   Musculoskeletal: He exhibits edema (2+ pitting).   Neurological: He is alert. GCS eye subscore is 3. GCS verbal subscore is 1. GCS motor subscore is 6.   RAAS -3    Skin: Skin is warm. Capillary refill takes less than 2 seconds.       Vents:  Vent Mode: A/C (07/16/18 0815)  Set Rate: 16 bmp (07/16/18 0815)  Vt Set: 500 mL (07/16/18 0815)  PEEP/CPAP: 6 cmH20  [FreeTextEntry1] : 1.hypertension: Better controlled today, continue with low-salt diet, follow up in 2 months.\par \par 2.meningioma status post resection with CVA: Follow up with neurology, obtain home health agency from insurance for physical therapy and nursing.\par \par 3.deviated septum at allergic rhinitis: Does not with to see ENT, start on Flonase nasal spray. (07/16/18 0815)  Oxygen Concentration (%): 36 (07/16/18 0815)  Peak Airway Pressure: 29 cmH2O (07/16/18 0815)  Plateau Pressure: 30 cmH20 (07/16/18 0815)  Total Ve: 10.3 mL (07/16/18 0815)  F/VT Ratio<105 (RSBI): (!) 45.54 (07/16/18 0815)    Lines/Drains/Airways     Central Venous Catheter Line                 Percutaneous Central Line Insertion/Assessment - triple lumen  07/09/18 1500 right internal jugular 6 days         Introducer 07/10/18 1548 other (see comments) 5 days          Drain                 NG/OG Tube 07/10/18 1557 [place] sump 16 Fr. Left nostril 5 days         Urethral Catheter 07/10/18 1646 Double-lumen;Latex 16 Fr. 5 days          Airway                 Airway - Non-Surgical 07/10/18 1523 Endotracheal Tube 5 days          Line                 IABP 07/10/18 1550 7.5 Fr. 40 mL 5 days          Peripheral Intravenous Line                 Peripheral IV - Single Lumen 07/12/18 1100 Right Forearm 3 days                Significant Labs:    CBC/Anemia Profile:    Recent Labs  Lab 07/15/18  0400 07/16/18  0418   WBC 11.38 14.20*   HGB 7.4* 7.6*   HCT 23.3* 24.8*   * 157   MCV 93 95   RDW 14.6* 15.1*        Chemistries:    Recent Labs  Lab 07/15/18  0400  07/15/18  1602 07/16/18  0030 07/16/18  0418     < > 141 141 143   K 4.0  < > 4.6 4.6 4.6     < > 104 103 105   CO2 28  < > 27 28 30*   BUN 53*  < > 58* 60* 57*   CREATININE 1.1  < > 1.2 1.2 1.1   CALCIUM 8.3*  < > 8.8 8.7 8.7   ALBUMIN 2.2*  --   --   --  2.2*   PROT 5.2*  --   --   --  5.9*   BILITOT 0.3  --   --   --  0.4   ALKPHOS 55  --   --   --  55   ALT 17  --   --   --  15   AST 10  --   --   --  11   MG 2.4  --   --   --  2.6   < > = values in this interval not displayed.    Recent Lab Results       07/16/18  0824 07/16/18  0818 07/16/18  0418 07/16/18  0417 07/16/18  0030      Immature Granulocytes   CANCELED  Comment:  Result canceled by the ancillary       Immature Grans (Abs)   CANCELED  Comment:  Mild elevation in immature  granulocytes is non specific and   can be seen in a variety of conditions including stress response,   acute inflammation, trauma and pregnancy. Correlation with other   laboratory and clinical findings is essential.    Result canceled by the ancillary         Albumin   2.2(L)       Alkaline Phosphatase   55       Allens Test N/A N/A        ALT   15       Anion Gap   8  10     Aniso   Slight       AST   11       BANDS   3.0       Basophilic Stippling   Occasional       Basophil%   0.0       Total Bilirubin   0.4  Comment:  For infants and newborns, interpretation of results should be based  on gestational age, weight and in agreement with clinical  observations.  Premature Infant recommended reference ranges:  Up to 24 hours.............<8.0 mg/dL  Up to 48 hours............<12.0 mg/dL  3-5 days..................<15.0 mg/dL  6-29 days.................<15.0 mg/dL         Site Other [facility]        BUN, Bld   57(H)  60(H)     Calcium   8.7  8.7     Chloride   105  103     CO2   30(H)  28     Creatinine   1.1  1.2     DelSys Adult Vent Adult Vent        Differential Method   Manual       eGFR if    >60.0  >60.0     eGFR if non    >60.0  Comment:  Calculation used to obtain the estimated glomerular filtration  rate (eGFR) is the CKD-EPI equation.     >60.0  Comment:  Calculation used to obtain the estimated glomerular filtration  rate (eGFR) is the CKD-EPI equation.        Eosinophil%   0.5       FiO2 35 35        Large/Giant Platelets   Present       Glucose   153(H)  189(H)     Gran%   87.5(H)       Hematocrit   24.8(L)       Hemoglobin   7.6(L)       Coumadin Monitoring INR   1.0  Comment:  Coumadin Therapy:  2.0 - 3.0 for INR for all indicators except mechanical heart valves  and antiphospholipid syndromes which should use 2.5 - 3.5.         Lymph%   2.5(L)       Magnesium   2.6       MCH   29.0       MCHC   30.6(L)       MCV   95       Metamyelocytes   0.5       Mode AC/PRVC AC/PRVC         Mono%   4.5       MPV   11.4       Myelocytes   1.5       nRBC   1(A)       PEEP          Platelet Estimate   Appears normal       Platelets   157       POC BE 8 4        POC HCO3 31.6(H) 27.6        POC PCO2 45.8(H) 35.9        POC PH 7.447 7.494(H)        POC PO2 30(LL) 71(L)        POC SATURATED O2 58(L) 95        POC TCO2 33(H) 29(H)        POCT Glucose    178(H)      Poly   Occasional       Potassium   4.6  4.6     Total Protein   5.9(L)       Protime   10.3       Rate 16 16        RBC   2.62(L)       RDW   15.1(H)       Sample VENOUS ARTERIAL        Sodium   143  141     Sp02          Vancomycin, Random          Vancomycin-Trough          Vt 500 500        WBC   14.20(H)                   07/16/18  0029 07/15/18  2215 07/15/18  2128 07/15/18  1958 07/15/18  1607      Immature Granulocytes          Immature Grans (Abs)          Albumin          Alkaline Phosphatase          Allens Test   N/A       ALT          Anion Gap          Aniso          AST          BANDS          Basophilic Stippling          Basophil%          Total Bilirubin          Site   Other       BUN, Bld          Calcium          Chloride          CO2          Creatinine          DelSys   Adult Vent       Differential Method          eGFR if           eGFR if non           Eosinophil%          FiO2   35       Large/Giant Platelets          Glucose          Gran%          Hematocrit          Hemoglobin          Coumadin Monitoring INR          Lymph%          Magnesium          MCH          MCHC          MCV          Metamyelocytes          Mode   AC/PRVC       Mono%          MPV          Myelocytes          nRBC          PEEP   6       Platelet Estimate          Platelets          POC BE   8       POC HCO3   33.6(H)       POC PCO2   63.9(H)       POC PH   7.328(L)       POC PO2   34(LL)       POC SATURATED O2   59(L)       POC TCO2   35(H)       POCT Glucose 207(H)   165(H) 206(H)     Poly           Potassium          Total Protein          Protime          Rate   16       RBC          RDW          Sample   VENOUS       Sodium          Sp02   100       Vancomycin, Random          Vancomycin-Trough  16.1        Vt   500       WBC                      07/15/18  1602 07/15/18  1213 07/15/18  1158 07/15/18  0851 07/15/18  0849      Immature Granulocytes          Immature Grans (Abs)          Albumin          Alkaline Phosphatase          Allens Test   N/A       ALT          Anion Gap 10    9     Aniso          AST          BANDS          Basophilic Stippling          Basophil%          Total Bilirubin          Site   Other       BUN, Bld 58(H)    54(H)     Calcium 8.8    8.7     Chloride 104    103     CO2 27    28     Creatinine 1.2    1.0     Xactium   Adult Vent       Differential Method          eGFR if  >60.0    >60.0     eGFR if non  >60.0  Comment:  Calculation used to obtain the estimated glomerular filtration  rate (eGFR) is the CKD-EPI equation.       >60.0  Comment:  Calculation used to obtain the estimated glomerular filtration  rate (eGFR) is the CKD-EPI equation.        Eosinophil%          FiO2   35       Large/Giant Platelets          Glucose 190(H)    125(H)     Gran%          Hematocrit          Hemoglobin          Coumadin Monitoring INR          Lymph%          Magnesium          MCH          MCHC          MCV          Metamyelocytes          Mode   AC/PRVC       Mono%          MPV          Myelocytes          nRBC          PEEP   8       Platelet Estimate          Platelets          POC BE   7       POC HCO3   32.9(H)       POC PCO2   60.2(H)       POC PH   7.346(L)       POC PO2   31(LL)       POC SATURATED O2   54(L)       POC TCO2   35(H)       POCT Glucose  133(H)  138(H)      Poly          Potassium 4.6    3.9     Total Protein          Protime          Rate   16       RBC          RDW          Sample   VENOUS       Sodium 141    140     Sp02   100        Vancomycin, Random     27.5     Vancomycin-Trough          Vt   500       WBC                        All pertinent labs within the past 24 hours have been reviewed.    Significant Imaging:  I have reviewed all pertinent imaging results/findings within the past 24 hours.  I have reviewed and interpreted all pertinent imaging results/findings within the past 24 hours.    Assessment/Plan:     Respiratory failure requiring intubation    · Oxygen saturation is 100% on an FiO2 of 35% + 6 PEEP  · Infection unlikely   · Respiratory cultures NGTD, blood cultures NGTD  · Afebrile  · Continue diuresis per primary team - Net fluid balance -265 mL in last 24 hours.   · No plans to extubate while IABP remains   · Morning AB.49 / 36 / 71 / 4 / 27.6 / 95%  with vent settings RR 16, , Peep 6, FiO2 35%. Plateau pressure 30.  · TV decreased to 450mL, 6cc/kg , will f/u with afternoon ABG   · SBT tomorrow during sedation holiday   · Recommend decreasing fluid intake - protonix IVPB to IV push, keppra to PO via NG/OG tube         Sarcoidosis of lung    · Sarcoid appears to have minimal contribution to current condition.   · Continue steroids as prescribed and taper once condition improves.          Plan discussed with attending Dr. Rothman , further recommendations as per attending addendum. Please feel free to call with any questions or concerns.    Ruben Bermudez MD  Resident Physician, PGY1       Ruben Bermudez MD  Pulmonology  [facility]

## 2020-06-10 NOTE — HPI
57M with complex history of HFrEF (EF 10-15%) with AICD, pulmonary sarcoid, CVA, admitted for GI bleed. Now s/p polymorphic VT with cardiac arrest and synchronized cardioversion x3 on 7/10 with new IABP. Now sedated, intubated, and on ventilator. we were consulted on 7/11 for ventilator management and recommendations.    Patient initially presented 6/22 with stroke-like symptoms and discharged 7/4 on warf/lovenox. Came back to ED with bright red stools and hypotension. He began experiencing chest discomfort and shortness of breath and was found to be in polymorphic v. Tach.  His condition continued to deteriorate and he developed respiratory distress requiring intubation.  He then entered asystole and ACLS protocol ensued for 16 minutes before ROSC.  He is now intubated, sedated (propofol, fentanyl), on pressors (levophed), insulin gtt, and anti-arrhythmics (amiodarone, lidocaine).  He has an intra-aortic balloon pump inserted in the left axilla, and CVC in the right IJ.  Pulmonary medicine was consulted for management of vent.   wife
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
## 2020-09-19 NOTE — ASSESSMENT & PLAN NOTE
Continue prednisone, breo-ellipta, and albuterol prn  Currently without complaint   No indicators present

## 2021-09-09 NOTE — ASSESSMENT & PLAN NOTE
VT storm with multiple events of both sustained and non-sustained VT, as well as MMVT and PMVT  BI-V ICD interrogation reveals 15 episodes of VT treated with ATP x 15 and shocks x 12  Patient's native rate following stabilization was bradycardic in the 50's above his base rate set by his ICD; rate increased to 80, no changes made to therapy zones  Patient was discharged on amiodarone 400 mg BID from a recent admission, currently receiving amiodarone and lidocaine infusions  We recommend continuing amiodarone, consider decreasing dose to 0.5 mg/min  Lopressor 2.5 mg IV every 4 hours   [FreeTextEntry1] : CHINEDU FORREST is a 76 year old female who presents for evaluation of intracranial stenosis. Past medical history includes RA, HTN, HLD, DM.  Former smoker, quit 20 years ago.  She is accompanied by her daughter, lives with her . \par  The patient had been in [place] from October  to December 2020. Since returning, she has been having increasing weakness, difficulty ambulating, frequent falls with loss of balance. She was evaluated by a neurologist Gloria OTOOLE (neurology) An MRA was significant for P1 stenosis of the right posterior cerebral artery and also the cavernous portion of the right ICA. \par Per daughter, she is having memory and cognition changes since December 2020. She denies any headaches, n/v or vision changes.  She has not had seizures.  She has intermittent urinary incontinence. She ambulates with the assistance of a rolling walker since December 2020.  \par She takes aspirin 81 mg daily.\par Denies any family history of stroke or aneurysm. \par

## 2022-08-17 NOTE — HPI
"Patient is intubated so hx is from chart: Per H&P:    57 year old male with PMHx significant for CAD s/p PCIs, HFrEF secondary to ischemic cardiomyopathy (EF 5-10%) s/p ICD, Afib (on warfarin), pulmonary sarcoid (on chronic prednisone), hx of L parietal CVA recently dc'ed on 7/4.       He was initially admitted to "stroke/neuro" service on 6/22 with dysarthria and stroke-like symptoms. Extensive work up was negative for recurrent CVA and so no tPA was administered. Patient developed atypical chest pain two days into his hospital course and was noted to be in monomorphic VT (6/24) which was treated with ATP then with shock due to recurrent VT in VF zone. Given his active cardiac issues this prompted transfer to heart failure service where the patient was initiated on IV amiodarone and beta blocker with subsequent resolution of his VT. He was eventually transitioned to PO amiodarone 400 mg BID x 2 weeks (from 6/25-7/9) followed by amiodarone 400 mg daily thereafter per EP recommendations. Of note patient underwent LHC on 6/25 given his extensive ischemic history which did not reveal a culprit lesion, therefore no interventions were done. He was noted to have an elevated LVEDP to 45 however and was administered IV diuresis before being transitioned back to his PO diuretic regimen. Patient also completed heart failure pathway during his hospital course (eg completed colonoscopy on 7/2) as part of his work up for advanced options. Follows with Dr. Berman of heart failure/transplant as an outpatient.      The patient was discharged home in stable condition on 7/4/2018. He was dc'ed on warf/lovenox bridge given CHADS2-VaSc of 5.  Of note, he is no longer a candidate for AO.  He presented yesterday to [facility] with BRBPR and was found to be in acute renal failure as well.  He notes that, on the night of the 5th and morning of 6th, he had 6 bright red bloody bm's.  He went to his PMD who noted he might have " internal hemmorhoids.  He was told to stop his lovenox.  He then returned home and flet very lightheaded and dizzy.  He wisely took his blood pressure and noted it was 70-80/50s whereas it is normally 117/70s.  He went in to ED immediately.       He is now intubated in the ICU. He is on stress dose steroids. ID is consulted for possible VAP.   [phone]

## 2022-12-05 NOTE — PROGRESS NOTES
Consent obtained. 22 g sl started in left forearm for optison use. optison given ivp via sl for imaging, denies transfusion rxn. Tolerated well. Sl d/lacey after. Pressure applied.    Additional Notes: Pt would like to consider allergy testing if lesions do not resolve. Render Risk Assessment In Note?: no Detail Level: Simple

## 2023-09-08 NOTE — PROGRESS NOTES
PRE-EDUCATION BOOKLET NOTE:    Met with Yonathan Good and had a brief discussion regarding the advanced heart failure evaluation process including options for heart transplantation and/or left ventricular assist device (LVAD) implantation.     Heart Transplant Educational Booklet given to patient, which included the following handouts:  · Treatment options for Advanced Heart Failure: A Referral Guide for patients  · Pre Heart Transplant Coordinator Team Contact Information   · Recipient Informed Consent   · Wellness Contract  · Multiple Listing Protocol and [facility] toll free numbers   · VAD Education Packet   · Advanced Directives  · 8 Step Plan for Heart Failure Patients     Pt completed RHC today with hemodynamics similar to previous study; wedge improved. Per Dr. Berman, pulmonary to be consulted for further recs regarding sarcoid disease.  Return dates to be determined pending note from Dr. Berman.   0 = swallows foods/liquids without difficulty

## 2023-10-17 NOTE — PROGRESS NOTES
CALLED PT TO SCHEDULE AN APPT WITH DR FAJARDO IN 4-6WKS WITH REPEAT PSA PER MARILU, NO ANSWER, LMOM.   [facility]  Heart Transplant  Progress Note    Patient Name: Yonathan Good Jr.  MRN: [id number]  Admission Date: 7/7/2018  Hospital Length of Stay: 9 days  Attending Physician: Mohan Cross MD  Primary Care Provider: Edgar Gates MD  Principal Problem:Ventricular tachycardia, incessant    Subjective:     Interval History: Patient following commands during sedation vacation this morning. CVP 8-9 overnight. Off levo since 6 am.    Continuous Infusions:   fentanyl 15 mL/hr at 07/16/18 0900    furosemide (LASIX) 2 mg/mL infusion (non-titrating) 10 mg/hr (07/16/18 1029)    propofol 35 mcg/kg/min (07/16/18 1034)     Scheduled Meds:   albuterol sulfate  2.5 mg Nebulization Q4H    amiodarone  200 mg Oral TID    aspirin  81 mg Oral Daily    atorvastatin  40 mg Oral Daily    budesonide  0.5 mg Nebulization Q12H    ceFEPime (MAXIPIME) IVPB  2 g Intravenous Q8H    chlorhexidine  15 mL Mouth/Throat BID    chlorhexidine  15 mL Mouth/Throat BID    docusate  100 mg Oral Daily    fluticasone-vilanterol  1 puff Inhalation Daily    gabapentin  600 mg Oral BID    heparin (porcine)  5,000 Units Subcutaneous Q8H    levETIRAcetam  500 mg Oral BID    metoprolol  2.5 mg Intravenous Q4H    mexiletine  150 mg Oral Q8H    pantoprozole (PROTONIX) 40 mg/100 mL D5W IVPB  40 mg Intravenous BID    [START ON 7/17/2018] predniSONE  40 mg Oral Daily    sodium chloride 0.9%  3 mL Intravenous Q8H    vancomycin (VANCOCIN) IVPB  1,000 mg Intravenous Q12H     PRN Meds:sodium chloride, ALPRAZolam, benzonatate, calcium gluconate IVPB, calcium gluconate IVPB, calcium gluconate IVPB, carisoprodol, dextrose 50%, glucagon (human recombinant), HYDROcodone-acetaminophen, insulin aspart U-100, magnesium sulfate IVPB, magnesium sulfate IVPB, nitroGLYCERIN, potassium chloride in water **AND** potassium chloride in water **AND** potassium chloride in water, sodium phosphate IVPB, sodium phosphate IVPB, sodium phosphate  IVPB    Review of patient's allergies indicates:  No Known Allergies  Objective:     Vital Signs (Most Recent):  Temp: 100 °F (37.8 °C) (07/16/18 0700)  Pulse: 80 (07/16/18 1000)  Resp: 18 (07/16/18 1000)  BP: (!) 91/50 (07/16/18 1000)  SpO2: 96 % (07/16/18 1000) Vital Signs (24h Range):  Temp:  [98.5 °F (36.9 °C)-100 °F (37.8 °C)] 100 °F (37.8 °C)  Pulse:  [80-98] 80  Resp:  [15-38] 18  SpO2:  [88 %-100 %] 96 %  BP: ()/(49-70) 91/50     Patient Vitals for the past 72 hrs (Last 3 readings):   Weight   07/16/18 0400 111.5 kg (245 lb 13 oz)   07/15/18 0400 109.4 kg (241 lb 2.9 oz)   07/14/18 0400 111.2 kg (245 lb 2.4 oz)     Body mass index is 34.28 kg/m².      Intake/Output Summary (Last 24 hours) at 07/16/18 1117  Last data filed at 07/16/18 0900   Gross per 24 hour   Intake          3080.03 ml   Output             3155 ml   Net           -74.97 ml       Hemodynamic Parameters:       Telemetry: paced at 80 bpm    Physical Exam   Constitutional: He appears well-developed and well-nourished. He is intubated.   Daughter and brother at bedside.    HENT:   Head: Normocephalic and atraumatic.   Neck: Normal range of motion. Neck supple. JVD: difficult to assess on ventialtor.   Right TLC   Cardiovascular:   Warm and well perfused in upper and lower extremities    Pulmonary/Chest: He is intubated.   Coarse breath sounds, ventilated.    Abdominal: Soft. Bowel sounds are normal. He exhibits no distension. There is no tenderness.   Musculoskeletal: He exhibits no edema.   Neurological:   Following commands, able to move all 4 extremities on command   Skin: Skin is warm and dry.   Nursing note and vitals reviewed.      Significant Labs:  CBC:    Recent Labs  Lab 07/14/18  0400 07/15/18  0400 07/16/18  0418   WBC 13.19* 11.38 14.20*   RBC 2.75* 2.51* 2.62*   HGB 8.3* 7.4* 7.6*   HCT 25.9* 23.3* 24.8*    146* 157   MCV 94 93 95   MCH 30.2 29.5 29.0   MCHC 32.0 31.8* 30.6*     BNP:  No results for input(s): BNP in  the last 168 hours.    Invalid input(s): BNPTRIAGELBLO  CMP:    Recent Labs  Lab 07/14/18  0400  07/15/18  0400  07/16/18  0030 07/16/18  0418 07/16/18  0800   *  < > 171*  < > 189* 153* 172*   CALCIUM 8.2*  < > 8.3*  < > 8.7 8.7 8.7   ALBUMIN 2.5*  --  2.2*  --   --  2.2*  --    PROT 5.6*  --  5.2*  --   --  5.9*  --    *  < > 138  < > 141 143 141   K 4.8  4.8  < > 4.0  < > 4.6 4.6 4.6   CO2 26  < > 28  < > 28 30* 28   CL 97  < > 101  < > 103 105 105   BUN 49*  < > 53*  < > 60* 57* 56*   CREATININE 1.8*  < > 1.1  < > 1.2 1.1 1.0   ALKPHOS 59  --  55  --   --  55  --    ALT 22  --  17  --   --  15  --    AST 12  --  10  --   --  11  --    BILITOT 0.3  --  0.3  --   --  0.4  --    < > = values in this interval not displayed.   Coagulation:     Recent Labs  Lab 07/14/18  0400 07/15/18  0400 07/16/18  0418   INR 1.4* 1.1 1.0     LDH:  No results for input(s): LDH in the last 72 hours.  Microbiology:  Microbiology Results (last 7 days)     Procedure Component Value Units Date/Time    Blood culture [[id number]] Collected:  07/13/18 1309    Order Status:  Completed Specimen:  Blood from Peripheral, Hand, Left Updated:  07/15/18 1812     Blood Culture, Routine No Growth to date     Blood Culture, Routine No Growth to date     Blood Culture, Routine No Growth to date    Blood culture [[id number]] Collected:  07/13/18 1350    Order Status:  Completed Specimen:  Blood from Peripheral, Hand, Right Updated:  07/15/18 1812     Blood Culture, Routine No Growth to date     Blood Culture, Routine No Growth to date     Blood Culture, Routine No Growth to date    Culture, Respiratory with Gram Stain [[id number]] Collected:  07/13/18 1129    Order Status:  Completed Specimen:  Respiratory from Endotracheal Aspirate Updated:  07/14/18 1138     Respiratory Culture Normal respiratory peng     Gram Stain (Respiratory) Few WBC's     Gram Stain (Respiratory) No organisms seen          I have reviewed all pertinent labs within  "the past 24 hours.    Estimated Creatinine Clearance: 106 mL/min (based on SCr of 1 mg/dL).    Diagnostic Results:  I have reviewed and interpreted all pertinent imaging results/findings within the past 24 hours.    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      He was initially admitted to "stroke/neuro" service on 6/22 with dysarthria and stroke-like symptoms. Extensive work up was negative for recurrent CVA and so no tPA was administered. Patient developed atypical chest pain two days into his hospital course and was noted to be in monomorphic VT (6/24) which was treated with ATP then with shock due to recurrent VT in VF zone. Given his active cardiac issues this prompted transfer to heart failure service where the patient was initiated on IV amiodarone and beta blocker with subsequent resolution of his VT. He was eventually transitioned to PO amiodarone 400 mg BID x 2 weeks (from 6/25-7/9) followed by amiodarone 400 mg daily thereafter per EP recommendations. Of note patient underwent LHC on 6/25 given his extensive ischemic history which did not reveal a culprit lesion, therefore no interventions were done. He was noted to have an elevated LVEDP to 45 however and was administered IV diuresis before being transitioned back to his PO diuretic regimen. Patient also completed heart failure pathway during his hospital course (eg completed colonoscopy on 7/2) as part of his work up for advanced options. Follows with Dr. Berman of heart failure/transplant as an outpatient.      The patient was discharged home in stable condition on 7/4/2018. He was dc'ed on warf/lovenox bridge given CHADS2-VaSc of 5.  Of note, he is no longer a candidate for AO.  He presented yesterday to [facility] with BRBPR and was " found to be in acute renal failure as well.  He notes that, on the night of the 5th and morning of 6th, he had 6 bright red bloody bm's.  He went to his PMD who noted he might have internal hemmorhoids.  He was told to stop his lovenox.  He then returned home and flet very lightheaded and dizzy.  He wisely took his blood pressure and noted it was 70-80/50s whereas it is normally 117/70s.  He went in to ED immediately.  There he was noted to have the following pertinent lab values:  Hg 9.2 (11.7 prior)  INR 2.9  Na 131 (139 prior)  K 6.18  BUN 43 (18 prior)  Cr 2.88 (1.1-1.4 at baseline)  proBNP 2755  He was transferred to [facility] for further evaluation and care.  Of note, he underwent screening c-scope on 7/2 during which the following was noted and done: Diverticulosis in the sigmoid colon and in the descending colon, Two 8 to 10 mm polyps in the sigmoid colon and in the descending colon, removed with a hot snare, resected and retrieved, ligated.        * VT Storm    -VT storm 7/10/18. Polymorphic and monomorphic. Degenerated into VF. 12 ICD shocks  -Intubated, IABP placed emergently at bedside.   -Amio loaded x 2, gtt started per protocol.  Now on amio 200mg PO TID. Lidocaine gtt started at 1, increased to 2 when patient had more VT-->VF and AICD shocks. Off lidocaine, continue mexilitene TID  -EP consulted, appreciate their assistance. Pacing rate increased to 80, metoprolol IV added.  -Continue support with IABP 1:1   -K goal >4.5, Mg >2.5  - regimen per EP recs one patient is able to take PO   - Toprol 150 mg, mexiletine 200 BID, Amio 200 TID for 1 weeks, 200 BID for 4 weeks, 300 QD thereafter        Hypotension    - Levophed started during code 7/10/18  - Off this am        Cardiogenic shock    - IABP placed emergently 7/10/18  - Daily CXR   - Augmenting well at 1:1. SVO2 58%. CO ~ 9 L/min, CI 3.9   - CVP 8-9 overnight. Increase lasix today        Encounter for management of intra-aortic balloon pump    -IABP  placed 7/10/2018  -Daily CXR        Respiratory failure requiring intubation    -Emergently intubated 7/10/18 for impending respiratory failure/need for IABP and inability to lay fat  -History of pulmonary sarcoidosis  -Methylpred given 7/10/18, hydrocortisone 100 mg q8 started 7/11/18. Transition to PO prednisone today.   -Pulmonary consult for assistance with sarcoid & vent management.  -CXR daily  -Spoke with pulm at bedside today, Concern for PNA & pulm edema, less concern for ARDS given how well is he oxygenating with minimal vent support. Considering bronch if CXR does not improve soon        VAP (ventilator-associated pneumonia)    -Being covered with vanc/cefepime  -Cultures NGTD          History of stroke    -On coumadin prior to admit  -Will discuss need for systemic anticoagulation        Ventricular fibrillation    -See VT        Chronic systolic CHF (congestive heart failure)    - See cardiogenic shock        GI bleed    - INR subtherapeutic  - Coumadin on hold, will discuss anticoagulation  - Protonix increased to 40 mg BID (changed to IV)  -Had screening colonoscopy with hot snare polypectomy during last admission; most likely culprit post polypectomy bleeding  - H & H stable, normal BMs since admission        History of atrial fibrillation    -  continue amio         Abnormal involuntary movements    - Continue keppra (changed to IV)  - possible seizure activity noted on 7/12/18 (twitching right face and UEs lasting 5 min)   - neuro consulted and increased keppra to 1000 IV 12   - continuous EEG read and negative for seizure activity   - will start de-escalation of keppra today and change to PO        CAD (coronary artery disease)    - Continue atorvastatin, nitro prn  - ASA restarted        Sarcoidosis of lung    - Continue prednisone, breo-ellipta, and albuterol prn          PT  Tube feeds  DVT PPx    Uninterrupted Critical Care/Counseling Time (not including procedures): 50 minutes    Catalina EID  MERRITT Paredes  Heart Transplant  [facility]

## 2024-01-26 NOTE — ASSESSMENT & PLAN NOTE
- Continue prednisone, breo-ellipta, and albuterol prn   Rivaroxaban/Xarelto - Compliance/Rivaroxaban/Xarelto - Dietary Advice/Rivaroxaban/Xarelto - Follow up monitoring/Rivaroxaban/Xarelto - Potential for adverse drug reactions and interactions

## 2024-11-02 NOTE — PROGRESS NOTES
Admit Note     Met with patient to assess needs. Patient is a 57 y.o.  male, admitted for GI bleed.      Patient admitted from home on 7/7/2018 .  At this time, patient presents as alert and oriented x 4, good eye contact, calm and communicative.  At this time, patients caregiver is not in attendance.    Household/Family Systems     Patient resides with patient's spouse or when necessary with his daughter and her family, at    Mailing address   [address] [place] [id number].        Physical Address:  [address] [place] [phone]      Support system includes spouse, three daughters and other extended family.    Patient does not have dependents that are need of being cared for.   Pt's 17 yr old daughter usually comes with pt to appointments. The  Pt's other two daughters are from his first marriage and they are 36 and 34 years old.  One daughter lives in [place] and other daughter lives in [place].      Patients primary caregiver is self.   Pt's spouse is in remission from cancer, however she has a trach and is not able to communicate over the phone.   Pt's home: [phone]  Pt's cell:  [phone],   Additional emergency contact:    Jenny (sister, lives in [place]) [phone]       During admission, patient's caregiver plans to stay at home.  Confirmed patient and patients caregivers do have access to reliable transportation.    Cognitive Status/Learning     Patient reports reading ability as 12th grade and states patient does not have difficulty with reading, writing, seeing, hearing, comprehension, learning and memory.   Pt reports he needs to see an eye doctor.  Patient reports patient learns best by all methods.   Needed: No.   Highest education level: High School (9-12) or GED    Vocation/Disability   .  Working for Income: No  If no, reason not working: Disability  Patient reports he started receiving SSI in 2011.       Adherence     Patient reports  a medium level of adherence to patients health care regimen. Pt reports that he does not always follow his diet.  Adherence counseling and education provided. Patient verbalizes understanding.    Substance Use    Patient reports the following substance usage.    Tobacco: none, patient denies any use.  Alcohol: none, pt reports he quit drinking a few years ago.  Illicit Drugs/Non-prescribed Medications: none, patient denies any use.  Patient states clear understanding of the potential impact of substance use.  Substance abstinence/cessation counseling, education and resources provided and reviewed.     Services Utilizing/ADLS    Infusion Service: Prior to admission, patient utilizing? no  Home Health: Prior to admission, patient utilizing? no  DME: Prior to admission, yes nebulizar  Pulmonary/Cardiac Rehab: Prior to admission, no  Dialysis:  Prior to admission, no  Transplant Specialty Pharmacy:  Prior to admission, no.    Prior to admission, patient reports patient was independent with ADLS and was driving. Pt's daughter can assist with transportation.  Pt reports his older daughter works for [employer] and he can fly home when discharged.    Patient reports patient is able to care for self at this time..  Patient indicates a willingness to care for self once medically cleared to do so.    Insurance/Medications    Insured by   Payor/Plan Subscr  Sex Relation Sub. Ins. ID Effective Group Num   1. MEDICAID - [place]* [place]G MCBRIDE . 1960 Male  [id number]* 11                                    [address]      Primary Insurance (for UNOS reporting): Public Insurance - Medicaid  Secondary Insurance (for UNOS reporting): None    Patient reports patient is able to obtain and afford medications at this time and at time of discharge.    Living Will/Healthcare Power of     Patient states patient does not have a LW and/or HCPA.   provided education regarding LW and HCPA and the completion of  forms.    Coping/Mental Health    Patient is coping adequately with the aid of  family members.   Patient indicates mental health difficulties.   Pt reports a history of anxiety.  Pt reports he takes Xanax 2x a day.  Pt does not attend out pt counseling.  Pt reports he has good family support.  Pt reports he is in agreement with discharge to home tomorrow. General support provided.     Discharge Planning    At time of discharge, patient plans to return to patient's home under the care of self .  Patients reports he may transport home by plane.    Per rounds today, expected discharge date is possibly tomorrow. Patient and patients caregiver  verbalize understanding and are involved in treatment planning and discharge process.    Additional Concerns    Patient is being followed for needs, education, resources, information, emotional support, supportive counseling, and for supportive and skilled discharge plan of care.  providing ongoing psychosocial support, education, resources and d/c planning as needed.  SW remains available. Patient denies additional needs and/or concerns at this time. Patient verbalizes understanding and agreement with information reviewed, social work availability, and how to access available resources as needed.   Prophylactic measure

## 2025-04-29 NOTE — PT/OT/SLP PROGRESS
Speech Language Pathology  Discharge Summary      Yonathan Good Jr.  MRN: [id number]    Patient not seen today secondary to Other (Comment). Patient transferred to ICU and will require new orders for ST to continue to follow. Please re-consult when appropriate. Thank you.    ELLY Hernandez., [facility]-SLP  Speech-Language Pathology  Pager: [phone]  6/25/2018    
Diet, Soft and Bite Sized:   Supplement Feeding Modality:  Oral  Ensure Plus High Protein Cans or Servings Per Day:  1       Frequency:  Two Times a day (04-29-25 @ 07:46)